# Patient Record
Sex: MALE | Race: WHITE | Employment: FULL TIME | URBAN - NONMETROPOLITAN AREA
[De-identification: names, ages, dates, MRNs, and addresses within clinical notes are randomized per-mention and may not be internally consistent; named-entity substitution may affect disease eponyms.]

---

## 2017-02-21 ENCOUNTER — OFFICE VISIT (OUTPATIENT)
Dept: CARDIOLOGY | Age: 57
End: 2017-02-21
Payer: COMMERCIAL

## 2017-02-21 VITALS
DIASTOLIC BLOOD PRESSURE: 90 MMHG | HEIGHT: 76 IN | BODY MASS INDEX: 30.08 KG/M2 | SYSTOLIC BLOOD PRESSURE: 144 MMHG | HEART RATE: 62 BPM | WEIGHT: 247 LBS

## 2017-02-21 DIAGNOSIS — I10 ESSENTIAL HYPERTENSION: ICD-10-CM

## 2017-02-21 DIAGNOSIS — I25.10 CORONARY ARTERY DISEASE INVOLVING NATIVE CORONARY ARTERY OF NATIVE HEART WITHOUT ANGINA PECTORIS: Primary | ICD-10-CM

## 2017-02-21 PROCEDURE — 99212 OFFICE O/P EST SF 10 MIN: CPT | Performed by: INTERNAL MEDICINE

## 2017-04-04 DIAGNOSIS — I10 ESSENTIAL HYPERTENSION: Primary | ICD-10-CM

## 2017-04-04 RX ORDER — LISINOPRIL 20 MG/1
20 TABLET ORAL 2 TIMES DAILY
Qty: 30 TABLET | Refills: 0 | Status: SHIPPED | OUTPATIENT
Start: 2017-04-04 | End: 2017-04-04 | Stop reason: SDUPTHER

## 2017-04-04 RX ORDER — LISINOPRIL 20 MG/1
20 TABLET ORAL 2 TIMES DAILY
Qty: 180 TABLET | Refills: 3 | Status: SHIPPED | OUTPATIENT
Start: 2017-04-04

## 2018-03-16 ENCOUNTER — TELEPHONE (OUTPATIENT)
Dept: CARDIOLOGY | Age: 58
End: 2018-03-16

## 2018-05-04 ENCOUNTER — TELEPHONE (OUTPATIENT)
Dept: CARDIOLOGY | Age: 58
End: 2018-05-04

## 2019-03-22 ENCOUNTER — OFFICE VISIT (OUTPATIENT)
Dept: CARDIOLOGY | Age: 59
End: 2019-03-22
Payer: COMMERCIAL

## 2019-03-22 VITALS
BODY MASS INDEX: 29.59 KG/M2 | HEART RATE: 66 BPM | DIASTOLIC BLOOD PRESSURE: 72 MMHG | HEIGHT: 76 IN | SYSTOLIC BLOOD PRESSURE: 130 MMHG | WEIGHT: 243 LBS

## 2019-03-22 DIAGNOSIS — I10 ESSENTIAL HYPERTENSION: ICD-10-CM

## 2019-03-22 DIAGNOSIS — I25.10 CORONARY ARTERY DISEASE INVOLVING NATIVE CORONARY ARTERY OF NATIVE HEART WITHOUT ANGINA PECTORIS: Primary | ICD-10-CM

## 2019-03-22 DIAGNOSIS — Z72.0 TOBACCO ABUSE: ICD-10-CM

## 2019-03-22 PROCEDURE — 93000 ELECTROCARDIOGRAM COMPLETE: CPT | Performed by: CLINICAL NURSE SPECIALIST

## 2019-03-22 PROCEDURE — 99213 OFFICE O/P EST LOW 20 MIN: CPT | Performed by: CLINICAL NURSE SPECIALIST

## 2019-03-25 ENCOUNTER — HOSPITAL ENCOUNTER (OUTPATIENT)
Dept: NON INVASIVE DIAGNOSTICS | Age: 59
Discharge: HOME | End: 2019-03-25
Payer: COMMERCIAL

## 2019-03-25 VITALS — SYSTOLIC BLOOD PRESSURE: 150 MMHG | HEART RATE: 86 BPM | DIASTOLIC BLOOD PRESSURE: 95 MMHG

## 2019-03-25 DIAGNOSIS — I10 ESSENTIAL HYPERTENSION: ICD-10-CM

## 2019-03-25 DIAGNOSIS — I25.10 CORONARY ARTERY DISEASE INVOLVING NATIVE CORONARY ARTERY OF NATIVE HEART WITHOUT ANGINA PECTORIS: ICD-10-CM

## 2019-03-25 LAB
LV EF: 35 %
LVEF MODALITY: NORMAL

## 2019-03-25 PROCEDURE — 93350 STRESS TTE ONLY: CPT

## 2019-03-25 PROCEDURE — 2500000003 HC RX 250 WO HCPCS: Performed by: INTERNAL MEDICINE

## 2019-03-25 RX ORDER — METOPROLOL TARTRATE 5 MG/5ML
5 INJECTION INTRAVENOUS PRN
Status: DISCONTINUED | OUTPATIENT
Start: 2019-03-25 | End: 2019-03-26 | Stop reason: HOSPADM

## 2019-03-25 RX ADMIN — METOPROLOL TARTRATE 5 MG: 1 INJECTION, SOLUTION INTRAVENOUS at 16:10

## 2019-03-27 DIAGNOSIS — Z95.5 PRESENCE OF STENT IN RIGHT CORONARY ARTERY: Primary | ICD-10-CM

## 2019-03-27 DIAGNOSIS — Z72.0 TOBACCO ABUSE: ICD-10-CM

## 2019-03-27 DIAGNOSIS — I25.10 CORONARY ARTERY DISEASE INVOLVING NATIVE CORONARY ARTERY OF NATIVE HEART WITHOUT ANGINA PECTORIS: ICD-10-CM

## 2019-04-11 ENCOUNTER — APPOINTMENT (OUTPATIENT)
Dept: GENERAL RADIOLOGY | Age: 59
End: 2019-04-11
Attending: INTERNAL MEDICINE
Payer: COMMERCIAL

## 2019-04-11 ENCOUNTER — TELEPHONE (OUTPATIENT)
Dept: CARDIOLOGY | Age: 59
End: 2019-04-11

## 2019-04-11 ENCOUNTER — HOSPITAL ENCOUNTER (OUTPATIENT)
Dept: CARDIAC CATH/INVASIVE PROCEDURES | Age: 59
Setting detail: OUTPATIENT SURGERY
Discharge: HOME | End: 2019-04-11
Attending: INTERNAL MEDICINE | Admitting: INTERNAL MEDICINE
Payer: COMMERCIAL

## 2019-04-11 VITALS
DIASTOLIC BLOOD PRESSURE: 73 MMHG | BODY MASS INDEX: 29.83 KG/M2 | SYSTOLIC BLOOD PRESSURE: 139 MMHG | HEIGHT: 76 IN | OXYGEN SATURATION: 93 % | WEIGHT: 245 LBS | RESPIRATION RATE: 15 BRPM | TEMPERATURE: 99.1 F | HEART RATE: 64 BPM

## 2019-04-11 DIAGNOSIS — R94.39 ABNORMAL STRESS ECHO: ICD-10-CM

## 2019-04-11 LAB
ANION GAP SERPL CALCULATED.3IONS-SCNC: 12 MMOL/L (ref 7–19)
BUN BLDV-MCNC: 22 MG/DL (ref 8–23)
CALCIUM SERPL-MCNC: 9.5 MG/DL (ref 8.8–10.2)
CHLORIDE BLD-SCNC: 104 MMOL/L (ref 98–111)
CO2: 24 MMOL/L (ref 22–29)
CREAT SERPL-MCNC: 1.1 MG/DL (ref 0.5–1.2)
GFR NON-AFRICAN AMERICAN: >60
GLUCOSE BLD-MCNC: 110 MG/DL (ref 74–109)
HCT VFR BLD CALC: 49.9 % (ref 42–52)
HEMOGLOBIN: 16.9 G/DL (ref 14–18)
MCH RBC QN AUTO: 30.7 PG (ref 27–31)
MCHC RBC AUTO-ENTMCNC: 33.9 G/DL (ref 33–37)
MCV RBC AUTO: 90.6 FL (ref 80–94)
PDW BLD-RTO: 12.8 % (ref 11.5–14.5)
PLATELET # BLD: 223 K/UL (ref 130–400)
PMV BLD AUTO: 10 FL (ref 9.4–12.4)
POTASSIUM SERPL-SCNC: 4.2 MMOL/L (ref 3.5–5)
RBC # BLD: 5.51 M/UL (ref 4.7–6.1)
SODIUM BLD-SCNC: 140 MMOL/L (ref 136–145)
WBC # BLD: 9.7 K/UL (ref 4.8–10.8)

## 2019-04-11 PROCEDURE — 99152 MOD SED SAME PHYS/QHP 5/>YRS: CPT | Performed by: INTERNAL MEDICINE

## 2019-04-11 PROCEDURE — 2580000003 HC RX 258: Performed by: INTERNAL MEDICINE

## 2019-04-11 PROCEDURE — 99153 MOD SED SAME PHYS/QHP EA: CPT | Performed by: INTERNAL MEDICINE

## 2019-04-11 PROCEDURE — 85027 COMPLETE CBC AUTOMATED: CPT

## 2019-04-11 PROCEDURE — 93571 IV DOP VEL&/PRESS C FLO 1ST: CPT | Performed by: INTERNAL MEDICINE

## 2019-04-11 PROCEDURE — 6360000004 HC RX CONTRAST MEDICATION: Performed by: INTERNAL MEDICINE

## 2019-04-11 PROCEDURE — 36415 COLL VENOUS BLD VENIPUNCTURE: CPT

## 2019-04-11 PROCEDURE — C1894 INTRO/SHEATH, NON-LASER: HCPCS

## 2019-04-11 PROCEDURE — 2709999900 HC NON-CHARGEABLE SUPPLY

## 2019-04-11 PROCEDURE — 6360000002 HC RX W HCPCS

## 2019-04-11 PROCEDURE — 71046 X-RAY EXAM CHEST 2 VIEWS: CPT

## 2019-04-11 PROCEDURE — 93459 L HRT ART/GRFT ANGIO: CPT | Performed by: INTERNAL MEDICINE

## 2019-04-11 PROCEDURE — C1769 GUIDE WIRE: HCPCS

## 2019-04-11 PROCEDURE — 80048 BASIC METABOLIC PNL TOTAL CA: CPT

## 2019-04-11 PROCEDURE — C1760 CLOSURE DEV, VASC: HCPCS

## 2019-04-11 RX ORDER — SODIUM CHLORIDE 9 MG/ML
INJECTION, SOLUTION INTRAVENOUS CONTINUOUS
Status: DISCONTINUED | OUTPATIENT
Start: 2019-04-11 | End: 2019-04-11 | Stop reason: HOSPADM

## 2019-04-11 RX ORDER — ACETAMINOPHEN 325 MG/1
650 TABLET ORAL EVERY 4 HOURS PRN
Status: DISCONTINUED | OUTPATIENT
Start: 2019-04-11 | End: 2019-04-11 | Stop reason: HOSPADM

## 2019-04-11 RX ORDER — SODIUM CHLORIDE 0.9 % (FLUSH) 0.9 %
10 SYRINGE (ML) INJECTION PRN
Status: DISCONTINUED | OUTPATIENT
Start: 2019-04-11 | End: 2019-04-11 | Stop reason: HOSPADM

## 2019-04-11 RX ADMIN — IOPAMIDOL 156 ML: 612 INJECTION, SOLUTION INTRAVENOUS at 13:33

## 2019-04-11 RX ADMIN — SODIUM CHLORIDE: 9 INJECTION, SOLUTION INTRAVENOUS at 10:09

## 2019-04-11 NOTE — PROCEDURES
Cardiac Risk Profile -         Risk Factor Yes / No / Unknown       Gender Male   Cigarette Use Yes:    Family History of Cardiovascular Disease Yes: coronary art dis, hypertension, diabetes   Diabetes Mellitus no   Hypercholesteremia yes   Hypertension yes        Prior Cardiac History -    5/3/2009  Acute inferior MI  5/3/2009  Cath  3.0x30 KATYA to RCA, minimual inferior hypokinesis, EF 50%  6/12/2009  SE  negative for myocardial ischemia  4/4/2013  DSE  negative for myocardial ischemia  7/27/2015  SE negative for myocardial ischemia  3/25/2019  SE Positive for echo myocardial ischemia, intermediate risk findings, AUC indication 16, AUC score 4   4/11/19 cath angiographic significant LAD (iFR 0.87, 0.87, 0.86) & diag (0.97, 0.97, 0.97), o/w luminals, normal LVFX      Indications for Cardiac Catheterization -  3/25/2019  SE Positive for echo myocardial ischemia, intermediate risk findings, AUC indication 16, AUC score 4 , Diagnostic Catheterization Appropriate Use Criteria Description, [facility] 2012;59:[id number]      Conscious Sedation Protocol Used During this Procedure -          Anesthesia: Moderate   Sedation: 3 mg Midazolam (Versed)  50 mcg Fentanyl   Start time: 12:40   Stop time: 13:17   ASA Class: III              After obtaining informed written consent, the patient was brought to the catheterization laboratory where the right groin was prepared in the usual sterile fashion. The patient was monitored continuously with ECG, pulse oximetry, blood pressure monitoring and direct observation. Additionally, please review the \"Kelin Hemodynamic Procedure Report\", which is generated electronically through the [employer]. This report includes additional details regarding this procedure including, but not limited to:     1. Patient Data,   2. Admission,   3. Procedure,   4. Hemodynamics,   5. Vital Signs,   6.  Medications, including conscious sedation medications given during the procedure (as noted above),   7. Procedure Log,   8. Device Usage,   9. Signature Audit [place], and,   10. Signatures. It should be noted, that I sign the \"Signatures\" line electronically through the \"HPF Def Portals\" tab on my computer. 2% lidocaine was injected above the common femoral artery. Utilizing ultrasound assistance and the Seldinger technique, the common femoral artery was cannulated and bright red pulsatile blood returned. Using fluoroscopy guidance, the J-tip wire was placed in the common femoral artery. A 6-Fr sheath was inserted. Utilizing 6-Lao Ab catheters, selective coronary arteriography was performed followed by left ventriculography. At this stage, the equipment was removed. A right femoral arteriogram was performed to ensure patency of the vessel so that a vascular access closure device could be deployed. A 6-Lao Mynx vascular access closure device was then inserted. Hemostasis was achieved. A sterile dressing was applied. He was taken to his room in stable and satisfactory condition. The results of the procedure were discussed with the patient's family in there CVI holding room. Complications:  none      Results:    Hemodynamics:      Site Pressure mmHg        Left ventricular pressure 138/0 mmHg   Left ventricular end-diastolic pressure (LVEDP) 12 mmHg   Aortic pressure 136/66 mmHg   Mean aortic pressure 96 mmHg       Angiography:    Coronary Arteries:    Left Main Coronary Artery:  A large vessel which arises from the left sinus of Valsalva. It divides into the left anterior descending coronary artery and the left circumflex. There is mild diffuse disease throughout the entire length of the vessel. Left Anterior Coronary Artery:  The LAD is a moderate sized vessel with several diagonal branches.    There is a complex 90% stenosis at the mid LAD which involves the proximal LAD and large 1st diagonal.  There is other wise mild diffuse disease throughout the entire length of the vessel. The iFR across the diagonal lesion was 0.97, 0.97, 0.97. The iFR across the proximal LAD lesion was 0.87, 0.87, 0.86. Left Circumflex Coronary Artery:  The LCx is a moderate sized vessel with several marginal branches. There is mild diffuse disease throughout the entire length of the vessel. Right Coronary Artery:  The RCA is a moderate sized dominant vessel which arises from the right sinus of Valsalva. There is mild diffuse disease throughout the entire length of the vessel. The stent in the mid RCA is patent. Left Ventriculogram:  The left ventriculogram is obtained in the right oblique projection. All regional wall segments move appropriately. The estimated visual ejection fraction is > 60%. There is no mitral regurgitation nor is there a pull back gradient seen across the aortic valve. Internal Mammary Artery Angiography    Left internal mammary artery angiography:  The left ZION is widely patent and ungrafted. It appears to be of suitable caliber for the use in bypass surgery in the future should the need arise. It was injected for surgical evaluation. Right internal mammary artery angiography:  The right ZION is widely patent and ungrafted. It appears to be of suitable caliber for the use in bypass surgery in the future should the need arise. It was injected for surgical evaluation. Summary:      1. Successful femoral artery ultrasound  2. Successful femoral artery arteriogram  3. Supervision of the administration of moderate conscious sedation  4. Single vessel coronary artery disease involving the LAD and diagonal which were physiologically significant by iFR measurements  5. Left ventricular function is normal  6. Patent un grafted left ZION  7. Patent un grafted right ZION      RECOMMENDATIONS:    1.  Reassurance  2. Risk factor modification  3. Evaluation of etiologies of non cardiac chest discomfort should symptoms persist or progress  4.   Follow Up with Primary care provider as arranged  5.   Consideration for open heart surgery      Electronically signed by Aubrey Rodriguez MD on 4/11/19

## 2019-04-11 NOTE — DISCHARGE SUMMARY
syncope, presyncope, arrhythmia, edema and fatigue.  The patient denies numbness or weakness to suggest cerebrovascular accident or transient ischemic attack. Aj Boogie is still smoking but has cut back\"     Additionally,  It was noted by the same observer at that time: \"We discussed importance of smoking cessation and ways to cut back or quit. 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 If that is negative we'll see him back in 6 months\"    With these symptoms, the patient underwent the following diagnostic endevors with the following results:    3/25/2019  SE Positive for echo myocardial ischemia, intermediate risk findings, AUC indication 16, AUC score 4     He was electively admitted for cardiac catheterization. Hospital Course: The patient underwent cardiac catheterization according to the following criteria:  3/25/2019  SE Positive for echo myocardial ischemia, intermediate risk findings, AUC indication 16, AUC score 4 , Diagnostic Catheterization Appropriate Use Criteria Description, [facility] 2012;59:[id number]    Selective left heart and coronary arteriography was preformed, which revealed:    1. Successful femoral artery ultrasound  2. Successful femoral artery arteriogram  3. Supervision of the administration of moderate conscious sedation  4. Single vessel coronary artery disease involving the LAD and diagonal which were physiologically significant by iFR measurements  5. Left ventricular function is normal  6. Patent un grafted left ZION  7. Patent un grafted right ZION. There were no apparent complications. With the findings of an angiographic complex lesion involving the LAD and diagonal, I felt that surgical revascularization would be preferable to percutaneous methods. I contacted Dr. Geary Kussmaul, at [facility], who has graciously agreed to provide surgical consultation.       The rest of the patient's hospitalization was uneventful. He was discharged home on medical therapy with outpatient follow up. Arrangements are underway for the patient to see Dr. Clark Hancock in his office. Consults:     None      Significant Diagnostic Studies:    1. Selective left heart and coronary arteriography with possible percutaneous coronary interventon, (femoral approach), 04/11/19     Significant Therapeutic Endeavors:      1. none      Activity: activity as directed per discharge instructions. Diet:  Cardiac diet    Labs: For convenience and continuity at follow-up the following most recent labs are provided:    CBC:    Lab Results   Component Value Date    WBC 9.7 04/11/2019    HGB 16.9 04/11/2019    HCT 49.9 04/11/2019    HCT 47.1 01/30/2011     04/11/2019     01/30/2011       Renal:    Lab Results   Component Value Date     04/11/2019     01/30/2011    K 4.2 04/11/2019    K 4.6 01/30/2011     04/11/2019     01/30/2011    CO2 24 04/11/2019    BUN 22 04/11/2019    CREATININE 1.1 04/11/2019    CREATININE 1.1 01/30/2011    CALCIUM 9.5 04/11/2019         Discharge Medications:     Current Discharge Medication List           Details   lisinopril (PRINIVIL;ZESTRIL) 20 MG tablet Take 1 tablet by mouth 2 times daily  Qty: 180 tablet, Refills: 3    Associated Diagnoses: Essential hypertension      cloNIDine (CATAPRES) 0.1 MG tablet Take 1 tablet by mouth nightly  Qty: 90 tablet, Refills: 3    Associated Diagnoses: Essential hypertension      amLODIPine (NORVASC) 2.5 MG tablet Take 2 tablets by mouth 2 times daily  Qty: 180 tablet, Refills: 3    Associated Diagnoses: Essential hypertension      rosuvastatin (CRESTOR) 20 MG tablet Take 20 mg by mouth daily. metoprolol (LOPRESSOR) 25 MG tablet Take 25 mg by mouth 2 times daily. aspirin 325 MG tablet Take 325 mg by mouth daily.         nitroGLYCERIN (NITROSTAT) 0.4 MG SL tablet Place 1 tablet under the tongue every 5 minutes as needed. Qty: 25 tablet, Refills: 11    Associated Diagnoses: Angina pectoris (HCC)      Multiple Vitamin (MULTI-VITAMIN) TABS Take  by mouth daily. Condition At Discharge:  Improved    Disposition:      1. Home  2. Follow up with cardiology as arranged  3. Follow up with primary care provider as arranged      [id number] Lia Lazar with me to discharge after the bedrest is complete, hemostatis is achieved, the patient is hemodynamically stable and comfortable. Please remind the patient that driving is absolutely prohibited for the next day (24 hours) after this procedure. Additionally, caution should be exercised to avoid heights, swimming, tub baths, open flames, or heavy machinery.         Electronically signed by Eladio Manrique MD on 4/11/19

## 2019-04-11 NOTE — TELEPHONE ENCOUNTER
[facility] [place] in [facility] called wanting records on pt. I faxed records to fax number [phone].

## 2019-04-11 NOTE — H&P
ABNER Piña   Nurse Practitioner   Certified Nurse Specialist   Progress Notes      Signed   Encounter Date:  3/22/2019          Related encounter: Office Visit from 3/22/2019 in [facility]         Signed        Expand All Collapse All          Show:Clear all  [x]Manual[x]Template[]Copied    Added by:  [x]ABNER Pham      []Jose for details      [facility] [address] [id number]  Phone: [phone]  Fax: [phone]     OFFICE VISIT:  3/22/2019     [address]: 1960     Reason For Visit:  Jose Burnette is a 59 y.o. male who is here for Follow-up (No cardiac symptoms today.) and Coronary Artery Disease  History of coronary disease and stenting in 2009. Last stress test was negative in 2015  He returns today in follow up with no complaints. 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     Subjective  Jose Burnette denies exertional chest pain, shortness of breath, orthopnea, paroxysmal nocturnal dyspnea, syncope, presyncope, arrhythmia, edema and fatigue. The patient denies numbness or weakness to suggest cerebrovascular accident or transient ischemic attack. He is still smoking but has cut back  Dilip Crisostomo MD is PCP and follows labs including LIPIDS.   Gio Broderick has the following history as recorded in [facility]:          Patient Active Problem List     Diagnosis Date Noted    Nicotine abuse 05/19/2014    Old inferior wall myocardial infarction 05/19/2014    Presence of stent in right coronary artery 04/02/2013    CAD (coronary artery disease) 11/08/2011    Hypertension 11/08/2011    Tobacco abuse 10/26/2011      Past Medical History        Past Medical History:   Diagnosis Date    Acute MI ([address]       inferior wall, interrupted by direct heart cath and angioplasty to the RCA 5/3/09, successful with normal LV function    CAD (coronary artery disease) 11/8/2011    Hyperlipidemia       PCP manages cholesterol    Hypertension       Systemic    Hypertension 11/8/2011    Kidney stone      Nephrolithiasis       history of    Nicotine abuse 5/19/2014    Old inferior wall myocardial infarction 5/19/2014    Presence of stent in right coronary artery 4/2/2013    Tobacco abuse 10/26/2011         Past Surgical History         Past Surgical History:   Procedure Laterality Date    DIAGNOSTIC CARDIAC CATH LAB PROCEDURE   [id number]     EF is > 55%. See scanned document.  LITHOTRIPSY             Family History         Family History   Problem Relation Age of Onset    Coronary Art Dis Mother      Hypertension Mother      Coronary Art Dis Father      Hypertension Father      Hypertension Maternal Aunt      Diabetes Maternal Aunt      Hypertension Maternal Uncle      Diabetes Maternal Uncle           Social History            Tobacco Use    Smoking status: Current Every Day Smoker       Packs/day: 1.50       Types: Cigarettes    Smokeless tobacco: Never Used   Substance Use Topics    Alcohol use: Yes       Alcohol/week: 0.0 oz      Current Facility-Administered Medications          Current Outpatient Medications   Medication Sig Dispense Refill    lisinopril (PRINIVIL;ZESTRIL) 20 MG tablet Take 1 tablet by mouth 2 times daily 180 tablet 3    cloNIDine (CATAPRES) 0.1 MG tablet Take 1 tablet by mouth nightly 90 tablet 3    amLODIPine (NORVASC) 2.5 MG tablet Take 2 tablets by mouth 2 times daily 180 tablet 3    rosuvastatin (CRESTOR) 20 MG tablet Take 20 mg by mouth daily.        nitroGLYCERIN (NITROSTAT) 0.4 MG SL tablet Place 1 tablet under the tongue every 5 minutes as needed.  25 tablet 11    Multiple Vitamin (MULTI-VITAMIN) TABS Take  by mouth daily.          metoprolol (LOPRESSOR) 25 MG tablet Take 25 mg by mouth 2 times daily.          aspirin 325 MG tablet Take 325 mg by mouth daily.            No current facility-administered medications for this visit.          Allergies: Patient has no known allergies.     Review of Systems  Constitutional - no significant activity change, appetite change, or unexpected weight change. No fever, chills or diaphoresis. No fatigue. HEENT - no significant rhinorrhea or epistaxis. No tinnitus or significant hearing loss. Eyes - no sudden vision change or amaurosis. Respiratory - no significant wheezing, stridor, apnea or cough. No dyspnea on exertion or shortness of breath. Cardiovascular - no exertional chest pain, orthopnea or PND. No sensation of arrhythmia or slow heart rate. No claudication or leg edema. Gastrointestinal - no abdominal swelling or pain. No blood in stool. No severe constipation, diarrhea, nausea, or vomiting. Genitourinary - no difficulty urinating, dysuria, frequency, or urgency. No flank pain or hematuria. Musculoskeletal - no back pain, gait disturbance, or myalgia. Skin - no color change or rash. No pallor. No new surgical incision. Neurologic - no speech difficulty, facial asymmetry or lateralizing weakness. No seizures, presyncope, syncope, or significant dizziness. Hematologic - no easy bruising or excessive bleeding. Psychiatric - no severe anxiety or insomnia. No confusion. All other review of systems are negative.        Objective  Vital Signs - /72   Pulse 66   Ht 6' 4\" (1.93 m)   Wt 243 lb (110.2 kg)   BMI 29.58 kg/m²   General - Mikey Draper is alert, cooperative, and pleasant. Well groomed. No acute distress. Body habitus is overweight. HEENT - The head is normocephalic. No circumoral cyanosis. Dentition is normal.   EYES -  No Xanthelasma, no arcus senilis, no conjunctival hemorrhages or discharge. Neck - Supple, without increased jugular venous pressures. No carotid bruits. No mass. Respiratory - Lungs are clear bilaterally. No wheezes or rales. Normal effort without use of accessory muscles. Cardiovascular - Heart has regular rhythm and rate. No murmurs, rubs or gallops. + pedal pulses and no varicosities. bring your medications bottles with you to each visit - this is for your safety!         ABNER Hernadez        This note was in part dictated using Dragon dictation device. Date of the Proposed Procedure:  4/11/2019     Proposed Procedure:  Selective left heart and coronary arteriography with possible percutaneous coronary interventon, (femoral approach), 04/11/19      :  DEBBIE Ernandez MD    Indications:  3/25/2019  SE Positive for echo myocardial ischemia, intermediate risk findings, AUC indication 16, AUC score 4     American Society of Anesthesiologists (ASA) Classification:  III    Plan of Sedation:  Moderate Sedation    Mallampati Classification:  II    I have examined this patient on 04/11/19 in CVI holding in the presence of Donavan Rey RN and find no interval changes since the original History and Physical / Consult as noted written by ABNER Hernadez, on 3/22/2019    With the constellation of symptoms and these findings, I recommend cardiac catheterization and possibility of percutaneous coronary intervention. I discussed with him  in detail  the risks, benefits and alternatives to this procedure. The risks mentioned to him include but are not limited to:  vascular complications in ~ 3%, stroke <1%, renal dysfunction <5%, myocardial infarction <1%, coronary dissection <1%, need for emergency open heart surgery <1, and death <1% . He appeared to understand, had no questions, and agreed to proceed with this plan. Additionally, there are risks associated with moderate sedation which includes transient drop in blood pressure and need for assisted ventilation. This procedure is scheduled for 04/11/19 .     Aurora Goel MD

## 2019-04-12 ENCOUNTER — HOSPITAL ENCOUNTER (OUTPATIENT)
Facility: HOSPITAL | Age: 59
Setting detail: SURGERY ADMIT
End: 2019-04-12
Attending: THORACIC SURGERY (CARDIOTHORACIC VASCULAR SURGERY) | Admitting: THORACIC SURGERY (CARDIOTHORACIC VASCULAR SURGERY)

## 2019-04-12 ENCOUNTER — PREP FOR SURGERY (OUTPATIENT)
Dept: OTHER | Facility: HOSPITAL | Age: 59
End: 2019-04-12

## 2019-04-12 DIAGNOSIS — I25.118 CORONARY ARTERY DISEASE OF NATIVE HEART WITH STABLE ANGINA PECTORIS, UNSPECIFIED VESSEL OR LESION TYPE (HCC): Primary | ICD-10-CM

## 2019-04-12 LAB
LV EF: 60 %
LVEF MODALITY: NORMAL

## 2019-04-15 ENCOUNTER — APPOINTMENT (OUTPATIENT)
Dept: GENERAL RADIOLOGY | Facility: HOSPITAL | Age: 59
End: 2019-04-15

## 2019-04-15 ENCOUNTER — APPOINTMENT (OUTPATIENT)
Dept: RESPIRATORY THERAPY | Facility: HOSPITAL | Age: 59
End: 2019-04-15

## 2019-04-15 ENCOUNTER — APPOINTMENT (OUTPATIENT)
Dept: CARDIOLOGY | Facility: HOSPITAL | Age: 59
End: 2019-04-15

## 2019-04-15 ENCOUNTER — HOSPITAL ENCOUNTER (INPATIENT)
Facility: HOSPITAL | Age: 59
LOS: 5 days | Discharge: HOME HEALTH | End: 2019-04-20
Attending: THORACIC SURGERY (CARDIOTHORACIC VASCULAR SURGERY) | Admitting: THORACIC SURGERY (CARDIOTHORACIC VASCULAR SURGERY)

## 2019-04-15 ENCOUNTER — OFFICE VISIT (OUTPATIENT)
Dept: CARDIAC SURGERY | Facility: CLINIC | Age: 59
End: 2019-04-15

## 2019-04-15 VITALS
HEIGHT: 76 IN | TEMPERATURE: 98.1 F | DIASTOLIC BLOOD PRESSURE: 84 MMHG | HEART RATE: 81 BPM | SYSTOLIC BLOOD PRESSURE: 168 MMHG | OXYGEN SATURATION: 98 % | WEIGHT: 245 LBS | BODY MASS INDEX: 29.83 KG/M2

## 2019-04-15 DIAGNOSIS — Z95.1 S/P CABG X 2: Primary | ICD-10-CM

## 2019-04-15 DIAGNOSIS — I25.119 CORONARY ARTERY DISEASE INVOLVING NATIVE HEART WITH ANGINA PECTORIS, UNSPECIFIED VESSEL OR LESION TYPE (HCC): Primary | ICD-10-CM

## 2019-04-15 PROBLEM — I25.10 CAD (CORONARY ARTERY DISEASE): Status: ACTIVE | Noted: 2019-04-15

## 2019-04-15 LAB
ABO GROUP BLD: NORMAL
ABO GROUP BLD: NORMAL
ALBUMIN SERPL-MCNC: 3.8 G/DL (ref 3.5–5.2)
ALBUMIN/GLOB SERPL: 1 G/DL
ALP SERPL-CCNC: 91 U/L (ref 39–117)
ALT SERPL W P-5'-P-CCNC: 16 U/L (ref 1–41)
ANION GAP SERPL CALCULATED.3IONS-SCNC: 15.7 MMOL/L
APTT PPP: 31.1 SECONDS (ref 22.7–35.4)
ARTERIAL PATENCY WRIST A: ABNORMAL
AST SERPL-CCNC: 18 U/L (ref 1–40)
ATMOSPHERIC PRESS: 753 MMHG
BACTERIA UR QL AUTO: NORMAL /HPF
BASE EXCESS BLDA CALC-SCNC: 1.7 MMOL/L (ref 0–2)
BASOPHILS # BLD AUTO: 0.05 10*3/MM3 (ref 0–0.2)
BASOPHILS NFR BLD AUTO: 0.6 % (ref 0–1.5)
BDY SITE: ABNORMAL
BH CV XLRA MEAS - DIST GSV CALF DIST LEFT: 0.25 CM
BH CV XLRA MEAS - DIST GSV CALF DIST RIGHT: 0.21 CM
BH CV XLRA MEAS - DIST GSV THIGH DIST LEFT: 0.34 CM
BH CV XLRA MEAS - DIST GSV THIGH DIST RIGHT: 0.25 CM
BH CV XLRA MEAS - GSV ANKLE DIST LEFT: 0.24 CM
BH CV XLRA MEAS - GSV ANKLE DIST RIGHT: 0.26 CM
BH CV XLRA MEAS - GSV KNEE DIST LEFT: 0.27 CM
BH CV XLRA MEAS - GSV KNEE DIST RIGHT: 0.23 CM
BH CV XLRA MEAS - GSV ORIGIN DIST LEFT: 0.47 CM
BH CV XLRA MEAS - GSV ORIGIN DIST RIGHT: 0.48 CM
BH CV XLRA MEAS - MID GSV CALF LEFT: 0.21 CM
BH CV XLRA MEAS - MID GSV CALF RIGHT: 0.15 CM
BH CV XLRA MEAS - MID GSV THIGH  LEFT: 0.32 CM
BH CV XLRA MEAS - MID GSV THIGH  RIGHT: 0.27 CM
BH CV XLRA MEAS - PROX GSV CALF DIST LEFT: 0.24 CM
BH CV XLRA MEAS - PROX GSV CALF DIST RIGHT: 0.26 CM
BH CV XLRA MEAS - PROX GSV THIGH  LEFT: 0.39 CM
BH CV XLRA MEAS - PROX GSV THIGH  RIGHT: 0.3 CM
BH CV XLRA MEAS LEFT CCA RATIO VEL: -82.1 CM/SEC
BH CV XLRA MEAS LEFT DIST CCA EDV: -16.3 CM/SEC
BH CV XLRA MEAS LEFT DIST CCA PSV: -82.1 CM/SEC
BH CV XLRA MEAS LEFT DIST ICA EDV: -14.2 CM/SEC
BH CV XLRA MEAS LEFT DIST ICA PSV: -69.3 CM/SEC
BH CV XLRA MEAS LEFT ICA RATIO VEL: -94.8 CM/SEC
BH CV XLRA MEAS LEFT ICA/CCA RATIO: 1.2
BH CV XLRA MEAS LEFT MID ICA EDV: -18.4 CM/SEC
BH CV XLRA MEAS LEFT MID ICA PSV: -94.8 CM/SEC
BH CV XLRA MEAS LEFT PROX CCA EDV: 23.7 CM/SEC
BH CV XLRA MEAS LEFT PROX CCA PSV: 118 CM/SEC
BH CV XLRA MEAS LEFT PROX ECA EDV: -15.4 CM/SEC
BH CV XLRA MEAS LEFT PROX ECA PSV: -96.1 CM/SEC
BH CV XLRA MEAS LEFT PROX ICA EDV: -15.6 CM/SEC
BH CV XLRA MEAS LEFT PROX ICA PSV: -67.2 CM/SEC
BH CV XLRA MEAS LEFT PROX SCLA PSV: 152 CM/SEC
BH CV XLRA MEAS LEFT VERTEBRAL A EDV: 14.2 CM/SEC
BH CV XLRA MEAS LEFT VERTEBRAL A PSV: 59.7 CM/SEC
BH CV XLRA MEAS RIGHT CCA RATIO VEL: -70.1 CM/SEC
BH CV XLRA MEAS RIGHT DIST CCA EDV: -18.4 CM/SEC
BH CV XLRA MEAS RIGHT DIST CCA PSV: -70.1 CM/SEC
BH CV XLRA MEAS RIGHT DIST ICA EDV: -28.3 CM/SEC
BH CV XLRA MEAS RIGHT DIST ICA PSV: -103 CM/SEC
BH CV XLRA MEAS RIGHT ICA RATIO VEL: -103 CM/SEC
BH CV XLRA MEAS RIGHT ICA/CCA RATIO: 1.5
BH CV XLRA MEAS RIGHT MID ICA EDV: -24.8 CM/SEC
BH CV XLRA MEAS RIGHT MID ICA PSV: -85.6 CM/SEC
BH CV XLRA MEAS RIGHT PROX CCA EDV: 18 CM/SEC
BH CV XLRA MEAS RIGHT PROX CCA PSV: 86.3 CM/SEC
BH CV XLRA MEAS RIGHT PROX ECA EDV: -12 CM/SEC
BH CV XLRA MEAS RIGHT PROX ECA PSV: -76.4 CM/SEC
BH CV XLRA MEAS RIGHT PROX ICA EDV: -21.9 CM/SEC
BH CV XLRA MEAS RIGHT PROX ICA PSV: -69.3 CM/SEC
BH CV XLRA MEAS RIGHT PROX SCLA PSV: 89.9 CM/SEC
BH CV XLRA MEAS RIGHT VERTEBRAL A EDV: 17 CM/SEC
BH CV XLRA MEAS RIGHT VERTEBRAL A PSV: 60.9 CM/SEC
BILIRUB SERPL-MCNC: 0.7 MG/DL (ref 0.2–1.2)
BILIRUB UR QL STRIP: NEGATIVE
BLD GP AB SCN SERPL QL: NEGATIVE
BUN BLD-MCNC: 14 MG/DL (ref 8–23)
BUN/CREAT SERPL: 11.8 (ref 7–25)
CALCIUM SPEC-SCNC: 9.6 MG/DL (ref 8.6–10.5)
CHLORIDE SERPL-SCNC: 97 MMOL/L (ref 98–107)
CHOLEST SERPL-MCNC: 132 MG/DL (ref 0–200)
CLARITY UR: CLEAR
CLOSE TME COLL+ADP + EPINEP PNL BLD: 95 %
CO2 SERPL-SCNC: 24.3 MMOL/L (ref 22–29)
COLOR UR: YELLOW
CREAT BLD-MCNC: 1.19 MG/DL (ref 0.76–1.27)
DEPRECATED RDW RBC AUTO: 42.5 FL (ref 37–54)
EOSINOPHIL # BLD AUTO: 0.16 10*3/MM3 (ref 0–0.4)
EOSINOPHIL NFR BLD AUTO: 2 % (ref 0.3–6.2)
ERYTHROCYTE [DISTWIDTH] IN BLOOD BY AUTOMATED COUNT: 12.9 % (ref 12.3–15.4)
GFR SERPL CREATININE-BSD FRML MDRD: 62 ML/MIN/1.73
GLOBULIN UR ELPH-MCNC: 3.7 GM/DL
GLUCOSE BLD-MCNC: 126 MG/DL (ref 65–99)
GLUCOSE UR STRIP-MCNC: NEGATIVE MG/DL
HBA1C MFR BLD: 5.3 % (ref 4.8–5.6)
HCO3 BLDA-SCNC: 26.1 MMOL/L (ref 22–28)
HCT VFR BLD AUTO: 50.1 % (ref 37.5–51)
HDLC SERPL-MCNC: 41 MG/DL (ref 40–60)
HGB BLD-MCNC: 16.5 G/DL (ref 13–17.7)
HGB UR QL STRIP.AUTO: ABNORMAL
HYALINE CASTS UR QL AUTO: NORMAL /LPF
IMM GRANULOCYTES # BLD AUTO: 0.02 10*3/MM3 (ref 0–0.05)
IMM GRANULOCYTES NFR BLD AUTO: 0.3 % (ref 0–0.5)
INR PPP: 0.98 (ref 0.9–1.1)
KETONES UR QL STRIP: NEGATIVE
LDLC SERPL CALC-MCNC: 63 MG/DL (ref 0–100)
LDLC/HDLC SERPL: 1.53 {RATIO}
LEFT ARM BP: NORMAL MMHG
LEUKOCYTE ESTERASE UR QL STRIP.AUTO: NEGATIVE
LYMPHOCYTES # BLD AUTO: 1.65 10*3/MM3 (ref 0.7–3.1)
LYMPHOCYTES NFR BLD AUTO: 20.7 % (ref 19.6–45.3)
MAGNESIUM SERPL-MCNC: 2.2 MG/DL (ref 1.6–2.4)
MCH RBC QN AUTO: 29.8 PG (ref 26.6–33)
MCHC RBC AUTO-ENTMCNC: 32.9 G/DL (ref 31.5–35.7)
MCV RBC AUTO: 90.6 FL (ref 79–97)
MODALITY: ABNORMAL
MONOCYTES # BLD AUTO: 1.01 10*3/MM3 (ref 0.1–0.9)
MONOCYTES NFR BLD AUTO: 12.7 % (ref 5–12)
NEUTROPHILS # BLD AUTO: 5.08 10*3/MM3 (ref 1.4–7)
NEUTROPHILS NFR BLD AUTO: 63.7 % (ref 42.7–76)
NITRITE UR QL STRIP: NEGATIVE
NRBC BLD AUTO-RTO: 0 /100 WBC (ref 0–0)
NT-PROBNP SERPL-MCNC: 43.6 PG/ML (ref 5–900)
PCO2 BLDA: 39.1 MM HG (ref 35–45)
PH BLDA: 7.43 PH UNITS (ref 7.35–7.45)
PH UR STRIP.AUTO: <=5 [PH] (ref 5–8)
PLATELET # BLD AUTO: 200 10*3/MM3 (ref 140–450)
PMV BLD AUTO: 10.4 FL (ref 6–12)
PO2 BLDA: 64.8 MM HG (ref 80–100)
POTASSIUM BLD-SCNC: 3.8 MMOL/L (ref 3.5–5.2)
PROT SERPL-MCNC: 7.5 G/DL (ref 6–8.5)
PROT UR QL STRIP: NEGATIVE
PROTHROMBIN TIME: 12.7 SECONDS (ref 11.7–14.2)
RBC # BLD AUTO: 5.53 10*6/MM3 (ref 4.14–5.8)
RBC # UR: NORMAL /HPF
REF LAB TEST METHOD: NORMAL
RH BLD: POSITIVE
RH BLD: POSITIVE
RIGHT ARM BP: NORMAL MMHG
SAO2 % BLDCOA: 93 % (ref 92–99)
SODIUM BLD-SCNC: 137 MMOL/L (ref 136–145)
SP GR UR STRIP: 1.02 (ref 1–1.03)
SQUAMOUS #/AREA URNS HPF: NORMAL /HPF
T&S EXPIRATION DATE: NORMAL
TOTAL RATE: 18 BREATHS/MINUTE
TRIGL SERPL-MCNC: 142 MG/DL (ref 0–150)
UROBILINOGEN UR QL STRIP: ABNORMAL
VLDLC SERPL-MCNC: 28.4 MG/DL (ref 5–40)
WBC NRBC COR # BLD: 7.97 10*3/MM3 (ref 3.4–10.8)
WBC UR QL AUTO: NORMAL /HPF

## 2019-04-15 PROCEDURE — 71046 X-RAY EXAM CHEST 2 VIEWS: CPT

## 2019-04-15 PROCEDURE — 85730 THROMBOPLASTIN TIME PARTIAL: CPT | Performed by: THORACIC SURGERY (CARDIOTHORACIC VASCULAR SURGERY)

## 2019-04-15 PROCEDURE — 85610 PROTHROMBIN TIME: CPT | Performed by: THORACIC SURGERY (CARDIOTHORACIC VASCULAR SURGERY)

## 2019-04-15 PROCEDURE — 93010 ELECTROCARDIOGRAM REPORT: CPT | Performed by: INTERNAL MEDICINE

## 2019-04-15 PROCEDURE — 99202 OFFICE O/P NEW SF 15 MIN: CPT | Performed by: NURSE PRACTITIONER

## 2019-04-15 PROCEDURE — 83735 ASSAY OF MAGNESIUM: CPT | Performed by: THORACIC SURGERY (CARDIOTHORACIC VASCULAR SURGERY)

## 2019-04-15 PROCEDURE — 82803 BLOOD GASES ANY COMBINATION: CPT

## 2019-04-15 PROCEDURE — 86900 BLOOD TYPING SEROLOGIC ABO: CPT

## 2019-04-15 PROCEDURE — 86923 COMPATIBILITY TEST ELECTRIC: CPT

## 2019-04-15 PROCEDURE — 80061 LIPID PANEL: CPT | Performed by: THORACIC SURGERY (CARDIOTHORACIC VASCULAR SURGERY)

## 2019-04-15 PROCEDURE — 86901 BLOOD TYPING SEROLOGIC RH(D): CPT

## 2019-04-15 PROCEDURE — 93970 EXTREMITY STUDY: CPT

## 2019-04-15 PROCEDURE — 86900 BLOOD TYPING SEROLOGIC ABO: CPT | Performed by: THORACIC SURGERY (CARDIOTHORACIC VASCULAR SURGERY)

## 2019-04-15 PROCEDURE — 85576 BLOOD PLATELET AGGREGATION: CPT | Performed by: THORACIC SURGERY (CARDIOTHORACIC VASCULAR SURGERY)

## 2019-04-15 PROCEDURE — 85025 COMPLETE CBC W/AUTO DIFF WBC: CPT | Performed by: THORACIC SURGERY (CARDIOTHORACIC VASCULAR SURGERY)

## 2019-04-15 PROCEDURE — 86850 RBC ANTIBODY SCREEN: CPT | Performed by: THORACIC SURGERY (CARDIOTHORACIC VASCULAR SURGERY)

## 2019-04-15 PROCEDURE — 80053 COMPREHEN METABOLIC PANEL: CPT | Performed by: THORACIC SURGERY (CARDIOTHORACIC VASCULAR SURGERY)

## 2019-04-15 PROCEDURE — 83880 ASSAY OF NATRIURETIC PEPTIDE: CPT | Performed by: THORACIC SURGERY (CARDIOTHORACIC VASCULAR SURGERY)

## 2019-04-15 PROCEDURE — 86901 BLOOD TYPING SEROLOGIC RH(D): CPT | Performed by: THORACIC SURGERY (CARDIOTHORACIC VASCULAR SURGERY)

## 2019-04-15 PROCEDURE — 93005 ELECTROCARDIOGRAM TRACING: CPT | Performed by: THORACIC SURGERY (CARDIOTHORACIC VASCULAR SURGERY)

## 2019-04-15 PROCEDURE — 94010 BREATHING CAPACITY TEST: CPT

## 2019-04-15 PROCEDURE — 83036 HEMOGLOBIN GLYCOSYLATED A1C: CPT | Performed by: THORACIC SURGERY (CARDIOTHORACIC VASCULAR SURGERY)

## 2019-04-15 PROCEDURE — 81001 URINALYSIS AUTO W/SCOPE: CPT | Performed by: THORACIC SURGERY (CARDIOTHORACIC VASCULAR SURGERY)

## 2019-04-15 PROCEDURE — 93880 EXTRACRANIAL BILAT STUDY: CPT

## 2019-04-15 PROCEDURE — 36600 WITHDRAWAL OF ARTERIAL BLOOD: CPT

## 2019-04-15 RX ORDER — ALPRAZOLAM 0.25 MG/1
0.25 TABLET ORAL EVERY 8 HOURS PRN
Status: DISCONTINUED | OUTPATIENT
Start: 2019-04-15 | End: 2019-04-16

## 2019-04-15 RX ORDER — CHLORHEXIDINE GLUCONATE 500 MG/1
1 CLOTH TOPICAL EVERY 12 HOURS PRN
Status: CANCELLED | OUTPATIENT
Start: 2019-04-15

## 2019-04-15 RX ORDER — ALBUTEROL SULFATE 2.5 MG/3ML
2.5 SOLUTION RESPIRATORY (INHALATION) ONCE AS NEEDED
Status: DISCONTINUED | OUTPATIENT
Start: 2019-04-15 | End: 2019-04-16

## 2019-04-15 RX ORDER — DIPHENHYDRAMINE HCL 25 MG
25 CAPSULE ORAL NIGHTLY PRN
Status: DISCONTINUED | OUTPATIENT
Start: 2019-04-15 | End: 2019-04-16

## 2019-04-15 RX ORDER — CHLORHEXIDINE GLUCONATE 0.12 MG/ML
15 RINSE ORAL EVERY 12 HOURS SCHEDULED
Status: COMPLETED | OUTPATIENT
Start: 2019-04-15 | End: 2019-04-16

## 2019-04-15 RX ORDER — METOPROLOL TARTRATE 50 MG/1
25 TABLET, FILM COATED ORAL 2 TIMES DAILY
COMMUNITY
Start: 2019-02-19 | End: 2019-04-20 | Stop reason: HOSPADM

## 2019-04-15 RX ORDER — CHLORHEXIDINE GLUCONATE 500 MG/1
1 CLOTH TOPICAL EVERY 12 HOURS
Status: DISCONTINUED | OUTPATIENT
Start: 2019-04-15 | End: 2019-04-16

## 2019-04-15 RX ORDER — AMLODIPINE BESYLATE 2.5 MG/1
2.5 TABLET ORAL 2 TIMES DAILY
Status: DISCONTINUED | OUTPATIENT
Start: 2019-04-15 | End: 2019-04-16

## 2019-04-15 RX ORDER — AMLODIPINE BESYLATE 2.5 MG/1
2.5 TABLET ORAL 2 TIMES DAILY
COMMUNITY
End: 2019-04-20 | Stop reason: HOSPADM

## 2019-04-15 RX ORDER — CLONIDINE HYDROCHLORIDE 0.1 MG/1
0.1 TABLET ORAL EVERY 24 HOURS
Status: DISCONTINUED | OUTPATIENT
Start: 2019-04-16 | End: 2019-04-16

## 2019-04-15 RX ORDER — TEMAZEPAM 15 MG/1
15 CAPSULE ORAL NIGHTLY PRN
Status: DISCONTINUED | OUTPATIENT
Start: 2019-04-15 | End: 2019-04-16

## 2019-04-15 RX ORDER — CEFAZOLIN SODIUM 2 G/100ML
2 INJECTION, SOLUTION INTRAVENOUS
Status: COMPLETED | OUTPATIENT
Start: 2019-04-16 | End: 2019-04-16

## 2019-04-15 RX ORDER — ROSUVASTATIN CALCIUM 20 MG/1
20 TABLET, COATED ORAL NIGHTLY
Status: DISCONTINUED | OUTPATIENT
Start: 2019-04-15 | End: 2019-04-16

## 2019-04-15 RX ORDER — CHLORHEXIDINE GLUCONATE 0.12 MG/ML
15 RINSE ORAL ONCE
Status: CANCELLED | OUTPATIENT
Start: 2019-04-15 | End: 2019-04-15

## 2019-04-15 RX ORDER — ACETAMINOPHEN 325 MG/1
650 TABLET ORAL EVERY 4 HOURS PRN
Status: DISCONTINUED | OUTPATIENT
Start: 2019-04-15 | End: 2019-04-16

## 2019-04-15 RX ORDER — NITROGLYCERIN 0.4 MG/1
0.4 TABLET SUBLINGUAL
Status: DISCONTINUED | OUTPATIENT
Start: 2019-04-15 | End: 2019-04-16

## 2019-04-15 RX ORDER — ASPIRIN 81 MG/1
81 TABLET, CHEWABLE ORAL DAILY
Status: DISCONTINUED | OUTPATIENT
Start: 2019-04-15 | End: 2019-04-16

## 2019-04-15 RX ADMIN — ROSUVASTATIN CALCIUM 20 MG: 20 TABLET, FILM COATED ORAL at 20:30

## 2019-04-15 RX ADMIN — CHLORHEXIDINE GLUCONATE 15 ML: 1.2 RINSE ORAL at 20:30

## 2019-04-15 RX ADMIN — AMLODIPINE BESYLATE 2.5 MG: 2.5 TABLET ORAL at 20:30

## 2019-04-15 RX ADMIN — ALPRAZOLAM 0.25 MG: 0.25 TABLET ORAL at 23:08

## 2019-04-15 RX ADMIN — METOPROLOL TARTRATE 25 MG: 25 TABLET ORAL at 20:30

## 2019-04-15 RX ADMIN — MUPIROCIN 10 APPLICATION: 20 OINTMENT TOPICAL at 20:31

## 2019-04-15 NOTE — H&P (VIEW-ONLY)
"Chaitanya Hair is a 58 y.o. male referred by Dr. Garcia    HPI    CC: chest pain    Mr. Hair is a pleasant 58 year old male who was referred to Dr. Perez for cardiac surgery evaluation after he underwent a stress test and subsequent cardiac catheterization while revealed multivessel CAD.  He was on his way to our office when he states he had an episode of chest pain while in the car.  His symptoms usually occur during exertion.  He states he usually feels \"exhausted with numb fingers.\"  This pain occurred at rest. He states it eased without intervention.  He described the pain as pressure. He has a strong family history of early coronary disease and death in his family.  Due to this increase in symptoms we will admit for observation and plan for surgery in the morning with Dr. Perez.    The following portions of the patient's history were reviewed and updated as appropriate: allergies, current medications, past family history, past medical history, past social history, past surgical history and problem list.    Review of Systems   Constitution: Positive for weakness.   HENT: Negative.    Eyes: Negative.    Cardiovascular: Positive for chest pain and leg swelling. Negative for palpitations.   Respiratory: Positive for shortness of breath and snoring. Negative for sleep disturbances due to breathing.    Endocrine: Negative.    Hematologic/Lymphatic: Negative.    Skin: Negative.    Musculoskeletal: Negative.    Gastrointestinal: Negative.    Genitourinary: Negative.    Psychiatric/Behavioral: Negative.    Allergic/Immunologic: Negative.        Physical Exam   Constitutional: He is oriented to person, place, and time. He appears well-developed and well-nourished.   HENT:   Head: Normocephalic and atraumatic.   Nose: Nose normal.   Mouth/Throat: Oropharynx is clear and moist. No oropharyngeal exudate.   Eyes: Conjunctivae and EOM are normal. Pupils are equal, round, and reactive to light. No scleral icterus. "   Neck: Normal range of motion. Neck supple. No JVD present.   Cardiovascular: Normal rate, regular rhythm and normal heart sounds.   Pulmonary/Chest: Effort normal and breath sounds normal. No respiratory distress.   Abdominal: Soft. Bowel sounds are normal. He exhibits no distension and no mass. There is no guarding.   Musculoskeletal: Normal range of motion. He exhibits no edema.   Neurological: He is alert and oriented to person, place, and time. No cranial nerve deficit.   Skin: Skin is warm and dry. He is not diaphoretic. No erythema.   Psychiatric: He has a normal mood and affect. His behavior is normal. Judgment and thought content normal.       Assessment/Plan     -Chest pain  -CAD  -hx of MI with stent placement in 2009  -Current tobacco abuse  -Hypertension    Plan: admit to hospital for monitoring and preop work up.      Thank you for the opportunity to participate in this patient's care.

## 2019-04-16 ENCOUNTER — APPOINTMENT (OUTPATIENT)
Dept: GENERAL RADIOLOGY | Facility: HOSPITAL | Age: 59
End: 2019-04-16

## 2019-04-16 ENCOUNTER — ANCILLARY PROCEDURE (OUTPATIENT)
Dept: PERIOP | Facility: HOSPITAL | Age: 59
End: 2019-04-16

## 2019-04-16 ENCOUNTER — ANESTHESIA (OUTPATIENT)
Dept: PERIOP | Facility: HOSPITAL | Age: 59
End: 2019-04-16

## 2019-04-16 ENCOUNTER — ANESTHESIA EVENT (OUTPATIENT)
Dept: PERIOP | Facility: HOSPITAL | Age: 59
End: 2019-04-16

## 2019-04-16 LAB
ALBUMIN SERPL-MCNC: 3.9 G/DL (ref 3.5–5.2)
ALBUMIN SERPL-MCNC: 4 G/DL (ref 3.5–5.2)
ANION GAP SERPL CALCULATED.3IONS-SCNC: 12.7 MMOL/L
ANION GAP SERPL CALCULATED.3IONS-SCNC: 16 MMOL/L
APTT PPP: 28.8 SECONDS (ref 22.7–35.4)
ARTERIAL PATENCY WRIST A: ABNORMAL
ARTERIAL PATENCY WRIST A: ABNORMAL
ATMOSPHERIC PRESS: 748.4 MMHG
ATMOSPHERIC PRESS: 750.2 MMHG
BASE EXCESS BLDA CALC-SCNC: -2.6 MMOL/L (ref 0–2)
BASE EXCESS BLDA CALC-SCNC: -3.8 MMOL/L (ref 0–2)
BASOPHILS # BLD AUTO: 0.07 10*3/MM3 (ref 0–0.2)
BASOPHILS NFR BLD AUTO: 0.4 % (ref 0–1.5)
BDY SITE: ABNORMAL
BDY SITE: ABNORMAL
BUN BLD-MCNC: 14 MG/DL (ref 8–23)
BUN BLD-MCNC: 17 MG/DL (ref 8–23)
BUN/CREAT SERPL: 10.4 (ref 7–25)
BUN/CREAT SERPL: 13.2 (ref 7–25)
CA-I BLD-MCNC: 5.1 MG/DL (ref 4.6–5.4)
CA-I SERPL ISE-MCNC: 1.28 MMOL/L (ref 1.15–1.35)
CALCIUM SPEC-SCNC: 8.6 MG/DL (ref 8.6–10.5)
CALCIUM SPEC-SCNC: 9.4 MG/DL (ref 8.6–10.5)
CHLORIDE SERPL-SCNC: 101 MMOL/L (ref 98–107)
CHLORIDE SERPL-SCNC: 102 MMOL/L (ref 98–107)
CO2 SERPL-SCNC: 20 MMOL/L (ref 22–29)
CO2 SERPL-SCNC: 22.3 MMOL/L (ref 22–29)
CREAT BLD-MCNC: 1.29 MG/DL (ref 0.76–1.27)
CREAT BLD-MCNC: 1.34 MG/DL (ref 0.76–1.27)
DEPRECATED RDW RBC AUTO: 43.7 FL (ref 37–54)
DEPRECATED RDW RBC AUTO: 44.6 FL (ref 37–54)
EOSINOPHIL # BLD AUTO: 0.35 10*3/MM3 (ref 0–0.4)
EOSINOPHIL NFR BLD AUTO: 2.2 % (ref 0.3–6.2)
ERYTHROCYTE [DISTWIDTH] IN BLOOD BY AUTOMATED COUNT: 13 % (ref 12.3–15.4)
ERYTHROCYTE [DISTWIDTH] IN BLOOD BY AUTOMATED COUNT: 13 % (ref 12.3–15.4)
FIBRINOGEN PPP-MCNC: 427 MG/DL (ref 219–464)
GFR SERPL CREATININE-BSD FRML MDRD: 54 ML/MIN/1.73
GFR SERPL CREATININE-BSD FRML MDRD: 57 ML/MIN/1.73
GLUCOSE BLD-MCNC: 141 MG/DL (ref 65–99)
GLUCOSE BLD-MCNC: 155 MG/DL (ref 65–99)
GLUCOSE BLDC GLUCOMTR-MCNC: 138 MG/DL (ref 70–130)
GLUCOSE BLDC GLUCOMTR-MCNC: 141 MG/DL (ref 70–130)
GLUCOSE BLDC GLUCOMTR-MCNC: 153 MG/DL (ref 70–130)
GLUCOSE BLDC GLUCOMTR-MCNC: 155 MG/DL (ref 70–130)
GLUCOSE BLDC GLUCOMTR-MCNC: 177 MG/DL (ref 70–130)
GLUCOSE BLDC GLUCOMTR-MCNC: 185 MG/DL (ref 70–130)
GLUCOSE BLDC GLUCOMTR-MCNC: 95 MG/DL (ref 70–130)
HCO3 BLDA-SCNC: 22.8 MMOL/L (ref 22–28)
HCO3 BLDA-SCNC: 23.1 MMOL/L (ref 22–28)
HCT VFR BLD AUTO: 42.5 % (ref 37.5–51)
HCT VFR BLD AUTO: 44.9 % (ref 37.5–51)
HGB BLD-MCNC: 14.1 G/DL (ref 13–17.7)
HGB BLD-MCNC: 14.5 G/DL (ref 13–17.7)
HOROWITZ INDEX BLD+IHG-RTO: 100 %
HOROWITZ INDEX BLD+IHG-RTO: 40 %
IMM GRANULOCYTES # BLD AUTO: 0.13 10*3/MM3 (ref 0–0.05)
IMM GRANULOCYTES NFR BLD AUTO: 0.8 % (ref 0–0.5)
INR PPP: 1.23 (ref 0.9–1.1)
LYMPHOCYTES # BLD AUTO: 1.63 10*3/MM3 (ref 0.7–3.1)
LYMPHOCYTES NFR BLD AUTO: 10.3 % (ref 19.6–45.3)
MAGNESIUM SERPL-MCNC: 3 MG/DL (ref 1.6–2.4)
MAGNESIUM SERPL-MCNC: 3.2 MG/DL (ref 1.6–2.4)
MCH RBC QN AUTO: 30.1 PG (ref 26.6–33)
MCH RBC QN AUTO: 30.3 PG (ref 26.6–33)
MCHC RBC AUTO-ENTMCNC: 32.3 G/DL (ref 31.5–35.7)
MCHC RBC AUTO-ENTMCNC: 33.2 G/DL (ref 31.5–35.7)
MCV RBC AUTO: 91.4 FL (ref 79–97)
MCV RBC AUTO: 93.2 FL (ref 79–97)
MODALITY: ABNORMAL
MODALITY: ABNORMAL
MONOCYTES # BLD AUTO: 1.02 10*3/MM3 (ref 0.1–0.9)
MONOCYTES NFR BLD AUTO: 6.5 % (ref 5–12)
NEUTROPHILS # BLD AUTO: 12.58 10*3/MM3 (ref 1.4–7)
NEUTROPHILS NFR BLD AUTO: 79.8 % (ref 42.7–76)
NRBC BLD AUTO-RTO: 0 /100 WBC (ref 0–0)
O2 A-A PPRESDIFF RESPIRATORY: 0.1 MMHG
O2 A-A PPRESDIFF RESPIRATORY: 0.3 MMHG
PCO2 BLDA: 42.5 MM HG (ref 35–45)
PCO2 BLDA: 46 MM HG (ref 35–45)
PEEP RESPIRATORY: 7.5 CM[H2O]
PEEP RESPIRATORY: 7.5 CM[H2O]
PH BLDA: 7.3 PH UNITS (ref 7.35–7.45)
PH BLDA: 7.34 PH UNITS (ref 7.35–7.45)
PHOSPHATE SERPL-MCNC: 3.2 MG/DL (ref 2.5–4.5)
PHOSPHATE SERPL-MCNC: 3.6 MG/DL (ref 2.5–4.5)
PLATELET # BLD AUTO: 155 10*3/MM3 (ref 140–450)
PLATELET # BLD AUTO: 174 10*3/MM3 (ref 140–450)
PMV BLD AUTO: 10 FL (ref 6–12)
PMV BLD AUTO: 10.3 FL (ref 6–12)
PO2 BLDA: 100 MM HG (ref 80–100)
PO2 BLDA: 79.6 MM HG (ref 80–100)
POTASSIUM BLD-SCNC: 4.7 MMOL/L (ref 3.5–5.2)
POTASSIUM BLD-SCNC: 5.4 MMOL/L (ref 3.5–5.2)
PROTHROMBIN TIME: 15.2 SECONDS (ref 11.7–14.2)
PSV: 8 CMH2O
RBC # BLD AUTO: 4.65 10*6/MM3 (ref 4.14–5.8)
RBC # BLD AUTO: 4.82 10*6/MM3 (ref 4.14–5.8)
SAO2 % BLDCOA: 94.3 % (ref 92–99)
SAO2 % BLDCOA: 97.4 % (ref 92–99)
SET MECH RESP RATE: 161
SODIUM BLD-SCNC: 136 MMOL/L (ref 136–145)
SODIUM BLD-SCNC: 138 MMOL/L (ref 136–145)
TOTAL RATE: 16 BREATHS/MINUTE
TOTAL RATE: 19 BREATHS/MINUTE
VENTILATOR MODE: ABNORMAL
VENTILATOR MODE: ABNORMAL
VT ON VENT VENT: 700 ML
WBC NRBC COR # BLD: 15.78 10*3/MM3 (ref 3.4–10.8)
WBC NRBC COR # BLD: 16.41 10*3/MM3 (ref 3.4–10.8)

## 2019-04-16 PROCEDURE — 85018 HEMOGLOBIN: CPT

## 2019-04-16 PROCEDURE — 85730 THROMBOPLASTIN TIME PARTIAL: CPT | Performed by: THORACIC SURGERY (CARDIOTHORACIC VASCULAR SURGERY)

## 2019-04-16 PROCEDURE — P9047 ALBUMIN (HUMAN), 25%, 50ML: HCPCS

## 2019-04-16 PROCEDURE — 85384 FIBRINOGEN ACTIVITY: CPT | Performed by: THORACIC SURGERY (CARDIOTHORACIC VASCULAR SURGERY)

## 2019-04-16 PROCEDURE — C1713 ANCHOR/SCREW BN/BN,TIS/BN: HCPCS | Performed by: THORACIC SURGERY (CARDIOTHORACIC VASCULAR SURGERY)

## 2019-04-16 PROCEDURE — 82803 BLOOD GASES ANY COMBINATION: CPT

## 2019-04-16 PROCEDURE — 85347 COAGULATION TIME ACTIVATED: CPT

## 2019-04-16 PROCEDURE — 94799 UNLISTED PULMONARY SVC/PX: CPT

## 2019-04-16 PROCEDURE — 33534 CABG ARTERIAL TWO: CPT | Performed by: THORACIC SURGERY (CARDIOTHORACIC VASCULAR SURGERY)

## 2019-04-16 PROCEDURE — 25010000002 NEOSTIGMINE 0.5 MG/ML SOLUTION: Performed by: ANESTHESIOLOGY

## 2019-04-16 PROCEDURE — 25010000002 PHENYLEPHRINE PER 1 ML: Performed by: ANESTHESIOLOGY

## 2019-04-16 PROCEDURE — 85025 COMPLETE CBC W/AUTO DIFF WBC: CPT | Performed by: THORACIC SURGERY (CARDIOTHORACIC VASCULAR SURGERY)

## 2019-04-16 PROCEDURE — 25010000003 PROTAMINE SULFATE PER 10 MG: Performed by: THORACIC SURGERY (CARDIOTHORACIC VASCULAR SURGERY)

## 2019-04-16 PROCEDURE — 83735 ASSAY OF MAGNESIUM: CPT | Performed by: THORACIC SURGERY (CARDIOTHORACIC VASCULAR SURGERY)

## 2019-04-16 PROCEDURE — 71045 X-RAY EXAM CHEST 1 VIEW: CPT

## 2019-04-16 PROCEDURE — 25010000002 MORPHINE PER 10 MG: Performed by: THORACIC SURGERY (CARDIOTHORACIC VASCULAR SURGERY)

## 2019-04-16 PROCEDURE — P9041 ALBUMIN (HUMAN),5%, 50ML: HCPCS | Performed by: THORACIC SURGERY (CARDIOTHORACIC VASCULAR SURGERY)

## 2019-04-16 PROCEDURE — 25010000002 PROPOFOL 10 MG/ML EMULSION: Performed by: ANESTHESIOLOGY

## 2019-04-16 PROCEDURE — 85014 HEMATOCRIT: CPT

## 2019-04-16 PROCEDURE — 25010000002 MIDAZOLAM PER 1 MG: Performed by: ANESTHESIOLOGY

## 2019-04-16 PROCEDURE — 25010000003 CEFAZOLIN IN DEXTROSE 2-4 GM/100ML-% SOLUTION: Performed by: THORACIC SURGERY (CARDIOTHORACIC VASCULAR SURGERY)

## 2019-04-16 PROCEDURE — 25010000002 VANCOMYCIN 1 G RECONSTITUTED SOLUTION

## 2019-04-16 PROCEDURE — 85610 PROTHROMBIN TIME: CPT | Performed by: THORACIC SURGERY (CARDIOTHORACIC VASCULAR SURGERY)

## 2019-04-16 PROCEDURE — C1751 CATH, INF, PER/CENT/MIDLINE: HCPCS | Performed by: ANESTHESIOLOGY

## 2019-04-16 PROCEDURE — 82947 ASSAY GLUCOSE BLOOD QUANT: CPT

## 2019-04-16 PROCEDURE — 25010000002 ONDANSETRON PER 1 MG: Performed by: ANESTHESIOLOGY

## 2019-04-16 PROCEDURE — 25010000002 ALBUMIN HUMAN 25% PER 50 ML

## 2019-04-16 PROCEDURE — 85027 COMPLETE CBC AUTOMATED: CPT | Performed by: THORACIC SURGERY (CARDIOTHORACIC VASCULAR SURGERY)

## 2019-04-16 PROCEDURE — 82330 ASSAY OF CALCIUM: CPT | Performed by: THORACIC SURGERY (CARDIOTHORACIC VASCULAR SURGERY)

## 2019-04-16 PROCEDURE — 25010000002 MAGNESIUM SULFATE PER 500 MG OF MAGNESIUM: Performed by: ANESTHESIOLOGY

## 2019-04-16 PROCEDURE — 93010 ELECTROCARDIOGRAM REPORT: CPT | Performed by: INTERNAL MEDICINE

## 2019-04-16 PROCEDURE — 02110Z9 BYPASS CORONARY ARTERY, TWO ARTERIES FROM LEFT INTERNAL MAMMARY, OPEN APPROACH: ICD-10-PCS | Performed by: THORACIC SURGERY (CARDIOTHORACIC VASCULAR SURGERY)

## 2019-04-16 PROCEDURE — 25010000002 METOCLOPRAMIDE PER 10 MG: Performed by: THORACIC SURGERY (CARDIOTHORACIC VASCULAR SURGERY)

## 2019-04-16 PROCEDURE — 93318 ECHO TRANSESOPHAGEAL INTRAOP: CPT

## 2019-04-16 PROCEDURE — 82962 GLUCOSE BLOOD TEST: CPT

## 2019-04-16 PROCEDURE — B24BZZ4 ULTRASONOGRAPHY OF HEART WITH AORTA, TRANSESOPHAGEAL: ICD-10-PCS | Performed by: ANESTHESIOLOGY

## 2019-04-16 PROCEDURE — 25010000002 ALBUMIN HUMAN 5% PER 50 ML: Performed by: THORACIC SURGERY (CARDIOTHORACIC VASCULAR SURGERY)

## 2019-04-16 PROCEDURE — 25010000002 HEPARIN (PORCINE) PER 1000 UNITS

## 2019-04-16 PROCEDURE — 25010000002 HEPARIN (PORCINE) PER 1000 UNITS: Performed by: ANESTHESIOLOGY

## 2019-04-16 PROCEDURE — 33534 CABG ARTERIAL TWO: CPT | Performed by: SPECIALIST/TECHNOLOGIST, OTHER

## 2019-04-16 PROCEDURE — 94002 VENT MGMT INPAT INIT DAY: CPT

## 2019-04-16 PROCEDURE — 25010000002 FENTANYL CITRATE (PF) 100 MCG/2ML SOLUTION: Performed by: ANESTHESIOLOGY

## 2019-04-16 PROCEDURE — 93005 ELECTROCARDIOGRAM TRACING: CPT | Performed by: THORACIC SURGERY (CARDIOTHORACIC VASCULAR SURGERY)

## 2019-04-16 PROCEDURE — 80069 RENAL FUNCTION PANEL: CPT | Performed by: THORACIC SURGERY (CARDIOTHORACIC VASCULAR SURGERY)

## 2019-04-16 PROCEDURE — A4648 IMPLANTABLE TISSUE MARKER: HCPCS | Performed by: THORACIC SURGERY (CARDIOTHORACIC VASCULAR SURGERY)

## 2019-04-16 PROCEDURE — 5A1221Z PERFORMANCE OF CARDIAC OUTPUT, CONTINUOUS: ICD-10-PCS | Performed by: THORACIC SURGERY (CARDIOTHORACIC VASCULAR SURGERY)

## 2019-04-16 DEVICE — SS SUTURE, 3 PER SLEEVE
Type: IMPLANTABLE DEVICE | Site: STERNUM | Status: FUNCTIONAL
Brand: MYO/WIRE II

## 2019-04-16 DEVICE — SS SUTURE, 4 PER SLEEVE
Type: IMPLANTABLE DEVICE | Site: STERNUM | Status: FUNCTIONAL
Brand: MYO/WIRE II

## 2019-04-16 RX ORDER — METOCLOPRAMIDE HYDROCHLORIDE 5 MG/ML
10 INJECTION INTRAMUSCULAR; INTRAVENOUS EVERY 6 HOURS
Status: COMPLETED | OUTPATIENT
Start: 2019-04-16 | End: 2019-04-17

## 2019-04-16 RX ORDER — POTASSIUM CHLORIDE 29.8 MG/ML
20 INJECTION INTRAVENOUS
Status: DISCONTINUED | OUTPATIENT
Start: 2019-04-16 | End: 2019-04-17

## 2019-04-16 RX ORDER — GABAPENTIN 300 MG/1
600 CAPSULE ORAL ONCE
Status: DISCONTINUED | OUTPATIENT
Start: 2019-04-16 | End: 2019-04-16 | Stop reason: HOSPADM

## 2019-04-16 RX ORDER — ALPRAZOLAM 0.25 MG/1
0.25 TABLET ORAL EVERY 8 HOURS PRN
Status: DISCONTINUED | OUTPATIENT
Start: 2019-04-16 | End: 2019-04-20 | Stop reason: HOSPADM

## 2019-04-16 RX ORDER — PROMETHAZINE HYDROCHLORIDE 25 MG/1
12.5 TABLET ORAL EVERY 6 HOURS PRN
Status: DISCONTINUED | OUTPATIENT
Start: 2019-04-16 | End: 2019-04-17

## 2019-04-16 RX ORDER — SODIUM CHLORIDE 0.9 % (FLUSH) 0.9 %
30 SYRINGE (ML) INJECTION ONCE AS NEEDED
Status: DISCONTINUED | OUTPATIENT
Start: 2019-04-16 | End: 2019-04-20 | Stop reason: HOSPADM

## 2019-04-16 RX ORDER — ROCURONIUM BROMIDE 10 MG/ML
INJECTION, SOLUTION INTRAVENOUS AS NEEDED
Status: DISCONTINUED | OUTPATIENT
Start: 2019-04-16 | End: 2019-04-16 | Stop reason: SURG

## 2019-04-16 RX ORDER — SODIUM CHLORIDE 9 MG/ML
INJECTION, SOLUTION INTRAVENOUS CONTINUOUS PRN
Status: DISCONTINUED | OUTPATIENT
Start: 2019-04-16 | End: 2019-04-16 | Stop reason: SURG

## 2019-04-16 RX ORDER — PROPOFOL 10 MG/ML
VIAL (ML) INTRAVENOUS CONTINUOUS PRN
Status: DISCONTINUED | OUTPATIENT
Start: 2019-04-16 | End: 2019-04-16 | Stop reason: SURG

## 2019-04-16 RX ORDER — SENNA AND DOCUSATE SODIUM 50; 8.6 MG/1; MG/1
2 TABLET, FILM COATED ORAL NIGHTLY
Status: DISCONTINUED | OUTPATIENT
Start: 2019-04-17 | End: 2019-04-20 | Stop reason: HOSPADM

## 2019-04-16 RX ORDER — MAGNESIUM SULFATE 1 G/100ML
1 INJECTION INTRAVENOUS EVERY 8 HOURS
Status: DISCONTINUED | OUTPATIENT
Start: 2019-04-16 | End: 2019-04-17

## 2019-04-16 RX ORDER — BISACODYL 10 MG
10 SUPPOSITORY, RECTAL RECTAL DAILY PRN
Status: DISCONTINUED | OUTPATIENT
Start: 2019-04-17 | End: 2019-04-20 | Stop reason: HOSPADM

## 2019-04-16 RX ORDER — MIDAZOLAM HYDROCHLORIDE 1 MG/ML
2 INJECTION INTRAMUSCULAR; INTRAVENOUS
Status: DISCONTINUED | OUTPATIENT
Start: 2019-04-16 | End: 2019-04-17

## 2019-04-16 RX ORDER — ONDANSETRON 2 MG/ML
INJECTION INTRAMUSCULAR; INTRAVENOUS AS NEEDED
Status: DISCONTINUED | OUTPATIENT
Start: 2019-04-16 | End: 2019-04-16 | Stop reason: SURG

## 2019-04-16 RX ORDER — FUROSEMIDE 10 MG/ML
40 INJECTION INTRAMUSCULAR; INTRAVENOUS EVERY 6 HOURS PRN
Status: DISCONTINUED | OUTPATIENT
Start: 2019-04-16 | End: 2019-04-17

## 2019-04-16 RX ORDER — POTASSIUM CHLORIDE 7.45 MG/ML
10 INJECTION INTRAVENOUS
Status: DISCONTINUED | OUTPATIENT
Start: 2019-04-16 | End: 2019-04-20 | Stop reason: HOSPADM

## 2019-04-16 RX ORDER — HEPARIN SODIUM 1000 [USP'U]/ML
INJECTION, SOLUTION INTRAVENOUS; SUBCUTANEOUS AS NEEDED
Status: DISCONTINUED | OUTPATIENT
Start: 2019-04-16 | End: 2019-04-16 | Stop reason: SURG

## 2019-04-16 RX ORDER — ACETAMINOPHEN 325 MG/1
650 TABLET ORAL EVERY 4 HOURS PRN
Status: DISCONTINUED | OUTPATIENT
Start: 2019-04-16 | End: 2019-04-20 | Stop reason: HOSPADM

## 2019-04-16 RX ORDER — ACETAMINOPHEN 500 MG
1000 TABLET ORAL ONCE
Status: DISCONTINUED | OUTPATIENT
Start: 2019-04-16 | End: 2019-04-16 | Stop reason: HOSPADM

## 2019-04-16 RX ORDER — ACETAMINOPHEN 650 MG/1
650 SUPPOSITORY RECTAL EVERY 4 HOURS PRN
Status: DISCONTINUED | OUTPATIENT
Start: 2019-04-16 | End: 2019-04-20 | Stop reason: HOSPADM

## 2019-04-16 RX ORDER — FENTANYL CITRATE 50 UG/ML
50 INJECTION, SOLUTION INTRAMUSCULAR; INTRAVENOUS
Status: DISCONTINUED | OUTPATIENT
Start: 2019-04-16 | End: 2019-04-16 | Stop reason: HOSPADM

## 2019-04-16 RX ORDER — GLYCOPYRROLATE 0.2 MG/ML
INJECTION INTRAMUSCULAR; INTRAVENOUS AS NEEDED
Status: DISCONTINUED | OUTPATIENT
Start: 2019-04-16 | End: 2019-04-16 | Stop reason: SURG

## 2019-04-16 RX ORDER — MIDAZOLAM HYDROCHLORIDE 1 MG/ML
1 INJECTION INTRAMUSCULAR; INTRAVENOUS
Status: DISCONTINUED | OUTPATIENT
Start: 2019-04-16 | End: 2019-04-16 | Stop reason: HOSPADM

## 2019-04-16 RX ORDER — PROTAMINE SULFATE 10 MG/ML
INJECTION, SOLUTION INTRAVENOUS AS NEEDED
Status: DISCONTINUED | OUTPATIENT
Start: 2019-04-16 | End: 2019-04-16 | Stop reason: HOSPADM

## 2019-04-16 RX ORDER — MEPERIDINE HYDROCHLORIDE 25 MG/ML
25 INJECTION INTRAMUSCULAR; INTRAVENOUS; SUBCUTANEOUS EVERY 4 HOURS PRN
Status: DISCONTINUED | OUTPATIENT
Start: 2019-04-16 | End: 2019-04-17

## 2019-04-16 RX ORDER — LIDOCAINE HYDROCHLORIDE 10 MG/ML
0.5 INJECTION, SOLUTION EPIDURAL; INFILTRATION; INTRACAUDAL; PERINEURAL ONCE AS NEEDED
Status: DISCONTINUED | OUTPATIENT
Start: 2019-04-16 | End: 2019-04-16 | Stop reason: HOSPADM

## 2019-04-16 RX ORDER — CHLORHEXIDINE GLUCONATE 0.12 MG/ML
15 RINSE ORAL EVERY 12 HOURS
Status: DISCONTINUED | OUTPATIENT
Start: 2019-04-16 | End: 2019-04-18

## 2019-04-16 RX ORDER — OXYCODONE HYDROCHLORIDE 5 MG/1
10 TABLET ORAL EVERY 4 HOURS PRN
Status: DISCONTINUED | OUTPATIENT
Start: 2019-04-16 | End: 2019-04-20 | Stop reason: HOSPADM

## 2019-04-16 RX ORDER — ONDANSETRON 2 MG/ML
4 INJECTION INTRAMUSCULAR; INTRAVENOUS EVERY 6 HOURS PRN
Status: DISCONTINUED | OUTPATIENT
Start: 2019-04-16 | End: 2019-04-20 | Stop reason: HOSPADM

## 2019-04-16 RX ORDER — MILRINONE LACTATE 0.2 MG/ML
.25-.75 INJECTION, SOLUTION INTRAVENOUS CONTINUOUS PRN
Status: DISCONTINUED | OUTPATIENT
Start: 2019-04-16 | End: 2019-04-17

## 2019-04-16 RX ORDER — DEXMEDETOMIDINE HYDROCHLORIDE 4 UG/ML
.2-1.5 INJECTION, SOLUTION INTRAVENOUS
Status: DISCONTINUED | OUTPATIENT
Start: 2019-04-16 | End: 2019-04-17

## 2019-04-16 RX ORDER — POTASSIUM CHLORIDE 750 MG/1
40 CAPSULE, EXTENDED RELEASE ORAL AS NEEDED
Status: DISCONTINUED | OUTPATIENT
Start: 2019-04-16 | End: 2019-04-20 | Stop reason: HOSPADM

## 2019-04-16 RX ORDER — ATORVASTATIN CALCIUM 20 MG/1
40 TABLET, FILM COATED ORAL NIGHTLY
Status: DISCONTINUED | OUTPATIENT
Start: 2019-04-16 | End: 2019-04-17

## 2019-04-16 RX ORDER — SODIUM CHLORIDE 9 MG/ML
30 INJECTION, SOLUTION INTRAVENOUS CONTINUOUS
Status: DISCONTINUED | OUTPATIENT
Start: 2019-04-16 | End: 2019-04-17

## 2019-04-16 RX ORDER — CYCLOBENZAPRINE HCL 10 MG
10 TABLET ORAL EVERY 8 HOURS PRN
Status: DISCONTINUED | OUTPATIENT
Start: 2019-04-17 | End: 2019-04-20 | Stop reason: HOSPADM

## 2019-04-16 RX ORDER — MIDAZOLAM HYDROCHLORIDE 1 MG/ML
2 INJECTION INTRAMUSCULAR; INTRAVENOUS
Status: DISCONTINUED | OUTPATIENT
Start: 2019-04-16 | End: 2019-04-16 | Stop reason: HOSPADM

## 2019-04-16 RX ORDER — NITROGLYCERIN 20 MG/100ML
5-200 INJECTION INTRAVENOUS
Status: DISCONTINUED | OUTPATIENT
Start: 2019-04-16 | End: 2019-04-17

## 2019-04-16 RX ORDER — BISACODYL 5 MG/1
10 TABLET, DELAYED RELEASE ORAL DAILY PRN
Status: DISCONTINUED | OUTPATIENT
Start: 2019-04-16 | End: 2019-04-20 | Stop reason: HOSPADM

## 2019-04-16 RX ORDER — CEFAZOLIN SODIUM 2 G/100ML
2 INJECTION, SOLUTION INTRAVENOUS EVERY 8 HOURS
Status: COMPLETED | OUTPATIENT
Start: 2019-04-16 | End: 2019-04-18

## 2019-04-16 RX ORDER — KETAMINE HYDROCHLORIDE 10 MG/ML
INJECTION INTRAMUSCULAR; INTRAVENOUS AS NEEDED
Status: DISCONTINUED | OUTPATIENT
Start: 2019-04-16 | End: 2019-04-16 | Stop reason: SURG

## 2019-04-16 RX ORDER — HYDROCODONE BITARTRATE AND ACETAMINOPHEN 5; 325 MG/1; MG/1
2 TABLET ORAL EVERY 4 HOURS PRN
Status: DISCONTINUED | OUTPATIENT
Start: 2019-04-16 | End: 2019-04-20 | Stop reason: HOSPADM

## 2019-04-16 RX ORDER — SODIUM CHLORIDE 9 MG/ML
30 INJECTION, SOLUTION INTRAVENOUS CONTINUOUS PRN
Status: DISCONTINUED | OUTPATIENT
Start: 2019-04-16 | End: 2019-04-17

## 2019-04-16 RX ORDER — AMINOCAPROIC ACID 250 MG/ML
INJECTION, SOLUTION INTRAVENOUS AS NEEDED
Status: DISCONTINUED | OUTPATIENT
Start: 2019-04-16 | End: 2019-04-16 | Stop reason: SURG

## 2019-04-16 RX ORDER — MORPHINE SULFATE 2 MG/ML
4 INJECTION, SOLUTION INTRAMUSCULAR; INTRAVENOUS
Status: DISCONTINUED | OUTPATIENT
Start: 2019-04-16 | End: 2019-04-17

## 2019-04-16 RX ORDER — SODIUM CHLORIDE, SODIUM LACTATE, POTASSIUM CHLORIDE, CALCIUM CHLORIDE 600; 310; 30; 20 MG/100ML; MG/100ML; MG/100ML; MG/100ML
9 INJECTION, SOLUTION INTRAVENOUS CONTINUOUS
Status: DISCONTINUED | OUTPATIENT
Start: 2019-04-16 | End: 2019-04-16

## 2019-04-16 RX ORDER — DOPAMINE HYDROCHLORIDE 160 MG/100ML
2-20 INJECTION, SOLUTION INTRAVENOUS CONTINUOUS PRN
Status: DISCONTINUED | OUTPATIENT
Start: 2019-04-16 | End: 2019-04-17

## 2019-04-16 RX ORDER — MAGNESIUM SULFATE HEPTAHYDRATE 500 MG/ML
INJECTION, SOLUTION INTRAMUSCULAR; INTRAVENOUS AS NEEDED
Status: DISCONTINUED | OUTPATIENT
Start: 2019-04-16 | End: 2019-04-16 | Stop reason: SURG

## 2019-04-16 RX ORDER — FAMOTIDINE 10 MG/ML
20 INJECTION, SOLUTION INTRAVENOUS ONCE
Status: DISCONTINUED | OUTPATIENT
Start: 2019-04-16 | End: 2019-04-16 | Stop reason: HOSPADM

## 2019-04-16 RX ORDER — PANTOPRAZOLE SODIUM 40 MG/1
40 TABLET, DELAYED RELEASE ORAL
Status: DISCONTINUED | OUTPATIENT
Start: 2019-04-17 | End: 2019-04-20 | Stop reason: HOSPADM

## 2019-04-16 RX ORDER — PROPOFOL 10 MG/ML
VIAL (ML) INTRAVENOUS AS NEEDED
Status: DISCONTINUED | OUTPATIENT
Start: 2019-04-16 | End: 2019-04-16 | Stop reason: SURG

## 2019-04-16 RX ORDER — FENTANYL CITRATE 50 UG/ML
INJECTION, SOLUTION INTRAMUSCULAR; INTRAVENOUS AS NEEDED
Status: DISCONTINUED | OUTPATIENT
Start: 2019-04-16 | End: 2019-04-16 | Stop reason: SURG

## 2019-04-16 RX ORDER — FAMOTIDINE 10 MG/ML
20 INJECTION, SOLUTION INTRAVENOUS ONCE
Status: COMPLETED | OUTPATIENT
Start: 2019-04-16 | End: 2019-04-16

## 2019-04-16 RX ORDER — ASPIRIN 81 MG/1
81 TABLET ORAL DAILY
Status: DISCONTINUED | OUTPATIENT
Start: 2019-04-17 | End: 2019-04-20 | Stop reason: HOSPADM

## 2019-04-16 RX ORDER — PROMETHAZINE HYDROCHLORIDE 25 MG/ML
12.5 INJECTION, SOLUTION INTRAMUSCULAR; INTRAVENOUS EVERY 6 HOURS PRN
Status: DISCONTINUED | OUTPATIENT
Start: 2019-04-16 | End: 2019-04-17

## 2019-04-16 RX ORDER — NALOXONE HCL 0.4 MG/ML
0.4 VIAL (ML) INJECTION
Status: DISCONTINUED | OUTPATIENT
Start: 2019-04-16 | End: 2019-04-17

## 2019-04-16 RX ORDER — ALBUMIN, HUMAN INJ 5% 5 %
1500 SOLUTION INTRAVENOUS AS NEEDED
Status: DISCONTINUED | OUTPATIENT
Start: 2019-04-16 | End: 2019-04-17

## 2019-04-16 RX ORDER — SODIUM CHLORIDE 0.9 % (FLUSH) 0.9 %
1-10 SYRINGE (ML) INJECTION AS NEEDED
Status: DISCONTINUED | OUTPATIENT
Start: 2019-04-16 | End: 2019-04-16 | Stop reason: HOSPADM

## 2019-04-16 RX ORDER — MORPHINE SULFATE 2 MG/ML
1 INJECTION, SOLUTION INTRAMUSCULAR; INTRAVENOUS EVERY 4 HOURS PRN
Status: DISCONTINUED | OUTPATIENT
Start: 2019-04-16 | End: 2019-04-17

## 2019-04-16 RX ORDER — POTASSIUM CHLORIDE 1.5 G/1.77G
40 POWDER, FOR SOLUTION ORAL AS NEEDED
Status: DISCONTINUED | OUTPATIENT
Start: 2019-04-16 | End: 2019-04-20 | Stop reason: HOSPADM

## 2019-04-16 RX ADMIN — MIDAZOLAM 1 MG: 1 INJECTION INTRAMUSCULAR; INTRAVENOUS at 09:26

## 2019-04-16 RX ADMIN — FENTANYL CITRATE 100 MCG: 50 INJECTION, SOLUTION INTRAMUSCULAR; INTRAVENOUS at 14:00

## 2019-04-16 RX ADMIN — SODIUM CHLORIDE: 9 INJECTION, SOLUTION INTRAVENOUS at 11:58

## 2019-04-16 RX ADMIN — HYDROCODONE BITARTRATE AND ACETAMINOPHEN 2 TABLET: 5; 325 TABLET ORAL at 23:21

## 2019-04-16 RX ADMIN — PROPOFOL 60 MG: 10 INJECTION, EMULSION INTRAVENOUS at 12:19

## 2019-04-16 RX ADMIN — CEFAZOLIN SODIUM 2 G: 2 INJECTION, SOLUTION INTRAVENOUS at 21:12

## 2019-04-16 RX ADMIN — MORPHINE SULFATE 4 MG: 2 INJECTION, SOLUTION INTRAMUSCULAR; INTRAVENOUS at 15:43

## 2019-04-16 RX ADMIN — SODIUM CHLORIDE: 900 INJECTION, SOLUTION INTRAVENOUS at 12:02

## 2019-04-16 RX ADMIN — FAMOTIDINE 20 MG: 10 INJECTION INTRAVENOUS at 08:47

## 2019-04-16 RX ADMIN — PROPOFOL 50 MG: 10 INJECTION, EMULSION INTRAVENOUS at 13:38

## 2019-04-16 RX ADMIN — NEOSTIGMINE METHYLSULFATE 4 MG: 5 INJECTION, SOLUTION INTRAMUSCULAR; INTRAVENOUS; SUBCUTANEOUS at 14:34

## 2019-04-16 RX ADMIN — CHLORHEXIDINE GLUCONATE 15 ML: 1.2 RINSE ORAL at 08:08

## 2019-04-16 RX ADMIN — MUPIROCIN 1 APPLICATION: 20 OINTMENT TOPICAL at 08:09

## 2019-04-16 RX ADMIN — METOCLOPRAMIDE 10 MG: 5 INJECTION, SOLUTION INTRAMUSCULAR; INTRAVENOUS at 16:39

## 2019-04-16 RX ADMIN — ATORVASTATIN CALCIUM 40 MG: 20 TABLET, FILM COATED ORAL at 21:14

## 2019-04-16 RX ADMIN — PROPOFOL 25 MCG/KG/MIN: 10 INJECTION, EMULSION INTRAVENOUS at 13:33

## 2019-04-16 RX ADMIN — MAGNESIUM SULFATE HEPTAHYDRATE 2 G: 500 INJECTION, SOLUTION INTRAMUSCULAR; INTRAVENOUS at 13:44

## 2019-04-16 RX ADMIN — SODIUM CHLORIDE 6 MG/HR: 9 INJECTION, SOLUTION INTRAVENOUS at 14:09

## 2019-04-16 RX ADMIN — PROPOFOL 20 MG: 10 INJECTION, EMULSION INTRAVENOUS at 13:35

## 2019-04-16 RX ADMIN — ROCURONIUM BROMIDE 50 MG: 10 INJECTION INTRAVENOUS at 12:19

## 2019-04-16 RX ADMIN — CHLORHEXIDINE GLUCONATE 15 ML: 1.2 RINSE ORAL at 16:39

## 2019-04-16 RX ADMIN — FENTANYL CITRATE 50 MCG: 50 INJECTION, SOLUTION INTRAMUSCULAR; INTRAVENOUS at 09:27

## 2019-04-16 RX ADMIN — FENTANYL CITRATE 50 MCG: 50 INJECTION, SOLUTION INTRAMUSCULAR; INTRAVENOUS at 12:13

## 2019-04-16 RX ADMIN — GLYCOPYRROLATE 0.8 MG: 0.2 INJECTION INTRAMUSCULAR; INTRAVENOUS at 14:34

## 2019-04-16 RX ADMIN — PHENYLEPHRINE HYDROCHLORIDE 0.1 MCG/KG/MIN: 10 INJECTION, SOLUTION INTRAMUSCULAR; INTRAVENOUS; SUBCUTANEOUS at 12:18

## 2019-04-16 RX ADMIN — ALBUMIN HUMAN 250 ML: 0.05 INJECTION, SOLUTION INTRAVENOUS at 19:30

## 2019-04-16 RX ADMIN — MIDAZOLAM 2 MG: 1 INJECTION INTRAMUSCULAR; INTRAVENOUS at 09:14

## 2019-04-16 RX ADMIN — KETAMINE HYDROCHLORIDE 50 MG: 10 INJECTION INTRAMUSCULAR; INTRAVENOUS at 12:52

## 2019-04-16 RX ADMIN — CEFAZOLIN SODIUM 2 G: 2 INJECTION, SOLUTION INTRAVENOUS at 12:05

## 2019-04-16 RX ADMIN — AMINOCAPROIC ACID 10 G: 250 INJECTION, SOLUTION INTRAVENOUS at 12:27

## 2019-04-16 RX ADMIN — AMINOCAPROIC ACID 10 G: 250 INJECTION, SOLUTION INTRAVENOUS at 14:08

## 2019-04-16 RX ADMIN — METOPROLOL TARTRATE 12.5 MG: 25 TABLET ORAL at 08:08

## 2019-04-16 RX ADMIN — MAGNESIUM SULFATE HEPTAHYDRATE 1 G: 500 INJECTION, SOLUTION INTRAMUSCULAR; INTRAVENOUS at 12:12

## 2019-04-16 RX ADMIN — PROPOFOL 50 MG: 10 INJECTION, EMULSION INTRAVENOUS at 13:54

## 2019-04-16 RX ADMIN — METOCLOPRAMIDE 10 MG: 5 INJECTION, SOLUTION INTRAMUSCULAR; INTRAVENOUS at 21:14

## 2019-04-16 RX ADMIN — SODIUM CHLORIDE: 9 INJECTION, SOLUTION INTRAVENOUS at 12:12

## 2019-04-16 RX ADMIN — PROPOFOL 20 MG: 10 INJECTION, EMULSION INTRAVENOUS at 12:07

## 2019-04-16 RX ADMIN — HEPARIN SODIUM 30000 UNITS: 1000 INJECTION, SOLUTION INTRAVENOUS; SUBCUTANEOUS at 13:12

## 2019-04-16 RX ADMIN — ONDANSETRON 4 MG: 2 INJECTION INTRAMUSCULAR; INTRAVENOUS at 14:08

## 2019-04-16 RX ADMIN — KETAMINE HYDROCHLORIDE 25 MG: 10 INJECTION INTRAMUSCULAR; INTRAVENOUS at 12:19

## 2019-04-16 RX ADMIN — MAGNESIUM SULFATE HEPTAHYDRATE 1 G: 500 INJECTION, SOLUTION INTRAMUSCULAR; INTRAVENOUS at 12:19

## 2019-04-16 RX ADMIN — FENTANYL CITRATE 50 MCG: 50 INJECTION, SOLUTION INTRAMUSCULAR; INTRAVENOUS at 09:14

## 2019-04-16 RX ADMIN — MIDAZOLAM 1 MG: 1 INJECTION INTRAMUSCULAR; INTRAVENOUS at 09:45

## 2019-04-16 RX ADMIN — MUPIROCIN: 20 OINTMENT TOPICAL at 21:38

## 2019-04-16 RX ADMIN — KETAMINE HYDROCHLORIDE 25 MG: 10 INJECTION INTRAMUSCULAR; INTRAVENOUS at 12:05

## 2019-04-16 RX ADMIN — FENTANYL CITRATE 50 MCG: 50 INJECTION, SOLUTION INTRAMUSCULAR; INTRAVENOUS at 12:51

## 2019-04-16 RX ADMIN — CEFAZOLIN SODIUM 2 G: 2 INJECTION, SOLUTION INTRAVENOUS at 14:08

## 2019-04-16 RX ADMIN — HYDROCODONE BITARTRATE AND ACETAMINOPHEN 2 TABLET: 5; 325 TABLET ORAL at 19:19

## 2019-04-16 RX ADMIN — FENTANYL CITRATE 50 MCG: 50 INJECTION, SOLUTION INTRAMUSCULAR; INTRAVENOUS at 12:06

## 2019-04-16 RX ADMIN — SODIUM CHLORIDE 2.4 UNITS/HR: 9 INJECTION, SOLUTION INTRAVENOUS at 21:30

## 2019-04-16 RX ADMIN — PROPOFOL 20 MG: 10 INJECTION, EMULSION INTRAVENOUS at 13:32

## 2019-04-16 RX ADMIN — SODIUM CHLORIDE 2 MCG/KG/HR: 900 INJECTION, SOLUTION INTRAVENOUS at 12:03

## 2019-04-16 RX ADMIN — PROPOFOL 20 MG: 10 INJECTION, EMULSION INTRAVENOUS at 12:05

## 2019-04-16 RX ADMIN — PROPOFOL 50 MG: 10 INJECTION, EMULSION INTRAVENOUS at 13:57

## 2019-04-16 NOTE — ANESTHESIA PROCEDURE NOTES
Airway  Airway not difficult    General Information and Staff    Anesthesiologist: Chinedu Kendrick MD    Indications and Patient Condition    Preoxygenated: yes  Mask difficulty assessment: 1 - vent by mask    Final Airway Details  Final airway type: endotracheal airway      Successful airway: ETT  Cuffed: yes   Successful intubation technique: direct laryngoscopy  Endotracheal tube insertion site: oral  Blade: Hurtado  Blade size: 2  ETT size (mm): 8.0  Cormack-Lehane Classification: grade I - full view of glottis  Placement verified by: chest auscultation and capnometry   Measured from: teeth  ETT to teeth (cm): 23    Additional Comments  Teeth checked after intubation, no damage noted.

## 2019-04-16 NOTE — ANESTHESIA PROCEDURE NOTES
Arterial Line      Patient reassessed immediately prior to procedure    Patient location during procedure: holding area  Start time: 4/16/2019 9:12 AM  Stop Time:4/16/2019 9:20 AM       Line placed for hemodynamic monitoring.  Performed By   Anesthesiologist: Sienna Vallecillo MD  Preanesthetic Checklist  Completed: patient identified, site marked, pre-op evaluation, IV checked, risks and benefits discussed and monitors and equipment checked  Arterial Line Prep   Sterile Tech: gloves  Prep: ChloraPrep  Arterial Line Procedure   Laterality:left  Location:  radial artery  Catheter size: 20 G   Guidance: ultrasound guided  PROCEDURE NOTE/ULTRASOUND INTERPRETATION.  Using ultrasound guidance the potential vascular sites for insertion of the catheter were visualized to determine the patency of the vessel to be used for vascular access.  After selecting the appropriate site for insertion, the needle was visualized under ultrasound being inserted into the radial artery, followed by ultrasound confirmation of wire and catheter placement. There were no abnormalities seen on ultrasound; an image was taken; and the patient tolerated the procedure with no complications.   Number of attempts: 1  Successful placement: yes          Post Assessment   Dressing Type: occlusive dressing applied.   Complications no  Circ/Move/Sens Assessment: normal.   Patient Tolerance: patient tolerated the procedure well with no apparent complications

## 2019-04-16 NOTE — ANESTHESIA PREPROCEDURE EVALUATION
Anesthesia Evaluation                  Airway   Mallampati: II  TM distance: >3 FB  Neck ROM: full  No difficulty expected  Dental - normal exam     Pulmonary - normal exam   (+) a smoker Current,   (-) sleep apnea    ROS comment: Negative patient screen for KAYLAN    Cardiovascular - normal exam    (+) hypertension, past MI , CAD, cardiac stents more than 12 months ago angina, hyperlipidemia,       Neuro/Psych  GI/Hepatic/Renal/Endo    (+)   renal disease stones, diabetes mellitus type 2,     Musculoskeletal     Abdominal    Substance History      OB/GYN          Other                      Anesthesia Plan    ASA 4     general     Anesthetic plan, all risks, benefits, and alternatives have been provided, discussed and informed consent has been obtained with: patient.

## 2019-04-16 NOTE — OP NOTE
[facility] CARDIAC SURGERY OP NOTE    Preop Diagnosis: Severe coronary artery disease not amenable to stenting involving LAD and diagonal    Postop Diagnosis: Same    Indications: This patient had severe coronary disease involving the LAD and diagonal that stenting could not be done.  The STS Risk and all risks and alternatives were discussed with the patient and family. Operation was advisable to prolong life and relieve symptoms.They understand and wish to proceed.    Procedure: CABG x2.  Skeletonized LIMA to diagonal branch and then LAD sequentially.  Temporary cardiopulmonary bypass.  Antegrade cold blood cardioplegia.  Neurologic monitoring.  Transesophageal echo.    Surgeon: Jesse Perez MD    Assisistant: Komal Arechiga CSA    Anesthesia: GET    Findings : The heart and cardiac chambers were normal.  He tended to be hypertensive in the LVH was grade 5/6.  The diagonal branch was 2 mm in the LAD was 2.5 mm.  The NETTE was 3 mm vessel with excellent blood flow.  There was no other significant coronary artery disease.    Operative Procedure: A primary median sternotomy was made and then the left NETTE was dissected off the chest wall with a harmonic scalpel.  Cardiopulmonary bypass was established for 29 minutes drifting to 34 °C at appropriate flow rates.  The aorta was crossclamped for 17 minutes and we gave a liter of antegrade cold blood cardioplegia and another dose in about 10 minutes.  The mammary was sewn side-to-side to the diagonal branch and then to the LAD with 7-0 Prolene.  With strong suction on the aortic needle vent the cross-clamp was released.  The patient was rewarmed and cardiopulmonary bypass weaned and discontinued.  Decannulation was affected usual devices were placed and hemostasis was obtained.  3 chest tubes were inserted and we applied vancomycin platelets plasma.  The sternum was closed with stainless steel wire and then the fascia soft tissues and  skin closed as usual.  The sponge needle and instrument counts noted to be correct the grafts lay nicely and all the anastomoses were hemostatic.  He went the open heart recovery room in good condition.    Complications: None    Tubes: 3    Epicardial Wires: 3    Blood Loss: Minimal    Aortic X time: 17 min    CPB time: 29 min     Specimen: None    Condition: Good      Patient Care Team:  Og Campos MD as PCP - General  Og Campos MD as PCP - Family Medicine        Jesse Perez MD  4/16/2019  2:47 PM

## 2019-04-16 NOTE — ANESTHESIA PROCEDURE NOTES
Central Line      Patient location during procedure: holding area  Start time: 4/16/2019 9:21 AM  Stop Time:4/16/2019 9:56 AM  Indications: central pressure monitoring, vascular access and MD/Surgeon request  Staff  Anesthesiologist: Sienna Vallecillo MD  Preanesthetic Checklist  Completed: patient identified, site marked, surgical consent, pre-op evaluation, IV checked and risks and benefits discussed  Central Line Prep  Sterile Tech:cap, gloves, gown, mask and sterile barriers  Prep: chloraprep  Patient monitoring: continuous pulse oximetry and EKG  Central Line Procedure  Laterality:right  Location:internal jugular  Catheter Type:[place]-Trisha and Cordis  Catheter Size:9 Fr  Guidance:ultrasound guided  Assessment  Post procedure:biopatch applied, line sutured and occlusive dressing applied  Assessement:chest x-ray ordered, blood return through all ports and free fluid flow  Complications:no  Patient Tolerance:patient tolerated the procedure well with no apparent complications

## 2019-04-16 NOTE — ANESTHESIA PROCEDURE NOTES
Procedure Performed: Emergent/Open-Heart Anesthesia LAURI     Start Time:        End Time:      Preanesthesia Checklist:  Procedure being performed at surgeon's request.    General Procedure Information  Diagnostic Indications for Echo:  hemodynamic monitoring  Physician Requesting Echo: Jesse Perez MD  CPT Code:  Mitral regurgitation, diastolic dysfunction  Location performed:  OR  Bite block not placed  Heart visualized  Probe Insertion:  Easy  Probe Type:  Multiplane  Modalities:  Color flow mapping, continuous wave Doppler and pulse wave Doppler    Echocardiographic and Doppler Measurements    Ventricles    Right Ventricle:  Cavity size normal.  Hypertrophy not present.  Thrombus not present.  Global function normal.    Left Ventricle:  Cavity size normal.  Hypertrophy not present.  Global Function normal.      Ventricular Regional Function:  1- Basal Anteroseptal:  normal  2- Basal Anterior:  normal  3- Basal Anterolateral:  normal  4- Basal Inferolateral:  normal  5- Basal Inferior:  normal  6- Basal Inferoseptal:  normal  7- Mid Anteroseptal:  normal  8- Mid Anterior:  normal  9- Mid Anterolateral:  normal  10- Mid Inferolateral:  normal  11- Mid Inferior:  normal  12- Mid Inferoseptal:  normal  13- Apical Anterior:  normal  14- Apical Lateral:  normal  15- Apical Inferior:  normal  16- Apical Septal:  normal  17- [place]:  normal      Valves    Aortic Valve:  Annulus normal.  Stenosis not present.  Area: 3.35 cm².  Mean Gradient: 2 mmHg.  Regurgitation absent.  Leaflets normal.  Leaflet motions normal.      Mitral Valve:  Annulus normal.  Stenosis not present.  Area: 3.45 cm².  Mean Gradient: 1 mmHg.  Regurgitation trace.  Leaflets normal.  Leaflet motions normal.      Tricuspid Valve:  Annulus normal.  Stenosis not present.  Regurgitation absent.  Leaflets normal.          Aorta    Ascending Aorta:  Size normal.  Diameter 3.6 cm.  Dissection not present.  Plaque thickness less than 3 mm.  Mobile plaque  not present.    Descending Aorta:  Size normal.  Dissection not present.  Plaque thickness less than 3 mm.  Mobile plaque not present.          Atria    Right Atrium:  Size normal.  Spontaneous echo contrast not present.  Thrombus not present.    Left Atrium:  Size normal.  Spontaneous echo contrast not present.  Thrombus not present.  Left atrial appendage normal.      Septa    Atrial Septum:  Intra-atrial septal morphology normal.      Ventricular Septum:  Intra-ventricular septum morphology normal.      Diastolic Function Measurements:  Diastolic Dysfunction Grade= I  E= 51 ms  A= 59 ms  E/A Ratio=  .9  DT=  ms  S/D=  1.2  IVRT=    Other Findings  Pulmonary Arteries:  normal  Pulmonary Venous Flow:  normal    Anesthesia Information  Performed Personally  Anesthesiologist:  Chinedu Kendrick MD

## 2019-04-16 NOTE — PLAN OF CARE
Problem: Patient Care Overview  Goal: Plan of Care Review  Outcome: Ongoing (interventions implemented as appropriate)   04/16/19 0321   Coping/Psychosocial   Plan of Care Reviewed With patient   Plan of Care Review   Progress improving   OTHER   Outcome Summary Preop CABG on 4/16. NPO @midnight. No complains of Pain or SOA. Room Air. Vital Signs Stable. Will Continue to Monitor.

## 2019-04-17 ENCOUNTER — APPOINTMENT (OUTPATIENT)
Dept: GENERAL RADIOLOGY | Facility: HOSPITAL | Age: 59
End: 2019-04-17

## 2019-04-17 LAB
ACT BLD: 114 SECONDS (ref 82–152)
ACT BLD: 345 SECONDS (ref 82–152)
ACT BLD: 417 SECONDS (ref 82–152)
ACT BLD: 92 SECONDS (ref 82–152)
ALBUMIN SERPL-MCNC: 4.1 G/DL (ref 3.5–5.2)
ANION GAP SERPL CALCULATED.3IONS-SCNC: 13.1 MMOL/L
BASE EXCESS BLDA CALC-SCNC: 0 MMOL/L (ref -5–5)
BASOPHILS # BLD AUTO: 0.02 10*3/MM3 (ref 0–0.2)
BASOPHILS NFR BLD AUTO: 0.1 % (ref 0–1.5)
BUN BLD-MCNC: 20 MG/DL (ref 8–23)
BUN/CREAT SERPL: 16.1 (ref 7–25)
CALCIUM SPEC-SCNC: 8.7 MG/DL (ref 8.6–10.5)
CHLORIDE SERPL-SCNC: 100 MMOL/L (ref 98–107)
CO2 BLDA-SCNC: 27 MMOL/L (ref 24–29)
CO2 BLDA-SCNC: 27 MMOL/L (ref 24–29)
CO2 BLDA-SCNC: 28 MMOL/L (ref 24–29)
CO2 BLDA-SCNC: 28 MMOL/L (ref 24–29)
CO2 SERPL-SCNC: 20.9 MMOL/L (ref 22–29)
CREAT BLD-MCNC: 1.24 MG/DL (ref 0.76–1.27)
DEPRECATED RDW RBC AUTO: 43.8 FL (ref 37–54)
EOSINOPHIL # BLD AUTO: 0.01 10*3/MM3 (ref 0–0.4)
EOSINOPHIL NFR BLD AUTO: 0.1 % (ref 0.3–6.2)
ERYTHROCYTE [DISTWIDTH] IN BLOOD BY AUTOMATED COUNT: 13 % (ref 12.3–15.4)
GFR SERPL CREATININE-BSD FRML MDRD: 59 ML/MIN/1.73
GLUCOSE BLD-MCNC: 134 MG/DL (ref 65–99)
GLUCOSE BLDC GLUCOMTR-MCNC: 104 MG/DL (ref 70–130)
GLUCOSE BLDC GLUCOMTR-MCNC: 107 MG/DL (ref 70–130)
GLUCOSE BLDC GLUCOMTR-MCNC: 107 MG/DL (ref 70–130)
GLUCOSE BLDC GLUCOMTR-MCNC: 109 MG/DL (ref 70–130)
GLUCOSE BLDC GLUCOMTR-MCNC: 112 MG/DL (ref 70–130)
GLUCOSE BLDC GLUCOMTR-MCNC: 115 MG/DL (ref 70–130)
GLUCOSE BLDC GLUCOMTR-MCNC: 115 MG/DL (ref 70–130)
GLUCOSE BLDC GLUCOMTR-MCNC: 123 MG/DL (ref 70–130)
GLUCOSE BLDC GLUCOMTR-MCNC: 124 MG/DL (ref 70–130)
GLUCOSE BLDC GLUCOMTR-MCNC: 137 MG/DL (ref 70–130)
GLUCOSE BLDC GLUCOMTR-MCNC: 142 MG/DL (ref 70–130)
GLUCOSE BLDC GLUCOMTR-MCNC: 144 MG/DL (ref 70–130)
GLUCOSE BLDC GLUCOMTR-MCNC: 145 MG/DL (ref 70–130)
GLUCOSE BLDC GLUCOMTR-MCNC: 152 MG/DL (ref 70–130)
HCO3 BLDA-SCNC: 25.3 MMOL/L (ref 22–26)
HCO3 BLDA-SCNC: 25.9 MMOL/L (ref 22–26)
HCO3 BLDA-SCNC: 26.3 MMOL/L (ref 22–26)
HCO3 BLDA-SCNC: 26.8 MMOL/L (ref 22–26)
HCT VFR BLD AUTO: 43.1 % (ref 37.5–51)
HCT VFR BLDA CALC: 33 % (ref 38–51)
HCT VFR BLDA CALC: 34 % (ref 38–51)
HCT VFR BLDA CALC: 35 % (ref 38–51)
HCT VFR BLDA CALC: 43 % (ref 38–51)
HGB BLD-MCNC: 14.1 G/DL (ref 13–17.7)
HGB BLDA-MCNC: 11.2 G/DL (ref 12–17)
HGB BLDA-MCNC: 11.6 G/DL (ref 12–17)
HGB BLDA-MCNC: 11.9 G/DL (ref 12–17)
HGB BLDA-MCNC: 14.6 G/DL (ref 12–17)
IMM GRANULOCYTES # BLD AUTO: 0.11 10*3/MM3 (ref 0–0.05)
IMM GRANULOCYTES NFR BLD AUTO: 0.7 % (ref 0–0.5)
INR PPP: 1.14 (ref 0.9–1.1)
LYMPHOCYTES # BLD AUTO: 0.78 10*3/MM3 (ref 0.7–3.1)
LYMPHOCYTES NFR BLD AUTO: 4.9 % (ref 19.6–45.3)
MAGNESIUM SERPL-MCNC: 2.5 MG/DL (ref 1.6–2.4)
MCH RBC QN AUTO: 30.1 PG (ref 26.6–33)
MCHC RBC AUTO-ENTMCNC: 32.7 G/DL (ref 31.5–35.7)
MCV RBC AUTO: 91.9 FL (ref 79–97)
MONOCYTES # BLD AUTO: 1.4 10*3/MM3 (ref 0.1–0.9)
MONOCYTES NFR BLD AUTO: 8.8 % (ref 5–12)
NEUTROPHILS # BLD AUTO: 13.67 10*3/MM3 (ref 1.4–7)
NEUTROPHILS NFR BLD AUTO: 85.4 % (ref 42.7–76)
NRBC BLD AUTO-RTO: 0 /100 WBC (ref 0–0)
PCO2 BLDA: 45 MM HG (ref 35–45)
PCO2 BLDA: 50.2 MM HG (ref 35–45)
PCO2 BLDA: 50.6 MM HG (ref 35–45)
PCO2 BLDA: 56.6 MM HG (ref 35–45)
PH BLDA: 7.28 PH UNITS (ref 7.35–7.6)
PH BLDA: 7.32 PH UNITS (ref 7.35–7.6)
PH BLDA: 7.33 PH UNITS (ref 7.35–7.6)
PH BLDA: 7.36 PH UNITS (ref 7.35–7.6)
PHOSPHATE SERPL-MCNC: 3.6 MG/DL (ref 2.5–4.5)
PLATELET # BLD AUTO: 164 10*3/MM3 (ref 140–450)
PMV BLD AUTO: 10.6 FL (ref 6–12)
PO2 BLDA: 206 MMHG (ref 80–105)
PO2 BLDA: 339 MMHG (ref 80–105)
PO2 BLDA: 44 MMHG (ref 80–105)
PO2 BLDA: 55 MMHG (ref 80–105)
POTASSIUM BLD-SCNC: 4.2 MMOL/L (ref 3.5–5.2)
POTASSIUM BLD-SCNC: 4.3 MMOL/L (ref 3.5–5.2)
POTASSIUM BLDA-SCNC: 4.4 MMOL/L (ref 3.5–4.9)
POTASSIUM BLDA-SCNC: 5 MMOL/L (ref 3.5–4.9)
POTASSIUM BLDA-SCNC: 5.3 MMOL/L (ref 3.5–4.9)
POTASSIUM BLDA-SCNC: 5.4 MMOL/L (ref 3.5–4.9)
PROTHROMBIN TIME: 14.3 SECONDS (ref 11.7–14.2)
RBC # BLD AUTO: 4.69 10*6/MM3 (ref 4.14–5.8)
SAO2 % BLDA: 100 % (ref 95–98)
SAO2 % BLDA: 100 % (ref 95–98)
SAO2 % BLDA: 75 % (ref 95–98)
SAO2 % BLDA: 84 % (ref 95–98)
SODIUM BLD-SCNC: 134 MMOL/L (ref 136–145)
WBC NRBC COR # BLD: 15.99 10*3/MM3 (ref 3.4–10.8)

## 2019-04-17 PROCEDURE — 25010000002 MAGNESIUM SULFATE IN D5W 1G/100ML (PREMIX) 1-5 GM/100ML-% SOLUTION: Performed by: THORACIC SURGERY (CARDIOTHORACIC VASCULAR SURGERY)

## 2019-04-17 PROCEDURE — 83735 ASSAY OF MAGNESIUM: CPT | Performed by: THORACIC SURGERY (CARDIOTHORACIC VASCULAR SURGERY)

## 2019-04-17 PROCEDURE — 99024 POSTOP FOLLOW-UP VISIT: CPT | Performed by: NURSE PRACTITIONER

## 2019-04-17 PROCEDURE — 25010000002 METOCLOPRAMIDE PER 10 MG: Performed by: THORACIC SURGERY (CARDIOTHORACIC VASCULAR SURGERY)

## 2019-04-17 PROCEDURE — 97110 THERAPEUTIC EXERCISES: CPT

## 2019-04-17 PROCEDURE — 97162 PT EVAL MOD COMPLEX 30 MIN: CPT

## 2019-04-17 PROCEDURE — 82962 GLUCOSE BLOOD TEST: CPT

## 2019-04-17 PROCEDURE — 93010 ELECTROCARDIOGRAM REPORT: CPT | Performed by: INTERNAL MEDICINE

## 2019-04-17 PROCEDURE — 71045 X-RAY EXAM CHEST 1 VIEW: CPT

## 2019-04-17 PROCEDURE — 93005 ELECTROCARDIOGRAM TRACING: CPT | Performed by: THORACIC SURGERY (CARDIOTHORACIC VASCULAR SURGERY)

## 2019-04-17 PROCEDURE — 80069 RENAL FUNCTION PANEL: CPT | Performed by: THORACIC SURGERY (CARDIOTHORACIC VASCULAR SURGERY)

## 2019-04-17 PROCEDURE — 25010000002 FUROSEMIDE PER 20 MG: Performed by: NURSE PRACTITIONER

## 2019-04-17 PROCEDURE — 85610 PROTHROMBIN TIME: CPT | Performed by: THORACIC SURGERY (CARDIOTHORACIC VASCULAR SURGERY)

## 2019-04-17 PROCEDURE — 25010000002 ENOXAPARIN PER 10 MG: Performed by: THORACIC SURGERY (CARDIOTHORACIC VASCULAR SURGERY)

## 2019-04-17 PROCEDURE — 25010000003 CEFAZOLIN IN DEXTROSE 2-4 GM/100ML-% SOLUTION: Performed by: THORACIC SURGERY (CARDIOTHORACIC VASCULAR SURGERY)

## 2019-04-17 PROCEDURE — 84132 ASSAY OF SERUM POTASSIUM: CPT | Performed by: NURSE PRACTITIONER

## 2019-04-17 PROCEDURE — 85025 COMPLETE CBC W/AUTO DIFF WBC: CPT | Performed by: THORACIC SURGERY (CARDIOTHORACIC VASCULAR SURGERY)

## 2019-04-17 RX ORDER — ROSUVASTATIN CALCIUM 20 MG/1
20 TABLET, COATED ORAL NIGHTLY
Status: DISCONTINUED | OUTPATIENT
Start: 2019-04-17 | End: 2019-04-20 | Stop reason: HOSPADM

## 2019-04-17 RX ORDER — DEXTROSE MONOHYDRATE 25 G/50ML
25 INJECTION, SOLUTION INTRAVENOUS
Status: DISCONTINUED | OUTPATIENT
Start: 2019-04-17 | End: 2019-04-17

## 2019-04-17 RX ORDER — DEXTROSE MONOHYDRATE 25 G/50ML
25 INJECTION, SOLUTION INTRAVENOUS
Status: DISCONTINUED | OUTPATIENT
Start: 2019-04-17 | End: 2019-04-20 | Stop reason: HOSPADM

## 2019-04-17 RX ORDER — NICOTINE POLACRILEX 4 MG
15 LOZENGE BUCCAL
Status: DISCONTINUED | OUTPATIENT
Start: 2019-04-17 | End: 2019-04-17

## 2019-04-17 RX ORDER — NICOTINE POLACRILEX 4 MG
15 LOZENGE BUCCAL
Status: DISCONTINUED | OUTPATIENT
Start: 2019-04-17 | End: 2019-04-20 | Stop reason: HOSPADM

## 2019-04-17 RX ORDER — FUROSEMIDE 10 MG/ML
20 INJECTION INTRAMUSCULAR; INTRAVENOUS ONCE
Status: COMPLETED | OUTPATIENT
Start: 2019-04-17 | End: 2019-04-17

## 2019-04-17 RX ADMIN — METOPROLOL TARTRATE 12.5 MG: 25 TABLET ORAL at 13:00

## 2019-04-17 RX ADMIN — CYCLOBENZAPRINE HYDROCHLORIDE 10 MG: 10 TABLET, FILM COATED ORAL at 23:13

## 2019-04-17 RX ADMIN — HYDROCODONE BITARTRATE AND ACETAMINOPHEN 2 TABLET: 5; 325 TABLET ORAL at 18:19

## 2019-04-17 RX ADMIN — ROSUVASTATIN CALCIUM 20 MG: 20 TABLET, FILM COATED ORAL at 20:27

## 2019-04-17 RX ADMIN — ALPRAZOLAM 0.25 MG: 0.25 TABLET ORAL at 08:46

## 2019-04-17 RX ADMIN — HYDROCODONE BITARTRATE AND ACETAMINOPHEN 2 TABLET: 5; 325 TABLET ORAL at 09:37

## 2019-04-17 RX ADMIN — METOPROLOL TARTRATE 25 MG: 25 TABLET ORAL at 20:28

## 2019-04-17 RX ADMIN — HYDROCODONE BITARTRATE AND ACETAMINOPHEN 2 TABLET: 5; 325 TABLET ORAL at 05:44

## 2019-04-17 RX ADMIN — MAGNESIUM SULFATE HEPTAHYDRATE 1 G: 1 INJECTION, SOLUTION INTRAVENOUS at 08:57

## 2019-04-17 RX ADMIN — FUROSEMIDE 20 MG: 10 INJECTION, SOLUTION INTRAMUSCULAR; INTRAVENOUS at 08:53

## 2019-04-17 RX ADMIN — CEFAZOLIN SODIUM 2 G: 2 INJECTION, SOLUTION INTRAVENOUS at 23:09

## 2019-04-17 RX ADMIN — MUPIROCIN 1 APPLICATION: 20 OINTMENT TOPICAL at 08:53

## 2019-04-17 RX ADMIN — MUPIROCIN 1 APPLICATION: 20 OINTMENT TOPICAL at 20:28

## 2019-04-17 RX ADMIN — ENOXAPARIN SODIUM 40 MG: 40 INJECTION SUBCUTANEOUS at 18:20

## 2019-04-17 RX ADMIN — ASPIRIN 81 MG: 81 TABLET, DELAYED RELEASE ORAL at 08:53

## 2019-04-17 RX ADMIN — HYDROCODONE BITARTRATE AND ACETAMINOPHEN 2 TABLET: 5; 325 TABLET ORAL at 14:25

## 2019-04-17 RX ADMIN — METOCLOPRAMIDE 10 MG: 5 INJECTION, SOLUTION INTRAMUSCULAR; INTRAVENOUS at 08:53

## 2019-04-17 RX ADMIN — METOCLOPRAMIDE 10 MG: 5 INJECTION, SOLUTION INTRAMUSCULAR; INTRAVENOUS at 03:40

## 2019-04-17 RX ADMIN — CHLORHEXIDINE GLUCONATE 15 ML: 1.2 RINSE ORAL at 18:20

## 2019-04-17 RX ADMIN — CYCLOBENZAPRINE HYDROCHLORIDE 10 MG: 10 TABLET, FILM COATED ORAL at 13:00

## 2019-04-17 RX ADMIN — SENNOSIDES AND DOCUSATE SODIUM 2 TABLET: 8.6; 5 TABLET ORAL at 20:27

## 2019-04-17 RX ADMIN — CHLORHEXIDINE GLUCONATE 15 ML: 1.2 RINSE ORAL at 03:39

## 2019-04-17 RX ADMIN — CEFAZOLIN SODIUM 2 G: 2 INJECTION, SOLUTION INTRAVENOUS at 05:01

## 2019-04-17 RX ADMIN — CEFAZOLIN SODIUM 2 G: 2 INJECTION, SOLUTION INTRAVENOUS at 13:49

## 2019-04-17 RX ADMIN — PANTOPRAZOLE SODIUM 40 MG: 40 TABLET, DELAYED RELEASE ORAL at 05:01

## 2019-04-17 NOTE — THERAPY EVALUATION
Acute Care - Physical Therapy Initial Evaluation  [facility]     Patient Name: Chaitanya Hair  : 1960  MRN: [id number]  Today's Date: 2019   Onset of Illness/Injury or Date of Surgery: (P) 19     Referring Physician: Perez (P)      Admit Date: 4/15/2019    Visit Dx:     ICD-10-CM ICD-9-CM   1. S/P CABG x 2 Z95.1 V45.81     Patient Active Problem List   Diagnosis   • Coronary artery disease of native heart with stable angina pectoris (CMS/[facility])   • Hypertension   • Nicotine abuse   • Old inferior wall myocardial infarction   • Presence of stent in right coronary artery   • Tobacco abuse   • CAD (coronary artery disease)     Past Medical History:   Diagnosis Date   • Abnormal stress echo 3/25/19-[facility]    Noted For Myocardial Ischemia   • Acute MI (CMS/[facility])    • Atelectasis 2019    Noted on Chest XR   • CAD (coronary artery disease) Since     w/R Stent Placed   • Chest pain due to CAD     Nitro PRN   • DM (diabetes mellitus) (CMS/[facility])     false positive   • Elevated cholesterol    • History of chest x-ray 2019    Suggestive For Atelectasis w/Chronic Inflammatory Scarring Noted   • History of EKG 3/25/19-[facility]    Sinus Rhythm w/Nonspecific ST-T Wave Abnormalities   • History of nephrolithiasis    • History of stress test 2015    WNL   • HLD (hyperlipidemia)     Controlled w/Meds   • Hypertension     Controlled w/Meds   • Kidney stones     S/p Lithotripsy   • LAD stenosis 2019    90% Mid LAD Stenosis; Diffuse Stenosis of LCX--Noted on Cardiac Cath   • Myocardial ischemia 2019    Noted on Echo Stress Test   • RCA occlusion (CMS/HCC) 2019    Mild Diffuse Disease--Noted on Cardiac Cath    • Tobacco use     1.5-1 PPD     Past Surgical History:   Procedure Laterality Date   • BACK SURGERY     • CARDIAC CATHETERIZATION      RCA Stent   • CARDIAC CATHETERIZATION  2019-[facility]    w/L Ventriculogram/Angiography--Dr. Garcia--CAD  Involving LAD   • COLONOSCOPY  06/2008   • CORONARY ANGIOPLASTY WITH STENT PLACEMENT Right 2009   • CYSTOSCOPY W/ LASER LITHOTRIPSY          PT ASSESSMENT (last 12 hours)      Physical Therapy Evaluation     Row Name 04/17/19 0852          PT Evaluation Time/Intention    Subjective Information  complains of;pain  (Pended)   -CG     Document Type  evaluation  (Pended)   -CG     Mode of Treatment  physical therapy  (Pended)   -CG     Patient Effort  good  (Pended)   -CG     Symptoms Noted During/After Treatment  none  (Pended)   -     Row Name 04/17/19 0852          General Information    Patient Profile Reviewed?  yes  (Pended)   -CG     Onset of Illness/Injury or Date of Surgery  04/16/19  (Pended)   -CG     Referring Physician  Chris  (Pended)   -CG     Patient Observations  alert;cooperative;agree to therapy  (Pended)   -CG     Patient/Family Observations  Pt seated in chair, no signs of acute distress at rest, nsg present  (Pended)   -CG     General Observations of Patient  Pt w/CTs, maacrio, NC, pacer  (Pended)   -CG     Prior Level of Function  independent:;all household mobility;community mobility;gait;transfer  (Pended)   -CG     Equipment Currently Used at Home  none  (Pended)   -CG     Pertinent History of Current Functional Problem  Pt is s/p CABGx2 on 4/16. PMHx: Tobacco abuse w/some DTs  (Pended)   -CG     Existing Precautions/Restrictions  fall;cardiac;sternal  (Pended)   -CG     Barriers to Rehab  medically complex  (Pended)   -     Row Name 04/17/19 0852          Home Main Entrance    Number of Stairs, Main Entrance  four  (Pended)   -CG     Stair Railings, Main Entrance  none  (Pended)   -CG     Row Name 04/17/19 0852          Cognitive Assessment/Interventions    Additional Documentation  Cognitive Assessment/Intervention (Group)  (Pended)   -     Row Name 04/17/19 0852          Cognitive Assessment/Intervention- PT/OT    Orientation Status (Cognition)  oriented x 3  (Pended)   -CG     Follows  Commands (Cognition)  WFL  (Pended)   -     Personal Safety Interventions  fall prevention program maintained;muscle strengthening facilitated;nonskid shoes/slippers when out of bed  (Pended)   -[facility] Name 04/17/19 0852          Bed Mobility Assessment/Treatment    Bed Mobility Assessment/Treatment  supine-sit;sit-supine  (Pended)   -     Supine-Sit [place] (Bed Mobility)  not tested  (Pended)  up in chair  -     Sit-Supine [place] (Bed Mobility)  minimum assist (75% patient effort);2 person assist;verbal cues;nonverbal cues (demo/gesture)  (Pended)   -     Bed Mobility, Safety Issues  decreased use of arms for pushing/pulling  (Pended)   -     Row Name 04/17/19 0852          Transfer Assessment/Treatment    Transfer Assessment/Treatment  sit-stand transfer;stand-sit transfer  (Pended)   -     Sit-Stand [place] (Transfers)  contact guard;2 person assist;1 person to manage equipment;verbal cues;nonverbal cues (demo/gesture)  (Pended)   -     Stand-Sit [place] (Transfers)  minimum assist (75% patient effort);1 person assist;verbal cues;nonverbal cues (demo/gesture)  (Pended)   -[facility] Name 04/17/19 0852          Gait/Stairs Assessment/Training    [place] Level (Gait)  contact guard;2 person assist;1 person to manage equipment;verbal cues;nonverbal cues (demo/gesture)  (Pended)   -     Distance in Feet (Gait)  100  (Pended)   -     Pattern (Gait)  step-through  (Pended)   -     Deviations/Abnormal Patterns (Gait)  base of support, wide;[place] decreased;gait speed decreased;stride length decreased  (Pended)   -     Bilateral Gait Deviations  --  (Pended)  B toe-out  -[facility] Name 04/17/19 0852          General ROM    GENERAL ROM COMMENTS  grossly WFL for age, UE limited d/t post-op status, restrictions, pain  (Pended)   -[facility] Name 04/17/19 0852          MMT (Manual Muscle Testing)    General MMT Comments  B LE >=3/5  (Pended)   -[facility] Name 04/17/19  0852          Motor Assessment/Intervention    Additional Documentation  Balance (Group);Balance Interventions (Group)  (Pended)   -CG     Row Name 04/17/19 0852          Balance    Balance  static standing balance;dynamic standing balance  (Pended)   -CG     Row Name 04/17/19 0852          Static Standing Balance    Level of [place] (Supported Standing, Static Balance)  contact guard assist;supervision  (Pended)   -CG     Row Name 04/17/19 0852          Dynamic Standing Balance    Level of [place], Reaches Outside Midline (Standing, Dynamic Balance)  contact guard assist  (Pended)   -CG     Row Name 04/17/19 0852          Pain Assessment    Additional Documentation  Pain Scale: Word Pre/Post-Treatment (Group)  (Pended)   -CG     Row Name 04/17/19 0852          Pain Scale: Numbers Pre/Post-Treatment    Pain Location  chest  (Pended)   -     Pain Intervention(s)  Ambulation/increased activity;Repositioned  (Pended)   -CG     Row Name 04/17/19 0852          Pain Scale: Word Pre/Post-Treatment    Pain: Word Scale, Pretreatment  4 - moderate pain  (Pended)   -CG     Pain: Word Scale, Post-Treatment  4 - moderate pain  (Pended)   -CG     Row Name             Wound 04/16/19 1406 chest incision    Wound - Properties Group Date first assessed: 04/16/19  - Time first assessed: 1406  -SS Location: chest  -SS Type: incision  -SS    Row Name 04/17/19 0852          Plan of Care Review    Plan of Care Reviewed With  patient  (Pended)   -CG     Row Name 04/17/19 0852          Physical Therapy Clinical Impression    Patient/Family Goals Statement (PT Clinical Impression)  to return to PLOF  (Pended)   -CG     Criteria for Skilled Interventions Met (PT Clinical Impression)  yes;treatment indicated  (Pended)   -CG     Impairments Found (describe specific impairments)  aerobic capacity/endurance;gait, locomotion, and balance;muscle performance  (Pended)   -CG     Rehab Potential (PT Clinical Summary)  good, to achieve  stated therapy goals  (Pended)   -     Row Name 04/17/19 0852          Vital Signs    Pre Systolic BP Rehab  114  (Pended)   -CG     Pre Treatment Diastolic BP  69  (Pended)   -CG     Post Systolic BP Rehab  128  (Pended)   -CG     Post Treatment Diastolic BP  67  (Pended)   -CG     Pretreatment Heart Rate (beats/min)  82  (Pended)   -CG     Posttreatment Heart Rate (beats/min)  90  (Pended)   -CG     Pre SpO2 (%)  94  (Pended)   -CG     O2 Delivery Pre Treatment  nasal cannula  (Pended)   -CG     O2 Delivery Intra Treatment  nasal cannula  (Pended)   -CG     Post SpO2 (%)  93  (Pended)   -CG     O2 Delivery Post Treatment  nasal cannula  (Pended)   -     Row Name 04/17/19 0852          Physical Therapy Goals    Additional Documentation  Cardiac Level Goal Selection (PT) (Group)  (Pended)   -[facility] Name 04/17/19 0852          Cardiac Level Goal (PT)    Cardiac Level (Cardiac Goal, PT)  Level 5  (Pended)   -CG     Time Frame (Cardiac Goal, PT)  1 week  (Pended)   -[facility] Name 04/17/19 0852          Positioning and Restraints    Pre-Treatment Position  sitting in chair/recliner  (Pended)   -CG     Post Treatment Position  bed  (Pended)   -CG     In Bed  notified nsg;supine;call light within reach;encouraged to call for assist;patient within staff view  (Pended)   -[facility] Name 04/17/19 0852          Living Environment    Home Accessibility  stairs to enter home  (Pended)   -CG       User Key  (r) = Recorded By, (t) = Taken By, (c) = Cosigned By    Initials Name Provider Type    Adrianne Hopson RN Registered Nurse    Adilson Hernandez, PT Student PT Student        Physical Therapy Education     Title: PT OT SLP Therapies (Done)     Topic: Physical Therapy (Done)     Point: Mobility training (Done)     Learning Progress Summary           Patient Acceptance, E,TB, VU,DU,NR by CG at 4/17/2019  9:22 AM                   Point: Home exercise program (Done)     Learning Progress Summary            Patient Acceptance, E,TB, VU,DU,NR by CG at 4/17/2019  9:22 AM                   Point: Body mechanics (Done)     Learning Progress Summary           Patient Acceptance, E,TB, VU,DU,NR by CG at 4/17/2019  9:22 AM                   Point: Precautions (Done)     Learning Progress Summary           Patient Acceptance, E,TB, VU,DU,NR by CG at 4/17/2019  9:22 AM                               User Key     Initials Effective Dates Name Provider Type Discipline    CG 02/04/19 -  Adilson Quintanilla, PT Student PT Student PT              PT Recommendation and Plan  Anticipated Discharge Disposition (PT): (P) home with assist, home with home health(pending pt progress)  Planned Therapy Interventions (PT Eval): (P) balance training, bed mobility training, gait training, home exercise program, stair training, patient/family education, strengthening  Therapy Frequency (PT Clinical Impression): (P) daily  Outcome Summary/Treatment Plan (PT)  Anticipated Discharge Disposition (PT): (P) home with assist, home with home health(pending pt progress)  Plan of Care Reviewed With: (P) patient  Outcome Summary: (P) Pt presented to physical therapy evaluation with decreased activity tolerance, functional mobility and difficulty walking following CABGx2 on 4/16. Pt was previously independent w/all mobility and required CGAx2 for transfers and minAx2 for bed mobility this date. Pt ambulated 100' w/CGA before fatiguing. Pt can benefit from skilled PT services to address deficits and progress activity to assist in return to PLOF. Currently anticipate DC home w/assist and HHPT pending pt progress.  Outcome Measures     Row Name 04/17/19 0900             How much help from another person do you currently need...    Turning from your back to your side while in flat bed without using bedrails?  3  (Pended)   -CG      Moving from lying on back to sitting on the side of a flat bed without bedrails?  3  (Pended)   -CG      Moving to and from a bed  to a chair (including a wheelchair)?  3  (Pended)   -CG      Standing up from a chair using your arms (e.g., wheelchair, bedside chair)?  3  (Pended)   -CG      Climbing 3-5 steps with a railing?  2  (Pended)   -CG      To walk in hospital room?  4  (Pended)   -CG      AM-PAC 6 Clicks Score  18  (Pended)   -CG         Functional Assessment    Outcome Measure Options  AM-PAC 6 Clicks Basic Mobility (PT)  (Pended)   -CG        User Key  (r) = Recorded By, (t) = Taken By, (c) = Cosigned By    Initials Name Provider Type    CG Adilson Quintanilla, PT Student PT Student         Time Calculation:   PT Charges     Row Name 04/17/19 0927             Time Calculation    Start Time  0840  (Pended)   -CG      Stop Time  0852  (Pended)   -CG      Time Calculation (min)  12 min  (Pended)   -CG      PT Received On  04/17/19  (Pended)   -CG      PT - Next Appointment  04/18/19  (Pended)   -CG      PT Goal Re-Cert Due Date  04/24/19  (Pended)   -CG         Time Calculation- PT    Total Timed Code Minutes- PT  8 minute(s)  (Pended)   -CG        User Key  (r) = Recorded By, (t) = Taken By, (c) = Cosigned By    Initials Name Provider Type    CG Adilson Quintanilla, PT Student PT Student        Therapy Charges for Today     Code Description Service Date Service Provider Modifiers Qty    03863275800 HC PT EVAL MOD COMPLEXITY 2 4/17/2019 Adilson Quintanilla, PT Student GP 1    23424056478 HC PT THER PROC EA 15 MIN 4/17/2019 Adilson Quintanilla, PT Student GP 1    83336039864 HC PT THER SUPP EA 15 MIN 4/17/2019 Adilson Quintanilla, PT Student GP 1          PT G-Codes  Outcome Measure Options: (P) AM-PAC 6 Clicks Basic Mobility (PT)  AM-PAC 6 Clicks Score: (P) 18      Adilson Socorro PT Student  4/17/2019

## 2019-04-17 NOTE — CONSULTS
"Met with patient and several family memebers, discussed benefits of cardiac rehab. Provided phase II information packet, which includes; general information about cardiac rehab, hospitals Cardiac Rehab Programs handout and [facility] letter article entitled “Cardiac Rehab is often the Best Medicine for Recovery\", stresses the importance of cardiac rehab after a heart event.   I provided the contact information for cardiac rehab here at [facility] and encouraged him to call when discharged. Patient states that he completed the program back in 2009 and plans to attend again.   Explained if receiving home health would not be able to attend cardiac rehab until finished with home health. Instructed to bring a copy of After Visit Summary to initial assessment.      "

## 2019-04-17 NOTE — PROGRESS NOTES
" LOS: 2 days   Patient Care Team:  Og Campos MD as PCP - General  Og Campos MD as PCP - Family Medicine    Chief Complaint: post op fu    Subjective:  Symptoms:  No shortness of breath or chest pain.    Diet:  No nausea or vomiting.    Pain:  Pain is well controlled and requiring pain medication.          Vital Signs  Temp:  [97.7 °F (36.5 °C)] 97.7 °F (36.5 °C)  Heart Rate:  [] 70  Resp:  [12-20] 18  BP: ()/(57-90) 116/70  Arterial Line BP: ()/(45-77) 89/77  FiO2 (%):  [0 %-100 %] 0 %  Body mass index is 30.38 kg/m².    Intake/Output Summary (Last 24 hours) at 4/17/2019 0802  Last data filed at 4/17/2019 0625  Gross per 24 hour   Intake 2921 ml   Output 3580 ml   Net -659 ml     No intake/output data recorded.    Chest tube drainage last 8 hours:  100, 50 (no air leak)        04/15/19  1252 04/15/19  2131   Weight: 111 kg (245 lb) 113 kg (249 lb 9.6 oz)         Objective:  General Appearance:  Comfortable.    Vital signs: (most recent): Blood pressure 116/70, pulse 70, temperature 97.7 °F (36.5 °C), temperature source Oral, resp. rate 18, height 193 cm (76\"), weight 113 kg (249 lb 9.6 oz), SpO2 95 %.    Output: Producing urine.    Lungs:  Normal effort and normal respiratory rate.  There are rales (bilateral lower lobes).  No wheezes or rhonchi.    Heart: Normal rate.  Regular rhythm.  Positive for friction rub (chest tubes).    Abdomen: Abdomen is soft and non-distended.    Extremities: There is no dependent edema.    Pulses: Distal pulses are intact.    Neurological: Patient is alert and oriented to person, place and time.    Skin:  Warm and dry.  (Sternal dressing dry and intact)            Results Review:        WBC WBC   Date Value Ref Range Status   04/17/2019 15.99 (H) 3.40 - 10.80 10*3/mm3 Final   04/16/2019 16.41 (H) 3.40 - 10.80 10*3/mm3 Final   04/16/2019 15.78 (H) 3.40 - 10.80 10*3/mm3 Final   04/15/2019 7.97 3.40 - 10.80 10*3/mm3 Final      HGB Hemoglobin   Date Value " Ref Range Status   04/17/2019 14.1 13.0 - 17.7 g/dL Final   04/16/2019 14.5 13.0 - 17.7 g/dL Final   04/16/2019 14.1 13.0 - 17.7 g/dL Final   04/15/2019 16.5 13.0 - 17.7 g/dL Final      HCT Hematocrit   Date Value Ref Range Status   04/17/2019 43.1 37.5 - 51.0 % Final   04/16/2019 44.9 37.5 - 51.0 % Final   04/16/2019 42.5 37.5 - 51.0 % Final   04/15/2019 50.1 37.5 - 51.0 % Final      Platelets Platelets   Date Value Ref Range Status   04/17/2019 164 140 - 450 10*3/mm3 Final   04/16/2019 174 140 - 450 10*3/mm3 Final   04/16/2019 155 140 - 450 10*3/mm3 Final   04/15/2019 200 140 - 450 10*3/mm3 Final        PT/INR:    Protime   Date Value Ref Range Status   04/17/2019 14.3 (H) 11.7 - 14.2 Seconds Final   04/16/2019 15.2 (H) 11.7 - 14.2 Seconds Final   04/15/2019 12.7 11.7 - 14.2 Seconds Final   /  INR   Date Value Ref Range Status   04/17/2019 1.14 (H) 0.90 - 1.10 Final   04/16/2019 1.23 (H) 0.90 - 1.10 Final   04/15/2019 0.98 0.90 - 1.10 Final       Sodium Sodium   Date Value Ref Range Status   04/17/2019 134 (L) 136 - 145 mmol/L Final   04/16/2019 138 136 - 145 mmol/L Final   04/16/2019 136 136 - 145 mmol/L Final   04/15/2019 137 136 - 145 mmol/L Final      Potassium Potassium   Date Value Ref Range Status   04/17/2019 4.3 3.5 - 5.2 mmol/L Final   04/16/2019 4.7 3.5 - 5.2 mmol/L Final   04/16/2019 5.4 (H) 3.5 - 5.2 mmol/L Final   04/15/2019 3.8 3.5 - 5.2 mmol/L Final      Chloride Chloride   Date Value Ref Range Status   04/17/2019 100 98 - 107 mmol/L Final   04/16/2019 102 98 - 107 mmol/L Final   04/16/2019 101 98 - 107 mmol/L Final   04/15/2019 97 (L) 98 - 107 mmol/L Final      Bicarbonate CO2   Date Value Ref Range Status   04/17/2019 20.9 (L) 22.0 - 29.0 mmol/L Final   04/16/2019 20.0 (L) 22.0 - 29.0 mmol/L Final   04/16/2019 22.3 22.0 - 29.0 mmol/L Final   04/15/2019 24.3 22.0 - 29.0 mmol/L Final      BUN BUN   Date Value Ref Range Status   04/17/2019 20 8 - 23 mg/dL Final   04/16/2019 17 8 - 23 mg/dL Final    04/16/2019 14 8 - 23 mg/dL Final   04/15/2019 14 8 - 23 mg/dL Final      Creatinine Creatinine   Date Value Ref Range Status   04/17/2019 1.24 0.76 - 1.27 mg/dL Final   04/16/2019 1.29 (H) 0.76 - 1.27 mg/dL Final   04/16/2019 1.34 (H) 0.76 - 1.27 mg/dL Final   04/15/2019 1.19 0.76 - 1.27 mg/dL Final      Calcium Calcium   Date Value Ref Range Status   04/17/2019 8.7 8.6 - 10.5 mg/dL Final   04/16/2019 9.4 8.6 - 10.5 mg/dL Final   04/16/2019 8.6 8.6 - 10.5 mg/dL Final   04/15/2019 9.6 8.6 - 10.5 mg/dL Final      Magnesium Magnesium   Date Value Ref Range Status   04/17/2019 2.5 (H) 1.6 - 2.4 mg/dL Final   04/16/2019 3.0 (H) 1.6 - 2.4 mg/dL Final   04/16/2019 3.2 (H) 1.6 - 2.4 mg/dL Final   04/15/2019 2.2 1.6 - 2.4 mg/dL Final            aspirin 81 mg Oral Daily   atorvastatin 40 mg Oral Nightly   ceFAZolin 2 g Intravenous Q8H   chlorhexidine 15 mL Mouth/Throat Q12H   enoxaparin 40 mg Subcutaneous Daily   magnesium sulfate 1 g Intravenous Q8H   metoclopramide 10 mg Intravenous Q6H   metoprolol tartrate 12.5 mg Oral Q12H   mupirocin  Each Nare BID   pantoprazole 40 mg Oral Q AM   sennosides-docusate sodium 2 tablet Oral Nightly       clevidipine 2-32 mg/hr    dexmedetomidine 0.2-1.5 mcg/kg/hr Last Rate: Stopped (04/16/19 1646)   DOPamine 2-20 mcg/kg/min    EPINEPHrine 0.02-0.3 mcg/kg/min    insulin 0-50 Units/hr Last Rate: Stopped (04/17/19 0600)   milrinone 0.25-0.75 mcg/kg/min    niCARdipine 5-15 mg/hr Last Rate: Stopped (04/17/19 0615)   nitroglycerin 5-200 mcg/min    norepinephrine 0.02-0.3 mcg/kg/min    phenylephrine 0.2-3 mcg/kg/min    propofol 5-50 mcg/kg/min Last Rate: Stopped (04/16/19 1625)   sodium chloride 30 mL/hr Last Rate: 30 mL/hr (04/16/19 1533)   sodium chloride 30 mL/hr    vasopressin 0.02-0.1 Units/min            Patient Active Problem List   Diagnosis Code   • Coronary artery disease of native heart with stable angina pectoris (CMS/[facility]) I25.118   • Hypertension I10   • Nicotine abuse Z72.0   •  Old inferior wall myocardial infarction I25.2   • Presence of stent in right coronary artery Z95.5   • Tobacco abuse Z72.0   • CAD (coronary artery disease) I25.10       Assessment & Plan    CAD s/p CABG x2 LIMA----POD #1 ([place])  ST elevation----global, asymptomatic, probable mild pericarditis  HTN----controlled  LVH  Tobacco abuse----recent cessation    Give Lasix 20 mg IV this am, start low dose lopressor.  Reeval chest tube output this afternoon for potential removal.  D/c cordis, isolate wires.  Increase pulmonary toilet and activity.         Briana Gibson, APRN  04/17/19  8:02 AM

## 2019-04-17 NOTE — PLAN OF CARE
Problem: Patient Care Overview  Goal: Plan of Care Review  Outcome: Ongoing (interventions implemented as appropriate)   04/17/19 0922   Coping/Psychosocial   Plan of Care Reviewed With patient   OTHER   Outcome Summary Pt presented to physical therapy evaluation with decreased activity tolerance, functional mobility and difficulty walking following CABGx2 on 4/16. Pt was previously independent w/all mobility and required CGAx2 for transfers and minAx2 for bed mobility this date. Pt ambulated 100' w/CGA before fatiguing. Pt can benefit from skilled PT services to address deficits and progress activity to assist in return to PLOF. Currently anticipate DC home w/assist and HHPT pending pt progress.

## 2019-04-17 NOTE — ANESTHESIA POSTPROCEDURE EVALUATION
"Patient: Chaitanya Hair    Procedure Summary     Date:  04/16/19 Room / Location:  [facility] OR [address] [place] [address] MAIN OR    Anesthesia Start:  1158 Anesthesia Stop:  1511    Procedure:  MEDIAN STERNOTOMY, CORONARY ARTERY BYPASS X2 USING LEFT INTERNAL MAMMARY ARTERY GRAFT, PRP, INTRAOP LAURI (N/A Chest) Diagnosis:       Coronary artery disease of native heart with stable angina pectoris, unspecified vessel or lesion type (CMS/HCC)      (Coronary artery disease of native heart with stable angina pectoris, unspecified vessel or lesion type (CMS/HCC) [I25.118])    Surgeon:  Jesse Perez MD Provider:  Chinedu Kendrick MD    Anesthesia Type:  general ASA Status:  4          Anesthesia Type: general  Last vitals  BP   139/78 (04/17/19 1300)   Temp   36.5 °C (97.7 °F) (04/16/19 0821)   Pulse   92 (04/17/19 1300)   Resp   18 (04/16/19 2045)     SpO2   94 % (04/17/19 1220)     Post Anesthesia Care and Evaluation    Patient location during evaluation: bedside  Patient participation: complete - patient participated  Level of consciousness: awake  Pain management: adequate  Airway patency: patent  Anesthetic complications: No anesthetic complications    Cardiovascular status: acceptable  Respiratory status: acceptable  Hydration status: acceptable    Comments: /78   Pulse 92   Temp 36.5 °C (97.7 °F) (Oral)   Resp 18   Ht 193 cm (76\")   Wt 113 kg (249 lb 9.6 oz)   SpO2 94%   BMI 30.38 kg/m²         "

## 2019-04-18 ENCOUNTER — APPOINTMENT (OUTPATIENT)
Dept: GENERAL RADIOLOGY | Facility: HOSPITAL | Age: 59
End: 2019-04-18

## 2019-04-18 LAB
ANION GAP SERPL CALCULATED.3IONS-SCNC: 14.7 MMOL/L
BUN BLD-MCNC: 21 MG/DL (ref 8–23)
BUN/CREAT SERPL: 20 (ref 7–25)
CALCIUM SPEC-SCNC: 9.2 MG/DL (ref 8.6–10.5)
CHLORIDE SERPL-SCNC: 99 MMOL/L (ref 98–107)
CO2 SERPL-SCNC: 24.3 MMOL/L (ref 22–29)
CREAT BLD-MCNC: 1.05 MG/DL (ref 0.76–1.27)
DEPRECATED RDW RBC AUTO: 45.5 FL (ref 37–54)
ERYTHROCYTE [DISTWIDTH] IN BLOOD BY AUTOMATED COUNT: 13.3 % (ref 12.3–15.4)
GFR SERPL CREATININE-BSD FRML MDRD: 72 ML/MIN/1.73
GLUCOSE BLD-MCNC: 114 MG/DL (ref 65–99)
GLUCOSE BLDC GLUCOMTR-MCNC: 106 MG/DL (ref 70–130)
GLUCOSE BLDC GLUCOMTR-MCNC: 115 MG/DL (ref 70–130)
GLUCOSE BLDC GLUCOMTR-MCNC: 119 MG/DL (ref 70–130)
GLUCOSE BLDC GLUCOMTR-MCNC: 125 MG/DL (ref 70–130)
HCT VFR BLD AUTO: 44.5 % (ref 37.5–51)
HGB BLD-MCNC: 14.5 G/DL (ref 13–17.7)
MCH RBC QN AUTO: 30.1 PG (ref 26.6–33)
MCHC RBC AUTO-ENTMCNC: 32.6 G/DL (ref 31.5–35.7)
MCV RBC AUTO: 92.5 FL (ref 79–97)
PLATELET # BLD AUTO: 172 10*3/MM3 (ref 140–450)
PMV BLD AUTO: 10.4 FL (ref 6–12)
POTASSIUM BLD-SCNC: 4.1 MMOL/L (ref 3.5–5.2)
POTASSIUM BLD-SCNC: 4.2 MMOL/L (ref 3.5–5.2)
RBC # BLD AUTO: 4.81 10*6/MM3 (ref 4.14–5.8)
SODIUM BLD-SCNC: 138 MMOL/L (ref 136–145)
WBC NRBC COR # BLD: 16.08 10*3/MM3 (ref 3.4–10.8)

## 2019-04-18 PROCEDURE — 80048 BASIC METABOLIC PNL TOTAL CA: CPT | Performed by: THORACIC SURGERY (CARDIOTHORACIC VASCULAR SURGERY)

## 2019-04-18 PROCEDURE — 84132 ASSAY OF SERUM POTASSIUM: CPT | Performed by: NURSE PRACTITIONER

## 2019-04-18 PROCEDURE — 93005 ELECTROCARDIOGRAM TRACING: CPT | Performed by: THORACIC SURGERY (CARDIOTHORACIC VASCULAR SURGERY)

## 2019-04-18 PROCEDURE — 82962 GLUCOSE BLOOD TEST: CPT

## 2019-04-18 PROCEDURE — 71045 X-RAY EXAM CHEST 1 VIEW: CPT

## 2019-04-18 PROCEDURE — 93010 ELECTROCARDIOGRAM REPORT: CPT | Performed by: INTERNAL MEDICINE

## 2019-04-18 PROCEDURE — 25010000002 ENOXAPARIN PER 10 MG: Performed by: THORACIC SURGERY (CARDIOTHORACIC VASCULAR SURGERY)

## 2019-04-18 PROCEDURE — 25010000002 FUROSEMIDE PER 20 MG: Performed by: NURSE PRACTITIONER

## 2019-04-18 PROCEDURE — 85027 COMPLETE CBC AUTOMATED: CPT | Performed by: THORACIC SURGERY (CARDIOTHORACIC VASCULAR SURGERY)

## 2019-04-18 PROCEDURE — 97110 THERAPEUTIC EXERCISES: CPT

## 2019-04-18 PROCEDURE — 25010000002 ONDANSETRON PER 1 MG: Performed by: THORACIC SURGERY (CARDIOTHORACIC VASCULAR SURGERY)

## 2019-04-18 PROCEDURE — 99024 POSTOP FOLLOW-UP VISIT: CPT | Performed by: NURSE PRACTITIONER

## 2019-04-18 PROCEDURE — 25010000003 CEFAZOLIN IN DEXTROSE 2-4 GM/100ML-% SOLUTION: Performed by: THORACIC SURGERY (CARDIOTHORACIC VASCULAR SURGERY)

## 2019-04-18 RX ORDER — SCOLOPAMINE TRANSDERMAL SYSTEM 1 MG/1
1 PATCH, EXTENDED RELEASE TRANSDERMAL
Status: DISCONTINUED | OUTPATIENT
Start: 2019-04-18 | End: 2019-04-20 | Stop reason: HOSPADM

## 2019-04-18 RX ORDER — METOPROLOL TARTRATE 50 MG/1
50 TABLET, FILM COATED ORAL EVERY 12 HOURS SCHEDULED
Status: DISCONTINUED | OUTPATIENT
Start: 2019-04-18 | End: 2019-04-18

## 2019-04-18 RX ORDER — FUROSEMIDE 10 MG/ML
40 INJECTION INTRAMUSCULAR; INTRAVENOUS ONCE
Status: COMPLETED | OUTPATIENT
Start: 2019-04-18 | End: 2019-04-18

## 2019-04-18 RX ADMIN — CEFAZOLIN SODIUM 2 G: 2 INJECTION, SOLUTION INTRAVENOUS at 06:23

## 2019-04-18 RX ADMIN — METOPROLOL TARTRATE 25 MG: 25 TABLET ORAL at 16:36

## 2019-04-18 RX ADMIN — SCOPALAMINE 1 PATCH: 1 PATCH, EXTENDED RELEASE TRANSDERMAL at 16:22

## 2019-04-18 RX ADMIN — ENOXAPARIN SODIUM 40 MG: 40 INJECTION SUBCUTANEOUS at 09:20

## 2019-04-18 RX ADMIN — ONDANSETRON 4 MG: 2 INJECTION INTRAMUSCULAR; INTRAVENOUS at 11:52

## 2019-04-18 RX ADMIN — FUROSEMIDE 40 MG: 10 INJECTION, SOLUTION INTRAMUSCULAR; INTRAVENOUS at 09:20

## 2019-04-18 RX ADMIN — METOPROLOL TARTRATE 75 MG: 25 TABLET ORAL at 21:01

## 2019-04-18 RX ADMIN — HYDROCODONE BITARTRATE AND ACETAMINOPHEN 2 TABLET: 5; 325 TABLET ORAL at 04:45

## 2019-04-18 RX ADMIN — ASPIRIN 81 MG: 81 TABLET, DELAYED RELEASE ORAL at 09:20

## 2019-04-18 RX ADMIN — CHLORHEXIDINE GLUCONATE 15 ML: 1.2 RINSE ORAL at 05:04

## 2019-04-18 RX ADMIN — SENNOSIDES AND DOCUSATE SODIUM 2 TABLET: 8.6; 5 TABLET ORAL at 21:01

## 2019-04-18 RX ADMIN — METOPROLOL TARTRATE 50 MG: 25 TABLET ORAL at 09:20

## 2019-04-18 RX ADMIN — PANTOPRAZOLE SODIUM 40 MG: 40 TABLET, DELAYED RELEASE ORAL at 06:23

## 2019-04-18 RX ADMIN — ONDANSETRON 4 MG: 2 INJECTION INTRAMUSCULAR; INTRAVENOUS at 21:02

## 2019-04-18 RX ADMIN — ACETAMINOPHEN 650 MG: 325 TABLET, FILM COATED ORAL at 16:40

## 2019-04-18 RX ADMIN — ROSUVASTATIN CALCIUM 20 MG: 20 TABLET, FILM COATED ORAL at 21:01

## 2019-04-18 NOTE — PLAN OF CARE
Problem: Patient Care Overview  Goal: Plan of Care Review  Outcome: Ongoing (interventions implemented as appropriate)   04/18/19 7422   Coping/Psychosocial   Plan of Care Reviewed With patient   OTHER   Outcome Summary Pt demonstrated increased activity tolerance, ambulating further distance w/increased speed this date. Continues to require CGA only for transfers and gait. Cardiac protocol exercises were introduced. PT will continue to follow to progress activity tolerance, gait and balance as appropriate.

## 2019-04-18 NOTE — DISCHARGE PLACEMENT REQUEST
"Lesly Hair (58 y.o. Male)     Date of Birth Social Security Number Address Home Phone MRN    [id number]  [id number] [place] [address] [phone] [id number]    [employer] Marital Status          None        Admission Date Admission Type Admitting Provider Attending Provider Department, Room/Bed    4/15/19 Urgent Jesse Perez MD Pollock, Samuel B., MD [facility] CARDIOVASC UNIT, 2221/1    Discharge Date Discharge Disposition Discharge Destination                       Attending Provider:  Jeses Perez MD    Allergies:  No Known Allergies    Isolation:  None   Infection:  None   Code Status:  CPR    Ht:  193 cm (76\")   Wt:  112 kg (247 lb)    Admission Cmt:  None   Principal Problem:  Coronary artery disease of native heart with stable angina pectoris (CMS/HCC) [I25.118] More...                 Active Insurance as of 4/15/2019     Primary Coverage     Payor Plan Insurance Group Employer/Plan Group    [facility] [employer] [facility] [id number]     Payor Plan Address Payor Plan Phone Number Payor Plan Fax Number Effective Dates    [address] [phone]  1/1/2019 - None Entered    [place] [address]       Subscriber Name Subscriber Birth Date Member ID       LESLY HAIR 1960 [id number]                 Emergency Contacts      (Rel.) Home Phone Work Phone Mobile Phone    HairSyl (Spouse) [phone] -- [phone]              "

## 2019-04-18 NOTE — PROGRESS NOTES
Discharge Planning Assessment  [facility]     Patient Name: Chaitanya Hair  MRN: [id number]  Today's Date: 4/18/2019    Admit Date: 4/15/2019    Discharge Needs Assessment     Row Name 04/18/19 1616       Living Environment    Lives With  spouse    Name(s) of Who Lives With Patient  Syl Hair, spouse    Current Living Arrangements  home/apartment/condo    Primary Care Provided by  self    Provides Primary Care For  pet(s) 10 cats    Family Caregiver if Needed  spouse       Resource/Environmental Concerns    Resource/Environmental Concerns  none       Transition Planning    Patient/Family Anticipates Transition to  home with family    Patient/Family Anticipated Services at Transition  home health care    Transportation Anticipated  family or friend will provide       Discharge Needs Assessment    Concerns to be Addressed  discharge planning    Equipment Currently Used at Home  none    Anticipated Changes Related to Illness  none    Equipment Needed After Discharge  none    Offered/Gave Vendor List  yes    Patient's Choice of Community Agency(s)  Pt chose [facility]        Discharge Plan     Row Name 04/18/19 5656       Plan    Plan  Home w/ [facility] (for [facility])    Patient/Family in Agreement with Plan  yes    Plan Comments  Spoke with Pt and spouse Syl Hair ([phone]) at bedside.  CCP introduced self and role.  Pt confirmed information on face sheet.  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  Pt lives in a house with two entrance steps with his wife.  Pt confirms pharmacy as [facility] in [place], [place].  Pt denies issues with affording his medications.  Pt denies past home health, subacute rehab and DME.  Pt plans to return home at discharge with 24-hour assistance of his spouse.  Pt chose [facility] to follow him at home.  Referral called to luis May at 2:48 PM.  CCP will continue to follow…AGUSTÍN ROMEO/CCP        Destination       No service coordination in this encounter.      Durable Medical Equipment      No service coordination in this encounter.      Dialysis/Infusion      No service coordination in this encounter.      Home Medical Care - Selection Complete      Service Provider Request Status Selected Services Address Phone Number Fax Number    [facility] CARE [facility] [address] [place] [id number] [phone]      Therapy      No service coordination in this encounter.      Community Resources      No service coordination in this encounter.          Demographic Summary     Row Name 04/18/19 1616       General Information    Admission Type  inpatient    Arrived From  operating room    Referral Source  admission list;physician    Reason for Consult  discharge planning    Preferred Language  English        Functional Status     Row Name 04/18/19 1616       Functional Status    Usual Activity Tolerance  good    Current Activity Tolerance  good       Functional Status, IADL    Medications  independent    Meal Preparation  independent    Housekeeping  independent    Laundry  independent    Shopping  independent       Mental Status    General Appearance WDL  WDL       Mental Status Summary    Recent Changes in Mental Status/Cognitive Functioning  no changes       Employment/    Employment Status  employed part time    Current or Previous Occupation  traveling/driving        Psychosocial    No documentation.       Abuse/Neglect    No documentation.       Legal    No documentation.       Substance Abuse    No documentation.       Patient Forms    No documentation.           Corrie Louise RN

## 2019-04-18 NOTE — PROGRESS NOTES
" LOS: 3 days   Patient Care Team:  Og Campos MD as PCP - General  Og Campos MD as PCP - Family Medicine    Chief Complaint: post op fu    Subjective:  Symptoms:  He reports cough.  No shortness of breath or chest pain.    Diet:  He reports  nausea.  No vomiting.    Activity level: Returning to normal.    Pain:  Pain is requiring pain medication and well controlled.      Mild increase in pain \"pulling\" sensation when lying flat    Vital Signs  Temp:  [98.2 °F (36.8 °C)-99.4 °F (37.4 °C)] 99 °F (37.2 °C)  Heart Rate:  [] 77  Resp:  [18] 18  BP: (114-153)/(67-97) 150/97  Body mass index is 30.07 kg/m².    Intake/Output Summary (Last 24 hours) at 4/18/2019 0811  Last data filed at 4/18/2019 0715  Gross per 24 hour   Intake 1318 ml   Output 2010 ml   Net -692 ml     I/O this shift:  In: 240 [P.O.:240]  Out: -           04/15/19  1252 04/15/19  2131 04/18/19  0623   Weight: 111 kg (245 lb) 113 kg (249 lb 9.6 oz) 112 kg (247 lb)         Objective:  General Appearance:  Comfortable.    Vital signs: (most recent): Blood pressure 150/97, pulse 77, temperature 98.9 °F (37.2 °C), temperature source Oral, resp. rate 18, height 193 cm (76\"), weight 112 kg (247 lb), SpO2 93 %.    Output: Producing urine and no stool output (passing flatus).    Lungs:  Normal effort and normal respiratory rate.  There are decreased breath sounds.  (O2 at 2L)  Heart: Tachycardia.  Regular rhythm.  S1 normal and S2 normal.  No friction rub. (Sinus tachycardia, rate 105)  Abdomen: Abdomen is soft and non-distended.    Extremities: There is dependent edema (trace pretibial).    Neurological: Patient is alert and oriented to person, place and time.    Skin:  Warm and dry.  (Sternal dressing dry and intact)            Results Review:        WBC WBC   Date Value Ref Range Status   04/18/2019 16.08 (H) 3.40 - 10.80 10*3/mm3 Final   04/17/2019 15.99 (H) 3.40 - 10.80 10*3/mm3 Final   04/16/2019 16.41 (H) 3.40 - 10.80 10*3/mm3 Final "   04/16/2019 15.78 (H) 3.40 - 10.80 10*3/mm3 Final   04/15/2019 7.97 3.40 - 10.80 10*3/mm3 Final      HGB Hemoglobin   Date Value Ref Range Status   04/18/2019 14.5 13.0 - 17.7 g/dL Final   04/17/2019 14.1 13.0 - 17.7 g/dL Final   04/16/2019 14.5 13.0 - 17.7 g/dL Final   04/16/2019 14.1 13.0 - 17.7 g/dL Final   04/16/2019 11.9 (L) 12.0 - 17.0 g/dL Final   04/16/2019 11.6 (L) 12.0 - 17.0 g/dL Final   04/16/2019 11.2 (L) 12.0 - 17.0 g/dL Final   04/16/2019 14.6 12.0 - 17.0 g/dL Final   04/15/2019 16.5 13.0 - 17.7 g/dL Final      HCT Hematocrit   Date Value Ref Range Status   04/18/2019 44.5 37.5 - 51.0 % Final   04/17/2019 43.1 37.5 - 51.0 % Final   04/16/2019 44.9 37.5 - 51.0 % Final   04/16/2019 42.5 37.5 - 51.0 % Final   04/16/2019 35 (L) 38 - 51 % Final   04/16/2019 34 (L) 38 - 51 % Final   04/16/2019 33 (L) 38 - 51 % Final   04/16/2019 43 38 - 51 % Final   04/15/2019 50.1 37.5 - 51.0 % Final      Platelets Platelets   Date Value Ref Range Status   04/18/2019 172 140 - 450 10*3/mm3 Final   04/17/2019 164 140 - 450 10*3/mm3 Final   04/16/2019 174 140 - 450 10*3/mm3 Final   04/16/2019 155 140 - 450 10*3/mm3 Final   04/15/2019 200 140 - 450 10*3/mm3 Final        PT/INR:    Protime   Date Value Ref Range Status   04/17/2019 14.3 (H) 11.7 - 14.2 Seconds Final   04/16/2019 15.2 (H) 11.7 - 14.2 Seconds Final   04/15/2019 12.7 11.7 - 14.2 Seconds Final   /  INR   Date Value Ref Range Status   04/17/2019 1.14 (H) 0.90 - 1.10 Final   04/16/2019 1.23 (H) 0.90 - 1.10 Final   04/15/2019 0.98 0.90 - 1.10 Final       Sodium Sodium   Date Value Ref Range Status   04/18/2019 138 136 - 145 mmol/L Final   04/17/2019 134 (L) 136 - 145 mmol/L Final   04/16/2019 138 136 - 145 mmol/L Final   04/16/2019 136 136 - 145 mmol/L Final   04/15/2019 137 136 - 145 mmol/L Final      Potassium Potassium   Date Value Ref Range Status   04/18/2019 4.2 3.5 - 5.2 mmol/L Final   04/17/2019 4.2 3.5 - 5.2 mmol/L Final   04/17/2019 4.3 3.5 - 5.2 mmol/L  Final   04/16/2019 4.7 3.5 - 5.2 mmol/L Final   04/16/2019 5.4 (H) 3.5 - 5.2 mmol/L Final   04/15/2019 3.8 3.5 - 5.2 mmol/L Final      Chloride Chloride   Date Value Ref Range Status   04/18/2019 99 98 - 107 mmol/L Final   04/17/2019 100 98 - 107 mmol/L Final   04/16/2019 102 98 - 107 mmol/L Final   04/16/2019 101 98 - 107 mmol/L Final   04/15/2019 97 (L) 98 - 107 mmol/L Final      Bicarbonate CO2   Date Value Ref Range Status   04/18/2019 24.3 22.0 - 29.0 mmol/L Final   04/17/2019 20.9 (L) 22.0 - 29.0 mmol/L Final   04/16/2019 20.0 (L) 22.0 - 29.0 mmol/L Final   04/16/2019 22.3 22.0 - 29.0 mmol/L Final   04/15/2019 24.3 22.0 - 29.0 mmol/L Final      BUN BUN   Date Value Ref Range Status   04/18/2019 21 8 - 23 mg/dL Final   04/17/2019 20 8 - 23 mg/dL Final   04/16/2019 17 8 - 23 mg/dL Final   04/16/2019 14 8 - 23 mg/dL Final   04/15/2019 14 8 - 23 mg/dL Final      Creatinine Creatinine   Date Value Ref Range Status   04/18/2019 1.05 0.76 - 1.27 mg/dL Final   04/17/2019 1.24 0.76 - 1.27 mg/dL Final   04/16/2019 1.29 (H) 0.76 - 1.27 mg/dL Final   04/16/2019 1.34 (H) 0.76 - 1.27 mg/dL Final   04/15/2019 1.19 0.76 - 1.27 mg/dL Final      Calcium Calcium   Date Value Ref Range Status   04/18/2019 9.2 8.6 - 10.5 mg/dL Final   04/17/2019 8.7 8.6 - 10.5 mg/dL Final   04/16/2019 9.4 8.6 - 10.5 mg/dL Final   04/16/2019 8.6 8.6 - 10.5 mg/dL Final   04/15/2019 9.6 8.6 - 10.5 mg/dL Final      Magnesium Magnesium   Date Value Ref Range Status   04/17/2019 2.5 (H) 1.6 - 2.4 mg/dL Final   04/16/2019 3.0 (H) 1.6 - 2.4 mg/dL Final   04/16/2019 3.2 (H) 1.6 - 2.4 mg/dL Final   04/15/2019 2.2 1.6 - 2.4 mg/dL Final            aspirin 81 mg Oral Daily   chlorhexidine 15 mL Mouth/Throat Q12H   enoxaparin 40 mg Subcutaneous Daily   insulin lispro 0-9 Units Subcutaneous 4x Daily With Meals & Nightly   metoprolol tartrate 25 mg Oral Q12H   mupirocin  Each Nare BID   pantoprazole 40 mg Oral Q AM   rosuvastatin 20 mg Oral Nightly    sennosides-docusate sodium 2 tablet Oral Nightly              Patient Active Problem List   Diagnosis Code   • Coronary artery disease of native heart with stable angina pectoris (CMS/[facility]) I25.118   • Hypertension I10   • Nicotine abuse Z72.0   • Old inferior wall myocardial infarction I25.2   • Presence of stent in right coronary artery Z95.5   • Tobacco abuse Z72.0   • CAD (coronary artery disease) I25.10       Assessment & Plan    CAD s/p CABG x2 LIMA----POD #2 ([place])  ST elevation----global, asymptomatic, probable mild pericarditis  HTN----elevated this am  LVH  Tobacco abuse----recent cessation     Lasix 40 mg IV today, increase beta blocker with HTN and tachycardia.  D/c macario, leave wires one more day.  Increase pulmonary toilet frequency and ambulation.  Mild nausea this am, attributing to mupirocin ointment, will discontinue.      Briana Gibson, APRN  04/18/19  8:11 AM

## 2019-04-18 NOTE — THERAPY TREATMENT NOTE
Acute Care - Physical Therapy Treatment Note  [facility]     Patient Name: Chaitanya Hair  : 1960  MRN: [id number]  Today's Date: 2019  Onset of Illness/Injury or Date of Surgery: 19     Referring Physician: Chris    Admit Date: 4/15/2019    Visit Dx:    ICD-10-CM ICD-9-CM   1. S/P CABG x 2 Z95.1 V45.81     Patient Active Problem List   Diagnosis   • Coronary artery disease of native heart with stable angina pectoris (CMS/HCC)   • Hypertension   • Nicotine abuse   • Old inferior wall myocardial infarction   • Presence of stent in right coronary artery   • Tobacco abuse   • CAD (coronary artery disease)       Therapy Treatment    Rehabilitation Treatment Summary     Row Name 19             Treatment Time/Intention    Discipline  physical therapist  (Pended)   -CG      Document Type  therapy note (daily note)  (Pended)   -CG      Subjective Information  complains of;fatigue;nausea/vomiting  (Pended)  from nose swab  -CG      Mode of Treatment  physical therapy  (Pended)   -CG      Patient/Family Observations  Pt seated in chair no signs of acute distress at rest  (Pended)   -CG      Patient Effort  good  (Pended)   -CG      Existing Precautions/Restrictions  fall;cardiac;sternal  (Pended)   -CG      Recorded by [CG] Adilson Quintanilla, PT Student 19      Row Name 19             Vital Signs    Pre Systolic BP Rehab  150  (Pended)   -CG      Pre Treatment Diastolic BP  97  (Pended)   -CG      Pretreatment Heart Rate (beats/min)  104  (Pended)   -CG      Posttreatment Heart Rate (beats/min)  109  (Pended)   -CG      Pre SpO2 (%)  95  (Pended)   -CG      O2 Delivery Pre Treatment  nasal cannula  (Pended)   -CG      O2 Delivery Intra Treatment  nasal cannula  (Pended)   -CG      Post SpO2 (%)  92  (Pended)   -CG      O2 Delivery Post Treatment  nasal cannula  (Pended)   -CG      Recorded by [CG] Adilson Quintanilla, PT Student 19      Row Name 19  0903             Cognitive Assessment/Intervention- PT/OT    Orientation Status (Cognition)  oriented x 3  (Pended)   -CG      Follows Commands (Cognition)  WFL  (Pended)   -CG      Personal Safety Interventions  nonskid shoes/slippers when out of bed;fall prevention program maintained;muscle strengthening facilitated  (Pended)   -CG      Recorded by [CG] Adilson Quintanilla, PT Student 04/18/19 0908      Row Name 04/18/19 0903             Bed Mobility Assessment/Treatment    Supine-Sit B[place] (Bed Mobility)  not tested  (Pended)  up in chair  -CG      Sit-Supine [place] (Bed Mobility)  contact guard;2 person assist;verbal cues;nonverbal cues (demo/gesture)  (Pended)   -CG      Bed Mobility, Safety Issues  decreased use of arms for pushing/pulling  (Pended)   -CG      Recorded by [CG] Adilson Quintanilla, PT Student 04/18/19 0908      Row Name 04/18/19 0903             Sit-Stand Transfer    Sit-Stand B[place] (Transfers)  contact guard;verbal cues;nonverbal cues (demo/gesture)  (Pended)   -CG      Recorded by [CG] Adilson Quintanilla, PT Student 04/18/19 0908      Row Name 04/18/19 0903             Stand-Sit Transfer    Stand-Sit [place] (Transfers)  contact guard;verbal cues;nonverbal cues (demo/gesture)  (Pended)   -CG      Recorded by [CG] Adilson Quintanilla, PT Student 04/18/19 0908      Row Name 04/18/19 0903             Gait/Stairs Assessment/Training    Benton Level (Gait)  contact guard;2 person assist;1 person to manage equipment;verbal cues;nonverbal cues (demo/gesture)  (Pended)   -CG      Distance in Feet (Gait)  120  (Pended)   -CG      Pattern (Gait)  step-through  (Pended)   -CG      Deviations/Abnormal Patterns (Gait)  alanna decreased;gait speed decreased;stride length decreased  (Pended)   -CG      Bilateral Gait Deviations  --  (Pended)  B toe out  -CG      Recorded by [CG] Adilson Quintanilla, PT Student 04/18/19 0908      Row Name 04/18/19 0903             Motor  Skills Assessment/Interventions    Additional Documentation  Therapeutic Exercise (Group);Therapeutic Exercise Interventions (Group)  (Pended)   -CG      Recorded by [CG] Adilson Quintanilla, PT Student 04/18/19 0908      Row Name 04/18/19 0903             Therapeutic Exercise    Comment (Therapeutic Exercise)  cardiac protocol x10, level 3  (Pended)   -CG      Recorded by [CG] Adilson Quintanilla PT Student 04/18/19 0909      Row Name 04/18/19 0903             Positioning and Restraints    Pre-Treatment Position  sitting in chair/recliner  (Pended)   -CG      Post Treatment Position  bed  (Pended)   -CG      In Bed  notified nsg;supine;call light within reach;encouraged to call for assist  (Pended)   -CG      Recorded by [CG] Adilson Quintanilla, PT Student 04/18/19 0909      Row Name 04/18/19 0903             Pain Scale: Word Pre/Post-Treatment    Pain: Word Scale, Pretreatment  0 - no pain  (Pended)   -CG      Pain: Word Scale, Post-Treatment  0 - no pain  (Pended)   -CG      Recorded by [CG] Adilson Quintanilla, PT Student 04/18/19 0909      Row Name                Wound 04/16/19 1406 chest incision    Wound - Properties Group Date first assessed: 04/16/19 [SS] Time first assessed: 1406 [SS] Location: chest [SS] Type: incision [SS] Recorded by:  [SS] Adrianne Rodrigez RN 04/16/19 1406    Row Name 04/18/19 0903             Plan of Care Review    Plan of Care Reviewed With  patient  (Pended)   -CG      Recorded by [CG] Adilson Quintanilla PT Student 04/18/19 0909      Row Name 04/18/19 0903             Outcome Summary/Treatment Plan (PT)    Anticipated Discharge Disposition (PT)  home with assist;home with home health  (Pended)  pending pt progress  -CG      Recorded by [CG] Adilson Quintanilla, PT Student 04/18/19 0909        User Key  (r) = Recorded By, (t) = Taken By, (c) = Cosigned By    Initials Name Effective Dates Discipline    SS Adrianne Rodrigez RN 06/16/16 -  Nurse    CG Socorro  Adilson, PT Student 02/04/19 -  PT          Wound 04/16/19 1406 chest incision (Active)   Dressing Appearance dry;intact 4/18/2019  4:45 AM   Closure YOUNG 4/18/2019  4:45 AM   Base dressing in place, unable to visualize 4/18/2019  4:45 AM   Drainage Amount none 4/18/2019  4:45 AM   Dressing Care, Wound foam;low-adherent 4/17/2019  8:27 PM           Physical Therapy Education     Title: PT OT SLP Therapies (Done)     Topic: Physical Therapy (Done)     Point: Mobility training (Done)     Learning Progress Summary           Patient Acceptance, E,TB, VU,NR by CG at 4/18/2019  9:09 AM    Acceptance, E,TB, VU,DU,NR by CG at 4/17/2019  9:22 AM                   Point: Home exercise program (Done)     Learning Progress Summary           Patient Acceptance, E,TB, VU,NR by CG at 4/18/2019  9:09 AM    Acceptance, E,TB, VU,DU,NR by CG at 4/17/2019  9:22 AM                   Point: Body mechanics (Done)     Learning Progress Summary           Patient Acceptance, E,TB, VU,NR by CG at 4/18/2019  9:09 AM    Acceptance, E,TB, VU,DU,NR by CG at 4/17/2019  9:22 AM                   Point: Precautions (Done)     Learning Progress Summary           Patient Acceptance, E,TB, VU,NR by CG at 4/18/2019  9:09 AM    Acceptance, E,TB, VU,DU,NR by CG at 4/17/2019  9:22 AM                               User Key     Initials Effective Dates Name Provider Type Discipline     02/04/19 -  Adilson Quintanilla, PT Student PT Student PT                PT Recommendation and Plan  Anticipated Discharge Disposition (PT): (P) home with assist, home with home health(pending pt progress)  Planned Therapy Interventions (PT Eval): balance training, bed mobility training, gait training, home exercise program, stair training, patient/family education, strengthening  Therapy Frequency (PT Clinical Impression): daily  Outcome Summary/Treatment Plan (PT)  Anticipated Discharge Disposition (PT): (P) home with assist, home with home health(pending pt progress)  Plan  of Care Reviewed With: (P) patient  Outcome Summary: (P) Pt demonstrated increased activity tolerance, ambulating further distance w/increased speed this date. Continues to require CGA only for transfers and gait. Cardiac protocol exercises were introduced. PT will continue to follow to progress activity tolerance, gait and balance as appropriate.  Outcome Measures     Row Name 04/18/19 0900 04/17/19 0900          How much help from another person do you currently need...    Turning from your back to your side while in flat bed without using bedrails?  3  (Pended)   -CG  3  -CH (r) CG (t) CH (c)     Moving from lying on back to sitting on the side of a flat bed without bedrails?  3  (Pended)   -CG  3  -CH (r) CG (t) CH (c)     Moving to and from a bed to a chair (including a wheelchair)?  3  (Pended)   -CG  3  -CH (r) CG (t) CH (c)     Standing up from a chair using your arms (e.g., wheelchair, bedside chair)?  3  (Pended)   -CG  3  -CH (r) CG (t) CH (c)     Climbing 3-5 steps with a railing?  3  (Pended)   -CG  2  -CH (r) CG (t) CH (c)     To walk in hospital room?  3  (Pended)   -CG  4  -CH (r) CG (t) CH (c)     AM-PAC 6 Clicks Score  18  (Pended)   -CG  18  -CH (r) CG (t)        Functional Assessment    Outcome Measure Options  AM-PAC 6 Clicks Basic Mobility (PT)  (Pended)   -CG  AM-PAC 6 Clicks Basic Mobility (PT)  -CH (r) CG (t) CH (c)       User Key  (r) = Recorded By, (t) = Taken By, (c) = Cosigned By    Initials Name Provider Type    CH Dayan Nunez, PT Physical Therapist    CG Adilson Quintanilla, LORI Student PT Student         Time Calculation:   PT Charges     Row Name 04/18/19 0911             Time Calculation    Start Time  0845  (Pended)   -CG      Stop Time  0903  (Pended)   -CG      Time Calculation (min)  18 min  (Pended)   -CG      PT Received On  04/18/19  (Pended)   -CG      PT - Next Appointment  04/19/19  (Pended)   -CG         Time Calculation- PT    Total Timed Code Minutes- PT  18  minute(s)  (Pended)   -CG        User Key  (r) = Recorded By, (t) = Taken By, (c) = Cosigned By    Initials Name Provider Type     Adilson Quintanilla, PT Student PT Student        Therapy Charges for Today     Code Description Service Date Service Provider Modifiers Qty    28435563068 HC PT EVAL MOD COMPLEXITY 2 4/17/2019 Adilson Quintanilla, PT Student GP 1    27282782608 HC PT THER PROC EA 15 MIN 4/17/2019 Adilson Quintanilla, PT Student GP 1    24638371004 HC PT THER SUPP EA 15 MIN 4/17/2019 Adilson Quintanilla, PT Student GP 1    21359743629 HC PT THER PROC EA 15 MIN 4/18/2019 Adilson Quintanilla, PT Student GP 1    [id number]  PT THER SUPP EA 15 MIN 4/18/2019 Adilson Quintanilla, PT Student GP 1          PT G-Codes  Outcome Measure Options: (P) AM-PAC 6 Clicks Basic Mobility (PT)  AM-PAC 6 Clicks Score: (P) 18    Adilson Quintanilla PT Student  4/18/2019

## 2019-04-18 NOTE — PLAN OF CARE
Problem: Patient Care Overview  Goal: Plan of Care Review  Outcome: Ongoing (interventions implemented as appropriate)   04/18/19 0253   Coping/Psychosocial   Plan of Care Reviewed With patient   Plan of Care Review   Progress improving   OTHER   Outcome Summary VSS. 3L NC. SR/ST. Wires I&T. Figueredo in place. Pain controlled with PO medication. Up with 1. Still no sips with ice chips per pt request. Will continue to monitor.        Problem: Cardiac Surgery (Adult)  Goal: Signs and Symptoms of Listed Potential Problems Will be Absent, Minimized or Managed (Cardiac Surgery)  Outcome: Ongoing (interventions implemented as appropriate)      Problem: Fall Risk (Adult)  Goal: Absence of Fall  Outcome: Ongoing (interventions implemented as appropriate)      Problem: Skin Injury Risk (Adult)  Goal: Skin Health and Integrity  Outcome: Ongoing (interventions implemented as appropriate)

## 2019-04-19 ENCOUNTER — APPOINTMENT (OUTPATIENT)
Dept: GENERAL RADIOLOGY | Facility: HOSPITAL | Age: 59
End: 2019-04-19

## 2019-04-19 LAB
ABO + RH BLD: NORMAL
ABO + RH BLD: NORMAL
ANION GAP SERPL CALCULATED.3IONS-SCNC: 12.2 MMOL/L
BH BB BLOOD EXPIRATION DATE: NORMAL
BH BB BLOOD EXPIRATION DATE: NORMAL
BH BB BLOOD TYPE BARCODE: 7300
BH BB BLOOD TYPE BARCODE: 7300
BH BB DISPENSE STATUS: NORMAL
BH BB DISPENSE STATUS: NORMAL
BH BB PRODUCT CODE: NORMAL
BH BB PRODUCT CODE: NORMAL
BH BB UNIT NUMBER: NORMAL
BH BB UNIT NUMBER: NORMAL
BUN BLD-MCNC: 32 MG/DL (ref 8–23)
BUN/CREAT SERPL: 29.6 (ref 7–25)
CALCIUM SPEC-SCNC: 9.5 MG/DL (ref 8.6–10.5)
CHLORIDE SERPL-SCNC: 92 MMOL/L (ref 98–107)
CO2 SERPL-SCNC: 28.8 MMOL/L (ref 22–29)
CREAT BLD-MCNC: 1.08 MG/DL (ref 0.76–1.27)
DEPRECATED RDW RBC AUTO: 44.2 FL (ref 37–54)
ERYTHROCYTE [DISTWIDTH] IN BLOOD BY AUTOMATED COUNT: 13.1 % (ref 12.3–15.4)
GFR SERPL CREATININE-BSD FRML MDRD: 70 ML/MIN/1.73
GLUCOSE BLD-MCNC: 118 MG/DL (ref 65–99)
GLUCOSE BLDC GLUCOMTR-MCNC: 101 MG/DL (ref 70–130)
GLUCOSE BLDC GLUCOMTR-MCNC: 115 MG/DL (ref 70–130)
GLUCOSE BLDC GLUCOMTR-MCNC: 118 MG/DL (ref 70–130)
GLUCOSE BLDC GLUCOMTR-MCNC: 128 MG/DL (ref 70–130)
HCT VFR BLD AUTO: 48 % (ref 37.5–51)
HGB BLD-MCNC: 15.8 G/DL (ref 13–17.7)
MCH RBC QN AUTO: 30.2 PG (ref 26.6–33)
MCHC RBC AUTO-ENTMCNC: 32.9 G/DL (ref 31.5–35.7)
MCV RBC AUTO: 91.6 FL (ref 79–97)
PLATELET # BLD AUTO: 203 10*3/MM3 (ref 140–450)
PMV BLD AUTO: 10.8 FL (ref 6–12)
POTASSIUM BLD-SCNC: 3.9 MMOL/L (ref 3.5–5.2)
RBC # BLD AUTO: 5.24 10*6/MM3 (ref 4.14–5.8)
SODIUM BLD-SCNC: 133 MMOL/L (ref 136–145)
UNIT  ABO: NORMAL
UNIT  ABO: NORMAL
UNIT  RH: NORMAL
UNIT  RH: NORMAL
WBC NRBC COR # BLD: 18.08 10*3/MM3 (ref 3.4–10.8)

## 2019-04-19 PROCEDURE — 25010000002 ENOXAPARIN PER 10 MG: Performed by: THORACIC SURGERY (CARDIOTHORACIC VASCULAR SURGERY)

## 2019-04-19 PROCEDURE — 71046 X-RAY EXAM CHEST 2 VIEWS: CPT

## 2019-04-19 PROCEDURE — 93005 ELECTROCARDIOGRAM TRACING: CPT | Performed by: NURSE PRACTITIONER

## 2019-04-19 PROCEDURE — 94762 N-INVAS EAR/PLS OXIMTRY CONT: CPT

## 2019-04-19 PROCEDURE — 82962 GLUCOSE BLOOD TEST: CPT

## 2019-04-19 PROCEDURE — 80048 BASIC METABOLIC PNL TOTAL CA: CPT | Performed by: THORACIC SURGERY (CARDIOTHORACIC VASCULAR SURGERY)

## 2019-04-19 PROCEDURE — 99024 POSTOP FOLLOW-UP VISIT: CPT | Performed by: NURSE PRACTITIONER

## 2019-04-19 PROCEDURE — 85027 COMPLETE CBC AUTOMATED: CPT | Performed by: THORACIC SURGERY (CARDIOTHORACIC VASCULAR SURGERY)

## 2019-04-19 PROCEDURE — 97110 THERAPEUTIC EXERCISES: CPT

## 2019-04-19 PROCEDURE — 93010 ELECTROCARDIOGRAM REPORT: CPT | Performed by: INTERNAL MEDICINE

## 2019-04-19 RX ORDER — BISACODYL 10 MG
10 SUPPOSITORY, RECTAL RECTAL ONCE
Status: COMPLETED | OUTPATIENT
Start: 2019-04-19 | End: 2019-04-20

## 2019-04-19 RX ORDER — METOPROLOL TARTRATE 100 MG/1
100 TABLET ORAL EVERY 12 HOURS SCHEDULED
Status: DISCONTINUED | OUTPATIENT
Start: 2019-04-19 | End: 2019-04-20

## 2019-04-19 RX ADMIN — SENNOSIDES AND DOCUSATE SODIUM 2 TABLET: 8.6; 5 TABLET ORAL at 20:23

## 2019-04-19 RX ADMIN — METOPROLOL TARTRATE 75 MG: 25 TABLET ORAL at 10:59

## 2019-04-19 RX ADMIN — ENOXAPARIN SODIUM 40 MG: 40 INJECTION SUBCUTANEOUS at 10:59

## 2019-04-19 RX ADMIN — ROSUVASTATIN CALCIUM 20 MG: 20 TABLET, FILM COATED ORAL at 20:23

## 2019-04-19 RX ADMIN — METOPROLOL TARTRATE 25 MG: 25 TABLET ORAL at 18:50

## 2019-04-19 RX ADMIN — ASPIRIN 81 MG: 81 TABLET, DELAYED RELEASE ORAL at 10:59

## 2019-04-19 RX ADMIN — PANTOPRAZOLE SODIUM 40 MG: 40 TABLET, DELAYED RELEASE ORAL at 06:47

## 2019-04-19 RX ADMIN — METOPROLOL TARTRATE 100 MG: 100 TABLET, FILM COATED ORAL at 20:23

## 2019-04-19 RX ADMIN — HYDROCODONE BITARTRATE AND ACETAMINOPHEN 1 TABLET: 5; 325 TABLET ORAL at 11:09

## 2019-04-19 NOTE — PROGRESS NOTES
" LOS: 4 days   Patient Care Team:  Og Campos MD as PCP - General  Og Campos MD as PCP - Family Medicine    Chief Complaint: post op fu    Subjective:  Symptoms:  He reports anorexia.  No shortness of breath.    Diet:  He reports  nausea.    Activity level: Returning to normal.    Pain:  Pain is requiring pain medication and well controlled.          Vital Signs  Temp:  [98.2 °F (36.8 °C)-100.1 °F (37.8 °C)] 98.2 °F (36.8 °C)  Heart Rate:  [] 75  Resp:  [16-18] 18  BP: (111-153)/(71-98) 130/79  Body mass index is 29.65 kg/m².    Intake/Output Summary (Last 24 hours) at 4/19/2019 0917  Last data filed at 4/19/2019 0818  Gross per 24 hour   Intake 1200 ml   Output 1950 ml   Net -750 ml     I/O this shift:  In: 480 [P.O.:480]  Out: 300 [Urine:300]          04/15/19  2131 04/18/19  0623 04/19/19  0654   Weight: 113 kg (249 lb 9.6 oz) 112 kg (247 lb) 110 kg (243 lb 9.6 oz)         Objective:  General Appearance:  Comfortable.    Vital signs: (most recent): Blood pressure 130/79, pulse 75, temperature 98.2 °F (36.8 °C), temperature source Oral, resp. rate 18, height 193 cm (76\"), weight 110 kg (243 lb 9.6 oz), SpO2 93 %.    Output: Producing urine and no stool output.    Lungs:  Normal effort and normal respiratory rate.  Breath sounds clear to auscultation.  (O2 at 2 L)  Heart: Normal rate.  Regular rhythm.  S1 normal and S2 normal.    Abdomen: Abdomen is soft and non-distended.  Bowel sounds are normal.     Extremities: There is no dependent edema.    Pulses: Distal pulses are intact.    Neurological: Patient is alert and oriented to person, place and time.    Skin:  Warm and dry.  (Sternal dressing dry and intact)            Results Review:        WBC WBC   Date Value Ref Range Status   04/19/2019 18.08 (H) 3.40 - 10.80 10*3/mm3 Final   04/18/2019 16.08 (H) 3.40 - 10.80 10*3/mm3 Final   04/17/2019 15.99 (H) 3.40 - 10.80 10*3/mm3 Final   04/16/2019 16.41 (H) 3.40 - 10.80 10*3/mm3 Final   04/16/2019 " 15.78 (H) 3.40 - 10.80 10*3/mm3 Final      HGB Hemoglobin   Date Value Ref Range Status   04/19/2019 15.8 13.0 - 17.7 g/dL Final   04/18/2019 14.5 13.0 - 17.7 g/dL Final   04/17/2019 14.1 13.0 - 17.7 g/dL Final   04/16/2019 14.5 13.0 - 17.7 g/dL Final   04/16/2019 14.1 13.0 - 17.7 g/dL Final   04/16/2019 11.9 (L) 12.0 - 17.0 g/dL Final   04/16/2019 11.6 (L) 12.0 - 17.0 g/dL Final   04/16/2019 11.2 (L) 12.0 - 17.0 g/dL Final   04/16/2019 14.6 12.0 - 17.0 g/dL Final      HCT Hematocrit   Date Value Ref Range Status   04/19/2019 48.0 37.5 - 51.0 % Final   04/18/2019 44.5 37.5 - 51.0 % Final   04/17/2019 43.1 37.5 - 51.0 % Final   04/16/2019 44.9 37.5 - 51.0 % Final   04/16/2019 42.5 37.5 - 51.0 % Final   04/16/2019 35 (L) 38 - 51 % Final   04/16/2019 34 (L) 38 - 51 % Final   04/16/2019 33 (L) 38 - 51 % Final   04/16/2019 43 38 - 51 % Final      Platelets Platelets   Date Value Ref Range Status   04/19/2019 203 140 - 450 10*3/mm3 Final   04/18/2019 172 140 - 450 10*3/mm3 Final   04/17/2019 164 140 - 450 10*3/mm3 Final   04/16/2019 174 140 - 450 10*3/mm3 Final   04/16/2019 155 140 - 450 10*3/mm3 Final        PT/INR:    Protime   Date Value Ref Range Status   04/17/2019 14.3 (H) 11.7 - 14.2 Seconds Final   04/16/2019 15.2 (H) 11.7 - 14.2 Seconds Final   /  INR   Date Value Ref Range Status   04/17/2019 1.14 (H) 0.90 - 1.10 Final   04/16/2019 1.23 (H) 0.90 - 1.10 Final       Sodium Sodium   Date Value Ref Range Status   04/19/2019 133 (L) 136 - 145 mmol/L Final   04/18/2019 138 136 - 145 mmol/L Final   04/17/2019 134 (L) 136 - 145 mmol/L Final   04/16/2019 138 136 - 145 mmol/L Final   04/16/2019 136 136 - 145 mmol/L Final      Potassium Potassium   Date Value Ref Range Status   04/19/2019 3.9 3.5 - 5.2 mmol/L Final   04/18/2019 4.1 3.5 - 5.2 mmol/L Final   04/18/2019 4.2 3.5 - 5.2 mmol/L Final   04/17/2019 4.2 3.5 - 5.2 mmol/L Final   04/17/2019 4.3 3.5 - 5.2 mmol/L Final   04/16/2019 4.7 3.5 - 5.2 mmol/L Final    04/16/2019 5.4 (H) 3.5 - 5.2 mmol/L Final      Chloride Chloride   Date Value Ref Range Status   04/19/2019 92 (L) 98 - 107 mmol/L Final   04/18/2019 99 98 - 107 mmol/L Final   04/17/2019 100 98 - 107 mmol/L Final   04/16/2019 102 98 - 107 mmol/L Final   04/16/2019 101 98 - 107 mmol/L Final      Bicarbonate CO2   Date Value Ref Range Status   04/19/2019 28.8 22.0 - 29.0 mmol/L Final   04/18/2019 24.3 22.0 - 29.0 mmol/L Final   04/17/2019 20.9 (L) 22.0 - 29.0 mmol/L Final   04/16/2019 20.0 (L) 22.0 - 29.0 mmol/L Final   04/16/2019 22.3 22.0 - 29.0 mmol/L Final      BUN BUN   Date Value Ref Range Status   04/19/2019 32 (H) 8 - 23 mg/dL Final   04/18/2019 21 8 - 23 mg/dL Final   04/17/2019 20 8 - 23 mg/dL Final   04/16/2019 17 8 - 23 mg/dL Final   04/16/2019 14 8 - 23 mg/dL Final      Creatinine Creatinine   Date Value Ref Range Status   04/19/2019 1.08 0.76 - 1.27 mg/dL Final   04/18/2019 1.05 0.76 - 1.27 mg/dL Final   04/17/2019 1.24 0.76 - 1.27 mg/dL Final   04/16/2019 1.29 (H) 0.76 - 1.27 mg/dL Final   04/16/2019 1.34 (H) 0.76 - 1.27 mg/dL Final      Calcium Calcium   Date Value Ref Range Status   04/19/2019 9.5 8.6 - 10.5 mg/dL Final   04/18/2019 9.2 8.6 - 10.5 mg/dL Final   04/17/2019 8.7 8.6 - 10.5 mg/dL Final   04/16/2019 9.4 8.6 - 10.5 mg/dL Final   04/16/2019 8.6 8.6 - 10.5 mg/dL Final      Magnesium Magnesium   Date Value Ref Range Status   04/17/2019 2.5 (H) 1.6 - 2.4 mg/dL Final   04/16/2019 3.0 (H) 1.6 - 2.4 mg/dL Final   04/16/2019 3.2 (H) 1.6 - 2.4 mg/dL Final            aspirin 81 mg Oral Daily   enoxaparin 40 mg Subcutaneous Daily   insulin lispro 0-9 Units Subcutaneous 4x Daily With Meals & Nightly   metoprolol tartrate 75 mg Oral Q12H   pantoprazole 40 mg Oral Q AM   rosuvastatin 20 mg Oral Nightly   Scopolamine 1 patch Transdermal Q72H   sennosides-docusate sodium 2 tablet Oral Nightly              Patient Active Problem List   Diagnosis Code   • Coronary artery disease of native heart with  stable angina pectoris (CMS/HCC) I25.118   • Hypertension I10   • Nicotine abuse Z72.0   • Old inferior wall myocardial infarction I25.2   • Presence of stent in right coronary artery Z95.5   • Tobacco abuse Z72.0   • CAD (coronary artery disease) I25.10       Assessment & Plan    CAD s/p CABG x2 [place]----POD #3 ([facility])  ST elevation----global, asymptomatic, probable mild pericarditis  HTN----elevated this am  LVH  Tobacco abuse----recent cessation  Leukocytosis-----probably reactive, continue to monitor      Still with intermittent nausea, but no overt vomiting, continue scopalamine patch and eval for timing with pain meds.  No BM since surgery, give suppository today.  No lasix today, mild dehydration today, recheck bmp in am.  D/c wires.  Anticipate d/c over weekend.      Briana Gibson, APRN  04/19/19  9:17 AM     Slightly improved today.

## 2019-04-19 NOTE — PLAN OF CARE
Problem: Patient Care Overview  Goal: Plan of Care Review  Outcome: Ongoing (interventions implemented as appropriate)   04/19/19 3917   Coping/Psychosocial   Plan of Care Reviewed With patient   OTHER   Outcome Summary Pt demonstrated increased activity tolerance and balance, ambulating 240' w/sup-CGA only. Stair training was deferred this date d/t nausea. PT will continue to follow to address gait, transfer and stair training before DC.

## 2019-04-19 NOTE — PAYOR COMM NOTE
"Lesly Hair (58 y.o. Male)     Please see attached clinical review.    REF#[id number]    PLEASE CALL KYLIE @ [phone] OR  [phone] WITH CONTINUED STAY AUTHORIZATION AND ADDT DAYS APPROVED.     THANK YOU    CATIE YAN LPN CCP    Date of Birth Social Security Number Address Home Phone MRN    1960  [address]  [facility] [address] [phone] [id number]    Latter day Marital Status          None        Admission Date Admission Type Admitting Provider Attending Provider Department, Room/Bed    4/15/19 Urgent Jesse Perez MD Pollock, Samuel B., MD [facility] CARDIOVASC UNIT, 2221/1    Discharge Date Discharge Disposition Discharge Destination                       Attending Provider:  Jesse Perez MD    Allergies:  No Known Allergies    Isolation:  None   Infection:  None   Code Status:  CPR    Ht:  193 cm (76\")   Wt:  110 kg (243 lb 9.6 oz)    Admission Cmt:  None   Principal Problem:  Coronary artery disease of native heart with stable angina pectoris (CMS/[facility]) [I25.118] More...                 Active Insurance as of 4/15/2019     Primary Coverage     Payor Plan Insurance Group Employer/Plan Group    ANTHEM BLUE CROSS ANTHEM BLUE CROSS BLUE SHIELD PPO [id number]     Payor Plan Address Payor Plan Phone Number Payor Plan Fax Number Effective Dates    [address] [phone]  1/1/2019 - None Entered    [place] [id number]       Subscriber Name Subscriber Birth Date Member ID       LESLY HAIR 1960 [id number]                 Emergency Contacts      (Rel.) Home Phone Work Phone Mobile Phone    Syl Hair (Spouse) [phone] -- [phone]              Intake & Output (last day)       04/18 0701 - 04/19 0700 04/19 0701 - 04/20 0700    P.O. 960 480    I.V. (mL/kg)      Total Intake(mL/kg) 960 (8.7) 480 (4.4)    Urine (mL/kg/hr) 1650 (0.6) 300 (0.8)    Total Output 1650 300    Net -690 +180              Lines, Drains & " "Airways    Active LDAs     Name:   Placement date:   Placement time:   Site:   Days:    Peripheral IV 04/15/19 1316 Anterior;Proximal;Right Forearm   04/15/19    1316    Forearm   3                Operative/Procedure Notes (last 24 hours) (Notes from 4/18/2019 10:28 AM through 4/19/2019 10:28 AM)     No notes of this type exist for this encounter.           Physician Progress Notes (last 24 hours) (Notes from 4/18/2019 10:28 AM through 4/19/2019 10:28 AM)      Briana Gibson APRN at 4/19/2019  9:17 AM           LOS: 4 days   Patient Care Team:  Og Campos MD as PCP - General  Og Campos MD as PCP - Family Medicine    Chief Complaint: post op fu    Subjective:  Symptoms:  He reports anorexia.  No shortness of breath.    Diet:  He reports  nausea.    Activity level: Returning to normal.    Pain:  Pain is requiring pain medication and well controlled.          Vital Signs  Temp:  [98.2 °F (36.8 °C)-100.1 °F (37.8 °C)] 98.2 °F (36.8 °C)  Heart Rate:  [] 75  Resp:  [16-18] 18  BP: (111-153)/(71-98) 130/79  Body mass index is 29.65 kg/m².    Intake/Output Summary (Last 24 hours) at 4/19/2019 0917  Last data filed at 4/19/2019 0818  Gross per 24 hour   Intake 1200 ml   Output 1950 ml   Net -750 ml     I/O this shift:  In: 480 [P.O.:480]  Out: 300 [Urine:300]          04/15/19  2131 04/18/19  0623 04/19/19  0654   Weight: 113 kg (249 lb 9.6 oz) 112 kg (247 lb) 110 kg (243 lb 9.6 oz)         Objective:  General Appearance:  Comfortable.    Vital signs: (most recent): Blood pressure 130/79, pulse 75, temperature 98.2 °F (36.8 °C), temperature source Oral, resp. rate 18, height 193 cm (76\"), weight 110 kg (243 lb 9.6 oz), SpO2 93 %.    Output: Producing urine and no stool output.    Lungs:  Normal effort and normal respiratory rate.  Breath sounds clear to auscultation.  (O2 at 2 L)  Heart: Normal rate.  Regular rhythm.  S1 normal and S2 normal.    Abdomen: Abdomen is soft and non-distended.  Bowel sounds " are normal.     Extremities: There is no dependent edema.    Pulses: Distal pulses are intact.    Neurological: Patient is alert and oriented to person, place and time.    Skin:  Warm and dry.  (Sternal dressing dry and intact)            Results Review:        WBC WBC   Date Value Ref Range Status   04/19/2019 18.08 (H) 3.40 - 10.80 10*3/mm3 Final   04/18/2019 16.08 (H) 3.40 - 10.80 10*3/mm3 Final   04/17/2019 15.99 (H) 3.40 - 10.80 10*3/mm3 Final   04/16/2019 16.41 (H) 3.40 - 10.80 10*3/mm3 Final   04/16/2019 15.78 (H) 3.40 - 10.80 10*3/mm3 Final      HGB Hemoglobin   Date Value Ref Range Status   04/19/2019 15.8 13.0 - 17.7 g/dL Final   04/18/2019 14.5 13.0 - 17.7 g/dL Final   04/17/2019 14.1 13.0 - 17.7 g/dL Final   04/16/2019 14.5 13.0 - 17.7 g/dL Final   04/16/2019 14.1 13.0 - 17.7 g/dL Final   04/16/2019 11.9 (L) 12.0 - 17.0 g/dL Final   04/16/2019 11.6 (L) 12.0 - 17.0 g/dL Final   04/16/2019 11.2 (L) 12.0 - 17.0 g/dL Final   04/16/2019 14.6 12.0 - 17.0 g/dL Final      HCT Hematocrit   Date Value Ref Range Status   04/19/2019 48.0 37.5 - 51.0 % Final   04/18/2019 44.5 37.5 - 51.0 % Final   04/17/2019 43.1 37.5 - 51.0 % Final   04/16/2019 44.9 37.5 - 51.0 % Final   04/16/2019 42.5 37.5 - 51.0 % Final   04/16/2019 35 (L) 38 - 51 % Final   04/16/2019 34 (L) 38 - 51 % Final   04/16/2019 33 (L) 38 - 51 % Final   04/16/2019 43 38 - 51 % Final      Platelets Platelets   Date Value Ref Range Status   04/19/2019 203 140 - 450 10*3/mm3 Final   04/18/2019 172 140 - 450 10*3/mm3 Final   04/17/2019 164 140 - 450 10*3/mm3 Final   04/16/2019 174 140 - 450 10*3/mm3 Final   04/16/2019 155 140 - 450 10*3/mm3 Final        PT/INR:    Protime   Date Value Ref Range Status   04/17/2019 14.3 (H) 11.7 - 14.2 Seconds Final   04/16/2019 15.2 (H) 11.7 - 14.2 Seconds Final   /  INR   Date Value Ref Range Status   04/17/2019 1.14 (H) 0.90 - 1.10 Final   04/16/2019 1.23 (H) 0.90 - 1.10 Final       Sodium Sodium   Date Value Ref Range  Status   04/19/2019 133 (L) 136 - 145 mmol/L Final   04/18/2019 138 136 - 145 mmol/L Final   04/17/2019 134 (L) 136 - 145 mmol/L Final   04/16/2019 138 136 - 145 mmol/L Final   04/16/2019 136 136 - 145 mmol/L Final      Potassium Potassium   Date Value Ref Range Status   04/19/2019 3.9 3.5 - 5.2 mmol/L Final   04/18/2019 4.1 3.5 - 5.2 mmol/L Final   04/18/2019 4.2 3.5 - 5.2 mmol/L Final   04/17/2019 4.2 3.5 - 5.2 mmol/L Final   04/17/2019 4.3 3.5 - 5.2 mmol/L Final   04/16/2019 4.7 3.5 - 5.2 mmol/L Final   04/16/2019 5.4 (H) 3.5 - 5.2 mmol/L Final      Chloride Chloride   Date Value Ref Range Status   04/19/2019 92 (L) 98 - 107 mmol/L Final   04/18/2019 99 98 - 107 mmol/L Final   04/17/2019 100 98 - 107 mmol/L Final   04/16/2019 102 98 - 107 mmol/L Final   04/16/2019 101 98 - 107 mmol/L Final      Bicarbonate CO2   Date Value Ref Range Status   04/19/2019 28.8 22.0 - 29.0 mmol/L Final   04/18/2019 24.3 22.0 - 29.0 mmol/L Final   04/17/2019 20.9 (L) 22.0 - 29.0 mmol/L Final   04/16/2019 20.0 (L) 22.0 - 29.0 mmol/L Final   04/16/2019 22.3 22.0 - 29.0 mmol/L Final      BUN BUN   Date Value Ref Range Status   04/19/2019 32 (H) 8 - 23 mg/dL Final   04/18/2019 21 8 - 23 mg/dL Final   04/17/2019 20 8 - 23 mg/dL Final   04/16/2019 17 8 - 23 mg/dL Final   04/16/2019 14 8 - 23 mg/dL Final      Creatinine Creatinine   Date Value Ref Range Status   04/19/2019 1.08 0.76 - 1.27 mg/dL Final   04/18/2019 1.05 0.76 - 1.27 mg/dL Final   04/17/2019 1.24 0.76 - 1.27 mg/dL Final   04/16/2019 1.29 (H) 0.76 - 1.27 mg/dL Final   04/16/2019 1.34 (H) 0.76 - 1.27 mg/dL Final      Calcium Calcium   Date Value Ref Range Status   04/19/2019 9.5 8.6 - 10.5 mg/dL Final   04/18/2019 9.2 8.6 - 10.5 mg/dL Final   04/17/2019 8.7 8.6 - 10.5 mg/dL Final   04/16/2019 9.4 8.6 - 10.5 mg/dL Final   04/16/2019 8.6 8.6 - 10.5 mg/dL Final      Magnesium Magnesium   Date Value Ref Range Status   04/17/2019 2.5 (H) 1.6 - 2.4 mg/dL Final   04/16/2019 3.0 (H) 1.6  - 2.4 mg/dL Final   04/16/2019 3.2 (H) 1.6 - 2.4 mg/dL Final            aspirin 81 mg Oral Daily   enoxaparin 40 mg Subcutaneous Daily   insulin lispro 0-9 Units Subcutaneous 4x Daily With Meals & Nightly   metoprolol tartrate 75 mg Oral Q12H   pantoprazole 40 mg Oral Q AM   rosuvastatin 20 mg Oral Nightly   Scopolamine 1 patch Transdermal Q72H   sennosides-docusate sodium 2 tablet Oral Nightly              Patient Active Problem List   Diagnosis Code   • Coronary artery disease of native heart with stable angina pectoris (CMS/[facility]) I25.118   • Hypertension I10   • Nicotine abuse Z72.0   • Old inferior wall myocardial infarction I25.2   • Presence of stent in right coronary artery Z95.5   • Tobacco abuse Z72.0   • CAD (coronary artery disease) I25.10       Assessment & Plan    CAD s/p CABG x2 [place]----POD #3 ([place])  ST elevation----global, asymptomatic, probable mild pericarditis  HTN----elevated this am  LVH  Tobacco abuse----recent cessation  Leukocytosis-----probably reactive, continue to monitor      Still with intermittent nausea, but no overt vomiting, continue scopalamine patch and eval for timing with pain meds.  No BM since surgery, give suppository today.  No lasix today, mild dehydration today, recheck bmp in am.  D/c wires.  Anticipate d/c over weekend.      ALY Frances  04/19/19  9:17 AM    Electronically signed by Briana Gibson APRN at 4/19/2019  9:43 AM           Consult Notes (last 24 hours) (Notes from 4/18/2019 10:28 AM through 4/19/2019 10:28 AM)     No notes of this type exist for this encounter.

## 2019-04-19 NOTE — PLAN OF CARE
Problem: Patient Care Overview  Goal: Plan of Care Review  Outcome: Ongoing (interventions implemented as appropriate)   04/19/19 0009   Coping/Psychosocial   Plan of Care Reviewed With patient;spouse   Plan of Care Review   Progress improving   OTHER   Outcome Summary VSS. 1-2L NC. ST. Wires I&T. No complaints of pain. Some intermittant nausea. PRN medications and scoplamine patch given. Up with 1. Will continue to monitor.        Problem: Cardiac Surgery (Adult)  Goal: Signs and Symptoms of Listed Potential Problems Will be Absent, Minimized or Managed (Cardiac Surgery)  Outcome: Ongoing (interventions implemented as appropriate)      Problem: Fall Risk (Adult)  Goal: Absence of Fall  Outcome: Ongoing (interventions implemented as appropriate)      Problem: Skin Injury Risk (Adult)  Goal: Skin Health and Integrity  Outcome: Ongoing (interventions implemented as appropriate)

## 2019-04-19 NOTE — THERAPY TREATMENT NOTE
Acute Care - Physical Therapy Treatment Note  [facility]     Patient Name: Chaitanya Hair  : 1960  MRN: [id number]  Today's Date: 2019  Onset of Illness/Injury or Date of Surgery: 19     Referring Physician: Chris    Admit Date: 4/15/2019    Visit Dx:    ICD-10-CM ICD-9-CM   1. S/P CABG x 2 Z95.1 V45.81     Patient Active Problem List   Diagnosis   • Coronary artery disease of native heart with stable angina pectoris (CMS/HCC)   • Hypertension   • Nicotine abuse   • Old inferior wall myocardial infarction   • Presence of stent in right coronary artery   • Tobacco abuse   • CAD (coronary artery disease)       Therapy Treatment    Rehabilitation Treatment Summary     Row Name 19 0946             Treatment Time/Intention    Discipline  physical therapist  (Pended)   -CG      Document Type  therapy note (daily note)  (Pended)   -CG      Subjective Information  complains of;pain;nausea/vomiting  (Pended)   -CG      Mode of Treatment  physical therapy  (Pended)   -CG      Patient/Family Observations  Pt supine in bed, no signs of acute distress at rest, wife present  (Pended)   -CG      Patient Effort  good  (Pended)   -CG      Comment  Pt states he got up to EOB independently w/no trouble during N/V this AM  (Pended)   -CG      Existing Precautions/Restrictions  fall;cardiac;sternal  (Pended)   -CG      Recorded by [CG] Adilson Quintanilla, PT Student 19 0979      Row Name 1946             Vital Signs    Pre Systolic BP Rehab  130  (Pended)   -CG      Pre Treatment Diastolic BP  79  (Pended)   -CG      Pretreatment Heart Rate (beats/min)  71  (Pended)   -CG      Posttreatment Heart Rate (beats/min)  78  (Pended)   -CG      Pre SpO2 (%)  94  (Pended)   -CG      O2 Delivery Pre Treatment  nasal cannula  (Pended)   -CG      O2 Delivery Intra Treatment  nasal cannula  (Pended)   -CG      Post SpO2 (%)  93  (Pended)   -CG      O2 Delivery Post Treatment  nasal cannula  (Pended)    -CG      Recorded by [CG] Adilson Quintanilla, PT Student 04/19/19 0954      Row Name 04/19/19 0946             Cognitive Assessment/Intervention- PT/OT    Orientation Status (Cognition)  oriented x 3  (Pended)   -CG      Follows Commands (Cognition)  WFL  (Pended)   -CG      Personal Safety Interventions  fall prevention program maintained;nonskid shoes/slippers when out of bed;muscle strengthening facilitated  (Pended)   -CG      Recorded by [CG] Adilson Quintanilla, PT Student 04/19/19 0954      Row Name 04/19/19 0946             Bed Mobility Assessment/Treatment    Supine-Sit [place] (Bed Mobility)  minimum assist (75% patient effort)  (Pended)   -CG      Sit-Supine [place] (Bed Mobility)  contact guard  (Pended)   -CG      Bed Mobility, Safety Issues  decreased use of arms for pushing/pulling  (Pended)   -CG      Assistive Device (Bed Mobility)  head of bed elevated  (Pended)   -CG      Recorded by [CG] Adilson Quintanilla PT Student 04/19/19 0954      Row Name 04/19/19 0946             Sit-Stand Transfer    Sit-Stand [place] (Transfers)  contact guard;verbal cues;nonverbal cues (demo/gesture)  (Pended)  HHAx1  -CG      Recorded by [CG] Adilson Quintanilla, PT Student 04/19/19 0954      Row Name 04/19/19 0946             Stand-Sit Transfer    Stand-Sit [place] (Transfers)  contact guard  (Pended)   -CG      Recorded by [CG] Adilson Quintanilla PT Student 04/19/19 0954      Row Name 04/19/19 0946             Gait/Stairs Assessment/Training    [place] Level (Gait)  supervision;contact guard  (Pended)   -CG      Distance in Feet (Gait)  240  (Pended)   -CG      Pattern (Gait)  step-through  (Pended)   -CG      Deviations/Abnormal Patterns (Gait)  alanna decreased;gait speed decreased;stride length decreased  (Pended)   -CG      Bilateral Gait Deviations  --  (Pended)  B LE toe out  -CG      Comment (Gait/Stairs)  stairs deferred d/t nausea this morning  (Pended)   -CG      Recorded by  "[CG] Adilson Quintanilla, PT Student 04/19/19 0954      Row Name 04/19/19 0946             Therapeutic Exercise    Comment (Therapeutic Exercise)  cardiac protocol x10, level 4  (Pended)   -CG      Recorded by [CG] Adilson Quintanilla, PT Student 04/19/19 0954      Row Name 04/19/19 0946             Positioning and Restraints    Pre-Treatment Position  in bed  (Pended)   -CG      Post Treatment Position  bed  (Pended)   -CG      In Bed  supine;with family/caregiver;with other staff  (Pended)   -CG      Recorded by [CG] Adilson Quintanilal, PT Student 04/19/19 0954      Row Name 04/19/19 0946             Pain Scale: Numbers Pre/Post-Treatment    Pain Location  chest  (Pended)   -CG      Pain Intervention(s)  Ambulation/increased activity;Repositioned  (Pended)   -CG      Recorded by [CG] Adilson Quintanilla, PT Student 04/19/19 0954      Row Name 04/19/19 0946             Pain Scale: Word Pre/Post-Treatment    Pain: Word Scale, Pretreatment  2 - mild pain  (Pended)  \"15/10 when coughing\"  -CG      Pain: Word Scale, Post-Treatment  2 - mild pain  (Pended)   -CG      Recorded by [CG] Adilson Quintanilla, PT Student 04/19/19 0954      Row Name                Wound 04/16/19 1406 chest incision    Wound - Properties Group Date first assessed: 04/16/19 [SS] Time first assessed: 1406 [SS] Location: chest [SS] Type: incision [SS] Recorded by:  [SS] Adrianne Rodrigez RN 04/16/19 1406    Row Name 04/19/19 0946             Plan of Care Review    Plan of Care Reviewed With  patient;spouse  (Pended)   -CG      Recorded by [CG] Adilson Quintanilla, PT Student 04/19/19 0954      Row Name 04/19/19 0946             Outcome Summary/Treatment Plan (PT)    Anticipated Discharge Disposition (PT)  home with assist;home with home health  (Pended)   -CG      Recorded by [CG] Adilson Quintanilla, PT Student 04/19/19 0954        User Key  (r) = Recorded By, (t) = Taken By, (c) = Cosigned By    Initials Name Effective Dates Discipline    " Adrianne Hopson RN 06/16/16 -  Nurse    CG Adilson Quintanilla, PT Student 02/04/19 -  PT          Wound 04/16/19 1406 chest incision (Active)   Dressing Appearance dry;intact 4/19/2019  8:00 AM   Closure YOUNG 4/19/2019  8:00 AM   Base dressing in place, unable to visualize 4/19/2019  8:00 AM   Drainage Amount none 4/19/2019  8:00 AM   Dressing Care, Wound foam;low-adherent 4/18/2019  8:58 PM           Physical Therapy Education     Title: PT OT SLP Therapies (Done)     Topic: Physical Therapy (Done)     Point: Mobility training (Done)     Learning Progress Summary           Patient Acceptance, E,TB, VU,NR by CG at 4/19/2019  9:54 AM    Acceptance, E,TB, VU,NR by CG at 4/18/2019  9:09 AM    Acceptance, E,TB, VU,DU,NR by CG at 4/17/2019  9:22 AM                   Point: Home exercise program (Done)     Learning Progress Summary           Patient Acceptance, E,TB, VU,NR by CG at 4/19/2019  9:54 AM    Acceptance, E,TB, VU,NR by CG at 4/18/2019  9:09 AM    Acceptance, E,TB, VU,DU,NR by CG at 4/17/2019  9:22 AM                   Point: Body mechanics (Done)     Learning Progress Summary           Patient Acceptance, E,TB, VU,NR by CG at 4/19/2019  9:54 AM    Acceptance, E,TB, VU,NR by CG at 4/18/2019  9:09 AM    Acceptance, E,TB, VU,DU,NR by CG at 4/17/2019  9:22 AM                   Point: Precautions (Done)     Learning Progress Summary           Patient Acceptance, E,TB, VU,NR by CG at 4/19/2019  9:54 AM    Acceptance, E,TB, VU,NR by CG at 4/18/2019  9:09 AM    Acceptance, E,TB, VU,DU,NR by CG at 4/17/2019  9:22 AM                               User Key     Initials Effective Dates Name Provider Type Discipline    CG 02/04/19 -  Adilson Quintanilla, PT Student PT Student PT                PT Recommendation and Plan  Anticipated Discharge Disposition (PT): (P) home with assist, home with home health  Planned Therapy Interventions (PT Eval): balance training, bed mobility training, gait training, home exercise  program, stair training, patient/family education, strengthening  Therapy Frequency (PT Clinical Impression): daily  Outcome Summary/Treatment Plan (PT)  Anticipated Discharge Disposition (PT): (P) home with assist, home with home health  Plan of Care Reviewed With: (P) patient  Outcome Summary: (P) Pt demonstrated increased activity tolerance and balance, ambulating 240' w/sup-CGA only. Stair training was deferred this date d/t nausea. PT will continue to follow to address transfer and stair training before DC.  Outcome Measures     Row Name 04/19/19 0900 04/18/19 0900 04/17/19 0900       How much help from another person do you currently need...    Turning from your back to your side while in flat bed without using bedrails?  3  (Pended)   -CG  3  -CH (r) CG (t) CH (c)  3  -CH (r) CG (t) CH (c)    Moving from lying on back to sitting on the side of a flat bed without bedrails?  3  (Pended)   -CG  3  -CH (r) CG (t) CH (c)  3  -CH (r) CG (t) CH (c)    Moving to and from a bed to a chair (including a wheelchair)?  3  (Pended)   -CG  3  -CH (r) CG (t) CH (c)  3  -CH (r) CG (t) CH (c)    Standing up from a chair using your arms (e.g., wheelchair, bedside chair)?  3  (Pended)   -CG  3  -CH (r) CG (t) CH (c)  3  -CH (r) CG (t) CH (c)    Climbing 3-5 steps with a railing?  3  (Pended)   -CG  3  -CH (r) CG (t) CH (c)  2  -CH (r) CG (t) CH (c)    To walk in hospital room?  3  (Pended)   -CG  3  -CH (r) CG (t) CH (c)  4  -CH (r) CG (t) CH (c)    AM-PAC 6 Clicks Score  18  (Pended)   -CG  18  -CH (r) CG (t)  18  -CH (r) CG (t)       Functional Assessment    Outcome Measure Options  AM-PAC 6 Clicks Basic Mobility (PT)  (Pended)   -CG  AM-PAC 6 Clicks Basic Mobility (PT)  - (r)  (t) CH (c)  AM-[facility] 6 Clicks Basic Mobility (PT)  - (r)  (t) CH (c)      User Key  (r) = Recorded By, (t) = Taken By, (c) = Cosigned By    Initials Name Provider Type    CH Dayan Nunez, PT Physical Therapist    Adilson Hernandez PT  Student PT Student         Time Calculation:   PT Charges     Row Name 04/19/19 0958             Time Calculation    Start Time  0932  (Pended)   -CG      Stop Time  0946  (Pended)   -CG      Time Calculation (min)  14 min  (Pended)   -CG      PT Received On  04/19/19  (Pended)   -CG      PT - Next Appointment  04/20/19  (Pended)   -CG         Time Calculation- PT    Total Timed Code Minutes- PT  14 minute(s)  (Pended)   -CG        User Key  (r) = Recorded By, (t) = Taken By, (c) = Cosigned By    Initials Name Provider Type    CG Adilson Quintanilla, PT Student PT Student        Therapy Charges for Today     Code Description Service Date Service Provider Modifiers Qty    61469128825 HC PT THER PROC EA 15 MIN 4/18/2019 Adilson Quintanilla, PT Student GP 1    76806568677 HC PT THER SUPP EA 15 MIN 4/18/2019 Adilson Quintanilla, PT Student GP 1    14659619405 HC PT THER PROC EA 15 MIN 4/19/2019 Adilson Quintanilla, PT Student GP 1          PT G-Codes  Outcome Measure Options: (P) AM-PAC 6 Clicks Basic Mobility (PT)  AM-PAC 6 Clicks Score: (P) 18    Adilson Quintanilla PT Student  4/19/2019

## 2019-04-20 VITALS
OXYGEN SATURATION: 95 % | RESPIRATION RATE: 16 BRPM | HEART RATE: 70 BPM | HEIGHT: 76 IN | DIASTOLIC BLOOD PRESSURE: 74 MMHG | SYSTOLIC BLOOD PRESSURE: 106 MMHG | BODY MASS INDEX: 29.59 KG/M2 | WEIGHT: 243 LBS | TEMPERATURE: 98.5 F

## 2019-04-20 LAB
ANION GAP SERPL CALCULATED.3IONS-SCNC: 12.4 MMOL/L
BACTERIA UR QL AUTO: ABNORMAL /HPF
BASOPHILS # BLD AUTO: 0.03 10*3/MM3 (ref 0–0.2)
BASOPHILS NFR BLD AUTO: 0.2 % (ref 0–1.5)
BILIRUB UR QL STRIP: NEGATIVE
BUN BLD-MCNC: 33 MG/DL (ref 8–23)
BUN/CREAT SERPL: 32 (ref 7–25)
CALCIUM SPEC-SCNC: 9.2 MG/DL (ref 8.6–10.5)
CHLORIDE SERPL-SCNC: 101 MMOL/L (ref 98–107)
CLARITY UR: CLEAR
CO2 SERPL-SCNC: 27.6 MMOL/L (ref 22–29)
COLOR UR: YELLOW
CREAT BLD-MCNC: 1.03 MG/DL (ref 0.76–1.27)
DEPRECATED RDW RBC AUTO: 45.3 FL (ref 37–54)
EOSINOPHIL # BLD AUTO: 0.19 10*3/MM3 (ref 0–0.4)
EOSINOPHIL NFR BLD AUTO: 1.2 % (ref 0.3–6.2)
ERYTHROCYTE [DISTWIDTH] IN BLOOD BY AUTOMATED COUNT: 13.2 % (ref 12.3–15.4)
GFR SERPL CREATININE-BSD FRML MDRD: 74 ML/MIN/1.73
GLUCOSE BLD-MCNC: 99 MG/DL (ref 65–99)
GLUCOSE BLDC GLUCOMTR-MCNC: 105 MG/DL (ref 70–130)
GLUCOSE UR STRIP-MCNC: NEGATIVE MG/DL
HCT VFR BLD AUTO: 44.6 % (ref 37.5–51)
HGB BLD-MCNC: 14.3 G/DL (ref 13–17.7)
HGB UR QL STRIP.AUTO: ABNORMAL
HYALINE CASTS UR QL AUTO: ABNORMAL /LPF
IMM GRANULOCYTES # BLD AUTO: 0.09 10*3/MM3 (ref 0–0.05)
IMM GRANULOCYTES NFR BLD AUTO: 0.6 % (ref 0–0.5)
KETONES UR QL STRIP: NEGATIVE
LEUKOCYTE ESTERASE UR QL STRIP.AUTO: NEGATIVE
LYMPHOCYTES # BLD AUTO: 2.02 10*3/MM3 (ref 0.7–3.1)
LYMPHOCYTES NFR BLD AUTO: 12.9 % (ref 19.6–45.3)
MCH RBC QN AUTO: 30.2 PG (ref 26.6–33)
MCHC RBC AUTO-ENTMCNC: 32.1 G/DL (ref 31.5–35.7)
MCV RBC AUTO: 94.3 FL (ref 79–97)
MONOCYTES # BLD AUTO: 1.3 10*3/MM3 (ref 0.1–0.9)
MONOCYTES NFR BLD AUTO: 8.3 % (ref 5–12)
NEUTROPHILS # BLD AUTO: 12.06 10*3/MM3 (ref 1.7–7)
NEUTROPHILS NFR BLD AUTO: 76.8 % (ref 42.7–76)
NITRITE UR QL STRIP: NEGATIVE
NRBC BLD AUTO-RTO: 0 /100 WBC (ref 0–0.2)
PH UR STRIP.AUTO: <=5 [PH] (ref 5–8)
PLATELET # BLD AUTO: 207 10*3/MM3 (ref 140–450)
PMV BLD AUTO: 9.9 FL (ref 6–12)
POTASSIUM BLD-SCNC: 4.3 MMOL/L (ref 3.5–5.2)
PROT UR QL STRIP: ABNORMAL
RBC # BLD AUTO: 4.73 10*6/MM3 (ref 4.14–5.8)
RBC # UR: ABNORMAL /HPF
REF LAB TEST METHOD: ABNORMAL
SODIUM BLD-SCNC: 141 MMOL/L (ref 136–145)
SP GR UR STRIP: 1.02 (ref 1–1.03)
SQUAMOUS #/AREA URNS HPF: ABNORMAL /HPF
UROBILINOGEN UR QL STRIP: ABNORMAL
WBC NRBC COR # BLD: 15.69 10*3/MM3 (ref 3.4–10.8)
WBC UR QL AUTO: ABNORMAL /HPF

## 2019-04-20 PROCEDURE — 85025 COMPLETE CBC W/AUTO DIFF WBC: CPT | Performed by: NURSE PRACTITIONER

## 2019-04-20 PROCEDURE — 25010000002 ENOXAPARIN PER 10 MG: Performed by: THORACIC SURGERY (CARDIOTHORACIC VASCULAR SURGERY)

## 2019-04-20 PROCEDURE — 97530 THERAPEUTIC ACTIVITIES: CPT

## 2019-04-20 PROCEDURE — 80048 BASIC METABOLIC PNL TOTAL CA: CPT | Performed by: THORACIC SURGERY (CARDIOTHORACIC VASCULAR SURGERY)

## 2019-04-20 PROCEDURE — 81001 URINALYSIS AUTO W/SCOPE: CPT | Performed by: NURSE PRACTITIONER

## 2019-04-20 PROCEDURE — 93005 ELECTROCARDIOGRAM TRACING: CPT | Performed by: NURSE PRACTITIONER

## 2019-04-20 PROCEDURE — 93010 ELECTROCARDIOGRAM REPORT: CPT | Performed by: INTERNAL MEDICINE

## 2019-04-20 PROCEDURE — 82962 GLUCOSE BLOOD TEST: CPT

## 2019-04-20 PROCEDURE — 99024 POSTOP FOLLOW-UP VISIT: CPT | Performed by: NURSE PRACTITIONER

## 2019-04-20 RX ORDER — FUROSEMIDE 20 MG/1
20 TABLET ORAL DAILY
Qty: 30 TABLET | Refills: 0 | Status: SHIPPED | OUTPATIENT
Start: 2019-04-21

## 2019-04-20 RX ORDER — POTASSIUM CHLORIDE 750 MG/1
10 CAPSULE, EXTENDED RELEASE ORAL DAILY
Qty: 30 CAPSULE | Refills: 0 | Status: SHIPPED | OUTPATIENT
Start: 2019-04-21

## 2019-04-20 RX ORDER — FUROSEMIDE 20 MG/1
20 TABLET ORAL DAILY
Status: DISCONTINUED | OUTPATIENT
Start: 2019-04-20 | End: 2019-04-20 | Stop reason: HOSPADM

## 2019-04-20 RX ORDER — METOPROLOL TARTRATE 50 MG/1
50 TABLET, FILM COATED ORAL EVERY 12 HOURS SCHEDULED
Status: DISCONTINUED | OUTPATIENT
Start: 2019-04-20 | End: 2019-04-20 | Stop reason: HOSPADM

## 2019-04-20 RX ORDER — METOPROLOL TARTRATE 50 MG/1
50 TABLET, FILM COATED ORAL 2 TIMES DAILY
Qty: 60 TABLET | Refills: 1 | Status: SHIPPED | OUTPATIENT
Start: 2019-04-20

## 2019-04-20 RX ORDER — POTASSIUM CHLORIDE 750 MG/1
10 CAPSULE, EXTENDED RELEASE ORAL DAILY
Status: DISCONTINUED | OUTPATIENT
Start: 2019-04-20 | End: 2019-04-20 | Stop reason: HOSPADM

## 2019-04-20 RX ORDER — ASPIRIN 81 MG/1
81 TABLET ORAL DAILY
Qty: 100 TABLET | Refills: 99 | Status: SHIPPED | OUTPATIENT
Start: 2019-04-21

## 2019-04-20 RX ORDER — HYDROCODONE BITARTRATE AND ACETAMINOPHEN 5; 325 MG/1; MG/1
1 TABLET ORAL EVERY 4 HOURS PRN
Qty: 60 TABLET | Refills: 0 | Status: SHIPPED | OUTPATIENT
Start: 2019-04-20 | End: 2019-04-26

## 2019-04-20 RX ADMIN — ASPIRIN 81 MG: 81 TABLET, DELAYED RELEASE ORAL at 09:36

## 2019-04-20 RX ADMIN — METOPROLOL TARTRATE: 100 TABLET, FILM COATED ORAL at 09:36

## 2019-04-20 RX ADMIN — ENOXAPARIN SODIUM 40 MG: 40 INJECTION SUBCUTANEOUS at 09:36

## 2019-04-20 RX ADMIN — POTASSIUM CHLORIDE 10 MEQ: 750 CAPSULE, EXTENDED RELEASE ORAL at 12:53

## 2019-04-20 RX ADMIN — FUROSEMIDE 20 MG: 20 TABLET ORAL at 12:53

## 2019-04-20 RX ADMIN — PANTOPRAZOLE SODIUM 40 MG: 40 TABLET, DELAYED RELEASE ORAL at 06:05

## 2019-04-20 RX ADMIN — Medication 10 MG: at 12:53

## 2019-04-20 NOTE — THERAPY TREATMENT NOTE
Acute Care - Physical Therapy Treatment Note/Discharge  [facility]     Patient Name: Chaitanya Hair  : 1960  MRN: [id number]  Today's Date: 2019  Onset of Illness/Injury or Date of Surgery: 19     Referring Physician: Chris    Admit Date: 4/15/2019    Visit Dx:    ICD-10-CM ICD-9-CM   1. S/P CABG x 2 Z95.1 V45.81     Patient Active Problem List   Diagnosis   • Coronary artery disease of native heart with stable angina pectoris (CMS/HCC)   • Hypertension   • Nicotine abuse   • Old inferior wall myocardial infarction   • Presence of stent in right coronary artery   • Tobacco abuse   • CAD (coronary artery disease)       Therapy Treatment    Rehabilitation Treatment Summary     Row Name 19             Treatment Time/Intention    Discipline  physical therapist  -MR      Document Type  discharge treatment  -MR      Subjective Information  no complaints  -MR      Mode of Treatment  physical therapy  -MR      Patient/Family Observations  Pt sitting up in chair upon arrival.  -MR      Patient Effort  good  -MR      Comment  Pt stating he is ready to discharge home at this time.  -MR      Existing Precautions/Restrictions  cardiac;sternal  -MR      Recorded by [MR] Isabel Gamez, PT 19      Row Name 19             Cognitive Assessment/Intervention- PT/OT    Orientation Status (Cognition)  oriented x 3  -MR      Follows Commands (Cognition)  WFL  -MR      Recorded by [MR] Isabel Gamez, PT 19      Row Name 19             Functional Mobility    Functional Mobility- Ind. Level  supervision required  -MR      Functional Mobility-Distance (Feet)  175  -MR      Functional Mobility- Comment  Pt performing ambulation without physical assistance.  Performing stairs as detailed below. Pt having minor amount of bloody discharge on gown and PT assessing upon return to room.  Chest incision not actively bleeding and very small amount of discharge  noted at inferior aspect of incision.  RN notified and to further assess.  -MR      Recorded by [MR] Isabel Gamez, PT 04/20/19 0925      Row Name 04/20/19 0902             Transfer Assessment/Treatment    Transfer Assessment/Treatment  sit-stand transfer;stand-sit transfer  -MR      Recorded by [MR] Isabel Gamez, PT 04/20/19 0925      Row Name 04/20/19 0902             Sit-Stand Transfer    Sit-Stand [place] (Transfers)  supervision  -MR      Recorded by [MR] Isabel Gamez, PT 04/20/19 0925      Row Name 04/20/19 0902             Stand-Sit Transfer    Stand-Sit [place] (Transfers)  supervision  -MR      Recorded by [MR] Isabel Gamez, PT 04/20/19 0925      Row Name 04/20/19 0902             Gait/Stairs Assessment/Training    Negotiation (Stairs)  stairs independence  -MR      [place] Level (Stairs)  contact guard  -MR      Handrail Location (Stairs)  right side (ascending)  -MR      Number of Steps (Stairs)  6  -MR      Ascending Technique (Stairs)  step-over-step  -MR      Descending Technique (Stairs)  step-over-step  -MR      Comment (Gait/Stairs)  Pt performing safe negotiation of stairs without issue.  -MR      Recorded by [MR] Isabel Gamez, PT 04/20/19 0925      Row Name 04/20/19 0902             Positioning and Restraints    Pre-Treatment Position  sitting in chair/recliner  -MR      Post Treatment Position  chair  -MR      In Chair  sitting  -MR      Recorded by [MR] Isabel Gamez, PT 04/20/19 0925      Row Name                Wound 04/16/19 1406 chest incision    Wound - Properties Group Date first assessed: 04/16/19 [SS] Time first assessed: 1406 [SS] Location: chest [SS] Type: incision [SS] Recorded by:  [SS] Adrianne Rodrigez RN 04/16/19 1406    Row Name 04/20/19 0902             Plan of Care Review    Plan of Care Reviewed With  patient  -MR      Recorded by [MR] Isabel Gamez, PT 04/20/19 0925      Row Name 04/20/19 0902             Outcome  Summary/Treatment Plan (PT)    Anticipated Discharge Disposition (PT)  home with assist  -MR      Reason for Discharge (PT Discharge Summary)  patient met all goals and outcomes  -MR      Recorded by [MR] Isabel Gamez, PT 04/20/19 0925        User Key  (r) = Recorded By, (t) = Taken By, (c) = Cosigned By    Initials Name Effective Dates Discipline    Adrianne Hopson RN 06/16/16 -  Nurse    Isabel Dominguez, PT 01/03/19 -  PT          Wound 04/16/19 1406 chest incision (Active)   Dressing Appearance open to air 4/20/2019  4:00 AM   Closure Liquid skin adhesive 4/20/2019  4:00 AM   Base clean;dry;pink 4/19/2019  8:17 PM   Periwound intact;dry;pink 4/19/2019  8:17 PM   Periwound Temperature warm 4/19/2019  8:17 PM   Drainage Amount none 4/20/2019  4:00 AM   Dressing Care, Wound open to air 4/19/2019  8:17 PM       Rehab Goal Summary     Row Name 04/20/19 0925             Cardiac Level Goal (PT)    Cardiac Level (Cardiac Goal, PT)  Level 5  -MR      Time Frame (Cardiac Goal, PT)  1 week  -MR      Progress/Outcomes (Cardiac Goal, PT)  goal met  -MR        User Key  (r) = Recorded By, (t) = Taken By, (c) = Cosigned By    Initials Name Provider Type Discipline    Isabel Dominguez, PT Physical Therapist PT          Physical Therapy Education     Title: PT OT SLP Therapies (Done)     Topic: Physical Therapy (Done)     Point: Mobility training (Done)     Learning Progress Summary           Patient Acceptance, E,TB, VU,NR by CG at 4/19/2019  9:54 AM    Acceptance, E,TB, VU,NR by CG at 4/18/2019  9:09 AM    Acceptance, E,TB, VU,DU,NR by CG at 4/17/2019  9:22 AM                   Point: Home exercise program (Done)     Learning Progress Summary           Patient Acceptance, E,TB, VU,NR by CG at 4/19/2019  9:54 AM    Acceptance, E,TB, VU,NR by CG at 4/18/2019  9:09 AM    Acceptance, E,TB, VU,DU,NR by CG at 4/17/2019  9:22 AM                   Point: Body mechanics (Done)     Learning Progress Summary            Patient Acceptance, E,TB, VU,NR by CG at 4/19/2019  9:54 AM    Acceptance, E,TB, VU,NR by CG at 4/18/2019  9:09 AM    Acceptance, E,TB, VU,DU,NR by CG at 4/17/2019  9:22 AM                   Point: Precautions (Done)     Learning Progress Summary           Patient Acceptance, E,TB, VU,NR by CG at 4/19/2019  9:54 AM    Acceptance, E,TB, VU,NR by CG at 4/18/2019  9:09 AM    Acceptance, E,TB, VU,DU,NR by CG at 4/17/2019  9:22 AM                               User Key     Initials Effective Dates Name Provider Type Discipline     02/04/19 -  Adilson Quintanilla, PT Student PT Student PT                PT Recommendation and Plan  Anticipated Discharge Disposition (PT): home with assist  Outcome Summary/Treatment Plan (PT)  Anticipated Discharge Disposition (PT): home with assist  Reason for Discharge (PT Discharge Summary): patient met all goals and outcomes  Plan of Care Reviewed With: patient  Progress: improving  Outcome Summary: Pt has met all rehab goals this date.  Able to ambulate without assistance and perform stair negotiation.  PT will sign off at this time.  Outcome Measures     Row Name 04/20/19 0926 04/19/19 0900 04/18/19 0900       How much help from another person do you currently need...    Turning from your back to your side while in flat bed without using bedrails?  4  -MR  3  -CH (r) CG (t) CH (c)  3  -CH (r) CG (t) CH (c)    Moving from lying on back to sitting on the side of a flat bed without bedrails?  4  -MR  3  -CH (r) CG (t) CH (c)  3  -CH (r) CG (t) CH (c)    Moving to and from a bed to a chair (including a wheelchair)?  4  -MR  3  -CH (r) CG (t) CH (c)  3  -CH (r) CG (t) CH (c)    Standing up from a chair using your arms (e.g., wheelchair, bedside chair)?  4  -MR  3  -CH (r) CG (t) CH (c)  3  -CH (r) CG (t) CH (c)    Climbing 3-5 steps with a railing?  3  -MR  3  -CH (r) CG (t) CH (c)  3  -CH (r) CG (t) CH (c)    To walk in hospital room?  4  -MR  3  -CH (r) CG (t) CH (c)  3  -CH (r) CG  (t) CH (c)    AM-PAC 6 Clicks Score  23  -MR  18  -CH (r) CG (t)  18  -CH (r) CG (t)       Functional Assessment    Outcome Measure Options  AM-PAC 6 Clicks Basic Mobility (PT)  -MR  AM-PAC 6 Clicks Basic Mobility (PT)  -CH (r) CG (t) CH (c)  AM-PAC 6 Clicks Basic Mobility (PT)  -CH (r) CG (t) CH (c)      User Key  (r) = Recorded By, (t) = Taken By, (c) = Cosigned By    Initials Name Provider Type    CH Dayan Nunez, PT Physical Therapist    Isabel Dominguez, PT Physical Therapist    CG Adilson Quintanilla, PT Student PT Student         Time Calculation:         PT G-Codes  Outcome Measure Options: AM-PAC 6 Clicks Basic Mobility (PT)  AM-PAC 6 Clicks Score: 23    Isabel Gamez, PT  4/20/2019

## 2019-04-20 NOTE — PLAN OF CARE
Problem: Cardiac Surgery (Adult)  Goal: Signs and Symptoms of Listed Potential Problems Will be Absent, Minimized or Managed (Cardiac Surgery)  Outcome: Outcome(s) achieved Date Met: 04/20/19      Problem: Fall Risk (Adult)  Goal: Absence of Fall  Outcome: Outcome(s) achieved Date Met: 04/20/19

## 2019-04-20 NOTE — PROGRESS NOTES
Exercise Oximetry    Patient Name:Chaitanya Hair   MRN: [id number]   Date: 04/20/19             ROOM AIR BASELINE   SpO2% 94   Heart Rate   Blood Pressure      EXERCISE ON ROOM AIR SpO2% EXERCISE ON O2 @  LPM SpO2%   1 MINUTE 94 1 MINUTE    2 MINUTES 93 2 MINUTES    3 MINUTES 92 3 MINUTES    4 MINUTES 93 4 MINUTES    5 MINUTES 94 5 MINUTES    6 MINUTES 92 6 MINUTES               Distance Walked   Distance Walked   Dyspnea (Debra Scale)   Dyspnea (Debra Scale)   Fatigue (Debra Scale)   Fatigue (Debra Scale)   SpO2% Post Exercise  93 SpO2% Post Exercise   HR Post Exercise   HR Post Exercise   Time to Recovery  Time to Recovery     Comments:

## 2019-04-20 NOTE — PROGRESS NOTES
" LOS: 5 days   Patient Care Team:  Og Campos MD as PCP - General  Og Campos MD as PCP - Family Medicine    Chief Complaint: post op fu    Subjective:  Symptoms:  No shortness of breath or chest pain.    Diet:  Adequate intake.  No nausea or vomiting.    Activity level: Returning to normal.    Pain:  He reports no pain.          Vital Signs  Temp:  [97.4 °F (36.3 °C)-98.9 °F (37.2 °C)] 98.5 °F (36.9 °C)  Heart Rate:  [63-79] 71  Resp:  [16-18] 18  BP: (115-146)/(76-90) 118/76  Body mass index is 29.58 kg/m².    Intake/Output Summary (Last 24 hours) at 4/20/2019 0849  Last data filed at 4/20/2019 0837  Gross per 24 hour   Intake 910 ml   Output 700 ml   Net 210 ml     I/O this shift:  In: 210 [P.O.:210]  Out: -           04/18/19  0623 04/19/19  0654 04/20/19  0608   Weight: 112 kg (247 lb) 110 kg (243 lb 9.6 oz) 110 kg (243 lb)         Objective:  General Appearance:  Comfortable.    Vital signs: (most recent): Blood pressure 118/76, pulse 71, temperature 98.5 °F (36.9 °C), temperature source Oral, resp. rate 18, height 193 cm (76\"), weight 110 kg (243 lb), SpO2 94 %.    Output: Producing urine and no stool output.    Lungs:  Normal effort and normal respiratory rate.  There are decreased breath sounds (bilateral lower lobes).    Heart: Normal rate.  Regular rhythm.  S1 normal and S2 normal.    Abdomen: Abdomen is soft and non-distended.  Bowel sounds are normal.     Extremities: There is no dependent edema.    Neurological: Patient is alert and oriented to person, place and time.    Skin:  Warm and dry.  (Sternal incision well approximated without erythema, edema, or drainage)            Results Review:        WBC WBC   Date Value Ref Range Status   04/20/2019 15.69 (H) 3.40 - 10.80 10*3/mm3 Final   04/19/2019 18.08 (H) 3.40 - 10.80 10*3/mm3 Final   04/18/2019 16.08 (H) 3.40 - 10.80 10*3/mm3 Final      HGB Hemoglobin   Date Value Ref Range Status   04/20/2019 14.3 13.0 - 17.7 g/dL Final   04/19/2019 " 15.8 13.0 - 17.7 g/dL Final   04/18/2019 14.5 13.0 - 17.7 g/dL Final      HCT Hematocrit   Date Value Ref Range Status   04/20/2019 44.6 37.5 - 51.0 % Final   04/19/2019 48.0 37.5 - 51.0 % Final   04/18/2019 44.5 37.5 - 51.0 % Final      Platelets Platelets   Date Value Ref Range Status   04/20/2019 207 140 - 450 10*3/mm3 Final   04/19/2019 203 140 - 450 10*3/mm3 Final   04/18/2019 172 140 - 450 10*3/mm3 Final        PT/INR:  No results found for: PROTIME/No results found for: INR    Sodium Sodium   Date Value Ref Range Status   04/20/2019 141 136 - 145 mmol/L Final   04/19/2019 133 (L) 136 - 145 mmol/L Final   04/18/2019 138 136 - 145 mmol/L Final      Potassium Potassium   Date Value Ref Range Status   04/20/2019 4.3 3.5 - 5.2 mmol/L Final   04/19/2019 3.9 3.5 - 5.2 mmol/L Final   04/18/2019 4.1 3.5 - 5.2 mmol/L Final   04/18/2019 4.2 3.5 - 5.2 mmol/L Final   04/17/2019 4.2 3.5 - 5.2 mmol/L Final      Chloride Chloride   Date Value Ref Range Status   04/20/2019 101 98 - 107 mmol/L Final   04/19/2019 92 (L) 98 - 107 mmol/L Final   04/18/2019 99 98 - 107 mmol/L Final      Bicarbonate CO2   Date Value Ref Range Status   04/20/2019 27.6 22.0 - 29.0 mmol/L Final   04/19/2019 28.8 22.0 - 29.0 mmol/L Final   04/18/2019 24.3 22.0 - 29.0 mmol/L Final      BUN BUN   Date Value Ref Range Status   04/20/2019 33 (H) 8 - 23 mg/dL Final   04/19/2019 32 (H) 8 - 23 mg/dL Final   04/18/2019 21 8 - 23 mg/dL Final      Creatinine Creatinine   Date Value Ref Range Status   04/20/2019 1.03 0.76 - 1.27 mg/dL Final   04/19/2019 1.08 0.76 - 1.27 mg/dL Final   04/18/2019 1.05 0.76 - 1.27 mg/dL Final      Calcium Calcium   Date Value Ref Range Status   04/20/2019 9.2 8.6 - 10.5 mg/dL Final   04/19/2019 9.5 8.6 - 10.5 mg/dL Final   04/18/2019 9.2 8.6 - 10.5 mg/dL Final      Magnesium No results found for: MG         aspirin 81 mg Oral Daily   bisacodyl 10 mg Rectal Once   enoxaparin 40 mg Subcutaneous Daily   metoprolol tartrate 100 mg Oral  Q12H   pantoprazole 40 mg Oral Q AM   rosuvastatin 20 mg Oral Nightly   Scopolamine 1 patch Transdermal Q72H   sennosides-docusate sodium 2 tablet Oral Nightly              Patient Active Problem List   Diagnosis Code   • Coronary artery disease of native heart with stable angina pectoris (CMS/HCC) I25.118   • Hypertension I10   • Nicotine abuse Z72.0   • Old inferior wall myocardial infarction I25.2   • Presence of stent in right coronary artery Z95.5   • Tobacco abuse Z72.0   • CAD (coronary artery disease) I25.10       Assessment & Plan    CAD s/p CABG x2 LIMA----POD #4 ([place])  ST elevation----global, asymptomatic, probable mild pericarditis  HTN----elevated this am  LVH  Tobacco abuse----recent cessation  Probable COPD, atelectasis, nocturnal hypoxia  Leukocytosis-----probably reactive, continue to monitor     White count trending down, u/a negative, renal fxn back to normal, nausea resolved.  Check walking oximetry with borderline O2 sats at rest, overnight with > 16 minutes sats less than 89%, will need nocturnal oxygen at home.  If navigate stairs successfully today, plan for discharge home.      Briana Gibson, ALY  04/20/19  8:49 AM

## 2019-04-20 NOTE — PLAN OF CARE
Problem: Patient Care Overview  Goal: Plan of Care Review  Outcome: Outcome(s) achieved Date Met: 04/20/19 04/20/19 0928   Coping/Psychosocial   Plan of Care Reviewed With patient   Plan of Care Review   Progress improving   OTHER   Outcome Summary Pt has met all rehab goals this date. Able to ambulate without assistance and perform stair negotiation. PT will sign off at this time.

## 2019-04-20 NOTE — PLAN OF CARE
Problem: Patient Care Overview  Goal: Plan of Care Review  Outcome: Outcome(s) achieved Date Met: 04/20/19 04/20/19 0538   Coping/Psychosocial   Plan of Care Reviewed With patient;spouse   Plan of Care Review   Progress improving   OTHER   Outcome Summary Vitals stable. No falls. No c/o pain or nausea. Overnight oximetry complete, pt placed on 2L during study due to decreased oxygen saturation. Waiting for urine specimen. Pt resting comfortably. Monitoring closely.        Problem: Cardiac Surgery (Adult)  Goal: Signs and Symptoms of Listed Potential Problems Will be Absent, Minimized or Managed (Cardiac Surgery)  Outcome: Ongoing (interventions implemented as appropriate)   04/20/19 0538   Goal/Outcome Evaluation   Problems Assessed (Cardiac Surgery) all   Problems Present (Cardiac Surgery) pain;respiratory compromise;situational response       Problem: Fall Risk (Adult)  Goal: Identify Related Risk Factors and Signs and Symptoms  Outcome: Outcome(s) achieved Date Met: 04/20/19 04/20/19 0538   Fall Risk (Adult)   Related Risk Factors (Fall Risk) gait/mobility problems;sleep pattern alteration;slippery/uneven surfaces;environment unfamiliar   Signs and Symptoms (Fall Risk) presence of risk factors     Goal: Absence of Fall  Outcome: Ongoing (interventions implemented as appropriate)   04/20/19 0538   Fall Risk (Adult)   Absence of Fall making progress toward outcome       Problem: Skin Injury Risk (Adult)  Goal: Identify Related Risk Factors and Signs and Symptoms  Outcome: Outcome(s) achieved Date Met: 04/20/19 04/20/19 0538   Skin Injury Risk (Adult)   Related Risk Factors (Skin Injury Risk) medication;mobility impaired     Goal: Skin Health and Integrity  Outcome: Outcome(s) achieved Date Met: 04/20/19 04/20/19 0538   Skin Injury Risk (Adult)   Skin Health and Integrity making progress toward outcome

## 2019-04-21 ENCOUNTER — READMISSION MANAGEMENT (OUTPATIENT)
Dept: CALL CENTER | Facility: HOSPITAL | Age: 59
End: 2019-04-21

## 2019-04-21 NOTE — OUTREACH NOTE
Prep Survey      Responses   Facility patient discharged from?  [place]   Is patient eligible?  Yes   Discharge diagnosis  CAD of native heart with stable angina pectoris,s/p CABG x2, skeletonized LIMA to diagonal branch and then LAD sequentially   Does the patient have one of the following disease processes/diagnoses(primary or secondary)?  Cardiothoracic surgery   Does the patient have Home health ordered?  Yes   What is the Home health agency?   [facility]   Is there a DME ordered?  No   Comments regarding appointments  See AVS   Prep survey completed?  Yes          Reena Trammell RN

## 2019-04-22 ENCOUNTER — TELEPHONE (OUTPATIENT)
Dept: CARDIAC SURGERY | Facility: CLINIC | Age: 59
End: 2019-04-22

## 2019-04-22 NOTE — PROGRESS NOTES
Case Management Discharge Note    Final Note: Pt d/c home 4/20/19 with [facility] ([facility]) scheduled to follow.     Destination      No service has been selected for the patient.      Durable Medical Equipment      No service has been selected for the patient.      Dialysis/Infusion      No service has been selected for the patient.      Home Medical Care - Selection Complete      Service Provider Request Status Selected Services Address Phone Number Fax Number    [facility] Selected Home Health Services [address] [address] [phone]      Therapy      No service has been selected for the patient.      Community Resources      No service has been selected for the patient.             Final Discharge Disposition Code: 06 - home with home health care

## 2019-04-23 ENCOUNTER — READMISSION MANAGEMENT (OUTPATIENT)
Dept: CALL CENTER | Facility: HOSPITAL | Age: 59
End: 2019-04-23

## 2019-04-23 NOTE — OUTREACH NOTE
CT Surgery Week 1 Survey      Responses   Facility patient discharged from?  [facility]   Does the patient have one of the following disease processes/diagnoses(primary or secondary)?  Cardiothoracic surgery   Is there a successful TCM telephone encounter documented?  No   Week 1 attempt successful?  Yes   Call start time  1259   Call end time  1303   Discharge diagnosis  CAD of native heart with stable angina pectoris,s/p CABG x2, skeletonized LIMA to diagonal branch and then LAD sequentially   Meds reviewed with patient/caregiver?  Yes   Is the patient having any side effects they believe may be caused by any medication additions or changes?  No   Does the patient have all medications related to this admission filled (includes all antibiotics, pain medications, cardiac medications, etc.)  Yes   Is the patient taking all medications as directed (includes completed medication regime)?  Yes   Does the patient have a primary care provider?   Yes   Does the patient have an appointment scheduled with their C/T surgeon?  Yes   Has the patient kept scheduled appointments due by today?  N/A   What is the Home health agency?   [facility]   Has home health visited the patient within 72 hours of discharge?  Yes   Psychosocial issues?  No   Did the patient receive a copy of their discharge instructions?  Yes   Nursing interventions  Reviewed instructions with patient   What is the patient's perception of their health status since discharge?  Improving   Is the patient/caregiver able to teach back normal signs of recovery?  Nausea and lack of appetite, Constipation, Pain or discomfort at incisional site, Depression or irritability   Is the patient /caregiver able to teach back basic post-op care?  Continue use of incentive spirometry at least 1 week post discharge, Practice 'cough and deep breath', Drive as instructed by MD in discharge instructions, Take showers only when approved by MD-sponge bathe until then, No tub bath,  swimming, or hot tub until instructed by MD, Keep incision areas clean, dry and protected, Do not remove steri-strips, Lifting as instructed by MD in discharge instructions   Is the patient/caregiver able to teach back signs and symptoms of incisional infection?  Increased redness, swelling or pain at the incisonal site, Increased drainage or bleeding, Incisional warmth, Pus or odor from incision, Fever   Is the patient/caregiver able to teach back steps to recovery at home?  Set small, achievable goals for return to baseline health, Rest and rebuild strength, gradually increase activity, Weigh daily, Practice good oral hygiene, Eat a well-balance diet, Make a list of questions for surgeon's appointment   Is the patient/caregiver able to teach back the hierarchy of who to call/visit for symptoms/problems? PCP, Specialist, Home health nurse, Urgent Care, ED, 911  Yes   Week 1 call completed?  Yes            Lorrie Wilkinson, RN

## 2019-04-25 ENCOUNTER — LAB REQUISITION (OUTPATIENT)
Dept: LAB | Facility: HOSPITAL | Age: 59
End: 2019-04-25

## 2019-04-25 DIAGNOSIS — Z00.00 ENCOUNTER FOR GENERAL ADULT MEDICAL EXAMINATION WITHOUT ABNORMAL FINDINGS: ICD-10-CM

## 2019-04-25 LAB
ANION GAP SERPL CALCULATED.3IONS-SCNC: 9 MMOL/L (ref 4–13)
BUN BLD-MCNC: 16 MG/DL (ref 5–21)
BUN/CREAT SERPL: 16.7 (ref 7–25)
CALCIUM SPEC-SCNC: 8.9 MG/DL (ref 8.4–10.4)
CHLORIDE SERPL-SCNC: 101 MMOL/L (ref 98–110)
CO2 SERPL-SCNC: 27 MMOL/L (ref 24–31)
CREAT BLD-MCNC: 0.96 MG/DL (ref 0.5–1.4)
GFR SERPL CREATININE-BSD FRML MDRD: 80 ML/MIN/1.73
GLUCOSE BLD-MCNC: 114 MG/DL (ref 70–100)
POTASSIUM BLD-SCNC: 4.3 MMOL/L (ref 3.5–5.3)
SODIUM BLD-SCNC: 137 MMOL/L (ref 135–145)

## 2019-04-25 PROCEDURE — 80048 BASIC METABOLIC PNL TOTAL CA: CPT | Performed by: THORACIC SURGERY (CARDIOTHORACIC VASCULAR SURGERY)

## 2019-04-29 NOTE — DISCHARGE SUMMARY
Date of Admission: 4/15/2019  Date of Discharge: 4/20/2019    Discharge Diagnosis: CAD status post CABG, hypertension, LVH, tobacco abuse, probable COPD    Presenting Problem/History of Present Illness  CAD (coronary artery disease) [I25.10]     Hospital Course  Patient is a 58 y.o. male admitted for open heart surgery.  The patient underwent successful CABG x2 with LIMA to D1 then LAD sequentially on 4/16/2019 with Dr. Perez (see op report for full details).  The patient's postoperative course was relatively uneventful with the exception of mild nausea that has since resolved.  The patient's postoperative course was 1 of gradual improvement, he is now eating and drinking sufficiently, urinating defecating without issue, and ambulating independently, he is now deemed ready for discharge.  He did have an overnight oximetry with greater than 16 minutes of O2 sats less than 89%, nocturnal oxygen has been ordered for home, and he is to follow-up with his primary physician for any further testing.  He is to follow-up as per appointments below, home health services will follow for routine incision surveillance and sternal precaution education.    Procedures Performed  Procedure(s):  MEDIAN STERNOTOMY, CORONARY ARTERY BYPASS X2 USING LEFT INTERNAL MAMMARY ARTERY GRAFT, PRP, INTRAOP LAURI       Consults:   Consults     No orders found from 3/17/2019 to 4/16/2019.          Pertinent Test Results:    Lab Results   Component Value Date    WBC 15.69 (H) 04/20/2019    HGB 14.3 04/20/2019    HCT 44.6 04/20/2019    MCV 94.3 04/20/2019     04/20/2019      Lab Results   Component Value Date    GLUCOSE 114 (H) 04/25/2019    CALCIUM 8.9 04/25/2019     04/25/2019    K 4.3 04/25/2019    CO2 27.0 04/25/2019     04/25/2019    BUN 16 04/25/2019    CREATININE 0.96 04/25/2019    EGFRIFNONA 80 04/25/2019    BCR 16.7 04/25/2019    ANIONGAP 9.0 04/25/2019     Lab Results   Component Value Date    INR 1.14 (H) 04/17/2019     NUHA 14.3 (H) 04/17/2019         Condition on Discharge: Stable    Vital Signs         Discharge Disposition  Home-Health Care [facility] [place]    Discharge Medications     Discharge Medications      New Medications      Instructions Start Date   aspirin 81 MG EC tablet  Replaces:  aspirin 325 MG tablet   81 mg, Oral, Daily      furosemide 20 MG tablet  Commonly known as:  LASIX   20 mg, Oral, Daily      potassium chloride 10 MEQ CR capsule  Commonly known as:  MICRO-K   10 mEq, Oral, Daily         Changes to Medications      Instructions Start Date   metoprolol tartrate 50 MG tablet  Commonly known as:  LOPRESSOR  What changed:    · how much to take  · how to take this   50 mg, Oral, 2 Times Daily         Continue These Medications      Instructions Start Date   MULTI-VITAMIN tablet   Oral      rosuvastatin 20 MG tablet  Commonly known as:  CRESTOR   20 mg, Oral         Stop These Medications    amLODIPine 2.5 MG tablet  Commonly known as:  NORVASC     aspirin 325 MG tablet  Replaced by:  aspirin 81 MG EC tablet     CloNIDine 0.1 MG tablet  Commonly known as:  CATAPRES     lisinopril 20 MG tablet  Commonly known as:  PRINIVIL,ZESTRIL     nitroglycerin 0.4 MG SL tablet  Commonly known as:  NITROSTAT        ASK your doctor about these medications      Instructions Start Date   HYDROcodone-acetaminophen 5-325 MG per tablet  Commonly known as:  NORCO  Ask about: Should I take this medication?   1 tablet, Oral, Every 4 Hours PRN             Discharge Diet:   Diet Instructions     Diet: Cardiac      Discharge Diet:  Cardiac          Activity at Discharge:   Activity Instructions     Discharge Activity Restrictions      1) No driving for 2 weeks and no longer taking narcotics, ride in the back seat for 2 weeks.   2) May shower, no tub bathing, no immersion in pools or hot tubs   3) Do not lift / push / pull more then 10 lbs.  4) Walk at least 10 minutes at least 3 times daily          Follow-up Appointments  Future Appointments    Date Time Provider Department Center   5/29/2019 11:15 AM Dennys Perez MD [facility] None     Additional Instructions for the Follow-ups that You Need to Schedule     Basic Metabolic Panel    Apr 25, 2019 (Approximate)      Call MD With Problems / Concerns   As directed      Instructions:  Call office at [phone] for any drainage, increased redness, or fever over 100.5    Order Comments:  Instructions:  Call office at [phone] for any drainage, increased redness, or fever over 100.5          Discharge Follow-up with PCP   As directed       Currently Documented PCP:    Og Campos MD    PCP Phone Number:    [phone]     Follow Up Details:  in 1 week, follow per their recommendations for nocturnal oxygen         Discharge Follow-up with Specialty: Cardiologist APRN/PA; 1 Week   As directed      Specialty:  Cardiologist APRN/PA    Follow Up:  1 Week    Follow Up Details:  bring all prescription bottles to appointment, call for appointment         Discharge Follow-up with Specified Provider: Cardiologist; 1 Month   As directed      To:  Cardiologist    Follow Up:  1 Month    Follow Up Details:  call for appointment, bring all medication bottles to appointment         Discharge Follow-up with Specified Provider: Dr. Preez   As directed      To:  Dr. Perez    Follow Up Details:  4-6 weeks, call for appointment, bring all current medications to appointment         Referral to Home Health   As directed      Face to Face Visit Date:  4/20/2019    Follow-up Provider for Plan of Care?:  I will be treating the patient on an ongoing basis.  Please send me the Plan of Care for signature.    Follow-up Provider:  DENNYS PEREZ [[id number]]    Reason/Clinical Findings:  post op open heart    Describe mobility limitations that make leaving home difficult:  weakness    Nursing/Therapeutic Services Requested:  Skilled Nursing    Skilled nursing orders:  Post CABG care (BMP next week, call/fax to   [facility] office) O2 instruction Medication education Cardiopulmonary assessments    Frequency:  1 Week 1               Test Results Pending at Discharge       ALY Frances  04/29/19  2:14 PM

## 2019-04-30 ENCOUNTER — READMISSION MANAGEMENT (OUTPATIENT)
Dept: CALL CENTER | Facility: HOSPITAL | Age: 59
End: 2019-04-30

## 2019-04-30 NOTE — OUTREACH NOTE
CT Surgery Week 2 Survey      Responses   Facility patient discharged from?  [facility]   Does the patient have one of the following disease processes/diagnoses(primary or secondary)?  Cardiothoracic surgery   Week 2 attempt successful?  Yes   Call start time  1307   Call end time  1311   Discharge diagnosis  CAD of native heart with stable angina pectoris,s/p CABG x2, skeletonized LIMA to diagonal branch and then LAD sequentially   Is patient permission given to speak with other caregiver?  No   Meds reviewed with patient/caregiver?  Yes   Is the patient having any side effects they believe may be caused by any medication additions or changes?  No   Does the patient have all medications related to this admission filled (includes all antibiotics, pain medications, cardiac medications, etc.)  Yes   Is the patient taking all medications as directed (includes completed medication regime)?  Yes   Comments regarding appointments  See AVS   Does the patient have a primary care provider?   Yes   Does the patient have an appointment scheduled with their C/T surgeon?  Yes   Has the patient kept scheduled appointments due by today?  Yes   Did the patient receive a copy of their discharge instructions?  Yes   Nursing interventions  Reviewed instructions with patient   What is the patient's perception of their health status since discharge?  Improving   Is the patient/caregiver able to teach back normal signs of recovery?  Nausea and lack of appetite, Constipation, Pain or discomfort at incisional site, Depression or irritability   Is the patient /caregiver able to teach back basic post-op care?  Continue use of incentive spirometry at least 1 week post discharge, Practice 'cough and deep breath', Drive as instructed by MD in discharge instructions, Take showers only when approved by MD-sponge bathe until then, No tub bath, swimming, or hot tub until instructed by MD, Keep incision areas clean, dry and protected, Do not remove  steri-strips, Lifting as instructed by MD in discharge instructions   Is the patient/caregiver able to teach back signs and symptoms of incisional infection?  Increased redness, swelling or pain at the incisonal site, Increased drainage or bleeding, Incisional warmth, Pus or odor from incision, Fever   Is the patient/caregiver able to teach back steps to recovery at home?  Set small, achievable goals for return to baseline health, Rest and rebuild strength, gradually increase activity, Weigh daily, Practice good oral hygiene, Eat a well-balance diet, Make a list of questions for surgeon's appointment   Is the patient /caregiver able to teach back the importance of cardiac rehab?  Yes   Is the patient/caregiver able to teach back the hierarchy of who to call/visit for symptoms/problems? PCP, Specialist, Home health nurse, Urgent Care, ED, 911  Yes   Week 2 call completed?  Yes   Revoked  -- [He is doing great.]          Fern Ansari RN

## 2019-05-01 ENCOUNTER — CLINICAL SUPPORT (OUTPATIENT)
Dept: CARDIAC SURGERY | Facility: CLINIC | Age: 59
End: 2019-05-01

## 2019-05-01 DIAGNOSIS — Z79.82 LONG TERM (CURRENT) USE OF ASPIRIN: ICD-10-CM

## 2019-05-01 DIAGNOSIS — Z72.0 TOBACCO USE: ICD-10-CM

## 2019-05-01 DIAGNOSIS — Z79.891 LONG TERM (CURRENT) USE OF OPIATE ANALGESIC: ICD-10-CM

## 2019-05-01 DIAGNOSIS — Z95.5 PRESENCE OF CORONARY ANGIOPLASTY IMPLANT AND GRAFT: Primary | ICD-10-CM

## 2019-05-01 PROCEDURE — G0180 MD CERTIFICATION HHA PATIENT: HCPCS | Performed by: THORACIC SURGERY (CARDIOTHORACIC VASCULAR SURGERY)

## 2019-05-15 ENCOUNTER — TELEPHONE (OUTPATIENT)
Dept: CARDIAC SURGERY | Facility: CLINIC | Age: 59
End: 2019-05-15

## 2019-05-15 ENCOUNTER — TELEPHONE (OUTPATIENT)
Dept: CARDIOLOGY | Age: 59
End: 2019-05-15

## 2019-05-15 NOTE — TELEPHONE ENCOUNTER
[facility] nurse calls to report that patient is being discharged after having met all his goals. She reports his b/p readings have been slightly elevated 132/90  133/88 She reports he has followed with his PCP but not cardiology. I spoke with patient who reports doing well . He has an appointment with Dr Garcia in about a month. He is agreeable to calling and moving this up if possible. He will call our office with any further questions

## 2019-05-15 NOTE — TELEPHONE ENCOUNTER
patient called to schedule a follow up from double bypass, instructions were not where [facility] could see to determine time frame needed, pt had it about 4 weeks ago, please determine what is needed for follow up and contact pt. Please be advised that the best time to call him to accommodate their needs is Anytime. Thank you.

## 2019-05-29 ENCOUNTER — OFFICE VISIT (OUTPATIENT)
Dept: CARDIAC SURGERY | Facility: CLINIC | Age: 59
End: 2019-05-29

## 2019-05-29 VITALS
DIASTOLIC BLOOD PRESSURE: 80 MMHG | HEART RATE: 60 BPM | HEIGHT: 76 IN | TEMPERATURE: 98.2 F | RESPIRATION RATE: 20 BRPM | OXYGEN SATURATION: 98 % | SYSTOLIC BLOOD PRESSURE: 148 MMHG | WEIGHT: 248 LBS | BODY MASS INDEX: 30.2 KG/M2

## 2019-05-29 DIAGNOSIS — Z95.1 S/P CABG X 2: ICD-10-CM

## 2019-05-29 PROCEDURE — 99024 POSTOP FOLLOW-UP VISIT: CPT | Performed by: NURSE PRACTITIONER

## 2019-05-29 RX ORDER — LISINOPRIL 5 MG/1
5 TABLET ORAL DAILY
Qty: 30 TABLET | Refills: 3 | Status: SHIPPED | OUTPATIENT
Start: 2019-05-29

## 2019-05-29 NOTE — PROGRESS NOTES
"CARDIOVASCULAR SURGERY FOLLOW-UP PROGRESS NOTE  Chief Complaint: post op        HPI:   Dear Dr. Campos, Og PARNELL MD and colleagues:    It was nice to see Chaitanya Hair in follow up 6 weeks  after surgery.  As you know, he is a 58 y.o. male with CAD who underwent CABGx2. He did well postoperatively and continues to do well. He comes in today complaining of nothing.  His activity level has been good.   His blood pressure is a little elevated today.  He says at home it has been elevated as well.  He does not follow up with Dr. Garcia until early next month.  I have ordered lisinopril 5mg daily.    Physical Exam:         /80 (BP Location: Right arm, Patient Position: Sitting, Cuff Size: Adult)   Pulse 60   Temp 98.2 °F (36.8 °C) (Oral)   Resp 20   Ht 193 cm (76\")   Wt 112 kg (248 lb)   SpO2 98%   BMI 30.19 kg/m²   Heart:  regular rate and rhythm  Lungs:  clear to auscultation bilaterally  Extremities:  no edema  Incision(s):  sternum stable    Assessment/Plan:     S/P CABG. Overall, he is doing well.    No significant post-op complications    OK to begin cardiac rehab  Follow-up as scheduled with cardiology  Follow-up as scheduled with PCP      Thank you for allowing me to participate in the care of your   patient.  Regards,  ALY Luna      "

## 2019-06-13 ENCOUNTER — OFFICE VISIT (OUTPATIENT)
Dept: CARDIOLOGY | Age: 59
End: 2019-06-13
Payer: COMMERCIAL

## 2019-06-13 VITALS
WEIGHT: 251 LBS | SYSTOLIC BLOOD PRESSURE: 130 MMHG | DIASTOLIC BLOOD PRESSURE: 84 MMHG | BODY MASS INDEX: 30.56 KG/M2 | HEART RATE: 74 BPM | HEIGHT: 76 IN

## 2019-06-13 DIAGNOSIS — I10 ESSENTIAL HYPERTENSION: ICD-10-CM

## 2019-06-13 DIAGNOSIS — I25.10 CORONARY ARTERY DISEASE INVOLVING NATIVE CORONARY ARTERY OF NATIVE HEART WITHOUT ANGINA PECTORIS: Primary | ICD-10-CM

## 2019-06-13 DIAGNOSIS — Z95.1 S/P CABG X 2: ICD-10-CM

## 2019-06-13 PROCEDURE — 99213 OFFICE O/P EST LOW 20 MIN: CPT | Performed by: CLINICAL NURSE SPECIALIST

## 2019-06-13 RX ORDER — METOPROLOL TARTRATE 50 MG/1
50 TABLET, FILM COATED ORAL 2 TIMES DAILY
Refills: 1 | COMMUNITY
Start: 2019-06-07

## 2019-06-13 RX ORDER — AMLODIPINE BESYLATE 2.5 MG/1
2.5 TABLET ORAL 2 TIMES DAILY
COMMUNITY

## 2019-06-13 NOTE — PATIENT INSTRUCTIONS
POSTOPERATIVE CORONARY ARTERY BYPASS GRAFTING    ONE MONTH AND BEYOND, POST OP INSTRUCTIONS    We are pleased to provide you with this instruction sheet to help speed your recovery from your operation, and to provide ready support for any question that may arise. Activity  Walking is still the best way to regain your strength after your operation. A walking program was provided to you at your discharge from the hospital.  At this point you should be walking 20 minutes or greater twice a day unless otherwise instructed by your physician. You may walk outside, weather permitting, treadmills and stationary bikes are also permitted. Cardiac Rehab is recommended for anyone after having bypass surgery or a heart attack. Cardiac Rehab is a very important step in your recovery process and we highly recommend you attend. Please feel free to request a specific facility if you live outside the [address] [place] [address] and we will be happy to make arrangements for you to attend. · DO NOT lift anything greater than 15 pounds for the next 8 weeks. If you have pain in your sternum with lifting stop, you are probably doing too much. Slowly progress and listen to your body. · Sexual relations will not interfere with healing, again, avoid weight-bearing positions. · If you golf, chipping and putting are fine at this time, you may begin driving the ball in eight more weeks. · You may mow or ride a tractor if it requires less than 15 pounds of pull. · You may not drive a motorcycle for 8 more weeks. · You may drive a car, unless you are still requiring narcotics or otherwise directed by your surgeon or cardiologist  · When you are 12 weeks post op you may resume normal activity unless directed by your physician. Wound Care  At this time your incision should be well healed. You may begin using your choice of soap and lotions as you wish unless otherwise directed by your provider.     Tobacco Use  Continued tobacco use will significantly affect long-term results of your operation and cause advancement of your heart disease. Smoking also causes graft closure and clots in your legs. Diet  If you are diabetic or on a special diet you should continue. A cardiac diet pamphlet was provided for you and should be followed closely. Things to Expect After a Heart Operation  Many of the following may be resolving at this time, others will continue to improve over time. 1.  Surgical pain - it is uncommon for you to need pain meds one month after your operation. Please call our office if you feel your pain is excessive or not improving. 2.  Difficulty sleeping - it sometimes takes up to 3 months to return to a normal sleep - wake cycle. Taking naps during the day will help supplement this. 3.  Loss of appetite - Your appetite should be returning to normal, however, some patients report likes or dislikes may never be what they were prior to surgery. This is a very individual perception. 4.  Mild depression - Some patients report mild depression over the next several months. Please speak to your primary care physician if this continues or does not improve. Medications      Please contact your Primary care provider for non cardiac medications. If your are now taking a cholesterol medication that you were not taking prior to surgery- we will need to check your cholesterol and liver enzymes after taking the medication for 2 mos. Please bring ALL medication bottles to every appointment. If you have more that one medical provider, medications can be changed and all providers need to know the medications you are taking  Please remember to be honest in reporting what you are actually taking so we can better care for you. Valve Patients  Always inform your physician or dentist of your prosthetic valve before having any medical or dental procedure.   If you are taking Coumadin, please do not stop or start any medication without consulting the physician who monitors you Coumadin (INR) level. You should have your levels checked once a week by your primary care doctor or cardiologist.           Please remember to bring all of your medications bottles to your appointment so that we can update your medication list.  Include all medications you take (including prescriptions, over-the-counter, vitamins, and herbals). We ask that you bring your original medication bottles, as it is difficult to identify medications based on color and shape alone. We are committed to providing you with the best care possible. If you receive a survey after visiting one of our offices, please take time to share your experience concerning your physician office visit. These surveys are confidential and no health information about you is shared. We are eager to improve for you and we are counting on your feedback to help make that happen. Allergies:  Patient has no known allergies.

## 2019-06-13 NOTE — PROGRESS NOTES
unexpected weight change. No fever, chills or diaphoresis. No fatigue. HEENT - no significant rhinorrhea or epistaxis. No tinnitus or significant hearing loss. Eyes - no sudden vision change or amaurosis. Respiratory - no significant wheezing, stridor, apnea or cough. No dyspnea on exertion or shortness of breath. Cardiovascular - no exertional chest pain, orthopnea or PND. No sensation of arrhythmia or slow heart rate. No claudication or leg edema. Gastrointestinal - no abdominal swelling or pain. No blood in stool. No severe constipation, diarrhea, nausea, or vomiting. Genitourinary - no difficulty urinating, dysuria, frequency, or urgency. No flank pain or hematuria. Musculoskeletal - no back pain, gait disturbance, or myalgia. Skin - no color change or rash. No pallor. No new surgical incision. Neurologic - no speech difficulty, facial asymmetry or lateralizing weakness. No seizures, presyncope, syncope, or significant dizziness. Hematologic - no easy bruising or excessive bleeding. Psychiatric - no severe anxiety or insomnia. No confusion. All other review of systems are negative. Objective  Vital Signs - /84   Pulse 74   Ht 6' 4\" (1.93 m)   Wt 251 lb (113.9 kg)   BMI 30.55 kg/m²   General - Jason Light is alert, cooperative, and pleasant. Well groomed. No acute distress. Body habitus is overweight. HEENT - The head is normocephalic. No circumoral cyanosis. Dentition is normal.   EYES -  No Xanthelasma, no arcus senilis, no conjunctival hemorrhages or discharge. Neck - Supple, without increased jugular venous pressures. No carotid bruits. No mass. Respiratory - Lungs are clear bilaterally. No wheezes or rales. Normal effort without use of accessory muscles. Cardiovascular - Heart has regular rhythm and rate. No murmurs, rubs or gallops. + pedal pulses and no varicosities. Abdominal -  Soft, nontender, nondistended. Bowel sounds are intact. Extremities - No clubbing, cyanosis, or  edema. Musculoskeletal -  No clubbing . No Osler's nodes. Gait normal .  No kyphosis or scoliosis. Skin -  no statis ulcers or dermatitis. Sternotomy well-healed  Neurological - No focal signs are identified. Oriented to person, place and time. Psychiatric -  Appropriate affect and mood. Assessment:     Diagnosis Orders   1. Coronary artery disease involving native coronary artery of native heart without angina pectoris     2. Essential hypertension     3. S/P CABG x 2       Data:  BP Readings from Last 3 Encounters:   06/13/19 130/84   04/11/19 139/73   03/25/19 (!) 150/95    Pulse Readings from Last 3 Encounters:   06/13/19 74   04/11/19 64   03/25/19 86        Wt Readings from Last 3 Encounters:   06/13/19 251 lb (113.9 kg)   04/11/19 245 lb (111.1 kg)   03/22/19 243 lb (110.2 kg)     Regressing well from bypass surgery. We discussed cardiac rehab. He staying quite active and monitoring his blood pressure at home. We discussed weight restrictions 12-week postoperatively. If his blood pressure continues to stay greater than 140/80 then we can increase his pain     States taking medications as prescribed  Stable cardiovascular status. No evidence of overt heart failure, angina or dysrhythmia. Plan  Monitor BP - if staying > 130/80 consistently then increase the amlodipe to 5mg twice  day  Follow up in 4 months  Call with any questions or concerns  Follow up with Zulma Hoover MD for non cardiac problems  Report any new problems  Cardiovascular Fitness-Exercise as tolerated. Strive for 30 minutes of exercise most days of the week. Cardiac / Healthy Diet  Continue current medications as directed  Continue plan of treatment  It is always recommended that you bring your medications bottles with you to each visit - this is for your safety!        ABNER Paulino    EMR dragon/transcription disclaimer: Much of this encounter note is electronic transcription/translation of spoken language to printed tach. Electronic translation of spoken language may be erroneous, or at times, nonsensical words or phrases may be inadvertently transcribed.  Although, I have reviewed the note for such errors, some may still exist.

## 2019-10-29 ENCOUNTER — OFFICE VISIT (OUTPATIENT)
Dept: CARDIOLOGY | Age: 59
End: 2019-10-29
Payer: COMMERCIAL

## 2019-10-29 VITALS
WEIGHT: 254 LBS | SYSTOLIC BLOOD PRESSURE: 138 MMHG | BODY MASS INDEX: 30.93 KG/M2 | HEIGHT: 76 IN | HEART RATE: 62 BPM | DIASTOLIC BLOOD PRESSURE: 88 MMHG

## 2019-10-29 DIAGNOSIS — I25.10 CORONARY ARTERY DISEASE INVOLVING NATIVE CORONARY ARTERY OF NATIVE HEART WITHOUT ANGINA PECTORIS: Primary | ICD-10-CM

## 2019-10-29 DIAGNOSIS — I10 ESSENTIAL HYPERTENSION: ICD-10-CM

## 2019-10-29 PROCEDURE — 99212 OFFICE O/P EST SF 10 MIN: CPT | Performed by: INTERNAL MEDICINE

## 2020-05-01 ENCOUNTER — VIRTUAL VISIT (OUTPATIENT)
Dept: CARDIOLOGY | Age: 60
End: 2020-05-01
Payer: COMMERCIAL

## 2020-05-01 VITALS — HEIGHT: 76 IN | WEIGHT: 258 LBS | BODY MASS INDEX: 31.42 KG/M2

## 2020-05-01 PROCEDURE — 99406 BEHAV CHNG SMOKING 3-10 MIN: CPT | Performed by: CLINICAL NURSE SPECIALIST

## 2020-05-01 PROCEDURE — 99442 PR PHYS/QHP TELEPHONE EVALUATION 11-20 MIN: CPT | Performed by: CLINICAL NURSE SPECIALIST

## 2020-05-01 NOTE — PROGRESS NOTES
tablet Take 81 mg by mouth daily      metoprolol tartrate (LOPRESSOR) 50 MG tablet Take 50 mg by mouth 2 times daily  1    lisinopril (PRINIVIL;ZESTRIL) 20 MG tablet Take 1 tablet by mouth 2 times daily 180 tablet 3    cloNIDine (CATAPRES) 0.1 MG tablet Take 1 tablet by mouth nightly 90 tablet 3    rosuvastatin (CRESTOR) 20 MG tablet Take 20 mg by mouth daily.  nitroGLYCERIN (NITROSTAT) 0.4 MG SL tablet Place 1 tablet under the tongue every 5 minutes as needed. 25 tablet 11    Multiple Vitamin (MULTI-VITAMIN) TABS Take  by mouth daily. No current facility-administered medications for this visit. Allergies: Patient has no known allergies. 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 See scanned document.    [employer] LITHOTRIPSY       Family History   Problem Relation Age of Onset    Coronary Art Dis Mother     Hypertension Mother     Coronary Art Dis Father     Hypertension Father     Hypertension Maternal Aunt     Diabetes Maternal Aunt     Hypertension Maternal Uncle     Diabetes Maternal Uncle      Social History     Tobacco Use    Smoking status: Current Every Day Smoker     Packs/day: 1.50     Years: 35.00     Pack years: 52.50     Types: Cigarettes    Smokeless tobacco: Never Used   Substance Use Topics    Alcohol use: Yes     Alcohol/week: 0.0 standard drinks     Comment: SOCIALLY       BP Readings from Last 3 Encounters:   10/29/19 138/88   06/13/19 130/84   04/11/19 139/73    Pulse Readings from Last 3 Encounters:   10/29/19 62   06/13/19 74   04/11/19 64             Diagnosis Orders   1. Coronary artery disease involving native coronary artery of native heart without angina pectoris     2. Essential hypertension     3. S/P CABG x 2     4. Tobacco abuse       138/79 today    Heart rate and blood pressure well controlled. On beta-blocker, CCB and ACE inhibitor. On aspirin and statin. PCP manages labs. Due next month for labs. Patient states he is active and doing well. He does continue to smoke about a pack or more a day. We discussed the importance of smoking cessation. We discussed strategies and ways to quit. At least 5 to 10 minutes were spent on strategies and benefits of smoking cessation      15 Minutes were spent speaking with patient and discussing plan of care. Follow-up will be made in 6 months. Someone from the  will call and set up appointment.     Electronically signed, ABNER Orosco

## 2020-08-10 ENCOUNTER — TELEPHONE (OUTPATIENT)
Dept: CARDIOLOGY | Age: 60
End: 2020-08-10

## 2020-08-10 NOTE — TELEPHONE ENCOUNTER
LVM; Left vm for pt to contact the office in reference to his appt in December; Dr. Jef Lucero is no longer with [facility] ([facility]); Please r/s pt with any of our other doctors within Cardiology

## 2020-11-30 ENCOUNTER — TELEPHONE (OUTPATIENT)
Dept: CARDIOLOGY | Age: 60
End: 2020-11-30

## 2023-01-26 ENCOUNTER — HOSPITAL ENCOUNTER (INPATIENT)
Age: 63
LOS: 4 days | Discharge: HOME | DRG: 339 | End: 2023-01-30
Attending: EMERGENCY MEDICINE | Admitting: SURGERY
Payer: COMMERCIAL

## 2023-01-26 ENCOUNTER — APPOINTMENT (OUTPATIENT)
Dept: CT IMAGING | Age: 63
DRG: 339 | End: 2023-01-26
Payer: COMMERCIAL

## 2023-01-26 ENCOUNTER — ANESTHESIA EVENT (OUTPATIENT)
Dept: OPERATING ROOM | Age: 63
End: 2023-01-26
Payer: COMMERCIAL

## 2023-01-26 ENCOUNTER — ANESTHESIA (OUTPATIENT)
Dept: OPERATING ROOM | Age: 63
End: 2023-01-26
Payer: COMMERCIAL

## 2023-01-26 DIAGNOSIS — R91.1 LUNG NODULE SEEN ON IMAGING STUDY: ICD-10-CM

## 2023-01-26 DIAGNOSIS — K35.80 ACUTE APPENDICITIS: ICD-10-CM

## 2023-01-26 DIAGNOSIS — K35.30 ACUTE APPENDICITIS WITH LOCALIZED PERITONITIS, WITHOUT PERFORATION OR ABSCESS, UNSPECIFIED WHETHER GANGRENE PRESENT: Primary | ICD-10-CM

## 2023-01-26 PROBLEM — K35.21 ACUTE APPENDICITIS WITH PERFORATION, GENERALIZED PERITONITIS, ABSCESS, AND GANGRENE: Status: ACTIVE | Noted: 2023-01-26

## 2023-01-26 PROBLEM — K35.20 ACUTE APPENDICITIS WITH PERFORATION, GENERALIZED PERITONITIS, AND GANGRENE, WITHOUT ABSCESS: Status: ACTIVE | Noted: 2023-01-26

## 2023-01-26 LAB
ALBUMIN SERPL-MCNC: 3.6 G/DL (ref 3.5–5.2)
ALP BLD-CCNC: 107 U/L (ref 40–130)
ALT SERPL-CCNC: 28 U/L (ref 5–41)
ANION GAP SERPL CALCULATED.3IONS-SCNC: 12 MMOL/L (ref 7–19)
AST SERPL-CCNC: 21 U/L (ref 5–40)
BASOPHILS ABSOLUTE: 0 K/UL (ref 0–0.2)
BASOPHILS RELATIVE PERCENT: 0.3 % (ref 0–1)
BILIRUB SERPL-MCNC: 2.2 MG/DL (ref 0.2–1.2)
BUN BLDV-MCNC: 19 MG/DL (ref 8–23)
CALCIUM SERPL-MCNC: 9.3 MG/DL (ref 8.8–10.2)
CHLORIDE BLD-SCNC: 98 MMOL/L (ref 98–111)
CO2: 23 MMOL/L (ref 22–29)
CREAT SERPL-MCNC: 1.5 MG/DL (ref 0.5–1.2)
EOSINOPHILS ABSOLUTE: 0 K/UL (ref 0–0.6)
EOSINOPHILS RELATIVE PERCENT: 0 % (ref 0–5)
GFR SERPL CREATININE-BSD FRML MDRD: 51 ML/MIN/{1.73_M2}
GLUCOSE BLD-MCNC: 124 MG/DL (ref 74–109)
HCT VFR BLD CALC: 49.9 % (ref 42–52)
HEMOGLOBIN: 17 G/DL (ref 14–18)
IMMATURE GRANULOCYTES #: 0.1 K/UL
LIPASE: 19 U/L (ref 13–60)
LYMPHOCYTES ABSOLUTE: 1.4 K/UL (ref 1.1–4.5)
LYMPHOCYTES RELATIVE PERCENT: 9.1 % (ref 20–40)
MCH RBC QN AUTO: 31.4 PG (ref 27–31)
MCHC RBC AUTO-ENTMCNC: 34.1 G/DL (ref 33–37)
MCV RBC AUTO: 92.1 FL (ref 80–94)
MONOCYTES ABSOLUTE: 1.1 K/UL (ref 0–0.9)
MONOCYTES RELATIVE PERCENT: 6.9 % (ref 0–10)
NEUTROPHILS ABSOLUTE: 13 K/UL (ref 1.5–7.5)
NEUTROPHILS RELATIVE PERCENT: 83.2 % (ref 50–65)
PDW BLD-RTO: 13.2 % (ref 11.5–14.5)
PLATELET # BLD: 180 K/UL (ref 130–400)
PMV BLD AUTO: 10.5 FL (ref 9.4–12.4)
POTASSIUM SERPL-SCNC: 4.6 MMOL/L (ref 3.5–5)
RBC # BLD: 5.42 M/UL (ref 4.7–6.1)
SARS-COV-2, NAAT: NOT DETECTED
SODIUM BLD-SCNC: 133 MMOL/L (ref 136–145)
TOTAL PROTEIN: 7.4 G/DL (ref 6.6–8.7)
WBC # BLD: 15.6 K/UL (ref 4.8–10.8)

## 2023-01-26 PROCEDURE — 6360000002 HC RX W HCPCS: Performed by: SURGERY

## 2023-01-26 PROCEDURE — 6370000000 HC RX 637 (ALT 250 FOR IP): Performed by: SURGERY

## 2023-01-26 PROCEDURE — 3700000000 HC ANESTHESIA ATTENDED CARE: Performed by: SURGERY

## 2023-01-26 PROCEDURE — 7100000001 HC PACU RECOVERY - ADDTL 15 MIN: Performed by: SURGERY

## 2023-01-26 PROCEDURE — 74177 CT ABD & PELVIS W/CONTRAST: CPT | Performed by: RADIOLOGY

## 2023-01-26 PROCEDURE — 2580000003 HC RX 258: Performed by: ANESTHESIOLOGY

## 2023-01-26 PROCEDURE — 96375 TX/PRO/DX INJ NEW DRUG ADDON: CPT

## 2023-01-26 PROCEDURE — 88304 TISSUE EXAM BY PATHOLOGIST: CPT

## 2023-01-26 PROCEDURE — 96365 THER/PROPH/DIAG IV INF INIT: CPT

## 2023-01-26 PROCEDURE — 2500000003 HC RX 250 WO HCPCS: Performed by: SURGERY

## 2023-01-26 PROCEDURE — 3700000001 HC ADD 15 MINUTES (ANESTHESIA): Performed by: SURGERY

## 2023-01-26 PROCEDURE — 0DTJ4ZZ RESECTION OF APPENDIX, PERCUTANEOUS ENDOSCOPIC APPROACH: ICD-10-PCS | Performed by: SURGERY

## 2023-01-26 PROCEDURE — 36415 COLL VENOUS BLD VENIPUNCTURE: CPT

## 2023-01-26 PROCEDURE — 6360000002 HC RX W HCPCS

## 2023-01-26 PROCEDURE — 2580000003 HC RX 258: Performed by: EMERGENCY MEDICINE

## 2023-01-26 PROCEDURE — 74177 CT ABD & PELVIS W/CONTRAST: CPT

## 2023-01-26 PROCEDURE — 99223 1ST HOSP IP/OBS HIGH 75: CPT | Performed by: SURGERY

## 2023-01-26 PROCEDURE — 6360000004 HC RX CONTRAST MEDICATION: Performed by: EMERGENCY MEDICINE

## 2023-01-26 PROCEDURE — 2500000003 HC RX 250 WO HCPCS: Performed by: ANESTHESIOLOGY

## 2023-01-26 PROCEDURE — 51702 INSERT TEMP BLADDER CATH: CPT

## 2023-01-26 PROCEDURE — 99285 EMERGENCY DEPT VISIT HI MDM: CPT

## 2023-01-26 PROCEDURE — 1210000000 HC MED SURG R&B

## 2023-01-26 PROCEDURE — 87635 SARS-COV-2 COVID-19 AMP PRB: CPT

## 2023-01-26 PROCEDURE — 80053 COMPREHEN METABOLIC PANEL: CPT

## 2023-01-26 PROCEDURE — 2500000003 HC RX 250 WO HCPCS

## 2023-01-26 PROCEDURE — 44970 LAPAROSCOPY APPENDECTOMY: CPT | Performed by: SURGERY

## 2023-01-26 PROCEDURE — 83690 ASSAY OF LIPASE: CPT

## 2023-01-26 PROCEDURE — G0378 HOSPITAL OBSERVATION PER HR: HCPCS

## 2023-01-26 PROCEDURE — 2709999900 HC NON-CHARGEABLE SUPPLY: Performed by: SURGERY

## 2023-01-26 PROCEDURE — C9290 INJ, BUPIVACAINE LIPOSOME: HCPCS | Performed by: SURGERY

## 2023-01-26 PROCEDURE — 3600000004 HC SURGERY LEVEL 4 BASE: Performed by: SURGERY

## 2023-01-26 PROCEDURE — 2580000003 HC RX 258: Performed by: SURGERY

## 2023-01-26 PROCEDURE — 85025 COMPLETE CBC W/AUTO DIFF WBC: CPT

## 2023-01-26 PROCEDURE — 2720000010 HC SURG SUPPLY STERILE: Performed by: SURGERY

## 2023-01-26 PROCEDURE — 6360000002 HC RX W HCPCS: Performed by: EMERGENCY MEDICINE

## 2023-01-26 PROCEDURE — 7100000000 HC PACU RECOVERY - FIRST 15 MIN: Performed by: SURGERY

## 2023-01-26 PROCEDURE — 3600000014 HC SURGERY LEVEL 4 ADDTL 15MIN: Performed by: SURGERY

## 2023-01-26 RX ORDER — LIDOCAINE HYDROCHLORIDE 10 MG/ML
INJECTION, SOLUTION EPIDURAL; INFILTRATION; INTRACAUDAL; PERINEURAL PRN
Status: DISCONTINUED | OUTPATIENT
Start: 2023-01-26 | End: 2023-01-26 | Stop reason: SDUPTHER

## 2023-01-26 RX ORDER — ONDANSETRON 2 MG/ML
4 INJECTION INTRAMUSCULAR; INTRAVENOUS
Status: DISCONTINUED | OUTPATIENT
Start: 2023-01-26 | End: 2023-01-26 | Stop reason: HOSPADM

## 2023-01-26 RX ORDER — MORPHINE SULFATE 4 MG/ML
4 INJECTION, SOLUTION INTRAMUSCULAR; INTRAVENOUS ONCE
Status: COMPLETED | OUTPATIENT
Start: 2023-01-26 | End: 2023-01-26

## 2023-01-26 RX ORDER — ENOXAPARIN SODIUM 100 MG/ML
30 INJECTION SUBCUTANEOUS 2 TIMES DAILY
Status: DISCONTINUED | OUTPATIENT
Start: 2023-01-26 | End: 2023-01-30 | Stop reason: HOSPADM

## 2023-01-26 RX ORDER — MEPERIDINE HYDROCHLORIDE 25 MG/ML
12.5 INJECTION INTRAMUSCULAR; INTRAVENOUS; SUBCUTANEOUS EVERY 5 MIN PRN
Status: DISCONTINUED | OUTPATIENT
Start: 2023-01-26 | End: 2023-01-26 | Stop reason: HOSPADM

## 2023-01-26 RX ORDER — ACETAMINOPHEN 325 MG/1
650 TABLET ORAL EVERY 4 HOURS PRN
Status: DISCONTINUED | OUTPATIENT
Start: 2023-01-26 | End: 2023-01-30 | Stop reason: HOSPADM

## 2023-01-26 RX ORDER — SODIUM CHLORIDE 0.9 % (FLUSH) 0.9 %
5-40 SYRINGE (ML) INJECTION EVERY 12 HOURS SCHEDULED
Status: DISCONTINUED | OUTPATIENT
Start: 2023-01-26 | End: 2023-01-26 | Stop reason: HOSPADM

## 2023-01-26 RX ORDER — ROCURONIUM BROMIDE 10 MG/ML
INJECTION, SOLUTION INTRAVENOUS PRN
Status: DISCONTINUED | OUTPATIENT
Start: 2023-01-26 | End: 2023-01-26 | Stop reason: SDUPTHER

## 2023-01-26 RX ORDER — SODIUM CHLORIDE 0.9 % (FLUSH) 0.9 %
5-40 SYRINGE (ML) INJECTION PRN
Status: DISCONTINUED | OUTPATIENT
Start: 2023-01-26 | End: 2023-01-26 | Stop reason: HOSPADM

## 2023-01-26 RX ORDER — SODIUM CHLORIDE 0.9 % (FLUSH) 0.9 %
5-40 SYRINGE (ML) INJECTION EVERY 12 HOURS SCHEDULED
Status: DISCONTINUED | OUTPATIENT
Start: 2023-01-26 | End: 2023-01-26

## 2023-01-26 RX ORDER — ASPIRIN 81 MG/1
81 TABLET, CHEWABLE ORAL DAILY
Status: DISCONTINUED | OUTPATIENT
Start: 2023-01-26 | End: 2023-01-30 | Stop reason: HOSPADM

## 2023-01-26 RX ORDER — OXYCODONE HYDROCHLORIDE 5 MG/1
10 TABLET ORAL EVERY 4 HOURS PRN
Status: DISCONTINUED | OUTPATIENT
Start: 2023-01-26 | End: 2023-01-30 | Stop reason: HOSPADM

## 2023-01-26 RX ORDER — PROCHLORPERAZINE EDISYLATE 5 MG/ML
5 INJECTION INTRAMUSCULAR; INTRAVENOUS
Status: DISCONTINUED | OUTPATIENT
Start: 2023-01-26 | End: 2023-01-26 | Stop reason: HOSPADM

## 2023-01-26 RX ORDER — METOPROLOL TARTRATE 50 MG/1
50 TABLET, FILM COATED ORAL 2 TIMES DAILY
Status: DISCONTINUED | OUTPATIENT
Start: 2023-01-26 | End: 2023-01-30 | Stop reason: HOSPADM

## 2023-01-26 RX ORDER — SODIUM CHLORIDE 0.9 % (FLUSH) 0.9 %
5-40 SYRINGE (ML) INJECTION EVERY 12 HOURS SCHEDULED
Status: DISCONTINUED | OUTPATIENT
Start: 2023-01-26 | End: 2023-01-30 | Stop reason: HOSPADM

## 2023-01-26 RX ORDER — FENTANYL CITRATE 50 UG/ML
50 INJECTION, SOLUTION INTRAMUSCULAR; INTRAVENOUS EVERY 5 MIN PRN
Status: DISCONTINUED | OUTPATIENT
Start: 2023-01-26 | End: 2023-01-26 | Stop reason: HOSPADM

## 2023-01-26 RX ORDER — AMLODIPINE BESYLATE 2.5 MG/1
2.5 TABLET ORAL 2 TIMES DAILY
Status: DISCONTINUED | OUTPATIENT
Start: 2023-01-26 | End: 2023-01-30 | Stop reason: HOSPADM

## 2023-01-26 RX ORDER — BUPIVACAINE HYDROCHLORIDE 2.5 MG/ML
INJECTION, SOLUTION INFILTRATION; PERINEURAL PRN
Status: DISCONTINUED | OUTPATIENT
Start: 2023-01-26 | End: 2023-01-26 | Stop reason: HOSPADM

## 2023-01-26 RX ORDER — ONDANSETRON 4 MG/1
4 TABLET, ORALLY DISINTEGRATING ORAL EVERY 8 HOURS PRN
Status: DISCONTINUED | OUTPATIENT
Start: 2023-01-26 | End: 2023-01-26 | Stop reason: SDUPTHER

## 2023-01-26 RX ORDER — ONDANSETRON 2 MG/ML
4 INJECTION INTRAMUSCULAR; INTRAVENOUS EVERY 6 HOURS PRN
Status: DISCONTINUED | OUTPATIENT
Start: 2023-01-26 | End: 2023-01-30 | Stop reason: HOSPADM

## 2023-01-26 RX ORDER — HYDROMORPHONE HYDROCHLORIDE 2 MG/1
1 TABLET ORAL EVERY 4 HOURS PRN
Status: DISCONTINUED | OUTPATIENT
Start: 2023-01-26 | End: 2023-01-26

## 2023-01-26 RX ORDER — FENTANYL CITRATE 50 UG/ML
50 INJECTION, SOLUTION INTRAMUSCULAR; INTRAVENOUS
Status: DISCONTINUED | OUTPATIENT
Start: 2023-01-26 | End: 2023-01-26 | Stop reason: HOSPADM

## 2023-01-26 RX ORDER — FENTANYL CITRATE 50 UG/ML
25 INJECTION, SOLUTION INTRAMUSCULAR; INTRAVENOUS
Status: DISCONTINUED | OUTPATIENT
Start: 2023-01-26 | End: 2023-01-26 | Stop reason: HOSPADM

## 2023-01-26 RX ORDER — ONDANSETRON 2 MG/ML
INJECTION INTRAMUSCULAR; INTRAVENOUS PRN
Status: DISCONTINUED | OUTPATIENT
Start: 2023-01-26 | End: 2023-01-26 | Stop reason: SDUPTHER

## 2023-01-26 RX ORDER — HYDROMORPHONE HYDROCHLORIDE 1 MG/ML
0.5 INJECTION, SOLUTION INTRAMUSCULAR; INTRAVENOUS; SUBCUTANEOUS
Status: DISCONTINUED | OUTPATIENT
Start: 2023-01-26 | End: 2023-01-30 | Stop reason: HOSPADM

## 2023-01-26 RX ORDER — FENTANYL CITRATE 50 UG/ML
INJECTION, SOLUTION INTRAMUSCULAR; INTRAVENOUS PRN
Status: DISCONTINUED | OUTPATIENT
Start: 2023-01-26 | End: 2023-01-26 | Stop reason: SDUPTHER

## 2023-01-26 RX ORDER — SODIUM CHLORIDE 9 MG/ML
INJECTION, SOLUTION INTRAVENOUS PRN
Status: DISCONTINUED | OUTPATIENT
Start: 2023-01-26 | End: 2023-01-26 | Stop reason: HOSPADM

## 2023-01-26 RX ORDER — SODIUM CHLORIDE 0.9 % (FLUSH) 0.9 %
5-40 SYRINGE (ML) INJECTION PRN
Status: DISCONTINUED | OUTPATIENT
Start: 2023-01-26 | End: 2023-01-30 | Stop reason: HOSPADM

## 2023-01-26 RX ORDER — SUCCINYLCHOLINE CHLORIDE 20 MG/ML
INJECTION INTRAMUSCULAR; INTRAVENOUS PRN
Status: DISCONTINUED | OUTPATIENT
Start: 2023-01-26 | End: 2023-01-26 | Stop reason: SDUPTHER

## 2023-01-26 RX ORDER — FAMOTIDINE 20 MG/1
20 TABLET, FILM COATED ORAL 2 TIMES DAILY
Status: DISCONTINUED | OUTPATIENT
Start: 2023-01-26 | End: 2023-01-30 | Stop reason: HOSPADM

## 2023-01-26 RX ORDER — ONDANSETRON 2 MG/ML
4 INJECTION INTRAMUSCULAR; INTRAVENOUS ONCE
Status: COMPLETED | OUTPATIENT
Start: 2023-01-26 | End: 2023-01-26

## 2023-01-26 RX ORDER — ONDANSETRON 4 MG/1
4 TABLET, ORALLY DISINTEGRATING ORAL EVERY 8 HOURS PRN
Status: DISCONTINUED | OUTPATIENT
Start: 2023-01-26 | End: 2023-01-30 | Stop reason: HOSPADM

## 2023-01-26 RX ORDER — SODIUM CHLORIDE 9 MG/ML
INJECTION, SOLUTION INTRAVENOUS PRN
Status: DISCONTINUED | OUTPATIENT
Start: 2023-01-26 | End: 2023-01-26 | Stop reason: SDUPTHER

## 2023-01-26 RX ORDER — OXYCODONE HYDROCHLORIDE 5 MG/1
5 TABLET ORAL EVERY 4 HOURS PRN
Status: DISCONTINUED | OUTPATIENT
Start: 2023-01-26 | End: 2023-01-30 | Stop reason: HOSPADM

## 2023-01-26 RX ORDER — HYDROMORPHONE HYDROCHLORIDE 1 MG/ML
1 INJECTION, SOLUTION INTRAMUSCULAR; INTRAVENOUS; SUBCUTANEOUS EVERY 4 HOURS PRN
Status: DISCONTINUED | OUTPATIENT
Start: 2023-01-26 | End: 2023-01-30 | Stop reason: HOSPADM

## 2023-01-26 RX ORDER — METOPROLOL TARTRATE 5 MG/5ML
2.5 INJECTION INTRAVENOUS ONCE
Status: DISCONTINUED | OUTPATIENT
Start: 2023-01-26 | End: 2023-01-30 | Stop reason: HOSPADM

## 2023-01-26 RX ORDER — LISINOPRIL 20 MG/1
20 TABLET ORAL 2 TIMES DAILY
Status: DISCONTINUED | OUTPATIENT
Start: 2023-01-26 | End: 2023-01-30 | Stop reason: HOSPADM

## 2023-01-26 RX ORDER — PROPOFOL 10 MG/ML
INJECTION, EMULSION INTRAVENOUS PRN
Status: DISCONTINUED | OUTPATIENT
Start: 2023-01-26 | End: 2023-01-26 | Stop reason: SDUPTHER

## 2023-01-26 RX ORDER — DIPHENHYDRAMINE HYDROCHLORIDE 50 MG/ML
12.5 INJECTION INTRAMUSCULAR; INTRAVENOUS
Status: DISCONTINUED | OUTPATIENT
Start: 2023-01-26 | End: 2023-01-26 | Stop reason: HOSPADM

## 2023-01-26 RX ORDER — EPHEDRINE SULFATE 50 MG/ML
INJECTION, SOLUTION INTRAVENOUS PRN
Status: DISCONTINUED | OUTPATIENT
Start: 2023-01-26 | End: 2023-01-26 | Stop reason: SDUPTHER

## 2023-01-26 RX ORDER — SODIUM CHLORIDE, SODIUM LACTATE, POTASSIUM CHLORIDE, AND CALCIUM CHLORIDE .6; .31; .03; .02 G/100ML; G/100ML; G/100ML; G/100ML
1000 INJECTION, SOLUTION INTRAVENOUS ONCE
Status: COMPLETED | OUTPATIENT
Start: 2023-01-26 | End: 2023-01-26

## 2023-01-26 RX ORDER — ROSUVASTATIN CALCIUM 20 MG/1
20 TABLET, COATED ORAL DAILY
Status: DISCONTINUED | OUTPATIENT
Start: 2023-01-26 | End: 2023-01-30 | Stop reason: HOSPADM

## 2023-01-26 RX ORDER — SODIUM CHLORIDE, SODIUM LACTATE, POTASSIUM CHLORIDE, CALCIUM CHLORIDE 600; 310; 30; 20 MG/100ML; MG/100ML; MG/100ML; MG/100ML
INJECTION, SOLUTION INTRAVENOUS CONTINUOUS
Status: DISCONTINUED | OUTPATIENT
Start: 2023-01-26 | End: 2023-01-26 | Stop reason: HOSPADM

## 2023-01-26 RX ORDER — CLONIDINE HYDROCHLORIDE 0.1 MG/1
0.1 TABLET ORAL NIGHTLY
Status: DISCONTINUED | OUTPATIENT
Start: 2023-01-26 | End: 2023-01-30 | Stop reason: HOSPADM

## 2023-01-26 RX ORDER — FENTANYL CITRATE 50 UG/ML
25 INJECTION, SOLUTION INTRAMUSCULAR; INTRAVENOUS EVERY 5 MIN PRN
Status: DISCONTINUED | OUTPATIENT
Start: 2023-01-26 | End: 2023-01-26 | Stop reason: HOSPADM

## 2023-01-26 RX ORDER — ONDANSETRON 2 MG/ML
4 INJECTION INTRAMUSCULAR; INTRAVENOUS EVERY 6 HOURS PRN
Status: DISCONTINUED | OUTPATIENT
Start: 2023-01-26 | End: 2023-01-26 | Stop reason: SDUPTHER

## 2023-01-26 RX ORDER — HYDROMORPHONE HYDROCHLORIDE 2 MG/1
2 TABLET ORAL EVERY 4 HOURS PRN
Status: DISCONTINUED | OUTPATIENT
Start: 2023-01-26 | End: 2023-01-26

## 2023-01-26 RX ORDER — MIDAZOLAM HYDROCHLORIDE 2 MG/2ML
2 INJECTION, SOLUTION INTRAMUSCULAR; INTRAVENOUS
Status: DISCONTINUED | OUTPATIENT
Start: 2023-01-26 | End: 2023-01-26 | Stop reason: HOSPADM

## 2023-01-26 RX ORDER — DEXMEDETOMIDINE HYDROCHLORIDE 100 UG/ML
INJECTION, SOLUTION INTRAVENOUS PRN
Status: DISCONTINUED | OUTPATIENT
Start: 2023-01-26 | End: 2023-01-26 | Stop reason: SDUPTHER

## 2023-01-26 RX ORDER — LIDOCAINE HYDROCHLORIDE 10 MG/ML
1 INJECTION, SOLUTION EPIDURAL; INFILTRATION; INTRACAUDAL; PERINEURAL
Status: DISCONTINUED | OUTPATIENT
Start: 2023-01-26 | End: 2023-01-26 | Stop reason: HOSPADM

## 2023-01-26 RX ORDER — SODIUM CHLORIDE, SODIUM LACTATE, POTASSIUM CHLORIDE, CALCIUM CHLORIDE 600; 310; 30; 20 MG/100ML; MG/100ML; MG/100ML; MG/100ML
INJECTION, SOLUTION INTRAVENOUS CONTINUOUS
Status: DISCONTINUED | OUTPATIENT
Start: 2023-01-26 | End: 2023-01-30

## 2023-01-26 RX ORDER — SODIUM CHLORIDE 9 MG/ML
INJECTION, SOLUTION INTRAVENOUS PRN
Status: DISCONTINUED | OUTPATIENT
Start: 2023-01-26 | End: 2023-01-30 | Stop reason: HOSPADM

## 2023-01-26 RX ORDER — SODIUM CHLORIDE, SODIUM LACTATE, POTASSIUM CHLORIDE, CALCIUM CHLORIDE 600; 310; 30; 20 MG/100ML; MG/100ML; MG/100ML; MG/100ML
INJECTION, SOLUTION INTRAVENOUS CONTINUOUS
Status: DISCONTINUED | OUTPATIENT
Start: 2023-01-26 | End: 2023-01-26 | Stop reason: SDUPTHER

## 2023-01-26 RX ORDER — METOPROLOL TARTRATE 5 MG/5ML
2.5 INJECTION INTRAVENOUS ONCE
Status: COMPLETED | OUTPATIENT
Start: 2023-01-26 | End: 2023-01-26

## 2023-01-26 RX ORDER — NITROGLYCERIN 0.4 MG/1
0.4 TABLET SUBLINGUAL EVERY 5 MIN PRN
Status: DISCONTINUED | OUTPATIENT
Start: 2023-01-26 | End: 2023-01-30 | Stop reason: HOSPADM

## 2023-01-26 RX ORDER — ACETAMINOPHEN 500 MG
1000 TABLET ORAL EVERY 8 HOURS SCHEDULED
Status: DISCONTINUED | OUTPATIENT
Start: 2023-01-26 | End: 2023-01-30 | Stop reason: HOSPADM

## 2023-01-26 RX ADMIN — PHENYLEPHRINE HYDROCHLORIDE 100 MCG: 10 INJECTION INTRAVENOUS at 11:45

## 2023-01-26 RX ADMIN — LIDOCAINE HYDROCHLORIDE 50 MG: 10 INJECTION, SOLUTION EPIDURAL; INFILTRATION; INTRACAUDAL; PERINEURAL at 11:37

## 2023-01-26 RX ADMIN — PROPOFOL 200 MG: 10 INJECTION, EMULSION INTRAVENOUS at 11:37

## 2023-01-26 RX ADMIN — MORPHINE SULFATE 4 MG: 4 INJECTION, SOLUTION INTRAMUSCULAR; INTRAVENOUS at 04:10

## 2023-01-26 RX ADMIN — FENTANYL CITRATE 50 MCG: 50 INJECTION, SOLUTION INTRAMUSCULAR; INTRAVENOUS at 11:35

## 2023-01-26 RX ADMIN — LISINOPRIL 20 MG: 20 TABLET ORAL at 21:07

## 2023-01-26 RX ADMIN — FENTANYL CITRATE 50 MCG: 50 INJECTION, SOLUTION INTRAMUSCULAR; INTRAVENOUS at 11:59

## 2023-01-26 RX ADMIN — ROCURONIUM BROMIDE 70 MG: 10 INJECTION, SOLUTION INTRAVENOUS at 11:40

## 2023-01-26 RX ADMIN — ENOXAPARIN SODIUM 30 MG: 100 INJECTION SUBCUTANEOUS at 21:54

## 2023-01-26 RX ADMIN — ROCURONIUM BROMIDE 10 MG: 10 INJECTION, SOLUTION INTRAVENOUS at 12:09

## 2023-01-26 RX ADMIN — FAMOTIDINE 20 MG: 20 TABLET ORAL at 14:24

## 2023-01-26 RX ADMIN — FAMOTIDINE 20 MG: 20 TABLET ORAL at 21:07

## 2023-01-26 RX ADMIN — OXYCODONE HYDROCHLORIDE 10 MG: 5 TABLET ORAL at 21:06

## 2023-01-26 RX ADMIN — SODIUM CHLORIDE, PRESERVATIVE FREE 10 ML: 5 INJECTION INTRAVENOUS at 09:07

## 2023-01-26 RX ADMIN — ONDANSETRON 4 MG: 2 INJECTION INTRAMUSCULAR; INTRAVENOUS at 04:10

## 2023-01-26 RX ADMIN — METOPROLOL TARTRATE 50 MG: 50 TABLET, FILM COATED ORAL at 21:07

## 2023-01-26 RX ADMIN — IOPAMIDOL 90 ML: 755 INJECTION, SOLUTION INTRAVENOUS at 04:25

## 2023-01-26 RX ADMIN — ACETAMINOPHEN 1000 MG: 500 TABLET, FILM COATED ORAL at 21:07

## 2023-01-26 RX ADMIN — CLONIDINE HYDROCHLORIDE 0.1 MG: 0.1 TABLET ORAL at 21:07

## 2023-01-26 RX ADMIN — SODIUM CHLORIDE, PRESERVATIVE FREE 20 MG: 5 INJECTION INTRAVENOUS at 11:22

## 2023-01-26 RX ADMIN — PIPERACILLIN AND TAZOBACTAM 3375 MG: 3; .375 INJECTION, POWDER, LYOPHILIZED, FOR SOLUTION INTRAVENOUS at 21:10

## 2023-01-26 RX ADMIN — ONDANSETRON 4 MG: 2 INJECTION INTRAMUSCULAR; INTRAVENOUS at 12:07

## 2023-01-26 RX ADMIN — DEXMEDETOMIDINE HYDROCHLORIDE 10 MCG: 100 INJECTION, SOLUTION, CONCENTRATE INTRAVENOUS at 12:35

## 2023-01-26 RX ADMIN — SUGAMMADEX 300 MG: 100 INJECTION, SOLUTION INTRAVENOUS at 12:44

## 2023-01-26 RX ADMIN — HYDROMORPHONE HYDROCHLORIDE 0.5 MG: 1 INJECTION, SOLUTION INTRAMUSCULAR; INTRAVENOUS; SUBCUTANEOUS at 12:05

## 2023-01-26 RX ADMIN — PIPERACILLIN AND TAZOBACTAM 3375 MG: 3; .375 INJECTION, POWDER, FOR SOLUTION INTRAVENOUS at 04:45

## 2023-01-26 RX ADMIN — HYDROMORPHONE HYDROCHLORIDE 0.5 MG: 1 INJECTION, SOLUTION INTRAMUSCULAR; INTRAVENOUS; SUBCUTANEOUS at 12:50

## 2023-01-26 RX ADMIN — ACETAMINOPHEN 1000 MG: 500 TABLET, FILM COATED ORAL at 13:49

## 2023-01-26 RX ADMIN — SODIUM CHLORIDE, SODIUM LACTATE, POTASSIUM CHLORIDE, AND CALCIUM CHLORIDE: 600; 310; 30; 20 INJECTION, SOLUTION INTRAVENOUS at 06:02

## 2023-01-26 RX ADMIN — EPHEDRINE SULFATE 20 MG: 50 INJECTION INTRAMUSCULAR; INTRAVENOUS; SUBCUTANEOUS at 11:55

## 2023-01-26 RX ADMIN — PHENYLEPHRINE HYDROCHLORIDE 100 MCG: 10 INJECTION INTRAVENOUS at 11:47

## 2023-01-26 RX ADMIN — AMLODIPINE BESYLATE 2.5 MG: 2.5 TABLET ORAL at 21:07

## 2023-01-26 RX ADMIN — DEXMEDETOMIDINE HYDROCHLORIDE 20 MCG: 100 INJECTION, SOLUTION, CONCENTRATE INTRAVENOUS at 11:28

## 2023-01-26 RX ADMIN — METOPROLOL TARTRATE 2.5 MG: 5 INJECTION, SOLUTION INTRAVENOUS at 11:22

## 2023-01-26 RX ADMIN — SUCCINYLCHOLINE CHLORIDE 200 MG: 20 INJECTION, SOLUTION INTRAMUSCULAR; INTRAVENOUS at 11:37

## 2023-01-26 RX ADMIN — SODIUM CHLORIDE, POTASSIUM CHLORIDE, SODIUM LACTATE AND CALCIUM CHLORIDE 1000 ML: 600; 310; 30; 20 INJECTION, SOLUTION INTRAVENOUS at 04:16

## 2023-01-26 RX ADMIN — HYDROMORPHONE HYDROCHLORIDE 0.5 MG: 1 INJECTION, SOLUTION INTRAMUSCULAR; INTRAVENOUS; SUBCUTANEOUS at 09:05

## 2023-01-26 RX ADMIN — SODIUM CHLORIDE, SODIUM LACTATE, POTASSIUM CHLORIDE, AND CALCIUM CHLORIDE: 600; 310; 30; 20 INJECTION, SOLUTION INTRAVENOUS at 12:45

## 2023-01-26 RX ADMIN — SODIUM CHLORIDE, SODIUM LACTATE, POTASSIUM CHLORIDE, AND CALCIUM CHLORIDE: 600; 310; 30; 20 INJECTION, SOLUTION INTRAVENOUS at 11:00

## 2023-01-26 ASSESSMENT — ENCOUNTER SYMPTOMS
ABDOMINAL DISTENTION: 0
SHORTNESS OF BREATH: 0
COLOR CHANGE: 0
SHORTNESS OF BREATH: 0
ABDOMINAL PAIN: 1
COUGH: 0
DIARRHEA: 1
EYE REDNESS: 0
CHEST TIGHTNESS: 0
BACK PAIN: 0
CONSTIPATION: 0
DIARRHEA: 0
NAUSEA: 1
NAUSEA: 0
VOMITING: 0
RHINORRHEA: 0
SORE THROAT: 0
EYE PAIN: 0

## 2023-01-26 ASSESSMENT — PAIN - FUNCTIONAL ASSESSMENT
PAIN_FUNCTIONAL_ASSESSMENT: ACTIVITIES ARE NOT PREVENTED
PAIN_FUNCTIONAL_ASSESSMENT: 0-10

## 2023-01-26 ASSESSMENT — PAIN SCALES - GENERAL
PAINLEVEL_OUTOF10: 8
PAINLEVEL_OUTOF10: 2
PAINLEVEL_OUTOF10: 6
PAINLEVEL_OUTOF10: 1

## 2023-01-26 ASSESSMENT — PAIN DESCRIPTION - ORIENTATION
ORIENTATION: RIGHT;LOWER
ORIENTATION: RIGHT;LOWER

## 2023-01-26 ASSESSMENT — LIFESTYLE VARIABLES: SMOKING_STATUS: 1

## 2023-01-26 ASSESSMENT — PAIN DESCRIPTION - LOCATION
LOCATION: ABDOMEN

## 2023-01-26 ASSESSMENT — PAIN DESCRIPTION - DESCRIPTORS: DESCRIPTORS: SHOOTING

## 2023-01-26 ASSESSMENT — PAIN SCALES - WONG BAKER: WONGBAKER_NUMERICALRESPONSE: 0

## 2023-01-26 NOTE — ED PROVIDER NOTES
[facility]  eMERGENCY dEPARTMENT eNCOUnter      Pt Name: Kamila Castaneda  MRN: [id number]  [place] 1960  Date of evaluation: 1/26/2023  Provider: Arlene Kerr MD    [address] [place] [address]       Chief Complaint   Patient presents with    Abdominal Pain     Pt has RLQ pain pt stated \"I think my appendix are about to bust\"          HISTORY OF PRESENT ILLNESS   (Location/Symptom, Timing/Onset,Context/Setting, Quality, Duration, Modifying Factors, Severity)  Note limiting factors. Kamila Castaneda is a 57 y.o. male who presents to the emergency department for a 2-day history of worsening right lower quadrant abdominal pain. Pain is constant and states that it is even painful for him to try to stand up straight or when he moves. Admits decreased appetite no vomiting has had 2 episodes of watery diarrhea. Fever up to 103 had Tylenol several hours ago and afebrile here. Tells me he has had kidney stones in the past but this feels different. Denies any prior abdominal surgeries. He is concerned about his appendix. No UTI symptoms. HPI    NursingNotes were reviewed. REVIEW OF SYSTEMS    (2-9 systems for level 4, 10 or more for level 5)     Review of Systems   Constitutional:  Positive for activity change, appetite change and fever. Negative for chills. HENT:  Negative for rhinorrhea and sore throat. Respiratory:  Negative for cough and shortness of breath. Cardiovascular:  Negative for chest pain. Gastrointestinal:  Positive for abdominal pain and diarrhea. Negative for nausea and vomiting. Genitourinary:  Negative for dysuria and frequency. Musculoskeletal:  Negative for back pain and neck pain. Neurological:  Negative for dizziness and headaches. All other systems reviewed and are negative.          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 See scanned document. LITHOTRIPSY           CURRENT MEDICATIONS     Previous Medications    AMLODIPINE (NORVASC) 2.5 MG TABLET    Take 2.5 mg by mouth 2 times daily    ASPIRIN 81 MG TABLET    Take 81 mg by mouth daily    CLONIDINE (CATAPRES) 0.1 MG TABLET    Take 1 tablet by mouth nightly    LISINOPRIL (PRINIVIL;ZESTRIL) 20 MG TABLET    Take 1 tablet by mouth 2 times daily    METOPROLOL TARTRATE (LOPRESSOR) 50 MG TABLET    Take 50 mg by mouth 2 times daily    MULTIPLE VITAMIN (MULTI-VITAMIN) TABS    Take  by mouth daily. NITROGLYCERIN (NITROSTAT) 0.4 MG SL TABLET    Place 1 tablet under the tongue every 5 minutes as needed. ROSUVASTATIN (CRESTOR) 20 MG TABLET    Take 20 mg by mouth daily. ALLERGIES     Patient has no known allergies.     FAMILY HISTORY       Family History   Problem Relation Age of Onset    Coronary Art Dis Mother     Hypertension Mother     Coronary Art Dis Father     Hypertension Father     Hypertension Maternal Aunt     Diabetes Maternal Aunt     Hypertension Maternal Uncle     Diabetes Maternal Uncle           SOCIAL HISTORY       Social History     Socioeconomic History    Marital status:      Spouse name: None    Number of children: None    Years of education: None    Highest education level: None   Tobacco Use    Smoking status: Every Day     Packs/day: 1.50 Years: 35.00     Pack years: 52.50     Types: Cigarettes    Smokeless tobacco: Never   Vaping Use    Vaping Use: Never used   Substance and Sexual Activity    Alcohol use: Yes     Alcohol/week: 0.0 standard drinks     Comment: SOCIALLY    Drug use: No       SCREENINGS    Quang Coma Scale  Eye Opening: Spontaneous  Best Verbal Response: Oriented  Best Motor Response: Obeys commands  Pocahontas Coma Scale Score: 15        PHYSICAL EXAM    (up to 7 for level 4, 8 or more for level 5)     ED Triage Vitals [01/26/23 0353]   BP Temp Temp Source Heart Rate Resp SpO2 Height Weight   (!) 154/86 98.4 °F (36.9 °C) Oral 93 20 90 % 6' 4\" (1.93 m) 255 lb (115.7 kg)       Physical Exam  Vitals and nursing note reviewed. Constitutional:       General: He is not in acute distress. Appearance: He is well-developed. He is not toxic-appearing. HENT:      Head: Normocephalic and atraumatic. Eyes:      Conjunctiva/sclera: Conjunctivae normal.   Neck:      Trachea: No tracheal deviation. Cardiovascular:      Rate and Rhythm: Normal rate and regular rhythm. Heart sounds: Normal heart sounds. No murmur heard. Pulmonary:      Breath sounds: Normal breath sounds. No wheezing or rales. Abdominal:      Palpations: Abdomen is soft. Tenderness: There is abdominal tenderness in the right lower quadrant. There is no right CVA tenderness, left CVA tenderness or guarding. Musculoskeletal:         General: Normal range of motion. Cervical back: Normal range of motion and neck supple. Skin:     General: Skin is warm and dry. Neurological:      Mental Status: He is alert and oriented to person, place, and time.        DIAGNOSTIC RESULTS         RADIOLOGY:  Non-plain film images such as CT, Ultrasound and MRI are read by the radiologist. Plain radiographic images are visualized and preliminarily interpreted bythe emergency physician with the below findings:      CT ABDOMEN PELVIS W IV CONTRAST Additional Contrast? None   Final Result   1. Dilated appendix, measuring 1.4 cm in diameter, with periappendiceal fat stranding/fluid suggesting acute appendicitis. A 1.2 cm appendicolith is seen in the proximal appendix. Peritonitis is noted. No fluid collection. No bowel    obstruction. No free air. Surgical consult recommended. 2. A 4 mm nodule is seen in the left lower lobe (series 2, image 5). Fleischner Society Guidelines for low-risk patients, no follow-up is necessary. For high-risk patients (smoking history or    other known risk factors) an optional chest CT at 12 months could be performed. 3. Colonic diverticulosis. Communications:01/26/23 04:46 Call Doctor Regarding Appendicitis, called  Dr. Carmencita Soni on 01/26 04:46 (-06:00)Electronically signed by Abisai Nicholas MD on 01-26-23 at 9244              LABS:  Labs Reviewed   CBC WITH AUTO DIFFERENTIAL - Abnormal; Notable for the following components:       Result Value    WBC 15.6 (*)     MCH 31.4 (*)     Neutrophils % 83.2 (*)     Lymphocytes % 9.1 (*)     Neutrophils Absolute 13.0 (*)     Monocytes Absolute 1.10 (*)     All other components within normal limits   COMPREHENSIVE METABOLIC PANEL - Abnormal; Notable for the following components:    Sodium 133 (*)     Glucose 124 (*)     Creatinine 1.5 (*)     Est, Glom Filt Rate 51 (*)     Total Bilirubin 2.2 (*)     All other components within normal limits   COVID-19, RAPID   LIPASE   URINALYSIS WITH REFLEX TO CULTURE       All other labs were within normal range or not returned as of this dictation.     EMERGENCY DEPARTMENT COURSE and DIFFERENTIAL DIAGNOSIS/MDM:   Vitals:    Vitals:    01/26/23 0353   BP: (!) 154/86   Pulse: 93   Resp: 20   Temp: 98.4 °F (36.9 °C)   TempSrc: Oral   SpO2: 90%   Weight: 255 lb (115.7 kg)   Height: 6' 4\" (1.93 m)       MDM     Amount and/or Complexity of Data Reviewed  Clinical lab tests: ordered and reviewed  Tests in the radiology section of CPT®: ordered and reviewed  Discuss the patient with other providers: yes  Independent visualization of images, tracings, or specimens: yes      2 day hx of worsening rlq abd pain found to have uncomplicated appendicitis as anticipated as presented quite classically. Pt started on zosyn. Have d/w Dr. Cecilia Tadeo for admission. Did notify pt of incidental pulmonary nodule as well and need of follow up and monitoring. CONSULTS:  None    PROCEDURES:  Unless otherwise noted below, none     Procedures    FINAL IMPRESSION      1. Acute appendicitis with localized peritonitis, without perforation or abscess, unspecified whether gangrene present    2. Lung nodule seen on imaging study          DISPOSITION/PLAN   DISPOSITION Decision To Admit 01/26/2023 04:51:18 AM      PATIENT REFERRED TO:  No follow-up provider specified.     DISCHARGE MEDICATIONS:  New Prescriptions    No medications on file          (Please note that portions of this note were completed with a voice recognition program.  Efforts were made to edit thedictations but occasionally words are mis-transcribed.)    Jeremiah Murphy MD (electronically signed)  Attending Emergency Physician        Carlos Simeon MD  01/26/23 8066

## 2023-01-26 NOTE — ANESTHESIA PRE PROCEDURE
Department of Anesthesiology  Preprocedure Note       Name:  Loreta Greenfield   Age:  57 y.o.  :  1960                                          MRN:  [id number]         Date:  2023      Surgeon: Johny Orellana):  Abebe Scott DO    Procedure: Procedure(s):  LAPAROSCOPIC APPENDECTOMY    Medications prior to admission:   Prior to Admission medications    Medication Sig Start Date End Date Taking? Authorizing Provider   amLODIPine (NORVASC) 2.5 MG tablet Take 2.5 mg by mouth 2 times daily    Historical Provider, MD   aspirin 81 MG tablet Take 81 mg by mouth daily    Historical Provider, MD   metoprolol tartrate (LOPRESSOR) 50 MG tablet Take 50 mg by mouth 2 times daily 19   Historical Provider, MD   lisinopril (PRINIVIL;ZESTRIL) 20 MG tablet Take 1 tablet by mouth 2 times daily 17   ABNER Cosby   cloNIDine (CATAPRES) 0.1 MG tablet Take 1 tablet by mouth nightly 16   ABNER Cosby   rosuvastatin (CRESTOR) 20 MG tablet Take 20 mg by mouth daily. Elian Link MD   nitroGLYCERIN (NITROSTAT) 0.4 MG SL tablet Place 1 tablet under the tongue every 5 minutes as needed. 12   ABNER Hernandez   Multiple Vitamin (MULTI-VITAMIN) TABS Take  by mouth daily.       Historical Provider, MD       Current medications:    Current Facility-Administered Medications   Medication Dose Route Frequency Provider Last Rate Last Admin    [facility] Hold] sodium chloride flush 0.9 % injection 5-40 mL  5-40 mL IntraVENous 2 times per day Candy Cobos DO   10 mL at 23 0907    [MAR Hold] sodium chloride flush 0.9 % injection 5-40 mL  5-40 mL IntraVENous PRN Candy Cobos DO        [MAR Hold] 0.9 % sodium chloride infusion   IntraVENous PRN Candy Cobos DO        [MAR Hold] enoxaparin Sodium (LOVENOX) injection 30 mg  30 mg SubCUTAneous BID Candy Cobos DO        [MAR Hold] acetaminophen (TYLENOL) tablet 650 mg  650 mg Oral B7S PRN Candy Cobos DO        [MAR Hold] ondansetron (ZOFRAN-ODT) disintegrating tablet 4 mg  4 mg Oral H1R PRN Candy Cobos DO        Or    [MAR Hold] ondansetron (ZOFRAN) injection 4 mg  4 mg IntraVENous D6I PRN Candy Cobos DO        [MAR Hold] lactated ringers IV soln infusion   IntraVENous Continuous Candy Cobos DO 75 mL/hr at 01/26/23 0602 New Bag at 01/26/23 0602    [MAR Hold] HYDROmorphone HCl PF (DILAUDID) injection 0.5 mg  0.5 mg IntraVENous G3A PRN Candy Cobos DO   0.5 mg at 01/26/23 0905    [MAR Hold] amLODIPine (NORVASC) tablet 2.5 mg  2.5 mg Oral BID Candy Cobos DO        [MAR Hold] aspirin chewable tablet 81 mg  81 mg Oral Daily Candy Cobos DO        [MAR Hold] cloNIDine (CATAPRES) tablet 0.1 mg  0.1 mg Oral Nightly Candy Cobos DO        [MAR Hold] lisinopril (PRINIVIL;ZESTRIL) tablet 20 mg  20 mg Oral BID Candy Cobos DO        [MAR Hold] metoprolol tartrate (LOPRESSOR) tablet 50 mg  50 mg Oral BID Candy Cobos DO        [MAR Hold] nitroGLYCERIN (NITROSTAT) SL tablet 0.4 mg  0.4 mg SubLINGual Q5 Min PRN Candy Cobos DO        [MAR Hold] rosuvastatin (CRESTOR) tablet 20 mg  20 mg Oral Daily Candy Cobos DO        [MAR Hold] piperacillin-tazobactam (ZOSYN) 3,375 mg in sodium chloride 0.9 % 50 mL IVPB ([id number])  3,375 mg IntraVENous K4B Candy Cobos DO        lactated ringers IV soln infusion   IntraVENous Continuous Edythe Haagensen,  mL/hr at 01/26/23 1100 New Bag at 01/26/23 1100       Allergies:  No Known Allergies    Problem List:    Patient Active Problem List   Diagnosis Code    CAD (coronary artery disease) I25.10    Hypertension I10    Tobacco abuse Z72.0    Presence of stent in right coronary artery Z95.5    Nicotine abuse Z72.0    Old inferior wall myocardial infarction I25.2    Acute appendicitis K35.80       Past Medical History:        Diagnosis Date    Acute MI ([facility] [address]     inferior wall, interrupted by direct heart cath and angioplasty to the RCA 5/3/09, successful with normal LV function  Arthritis     CAD (coronary artery disease) 11/8/2011    Hyperlipidemia     PCP manages cholesterol    Hypertension     Systemic    Hypertension 11/8/2011    Kidney stone     Nephrolithiasis     history of    Nicotine abuse 5/19/2014    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 See scanned document.  LITHOTRIPSY         Social History:    Social History     Tobacco Use    Smoking status: Every Day     Packs/day: 1.50     Years: 35.00     Pack years: 52.50     Types: Cigarettes    Smokeless tobacco: Never   Substance Use Topics    Alcohol use: Yes     Alcohol/week: 0.0 standard drinks     Comment: SOCIALLY                                Ready to quit: Not Answered  Counseling given: Not Answered      Vital Signs (Current):   Vitals:    01/26/23 0406 01/26/23 0600 01/26/23 0816 01/26/23 0905   BP: (!) 156/70 135/75 (!) 140/87    Pulse:  93 89    Resp:  20 18 18   Temp:  99.4 °F (37.4 °C) 98.2 °F (36.8 °C)    TempSrc:  Temporal Temporal    SpO2: 90% 92% 94%    Weight:  256 lb 3.2 oz (116.2 kg)     Height:  6' 4\" (1.93 m)                                                BP Readings from Last 3 Encounters:   01/26/23 (!) 140/87   10/29/19 138/88   06/13/19 130/84       NPO Status: Time of last liquid consumption: 2350                        Time of last solid consumption: 2350                        Date of last liquid consumption: 01/25/23                        Date of last solid food consumption: 01/25/23    BMI:   Wt Readings from Last 3 Encounters:   01/26/23 256 lb 3.2 oz (116.2 kg)   05/01/20 258 lb (117 kg)   10/29/19 254 lb (115.2 kg)     Body mass index is 31.19 kg/m².     CBC:   Lab Results   Component Value Date/Time    WBC 15.6 01/26/2023 03:54 AM    RBC 5.42 01/26/2023 03:54 AM    HGB 17.0 01/26/2023 03:54 AM    HCT 49.9 01/26/2023 03:54 AM    HCT 47.1 01/30/2011 09:55 AM    MCV 92.1 01/26/2023 03:54 AM    RDW 13.2 01/26/2023 03:54 AM     01/26/2023 03:54 AM     01/30/2011 09:55 AM       CMP:   Lab Results   Component Value Date/Time     01/26/2023 03:54 AM     01/30/2011 09:55 AM    K 4.6 01/26/2023 03:54 AM    K 4.6 01/30/2011 09:55 AM    CL 98 01/26/2023 03:54 AM     01/30/2011 09:55 AM    CO2 23 01/26/2023 03:54 AM    BUN 19 01/26/2023 03:54 AM    CREATININE 1.5 01/26/2023 03:54 AM    CREATININE 1.1 01/30/2011 09:55 AM    LABGLOM 51 01/26/2023 03:54 AM    GLUCOSE 124 01/26/2023 03:54 AM    PROT 7.4 01/26/2023 03:54 AM    PROT 6.8 03/01/2011 10:13 AM    CALCIUM 9.3 01/26/2023 03:54 AM    BILITOT 2.2 01/26/2023 03:54 AM    ALKPHOS 107 01/26/2023 03:54 AM    ALKPHOS 89 03/01/2011 10:13 AM    AST 21 01/26/2023 03:54 AM    ALT 28 01/26/2023 03:54 AM       POC Tests: No results for input(s): POCGLU, POCNA, POCK, POCCL, POCBUN, POCHEMO, POCHCT in the last 72 hours.     Coags:   Lab Results   Component Value Date/Time    PROTIME 10.56 01/30/2011 09:55 AM    INR 0.98 01/30/2011 09:55 AM       HCG (If Applicable): No results found for: PREGTESTUR, PREGSERUM, HCG, HCGQUANT     ABGs: No results found for: PHART, PO2ART, UAG4ZKK, ZOS1QJH, BEART, A1WSTVOX     Type & Screen (If Applicable):  No results found for: LABABO, LABRH    Drug/Infectious Status (If Applicable):  No results found for: HIV, HEPCAB    COVID-19 Screening (If Applicable):   Lab Results   Component Value Date/Time    COVID19 Not Detected 01/26/2023 04:16 AM           Anesthesia Evaluation  Patient summary reviewed and Nursing notes reviewed no history of anesthetic complications:   Airway: Mallampati: II  TM distance: >3 FB   Neck ROM: full  Mouth opening: > = 3 FB   Dental: normal exam Pulmonary:   (+) current smoker    (-) shortness of breath          Patient did not smoke on day of surgery. Cardiovascular:  Exercise tolerance: good (>4 METS),   (+) hypertension:, past MI:, CAD:, CABG/stent (cabg x 2 in 2019, stenting RCA):,     (-) pacemaker, valvular problems/murmurs and dysrhythmias       Beta Blocker:  Dose within 24 Hrs         Neuro/Psych:      (-) seizures and CVA           GI/Hepatic/Renal:   (+) renal disease: kidney stones,      (-) GERD and liver disease       Endo/Other:    (+) no malignancy/cancer. (-) diabetes mellitus, blood dyscrasia, no malignancy/cancer               Abdominal:   (+) obese,           Vascular:     - DVT and PE. Other Findings:           Anesthesia Plan      general     ASA 3     (Pepcid in preop.)  Induction: intravenous. MIPS: Postoperative opioids intended and Prophylactic antiemetics administered. Anesthetic plan and risks discussed with patient.                         Josey Clancy DO   1/26/2023

## 2023-01-26 NOTE — OP NOTE
Operative Note      Patient: Heide Michael  YOB: 1960  MRN: [id number]    Date of Procedure: 1/26/2023    Pre-Op Diagnosis: Acute appendicitis [K35.80]    Post-Op Diagnosis:  Acute perforated appendicitis with gangrene, without abscess       Procedure(s):  LAPAROSCOPIC APPENDECTOMY    Surgeon(s):  Yudi Lock DO    Assistant:   * No surgical staff found *    Anesthesia: General    Estimated Blood Loss (mL): less than 50     Complications: None    Specimens:   ID Type Source Tests Collected by Time Destination   A : APPENDIX Tissue Appendix 189 Vishal Lazar DO 4/67/4520 1159        Implants:  * No implants in log *      Drains:   Closed/Suction Drain Left; Anterior Abdomen Bulb (Active)   Site Description Clean, dry & intact 01/26/23 1303   Dressing Status Clean, dry & intact 01/26/23 1303   Drainage Appearance Bloody 01/26/23 1303   Drain Status To bulb suction 01/26/23 1303   Output (ml) 50 ml 01/26/23 1303       Urinary Catheter 01/26/23 (Active)       Findings: inflamed perforated appendix with areas of gangrene; no abscess. Drain placed. Detailed Description of Procedure:   Mr. Jose Anglin was taken to the main operating room and placed in the supine position. After general anesthesia was administered, the abdomen was prepped and draped in the usual, sterile fashion. A thompson catheter was placed. A timeout was performed identifying the correct patient, procedure, preoperative antibiotics, and necessary available equipment. A small incision was made in the superior aspect of the umbilicus after instilling local anesthetic. Via Izabel technique the abdomen was entered and insufflated via 12 mm Trocar. A 5mm laparoscope was inserted and inspection undertaken. No injury from insertion was appreciated. In the right lower quadrant no purulence was seen and the appendix was not initially visible.     Two, 5mm trocars were then inserted under direct visualization after local anesthetic was injected, in the right upper abdomen and in the left lower abdomen. The patient was then rotated to the left and placed in reverse Trendelenburg position and the appendix was not initially visualized. There were adhesions and purulent discharge in the right lower abdomen. Blunt and hydro-dissection were used to free the adhesions and the area of the appendix was found. It was very difficult to free the appendix from lateral attachments. The inflammatory changes in the area made it difficult to visualize. An additional 5 mm trocar was placed in the pelvis under direct visualization and moving the camera to the left lower abdomen allowed for a better view. The appendix tore during dissection, and a small appendicolith was released into the abdomen. This was grasped with a size 10 stone-grabber and removed. The distal portion of the appendix was then freed from the mesoappendix and the mesoappendix was divided with the ligasure device. Further dissection was performed to toward the base of the appendix. It was densely adherent to the cecum. Numerous attempts were made to free it, however the area was very inflamed. An area just distal to the base was circumferentially cleared and divided here with the endo-LEIDY blue load stapler. Hemostasis was obtained. The appendix  portion and the smaller proximal piece were then placed in a endoscopic retrieval bag and removed from the umbilical incision. The umbilical fascia was closed with 0-Vicryl suture. The abdomen was again visualized with 5mm scope, and hemostasis remained at the staple lines. A 15 [employer] was then placed via the left lower abdominal incision into the right lower abdomen. It was sutured in place with a 00 Nylon suture. The abdomen was desuflatted and all skin incisions were closed with 0000-Monocryl. Sponge, needle, and instrument count correct on 2 occasions.      Estimated intraoperative blood loss: Minimal.    Mr. Jose Anglin tolerated his surgery well, and he was taken to  PACU in satisfactory condition. ________________________________  Prerna Cobos DO      Electronically signed by Kodi Rosado DO on 8/75/4851 at 1:18 PM

## 2023-01-26 NOTE — H&P
[facility] Surgery History & Physical    Chief Complaint:  Chief Complaint   Patient presents with    Abdominal Pain     Pt has RLQ pain pt stated \"I think my appendix are about to bust\"        SUBJECTIVE:  Mr. Kunal Shen is a 57 y.o. male who presents today with 2 days of right lower abdominal pain. The pain is sharp and localized to the right lower abdomen. He had fever to 102. He does not have much of an appetite. He has never had this pain in the past.       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 See scanned document.     LITHOTRIPSY       Current Facility-Administered Medications   Medication Dose Route Frequency Provider Last Rate Last Admin    sodium chloride flush 0.9 % injection 5-40 mL  5-40 mL IntraVENous 2 times per day Candy Cobos DO   10 mL at 01/26/23 0907    sodium chloride flush 0.9 % injection 5-40 mL  5-40 mL IntraVENous PRN Candy Cobos DO        0.9 % sodium chloride infusion   IntraVENous PRN Candy Cobos DO        enoxaparin Sodium (LOVENOX) injection 30 mg  30 mg SubCUTAneous BID Candy Cobos DO        acetaminophen (TYLENOL) tablet 650 mg  650 mg Oral N6M PRN Candy Cobos DO        ondansetron (ZOFRAN-ODT) disintegrating tablet 4 mg  4 mg Oral T2W PRN Candy CASSIDY Chandrika, DO        Or    ondansetron (ZOFRAN) injection 4 mg  4 mg IntraVENous E9Z PRN Candy Cobos, DO        lactated ringers IV soln infusion   IntraVENous Continuous Candy Cobos DO 75 mL/hr at 01/26/23 0602 New Bag at 01/26/23 0602    HYDROmorphone HCl PF (DILAUDID) injection 0.5 mg  0.5 mg IntraVENous D6D PRN Candy Cobos, DO   0.5 mg at 01/26/23 0112     Allergies: Patient has no known allergies. Family History   Problem Relation Age of Onset    Coronary Art Dis Mother     Hypertension Mother     Coronary Art Dis Father     Hypertension Father     Hypertension Maternal Aunt     Diabetes Maternal Aunt     Hypertension Maternal Uncle     Diabetes Maternal Uncle        Social History     Tobacco Use    Smoking status: Every Day     Packs/day: 1.50     Years: 35.00     Pack years: 52.50     Types: Cigarettes    Smokeless tobacco: Never   Substance Use Topics    Alcohol use: Yes     Alcohol/week: 0.0 standard drinks     Comment: SOCIALLY       Review of Systems   Constitutional:  Positive for activity change, appetite change and fever. Negative for fatigue and unexpected weight change. HENT:  Negative for hearing loss, nosebleeds and sore throat. Eyes:  Negative for pain, redness and visual disturbance. Respiratory:  Negative for cough, chest tightness and shortness of breath. Cardiovascular:  Negative for chest pain, palpitations and leg swelling. Gastrointestinal:  Positive for abdominal pain and nausea. Negative for abdominal distention, constipation, diarrhea and vomiting. Endocrine: Negative for cold intolerance, heat intolerance and polydipsia. Genitourinary:  Negative for difficulty urinating, frequency and urgency. Musculoskeletal:  Negative for back pain, joint swelling and neck pain. Skin:  Negative for color change, rash and wound. Allergic/Immunologic: Negative for environmental allergies and food allergies.    Neurological:  Negative for seizures, light-headedness and headaches. Hematological:  Negative for adenopathy. Does not bruise/bleed easily. Psychiatric/Behavioral:  Negative for confusion, sleep disturbance and suicidal ideas. OBJECTIVE:  BP (!) 140/87   Pulse 89   Temp 98.2 °F (36.8 °C) (Temporal)   Resp 18   Ht 6' 4\" (1.93 m)   Wt 256 lb 3.2 oz (116.2 kg)   SpO2 94%   BMI 31.19 kg/m²   CONSTITUTIONAL: Alert, appropriate, no acute distress  SKIN: warm, dry with no rashes or lesions  HEENT: NCAT, Non icteric, PERR. Trachea Midline. CHEST/LUNGS: CTA bilaterally. Normal respiratory effort. CARDIOVASCULAR: RRR, No edema. ABDOMEN: soft, ND, TTP in RLQ, +BS  NEUROLOGIC: CN II-XI grossly intact, no motor or sensory deficits   MUSCULOSKELETAL: No clubbing or cyanosis. PSYCHIATRIC:  Normal Mood and Affect. Alert and Oriented. LABS:  CBC:   Recent Labs     01/26/23  0354   WBC 15.6*   HGB 17.0        BMP:    Recent Labs     01/26/23  0354   *   K 4.6   CL 98   CO2 23   BUN 19   CREATININE 1.5*   GLUCOSE 124*     CT abd/pelvis 1/26/2023  Impression:     1. Dilated appendix, measuring 1.4 cm in diameter, with periappendiceal fat stranding/fluid suggesting acute appendicitis. A 1.2 cm appendicolith is seen in the proximal appendix. Peritonitis is noted. No fluid collection. No bowel   obstruction. No free air. Surgical consult recommended. 2. A 4 mm nodule is seen in the left lower lobe (series 2, image 5). Fleischner Society Guidelines for low-risk patients, no follow-up is necessary. For high-risk patients (smoking history or   other known risk factors) an optional chest CT at 12 months could be performed. 3. Colonic diverticulosis. Communications:01/26/23 04:46 Call Doctor Regarding Appendicitis, called  Dr. Justina Mcnair on 01/26 04:46 (-06:00)Electronically signed by Elo Smith MD on 01-26-23 at [id number]       ASSESSMENT:  Principal Problem:    Acute appendicitis  Resolved Problems:    * No resolved hospital problems.  *      PLAN:  The risks, benefits, and alternatives were discussed with the pt. He is willing to accept the risks and proceed with a laparoscopic appendectomy. The surgical risks included but not limited to bleeding, infection, perforation, recurrence, risk of needing further surgery, etc. The anesthetic risks included heart attacks, strokes, pneumonia, phlebitis, etc.  He is willing to accept all risks and proceed with surgery. No guarantees have been given.       Meredith Mckeon DO   Electronically Signed on 1/26/2023 at 9:28 AM

## 2023-01-26 NOTE — BRIEF OP NOTE
Brief Postoperative Note      Patient: Elia Tsang  YOB: 1960  MRN: [id number]    Date of Procedure: 1/26/2023    Pre-Op Diagnosis: Acute appendicitis [K35.80]    Post-Op Diagnosis:  Acute Appendicitis with perforation and gangrene, without abscess       Procedure(s):  LAPAROSCOPIC APPENDECTOMY    Surgeon(s):  Avelina Bello DO    Assistant:  * No surgical staff found *    Anesthesia: General    Estimated Blood Loss (mL): less than 50     Complications: None    Specimens:   ID Type Source Tests Collected by Time Destination   A : APPENDIX Tissue Appendix 189 Vishal DO Nagi 9/15/8163 1159        Implants:  * No implants in log *      Drains:   Closed/Suction Drain Left; Anterior Abdomen Bulb (Active)       Urinary Catheter 01/26/23 (Active)       Findings: perforated inflamed appendix with areas of gangrene. Staple fired with ~ 1.5 cm stump remaining. Drain placed. Copious irrigation.     Electronically signed by Thiago Kee DO on 0/36/2249 at 12:51 PM

## 2023-01-26 NOTE — PROGRESS NOTES
Romulo Pearson arrived to room # [phone]. Presented with: Appendicitis   Mental Status: Patient is oriented, alert, coherent, logical, thought processes intact, and able to concentrate and follow conversation. Vitals:    01/26/23 0600   BP: 135/75   Pulse: 93   Resp: 20   Temp: 99.4 °F (37.4 °C)   SpO2: 92%     Patient safety contract and falls prevention contract reviewed with patient Yes. Oriented Patient and Family to room. Call light within reach. Yes.   Needs, issues or concerns expressed at this time: no.      Electronically signed by Carrie Vences RN on 1/26/2023 at 6:13 AM

## 2023-01-26 NOTE — PROGRESS NOTES
Surgery at bedside to take patient. Consents signed and placed on the chart   Glasses were given to patient wife.

## 2023-01-26 NOTE — PROGRESS NOTES
4 Eyes Skin Assessment    Karly Kong is being assessed upon: Admission    I agree that I, Brittani Godinez RN, along with Rakel Kidd RN (either 2 RN's or 1 LPN and 1 RN) have performed a thorough Head to Toe Skin Assessment on the patient. ALL assessment sites listed below have been assessed. Areas assessed by both nurses:     [x]   Head, Face, and Ears   [x]   Shoulders, Back, and Chest  [x]   Arms, Elbows, and Hands   [x]   Coccyx, Sacrum, and Ischium  [x]   Legs, Feet, and Heels    Does the Patient have Skin Breakdown?  No    [place]ud[place] Prevention initiated: NA  Wound Care Orders initiated: NA    WOC nurse consulted for Pressure Injury (Stage 3,4, Unstageable, DTI, NWPT, and Complex wounds) and New or Established Ostomies: NA        Primary Nurse eSignature: Brittani Godinez RN on 1/26/2023 at 6:13 AM      Co-Signer eSignature: Electronically signed by Rakel Kidd RN on 1/26/23 at 6:13 AM CST

## 2023-01-26 NOTE — PROGRESS NOTES
Patient resting quietly with eyes closed. Opens eyes to name and answers questions. Oxygen titrated to 3L N/C. Respirations even and unlabored. BARAK drain emptied and charted. Denies any pain or discomfort at this time.

## 2023-01-26 NOTE — ANESTHESIA POSTPROCEDURE EVALUATION
Department of Anesthesiology  Postprocedure Note    Patient: Vinh King  MRN: [id number]  YOB: 1960  Date of evaluation: 1/26/2023      Procedure Summary     Date: 01/26/23 Room / Location: [address]    Anesthesia Start: 1131 Anesthesia Stop: 1304    Procedure: LAPAROSCOPIC APPENDECTOMY (Abdomen) Diagnosis:       Acute appendicitis      (Acute appendicitis [K35.80])    Surgeons: Candy Cobos DO Responsible Provider: ABNER Morejon CRNA    Anesthesia Type: general ASA Status: 3          Anesthesia Type: No value filed.    Jewels Phase I: Jewels Score: 5    Jewels Phase II:        Anesthesia Post Evaluation    Patient location during evaluation: PACU  Patient participation: waiting for patient participation  Level of consciousness: responsive to physical stimuli  Pain score: 0  Airway patency: patent (oral airway in place)  Nausea & Vomiting: no vomiting and no nausea  Complications: no  Cardiovascular status: hemodynamically stable  Respiratory status: acceptable, spontaneous ventilation, face mask and oral airway  Hydration status: stable  Comments: /76   Pulse 88   Temp 100.2 °F (37.9 °C) (Temporal)   Resp 28   Ht 6' 4\" (1.93 m)   Wt 256 lb 3.2 oz (116.2 kg)   SpO2 90%   BMI 31.19 kg/m²

## 2023-01-27 LAB
ANION GAP SERPL CALCULATED.3IONS-SCNC: 10 MMOL/L (ref 7–19)
BASOPHILS ABSOLUTE: 0 K/UL (ref 0–0.2)
BASOPHILS RELATIVE PERCENT: 0.2 % (ref 0–1)
BUN BLDV-MCNC: 22 MG/DL (ref 8–23)
CALCIUM SERPL-MCNC: 7.9 MG/DL (ref 8.8–10.2)
CHLORIDE BLD-SCNC: 102 MMOL/L (ref 98–111)
CO2: 27 MMOL/L (ref 22–29)
CREAT SERPL-MCNC: 1.6 MG/DL (ref 0.5–1.2)
EOSINOPHILS ABSOLUTE: 0 K/UL (ref 0–0.6)
EOSINOPHILS RELATIVE PERCENT: 0.1 % (ref 0–5)
GFR SERPL CREATININE-BSD FRML MDRD: 48 ML/MIN/{1.73_M2}
GLUCOSE BLD-MCNC: 97 MG/DL (ref 74–109)
HCT VFR BLD CALC: 42.5 % (ref 42–52)
HEMOGLOBIN: 14 G/DL (ref 14–18)
IMMATURE GRANULOCYTES #: 0.1 K/UL
LYMPHOCYTES ABSOLUTE: 1.4 K/UL (ref 1.1–4.5)
LYMPHOCYTES RELATIVE PERCENT: 10.4 % (ref 20–40)
MCH RBC QN AUTO: 31.2 PG (ref 27–31)
MCHC RBC AUTO-ENTMCNC: 32.9 G/DL (ref 33–37)
MCV RBC AUTO: 94.7 FL (ref 80–94)
MONOCYTES ABSOLUTE: 1 K/UL (ref 0–0.9)
MONOCYTES RELATIVE PERCENT: 7.3 % (ref 0–10)
NEUTROPHILS ABSOLUTE: 10.9 K/UL (ref 1.5–7.5)
NEUTROPHILS RELATIVE PERCENT: 81 % (ref 50–65)
PDW BLD-RTO: 13.2 % (ref 11.5–14.5)
PLATELET # BLD: 139 K/UL (ref 130–400)
PMV BLD AUTO: 10.6 FL (ref 9.4–12.4)
POTASSIUM REFLEX MAGNESIUM: 5.5 MMOL/L (ref 3.5–5)
RBC # BLD: 4.49 M/UL (ref 4.7–6.1)
SODIUM BLD-SCNC: 139 MMOL/L (ref 136–145)
WBC # BLD: 13.5 K/UL (ref 4.8–10.8)

## 2023-01-27 PROCEDURE — 36415 COLL VENOUS BLD VENIPUNCTURE: CPT

## 2023-01-27 PROCEDURE — 6370000000 HC RX 637 (ALT 250 FOR IP): Performed by: SURGERY

## 2023-01-27 PROCEDURE — 2580000003 HC RX 258: Performed by: SURGERY

## 2023-01-27 PROCEDURE — 80048 BASIC METABOLIC PNL TOTAL CA: CPT

## 2023-01-27 PROCEDURE — 1210000000 HC MED SURG R&B

## 2023-01-27 PROCEDURE — 99024 POSTOP FOLLOW-UP VISIT: CPT | Performed by: SURGERY

## 2023-01-27 PROCEDURE — 94760 N-INVAS EAR/PLS OXIMETRY 1: CPT

## 2023-01-27 PROCEDURE — 85025 COMPLETE CBC W/AUTO DIFF WBC: CPT

## 2023-01-27 PROCEDURE — 6360000002 HC RX W HCPCS: Performed by: SURGERY

## 2023-01-27 RX ADMIN — AMLODIPINE BESYLATE 2.5 MG: 2.5 TABLET ORAL at 21:32

## 2023-01-27 RX ADMIN — ACETAMINOPHEN 1000 MG: 500 TABLET, FILM COATED ORAL at 21:32

## 2023-01-27 RX ADMIN — ASPIRIN 81 MG: 81 TABLET, CHEWABLE ORAL at 09:35

## 2023-01-27 RX ADMIN — SODIUM CHLORIDE, PRESERVATIVE FREE 10 ML: 5 INJECTION INTRAVENOUS at 09:36

## 2023-01-27 RX ADMIN — LISINOPRIL 20 MG: 20 TABLET ORAL at 21:32

## 2023-01-27 RX ADMIN — ENOXAPARIN SODIUM 30 MG: 100 INJECTION SUBCUTANEOUS at 09:35

## 2023-01-27 RX ADMIN — FAMOTIDINE 20 MG: 20 TABLET ORAL at 09:35

## 2023-01-27 RX ADMIN — PIPERACILLIN AND TAZOBACTAM 3375 MG: 3; .375 INJECTION, POWDER, LYOPHILIZED, FOR SOLUTION INTRAVENOUS at 06:07

## 2023-01-27 RX ADMIN — FAMOTIDINE 20 MG: 20 TABLET ORAL at 21:32

## 2023-01-27 RX ADMIN — ACETAMINOPHEN 1000 MG: 500 TABLET, FILM COATED ORAL at 06:08

## 2023-01-27 RX ADMIN — METOPROLOL TARTRATE 50 MG: 50 TABLET, FILM COATED ORAL at 21:32

## 2023-01-27 RX ADMIN — PIPERACILLIN AND TAZOBACTAM 3375 MG: 3; .375 INJECTION, POWDER, LYOPHILIZED, FOR SOLUTION INTRAVENOUS at 13:00

## 2023-01-27 RX ADMIN — METOPROLOL TARTRATE 50 MG: 50 TABLET, FILM COATED ORAL at 09:35

## 2023-01-27 RX ADMIN — LISINOPRIL 20 MG: 20 TABLET ORAL at 09:35

## 2023-01-27 RX ADMIN — ENOXAPARIN SODIUM 30 MG: 100 INJECTION SUBCUTANEOUS at 21:32

## 2023-01-27 RX ADMIN — OXYCODONE HYDROCHLORIDE 10 MG: 5 TABLET ORAL at 21:34

## 2023-01-27 RX ADMIN — ROSUVASTATIN CALCIUM 20 MG: 20 TABLET, FILM COATED ORAL at 22:12

## 2023-01-27 RX ADMIN — ACETAMINOPHEN 1000 MG: 500 TABLET, FILM COATED ORAL at 13:00

## 2023-01-27 RX ADMIN — AMLODIPINE BESYLATE 2.5 MG: 2.5 TABLET ORAL at 09:35

## 2023-01-27 RX ADMIN — CLONIDINE HYDROCHLORIDE 0.1 MG: 0.1 TABLET ORAL at 21:32

## 2023-01-27 RX ADMIN — OXYCODONE HYDROCHLORIDE 10 MG: 5 TABLET ORAL at 06:07

## 2023-01-27 RX ADMIN — PIPERACILLIN AND TAZOBACTAM 3375 MG: 3; .375 INJECTION, POWDER, LYOPHILIZED, FOR SOLUTION INTRAVENOUS at 22:12

## 2023-01-27 ASSESSMENT — PAIN DESCRIPTION - LOCATION
LOCATION: ABDOMEN
LOCATION: ABDOMEN
LOCATION: OTHER (COMMENT)

## 2023-01-27 ASSESSMENT — PAIN DESCRIPTION - DESCRIPTORS
DESCRIPTORS: ACHING
DESCRIPTORS: DISCOMFORT

## 2023-01-27 ASSESSMENT — PAIN SCALES - GENERAL
PAINLEVEL_OUTOF10: 7
PAINLEVEL_OUTOF10: 3
PAINLEVEL_OUTOF10: 6

## 2023-01-27 NOTE — PROGRESS NOTES
[facility] General Surgery Progress Note    Chief Complaint:  Chief Complaint   Patient presents with    Abdominal Pain     Pt has RLQ pain pt stated \"I think my appendix are about to bust\"        POD # 1    S: Mr. Chantal Christine is seen and examined at bedside. He feels somewhat better. No flatus at this time. O:   /70   Pulse 58   Temp 98.3 °F (36.8 °C) (Temporal)   Resp 15   Ht 6' 4\" (1.93 m)   Wt 256 lb 3.2 oz (116.2 kg)   SpO2 91%   BMI 31.19 kg/m²   I/O reviewed and appropriate  CONSTITUTIONAL: Alert, appropriate, no acute distress  SKIN: warm, dry with no rashes or lesions  HEENT: NCAT, Non icteric, PERR. Trachea Midline. CHEST/LUNGS: CTA bilaterally. Normal respiratory effort. CARDIOVASCULAR: RRR, No edema. ABDOMEN: soft, ND, appropriately TTP, +BS  Incisions: Clean, dry, and intact with no erythema or induration. BARAK seropurulent. NEUROLOGIC: CN II-XI grossly intact, no motor or sensory deficits   MUSCULOSKELETAL: No clubbing or cyanosis. PSYCHIATRIC:  Normal Mood and Affect. Alert and Oriented. LABS:   CBC:   Recent Labs     01/26/23  0354 01/27/23  0147   WBC 15.6* 13.5*   RBC 5.42 4.49*   HGB 17.0 14.0   HCT 49.9 42.5    139   LYMPHOPCT 9.1* 10.4*   MONOPCT 6.9 7.3   BASOPCT 0.3 0.2   MONOSABS 1.10* 1.00*   LYMPHSABS 1.4 1.4   EOSABS 0.00 0.00   BASOSABS 0.00 0.00      BMP:   Recent Labs     01/26/23  0354 01/27/23  0147   * 139   K 4.6 5.5*   CL 98 102   CO2 23 27   ANIONGAP 12 10   GLUCOSE 124* 97   CREATININE 1.5* 1.6*   LABGLOM 51* 48*   CALCIUM 9.3 7.9*     LFT:   Recent Labs     01/26/23  0354   PROT 7.4   LABALBU 3.6   BILITOT 2.2*   ALKPHOS 107   ALT 28   AST 21       A:     Acute appendicitis with perforation, generalized peritonitis, and gangrene, without abscess      P:  Analgesia prn. Continue antibiotics IV. Continue BARAK drain. Will advance to full liquid diet. Do anticipate a postoperative ileus given level of inflammatory changes within the abdomen. Discussed with the patient and wife who note understanding. Continue LR @125 for HILARY. Will repeat labs tomorrow. Dr. Sandy Smith will be covering for the weekend.        Candace Mcnally DO   Electronically Signed on 1/27/2023 at 10:16 AM

## 2023-01-28 LAB
ANION GAP SERPL CALCULATED.3IONS-SCNC: 11 MMOL/L (ref 7–19)
BASOPHILS ABSOLUTE: 0 K/UL (ref 0–0.2)
BASOPHILS RELATIVE PERCENT: 0.3 % (ref 0–1)
BUN BLDV-MCNC: 18 MG/DL (ref 8–23)
CALCIUM SERPL-MCNC: 8.6 MG/DL (ref 8.8–10.2)
CHLORIDE BLD-SCNC: 99 MMOL/L (ref 98–111)
CO2: 24 MMOL/L (ref 22–29)
CREAT SERPL-MCNC: 1.3 MG/DL (ref 0.5–1.2)
EOSINOPHILS ABSOLUTE: 0.1 K/UL (ref 0–0.6)
EOSINOPHILS RELATIVE PERCENT: 0.5 % (ref 0–5)
GFR SERPL CREATININE-BSD FRML MDRD: >60 ML/MIN/{1.73_M2}
GLUCOSE BLD-MCNC: 110 MG/DL (ref 74–109)
HCT VFR BLD CALC: 46.5 % (ref 42–52)
HEMOGLOBIN: 15.6 G/DL (ref 14–18)
IMMATURE GRANULOCYTES #: 0.1 K/UL
LYMPHOCYTES ABSOLUTE: 1 K/UL (ref 1.1–4.5)
LYMPHOCYTES RELATIVE PERCENT: 7.6 % (ref 20–40)
MCH RBC QN AUTO: 31.1 PG (ref 27–31)
MCHC RBC AUTO-ENTMCNC: 33.5 G/DL (ref 33–37)
MCV RBC AUTO: 92.8 FL (ref 80–94)
MONOCYTES ABSOLUTE: 1 K/UL (ref 0–0.9)
MONOCYTES RELATIVE PERCENT: 7.2 % (ref 0–10)
NEUTROPHILS ABSOLUTE: 11.3 K/UL (ref 1.5–7.5)
NEUTROPHILS RELATIVE PERCENT: 83.5 % (ref 50–65)
PDW BLD-RTO: 12.8 % (ref 11.5–14.5)
PLATELET # BLD: 157 K/UL (ref 130–400)
PMV BLD AUTO: 10.4 FL (ref 9.4–12.4)
POTASSIUM REFLEX MAGNESIUM: 4.1 MMOL/L (ref 3.5–5)
RBC # BLD: 5.01 M/UL (ref 4.7–6.1)
SODIUM BLD-SCNC: 134 MMOL/L (ref 136–145)
WBC # BLD: 13.5 K/UL (ref 4.8–10.8)

## 2023-01-28 PROCEDURE — 2580000003 HC RX 258: Performed by: SURGERY

## 2023-01-28 PROCEDURE — 99024 POSTOP FOLLOW-UP VISIT: CPT | Performed by: SURGERY

## 2023-01-28 PROCEDURE — 85025 COMPLETE CBC W/AUTO DIFF WBC: CPT

## 2023-01-28 PROCEDURE — 2700000000 HC OXYGEN THERAPY PER DAY

## 2023-01-28 PROCEDURE — 1210000000 HC MED SURG R&B

## 2023-01-28 PROCEDURE — 6370000000 HC RX 637 (ALT 250 FOR IP): Performed by: SURGERY

## 2023-01-28 PROCEDURE — 36415 COLL VENOUS BLD VENIPUNCTURE: CPT

## 2023-01-28 PROCEDURE — 80048 BASIC METABOLIC PNL TOTAL CA: CPT

## 2023-01-28 PROCEDURE — 94760 N-INVAS EAR/PLS OXIMETRY 1: CPT

## 2023-01-28 PROCEDURE — 6360000002 HC RX W HCPCS: Performed by: SURGERY

## 2023-01-28 RX ORDER — AMOXICILLIN AND CLAVULANATE POTASSIUM 875; 125 MG/1; MG/1
1 TABLET, FILM COATED ORAL EVERY 12 HOURS SCHEDULED
Status: DISCONTINUED | OUTPATIENT
Start: 2023-01-28 | End: 2023-01-29

## 2023-01-28 RX ADMIN — PIPERACILLIN AND TAZOBACTAM 3375 MG: 3; .375 INJECTION, POWDER, LYOPHILIZED, FOR SOLUTION INTRAVENOUS at 05:11

## 2023-01-28 RX ADMIN — ENOXAPARIN SODIUM 30 MG: 100 INJECTION SUBCUTANEOUS at 10:08

## 2023-01-28 RX ADMIN — FAMOTIDINE 20 MG: 20 TABLET ORAL at 21:00

## 2023-01-28 RX ADMIN — METOPROLOL TARTRATE 50 MG: 50 TABLET, FILM COATED ORAL at 21:00

## 2023-01-28 RX ADMIN — LISINOPRIL 20 MG: 20 TABLET ORAL at 21:00

## 2023-01-28 RX ADMIN — FAMOTIDINE 20 MG: 20 TABLET ORAL at 10:09

## 2023-01-28 RX ADMIN — SODIUM CHLORIDE, POTASSIUM CHLORIDE, SODIUM LACTATE AND CALCIUM CHLORIDE: 600; 310; 30; 20 INJECTION, SOLUTION INTRAVENOUS at 17:41

## 2023-01-28 RX ADMIN — ACETAMINOPHEN 1000 MG: 500 TABLET, FILM COATED ORAL at 13:39

## 2023-01-28 RX ADMIN — ACETAMINOPHEN 1000 MG: 500 TABLET, FILM COATED ORAL at 21:00

## 2023-01-28 RX ADMIN — METOPROLOL TARTRATE 50 MG: 50 TABLET, FILM COATED ORAL at 10:09

## 2023-01-28 RX ADMIN — ASPIRIN 81 MG: 81 TABLET, CHEWABLE ORAL at 10:09

## 2023-01-28 RX ADMIN — CLONIDINE HYDROCHLORIDE 0.1 MG: 0.1 TABLET ORAL at 21:00

## 2023-01-28 RX ADMIN — ROSUVASTATIN CALCIUM 20 MG: 20 TABLET, FILM COATED ORAL at 20:59

## 2023-01-28 RX ADMIN — AMLODIPINE BESYLATE 2.5 MG: 2.5 TABLET ORAL at 10:09

## 2023-01-28 RX ADMIN — SODIUM CHLORIDE, PRESERVATIVE FREE 10 ML: 5 INJECTION INTRAVENOUS at 10:13

## 2023-01-28 RX ADMIN — ACETAMINOPHEN 1000 MG: 500 TABLET, FILM COATED ORAL at 05:08

## 2023-01-28 RX ADMIN — ENOXAPARIN SODIUM 30 MG: 100 INJECTION SUBCUTANEOUS at 21:01

## 2023-01-28 RX ADMIN — OXYCODONE HYDROCHLORIDE 10 MG: 5 TABLET ORAL at 20:59

## 2023-01-28 RX ADMIN — AMOXICILLIN AND CLAVULANATE POTASSIUM 1 TABLET: 875; 125 TABLET, FILM COATED ORAL at 21:00

## 2023-01-28 RX ADMIN — AMLODIPINE BESYLATE 2.5 MG: 2.5 TABLET ORAL at 20:59

## 2023-01-28 RX ADMIN — LISINOPRIL 20 MG: 20 TABLET ORAL at 10:09

## 2023-01-28 ASSESSMENT — PAIN SCALES - GENERAL
PAINLEVEL_OUTOF10: 5
PAINLEVEL_OUTOF10: 3
PAINLEVEL_OUTOF10: 2

## 2023-01-28 ASSESSMENT — PAIN DESCRIPTION - ORIENTATION: ORIENTATION: LEFT

## 2023-01-28 ASSESSMENT — PAIN DESCRIPTION - DESCRIPTORS
DESCRIPTORS: ACHING
DESCRIPTORS: ACHING;SORE

## 2023-01-28 ASSESSMENT — PAIN DESCRIPTION - LOCATION
LOCATION: ABDOMEN
LOCATION: ABDOMEN

## 2023-01-28 NOTE — PROGRESS NOTES
Subjective:  No acute events or changes. Tolerating some PO. Had a little nausea, no vomiting. Pain well-controlled. Has ambulated. Objective:  BP (!) 165/88   Pulse 65   Temp 98.1 °F (36.7 °C) (Temporal)   Resp 18   Ht 6' 4\" (1.93 m)   Wt 256 lb 3.2 oz (116.2 kg)   SpO2 91%   BMI 31.19 kg/m²   Date 01/28/23 0000 - 01/28/23 2359   Shift 9220-8640 4709-9645 1532-0145 24 Hour Total   INTAKE   P.O.(mL/kg/hr)  700  700   I. V.(mL/kg) 500(4.3)   500(4.3)   Shift Total(mL/kg) 500(4.3) 700(6)  1200(10.3)   OUTPUT   Urine(mL/kg/hr) 900(1)   900   Drains(mL/kg) 30(0.3)   30(0.3)   Shift Total(mL/kg) 930(8)   930(8)   Weight (kg) 116.2 116.2 116.2 116.2     General: NAD, AAO  Chest: regular, non-labored  Abdomen: Soft, mild distention, ATTP, incision C/D/I, BARAK with minimal serosang output    CBC:   Lab Results   Component Value Date/Time    WBC 13.5 01/28/2023 01:58 AM    RBC 5.01 01/28/2023 01:58 AM    HGB 15.6 01/28/2023 01:58 AM    HCT 46.5 01/28/2023 01:58 AM    HCT 47.1 01/30/2011 09:55 AM    MCV 92.8 01/28/2023 01:58 AM    MCH 31.1 01/28/2023 01:58 AM    MCHC 33.5 01/28/2023 01:58 AM    RDW 12.8 01/28/2023 01:58 AM     01/28/2023 01:58 AM     01/30/2011 09:55 AM    MPV 10.4 01/28/2023 01:58 AM     Assessment and plan:  57year old male s/p laparoscopic appendectomy   Doing okay.  Will gradually advance diet, switch to oral antibiotics, and continue increasing activity  Continue lovenox and pepcid  Recheck CBC in am

## 2023-01-28 NOTE — PLAN OF CARE
Problem: Discharge Planning  Goal: Discharge to home or other facility with appropriate resources  Outcome: Progressing  Flowsheets (Taken 1/28/2023 0005 by Vic Chen LPN)  Discharge to home or other facility with appropriate resources: Identify barriers to discharge with patient and caregiver     Problem: Pain  Goal: Verbalizes/displays adequate comfort level or baseline comfort level  Outcome: Progressing     Problem: ABCDS Injury Assessment  Goal: Absence of physical injury  Outcome: Progressing

## 2023-01-29 LAB
HCT VFR BLD CALC: 45.4 % (ref 42–52)
HEMOGLOBIN: 15.3 G/DL (ref 14–18)
MCH RBC QN AUTO: 31 PG (ref 27–31)
MCHC RBC AUTO-ENTMCNC: 33.7 G/DL (ref 33–37)
MCV RBC AUTO: 91.9 FL (ref 80–94)
PDW BLD-RTO: 12.9 % (ref 11.5–14.5)
PLATELET # BLD: 182 K/UL (ref 130–400)
PMV BLD AUTO: 10 FL (ref 9.4–12.4)
RBC # BLD: 4.94 M/UL (ref 4.7–6.1)
WBC # BLD: 12.7 K/UL (ref 4.8–10.8)

## 2023-01-29 PROCEDURE — 2580000003 HC RX 258: Performed by: SURGERY

## 2023-01-29 PROCEDURE — 36415 COLL VENOUS BLD VENIPUNCTURE: CPT

## 2023-01-29 PROCEDURE — 99024 POSTOP FOLLOW-UP VISIT: CPT | Performed by: SURGERY

## 2023-01-29 PROCEDURE — 6370000000 HC RX 637 (ALT 250 FOR IP): Performed by: SURGERY

## 2023-01-29 PROCEDURE — 94760 N-INVAS EAR/PLS OXIMETRY 1: CPT

## 2023-01-29 PROCEDURE — 2700000000 HC OXYGEN THERAPY PER DAY

## 2023-01-29 PROCEDURE — 6360000002 HC RX W HCPCS: Performed by: SURGERY

## 2023-01-29 PROCEDURE — 85027 COMPLETE CBC AUTOMATED: CPT

## 2023-01-29 PROCEDURE — 1210000000 HC MED SURG R&B

## 2023-01-29 RX ORDER — DOCUSATE SODIUM 100 MG/1
100 CAPSULE, LIQUID FILLED ORAL DAILY
Status: DISCONTINUED | OUTPATIENT
Start: 2023-01-29 | End: 2023-01-30 | Stop reason: HOSPADM

## 2023-01-29 RX ADMIN — ENOXAPARIN SODIUM 30 MG: 100 INJECTION SUBCUTANEOUS at 21:32

## 2023-01-29 RX ADMIN — AMOXICILLIN AND CLAVULANATE POTASSIUM 1 TABLET: 875; 125 TABLET, FILM COATED ORAL at 07:57

## 2023-01-29 RX ADMIN — DOCUSATE SODIUM 100 MG: 100 CAPSULE, LIQUID FILLED ORAL at 21:32

## 2023-01-29 RX ADMIN — AMLODIPINE BESYLATE 2.5 MG: 2.5 TABLET ORAL at 07:57

## 2023-01-29 RX ADMIN — METOPROLOL TARTRATE 50 MG: 50 TABLET, FILM COATED ORAL at 07:57

## 2023-01-29 RX ADMIN — CLONIDINE HYDROCHLORIDE 0.1 MG: 0.1 TABLET ORAL at 21:22

## 2023-01-29 RX ADMIN — ONDANSETRON 4 MG: 2 INJECTION INTRAMUSCULAR; INTRAVENOUS at 14:09

## 2023-01-29 RX ADMIN — LISINOPRIL 20 MG: 20 TABLET ORAL at 21:22

## 2023-01-29 RX ADMIN — LISINOPRIL 20 MG: 20 TABLET ORAL at 07:57

## 2023-01-29 RX ADMIN — ASPIRIN 81 MG: 81 TABLET, CHEWABLE ORAL at 07:57

## 2023-01-29 RX ADMIN — ENOXAPARIN SODIUM 30 MG: 100 INJECTION SUBCUTANEOUS at 07:58

## 2023-01-29 RX ADMIN — FAMOTIDINE 20 MG: 20 TABLET ORAL at 21:22

## 2023-01-29 RX ADMIN — SODIUM CHLORIDE, PRESERVATIVE FREE 10 ML: 5 INJECTION INTRAVENOUS at 21:23

## 2023-01-29 RX ADMIN — ROSUVASTATIN CALCIUM 20 MG: 20 TABLET, FILM COATED ORAL at 21:22

## 2023-01-29 RX ADMIN — AMLODIPINE BESYLATE 2.5 MG: 2.5 TABLET ORAL at 21:22

## 2023-01-29 RX ADMIN — PIPERACILLIN AND TAZOBACTAM 3375 MG: 3; .375 INJECTION, POWDER, FOR SOLUTION INTRAVENOUS at 21:23

## 2023-01-29 RX ADMIN — FAMOTIDINE 20 MG: 20 TABLET ORAL at 07:58

## 2023-01-29 RX ADMIN — METOPROLOL TARTRATE 50 MG: 50 TABLET, FILM COATED ORAL at 21:22

## 2023-01-29 RX ADMIN — PIPERACILLIN AND TAZOBACTAM 4500 MG: 4; .5 INJECTION, POWDER, FOR SOLUTION INTRAVENOUS at 14:16

## 2023-01-29 RX ADMIN — ACETAMINOPHEN 1000 MG: 500 TABLET, FILM COATED ORAL at 14:08

## 2023-01-29 RX ADMIN — ACETAMINOPHEN 1000 MG: 500 TABLET, FILM COATED ORAL at 21:22

## 2023-01-29 ASSESSMENT — PAIN DESCRIPTION - LOCATION: LOCATION: ABDOMEN

## 2023-01-29 ASSESSMENT — PAIN DESCRIPTION - DESCRIPTORS: DESCRIPTORS: DISCOMFORT;TIGHTNESS

## 2023-01-29 ASSESSMENT — PAIN DESCRIPTION - ONSET: ONSET: ON-GOING

## 2023-01-29 ASSESSMENT — PAIN DESCRIPTION - FREQUENCY: FREQUENCY: INTERMITTENT

## 2023-01-29 ASSESSMENT — PAIN DESCRIPTION - ORIENTATION: ORIENTATION: RIGHT;LEFT;LOWER

## 2023-01-29 ASSESSMENT — PAIN SCALES - GENERAL
PAINLEVEL_OUTOF10: 3
PAINLEVEL_OUTOF10: 5
PAINLEVEL_OUTOF10: 0

## 2023-01-29 ASSESSMENT — PAIN DESCRIPTION - PAIN TYPE: TYPE: SURGICAL PAIN

## 2023-01-29 NOTE — PROGRESS NOTES
Subjective:  No acute events. Patient complaining of a lot of hiccoughs, as well as then N/V. Does state that he is passing flatus. Objective:  BP (!) 147/80   Pulse 68   Temp 99 °F (37.2 °C) (Temporal)   Resp 18   Ht 6' 4\" (1.93 m)   Wt 256 lb 3.2 oz (116.2 kg)   SpO2 92%   BMI 31.19 kg/m²   Date 01/29/23 0000 - 01/29/23 2359   Shift 3941-1860 0204-8985 0098-5405 24 Hour Total   INTAKE   I.V.(mL/kg)  1359. 5(11.7)  1359. 5(11.7)   Shift Total(mL/kg)  1359. 5(11.7)  1359. 5(11.7)   OUTPUT   Emesis/NG output(mL/kg)  150(1.3)  150(1.3)   Shift Total(mL/kg)  150(1.3)  150(1.3)   Weight (kg) 116.2 116.2 116.2 116.2     General: NAD, AAO  Chest: regular, non-labored  Abdomen: Soft, mild distention, ATTP, drain with serosang    CBC:   Lab Results   Component Value Date/Time    WBC 12.7 01/29/2023 04:27 AM    RBC 4.94 01/29/2023 04:27 AM    HGB 15.3 01/29/2023 04:27 AM    HCT 45.4 01/29/2023 04:27 AM    HCT 47.1 01/30/2011 09:55 AM    MCV 91.9 01/29/2023 04:27 AM    MCH 31.0 01/29/2023 04:27 AM    MCHC 33.7 01/29/2023 04:27 AM    RDW 12.9 01/29/2023 04:27 AM     01/29/2023 04:27 AM     01/30/2011 09:55 AM    MPV 10.0 01/29/2023 04:27 AM     Assessment and plan:  57year old male s/p lap awilda for perforated gangrenous appendicitis  Discussed with patient that may need to switch back to IV antibiotics, stay back on clears, increase ambulating.    Continue lovenox and pepcid  Recheck CBC in am

## 2023-01-29 NOTE — PLAN OF CARE
Problem: Discharge Planning  Goal: Discharge to home or other facility with appropriate resources  Outcome: Progressing  Flowsheets (Taken 1/28/2023 1942 by James Ellison LPN)  Discharge to home or other facility with appropriate resources:   Arrange for needed discharge resources and transportation as appropriate   Identify barriers to discharge with patient and caregiver     Problem: Pain  Goal: Verbalizes/displays adequate comfort level or baseline comfort level  Outcome: Progressing     Problem: ABCDS Injury Assessment  Goal: Absence of physical injury  Outcome: Progressing

## 2023-01-29 NOTE — PROGRESS NOTES
Pharmacy Adjustment per [facility] protocol    Marc Ramirez is a 57 y.o. male. Pharmacy has adjusted medications per [facility] protocol. Recent Labs     01/27/23  0147 01/28/23  0158   BUN 22 18       Recent Labs     01/27/23  0147 01/28/23  0158   CREATININE 1.6* 1.3*       Estimated Creatinine Clearance: 80 mL/min (A) (based on SCr of 1.3 mg/dL (H)).     Height:   Ht Readings from Last 1 Encounters:   01/26/23 6' 4\" (1.93 m)     Weight:  Wt Readings from Last 1 Encounters:   01/26/23 256 lb 3.2 oz (116.2 kg)         Plan: Adjust the following medications based on [facility] protocol:           Zosyn to 4500 mg IV once over 30 minutes followed by 3375 mg IV every 8 hours extended infusion over 240 minutes      Electronically signed by Dantua Kohler RPH on 1/29/2023 at 1:46 PM

## 2023-01-30 VITALS
BODY MASS INDEX: 31.2 KG/M2 | SYSTOLIC BLOOD PRESSURE: 133 MMHG | TEMPERATURE: 98.4 F | DIASTOLIC BLOOD PRESSURE: 69 MMHG | OXYGEN SATURATION: 96 % | RESPIRATION RATE: 18 BRPM | WEIGHT: 256.2 LBS | HEART RATE: 58 BPM | HEIGHT: 76 IN

## 2023-01-30 LAB
ALBUMIN SERPL-MCNC: 3 G/DL (ref 3.5–5.2)
ANION GAP SERPL CALCULATED.3IONS-SCNC: 13 MMOL/L (ref 7–19)
BUN BLDV-MCNC: 17 MG/DL (ref 8–23)
CALCIUM SERPL-MCNC: 8.8 MG/DL (ref 8.8–10.2)
CHLORIDE BLD-SCNC: 101 MMOL/L (ref 98–111)
CO2: 24 MMOL/L (ref 22–29)
CREAT SERPL-MCNC: 1 MG/DL (ref 0.5–1.2)
GFR SERPL CREATININE-BSD FRML MDRD: >60 ML/MIN/{1.73_M2}
GLUCOSE BLD-MCNC: 93 MG/DL (ref 74–109)
HCT VFR BLD CALC: 52.5 % (ref 42–52)
HEMOGLOBIN: 17 G/DL (ref 14–18)
MCH RBC QN AUTO: 30.6 PG (ref 27–31)
MCHC RBC AUTO-ENTMCNC: 32.4 G/DL (ref 33–37)
MCV RBC AUTO: 94.6 FL (ref 80–94)
PDW BLD-RTO: 12.9 % (ref 11.5–14.5)
PHOSPHORUS: 4.3 MG/DL (ref 2.5–4.5)
PLATELET # BLD: 155 K/UL (ref 130–400)
PMV BLD AUTO: 9.7 FL (ref 9.4–12.4)
POTASSIUM SERPL-SCNC: 4.1 MMOL/L (ref 3.5–5)
RBC # BLD: 5.55 M/UL (ref 4.7–6.1)
SODIUM BLD-SCNC: 138 MMOL/L (ref 136–145)
WBC # BLD: 8.5 K/UL (ref 4.8–10.8)

## 2023-01-30 PROCEDURE — 6360000002 HC RX W HCPCS: Performed by: SURGERY

## 2023-01-30 PROCEDURE — 2580000003 HC RX 258: Performed by: SURGERY

## 2023-01-30 PROCEDURE — 99024 POSTOP FOLLOW-UP VISIT: CPT | Performed by: SURGERY

## 2023-01-30 PROCEDURE — 94760 N-INVAS EAR/PLS OXIMETRY 1: CPT

## 2023-01-30 PROCEDURE — 80069 RENAL FUNCTION PANEL: CPT

## 2023-01-30 PROCEDURE — 6370000000 HC RX 637 (ALT 250 FOR IP): Performed by: SURGERY

## 2023-01-30 PROCEDURE — 85027 COMPLETE CBC AUTOMATED: CPT

## 2023-01-30 PROCEDURE — 36415 COLL VENOUS BLD VENIPUNCTURE: CPT

## 2023-01-30 RX ORDER — CIPROFLOXACIN 500 MG/1
500 TABLET, FILM COATED ORAL 2 TIMES DAILY
Qty: 12 TABLET | Refills: 0 | Status: SHIPPED | OUTPATIENT
Start: 2023-01-30 | End: 2023-02-05

## 2023-01-30 RX ORDER — METRONIDAZOLE 500 MG/1
500 TABLET ORAL 3 TIMES DAILY
Qty: 18 TABLET | Refills: 0 | Status: SHIPPED | OUTPATIENT
Start: 2023-01-30 | End: 2023-02-05

## 2023-01-30 RX ORDER — BISACODYL 10 MG
10 SUPPOSITORY, RECTAL RECTAL ONCE
Status: COMPLETED | OUTPATIENT
Start: 2023-01-30 | End: 2023-01-30

## 2023-01-30 RX ORDER — OXYCODONE HYDROCHLORIDE AND ACETAMINOPHEN 5; 325 MG/1; MG/1
1 TABLET ORAL EVERY 6 HOURS PRN
Qty: 12 TABLET | Refills: 0 | Status: SHIPPED | OUTPATIENT
Start: 2023-01-30 | End: 2023-02-02

## 2023-01-30 RX ADMIN — SODIUM CHLORIDE, PRESERVATIVE FREE 10 ML: 5 INJECTION INTRAVENOUS at 08:01

## 2023-01-30 RX ADMIN — FAMOTIDINE 20 MG: 20 TABLET ORAL at 08:00

## 2023-01-30 RX ADMIN — AMLODIPINE BESYLATE 2.5 MG: 2.5 TABLET ORAL at 07:59

## 2023-01-30 RX ADMIN — ACETAMINOPHEN 1000 MG: 500 TABLET, FILM COATED ORAL at 05:25

## 2023-01-30 RX ADMIN — METOPROLOL TARTRATE 50 MG: 50 TABLET, FILM COATED ORAL at 08:01

## 2023-01-30 RX ADMIN — BISACODYL 10 MG: 10 SUPPOSITORY RECTAL at 10:05

## 2023-01-30 RX ADMIN — DOCUSATE SODIUM 100 MG: 100 CAPSULE, LIQUID FILLED ORAL at 08:00

## 2023-01-30 RX ADMIN — LISINOPRIL 20 MG: 20 TABLET ORAL at 08:01

## 2023-01-30 RX ADMIN — PIPERACILLIN AND TAZOBACTAM 3375 MG: 3; .375 INJECTION, POWDER, FOR SOLUTION INTRAVENOUS at 05:27

## 2023-01-30 RX ADMIN — ASPIRIN 81 MG: 81 TABLET, CHEWABLE ORAL at 08:00

## 2023-01-30 RX ADMIN — ENOXAPARIN SODIUM 30 MG: 100 INJECTION SUBCUTANEOUS at 08:00

## 2023-01-30 ASSESSMENT — PAIN SCALES - GENERAL: PAINLEVEL_OUTOF10: 2

## 2023-01-30 NOTE — FLOWSHEET NOTE
01/30/23 0550   Mobility   Activity Ambulate in porras   Level of Assistance Standby assist, set-up cues, supervision of patient - no hands on   Assistive Device None   Distance Ambulated (ft) 1000 ft   Ambulation Response Tolerated well   Repositioned Semi fowlers

## 2023-01-30 NOTE — DISCHARGE INSTR - DIET
Good nutrition is important when healing from an illness, injury, or surgery. Follow any nutrition recommendations given to you during your hospital stay. If you were given an oral nutrition supplement while in the hospital, continue to take this supplement at home. You can take it with meals, in-between meals, and/or before bedtime. These supplements can be purchased at most local grocery stores, pharmacies, and chain [employer]-stores. If you have any questions about your diet or nutrition, call the hospital and ask for the dietitian.     Full liquid diet advance as tolerated

## 2023-01-30 NOTE — PROGRESS NOTES
[facility] General Surgery Progress Note    Chief Complaint:  Chief Complaint   Patient presents with    Abdominal Pain     Pt has RLQ pain pt stated \"I think my appendix are about to bust\"        POD # 4    S: Mr. Bee Quinteros is seen and examined at bedside. He feels must better than yesterday. No further hiccoughs or vomiting. Would like to go home. BARAK output decreasing. O:   /69   Pulse 58   Temp 98.4 °F (36.9 °C) (Oral)   Resp 18   Ht 6' 4\" (1.93 m)   Wt 256 lb 3.2 oz (116.2 kg)   SpO2 94%   BMI 31.19 kg/m²   I/O reviewed and appropriate  CONSTITUTIONAL: Alert, appropriate, no acute distress  SKIN: warm, dry with no rashes or lesions  HEENT: NCAT, Non icteric, PERR. Trachea Midline. CHEST/LUNGS: CTA bilaterally. Normal respiratory effort. CARDIOVASCULAR: RRR, No edema. ABDOMEN: soft, ND, appropriately TTP, +BS  Incisions: Clean, dry, and intact with no erythema or induration. BARAK serosanguinous. NEUROLOGIC: CN II-XI grossly intact, no motor or sensory deficits   MUSCULOSKELETAL: No clubbing or cyanosis. PSYCHIATRIC:  Normal Mood and Affect. Alert and Oriented. LABS:   CBC:   Recent Labs     01/28/23  0158 01/29/23  0427 01/30/23  0150   WBC 13.5* 12.7* 8.5   RBC 5.01 4.94 5.55   HGB 15.6 15.3 17.0   HCT 46.5 45.4 52.5*    182 155   LYMPHOPCT 7.6*  --   --    MONOPCT 7.2  --   --    BASOPCT 0.3  --   --    MONOSABS 1.00*  --   --    LYMPHSABS 1.0*  --   --    EOSABS 0.10  --   --    BASOSABS 0.00  --   --       BMP:   Recent Labs     01/28/23  0158 01/30/23  0150   * 138   K 4.1 4.1   CL 99 101   CO2 24 24   ANIONGAP 11 13   GLUCOSE 110* 93   CREATININE 1.3* 1.0   LABGLOM >60 >60   CALCIUM 8.6* 8.8     LFT:   Recent Labs     01/30/23  0150   LABALBU 3.0*       A:     Acute appendicitis with perforation, generalized peritonitis, and gangrene, without abscess      P:  Analgesia prn. Continue antibiotics IV. Continue BARAK drain. Will advance to full liquid diet. Shaheed resolved. Suppository for BM. Once patient has BM and toelrates full liquid diet, will dc home with oral antibiotics. If does not tolerate full liquid, will repeat CT scan to evaluate for abscess or leak. Patient is non-toxic appearing with no pain in his abdomen, so feel this is unlikely but may need to be precautious.       Madeleine Pollock DO   Electronically Signed on 1/30/2023 at 11:01 AM

## 2023-01-30 NOTE — PLAN OF CARE
Problem: Discharge Planning  Goal: Discharge to home or other facility with appropriate resources  1/30/2023 1057 by Ronda Kaufman RN  Outcome: Progressing  1/30/2023 0013 by Irene Encarnacion RN  Outcome: Progressing

## 2023-01-30 NOTE — DISCHARGE SUMMARY
Physician Discharge Summary     Patient ID:  Nkechi Hernadez  [id number]  88 y.o.  1960    Admit date: 1/26/2023    Discharge date and time: No discharge date for patient encounter. Admitting Physician: Eddie Olea DO     Discharge Physician: Brian Walker DO     Admission Diagnoses:  Acute appendicitis with perforation, generalized peritonitis, gangrene, without abscess  Lung nodule seen on imaging study [R91.1], acute kidney injury    Discharge Diagnoses:  Same    Admission Condition: fair    Discharged Condition: good    Indication for Admission: Surgery, observation    Hospital Course: Mr. Kenny Funes presented to the emergency room with complaints of two days of abdominal pain. He was found to have appendicitis and taken to the operating room. There he was found to have a perforated appendix with areas of gangrene, but no abscess. His appendic was able to be removed, though a very small stump remained. A drain was placed and he was washed out. He has since tolerated his diet and has been treated with antibiotics. He did not tolerates oral Augmentin yesterday, but feels improved today. His leukocytosis resolved and his acute kidney injury resolved. He will be discharged home today with cipro and flagyl and his BARAK drain. Consults: None    Significant Diagnostic Studies: See Chart    Treatments: IV hydration, antibiotics: Zosyn and Augmentin, analgesia: acetaminophen and Dilaudid, and surgery: Laparoscopic Appendectomy     Discharge Exam:  /69   Pulse 58   Temp 98.4 °F (36.9 °C) (Oral)   Resp 18   Ht 6' 4\" (1.93 m)   Wt 256 lb 3.2 oz (116.2 kg)   SpO2 96%   BMI 31.19 kg/m²   General appearance: alert, appears stated age, and cooperative  Abdomen: soft, non-tender; bowel sounds normal; no masses,  no organomegaly    Disposition: home    In process/preliminary results:  Outstanding Order Results       No orders found from 12/28/2022 to 1/27/2023.             Patient Instructions:   Current Discharge Medication List        START taking these medications    Details   oxyCODONE-acetaminophen (PERCOCET) 5-325 MG per tablet Take 1 tablet by mouth every 6 hours as needed for Pain for up to 3 days. Intended supply: 3 days. Take lowest dose possible to manage pain Max Daily Amount: 4 tablets  Qty: 12 tablet, Refills: 0    Comments: Reduce doses taken as pain becomes manageable  Associated Diagnoses: Acute appendicitis with localized peritonitis, without perforation or abscess, unspecified whether gangrene present      metroNIDAZOLE (FLAGYL) 500 MG tablet Take 1 tablet by mouth 3 times daily for 6 days  Qty: 18 tablet, Refills: 0      ciprofloxacin (CIPRO) 500 MG tablet Take 1 tablet by mouth 2 times daily for 6 days  Qty: 12 tablet, Refills: 0           CONTINUE these medications which have NOT CHANGED    Details   amLODIPine (NORVASC) 2.5 MG tablet Take 2.5 mg by mouth 2 times daily      aspirin 81 MG tablet Take 81 mg by mouth daily      metoprolol tartrate (LOPRESSOR) 50 MG tablet Take 50 mg by mouth 2 times daily  Refills: 1      lisinopril (PRINIVIL;ZESTRIL) 20 MG tablet Take 1 tablet by mouth 2 times daily  Qty: 180 tablet, Refills: 3    Associated Diagnoses: Essential hypertension      cloNIDine (CATAPRES) 0.1 MG tablet Take 1 tablet by mouth nightly  Qty: 90 tablet, Refills: 3    Associated Diagnoses: Essential hypertension      rosuvastatin (CRESTOR) 20 MG tablet Take 20 mg by mouth daily. nitroGLYCERIN (NITROSTAT) 0.4 MG SL tablet Place 1 tablet under the tongue every 5 minutes as needed. Qty: 25 tablet, Refills: 11    Associated Diagnoses: Angina pectoris (HCC)      Multiple Vitamin (MULTI-VITAMIN) TABS Take  by mouth daily. Activity: activity as tolerated and no driving while on analgesics  Diet: regular diet  Wound Care: as directed    Follow-up with Dr. Mars Harris in 3 days.     Ursula Moore DO   3/69/6283  2:51 PM

## 2023-01-31 ENCOUNTER — CARE COORDINATION (OUTPATIENT)
Dept: CASE MANAGEMENT | Age: 63
End: 2023-01-31

## 2023-01-31 NOTE — CARE COORDINATION
[facility] Care Transitions Initial Follow Up Call    Call within 2 business days of discharge: Yes    Attempted to make contact with patient/caregiver for an initial Care Transitions Coordination follow up call post discharge from the hospital without success. Unable to leave a message regarding intent of call and call back information. Call was immediately forwarded to an unidentifiable voice messaging system. CTN will follow up again at a later time. Any previously scheduled hospital follow up appointments as noted. Patient: Heide Michael Patient : 1960   MRN: [id number]  Reason for Admission: Acute Appendicitis with perforation, Lap Appy  Discharge Date: 23 RARS: Readmission Risk Score: 8.2      Last Discharge [address]       Date Complaint Diagnosis Description Type Department Provider    23 Abdominal Pain Acute appendicitis with localized peritonitis, without perforation or abscess, unspecified whether gangrene present . .. ED to Hosp-Admission (Discharged) (ADMITTED) [facility]IL[facility] Breaux. Lawson Kirk 22, DO; Soraida Bahena MD            Was this an external facility discharge? No     Non-face-to-face services provided:  Reviewed encounter information for continuity of care prior to follow up Care Transitions Coordination phone call - chart notes, consults, progress notes, test results, med list, appointments, AVS, other information. Follow Up  Future Appointments   Date Time Provider Jackie Yang     0:21 AM Candy Cobos DO [facility]     Plan for next call: Second attempt at initial contact for hospital follow up post discharge.     Gregg Cuenca RN

## 2023-01-31 NOTE — ADT AUTH CERT
Appendectomy, without Abscess or Peritonitis - Care Day 4 (1/29/2023) by Dann Schwab RN       Review Status Review Entered   Completed 1/31/2023 1353       Created By   Dann Schwab RN      Criteria Review      Care Day: 4 Care Date: 1/29/2023 Level of Care: Inpatient Floor    Guideline Day 2    Level Of Care    ( ) Floor to discharge    1/31/2023 2:53 PM EST by Leeann Menendez      SURGICAL    Clinical Status    (X) * Procedure completed    (X) * Hemodynamic stability    1/31/2023 2:53 PM EST by Radha Varela      BP (!) 147/80   Pulse 68   Temp 99 °F (37.2 °C) (Temporal)   Resp 18   Ht 6' 4\" (1.93 m)   Wt 256 lb 3.2 oz (116.2 kg)   SpO2 92%   BMI 31.19 kg/m²    (X) * No evidence of ileus or bowel obstruction    (X) * No evidence of postoperative or surgical site infection    ( ) * Diet tolerated    1/31/2023 2:53 PM EST by Avery, [address] [place] [address]. ZOFRAN 4MG IV X 1.    (X) * Pain absent or managed    1/31/2023 2:53 PM EST by Radha Varela      TYLENOL 1G PO TID.    ( ) * Discharge plans and education understood    Activity    (X) * Ambulatory or acceptable for next level of care    Routes    ( ) * Oral hydration    (X) * Oral medications or regimen acceptable for next level of care    1/31/2023 2:53 PM EST by Radha Varela      NORVASC 2.5MG PO BID. ASA 81MG PO QD. CATAPRES 0.1MG PO QHS. PEPCID 20MG PO BID. ZESTRIL 20MG PO BID. COLACE 100MG PO QD.  LOPRESSOR 50MG PO BID. CRESTOR 20MG PO QD.    (X) * Oral diet or acceptable for next level of care    1/31/2023 2:53 PM EST by Radha Varela      CL DIET. Medications    (X) * PCA and parenteral analgesics absent    ( ) Discontinue antibiotics    1/31/2023 2:53 PM EST by Radha Varela      AUGMENTIN 875/125MG PO BID.  ZOSYN 4.5G IV X 1 THEN 3.375G IV Q8H.        Definitions for Care Day 4    Hemodynamic stability    (X) Hemodynamic stability, as indicated by  1 or more  of the following :       (X) Hemodynamic abnormalities at baseline or acceptable for next level of care       * Milestone   Additional Notes   1/29/23   IP SURGICAL      WBC: 12.7 (H)         MED>   LOVENOX 30MG SC BID. PE>   General: NAD, AAO   Chest: regular, non-labored   Abdomen: Soft, mild distention, ATTP, drain with serosang      SURGERY>   Assessment and plan:   57year old male s/p lap awilda for perforated gangrenous appendicitis   Discussed with patient that may need to switch back to IV antibiotics, stay back on clears, increase ambulating. Continue lovenox and pepcid   Recheck CBC in am                       Appendectomy, without Abscess or Peritonitis - Care Day 3 (1/28/2023) by Emerson Cruz RN       Review Status Review Entered   Completed 1/31/2023 1345       Created By   Emerson Cruz RN      Criteria Review      Care Day: 3 Care Date: 1/28/2023 Level of Care: Inpatient Floor    Guideline Day 2    Level Of Care    ( ) Floor to discharge    1/31/2023 2:45 PM EST by Thomas Right      surgical bed    Clinical Status    (X) * Procedure completed    (X) * Hemodynamic stability    1/31/2023 2:45 PM EST by Radha Varela      TEMP 98. 1. P 66. RR 18. /88. SPO2 92% 2LPM NC.    (X) * No evidence of ileus or bowel obstruction    (X) * No evidence of postoperative or surgical site infection    1/31/2023 2:45 PM EST by Radha Varela      incision C/D/I,    ( ) * Diet tolerated    1/31/2023 2:45 PM EST by Radha Varela      Tolerating some PO. Had a little nausea, no vomiting    (X) * Pain absent or managed    1/31/2023 2:45 PM EST by Radha Varela      TYLENOL 1G PO TID. ROXICODONE 10MG PO X 1.    ( ) * Discharge plans and education understood    Activity    (X) * Ambulatory or acceptable for next level of care    1/31/2023 2:45 PM EST by Radha Varela      PATIENT HAS AMBULATED. Routes    ( ) * Oral hydration    (X) * Oral medications or regimen acceptable for next level of care    1/31/2023 2:45 PM EST by Radha Varela      NORVASC 2.5MG PO BID. ASA 81MG PO QD. CATAPRES 0.1MG PO QHS.   PEPCID 20MG PO BID. ZESTRIL 20MG PO BID. LOPRESSOR 50MG PO BID. CRESTOR 20MG PO QD.    (X) * Oral diet or acceptable for next level of care    1/31/2023 2:45 PM EST by Radha Varela      FL TO DYSPHAGIA    Medications    (X) * PCA and parenteral analgesics absent    ( ) Discontinue antibiotics    1/31/2023 2:45 PM EST by Radha Varela      AUGMENTIN 875-125MG PO BID.  ZOSYN 3.375G IV X 1. Definitions for Care Day 3    Hemodynamic stability    (X) Hemodynamic stability, as indicated by  1 or more  of the following :       (X) Hemodynamic abnormalities at baseline or acceptable for next level of care       * Milestone   Additional Notes   1/28/23   IP SURGICAL      Sodium: 134 (L)   Creatinine: 1.3 (H)   Glucose, Random: 110 (H)   CALCIUM, SERUM, 843960: 8.6 (L)   WBC: 13.5 (H)   MCH: 31.1 (H)   Neutrophils %: 83.5 (H)   Lymphocyte %: 7.6 (L)   Neutrophils Absolute: 11.3 (H)   Lymphocytes Absolute: 1.0 (L)   Monocytes Absolute: 1.00 (H)            MEDS>   LOVENOX 30MG SC BID. IVLR 75ML/HOUR. General: NAD, AAO   Chest: regular, non-labored   Abdomen: Soft, mild distention, ATTP, BARAK with minimal serosang output          SURGERY>   Assessment and plan:   57year old male s/p laparoscopic appendectomy    Doing okay.  Will gradually advance diet, switch to oral antibiotics, and continue increasing activity   Continue lovenox and pepcid   Recheck CBC in am

## 2023-01-31 NOTE — PROGRESS NOTES
AVS given, all questions answered, pt verbalized understanding. Drain management/education taught, pt verbalized understanding. Iv's Removed. All belongings sent with patient.  NAD noted, vss.

## 2023-01-31 NOTE — PLAN OF CARE
Problem: Discharge Planning  Goal: Discharge to home or other facility with appropriate resources  1/30/2023 1810 by Juwan Mcdowell RN  Outcome: Adequate for Discharge  1/30/2023 1057 by Juwan Mcdowell RN  Outcome: Progressing     Problem: Pain  Goal: Verbalizes/displays adequate comfort level or baseline comfort level  Outcome: Adequate for Discharge     Problem: ABCDS Injury Assessment  Goal: Absence of physical injury  Outcome: Adequate for Discharge

## 2023-02-01 ENCOUNTER — CARE COORDINATION (OUTPATIENT)
Dept: CASE MANAGEMENT | Age: 63
End: 2023-02-01

## 2023-02-01 NOTE — CARE COORDINATION
[facility] Care Transitions Initial Follow Up Call    Call within 2 business days of discharge: Yes    Attempted to make contact with patient/caregiver for an initial Care Transitions Coordination follow up call post discharge from the hospital without success. Unable to leave a message regarding intent of call and call back information. Call was forwarded to an unidentifiable voice messaging system. As this repeated attempt to make contact was unsuccessful, will disengage at this time. Any previously scheduled hospital follow up appointments as noted. Patient: Mary Ann Dean Patient : 1960   MRN: [id number]    Discharge Date: 23 RARS: Readmission Risk Score: 8.2      Last Discharge  [facility]       Date Complaint Diagnosis Description Type Department Provider    23 Abdominal Pain Acute appendicitis with localized peritonitis, without perforation or abscess, unspecified whether gangrene present . .. ED to Hosp-Admission (Discharged) (ADMITTED) [facility] Saeid. Lawson Kirk 22, DO; El Adam MD            Was this an external facility discharge? No     Non-face-to-face services provided:  Reviewed encounter information for continuity of care prior to follow up Care Transitions Coordination phone call - chart notes, consults, progress notes, test results, med list, appointments, AVS, other information.     Follow Up  Future Appointments   Date Time Provider Jackie Yang   1777  1:81 AM DO DILCIA Davis [facility] NEMESIO Reyes RN

## 2023-02-02 ENCOUNTER — OFFICE VISIT (OUTPATIENT)
Dept: SURGERY | Age: 63
End: 2023-02-02

## 2023-02-02 VITALS
DIASTOLIC BLOOD PRESSURE: 70 MMHG | BODY MASS INDEX: 31.61 KG/M2 | TEMPERATURE: 97.9 F | WEIGHT: 259.6 LBS | SYSTOLIC BLOOD PRESSURE: 124 MMHG | HEIGHT: 76 IN

## 2023-02-02 DIAGNOSIS — R19.7 DIARRHEA, UNSPECIFIED TYPE: Primary | ICD-10-CM

## 2023-02-02 DIAGNOSIS — Z09 POSTOPERATIVE EXAMINATION: ICD-10-CM

## 2023-02-02 PROCEDURE — 99024 POSTOP FOLLOW-UP VISIT: CPT | Performed by: SURGERY

## 2023-02-02 NOTE — PROGRESS NOTES
Postop Progress Note    Subjective    Earney Flattery presents to the office for postop follow up one week s/p perforated appendicitis and laparoscopic appendectomy. He started having diarrhea last night. No abdominal pain. No nausea. Minimal appetite. Objective    Vitals:    02/02/23 1036   BP: 124/70   Temp: 97.9 °F (36.6 °C)     General: alert, cooperative and no distress  Incision: healing well. BARAK with minimal serous output. No purulence. Abd soft, non-tender. Assessment  Doing well postoperatively. Plan  1. Continue any current medications. Will check GI panel on stool sample to r/o c.diff. 2. Wound care discussed  3. Pt is to increase activities as tolerated  4. Usual diet  5.  Follow up: as needed    Electronically signed by Kylah Dominguez DO on 6/4/7928 at 10:52 AM

## 2023-08-04 ENCOUNTER — APPOINTMENT (OUTPATIENT)
Dept: GENERAL RADIOLOGY | Age: 63
End: 2023-08-04
Payer: MEDICARE

## 2023-08-04 ENCOUNTER — APPOINTMENT (OUTPATIENT)
Dept: CT IMAGING | Age: 63
End: 2023-08-04
Payer: MEDICARE

## 2023-08-04 ENCOUNTER — HOSPITAL ENCOUNTER (EMERGENCY)
Age: 63
Discharge: HOME | End: 2023-08-04
Attending: EMERGENCY MEDICINE
Payer: MEDICARE

## 2023-08-04 VITALS
TEMPERATURE: 98.7 F | WEIGHT: 259 LBS | DIASTOLIC BLOOD PRESSURE: 83 MMHG | OXYGEN SATURATION: 93 % | SYSTOLIC BLOOD PRESSURE: 163 MMHG | BODY MASS INDEX: 31.53 KG/M2 | RESPIRATION RATE: 18 BRPM | HEART RATE: 78 BPM

## 2023-08-04 DIAGNOSIS — U07.1 COVID-19 VIRUS INFECTION: Primary | ICD-10-CM

## 2023-08-04 DIAGNOSIS — R41.89 BRAIN FOG: ICD-10-CM

## 2023-08-04 LAB
ALBUMIN SERPL-MCNC: 3.3 G/DL (ref 3.5–5.2)
ALP SERPL-CCNC: 108 U/L (ref 40–130)
ALT SERPL-CCNC: 34 U/L (ref 5–41)
ANION GAP SERPL CALCULATED.3IONS-SCNC: 10 MMOL/L (ref 7–19)
AST SERPL-CCNC: 28 U/L (ref 5–40)
BASOPHILS # BLD: 0 K/UL (ref 0–0.2)
BASOPHILS NFR BLD: 0.5 % (ref 0–1)
BILIRUB SERPL-MCNC: 1 MG/DL (ref 0.2–1.2)
BNP BLD-MCNC: 258 PG/ML (ref 0–124)
BUN SERPL-MCNC: 12 MG/DL (ref 8–23)
CALCIUM SERPL-MCNC: 8.8 MG/DL (ref 8.8–10.2)
CHLORIDE SERPL-SCNC: 107 MMOL/L (ref 98–111)
CO2 SERPL-SCNC: 26 MMOL/L (ref 22–29)
CREAT SERPL-MCNC: 1.3 MG/DL (ref 0.5–1.2)
EOSINOPHIL # BLD: 0.2 K/UL (ref 0–0.6)
EOSINOPHIL NFR BLD: 2.2 % (ref 0–5)
ERYTHROCYTE [DISTWIDTH] IN BLOOD BY AUTOMATED COUNT: 13.8 % (ref 11.5–14.5)
GLUCOSE SERPL-MCNC: 117 MG/DL (ref 74–109)
HCT VFR BLD AUTO: 50 % (ref 42–52)
HGB BLD-MCNC: 16.5 G/DL (ref 14–18)
IMM GRANULOCYTES # BLD: 0 K/UL
LYMPHOCYTES # BLD: 1.7 K/UL (ref 1.1–4.5)
LYMPHOCYTES NFR BLD: 23.4 % (ref 20–40)
MCH RBC QN AUTO: 30.6 PG (ref 27–31)
MCHC RBC AUTO-ENTMCNC: 33 G/DL (ref 33–37)
MCV RBC AUTO: 92.6 FL (ref 80–94)
MONOCYTES # BLD: 1.5 K/UL (ref 0–0.9)
MONOCYTES NFR BLD: 20.5 % (ref 0–10)
NEUTROPHILS # BLD: 3.9 K/UL (ref 1.5–7.5)
NEUTS SEG NFR BLD: 52.9 % (ref 50–65)
PLATELET # BLD AUTO: 107 K/UL (ref 130–400)
PMV BLD AUTO: 10.6 FL (ref 9.4–12.4)
POTASSIUM SERPL-SCNC: 4.1 MMOL/L (ref 3.5–5)
PROT SERPL-MCNC: 7.2 G/DL (ref 6.6–8.7)
RBC # BLD AUTO: 5.4 M/UL (ref 4.7–6.1)
SODIUM SERPL-SCNC: 143 MMOL/L (ref 136–145)
WBC # BLD AUTO: 7.4 K/UL (ref 4.8–10.8)

## 2023-08-04 PROCEDURE — 36415 COLL VENOUS BLD VENIPUNCTURE: CPT

## 2023-08-04 PROCEDURE — 71045 X-RAY EXAM CHEST 1 VIEW: CPT

## 2023-08-04 PROCEDURE — 93005 ELECTROCARDIOGRAM TRACING: CPT | Performed by: EMERGENCY MEDICINE

## 2023-08-04 PROCEDURE — 6360000002 HC RX W HCPCS: Performed by: EMERGENCY MEDICINE

## 2023-08-04 PROCEDURE — 2580000003 HC RX 258: Performed by: EMERGENCY MEDICINE

## 2023-08-04 PROCEDURE — 80053 COMPREHEN METABOLIC PANEL: CPT

## 2023-08-04 PROCEDURE — 99285 EMERGENCY DEPT VISIT HI MDM: CPT

## 2023-08-04 PROCEDURE — 96374 THER/PROPH/DIAG INJ IV PUSH: CPT

## 2023-08-04 PROCEDURE — 83880 ASSAY OF NATRIURETIC PEPTIDE: CPT

## 2023-08-04 PROCEDURE — 85025 COMPLETE CBC W/AUTO DIFF WBC: CPT

## 2023-08-04 PROCEDURE — 70450 CT HEAD/BRAIN W/O DYE: CPT

## 2023-08-04 RX ORDER — SODIUM CHLORIDE, SODIUM LACTATE, POTASSIUM CHLORIDE, AND CALCIUM CHLORIDE .6; .31; .03; .02 G/100ML; G/100ML; G/100ML; G/100ML
1000 INJECTION, SOLUTION INTRAVENOUS ONCE
Status: COMPLETED | OUTPATIENT
Start: 2023-08-04 | End: 2023-08-04

## 2023-08-04 RX ORDER — KETOROLAC TROMETHAMINE 30 MG/ML
15 INJECTION, SOLUTION INTRAMUSCULAR; INTRAVENOUS ONCE
Status: COMPLETED | OUTPATIENT
Start: 2023-08-04 | End: 2023-08-04

## 2023-08-04 RX ADMIN — SODIUM CHLORIDE, POTASSIUM CHLORIDE, SODIUM LACTATE AND CALCIUM CHLORIDE 1000 ML: 600; 310; 30; 20 INJECTION, SOLUTION INTRAVENOUS at 15:14

## 2023-08-04 RX ADMIN — KETOROLAC TROMETHAMINE 15 MG: 30 INJECTION, SOLUTION INTRAMUSCULAR; INTRAVENOUS at 15:13

## 2023-08-04 ASSESSMENT — ENCOUNTER SYMPTOMS
COUGH: 1
EYES NEGATIVE: 1
DIARRHEA: 1

## 2023-08-04 ASSESSMENT — PAIN - FUNCTIONAL ASSESSMENT: PAIN_FUNCTIONAL_ASSESSMENT: NONE - DENIES PAIN

## 2023-08-05 ENCOUNTER — HOSPITAL ENCOUNTER (INPATIENT)
Age: 63
LOS: 12 days | Discharge: ANOTHER HOSPITAL | End: 2023-08-17
Attending: EMERGENCY MEDICINE | Admitting: INTERNAL MEDICINE
Payer: MEDICARE

## 2023-08-05 ENCOUNTER — APPOINTMENT (OUTPATIENT)
Dept: GENERAL RADIOLOGY | Age: 63
End: 2023-08-05
Payer: MEDICARE

## 2023-08-05 ENCOUNTER — APPOINTMENT (OUTPATIENT)
Dept: CT IMAGING | Age: 63
End: 2023-08-05
Payer: MEDICARE

## 2023-08-05 DIAGNOSIS — U07.1 PNEUMONIA DUE TO COVID-19 VIRUS: Primary | ICD-10-CM

## 2023-08-05 DIAGNOSIS — R41.82 ALTERED MENTAL STATUS, UNSPECIFIED ALTERED MENTAL STATUS TYPE: ICD-10-CM

## 2023-08-05 DIAGNOSIS — R56.9 SEIZURE (HCC): ICD-10-CM

## 2023-08-05 DIAGNOSIS — J12.82 PNEUMONIA DUE TO COVID-19 VIRUS: Primary | ICD-10-CM

## 2023-08-05 PROBLEM — J96.01 ACUTE RESPIRATORY FAILURE WITH HYPOXEMIA (HCC): Status: ACTIVE | Noted: 2023-08-05

## 2023-08-05 LAB
ALBUMIN SERPL-MCNC: 3.7 G/DL (ref 3.5–5.2)
ALLENS TEST: ABNORMAL
ALP SERPL-CCNC: 111 U/L (ref 40–130)
ALT SERPL-CCNC: 28 U/L (ref 5–41)
AMPHET UR QL SCN: NEGATIVE
ANION GAP SERPL CALCULATED.3IONS-SCNC: 28 MMOL/L (ref 7–19)
APTT PPP: 27.9 SEC (ref 26–36.2)
AST SERPL-CCNC: 24 U/L (ref 5–40)
BACTERIA URNS QL MICRO: NEGATIVE /HPF
BARBITURATES UR QL SCN: NEGATIVE
BASE EXCESS ARTERIAL: -11.9 MMOL/L (ref -2–2)
BASE EXCESS ARTERIAL: -2.6 MMOL/L (ref -2–2)
BASOPHILS # BLD: 0 K/UL (ref 0–0.2)
BASOPHILS NFR BLD: 0.5 % (ref 0–1)
BENZODIAZ UR QL SCN: NEGATIVE
BILIRUB SERPL-MCNC: 1.2 MG/DL (ref 0.2–1.2)
BILIRUB UR QL STRIP: NEGATIVE
BNP BLD-MCNC: 371 PG/ML (ref 0–124)
BUN SERPL-MCNC: 16 MG/DL (ref 8–23)
BUPRENORPHINE URINE: NEGATIVE
CALCIUM SERPL-MCNC: 9.3 MG/DL (ref 8.8–10.2)
CANNABINOIDS UR QL SCN: NEGATIVE
CARBOXYHEMOGLOBIN ARTERIAL: 2 % (ref 0–5)
CARBOXYHEMOGLOBIN ARTERIAL: 3 % (ref 0–5)
CHLORIDE SERPL-SCNC: 103 MMOL/L (ref 98–111)
CLARITY UR: CLEAR
CO2 SERPL-SCNC: 11 MMOL/L (ref 22–29)
COCAINE UR QL SCN: NEGATIVE
COLOR UR: YELLOW
CREAT SERPL-MCNC: 1.4 MG/DL (ref 0.5–1.2)
CRYSTALS URNS MICRO: ABNORMAL /HPF
D DIMER PPP FEU-MCNC: 1.98 UG/ML FEU (ref 0–0.48)
DRUG SCREEN COMMENT UR-IMP: NORMAL
EOSINOPHIL # BLD: 0.1 K/UL (ref 0–0.6)
EOSINOPHIL NFR BLD: 1 % (ref 0–5)
EPI CELLS #/AREA URNS AUTO: 3 /HPF (ref 0–5)
ERYTHROCYTE [DISTWIDTH] IN BLOOD BY AUTOMATED COUNT: 13.7 % (ref 11.5–14.5)
FIO2: 100 %
FIO2: 100 %
GLUCOSE SERPL-MCNC: 157 MG/DL (ref 74–109)
GLUCOSE UR STRIP.AUTO-MCNC: NEGATIVE MG/DL
HBA1C MFR BLD: 5.7 % (ref 4–6)
HCO3 ARTERIAL: 12.1 MMOL/L (ref 22–26)
HCO3 ARTERIAL: 24.2 MMOL/L (ref 22–26)
HCT VFR BLD AUTO: 51.4 % (ref 42–52)
HEMOGLOBIN, ART, EXTENDED: 15.5 G/DL (ref 14–18)
HEMOGLOBIN, ART, EXTENDED: 16.7 G/DL (ref 14–18)
HGB BLD-MCNC: 17 G/DL (ref 14–18)
HGB UR STRIP.AUTO-MCNC: ABNORMAL MG/L
HYALINE CASTS #/AREA URNS AUTO: 7 /HPF (ref 0–8)
IMM GRANULOCYTES # BLD: 0 K/UL
INR PPP: 1.08 (ref 0.88–1.18)
KETONES UR STRIP.AUTO-MCNC: 15 MG/DL
LACTATE BLDV-SCNC: 0.8 MMOL/L (ref 0.5–1.9)
LACTATE BLDV-SCNC: 13.1 MMOL/L (ref 0.5–1.9)
LEUKOCYTE ESTERASE UR QL STRIP.AUTO: ABNORMAL
LYMPHOCYTES # BLD: 1.8 K/UL (ref 1.1–4.5)
LYMPHOCYTES NFR BLD: 21.7 % (ref 20–40)
MCH RBC QN AUTO: 31.2 PG (ref 27–31)
MCHC RBC AUTO-ENTMCNC: 33.1 G/DL (ref 33–37)
MCV RBC AUTO: 94.3 FL (ref 80–94)
MECHANICAL RATE IN BPM: 16
METHADONE UR QL SCN: NEGATIVE
METHAMPHETAMINE, URINE: NEGATIVE
METHEMOGLOBIN ARTERIAL: 0.8 %
METHEMOGLOBIN ARTERIAL: 1.2 %
MODE: ABNORMAL
MONOCYTES # BLD: 1.9 K/UL (ref 0–0.9)
MONOCYTES NFR BLD: 23.1 % (ref 0–10)
NEUTROPHILS # BLD: 4.4 K/UL (ref 1.5–7.5)
NEUTS SEG NFR BLD: 53.2 % (ref 50–65)
NITRITE UR QL STRIP.AUTO: NEGATIVE
O2 CONTENT ARTERIAL: 20 ML/DL
O2 CONTENT ARTERIAL: 20.9 ML/DL
O2 DELIVERY DEVICE: ABNORMAL
O2 SAT, ARTERIAL: 89.2 %
O2 SAT, ARTERIAL: 91.7 %
O2 THERAPY: ABNORMAL
O2 THERAPY: ABNORMAL
OPIATES UR QL SCN: NEGATIVE
OXYCODONE UR QL SCN: NEGATIVE
PCO2 ARTERIAL: 24 MMHG (ref 35–45)
PCO2 ARTERIAL: 48 MMHG (ref 35–45)
PCP UR QL SCN: NEGATIVE
PH ARTERIAL: 7.31 (ref 7.35–7.45)
PH ARTERIAL: 7.31 (ref 7.35–7.45)
PH UR STRIP.AUTO: 5.5 [PH] (ref 5–8)
PLATELET # BLD AUTO: 126 K/UL (ref 130–400)
PMV BLD AUTO: 11.1 FL (ref 9.4–12.4)
PO2 ARTERIAL: 62 MMHG (ref 80–100)
PO2 ARTERIAL: 70 MMHG (ref 80–100)
POSITIVE END EXP PRESS: 5
POTASSIUM BLD-SCNC: 3.8 MMOL/L
POTASSIUM BLD-SCNC: 4.1 MMOL/L
POTASSIUM SERPL-SCNC: 4 MMOL/L (ref 3.5–5)
PROPOXYPH UR QL SCN: NEGATIVE
PROT SERPL-MCNC: 7.6 G/DL (ref 6.6–8.7)
PROT UR STRIP.AUTO-MCNC: 300 MG/DL
PROTHROMBIN TIME: 13.7 SEC (ref 12–14.6)
RBC # BLD AUTO: 5.45 M/UL (ref 4.7–6.1)
RBC #/AREA URNS AUTO: 12 /HPF (ref 0–4)
SAMPLE SOURCE: ABNORMAL
SAMPLE SOURCE: ABNORMAL
SARS-COV-2 RDRP RESP QL NAA+PROBE: DETECTED
SODIUM SERPL-SCNC: 142 MMOL/L (ref 136–145)
SP GR UR STRIP.AUTO: 1.02 (ref 1–1.03)
TRICYCLIC, URINE: NEGATIVE
TROPONIN T SERPL-MCNC: <0.01 NG/ML (ref 0–0.03)
UROBILINOGEN UR STRIP.AUTO-MCNC: 1 E.U./DL
VT MECHANICAL: 450 %
WBC # BLD AUTO: 8.7 K/UL (ref 4.8–10.8)
WBC #/AREA URNS AUTO: 3 /HPF (ref 0–5)

## 2023-08-05 PROCEDURE — 2580000003 HC RX 258: Performed by: EMERGENCY MEDICINE

## 2023-08-05 PROCEDURE — 2500000003 HC RX 250 WO HCPCS: Performed by: EMERGENCY MEDICINE

## 2023-08-05 PROCEDURE — 85610 PROTHROMBIN TIME: CPT

## 2023-08-05 PROCEDURE — 6360000002 HC RX W HCPCS: Performed by: PSYCHIATRY & NEUROLOGY

## 2023-08-05 PROCEDURE — 87040 BLOOD CULTURE FOR BACTERIA: CPT

## 2023-08-05 PROCEDURE — 82803 BLOOD GASES ANY COMBINATION: CPT

## 2023-08-05 PROCEDURE — 80053 COMPREHEN METABOLIC PANEL: CPT

## 2023-08-05 PROCEDURE — 83880 ASSAY OF NATRIURETIC PEPTIDE: CPT

## 2023-08-05 PROCEDURE — 6360000002 HC RX W HCPCS: Performed by: INTERNAL MEDICINE

## 2023-08-05 PROCEDURE — 6360000002 HC RX W HCPCS: Performed by: EMERGENCY MEDICINE

## 2023-08-05 PROCEDURE — 81001 URINALYSIS AUTO W/SCOPE: CPT

## 2023-08-05 PROCEDURE — 71045 X-RAY EXAM CHEST 1 VIEW: CPT

## 2023-08-05 PROCEDURE — 70450 CT HEAD/BRAIN W/O DYE: CPT

## 2023-08-05 PROCEDURE — 3E0333Z INTRODUCTION OF ANTI-INFLAMMATORY INTO PERIPHERAL VEIN, PERCUTANEOUS APPROACH: ICD-10-PCS | Performed by: INTERNAL MEDICINE

## 2023-08-05 PROCEDURE — 96365 THER/PROPH/DIAG IV INF INIT: CPT

## 2023-08-05 PROCEDURE — 84484 ASSAY OF TROPONIN QUANT: CPT

## 2023-08-05 PROCEDURE — 2500000003 HC RX 250 WO HCPCS: Performed by: INTERNAL MEDICINE

## 2023-08-05 PROCEDURE — 2000000000 HC ICU R&B

## 2023-08-05 PROCEDURE — 72125 CT NECK SPINE W/O DYE: CPT

## 2023-08-05 PROCEDURE — 5A1955Z RESPIRATORY VENTILATION, GREATER THAN 96 CONSECUTIVE HOURS: ICD-10-PCS | Performed by: INTERNAL MEDICINE

## 2023-08-05 PROCEDURE — 99223 1ST HOSP IP/OBS HIGH 75: CPT | Performed by: PSYCHIATRY & NEUROLOGY

## 2023-08-05 PROCEDURE — 85025 COMPLETE CBC W/AUTO DIFF WBC: CPT

## 2023-08-05 PROCEDURE — 96375 TX/PRO/DX INJ NEW DRUG ADDON: CPT

## 2023-08-05 PROCEDURE — 85730 THROMBOPLASTIN TIME PARTIAL: CPT

## 2023-08-05 PROCEDURE — 2700000000 HC OXYGEN THERAPY PER DAY

## 2023-08-05 PROCEDURE — 80306 DRUG TEST PRSMV INSTRMNT: CPT

## 2023-08-05 PROCEDURE — 8E0ZXY6 ISOLATION: ICD-10-PCS | Performed by: INTERNAL MEDICINE

## 2023-08-05 PROCEDURE — 85379 FIBRIN DEGRADATION QUANT: CPT

## 2023-08-05 PROCEDURE — 99285 EMERGENCY DEPT VISIT HI MDM: CPT

## 2023-08-05 PROCEDURE — 96366 THER/PROPH/DIAG IV INF ADDON: CPT

## 2023-08-05 PROCEDURE — 83036 HEMOGLOBIN GLYCOSYLATED A1C: CPT

## 2023-08-05 PROCEDURE — 87635 SARS-COV-2 COVID-19 AMP PRB: CPT

## 2023-08-05 PROCEDURE — 31500 INSERT EMERGENCY AIRWAY: CPT

## 2023-08-05 PROCEDURE — 6370000000 HC RX 637 (ALT 250 FOR IP): Performed by: INTERNAL MEDICINE

## 2023-08-05 PROCEDURE — 36415 COLL VENOUS BLD VENIPUNCTURE: CPT

## 2023-08-05 PROCEDURE — 0BH17EZ INSERTION OF ENDOTRACHEAL AIRWAY INTO TRACHEA, VIA NATURAL OR ARTIFICIAL OPENING: ICD-10-PCS | Performed by: INTERNAL MEDICINE

## 2023-08-05 PROCEDURE — 94002 VENT MGMT INPAT INIT DAY: CPT

## 2023-08-05 PROCEDURE — 2580000003 HC RX 258: Performed by: INTERNAL MEDICINE

## 2023-08-05 PROCEDURE — 36600 WITHDRAWAL OF ARTERIAL BLOOD: CPT

## 2023-08-05 PROCEDURE — 83605 ASSAY OF LACTIC ACID: CPT

## 2023-08-05 RX ORDER — FLUVOXAMINE MALEATE 50 MG/1
50 TABLET, COATED ORAL 2 TIMES DAILY
Status: DISCONTINUED | OUTPATIENT
Start: 2023-08-05 | End: 2023-08-09

## 2023-08-05 RX ORDER — DEXAMETHASONE SODIUM PHOSPHATE 10 MG/ML
6 INJECTION, SOLUTION INTRAMUSCULAR; INTRAVENOUS ONCE
Status: COMPLETED | OUTPATIENT
Start: 2023-08-05 | End: 2023-08-05

## 2023-08-05 RX ORDER — MELATONIN 10 MG
10 CAPSULE ORAL DAILY
Status: DISCONTINUED | OUTPATIENT
Start: 2023-08-05 | End: 2023-08-17 | Stop reason: HOSPADM

## 2023-08-05 RX ORDER — DEXAMETHASONE SODIUM PHOSPHATE 10 MG/ML
6 INJECTION, SOLUTION INTRAMUSCULAR; INTRAVENOUS EVERY 24 HOURS
Status: DISCONTINUED | OUTPATIENT
Start: 2023-08-05 | End: 2023-08-05

## 2023-08-05 RX ORDER — ENOXAPARIN SODIUM 100 MG/ML
0.5 INJECTION SUBCUTANEOUS 2 TIMES DAILY
Status: DISCONTINUED | OUTPATIENT
Start: 2023-08-05 | End: 2023-08-09 | Stop reason: DRUGHIGH

## 2023-08-05 RX ORDER — MIDAZOLAM HYDROCHLORIDE 1 MG/ML
1-10 INJECTION, SOLUTION INTRAVENOUS CONTINUOUS
Status: DISCONTINUED | OUTPATIENT
Start: 2023-08-05 | End: 2023-08-10 | Stop reason: ALTCHOICE

## 2023-08-05 RX ORDER — OMEGA-3-ACID ETHYL ESTERS 1 G/1
2 CAPSULE, LIQUID FILLED ORAL 2 TIMES DAILY
Status: DISCONTINUED | OUTPATIENT
Start: 2023-08-05 | End: 2023-08-17 | Stop reason: HOSPADM

## 2023-08-05 RX ORDER — ACETAMINOPHEN 650 MG/1
650 SUPPOSITORY RECTAL EVERY 6 HOURS PRN
Status: DISCONTINUED | OUTPATIENT
Start: 2023-08-05 | End: 2023-08-17 | Stop reason: HOSPADM

## 2023-08-05 RX ORDER — ASPIRIN 325 MG
325 TABLET ORAL DAILY
Status: DISCONTINUED | OUTPATIENT
Start: 2023-08-05 | End: 2023-08-17 | Stop reason: HOSPADM

## 2023-08-05 RX ORDER — CHLORHEXIDINE GLUCONATE 0.12 MG/ML
15 RINSE ORAL 2 TIMES DAILY
Status: DISCONTINUED | OUTPATIENT
Start: 2023-08-05 | End: 2023-08-17 | Stop reason: HOSPADM

## 2023-08-05 RX ORDER — PROPOFOL 10 MG/ML
5-50 INJECTION, EMULSION INTRAVENOUS CONTINUOUS
Status: DISCONTINUED | OUTPATIENT
Start: 2023-08-05 | End: 2023-08-13

## 2023-08-05 RX ORDER — LEVETIRACETAM 500 MG/5ML
1000 INJECTION, SOLUTION, CONCENTRATE INTRAVENOUS ONCE
Status: COMPLETED | OUTPATIENT
Start: 2023-08-05 | End: 2023-08-05

## 2023-08-05 RX ORDER — ONDANSETRON 4 MG/1
4 TABLET, ORALLY DISINTEGRATING ORAL EVERY 8 HOURS PRN
Status: DISCONTINUED | OUTPATIENT
Start: 2023-08-05 | End: 2023-08-17 | Stop reason: HOSPADM

## 2023-08-05 RX ORDER — SODIUM CHLORIDE 0.9 % (FLUSH) 0.9 %
10 SYRINGE (ML) INJECTION EVERY 12 HOURS SCHEDULED
Status: DISCONTINUED | OUTPATIENT
Start: 2023-08-05 | End: 2023-08-12

## 2023-08-05 RX ORDER — ONDANSETRON 2 MG/ML
4 INJECTION INTRAMUSCULAR; INTRAVENOUS EVERY 6 HOURS PRN
Status: DISCONTINUED | OUTPATIENT
Start: 2023-08-05 | End: 2023-08-17 | Stop reason: HOSPADM

## 2023-08-05 RX ORDER — SODIUM CHLORIDE 0.9 % (FLUSH) 0.9 %
10 SYRINGE (ML) INJECTION PRN
Status: DISCONTINUED | OUTPATIENT
Start: 2023-08-05 | End: 2023-08-17 | Stop reason: HOSPADM

## 2023-08-05 RX ORDER — SODIUM CHLORIDE 9 MG/ML
INJECTION, SOLUTION INTRAVENOUS PRN
Status: DISCONTINUED | OUTPATIENT
Start: 2023-08-05 | End: 2023-08-17 | Stop reason: HOSPADM

## 2023-08-05 RX ORDER — SODIUM CHLORIDE, SODIUM LACTATE, POTASSIUM CHLORIDE, CALCIUM CHLORIDE 600; 310; 30; 20 MG/100ML; MG/100ML; MG/100ML; MG/100ML
INJECTION, SOLUTION INTRAVENOUS CONTINUOUS
Status: DISCONTINUED | OUTPATIENT
Start: 2023-08-05 | End: 2023-08-08

## 2023-08-05 RX ORDER — FENTANYL CITRATE-0.9 % NACL/PF 10 MCG/ML
25-200 PLASTIC BAG, INJECTION (ML) INTRAVENOUS CONTINUOUS
Status: DISCONTINUED | OUTPATIENT
Start: 2023-08-05 | End: 2023-08-05

## 2023-08-05 RX ORDER — ERGOCALCIFEROL 1.25 MG/1
50000 CAPSULE ORAL WEEKLY
Status: DISCONTINUED | OUTPATIENT
Start: 2023-08-05 | End: 2023-08-17 | Stop reason: HOSPADM

## 2023-08-05 RX ORDER — CYPROHEPTADINE HYDROCHLORIDE 4 MG/1
8 TABLET ORAL 3 TIMES DAILY
Status: DISCONTINUED | OUTPATIENT
Start: 2023-08-05 | End: 2023-08-09

## 2023-08-05 RX ORDER — ATORVASTATIN CALCIUM 20 MG/1
40 TABLET, FILM COATED ORAL NIGHTLY
Status: DISCONTINUED | OUTPATIENT
Start: 2023-08-05 | End: 2023-08-17 | Stop reason: HOSPADM

## 2023-08-05 RX ORDER — ZINC SULFATE 50(220)MG
50 CAPSULE ORAL DAILY
Status: DISCONTINUED | OUTPATIENT
Start: 2023-08-05 | End: 2023-08-17 | Stop reason: HOSPADM

## 2023-08-05 RX ORDER — THIAMINE HYDROCHLORIDE 100 MG/ML
100 INJECTION, SOLUTION INTRAMUSCULAR; INTRAVENOUS DAILY
Status: DISCONTINUED | OUTPATIENT
Start: 2023-08-05 | End: 2023-08-05

## 2023-08-05 RX ORDER — DEXAMETHASONE SODIUM PHOSPHATE 10 MG/ML
6 INJECTION, SOLUTION INTRAMUSCULAR; INTRAVENOUS EVERY 24 HOURS
Status: DISCONTINUED | OUTPATIENT
Start: 2023-08-06 | End: 2023-08-13

## 2023-08-05 RX ORDER — FINASTERIDE 5 MG/1
10 TABLET, FILM COATED ORAL DAILY
Status: DISCONTINUED | OUTPATIENT
Start: 2023-08-05 | End: 2023-08-17 | Stop reason: HOSPADM

## 2023-08-05 RX ORDER — THIAMINE HYDROCHLORIDE 100 MG/ML
200 INJECTION, SOLUTION INTRAMUSCULAR; INTRAVENOUS EVERY 12 HOURS
Status: COMPLETED | OUTPATIENT
Start: 2023-08-05 | End: 2023-08-07

## 2023-08-05 RX ORDER — SODIUM CHLORIDE, SODIUM LACTATE, POTASSIUM CHLORIDE, AND CALCIUM CHLORIDE .6; .31; .03; .02 G/100ML; G/100ML; G/100ML; G/100ML
1000 INJECTION, SOLUTION INTRAVENOUS ONCE
Status: COMPLETED | OUTPATIENT
Start: 2023-08-05 | End: 2023-08-05

## 2023-08-05 RX ORDER — MAGNESIUM SULFATE IN WATER 40 MG/ML
4000 INJECTION, SOLUTION INTRAVENOUS ONCE
Status: COMPLETED | OUTPATIENT
Start: 2023-08-05 | End: 2023-08-05

## 2023-08-05 RX ORDER — ACETAMINOPHEN 325 MG/1
650 TABLET ORAL EVERY 6 HOURS PRN
Status: DISCONTINUED | OUTPATIENT
Start: 2023-08-05 | End: 2023-08-17 | Stop reason: HOSPADM

## 2023-08-05 RX ORDER — LEVETIRACETAM 500 MG/5ML
500 INJECTION, SOLUTION, CONCENTRATE INTRAVENOUS EVERY 12 HOURS
Status: DISCONTINUED | OUTPATIENT
Start: 2023-08-05 | End: 2023-08-17 | Stop reason: HOSPADM

## 2023-08-05 RX ADMIN — WATER 1000 MG: 1 INJECTION INTRAMUSCULAR; INTRAVENOUS; SUBCUTANEOUS at 11:18

## 2023-08-05 RX ADMIN — THIAMINE HYDROCHLORIDE 200 MG: 100 INJECTION, SOLUTION INTRAMUSCULAR; INTRAVENOUS at 11:19

## 2023-08-05 RX ADMIN — PROPOFOL 20 MCG/KG/MIN: 10 INJECTION, EMULSION INTRAVENOUS at 14:27

## 2023-08-05 RX ADMIN — OMEGA-3-ACID ETHYL ESTERS 2 G: 1 CAPSULE, LIQUID FILLED ORAL at 16:01

## 2023-08-05 RX ADMIN — ASCORBIC ACID 3000 MG: 500 INJECTION INTRAVENOUS at 23:03

## 2023-08-05 RX ADMIN — MIDAZOLAM HYDROCHLORIDE 6 MG/HR: 1 INJECTION, SOLUTION INTRAVENOUS at 08:17

## 2023-08-05 RX ADMIN — SODIUM CHLORIDE, POTASSIUM CHLORIDE, SODIUM LACTATE AND CALCIUM CHLORIDE 1000 ML: 600; 310; 30; 20 INJECTION, SOLUTION INTRAVENOUS at 07:37

## 2023-08-05 RX ADMIN — Medication 100 MCG/HR: at 08:15

## 2023-08-05 RX ADMIN — ASCORBIC ACID 3000 MG: 500 INJECTION INTRAVENOUS at 11:31

## 2023-08-05 RX ADMIN — PROPOFOL 50 MCG/KG/MIN: 10 INJECTION, EMULSION INTRAVENOUS at 21:08

## 2023-08-05 RX ADMIN — Medication 5000 UNITS: at 16:01

## 2023-08-05 RX ADMIN — THIAMINE HYDROCHLORIDE 200 MG: 100 INJECTION, SOLUTION INTRAMUSCULAR; INTRAVENOUS at 23:03

## 2023-08-05 RX ADMIN — CHLORHEXIDINE GLUCONATE 15 ML: 1.2 RINSE ORAL at 20:42

## 2023-08-05 RX ADMIN — SODIUM CHLORIDE, PRESERVATIVE FREE 20 MG: 5 INJECTION INTRAVENOUS at 20:26

## 2023-08-05 RX ADMIN — Medication 10 MG: at 16:00

## 2023-08-05 RX ADMIN — PROPOFOL 50 MCG/KG/MIN: 10 INJECTION, EMULSION INTRAVENOUS at 16:28

## 2023-08-05 RX ADMIN — ERGOCALCIFEROL 50000 UNITS: 1.25 CAPSULE ORAL at 16:01

## 2023-08-05 RX ADMIN — DEXAMETHASONE SODIUM PHOSPHATE 6 MG: 10 INJECTION, SOLUTION INTRAMUSCULAR; INTRAVENOUS at 07:56

## 2023-08-05 RX ADMIN — CHLORHEXIDINE GLUCONATE 15 ML: 1.2 RINSE ORAL at 11:00

## 2023-08-05 RX ADMIN — SODIUM CHLORIDE, POTASSIUM CHLORIDE, SODIUM LACTATE AND CALCIUM CHLORIDE: 600; 310; 30; 20 INJECTION, SOLUTION INTRAVENOUS at 23:03

## 2023-08-05 RX ADMIN — OMEGA-3-ACID ETHYL ESTERS 2 G: 1 CAPSULE, LIQUID FILLED ORAL at 20:26

## 2023-08-05 RX ADMIN — LEVETIRACETAM 500 MG: 100 INJECTION INTRAVENOUS at 16:29

## 2023-08-05 RX ADMIN — ENOXAPARIN SODIUM 60 MG: 100 INJECTION SUBCUTANEOUS at 20:26

## 2023-08-05 RX ADMIN — MAGNESIUM SULFATE HEPTAHYDRATE 4000 MG: 40 INJECTION, SOLUTION INTRAVENOUS at 11:01

## 2023-08-05 RX ADMIN — CYPROHEPTADINE HYDROCHLORIDE 8 MG: 4 TABLET ORAL at 16:01

## 2023-08-05 RX ADMIN — ZINC SULFATE 220 MG (50 MG) CAPSULE 50 MG: CAPSULE at 18:26

## 2023-08-05 RX ADMIN — FLUVOXAMINE MALEATE 50 MG: 50 TABLET, COATED ORAL at 16:01

## 2023-08-05 RX ADMIN — PROPOFOL 50 MCG/KG/MIN: 10 INJECTION, EMULSION INTRAVENOUS at 19:45

## 2023-08-05 RX ADMIN — ASCORBIC ACID 3000 MG: 500 INJECTION INTRAVENOUS at 18:22

## 2023-08-05 RX ADMIN — FINASTERIDE 10 MG: 5 TABLET, FILM COATED ORAL at 16:00

## 2023-08-05 RX ADMIN — ATORVASTATIN CALCIUM 40 MG: 40 TABLET, FILM COATED ORAL at 20:26

## 2023-08-05 RX ADMIN — CYPROHEPTADINE HYDROCHLORIDE 8 MG: 4 TABLET ORAL at 20:26

## 2023-08-05 RX ADMIN — SODIUM CHLORIDE, PRESERVATIVE FREE 20 MG: 5 INJECTION INTRAVENOUS at 14:55

## 2023-08-05 RX ADMIN — SODIUM CHLORIDE, POTASSIUM CHLORIDE, SODIUM LACTATE AND CALCIUM CHLORIDE: 600; 310; 30; 20 INJECTION, SOLUTION INTRAVENOUS at 10:51

## 2023-08-05 RX ADMIN — ENOXAPARIN SODIUM 60 MG: 100 INJECTION SUBCUTANEOUS at 14:55

## 2023-08-05 RX ADMIN — LEVETIRACETAM 1000 MG: 100 INJECTION INTRAVENOUS at 07:56

## 2023-08-05 RX ADMIN — ASPIRIN 325 MG: 325 TABLET ORAL at 16:01

## 2023-08-05 RX ADMIN — FLUVOXAMINE MALEATE 50 MG: 50 TABLET, COATED ORAL at 20:27

## 2023-08-05 RX ADMIN — MIDAZOLAM HYDROCHLORIDE 8 MG/HR: 1 INJECTION, SOLUTION INTRAVENOUS at 19:20

## 2023-08-05 ASSESSMENT — PULMONARY FUNCTION TESTS
PIF_VALUE: 19
PIF_VALUE: 19
PIF_VALUE: 16
PIF_VALUE: 20
PIF_VALUE: 19
PIF_VALUE: 18
PIF_VALUE: 18
PIF_VALUE: 15
PIF_VALUE: 18
PIF_VALUE: 19
PIF_VALUE: 16
PIF_VALUE: 15
PIF_VALUE: 17
PIF_VALUE: 19
PIF_VALUE: 19
PIF_VALUE: 16
PIF_VALUE: 15
PIF_VALUE: 16
PIF_VALUE: 17
PIF_VALUE: 18
PIF_VALUE: 14
PIF_VALUE: 24
PIF_VALUE: 20
PIF_VALUE: 19
PIF_VALUE: 18
PIF_VALUE: 16
PIF_VALUE: 19

## 2023-08-05 ASSESSMENT — PAIN SCALES - GENERAL
PAINLEVEL_OUTOF10: 0

## 2023-08-05 NOTE — CONSULTS
Comprehensive Nutrition Assessment    Type and Reason for Visit:  Initial, Consult    Nutrition Recommendations/Plan: To start Vital HIgh Protein at 25ml and advance by 5 ml every 6 hours until goal rate of 68ml is reached. 15ml free water flush hourly     Malnutrition Assessment:  Malnutrition Status: At risk for malnutrition (Comment) (08/05/23 1420)    Context:  Acute Illness     Findings of the 6 clinical characteristics of malnutrition:  Energy Intake:  Mild decrease in energy intake (Comment)  Weight Loss:  No significant weight loss     Body Fat Loss:  No significant body fat loss     Muscle Mass Loss:  No significant muscle mass loss    Fluid Accumulation:  No significant fluid accumulation Extremities   Strength:  Not Performed    Nutrition Assessment:    Received consult for vent and tf orders and management. No Propofol noted. +COVID. To start Vital high protein goal rate 68ml/hr with 15ml free water flush hourly. Nutrition Related Findings:    on vent. Glucose 157-on Decdron Wound Type: None       Current Nutrition Intake & Therapies:    Average Meal Intake: NPO  Average Supplements Intake: NPO  Current Tube Feeding (TF) Orders:  Feeding Route: Orogastric  Formula: Peptide Based High Protein  Schedule: Continuous  Feeding Regimen: Vital HighProtein goal rate 68ml/hr  Additives/Modulars: None  Water Flushes: 15ml hourly  Current TF & Flush Orders Provides: 0  Goal TF & Flush Orders Provides: Vital HighProtein @ 68ml/hr with 15ml free water flush = 1632 kcals with 142g protein, 182g CHO and 1364ml free water from formula and 360ml free water from flush    Anthropometric Measures:  Height: 6' 4\" (193 cm)  Ideal Body Weight (IBW): 202 lbs (92 kg)    Admission Body Weight: 259 lb (117.5 kg)  Current Body Weight: 259 lb (117.5 kg), 128.2 % IBW.     Current BMI (kg/m2): 31.5  Usual Body Weight: 259 lb 9.6 oz (117.8 kg) (2/2023)  % Weight Change (Calculated): -0.2  BMI Categories: Obese Class 1 (BMI 30.0-34. 9)    Estimated Daily Nutrient Needs:  Energy Requirements Based On: Kcal/kg  Weight Used for Energy Requirements: Current  Energy (kcal/day): 8418-2237 ml  Weight Used for Protein Requirements: Ideal  Protein (g/day): 138-184g  (1.5-2g/kg)  Method Used for Fluid Requirements: 1 ml/kcal  Fluid (ml/day): 292-1645 ml    Nutrition Diagnosis:   Inadequate oral intake related to acute injury/trauma, impaired respiratory function as evidenced by NPO or clear liquid status due to medical condition, intubation, nutrition support - enteral nutrition    Nutrition Interventions:   Food and/or Nutrient Delivery: Start Tube Feeding, Continue NPO  Nutrition Education/Counseling: No recommendation at this time  Coordination of Nutrition Care: Continue to monitor while inpatient       Goals:     Goals: Meet at least 75% of estimated needs, Initiate nutrition support, Tolerate nutrition support at goal rate       Nutrition Monitoring and Evaluation:   Behavioral-Environmental Outcomes: None Identified  Food/Nutrient Intake Outcomes: Enteral Nutrition Intake/Tolerance  Physical Signs/Symptoms Outcomes: Biochemical Data, Fluid Status or Edema, Weight, Skin    Discharge Planning:     Too soon to determine     Joyce Garcia MS, RD, LD  Contact: [phone]

## 2023-08-05 NOTE — H&P
Hospital Medicine H&P    Patient:  Latisha Medley  MRN: [id number]    Consulting Physician: Bert Monteiro DO  Reason for Consult: Medical Management  Primacy Care Physician: Gabino Smith MD    HISTORY OF PRESENT ILLNESS:   The patient is a 70 y.o. male returns back to the emergency department with worsening shortness of breath. On Sunday, he was diagnosed with COVID. He was seen in the emergency department yesterday treated and was discharged. He came back again today with worsening symptoms and developed significant hypoxemia and required intubation. He has had fairly rapid development of infiltrative disease on his chest x-ray. This is somewhat reminiscent of the earlier phases of COVID at the beginning of the pandemic. However he does not have the classic groundglass appearance. He will be admitted to the ICU and undergo intensive medical therapy. He is currently on assist-control rate of 14, tidal volume of 520, 8 of PEEP, FiO2 1. 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 See scanned document.     LAPAROSCOPIC APPENDECTOMY N/A 1/26/2023    LAPAROSCOPIC APPENDECTOMY performed by Champ Vigil DO at [address]    LITHOTRIPSY         Medications: Scheduled Meds:   sodium 08/05/2023 Time:    09:47     XR CHEST PORTABLE    Result Date: 8/5/2023  EXAM:  CHEST, SINGLE VIEW  HISTORY:  Altered mental status. COMPARISON:  Chest radiograph 08/04/2023. FINDINGS:  Mild enlargement cardiac silhouette is unchanged. Mediasternotomy wires. Mixed interstitial and alveolar opacities throughout the lungs bilaterally. Increased in extent with more focal airspace opacities as seen in the lower right lung through the infrahilar region. This could be related to infection/pneumonia or asymmetric pulmonary edema. No pleural effusion or pneumothorax. Increasing mixed interstitial and alveolar opacities, more pronounced on the right side as detailed above which could represent worsening infection/pneumonia or asymmetric pulmonary edema. Stable cardiomegaly and postoperative change of the chest.   ______________________________________ Electronically signed by: Nichole Monroe M.D. Date:     08/05/2023 Time:    08:24     XR CHEST PORTABLE    Result Date: 8/4/2023  EXAM:  CHEST FRONTAL VIEW  HISTORY:  COVID-19, confusion COMPARISON:  04/11/2019      FINDINGS / IMPRESSION:  Cardiomegaly, atherosclerotic disease and present. There is mild vascular congestion and interstitial edema. No definite consolidated pneumonia is identified. ______________________________________ Electronically signed by: Jesus Barba M.D. Date:     08/04/2023 Time:    15:48         Assessment and Plan     Acute respiratory failure with hypoxemia secondary to COVID. Ventilatory support. Aggressive medical management. Further recommendations to follow. Please document 40 minutes of critical care time for patient assessment, chart review, discussion with staff, .       Pearl Wolf DO

## 2023-08-05 NOTE — ED PROVIDER NOTES
[facility] EMERGENCY DEPT  eMERGENCY dEPARTMENT eNCOUnter      Pt Name: Maria A Balderas  MRN: [id number]  [address] [facility] [place] 1960  Date of evaluation: 8/5/2023  Provider: Ana Vallejo MD    [address]       Chief Complaint   Patient presents with    Positive For Covid-19    Altered Mental Status         HISTORY OF PRESENT ILLNESS   (Location/Symptom, Timing/Onset,Context/Setting, Quality, Duration, Modifying Factors, Severity)  Note limiting factors. Maria A Balderas is a 70 y.o. male who presents to the emergency department for altered mental status. Patient diagnosed with COVID 6 days ago was seen here yesterday for dizziness and some mild confusion but had a negative head CT and otherwise grossly negative work-up with a nonfocal neuro exam and was discharged home. This morning he was headed to the bathroom when he supposedly had some brief seizure-like activity. He is incontinent of urine here. Blood sugar is 124 with EMS. Altered responsive to pain unable to provide any history. HPI    NursingNotes were reviewed.     REVIEW OF SYSTEMS    (2-9 systems for level 4, 10 or more for level 5)     Review of Systems   Unable to perform ROS: Mental status change          PAST MEDICALHISTORY     Past Medical History:   Diagnosis Date    Acute MI ([address])     inferior wall, interrupted by direct heart cath and angioplasty to the RCA 5/3/09, successful with normal LV function    Arthritis     CAD (coronary artery disease) 11/8/2011    Hyperlipidemia     PCP manages cholesterol    Hypertension     Systemic    Hypertension 11/8/2011    Kidney stone     Nephrolithiasis     history of    Nicotine abuse 5/19/2014    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 All CT scans are performed using dose optimization techniques as appropriate to the performed exam and include    at least one of the following: Automated exposure control, adjustment of the mA and/or kV according to size, and the use of iterative reconstruction technique. ______________________________________    Electronically signed by: Kati Mathis M.D. Date:     08/05/2023   Time:    08:43       CT CERVICAL SPINE WO CONTRAST   Final Result       No acute osseous abnormality involving the cervical spine. Multilevel degenerative disc/joint disease as described with mild central canal narrowing at C5/C6 and C6/C7. Multilevel foraminal narrowing most severe bilaterally at C6/C7. Mild dextrocurvature of the spine. Partially imaged endotracheal tube. Emphysematous changes of the lungs with question of pneumonia versus pulmonary edema within the lungs. Mild gaseous distension of the esophagus with scattered filling defects. Correlation with MRI of the cervical spine can be considered if clinically warranted. All CT scans are performed using dose optimization techniques as appropriate to the performed exam and include    at least one of the following: Automated exposure control, adjustment of the mA and/or kV according to size, and the use of iterative reconstruction technique. ______________________________________    Electronically signed by: Tameka Sofia M.D. Date:     08/05/2023   Time:    08:49       XR CHEST PORTABLE   Final Result       Increasing mixed interstitial and alveolar opacities, more pronounced on the right side as detailed above which could represent worsening infection/pneumonia or asymmetric pulmonary edema.        Stable cardiomegaly and postoperative change of the chest.           ______________________________________    Electronically

## 2023-08-05 NOTE — PLAN OF CARE
Nutrition Problem #1: Inadequate oral intake  Intervention: Food and/or Nutrient Delivery: Start Tube Feeding, Continue NPO  Nutritional

## 2023-08-05 NOTE — PROGRESS NOTES
Pt intubated with 8.0 et tube at the 24cm asia.  Pt placed on vent settings ACVC 16, ,5 peep and 100% fio2

## 2023-08-05 NOTE — PROGRESS NOTES
Received pt to  at 09:32 from ER admitted with pneumonia due to Covid-19, AMS, and seizure. He is intubated and ventilated per RT--AC/VC, RR 20, , PEEP 5, FiO2 100%. He is sedated with Versed and Fentanyl, does withdraw to pain and exhibit anxiousness. Dr. Evelyn Yang notified of patient's arrival and in to see patient. VSS at this time. Will continue to monitor.

## 2023-08-05 NOTE — CONSULTS
[facility] Neurology Consult      Patient:   Justyn David  MR#:    [id number]  Account Number:                   [id number]      Room:    [facility]   YOB: 1960  Date of Progress Note: 8/5/2023  Time of Note                           3:21 PM  Attending Physician:  Onesimo Smith DO  Consulting Physician:  Lisa Carpenter DO       CHIEF COMPLAINT:  AMS, covid, possible seizure      HISTORY OF PRESENT ILLNESS:   This is a 70 y.o. male who was admitted with altered mental status, respiratory failure, COVID. He initially presented to the ER with altered mental status. He was diagnosed with COVID 6 days ago and was seen in the ER yesterday for dizziness and mild confusion. Head CT at that time was negative. He was discharged home. He presented back to the emergency department today with worsening confusion and possible seizure-like activity versus syncope. The patient subsequently went into respiratory distress and required intubation and mechanical ventilation. No prior seizure history noted. He was treated with Keppra in the emergency department is now in our ICU. He is currently sedated with propofol. REVIEW OF SYSTEMS:  Unable to obtain given AMS.      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 Minimal central canal narrowing with central canal measuring 9.5 mm anterior to posterior dimension. Facet hypertrophy with moderate bilateral foraminal narrowing more pronounced on the left side  C6/C7:  The posterior disc/osteophyte complex with mild deformation ventral portion of the spinal cord. Central canal measures 9.5 mm in anterior to posterior dimension. Facet and uncovertebral hypertrophy with marked bilateral foraminal narrowing. C7/T1:  Small posterior disc/osteophyte complex without significant central canal narrowing. Facet and uncovertebral hypertrophy with mild foraminal narrowing        No acute osseous abnormality involving the cervical spine. Multilevel degenerative disc/joint disease as described with mild central canal narrowing at C5/C6 and C6/C7. Multilevel foraminal narrowing most severe bilaterally at C6/C7. Mild dextrocurvature of the spine. Partially imaged endotracheal tube. Emphysematous changes of the lungs with question of pneumonia versus pulmonary edema within the lungs. Mild gaseous distension of the esophagus with scattered filling defects. Correlation with MRI of the cervical spine can be considered if clinically warranted. All CT scans are performed using dose optimization techniques as appropriate to the performed exam and include at least one of the following: Automated exposure control, adjustment of the mA and/or kV according to size, and the use of iterative reconstruction technique. ______________________________________ Electronically signed by: Wes Gilmore M.D. Date:     08/05/2023 Time:    08:49     XR CHEST PORTABLE    Result Date: 8/5/2023  EXAM: CHEST RADIOGRAPH (1 VIEW)  TECHNIQUE: Frontal Chest Radiograph. HISTORY: Nasogastric tube placement. COMPARISON: Chest radiograph 08/05/2023  FINDINGS:  Lines, Tubes, Devices:  Enteric tube with tip likely within the body of the stomach.   [place] is

## 2023-08-06 ENCOUNTER — APPOINTMENT (OUTPATIENT)
Dept: GENERAL RADIOLOGY | Age: 63
End: 2023-08-06
Payer: MEDICARE

## 2023-08-06 LAB
ALBUMIN SERPL-MCNC: 3 G/DL (ref 3.5–5.2)
ALBUMIN SERPL-MCNC: 3.1 G/DL (ref 3.5–5.2)
ALLENS TEST: ABNORMAL
ALP SERPL-CCNC: 82 U/L (ref 40–130)
ALP SERPL-CCNC: 83 U/L (ref 40–130)
ALT SERPL-CCNC: 16 U/L (ref 5–41)
ALT SERPL-CCNC: 18 U/L (ref 5–41)
ANION GAP SERPL CALCULATED.3IONS-SCNC: 12 MMOL/L (ref 7–19)
ANION GAP SERPL CALCULATED.3IONS-SCNC: 13 MMOL/L (ref 7–19)
APTT PPP: 32.1 SEC (ref 26–36.2)
AST SERPL-CCNC: 15 U/L (ref 5–40)
AST SERPL-CCNC: 17 U/L (ref 5–40)
BASE EXCESS ARTERIAL: -1.7 MMOL/L (ref -2–2)
BASOPHILS # BLD: 0 K/UL (ref 0–0.2)
BASOPHILS NFR BLD: 0.2 % (ref 0–1)
BILIRUB SERPL-MCNC: 0.6 MG/DL (ref 0.2–1.2)
BILIRUB SERPL-MCNC: 0.6 MG/DL (ref 0.2–1.2)
BUN SERPL-MCNC: 24 MG/DL (ref 8–23)
BUN SERPL-MCNC: 29 MG/DL (ref 8–23)
CALCIUM SERPL-MCNC: 7.9 MG/DL (ref 8.8–10.2)
CALCIUM SERPL-MCNC: 8.4 MG/DL (ref 8.8–10.2)
CARBOXYHEMOGLOBIN ARTERIAL: 1.4 % (ref 0–5)
CHLORIDE SERPL-SCNC: 104 MMOL/L (ref 98–111)
CHLORIDE SERPL-SCNC: 107 MMOL/L (ref 98–111)
CO2 SERPL-SCNC: 23 MMOL/L (ref 22–29)
CO2 SERPL-SCNC: 23 MMOL/L (ref 22–29)
CREAT SERPL-MCNC: 1.5 MG/DL (ref 0.5–1.2)
CREAT SERPL-MCNC: 2.1 MG/DL (ref 0.5–1.2)
EOSINOPHIL # BLD: 0 K/UL (ref 0–0.6)
EOSINOPHIL NFR BLD: 0 % (ref 0–5)
ERYTHROCYTE [DISTWIDTH] IN BLOOD BY AUTOMATED COUNT: 14 % (ref 11.5–14.5)
FIO2: 100 %
GLUCOSE BLD-MCNC: 288 MG/DL (ref 70–99)
GLUCOSE SERPL-MCNC: 140 MG/DL (ref 74–109)
GLUCOSE SERPL-MCNC: 157 MG/DL (ref 74–109)
HCO3 ARTERIAL: 25.6 MMOL/L (ref 22–26)
HCT VFR BLD AUTO: 46.1 % (ref 42–52)
HEMOGLOBIN, ART, EXTENDED: 15.6 G/DL (ref 14–18)
HGB BLD-MCNC: 15 G/DL (ref 14–18)
IMM GRANULOCYTES # BLD: 0 K/UL
INR PPP: 1.13 (ref 0.88–1.18)
LYMPHOCYTES # BLD: 1 K/UL (ref 1.1–4.5)
LYMPHOCYTES NFR BLD: 12 % (ref 20–40)
MCH RBC QN AUTO: 31.1 PG (ref 27–31)
MCHC RBC AUTO-ENTMCNC: 32.5 G/DL (ref 33–37)
MCV RBC AUTO: 95.4 FL (ref 80–94)
MECHANICAL RATE IN BPM: 20
METHEMOGLOBIN ARTERIAL: 1.4 %
MODE: ABNORMAL
MONOCYTES # BLD: 0.9 K/UL (ref 0–0.9)
MONOCYTES NFR BLD: 10.9 % (ref 0–10)
NEUTROPHILS # BLD: 6.5 K/UL (ref 1.5–7.5)
NEUTS SEG NFR BLD: 76.5 % (ref 50–65)
O2 CONTENT ARTERIAL: 19.8 ML/DL
O2 SAT, ARTERIAL: 90.5 %
O2 THERAPY: ABNORMAL
PCO2 ARTERIAL: 52 MMHG (ref 35–45)
PERFORMED ON: ABNORMAL
PH ARTERIAL: 7.3 (ref 7.35–7.45)
PLATELET # BLD AUTO: 139 K/UL (ref 130–400)
PMV BLD AUTO: 10.1 FL (ref 9.4–12.4)
PO2 ARTERIAL: 63 MMHG (ref 80–100)
POSITIVE END EXP PRESS: 8
POTASSIUM BLD-SCNC: 4.6 MMOL/L
POTASSIUM SERPL-SCNC: 4.5 MMOL/L (ref 3.5–5)
POTASSIUM SERPL-SCNC: 5.2 MMOL/L (ref 3.5–5)
PROT SERPL-MCNC: 5.7 G/DL (ref 6.6–8.7)
PROT SERPL-MCNC: 6.6 G/DL (ref 6.6–8.7)
PROTHROMBIN TIME: 14.2 SEC (ref 12–14.6)
RBC # BLD AUTO: 4.83 M/UL (ref 4.7–6.1)
SAMPLE SOURCE: ABNORMAL
SODIUM SERPL-SCNC: 140 MMOL/L (ref 136–145)
SODIUM SERPL-SCNC: 142 MMOL/L (ref 136–145)
TROPONIN T SERPL-MCNC: 0.09 NG/ML (ref 0–0.03)
VT MECHANICAL: 520 %
WBC # BLD AUTO: 8.5 K/UL (ref 4.8–10.8)

## 2023-08-06 PROCEDURE — 82962 GLUCOSE BLOOD TEST: CPT

## 2023-08-06 PROCEDURE — 6360000002 HC RX W HCPCS: Performed by: INTERNAL MEDICINE

## 2023-08-06 PROCEDURE — 94760 N-INVAS EAR/PLS OXIMETRY 1: CPT

## 2023-08-06 PROCEDURE — 2000000000 HC ICU R&B

## 2023-08-06 PROCEDURE — 6360000002 HC RX W HCPCS: Performed by: EMERGENCY MEDICINE

## 2023-08-06 PROCEDURE — 2500000003 HC RX 250 WO HCPCS: Performed by: INTERNAL MEDICINE

## 2023-08-06 PROCEDURE — 95819 EEG AWAKE AND ASLEEP: CPT

## 2023-08-06 PROCEDURE — 99233 SBSQ HOSP IP/OBS HIGH 50: CPT | Performed by: PSYCHIATRY & NEUROLOGY

## 2023-08-06 PROCEDURE — 2580000003 HC RX 258: Performed by: INTERNAL MEDICINE

## 2023-08-06 PROCEDURE — 85025 COMPLETE CBC W/AUTO DIFF WBC: CPT

## 2023-08-06 PROCEDURE — 85610 PROTHROMBIN TIME: CPT

## 2023-08-06 PROCEDURE — 95819 EEG AWAKE AND ASLEEP: CPT | Performed by: PSYCHIATRY & NEUROLOGY

## 2023-08-06 PROCEDURE — 71045 X-RAY EXAM CHEST 1 VIEW: CPT

## 2023-08-06 PROCEDURE — 2700000000 HC OXYGEN THERAPY PER DAY

## 2023-08-06 PROCEDURE — 80053 COMPREHEN METABOLIC PANEL: CPT

## 2023-08-06 PROCEDURE — 36415 COLL VENOUS BLD VENIPUNCTURE: CPT

## 2023-08-06 PROCEDURE — 84484 ASSAY OF TROPONIN QUANT: CPT

## 2023-08-06 PROCEDURE — 85730 THROMBOPLASTIN TIME PARTIAL: CPT

## 2023-08-06 PROCEDURE — 02HV33Z INSERTION OF INFUSION DEVICE INTO SUPERIOR VENA CAVA, PERCUTANEOUS APPROACH: ICD-10-PCS | Performed by: INTERNAL MEDICINE

## 2023-08-06 PROCEDURE — 6360000002 HC RX W HCPCS: Performed by: PSYCHIATRY & NEUROLOGY

## 2023-08-06 PROCEDURE — 36600 WITHDRAWAL OF ARTERIAL BLOOD: CPT

## 2023-08-06 PROCEDURE — 6370000000 HC RX 637 (ALT 250 FOR IP): Performed by: INTERNAL MEDICINE

## 2023-08-06 PROCEDURE — 94003 VENT MGMT INPAT SUBQ DAY: CPT

## 2023-08-06 PROCEDURE — 82803 BLOOD GASES ANY COMBINATION: CPT

## 2023-08-06 RX ORDER — PHENYLEPHRINE HYDROCHLORIDE 10 MG/ML
INJECTION INTRAVENOUS
Status: COMPLETED
Start: 2023-08-06 | End: 2023-08-06

## 2023-08-06 RX ORDER — NOREPINEPHRINE BITARTRATE 0.06 MG/ML
1-100 INJECTION, SOLUTION INTRAVENOUS CONTINUOUS
Status: DISCONTINUED | OUTPATIENT
Start: 2023-08-06 | End: 2023-08-06

## 2023-08-06 RX ADMIN — Medication 5000 UNITS: at 09:30

## 2023-08-06 RX ADMIN — MIDAZOLAM HYDROCHLORIDE 8 MG/HR: 1 INJECTION, SOLUTION INTRAVENOUS at 07:29

## 2023-08-06 RX ADMIN — OMEGA-3-ACID ETHYL ESTERS 2 G: 1 CAPSULE, LIQUID FILLED ORAL at 20:38

## 2023-08-06 RX ADMIN — CHLORHEXIDINE GLUCONATE 15 ML: 1.2 RINSE ORAL at 09:41

## 2023-08-06 RX ADMIN — THIAMINE HYDROCHLORIDE 200 MG: 100 INJECTION, SOLUTION INTRAMUSCULAR; INTRAVENOUS at 09:39

## 2023-08-06 RX ADMIN — PHENYLEPHRINE HYDROCHLORIDE 50 MCG/MIN: 10 INJECTION INTRAVENOUS at 18:58

## 2023-08-06 RX ADMIN — SODIUM CHLORIDE, PRESERVATIVE FREE 10 ML: 5 INJECTION INTRAVENOUS at 09:26

## 2023-08-06 RX ADMIN — CHLORHEXIDINE GLUCONATE 15 ML: 1.2 RINSE ORAL at 20:49

## 2023-08-06 RX ADMIN — PHENYLEPHRINE HYDROCHLORIDE 30 MCG/MIN: 10 INJECTION INTRAVENOUS at 11:25

## 2023-08-06 RX ADMIN — FLUVOXAMINE MALEATE 50 MG: 50 TABLET, COATED ORAL at 09:12

## 2023-08-06 RX ADMIN — PROPOFOL 20 MCG/KG/MIN: 10 INJECTION, EMULSION INTRAVENOUS at 17:37

## 2023-08-06 RX ADMIN — WATER 1000 MG: 1 INJECTION INTRAMUSCULAR; INTRAVENOUS; SUBCUTANEOUS at 14:39

## 2023-08-06 RX ADMIN — CYPROHEPTADINE HYDROCHLORIDE 8 MG: 4 TABLET ORAL at 20:38

## 2023-08-06 RX ADMIN — ASPIRIN 325 MG: 325 TABLET ORAL at 09:20

## 2023-08-06 RX ADMIN — SODIUM CHLORIDE, PRESERVATIVE FREE 20 MG: 5 INJECTION INTRAVENOUS at 09:21

## 2023-08-06 RX ADMIN — PROPOFOL 50 MCG/KG/MIN: 10 INJECTION, EMULSION INTRAVENOUS at 02:03

## 2023-08-06 RX ADMIN — ASCORBIC ACID 3000 MG: 500 INJECTION INTRAVENOUS at 20:39

## 2023-08-06 RX ADMIN — THIAMINE HYDROCHLORIDE 200 MG: 100 INJECTION, SOLUTION INTRAMUSCULAR; INTRAVENOUS at 23:29

## 2023-08-06 RX ADMIN — Medication 5 MCG/MIN: at 12:03

## 2023-08-06 RX ADMIN — ENOXAPARIN SODIUM 60 MG: 100 INJECTION SUBCUTANEOUS at 20:39

## 2023-08-06 RX ADMIN — PROPOFOL 20 MCG/KG/MIN: 10 INJECTION, EMULSION INTRAVENOUS at 23:30

## 2023-08-06 RX ADMIN — PROPOFOL 50 MCG/KG/MIN: 10 INJECTION, EMULSION INTRAVENOUS at 08:12

## 2023-08-06 RX ADMIN — ASCORBIC ACID 3000 MG: 500 INJECTION INTRAVENOUS at 15:24

## 2023-08-06 RX ADMIN — EPINEPHRINE 14 MCG/MIN: 1 INJECTION INTRAMUSCULAR; INTRAVENOUS; SUBCUTANEOUS at 20:40

## 2023-08-06 RX ADMIN — Medication 10 MG: at 09:18

## 2023-08-06 RX ADMIN — DEXAMETHASONE SODIUM PHOSPHATE 6 MG: 10 INJECTION INTRAMUSCULAR; INTRAVENOUS at 09:22

## 2023-08-06 RX ADMIN — CYPROHEPTADINE HYDROCHLORIDE 8 MG: 4 TABLET ORAL at 09:13

## 2023-08-06 RX ADMIN — ATORVASTATIN CALCIUM 40 MG: 40 TABLET, FILM COATED ORAL at 20:39

## 2023-08-06 RX ADMIN — ENOXAPARIN SODIUM 60 MG: 100 INJECTION SUBCUTANEOUS at 09:20

## 2023-08-06 RX ADMIN — PROPOFOL 30 MCG/KG/MIN: 10 INJECTION, EMULSION INTRAVENOUS at 11:00

## 2023-08-06 RX ADMIN — PROPOFOL 50 MCG/KG/MIN: 10 INJECTION, EMULSION INTRAVENOUS at 04:00

## 2023-08-06 RX ADMIN — SODIUM CHLORIDE, POTASSIUM CHLORIDE, SODIUM LACTATE AND CALCIUM CHLORIDE: 600; 310; 30; 20 INJECTION, SOLUTION INTRAVENOUS at 17:20

## 2023-08-06 RX ADMIN — ZINC SULFATE 220 MG (50 MG) CAPSULE 50 MG: CAPSULE at 09:30

## 2023-08-06 RX ADMIN — LEVETIRACETAM 500 MG: 100 INJECTION INTRAVENOUS at 03:59

## 2023-08-06 RX ADMIN — FLUVOXAMINE MALEATE 50 MG: 50 TABLET, COATED ORAL at 20:39

## 2023-08-06 RX ADMIN — SODIUM CHLORIDE, POTASSIUM CHLORIDE, SODIUM LACTATE AND CALCIUM CHLORIDE: 600; 310; 30; 20 INJECTION, SOLUTION INTRAVENOUS at 07:30

## 2023-08-06 RX ADMIN — CYPROHEPTADINE HYDROCHLORIDE 8 MG: 4 TABLET ORAL at 14:39

## 2023-08-06 RX ADMIN — SODIUM CHLORIDE, PRESERVATIVE FREE 20 MG: 5 INJECTION INTRAVENOUS at 20:39

## 2023-08-06 RX ADMIN — LEVETIRACETAM 500 MG: 100 INJECTION INTRAVENOUS at 16:32

## 2023-08-06 RX ADMIN — EPINEPHRINE 1 MCG/MIN: 1 INJECTION INTRAMUSCULAR; INTRAVENOUS; SUBCUTANEOUS at 13:09

## 2023-08-06 RX ADMIN — FINASTERIDE 10 MG: 5 TABLET, FILM COATED ORAL at 09:13

## 2023-08-06 RX ADMIN — ASCORBIC ACID 3000 MG: 500 INJECTION INTRAVENOUS at 03:59

## 2023-08-06 RX ADMIN — OMEGA-3-ACID ETHYL ESTERS 2 G: 1 CAPSULE, LIQUID FILLED ORAL at 09:18

## 2023-08-06 ASSESSMENT — PULMONARY FUNCTION TESTS
PIF_VALUE: 17
PIF_VALUE: 16
PIF_VALUE: 18
PIF_VALUE: 22
PIF_VALUE: 19
PIF_VALUE: 20
PIF_VALUE: 16
PIF_VALUE: 16
PIF_VALUE: 19
PIF_VALUE: 22
PIF_VALUE: 20
PIF_VALUE: 22
PIF_VALUE: 23
PIF_VALUE: 19
PIF_VALUE: 20
PIF_VALUE: 20
PIF_VALUE: 18
PIF_VALUE: 20
PIF_VALUE: 19
PIF_VALUE: 21
PIF_VALUE: 20
PIF_VALUE: 20
PIF_VALUE: 19
PIF_VALUE: 20
PIF_VALUE: 18
PIF_VALUE: 20
PIF_VALUE: 18
PIF_VALUE: 21
PIF_VALUE: 18
PIF_VALUE: 18
PIF_VALUE: 20
PIF_VALUE: 23
PIF_VALUE: 20
PIF_VALUE: 19
PIF_VALUE: 16
PIF_VALUE: 19
PIF_VALUE: 21
PIF_VALUE: 19
PIF_VALUE: 19
PIF_VALUE: 17
PIF_VALUE: 23
PIF_VALUE: 26
PIF_VALUE: 17
PIF_VALUE: 24
PIF_VALUE: 23
PIF_VALUE: 19
PIF_VALUE: 19
PIF_VALUE: 27
PIF_VALUE: 20
PIF_VALUE: 19
PIF_VALUE: 25
PIF_VALUE: 23
PIF_VALUE: 19
PIF_VALUE: 23
PIF_VALUE: 20
PIF_VALUE: 19
PIF_VALUE: 19
PIF_VALUE: 18
PIF_VALUE: 21
PIF_VALUE: 19
PIF_VALUE: 22
PIF_VALUE: 20
PIF_VALUE: 24
PIF_VALUE: 17
PIF_VALUE: 20
PIF_VALUE: 19
PIF_VALUE: 19
PIF_VALUE: 24
PIF_VALUE: 21
PIF_VALUE: 19
PIF_VALUE: 23

## 2023-08-06 ASSESSMENT — PAIN SCALES - GENERAL
PAINLEVEL_OUTOF10: 0
PAINLEVEL_OUTOF10: 0

## 2023-08-06 NOTE — PROGRESS NOTES
Dr. Oj Sullivan notified that bradycardia has worsened. He then came to bedside. At 1208 1mg of epinephrine was administered per verbal order.

## 2023-08-06 NOTE — PROCEDURES
ADULT INPATIENT ELECTROENCEPHALOGRAM REPORT    Patient:   Latesha Lemons  MR#:    [id number]  Room #:    INPATIENT  YOB: 1960  Date of Evaluation:  8/6/2023  Primary Physician:     Aby Castrejon MD   Referring Physician:   Dariana Jimenez DO      CLINICAL INFORMATION:     This patient is a 70 y.o. male with a history of AMS. MEDICATIONS:     See MAR. RECORDING CONDITIONS:     This EEG was performed utilizing standard International 10-20 System of electrode placement, with additional channels monitored for eye movement. One channel electrocardiogram was monitored. Data was obtained, stored, and interpreted according to ACNS guidelines (J Clin Neurophysiol 2006;23(2):) utilizing referential montage recording, with reformatting to longitudinal, transverse bipolar, and referential montages as necessary for interpretation, along with the digital/automated EEG analysis. Patient tolerated entire procedure well. Photic stimulation and hyperventilation were utilized as activation procedures unless otherwise specified below. E.E.G. DESCRIPTION:     The background consists of diffuse low voltage slowing. No overt epileptiform abnormalities or electrographic seizures were seen. Hyperventilation was not performed. Photic stimulation was performed and had little change on the recording. Muscle, motion, and eye movement artifacts were noted. EEG INTERPRETATION:    Abnormal EEG due to diffuse low voltage slowing of the background rhythm. No overt epileptiform abnormalities or electrographic seizure activity was noted.         Dariana Jimenez DO  Board Certified Neurologist      Date reported: 8/6/2023  Date signed: 8/6/2023

## 2023-08-06 NOTE — PLAN OF CARE
Problem: Discharge Planning  Goal: Discharge to home or other facility with appropriate resources  Outcome: Not Progressing     Problem: Safety - Adult  Goal: Free from fall injury  Outcome: Not Progressing     Problem: ABCDS Injury Assessment  Goal: Absence of physical injury  Outcome: Not Progressing     Problem: Skin/Tissue Integrity  Goal: Absence of new skin breakdown  Description: 1. Monitor for areas of redness and/or skin breakdown  2. Assess vascular access sites hourly  3. Every 4-6 hours minimum:  Change oxygen saturation probe site  4. Every 4-6 hours:  If on nasal continuous positive airway pressure, respiratory therapy assess nares and determine need for appliance change or resting period.   Outcome: Not Progressing     Problem: Nutrition Deficit:  Goal: Optimize nutritional status  Outcome: Not Progressing     Problem: Pain  Goal: Verbalizes/displays adequate comfort level or baseline comfort level  Outcome: Not Progressing

## 2023-08-06 NOTE — PROGRESS NOTES
Dr. Frandy Ghosh notified that patient is hypotensive and bradycardic. 70's/40's and heart rate 45-50. Order received. See orders.

## 2023-08-06 NOTE — PROCEDURES
After informed consent was obtained, the right femoral region was prepped and draped in sterile fashion. The Site Right ultrasound was used real-time to identify and cannulate the femoral vein on 1 attempt. The guidewire was inserted without difficulty. A skin nick was made. The tract was then dilated. The catheter was then inserted via Seldinger technique. All 3 ports were flushed and had good flow. The catheter was sutured in place. There were no immediate complications. Estimated blood loss was 3 cc.

## 2023-08-07 ENCOUNTER — APPOINTMENT (OUTPATIENT)
Dept: MRI IMAGING | Age: 63
End: 2023-08-07
Payer: MEDICARE

## 2023-08-07 PROBLEM — J12.82 PNEUMONIA DUE TO COVID-19 VIRUS: Status: ACTIVE | Noted: 2023-08-07

## 2023-08-07 PROBLEM — Z51.5 PALLIATIVE CARE PATIENT: Status: ACTIVE | Noted: 2023-08-07

## 2023-08-07 PROBLEM — R41.82 ALTERED MENTAL STATUS: Status: ACTIVE | Noted: 2023-08-07

## 2023-08-07 PROBLEM — N17.9 AKI (ACUTE KIDNEY INJURY) (HCC): Status: ACTIVE | Noted: 2023-08-07

## 2023-08-07 PROBLEM — R56.9 SEIZURE (HCC): Status: ACTIVE | Noted: 2023-08-07

## 2023-08-07 PROBLEM — U07.1 PNEUMONIA DUE TO COVID-19 VIRUS: Status: ACTIVE | Noted: 2023-08-07

## 2023-08-07 PROBLEM — J96.01 ACUTE RESPIRATORY FAILURE WITH HYPOXIA (HCC): Status: ACTIVE | Noted: 2023-08-07

## 2023-08-07 LAB
ALBUMIN SERPL-MCNC: 2.9 G/DL (ref 3.5–5.2)
ALP SERPL-CCNC: 81 U/L (ref 40–130)
ALT SERPL-CCNC: 17 U/L (ref 5–41)
ANION GAP SERPL CALCULATED.3IONS-SCNC: 16 MMOL/L (ref 7–19)
AST SERPL-CCNC: 23 U/L (ref 5–40)
BASOPHILS # BLD: 0 K/UL (ref 0–0.2)
BASOPHILS NFR BLD: 0.1 % (ref 0–1)
BILIRUB SERPL-MCNC: 0.4 MG/DL (ref 0.2–1.2)
BUN SERPL-MCNC: 38 MG/DL (ref 8–23)
CALCIUM SERPL-MCNC: 8.4 MG/DL (ref 8.8–10.2)
CHLORIDE SERPL-SCNC: 103 MMOL/L (ref 98–111)
CO2 SERPL-SCNC: 20 MMOL/L (ref 22–29)
CREAT SERPL-MCNC: 2.4 MG/DL (ref 0.5–1.2)
EKG P AXIS: 8 DEGREES
EKG P-R INTERVAL: 168 MS
EKG Q-T INTERVAL: 436 MS
EKG QRS DURATION: 168 MS
EKG QTC CALCULATION (BAZETT): 453 MS
EKG T AXIS: 0 DEGREES
EOSINOPHIL # BLD: 0 K/UL (ref 0–0.6)
EOSINOPHIL NFR BLD: 0 % (ref 0–5)
ERYTHROCYTE [DISTWIDTH] IN BLOOD BY AUTOMATED COUNT: 14.3 % (ref 11.5–14.5)
GLUCOSE SERPL-MCNC: 239 MG/DL (ref 74–109)
HCT VFR BLD AUTO: 45 % (ref 42–52)
HGB BLD-MCNC: 14.5 G/DL (ref 14–18)
IMM GRANULOCYTES # BLD: 0.1 K/UL
LYMPHOCYTES # BLD: 1 K/UL (ref 1.1–4.5)
LYMPHOCYTES NFR BLD: 7 % (ref 20–40)
MAGNESIUM SERPL-MCNC: 2.7 MG/DL (ref 1.6–2.4)
MCH RBC QN AUTO: 31 PG (ref 27–31)
MCHC RBC AUTO-ENTMCNC: 32.2 G/DL (ref 33–37)
MCV RBC AUTO: 96.2 FL (ref 80–94)
MONOCYTES # BLD: 2 K/UL (ref 0–0.9)
MONOCYTES NFR BLD: 13.8 % (ref 0–10)
NEUTROPHILS # BLD: 11.3 K/UL (ref 1.5–7.5)
NEUTS SEG NFR BLD: 78.5 % (ref 50–65)
PHOSPHATE SERPL-MCNC: 5 MG/DL (ref 2.5–4.5)
PLATELET # BLD AUTO: 218 K/UL (ref 130–400)
PMV BLD AUTO: 10.1 FL (ref 9.4–12.4)
POTASSIUM SERPL-SCNC: 3.8 MMOL/L (ref 3.5–5)
PROT SERPL-MCNC: 6.5 G/DL (ref 6.6–8.7)
RBC # BLD AUTO: 4.68 M/UL (ref 4.7–6.1)
SODIUM SERPL-SCNC: 139 MMOL/L (ref 136–145)
WBC # BLD AUTO: 14.4 K/UL (ref 4.8–10.8)

## 2023-08-07 PROCEDURE — 83735 ASSAY OF MAGNESIUM: CPT

## 2023-08-07 PROCEDURE — 84100 ASSAY OF PHOSPHORUS: CPT

## 2023-08-07 PROCEDURE — 94003 VENT MGMT INPAT SUBQ DAY: CPT

## 2023-08-07 PROCEDURE — 6370000000 HC RX 637 (ALT 250 FOR IP): Performed by: INTERNAL MEDICINE

## 2023-08-07 PROCEDURE — 2580000003 HC RX 258: Performed by: INTERNAL MEDICINE

## 2023-08-07 PROCEDURE — 2700000000 HC OXYGEN THERAPY PER DAY

## 2023-08-07 PROCEDURE — 93010 ELECTROCARDIOGRAM REPORT: CPT | Performed by: INTERNAL MEDICINE

## 2023-08-07 PROCEDURE — 99233 SBSQ HOSP IP/OBS HIGH 50: CPT | Performed by: PSYCHIATRY & NEUROLOGY

## 2023-08-07 PROCEDURE — 2000000000 HC ICU R&B

## 2023-08-07 PROCEDURE — 2500000003 HC RX 250 WO HCPCS: Performed by: INTERNAL MEDICINE

## 2023-08-07 PROCEDURE — 80053 COMPREHEN METABOLIC PANEL: CPT

## 2023-08-07 PROCEDURE — 6360000002 HC RX W HCPCS: Performed by: PSYCHIATRY & NEUROLOGY

## 2023-08-07 PROCEDURE — 99223 1ST HOSP IP/OBS HIGH 75: CPT | Performed by: PHYSICIAN ASSISTANT

## 2023-08-07 PROCEDURE — 85025 COMPLETE CBC W/AUTO DIFF WBC: CPT

## 2023-08-07 PROCEDURE — 6360000002 HC RX W HCPCS: Performed by: INTERNAL MEDICINE

## 2023-08-07 RX ADMIN — CHLORHEXIDINE GLUCONATE 15 ML: 1.2 RINSE ORAL at 07:56

## 2023-08-07 RX ADMIN — LEVETIRACETAM 500 MG: 100 INJECTION INTRAVENOUS at 02:59

## 2023-08-07 RX ADMIN — CYPROHEPTADINE HYDROCHLORIDE 8 MG: 4 TABLET ORAL at 07:38

## 2023-08-07 RX ADMIN — ASPIRIN 325 MG: 325 TABLET ORAL at 07:37

## 2023-08-07 RX ADMIN — SODIUM CHLORIDE, POTASSIUM CHLORIDE, SODIUM LACTATE AND CALCIUM CHLORIDE: 600; 310; 30; 20 INJECTION, SOLUTION INTRAVENOUS at 18:00

## 2023-08-07 RX ADMIN — ASCORBIC ACID 3000 MG: 500 INJECTION INTRAVENOUS at 02:59

## 2023-08-07 RX ADMIN — PROPOFOL 20 MCG/KG/MIN: 10 INJECTION, EMULSION INTRAVENOUS at 04:45

## 2023-08-07 RX ADMIN — ATORVASTATIN CALCIUM 40 MG: 40 TABLET, FILM COATED ORAL at 20:36

## 2023-08-07 RX ADMIN — Medication 5000 UNITS: at 07:45

## 2023-08-07 RX ADMIN — LEVETIRACETAM 500 MG: 100 INJECTION INTRAVENOUS at 14:43

## 2023-08-07 RX ADMIN — CYPROHEPTADINE HYDROCHLORIDE 8 MG: 4 TABLET ORAL at 20:35

## 2023-08-07 RX ADMIN — FLUVOXAMINE MALEATE 50 MG: 50 TABLET, COATED ORAL at 20:35

## 2023-08-07 RX ADMIN — FINASTERIDE 10 MG: 5 TABLET, FILM COATED ORAL at 07:38

## 2023-08-07 RX ADMIN — THIAMINE HYDROCHLORIDE 200 MG: 100 INJECTION, SOLUTION INTRAMUSCULAR; INTRAVENOUS at 22:07

## 2023-08-07 RX ADMIN — CHLORHEXIDINE GLUCONATE 15 ML: 1.2 RINSE ORAL at 20:36

## 2023-08-07 RX ADMIN — CYPROHEPTADINE HYDROCHLORIDE 8 MG: 4 TABLET ORAL at 14:43

## 2023-08-07 RX ADMIN — ENOXAPARIN SODIUM 60 MG: 100 INJECTION SUBCUTANEOUS at 20:36

## 2023-08-07 RX ADMIN — THIAMINE HYDROCHLORIDE 200 MG: 100 INJECTION, SOLUTION INTRAMUSCULAR; INTRAVENOUS at 09:10

## 2023-08-07 RX ADMIN — SODIUM CHLORIDE, POTASSIUM CHLORIDE, SODIUM LACTATE AND CALCIUM CHLORIDE: 600; 310; 30; 20 INJECTION, SOLUTION INTRAVENOUS at 00:58

## 2023-08-07 RX ADMIN — ASCORBIC ACID 3000 MG: 500 INJECTION INTRAVENOUS at 07:59

## 2023-08-07 RX ADMIN — SODIUM CHLORIDE, PRESERVATIVE FREE 20 MG: 5 INJECTION INTRAVENOUS at 07:36

## 2023-08-07 RX ADMIN — SODIUM CHLORIDE, PRESERVATIVE FREE 10 ML: 5 INJECTION INTRAVENOUS at 07:56

## 2023-08-07 RX ADMIN — SODIUM CHLORIDE, POTASSIUM CHLORIDE, SODIUM LACTATE AND CALCIUM CHLORIDE: 600; 310; 30; 20 INJECTION, SOLUTION INTRAVENOUS at 09:16

## 2023-08-07 RX ADMIN — WATER 1000 MG: 1 INJECTION INTRAMUSCULAR; INTRAVENOUS; SUBCUTANEOUS at 14:43

## 2023-08-07 RX ADMIN — FLUVOXAMINE MALEATE 50 MG: 50 TABLET, COATED ORAL at 07:45

## 2023-08-07 RX ADMIN — DEXAMETHASONE SODIUM PHOSPHATE 6 MG: 10 INJECTION INTRAMUSCULAR; INTRAVENOUS at 07:37

## 2023-08-07 RX ADMIN — ZINC SULFATE 220 MG (50 MG) CAPSULE 50 MG: CAPSULE at 07:45

## 2023-08-07 RX ADMIN — OMEGA-3-ACID ETHYL ESTERS 2 G: 1 CAPSULE, LIQUID FILLED ORAL at 20:35

## 2023-08-07 RX ADMIN — OMEGA-3-ACID ETHYL ESTERS 2 G: 1 CAPSULE, LIQUID FILLED ORAL at 07:45

## 2023-08-07 RX ADMIN — EPINEPHRINE 14 MCG/MIN: 1 INJECTION INTRAMUSCULAR; INTRAVENOUS; SUBCUTANEOUS at 03:00

## 2023-08-07 RX ADMIN — ENOXAPARIN SODIUM 60 MG: 100 INJECTION SUBCUTANEOUS at 07:38

## 2023-08-07 RX ADMIN — Medication 10 MG: at 07:45

## 2023-08-07 ASSESSMENT — PULMONARY FUNCTION TESTS
PIF_VALUE: 18
PIF_VALUE: 14
PIF_VALUE: 15
PIF_VALUE: 19
PIF_VALUE: 18
PIF_VALUE: 18
PIF_VALUE: 17
PIF_VALUE: 19
PIF_VALUE: 18
PIF_VALUE: 19
PIF_VALUE: 18
PIF_VALUE: 21
PIF_VALUE: 15
PIF_VALUE: 26
PIF_VALUE: 15
PIF_VALUE: 18
PIF_VALUE: 18
PIF_VALUE: 21
PIF_VALUE: 18
PIF_VALUE: 22
PIF_VALUE: 18
PIF_VALUE: 18
PIF_VALUE: 17
PIF_VALUE: 15
PIF_VALUE: 19
PIF_VALUE: 21
PIF_VALUE: 18
PIF_VALUE: 16
PIF_VALUE: 18
PIF_VALUE: 18
PIF_VALUE: 14
PIF_VALUE: 18
PIF_VALUE: 15
PIF_VALUE: 17
PIF_VALUE: 19
PIF_VALUE: 18
PIF_VALUE: 21
PIF_VALUE: 16
PIF_VALUE: 15
PIF_VALUE: 21
PIF_VALUE: 18
PIF_VALUE: 13
PIF_VALUE: 18
PIF_VALUE: 18
PIF_VALUE: 21
PIF_VALUE: 20
PIF_VALUE: 18
PIF_VALUE: 17
PIF_VALUE: 14
PIF_VALUE: 18
PIF_VALUE: 17
PIF_VALUE: 21
PIF_VALUE: 18
PIF_VALUE: 18
PIF_VALUE: 22
PIF_VALUE: 21
PIF_VALUE: 21
PIF_VALUE: 20
PIF_VALUE: 18
PIF_VALUE: 18
PIF_VALUE: 17
PIF_VALUE: 15
PIF_VALUE: 23
PIF_VALUE: 16
PIF_VALUE: 18
PIF_VALUE: 18
PIF_VALUE: 19
PIF_VALUE: 18
PIF_VALUE: 16
PIF_VALUE: 19
PIF_VALUE: 21
PIF_VALUE: 18
PIF_VALUE: 17
PIF_VALUE: 27
PIF_VALUE: 22
PIF_VALUE: 16
PIF_VALUE: 18
PIF_VALUE: 18
PIF_VALUE: 19
PIF_VALUE: 19
PIF_VALUE: 16
PIF_VALUE: 14
PIF_VALUE: 17
PIF_VALUE: 15
PIF_VALUE: 18
PIF_VALUE: 18

## 2023-08-07 ASSESSMENT — PAIN SCALES - GENERAL
PAINLEVEL_OUTOF10: 0
PAINLEVEL_OUTOF10: 0

## 2023-08-07 NOTE — PROGRESS NOTES
COVID    Please feel free to call with any questions. [phone] (cell phone).       Micheal Fitzpatrick DO  Board Certified Neurology

## 2023-08-07 NOTE — PROGRESS NOTES
Nutrition Assessment     Type and Reason for Visit: Reassess    Nutrition Recommendations/Plan:   Continue to monitor clinical course to restart EN as appropriate. Malnutrition Assessment:  Malnutrition Status: At risk for malnutrition (Comment)    Nutrition Assessment:  EN has been stopped d/t elevated residual (300 ml @0400 on 8/6). NGT now set to LIS. LR @ 125 ml/hr. Pt remains on vent, no propofol noted at this time. Continue to monitor course to restart EN as appropriate. Estimated Daily Nutrient Needs:  Energy (kcal):  6487-5529 ml Weight Used for Energy Requirements: Current     Protein (g):  138-184g  (1.5-2g/kg) Weight Used for Protein Requirements: Ideal        Fluid (ml/day):  292-1645 ml Method Used for Fluid Requirements: 1 ml/kcal    Nutrition Related Findings:   on vent, glucose 157-288 last 24hrs.  NGT to LIS Wound Type: None    Current Nutrition Therapies:    Diet NPO  ADULT TUBE FEEDING; Orogastric; Peptide Based High Protein; Continuous; 25; Yes; 5; Q 6 hours; 68; 15; Q 1 hour    Anthropometric Measures:  Height: 6' 4\" (193 cm)  Current Body Wt: 274 lb (124.3 kg)   BMI: 33.4    Nutrition Diagnosis:   Inadequate oral intake related to acute injury/trauma, impaired respiratory function as evidenced by NPO or clear liquid status due to medical condition, intubation, nutrition support - enteral nutrition    Nutrition Interventions:   Food and/or Nutrient Delivery: Continue NPO  Nutrition Education/Counseling: No recommendation at this time  Coordination of Nutrition Care: Continue to monitor while inpatient       Goals:     Goals: Meet at least 75% of estimated needs, Initiate nutrition support, Tolerate nutrition support at goal rate       Nutrition Monitoring and Evaluation:   Behavioral-Environmental Outcomes: None Identified  Food/Nutrient Intake Outcomes: Enteral Nutrition Intake/Tolerance  Physical Signs/Symptoms Outcomes: Biochemical Data, Fluid Status or Edema, Weight, Skin    Discharge

## 2023-08-07 NOTE — PLAN OF CARE
Problem: Discharge Planning  Goal: Discharge to home or other facility with appropriate resources  Outcome: Not Progressing     Problem: Safety - Adult  Goal: Free from fall injury  Outcome: Not Progressing     Problem: ABCDS Injury Assessment  Goal: Absence of physical injury  Outcome: Not Progressing     Problem: Skin/Tissue Integrity  Goal: Absence of new skin breakdown  Description: 1. Monitor for areas of redness and/or skin breakdown  2. Assess vascular access sites hourly  3. Every 4-6 hours minimum:  Change oxygen saturation probe site  4. Every 4-6 hours:  If on nasal continuous positive airway pressure, respiratory therapy assess nares and determine need for appliance change or resting period.   Outcome: Not Progressing     Problem: Nutrition Deficit:  Goal: Optimize nutritional status  Outcome: Not Progressing     Problem: Pain  Goal: Verbalizes/displays adequate comfort level or baseline comfort level  Outcome: Not Progressing     Problem: Neurosensory - Adult  Goal: Achieves stable or improved neurological status  Outcome: Not Progressing  Goal: Absence of seizures  Outcome: Not Progressing     Problem: Respiratory - Adult  Goal: Achieves optimal ventilation and oxygenation  Outcome: Not Progressing     Problem: Cardiovascular - Adult  Goal: Maintains optimal cardiac output and hemodynamic stability  Outcome: Not Progressing  Goal: Absence of cardiac dysrhythmias or at baseline  Outcome: Not Progressing     Problem: Skin/Tissue Integrity - Adult  Goal: Skin integrity remains intact  Outcome: Not Progressing  Goal: Oral mucous membranes remain intact  Outcome: Not Progressing     Problem: Musculoskeletal - Adult  Goal: Return mobility to safest level of function  Outcome: Not Progressing  Goal: Return ADL status to a safe level of function  Outcome: Not Progressing     Problem: Gastrointestinal - Adult  Goal: Maintains or returns to baseline bowel function  Outcome: Not Progressing     Problem:

## 2023-08-07 NOTE — PROGRESS NOTES
Hospitalist Progress Note    Patient:  Lindsay Romo  YOB: 1960  Date of Service: 8/7/2023  MRN: [id number]   Acct: [de-identified]   Primary Care Physician: Bony Smith MD  Advance Directive: Full Code  Admit Date: 8/5/2023       Hospital Day: 2  Referring Provider: Roslyn Aguilar DO    Patient Seen, Chart, Consults, Notes, Labs, Radiology studies reviewed. Subjective: Lindsay Romo is a 70 y.o. male  whom we are following for  COVID-pneumonia, hypoxemic respiratory failure, acute kidney injury. He is now off of all of his pressors. Unfortunately he has had a jump in serum creatinine. He did have some volatility yesterday with his blood pressure. He may have developed some acute tubular injury. His O2 demand has also decreased. He is on assist-control rate of 20, tidal volume of 520, 5 of PEEP, FiO2 of 0.5. Allergies:  Patient has no known allergies.     Medicines:  Current Facility-Administered Medications   Medication Dose Route Frequency Provider Last Rate Last Admin    [START ON 8/8/2023] famotidine (PEPCID) 20 mg in sodium chloride (PF) 0.9 % 10 mL injection  20 mg IntraVENous Daily Lamar Jeff, DO        midazolam (VERSED) 100mg/100mL in NS infusion  1-10 mg/hr IntraVENous Continuous Dara Black MD   Stopped at 08/06/23 1145    sodium chloride flush 0.9 % injection 10 mL  10 mL IntraVENous 2 times per day Lamar Jeff, DO   10 mL at 08/07/23 0756    sodium chloride flush 0.9 % injection 10 mL  10 mL IntraVENous PRN Lamar Jeff, DO        0.9 % sodium chloride infusion   IntraVENous PRN Lamar Jeff, DO        enoxaparin (LOVENOX) injection 60 mg  0.5 mg/kg SubCUTAneous BID Lamar Jeff, DO   60 mg at 08/07/23 8164    ondansetron (ZOFRAN-ODT) disintegrating tablet 4 mg  4 mg Oral Q8H PRN Lamar Jeff, DO        Or    [facility]ansetron [facility]) injection 4 mg  4 mg IntraVENous Q6H PRN Lamar Jeff, DO        magnesium hydroxide (MILK Mediasternotomy wires. Mixed interstitial and alveolar opacities throughout the lungs bilaterally. Increased in extent with more focal airspace opacities as seen in the lower right lung through the infrahilar region. This could be related to infection/pneumonia or asymmetric pulmonary edema. No pleural effusion or pneumothorax. Increasing mixed interstitial and alveolar opacities, more pronounced on the right side as detailed above which could represent worsening infection/pneumonia or asymmetric pulmonary edema. Stable cardiomegaly and postoperative change of the chest.   ______________________________________ Electronically signed by: Lenny Chew M.D. Date:     08/05/2023 Time:    08:24        Assessment     Acute respiratory failure with hypoxemia secondary to COVID. Ventilatory support. Aggressive medical management. Acute kidney injury. Continue fluid resuscitation. Acute kidney injury. Probable ATN. With hold any potential nephrotoxic agents. Bradycardia. Possibly associated with COVID infection. Catecholamine support. Please document 40 minutes of critical care time for patient assessment, chart review, discussion with staff, .       Elva Lancaster DO

## 2023-08-07 NOTE — CONSULTS
Palliative Care Consult Note    8/7/2023 9:33 AM  Subjective:  Admit Date: 8/5/2023  PCP: Clarke Wolf MD    Date of Service: 8/7/2023    Reason for Consultation:  Goals of Care, Code Status, Family Support     History Obtained From: EMR/Patient and their Family    History Of Present Illness: The patient is a 70 y.o. male with PMH CAD, HLD, HTN, tobacco use who presented to [facility] ED on 08/04/2023 w/ concern for worsening weakness, confusion, fatigue and feeling \"shaky\". He was diagnosed w/ COVID-19 5 days prior after developing cough, congestion, diarrhea, fever and fatigue. He was utilizing Mucinex at home. CXR reported cardiomegaly w/ mild vascular congestion and interstitial edema. CT head reported no acute intracranial abnormality. He was felt stable for discharge home at that time. The patient returned on 08/05/2023 after he experienced reported seizure-like activity at home. He was incontinent of urine in ED. Repeat CT head reported no acute intracranial abnormality. CXR reported increasing mixed interstitial and alveolar opacities. He was given Keppra, decadron and IVF's. The patient's mental status did not improve with treatments administered and patient was intubated. He has been seen by Neurology w/ recommendation for MRI brain when able. Keppra is continued. He has required pressor support for hypotension and bradycardia. EEG without epileptiform abnormalities or electrographic seizure activity. He is currently receiving Keppra, Decadron, Rocephin, Zinc, Vitamin D, Thiamine, Luvox, Proscar, Pepcid, ascorbic acid, aspirin, melatonin. Palliative care has been consulted for goals of care, code status discussion.      Past Medical History:        Diagnosis Date    Acute MI ([address])     inferior wall, interrupted by direct heart cath and angioplasty to the RCA 5/3/09, successful with normal LV function    Arthritis     CAD (coronary artery disease) 11/08/2011    Hyperlipidemia     PCP manages cholesterol

## 2023-08-07 NOTE — ACP (ADVANCE CARE PLANNING)
Advance Care Planning     Advance Care Planning (ACP) Physician/NP/PA (Provider) Conversation      Date of ACP Conversation: 08/07/2023  Conversation Conducted with:    Healthcare Decision Maker: Next of Kin by law (only applies in absence of above) (name) [address]:    Current Designated Health Care Decision Maker:    Primary Decision Maker: Marin Jimi - Spouse - [phone]    Secondary Decision Maker: Catalina Padminis - Child - [phone]    Secondary Decision Maker: Bonnie De La Cruz - Child - [phone]    Care Preferences:    Ventilation:  Yes    Resuscitation:  Yes    Length of Voluntary ACP Conversation in minutes:  10 minutes included w/ total consult time     Danyell Amaya PA-C

## 2023-08-08 LAB
ALBUMIN SERPL-MCNC: 2.6 G/DL (ref 3.5–5.2)
ALBUMIN SERPL-MCNC: 2.9 G/DL (ref 3.5–5.2)
ALP SERPL-CCNC: 82 U/L (ref 40–130)
ALP SERPL-CCNC: 89 U/L (ref 40–130)
ALT SERPL-CCNC: 12 U/L (ref 5–41)
ALT SERPL-CCNC: 16 U/L (ref 5–41)
ANION GAP SERPL CALCULATED.3IONS-SCNC: 6 MMOL/L (ref 7–19)
ANION GAP SERPL CALCULATED.3IONS-SCNC: 9 MMOL/L (ref 7–19)
AST SERPL-CCNC: 14 U/L (ref 5–40)
AST SERPL-CCNC: 21 U/L (ref 5–40)
BASOPHILS # BLD: 0 K/UL (ref 0–0.2)
BASOPHILS # BLD: 0 K/UL (ref 0–0.2)
BASOPHILS NFR BLD: 0.1 % (ref 0–1)
BASOPHILS NFR BLD: 0.2 % (ref 0–1)
BILIRUB SERPL-MCNC: 0.3 MG/DL (ref 0.2–1.2)
BILIRUB SERPL-MCNC: 0.3 MG/DL (ref 0.2–1.2)
BUN SERPL-MCNC: 41 MG/DL (ref 8–23)
BUN SERPL-MCNC: 47 MG/DL (ref 8–23)
CALCIUM SERPL-MCNC: 8.8 MG/DL (ref 8.8–10.2)
CALCIUM SERPL-MCNC: 9 MG/DL (ref 8.8–10.2)
CHLORIDE SERPL-SCNC: 110 MMOL/L (ref 98–111)
CHLORIDE SERPL-SCNC: 111 MMOL/L (ref 98–111)
CO2 SERPL-SCNC: 26 MMOL/L (ref 22–29)
CO2 SERPL-SCNC: 29 MMOL/L (ref 22–29)
CREAT SERPL-MCNC: 1.3 MG/DL (ref 0.5–1.2)
CREAT SERPL-MCNC: 1.5 MG/DL (ref 0.5–1.2)
EOSINOPHIL # BLD: 0 K/UL (ref 0–0.6)
EOSINOPHIL # BLD: 0 K/UL (ref 0–0.6)
EOSINOPHIL NFR BLD: 0 % (ref 0–5)
EOSINOPHIL NFR BLD: 0 % (ref 0–5)
ERYTHROCYTE [DISTWIDTH] IN BLOOD BY AUTOMATED COUNT: 13.9 % (ref 11.5–14.5)
ERYTHROCYTE [DISTWIDTH] IN BLOOD BY AUTOMATED COUNT: 14 % (ref 11.5–14.5)
GLUCOSE SERPL-MCNC: 105 MG/DL (ref 74–109)
GLUCOSE SERPL-MCNC: 110 MG/DL (ref 74–109)
HCT VFR BLD AUTO: 43.9 % (ref 42–52)
HCT VFR BLD AUTO: 44.7 % (ref 42–52)
HGB BLD-MCNC: 14.1 G/DL (ref 14–18)
HGB BLD-MCNC: 14.5 G/DL (ref 14–18)
IMM GRANULOCYTES # BLD: 0.1 K/UL
IMM GRANULOCYTES # BLD: 0.2 K/UL
LYMPHOCYTES # BLD: 1.2 K/UL (ref 1.1–4.5)
LYMPHOCYTES # BLD: 1.4 K/UL (ref 1.1–4.5)
LYMPHOCYTES NFR BLD: 10.9 % (ref 20–40)
LYMPHOCYTES NFR BLD: 9.1 % (ref 20–40)
MAGNESIUM SERPL-MCNC: 2.2 MG/DL (ref 1.6–2.4)
MAGNESIUM SERPL-MCNC: 2.3 MG/DL (ref 1.6–2.4)
MCH RBC QN AUTO: 30.7 PG (ref 27–31)
MCH RBC QN AUTO: 30.7 PG (ref 27–31)
MCHC RBC AUTO-ENTMCNC: 32.1 G/DL (ref 33–37)
MCHC RBC AUTO-ENTMCNC: 32.4 G/DL (ref 33–37)
MCV RBC AUTO: 94.5 FL (ref 80–94)
MCV RBC AUTO: 95.4 FL (ref 80–94)
MONOCYTES # BLD: 1.3 K/UL (ref 0–0.9)
MONOCYTES # BLD: 1.8 K/UL (ref 0–0.9)
MONOCYTES NFR BLD: 11.5 % (ref 0–10)
MONOCYTES NFR BLD: 12.2 % (ref 0–10)
NEUTROPHILS # BLD: 11.9 K/UL (ref 1.5–7.5)
NEUTROPHILS # BLD: 7.9 K/UL (ref 1.5–7.5)
NEUTS SEG NFR BLD: 75.4 % (ref 50–65)
NEUTS SEG NFR BLD: 77.8 % (ref 50–65)
PHOSPHATE SERPL-MCNC: 3.2 MG/DL (ref 2.5–4.5)
PHOSPHATE SERPL-MCNC: 3.6 MG/DL (ref 2.5–4.5)
PLATELET # BLD AUTO: 189 K/UL (ref 130–400)
PLATELET # BLD AUTO: 205 K/UL (ref 130–400)
PMV BLD AUTO: 10 FL (ref 9.4–12.4)
PMV BLD AUTO: 10.9 FL (ref 9.4–12.4)
POTASSIUM SERPL-SCNC: 4.9 MMOL/L (ref 3.5–5)
POTASSIUM SERPL-SCNC: 5.4 MMOL/L (ref 3.5–5)
PROT SERPL-MCNC: 5.9 G/DL (ref 6.6–8.7)
PROT SERPL-MCNC: 6.3 G/DL (ref 6.6–8.7)
RBC # BLD AUTO: 4.6 M/UL (ref 4.7–6.1)
RBC # BLD AUTO: 4.73 M/UL (ref 4.7–6.1)
SODIUM SERPL-SCNC: 145 MMOL/L (ref 136–145)
SODIUM SERPL-SCNC: 146 MMOL/L (ref 136–145)
WBC # BLD AUTO: 10.5 K/UL (ref 4.8–10.8)
WBC # BLD AUTO: 15.3 K/UL (ref 4.8–10.8)

## 2023-08-08 PROCEDURE — 2500000003 HC RX 250 WO HCPCS: Performed by: INTERNAL MEDICINE

## 2023-08-08 PROCEDURE — 83735 ASSAY OF MAGNESIUM: CPT

## 2023-08-08 PROCEDURE — 84100 ASSAY OF PHOSPHORUS: CPT

## 2023-08-08 PROCEDURE — 6370000000 HC RX 637 (ALT 250 FOR IP): Performed by: INTERNAL MEDICINE

## 2023-08-08 PROCEDURE — 80053 COMPREHEN METABOLIC PANEL: CPT

## 2023-08-08 PROCEDURE — 94003 VENT MGMT INPAT SUBQ DAY: CPT

## 2023-08-08 PROCEDURE — 99232 SBSQ HOSP IP/OBS MODERATE 35: CPT | Performed by: PSYCHIATRY & NEUROLOGY

## 2023-08-08 PROCEDURE — 6360000002 HC RX W HCPCS: Performed by: PSYCHIATRY & NEUROLOGY

## 2023-08-08 PROCEDURE — 2700000000 HC OXYGEN THERAPY PER DAY

## 2023-08-08 PROCEDURE — 6360000002 HC RX W HCPCS: Performed by: INTERNAL MEDICINE

## 2023-08-08 PROCEDURE — 2000000000 HC ICU R&B

## 2023-08-08 PROCEDURE — 85025 COMPLETE CBC W/AUTO DIFF WBC: CPT

## 2023-08-08 PROCEDURE — 6360000002 HC RX W HCPCS: Performed by: EMERGENCY MEDICINE

## 2023-08-08 PROCEDURE — 2580000003 HC RX 258: Performed by: INTERNAL MEDICINE

## 2023-08-08 PROCEDURE — 99232 SBSQ HOSP IP/OBS MODERATE 35: CPT | Performed by: PHYSICIAN ASSISTANT

## 2023-08-08 RX ORDER — NOREPINEPHRINE BITARTRATE 0.06 MG/ML
1-100 INJECTION, SOLUTION INTRAVENOUS CONTINUOUS
Status: DISCONTINUED | OUTPATIENT
Start: 2023-08-08 | End: 2023-08-12

## 2023-08-08 RX ADMIN — CHLORHEXIDINE GLUCONATE 15 ML: 1.2 RINSE ORAL at 07:58

## 2023-08-08 RX ADMIN — FINASTERIDE 10 MG: 5 TABLET, FILM COATED ORAL at 07:56

## 2023-08-08 RX ADMIN — LEVETIRACETAM 500 MG: 100 INJECTION INTRAVENOUS at 15:33

## 2023-08-08 RX ADMIN — ASPIRIN 325 MG: 325 TABLET ORAL at 07:54

## 2023-08-08 RX ADMIN — CYPROHEPTADINE HYDROCHLORIDE 8 MG: 4 TABLET ORAL at 13:30

## 2023-08-08 RX ADMIN — PROPOFOL 20 MCG/KG/MIN: 10 INJECTION, EMULSION INTRAVENOUS at 05:14

## 2023-08-08 RX ADMIN — LEVETIRACETAM 500 MG: 100 INJECTION INTRAVENOUS at 04:42

## 2023-08-08 RX ADMIN — ENOXAPARIN SODIUM 60 MG: 100 INJECTION SUBCUTANEOUS at 07:53

## 2023-08-08 RX ADMIN — SODIUM CHLORIDE, PRESERVATIVE FREE 10 ML: 5 INJECTION INTRAVENOUS at 07:57

## 2023-08-08 RX ADMIN — OMEGA-3-ACID ETHYL ESTERS 2 G: 1 CAPSULE, LIQUID FILLED ORAL at 20:21

## 2023-08-08 RX ADMIN — WATER 1000 MG: 1 INJECTION INTRAMUSCULAR; INTRAVENOUS; SUBCUTANEOUS at 13:30

## 2023-08-08 RX ADMIN — PROPOFOL 40 MCG/KG/MIN: 10 INJECTION, EMULSION INTRAVENOUS at 18:08

## 2023-08-08 RX ADMIN — ENOXAPARIN SODIUM 60 MG: 100 INJECTION SUBCUTANEOUS at 20:21

## 2023-08-08 RX ADMIN — MIDAZOLAM HYDROCHLORIDE 2 MG/HR: 1 INJECTION, SOLUTION INTRAVENOUS at 08:37

## 2023-08-08 RX ADMIN — ZINC SULFATE 220 MG (50 MG) CAPSULE 50 MG: CAPSULE at 07:55

## 2023-08-08 RX ADMIN — OMEGA-3-ACID ETHYL ESTERS 2 G: 1 CAPSULE, LIQUID FILLED ORAL at 07:54

## 2023-08-08 RX ADMIN — CYPROHEPTADINE HYDROCHLORIDE 8 MG: 4 TABLET ORAL at 07:54

## 2023-08-08 RX ADMIN — FLUVOXAMINE MALEATE 50 MG: 50 TABLET, COATED ORAL at 07:54

## 2023-08-08 RX ADMIN — PROPOFOL 30 MCG/KG/MIN: 10 INJECTION, EMULSION INTRAVENOUS at 01:54

## 2023-08-08 RX ADMIN — Medication 5000 UNITS: at 07:55

## 2023-08-08 RX ADMIN — PROPOFOL 50 MCG/KG/MIN: 10 INJECTION, EMULSION INTRAVENOUS at 12:51

## 2023-08-08 RX ADMIN — PROPOFOL 50 MCG/KG/MIN: 10 INJECTION, EMULSION INTRAVENOUS at 08:33

## 2023-08-08 RX ADMIN — PROPOFOL 40 MCG/KG/MIN: 10 INJECTION, EMULSION INTRAVENOUS at 20:55

## 2023-08-08 RX ADMIN — ATORVASTATIN CALCIUM 40 MG: 40 TABLET, FILM COATED ORAL at 20:21

## 2023-08-08 RX ADMIN — DEXAMETHASONE SODIUM PHOSPHATE 6 MG: 10 INJECTION INTRAMUSCULAR; INTRAVENOUS at 07:52

## 2023-08-08 RX ADMIN — CYPROHEPTADINE HYDROCHLORIDE 8 MG: 4 TABLET ORAL at 20:21

## 2023-08-08 RX ADMIN — CHLORHEXIDINE GLUCONATE 15 ML: 1.2 RINSE ORAL at 20:21

## 2023-08-08 RX ADMIN — FLUVOXAMINE MALEATE 50 MG: 50 TABLET, COATED ORAL at 20:21

## 2023-08-08 RX ADMIN — SODIUM CHLORIDE, PRESERVATIVE FREE 20 MG: 5 INJECTION INTRAVENOUS at 07:51

## 2023-08-08 RX ADMIN — SODIUM CHLORIDE, POTASSIUM CHLORIDE, SODIUM LACTATE AND CALCIUM CHLORIDE: 600; 310; 30; 20 INJECTION, SOLUTION INTRAVENOUS at 01:43

## 2023-08-08 RX ADMIN — PROPOFOL 50 MCG/KG/MIN: 10 INJECTION, EMULSION INTRAVENOUS at 15:49

## 2023-08-08 RX ADMIN — Medication 10 MG: at 07:53

## 2023-08-08 ASSESSMENT — PULMONARY FUNCTION TESTS
PIF_VALUE: 18
PIF_VALUE: 14
PIF_VALUE: 18
PIF_VALUE: 18
PIF_VALUE: 13
PIF_VALUE: 18
PIF_VALUE: 13
PIF_VALUE: 18
PIF_VALUE: 18
PIF_VALUE: 13
PIF_VALUE: 13
PIF_VALUE: 14
PIF_VALUE: 18
PIF_VALUE: 17
PIF_VALUE: 18
PIF_VALUE: 17
PIF_VALUE: 14
PIF_VALUE: 15
PIF_VALUE: 13
PIF_VALUE: 18
PIF_VALUE: 14
PIF_VALUE: 18
PIF_VALUE: 16
PIF_VALUE: 18
PIF_VALUE: 29
PIF_VALUE: 16
PIF_VALUE: 14
PIF_VALUE: 18
PIF_VALUE: 12
PIF_VALUE: 18
PIF_VALUE: 14
PIF_VALUE: 18
PIF_VALUE: 18

## 2023-08-08 ASSESSMENT — PAIN SCALES - GENERAL
PAINLEVEL_OUTOF10: 0

## 2023-08-08 NOTE — PROGRESS NOTES
strength x 4 extremities  [x] Normal bulk and tone  [x] No tremor present  [x] No rigidity or bradykinesia noted  COMMENTS:Withdrawal x 4    Sensory  [] Sensation intact to light touch, pin prick, vibration, and proprioception BLE  [] Sensation intact to light touch, pin prick, vibration, and proprioception BUE  COMMENTS:Limited    Coordination [] FTN normal bilaterally   [] HTS normal bilaterally  [] BISI normal.   COMMENTS:Limited   Reflexes  [x] Symmetric and non-pathological  [x] Toes downgoing bilaterally  [x] No clonus present  COMMENTS:   Gait                  [] Normal steady gait    [] Ataxic    [] Spastic     [] Magnetic     [] Shuffling  [x] Not assessed  COMMENTS:        LABS/IMAGING:    CT HEAD WO CONTRAST    Result Date: 8/5/2023  EXAM:  CT HEAD WITHOUT CONTRAST 08/05/2023. SAGITTAL AND CORONAL REFORMATTED IMAGES OBTAINED  HISTORY:  Altered mental status  COMPARISON:  08/04/2023  FINDINGS: There is no evidence of intracranial hemorrhage. The midline is maintained. There is no hydrocephalus. Generalized atrophy. Chronic small vessel ischemic change. No cerebellar tonsillar ectopia. Evaluation of the calvarium shows no fracture. The mastoid air cells are normally pneumatized. No acute intracranial abnormality. All CT scans are performed using dose optimization techniques as appropriate to the performed exam and include at least one of the following: Automated exposure control, adjustment of the mA and/or kV according to size, and the use of iterative reconstruction technique. ______________________________________ Electronically signed by: Nini Mcdonnell M.D. Date:     08/05/2023 Time:    08:43     CT CERVICAL SPINE WO CONTRAST    Result Date: 8/5/2023  EXAM:  CT CERVICAL SPINE WITHOUT CONTRAST. HISTORY:  Altered mental status. Fall. COVID-19 positive. COMPARISON:  CT head 08/04/2023. TECHNIQUE:  Noncontrast CT images of the cervical spine were obtained.   Axial reconstructions with bilaterally. Increased in extent with more focal airspace opacities as seen in the lower right lung through the infrahilar region. This could be related to infection/pneumonia or asymmetric pulmonary edema. No pleural effusion or pneumothorax. Increasing mixed interstitial and alveolar opacities, more pronounced on the right side as detailed above which could represent worsening infection/pneumonia or asymmetric pulmonary edema. Stable cardiomegaly and postoperative change of the chest.   ______________________________________ Electronically signed by: Sunny Quinteros M.D. Date:     08/05/2023 Time:    08:24       Lab Results   Component Value Date    WBC 15.3 (H) 08/08/2023    HGB 14.5 08/08/2023    HCT 44.7 08/08/2023    MCV 94.5 (H) 08/08/2023     08/08/2023     Lab Results   Component Value Date     08/08/2023    K 4.9 08/08/2023     08/08/2023    CO2 26 08/08/2023    BUN 41 (H) 08/08/2023    CREATININE 1.5 (H) 08/08/2023    GLUCOSE 110 (H) 08/08/2023    CALCIUM 9.0 08/08/2023    PROT 6.3 (L) 08/08/2023    LABALBU 2.9 (L) 08/08/2023    BILITOT 0.3 08/08/2023    ALKPHOS 89 08/08/2023    AST 21 08/08/2023    ALT 16 08/08/2023    LABGLOM 51 (A) 08/08/2023     Lab Results   Component Value Date    INR 1.13 08/06/2023    INR 1.08 08/05/2023    INR 0.98 01/30/2011    PROTIME 14.2 08/06/2023    PROTIME 13.7 08/05/2023    PROTIME 10.56 01/30/2011       RECORD REVIEW:   Previous medical records, medications were reviewed at today's visit. Nursing/physician notes, imaging, labs and vitals reviewed. PT,OT and/or speech notes reviewed       ASSESSMENT:  70 y.o. admitted with confusion, possible seizure, COVID pneumonia, respiratory failure s/p mechanical ventilation, HILARY, acidosis. CT head nothing acute. EEG with slowing, nothing epileptiform. Exam unchanged overnight.       PLAN:   MRI Brain when able    Continue Keppra 500 mg q12h    Continue addressing medical issues, HILARY, acidosis,

## 2023-08-08 NOTE — PROGRESS NOTES
Since 2145 patient having tachypnea, tachycardia, and hypertensive. Patient able to open eyes to voice. Patient not following commands at this time. patient coughing and gaging on ET tube. Patient started on low dose propofol to help patient rest and be more hemodynamic stable.

## 2023-08-08 NOTE — PLAN OF CARE
Problem: Discharge Planning  Goal: Discharge to home or other facility with appropriate resources  8/8/2023 0252 by Justyna Teresa RN  Outcome: Not Progressing     Problem: ABCDS Injury Assessment  Goal: Absence of physical injury  8/8/2023 0252 by Justyna Teresa RN  Outcome: Not Progressing     Problem: Cardiovascular - Adult  Goal: Maintains optimal cardiac output and hemodynamic stability  8/8/2023 0252 by Justyna Teresa RN  Outcome: Not Progressing     Problem: Cardiovascular - Adult  Goal: Absence of cardiac dysrhythmias or at baseline  8/8/2023 0252 by Justyna Teresa RN  Outcome: Not Progressing     Problem: Skin/Tissue Integrity - Adult  Goal: Oral mucous membranes remain intact  8/8/2023 0252 by Justyna Tersea RN  Outcome: Not Progressing     Problem: Musculoskeletal - Adult  Goal: Return mobility to safest level of function  8/8/2023 0252 by Justyna Teresa RN  Outcome: Not Progressing     Problem: Musculoskeletal - Adult  Goal: Return ADL status to a safe level of function  8/8/2023 0252 by Justyna Teresa RN  Outcome: Not Progressing     Problem: Discharge Planning  Goal: Discharge to home or other facility with appropriate resources  8/8/2023 0252 by Justyna Teresa RN  Outcome: Not Progressing     Problem: Safety - Adult  Goal: Free from fall injury  8/8/2023 1029 by Gladis Schwartz RN  Outcome: Progressing  8/8/2023 0252 by Justyna Teresa RN  Outcome: Not Progressing     Problem: ABCDS Injury Assessment  Goal: Absence of physical injury  8/8/2023 0252 by Justyna Teresa RN  Outcome: Not Progressing     Problem: Skin/Tissue Integrity  Goal: Absence of new skin breakdown  Description: 1. Monitor for areas of redness and/or skin breakdown  2. Assess vascular access sites hourly  3. Every 4-6 hours minimum:  Change oxygen saturation probe site  4.   Every 4-6 hours:  If on nasal continuous positive airway pressure, respiratory therapy assess nares and determine need for appliance change or resting period.   8/8/2023 1029 by Mayi Fleming RN  Outcome: Progressing  8/8/2023 0252 by Amarilys Byrd RN  Outcome: Not Progressing     Problem: Nutrition Deficit:  Goal: Optimize nutritional status  8/8/2023 1029 by Mayi Fleming RN  Outcome: Progressing  8/8/2023 0252 by Amarilys Byrd RN  Outcome: Not Progressing     Problem: Pain  Goal: Verbalizes/displays adequate comfort level or baseline comfort level  8/8/2023 1029 by Mayi Fleming RN  Outcome: Progressing  8/8/2023 0252 by Amarilys Byrd RN  Outcome: Not Progressing     Problem: Neurosensory - Adult  Goal: Achieves stable or improved neurological status  8/8/2023 1029 by Mayi Fleming RN  Outcome: Progressing  8/8/2023 0252 by Amarilys Byrd RN  Outcome: Not Progressing  Goal: Absence of seizures  8/8/2023 1029 by Mayi Fleming RN  Outcome: Progressing  8/8/2023 0252 by Amarilys Byrd RN  Outcome: Not Progressing     Problem: Respiratory - Adult  Goal: Achieves optimal ventilation and oxygenation  8/8/2023 1029 by Mayi Fleming RN  Outcome: Progressing  8/8/2023 0252 by Amarilys Byrd RN  Outcome: Not Progressing     Problem: Cardiovascular - Adult  Goal: Maintains optimal cardiac output and hemodynamic stability  8/8/2023 0252 by Amarilys Byrd RN  Outcome: Not Progressing  Goal: Absence of cardiac dysrhythmias or at baseline  8/8/2023 0252 by Amarilys Byrd RN  Outcome: Not Progressing     Problem: Skin/Tissue Integrity - Adult  Goal: Skin integrity remains intact  8/8/2023 1029 by Mayi Fleming RN  Outcome: Progressing  8/8/2023 0252 by Amarilys Byrd RN  Outcome: Not Progressing  Goal: Oral mucous membranes remain intact  8/8/2023 0252 by Amarilys Byrd RN  Outcome: Not Progressing     Problem: Musculoskeletal - Adult  Goal: Return mobility to safest level of function  8/8/2023 0252 by Amarilys Byrd RN  Outcome: Not Progressing  Goal: Return ADL status to a safe level of function  8/8/2023 0252 by Amarilys Byrd

## 2023-08-08 NOTE — PROGRESS NOTES
Patient overbreathing ventilator with respirations 30-40 and diaphoretic. Spontaneous movement in bilateral upper extremities. Oxygen saturation dropped to 80s. Patient tachycardic and hypertensive in 200s. Sedation increased. FiO2 increased with no improvement to oxygen saturation. PEEP increased to 6, verbal order to adjust as needed per MD. Patient resting and hemodynamically stable.     Electronically signed by Mae Alvarez RN on 8/8/2023 at 7:53 AM

## 2023-08-08 NOTE — PROGRESS NOTES
1.  Use robitussin at night for cough as needed    2.  Keep up on fluids    3.  Continue exercises.    4.  Wear stockings as before.   Palliative Care Progress Note  8/8/2023 12:21 PM    Patient:  Tawny Darden  YOB: 1960  Primary Care Physician: Saulo Barber MD  Advance Directive: Full Code  Admit Date: 8/5/2023       Hospital Day: 3  Portions of this note have been copied forward, however, changed to reflect the most current clinical status of this patient. CHIEF COMPLAINT/REASON FOR CONSULTATION Goals of care, family support, Code status, symptom management     SUBJECTIVE:  Mr. Vicki Griffith remains intubated, sedated. RN reports overnight he became more responsive w/ decreased sedation and started breathing over the vent and became agitated. HPI:  The patient is a 70 y.o. male with PMH CAD, HLD, HTN, tobacco use who presented to [facility] ED on 08/04/2023 w/ concern for worsening weakness, confusion, fatigue and feeling \"shaky\". He was diagnosed w/ COVID-19 5 days prior after developing cough, congestion, diarrhea, fever and fatigue. He was utilizing Mucinex at home. CXR reported cardiomegaly w/ mild vascular congestion and interstitial edema. CT head reported no acute intracranial abnormality. He was felt stable for discharge home at that time. The patient returned on 08/05/2023 after he experienced reported seizure-like activity at home. He was incontinent of urine in ED. Repeat CT head reported no acute intracranial abnormality. CXR reported increasing mixed interstitial and alveolar opacities. He was given Keppra, decadron and IVF's. The patient's mental status did not improve with treatments administered and patient was intubated. He has been seen by Neurology w/ recommendation for MRI brain when able. Keppra is continued. He has required pressor support for hypotension and bradycardia. EEG without epileptiform abnormalities or electrographic seizure activity. He is currently receiving Keppra, Decadron, Rocephin, Zinc, Vitamin D, Thiamine, Luvox, Proscar, Pepcid, ascorbic acid, aspirin, melatonin.  Palliative care has been ondansetron **OR** ondansetron, magnesium hydroxide, acetaminophen **OR** acetaminophen  Diet NPO  ADULT TUBE FEEDING; Orogastric; Peptide Based High Protein; Continuous; 25; Yes; 5; Q 6 hours; 68; 15; Q 1 hour     Lab and other Data:     Recent Labs     08/06/23  0331 08/07/23  0140 08/08/23  0145   WBC 8.5 14.4* 15.3*   HGB 15.0 14.5 14.5    218 205     Recent Labs     08/06/23  1238 08/07/23  0140 08/08/23  0145    139 145   K 4.5 3.8 4.9    103 110   CO2 23 20* 26   BUN 29* 38* 41*   CREATININE 2.1* 2.4* 1.5*   GLUCOSE 157* 239* 110*     Recent Labs     08/06/23  1238 08/07/23  0140 08/08/23  0145   AST 15 23 21   ALT 16 17 16   BILITOT 0.6 0.4 0.3   ALKPHOS 82 81 89     Palliative Performance Scale:  [] 80% Full ambulation  Some disease: Normal activity w/ some effort  Full self-care  Normal/reduced intake  Full LOC  [] 70% Reduced ambulation  Some disease: Can't do normal job or work  Full self-care  Normal/reduced intake  Full LOC  [] 60% Reduced ambulation  Significant disease: Can't do hobbies/housework  Occasional assistance  Normal/reduced intake  LOC full/confusion  [] 50% Mainly sit/lie  Extensive disease: Can't do any work  Considerable assistance  Normal/reduced intake  LOC full/confusion  [] 40% Mainly in bed  Extensive disease  Mainly assistance  Normal/reduced intake  LOC full/confusion  [] 30% Bed Bound  Extensive disease  Total care  Reduced Intake  LOC full/confusion  [] 20% Bed Bound  Extensive disease  Total care  Minimal intake  Drowsy/coma  [x] 10% Bed Bound  Extensive disease  Total care  Mouth care only  Drowsy/coma  [] 0% Death    ECOG:(4) Completely disabled, unable to carry out self-care and confined to bed or chair    CLINICAL PAIN ASSESSMENT:     No non-verbal indicators of pain present     Assessment/Plan   Principal Problem:    Acute respiratory failure with hypoxemia [facility]  Active Problems:    Palliative care patient    Pneumonia

## 2023-08-08 NOTE — PROGRESS NOTES
Wasted 98cc of versed with Pharmacist Karin Talbot in Pharmacy medication destroyer. Also wasted with [facility] RN.

## 2023-08-09 ENCOUNTER — APPOINTMENT (OUTPATIENT)
Dept: GENERAL RADIOLOGY | Age: 63
End: 2023-08-09
Payer: MEDICARE

## 2023-08-09 LAB
ALBUMIN SERPL-MCNC: 2.8 G/DL (ref 3.5–5.2)
ALP SERPL-CCNC: 87 U/L (ref 40–130)
ALT SERPL-CCNC: 13 U/L (ref 5–41)
ANION GAP SERPL CALCULATED.3IONS-SCNC: 8 MMOL/L (ref 7–19)
AST SERPL-CCNC: 14 U/L (ref 5–40)
BILIRUB SERPL-MCNC: 0.3 MG/DL (ref 0.2–1.2)
BUN SERPL-MCNC: 49 MG/DL (ref 8–23)
CALCIUM SERPL-MCNC: 9 MG/DL (ref 8.8–10.2)
CHLORIDE SERPL-SCNC: 105 MMOL/L (ref 98–111)
CO2 SERPL-SCNC: 29 MMOL/L (ref 22–29)
CREAT SERPL-MCNC: 1.3 MG/DL (ref 0.5–1.2)
ERYTHROCYTE [DISTWIDTH] IN BLOOD BY AUTOMATED COUNT: 13.7 % (ref 11.5–14.5)
GLUCOSE SERPL-MCNC: 105 MG/DL (ref 74–109)
HCT VFR BLD AUTO: 44 % (ref 42–52)
HGB BLD-MCNC: 14.3 G/DL (ref 14–18)
MCH RBC QN AUTO: 30.9 PG (ref 27–31)
MCHC RBC AUTO-ENTMCNC: 32.5 G/DL (ref 33–37)
MCV RBC AUTO: 95 FL (ref 80–94)
PLATELET # BLD AUTO: 190 K/UL (ref 130–400)
PMV BLD AUTO: 10.7 FL (ref 9.4–12.4)
POTASSIUM SERPL-SCNC: 4.6 MMOL/L (ref 3.5–5)
PROT SERPL-MCNC: 6.1 G/DL (ref 6.6–8.7)
RBC # BLD AUTO: 4.63 M/UL (ref 4.7–6.1)
SODIUM SERPL-SCNC: 142 MMOL/L (ref 136–145)
WBC # BLD AUTO: 10.9 K/UL (ref 4.8–10.8)

## 2023-08-09 PROCEDURE — 6360000002 HC RX W HCPCS: Performed by: EMERGENCY MEDICINE

## 2023-08-09 PROCEDURE — 94003 VENT MGMT INPAT SUBQ DAY: CPT

## 2023-08-09 PROCEDURE — 6360000002 HC RX W HCPCS: Performed by: INTERNAL MEDICINE

## 2023-08-09 PROCEDURE — 84100 ASSAY OF PHOSPHORUS: CPT

## 2023-08-09 PROCEDURE — 2500000003 HC RX 250 WO HCPCS: Performed by: INTERNAL MEDICINE

## 2023-08-09 PROCEDURE — 85027 COMPLETE CBC AUTOMATED: CPT

## 2023-08-09 PROCEDURE — 99232 SBSQ HOSP IP/OBS MODERATE 35: CPT | Performed by: PSYCHIATRY & NEUROLOGY

## 2023-08-09 PROCEDURE — 2580000003 HC RX 258: Performed by: HOSPITALIST

## 2023-08-09 PROCEDURE — 2000000000 HC ICU R&B

## 2023-08-09 PROCEDURE — 80053 COMPREHEN METABOLIC PANEL: CPT

## 2023-08-09 PROCEDURE — 6360000002 HC RX W HCPCS: Performed by: PSYCHIATRY & NEUROLOGY

## 2023-08-09 PROCEDURE — 71045 X-RAY EXAM CHEST 1 VIEW: CPT

## 2023-08-09 PROCEDURE — 2580000003 HC RX 258: Performed by: INTERNAL MEDICINE

## 2023-08-09 PROCEDURE — 6370000000 HC RX 637 (ALT 250 FOR IP): Performed by: INTERNAL MEDICINE

## 2023-08-09 PROCEDURE — 85025 COMPLETE CBC W/AUTO DIFF WBC: CPT

## 2023-08-09 PROCEDURE — 2500000003 HC RX 250 WO HCPCS: Performed by: HOSPITALIST

## 2023-08-09 PROCEDURE — 99232 SBSQ HOSP IP/OBS MODERATE 35: CPT | Performed by: PHYSICIAN ASSISTANT

## 2023-08-09 PROCEDURE — 2700000000 HC OXYGEN THERAPY PER DAY

## 2023-08-09 PROCEDURE — 94760 N-INVAS EAR/PLS OXIMETRY 1: CPT

## 2023-08-09 PROCEDURE — 83735 ASSAY OF MAGNESIUM: CPT

## 2023-08-09 RX ORDER — ENOXAPARIN SODIUM 100 MG/ML
40 INJECTION SUBCUTANEOUS 2 TIMES DAILY
Status: DISCONTINUED | OUTPATIENT
Start: 2023-08-09 | End: 2023-08-17 | Stop reason: HOSPADM

## 2023-08-09 RX ORDER — BUMETANIDE 0.25 MG/ML
1 INJECTION INTRAMUSCULAR; INTRAVENOUS ONCE
Status: COMPLETED | OUTPATIENT
Start: 2023-08-09 | End: 2023-08-09

## 2023-08-09 RX ADMIN — AZITHROMYCIN MONOHYDRATE 500 MG: 500 INJECTION, POWDER, LYOPHILIZED, FOR SOLUTION INTRAVENOUS at 21:54

## 2023-08-09 RX ADMIN — CYPROHEPTADINE HYDROCHLORIDE 8 MG: 4 TABLET ORAL at 13:46

## 2023-08-09 RX ADMIN — FINASTERIDE 10 MG: 5 TABLET, FILM COATED ORAL at 07:38

## 2023-08-09 RX ADMIN — PROPOFOL 35 MCG/KG/MIN: 10 INJECTION, EMULSION INTRAVENOUS at 00:02

## 2023-08-09 RX ADMIN — SODIUM CHLORIDE, PRESERVATIVE FREE 10 ML: 5 INJECTION INTRAVENOUS at 21:00

## 2023-08-09 RX ADMIN — Medication 5000 UNITS: at 07:38

## 2023-08-09 RX ADMIN — ZINC SULFATE 220 MG (50 MG) CAPSULE 50 MG: CAPSULE at 07:38

## 2023-08-09 RX ADMIN — ASPIRIN 325 MG: 325 TABLET ORAL at 07:38

## 2023-08-09 RX ADMIN — ENOXAPARIN SODIUM 40 MG: 100 INJECTION SUBCUTANEOUS at 21:47

## 2023-08-09 RX ADMIN — MIDAZOLAM HYDROCHLORIDE 3 MG/HR: 1 INJECTION, SOLUTION INTRAVENOUS at 08:42

## 2023-08-09 RX ADMIN — Medication 10 MG: at 07:38

## 2023-08-09 RX ADMIN — CHLORHEXIDINE GLUCONATE 15 ML: 1.2 RINSE ORAL at 07:39

## 2023-08-09 RX ADMIN — ENOXAPARIN SODIUM 60 MG: 100 INJECTION SUBCUTANEOUS at 07:36

## 2023-08-09 RX ADMIN — LEVETIRACETAM 500 MG: 100 INJECTION INTRAVENOUS at 04:22

## 2023-08-09 RX ADMIN — PROPOFOL 30 MCG/KG/MIN: 10 INJECTION, EMULSION INTRAVENOUS at 22:57

## 2023-08-09 RX ADMIN — DEXAMETHASONE SODIUM PHOSPHATE 6 MG: 10 INJECTION INTRAMUSCULAR; INTRAVENOUS at 07:35

## 2023-08-09 RX ADMIN — CYPROHEPTADINE HYDROCHLORIDE 8 MG: 4 TABLET ORAL at 07:38

## 2023-08-09 RX ADMIN — PROPOFOL 30 MCG/KG/MIN: 10 INJECTION, EMULSION INTRAVENOUS at 13:46

## 2023-08-09 RX ADMIN — LEVETIRACETAM 500 MG: 100 INJECTION INTRAVENOUS at 16:19

## 2023-08-09 RX ADMIN — PROPOFOL 30 MCG/KG/MIN: 10 INJECTION, EMULSION INTRAVENOUS at 04:38

## 2023-08-09 RX ADMIN — OMEGA-3-ACID ETHYL ESTERS 2 G: 1 CAPSULE, LIQUID FILLED ORAL at 07:38

## 2023-08-09 RX ADMIN — SODIUM CHLORIDE, PRESERVATIVE FREE 20 MG: 5 INJECTION INTRAVENOUS at 07:36

## 2023-08-09 RX ADMIN — BUMETANIDE 1 MG: 0.25 INJECTION INTRAMUSCULAR; INTRAVENOUS at 02:28

## 2023-08-09 RX ADMIN — PROPOFOL 30 MCG/KG/MIN: 10 INJECTION, EMULSION INTRAVENOUS at 08:44

## 2023-08-09 RX ADMIN — WATER 1000 MG: 1 INJECTION INTRAMUSCULAR; INTRAVENOUS; SUBCUTANEOUS at 13:47

## 2023-08-09 RX ADMIN — FLUVOXAMINE MALEATE 50 MG: 50 TABLET, COATED ORAL at 07:38

## 2023-08-09 RX ADMIN — PROPOFOL 30 MCG/KG/MIN: 10 INJECTION, EMULSION INTRAVENOUS at 18:18

## 2023-08-09 RX ADMIN — ATORVASTATIN CALCIUM 40 MG: 40 TABLET, FILM COATED ORAL at 21:47

## 2023-08-09 RX ADMIN — SODIUM CHLORIDE, PRESERVATIVE FREE 10 ML: 5 INJECTION INTRAVENOUS at 07:39

## 2023-08-09 RX ADMIN — BUMETANIDE 0.5 MG/HR: 0.25 INJECTION INTRAMUSCULAR; INTRAVENOUS at 02:31

## 2023-08-09 ASSESSMENT — PULMONARY FUNCTION TESTS
PIF_VALUE: 18
PIF_VALUE: 18
PIF_VALUE: 16
PIF_VALUE: 16
PIF_VALUE: 21
PIF_VALUE: 23
PIF_VALUE: 19
PIF_VALUE: 23
PIF_VALUE: 18
PIF_VALUE: 18
PIF_VALUE: 20
PIF_VALUE: 18
PIF_VALUE: 16
PIF_VALUE: 19
PIF_VALUE: 18
PIF_VALUE: 17
PIF_VALUE: 18
PIF_VALUE: 17
PIF_VALUE: 19
PIF_VALUE: 16
PIF_VALUE: 17
PIF_VALUE: 19
PIF_VALUE: 19
PIF_VALUE: 23
PIF_VALUE: 16
PIF_VALUE: 18
PIF_VALUE: 17
PIF_VALUE: 19
PIF_VALUE: 16
PIF_VALUE: 20
PIF_VALUE: 18
PIF_VALUE: 16
PIF_VALUE: 20
PIF_VALUE: 16
PIF_VALUE: 26
PIF_VALUE: 17

## 2023-08-09 ASSESSMENT — PAIN SCALES - GENERAL
PAINLEVEL_OUTOF10: 0
PAINLEVEL_OUTOF10: 0

## 2023-08-09 NOTE — PROGRESS NOTES
Comprehensive Nutrition Assessment    Type and Reason for Visit:  Reassess    Nutrition Recommendations/Plan:   Modify Vital High Protein goal rate to 70ml/hr     Malnutrition Assessment:  Malnutrition Status: At risk for malnutrition (Comment) (08/09/23 1118)    Context:  Acute Illness     Findings of the 6 clinical characteristics of malnutrition:  Energy Intake:  Mild decrease in energy intake (Comment)  Weight Loss:  Greater than 2% over 1 week     Body Fat Loss:  No significant body fat loss     Muscle Mass Loss:  No significant muscle mass loss    Fluid Accumulation:  No significant fluid accumulation Extremities   Strength:  Not Performed    Nutrition Assessment:    EN is running at time of visit. Rate has been increased to 40ml/hr with 15ml free water flush. Propofol remains at 21.2ml/hr. Weight has decreased with diuresis. Nutrition Related Findings:    Glucose 10.  on vent Wound Type: None       Current Nutrition Intake & Therapies:    Average Meal Intake: NPO  Average Supplements Intake: NPO  Current Tube Feeding (TF) Orders:  Feeding Route: Orogastric  Formula: Peptide Based High Protein  Schedule: Continuous  Feeding Regimen: Vital High Protein goal rate 70ml/hr  Additives/Modulars: None  Water Flushes: 15ml hourly  Current TF & Flush Orders Provides: Vital High Protein @ 40ml/hr = 960 kcals with 83g protein, 106h CHO and 802 ml free water from formula  Goal TF & Flush Orders Provides: Vital High Protein @ 70ml/hr = 1680 kcals with 146g protein, 186g CHO and 1404 ml free water from formula and 360ml free water from fllush. Propofol adds another 559 NP kcals    Anthropometric Measures:  Height: 6' 4\" (193 cm)  Ideal Body Weight (IBW): 202 lbs (92 kg)    Admission Body Weight: 259 lb (117.5 kg)  Current Body Weight: 245 lb 9.5 oz (111.4 kg), 121.6 % IBW.  Weight Source: Bed Scale  Current BMI (kg/m2): 29.9  Usual Body Weight: 259 lb 9.6 oz (117.8 kg) (2/2023)  % Weight Change (Calculated):

## 2023-08-09 NOTE — PROGRESS NOTES
Palliative Care Progress Note  8/9/2023 2:38 PM    Patient:  Karen Baker  YOB: 1960  Primary Care Physician: Aleshia Erickson MD  Advance Directive: Full Code  Admit Date: 8/5/2023       Hospital Day: 4  Portions of this note have been copied forward, however, changed to reflect the most current clinical status of this patient. CHIEF COMPLAINT/REASON FOR CONSULTATION Goals of care, family support, Code status, symptom management     SUBJECTIVE:  Mr. Jewel Moreau remains intubated, sedated. Placed on bumex drip overnight w/ concern for volume overload     HPI:  The patient is a 70 y.o. male with PMH CAD, HLD, HTN, tobacco use who presented to [address] [facility] ED on 08/04/2023 w/ concern for worsening weakness, confusion, fatigue and feeling \"shaky\". He was diagnosed w/ COVID-19 5 days prior after developing cough, congestion, diarrhea, fever and fatigue. He was utilizing Mucinex at home. CXR reported cardiomegaly w/ mild vascular congestion and interstitial edema. CT head reported no acute intracranial abnormality. He was felt stable for discharge home at that time. The patient returned on 08/05/2023 after he experienced reported seizure-like activity at home. He was incontinent of urine in ED. Repeat CT head reported no acute intracranial abnormality. CXR reported increasing mixed interstitial and alveolar opacities. He was given Keppra, decadron and IVF's. The patient's mental status did not improve with treatments administered and patient was intubated. He has been seen by Neurology w/ recommendation for MRI brain when able. Keppra is continued. He has required pressor support for hypotension and bradycardia. EEG without epileptiform abnormalities or electrographic seizure activity. He is currently receiving Keppra, Decadron, Rocephin, Zinc, Vitamin D, Thiamine, Luvox, Proscar, Pepcid, ascorbic acid, aspirin, melatonin. Palliative care has been consulted for goals of care, code status discussion.      Review of

## 2023-08-09 NOTE — PROGRESS NOTES
Hospitalist Progress Note    Patient:  Long Shin  YOB: 1960  Date of Service: 8/8/2023  MRN: [id number]   Acct: [de-identified]   Primary Care Physician: Vikki Watson MD  Advance Directive: Full Code  Admit Date: 8/5/2023       Hospital Day: 3  Referring Provider: Dena Quintero DO    Patient Seen, Chart, Consults, Notes, Labs, Radiology studies reviewed. Subjective: Long Shin is a 70 y.o. male  whom we are following for COVID-pneumonia, hypoxemic respiratory failure, acute kidney injury. He still has fairly high O2 demand. His GFR is improved. Hemodynamics are stable. He remains off of pressors. He is on assist-control rate of 20, tidal volume of 520, 6 of PEEP, FiO2 of 0.6. Allergies:  Patient has no known allergies.     Medicines:  Current Facility-Administered Medications   Medication Dose Route Frequency Provider Last Rate Last Admin    famotidine (PEPCID) 20 mg in sodium chloride (PF) 0.9 % 10 mL injection  20 mg IntraVENous Daily Dena Quintero, DO   20 mg at 08/08/23 0751    midazolam (VERSED) 100mg/100mL in NS infusion  1-10 mg/hr IntraVENous Continuous Kendall Renteria MD 2 mL/hr at 08/08/23 0837 2 mg/hr at 08/08/23 0837    sodium chloride flush 0.9 % injection 10 mL  10 mL IntraVENous 2 times per day Dena Quintero, DO   10 mL at 08/08/23 0757    sodium chloride flush 0.9 % injection 10 mL  10 mL IntraVENous PRN Dena Quintero, DO        0.9 % sodium chloride infusion   IntraVENous PRN Dena Quintero, DO        enoxaparin (LOVENOX) injection 60 mg  0.5 mg/kg SubCUTAneous BID Dena Romaner, DO   60 mg at 08/08/23 0753    ondansetron (ZOFRAN-ODT) disintegrating tablet 4 mg  4 mg Oral Q8H PRN Dena Romaner, DO        Or    ondansetron [facility]) injection 4 mg  4 mg IntraVENous Q6H PRN Dena Lacer, DO        magnesium hydroxide (MILK OF MAGNESIA) 400 MG/5ML suspension 30 mL  30 mL Oral Daily PRN Dena Lacer, DO degenerative changes of additional levels. Bilateral facet hypertrophy throughout the spine. Soft Tissues: No prevertebral soft tissue swelling. Scattered vascular calcifications of the carotid vessels. Limited Chest: Endotracheal tube within the trachea with tip excluded from the field of view. Filling defects within the adjacent mildly distended esophagus. Patchy ground-glass opacities throughout the upper lungs bilaterally with regions of interlobular septal thickening which could represent a component of pulmonary edema. Changes may be related to pneumonia given the clinical history. Emphysematous changes of the lungs. Segmental analysis is as follows:  C2/C3:  No significant central canal narrowing. Facet hypertrophy, more pronounced on the left side with mild left foraminal narrowing. C3/C4:  Small posterior disc/osteophyte complex without significant central canal narrowing. Facet hypertrophy with minimal foraminal narrowing. C4/C5:  Small posterior disc/osteophyte complex effacing the ventral portion of the thecal sac with central canal measuring 10.5 mm in anterior-posterior dimension. Mild facet hypertrophy with mild left foraminal narrowing. C5/C6:  Posterior disc osteophyte complex results in mild deformation ventral portion of the spinal cord. Minimal central canal narrowing with central canal measuring 9.5 mm anterior to posterior dimension. Facet hypertrophy with moderate bilateral foraminal narrowing more pronounced on the left side  C6/C7:  The posterior disc/osteophyte complex with mild deformation ventral portion of the spinal cord. Central canal measures 9.5 mm in anterior to posterior dimension. Facet and uncovertebral hypertrophy with marked bilateral foraminal narrowing. C7/T1:  Small posterior disc/osteophyte complex without significant central canal narrowing.  Facet and uncovertebral hypertrophy with mild foraminal narrowing        No acute osseous abnormality involving the

## 2023-08-09 NOTE — PLAN OF CARE
Problem: Discharge Planning  Goal: Discharge to home or other facility with appropriate resources  8/9/2023 0009 by Vincenzo Campa RN  Outcome: Not Progressing     Problem: Safety - Adult  Goal: Free from fall injury  8/9/2023 0009 by Vincenzo Campa RN  Outcome: Not Progressing     Problem: ABCDS Injury Assessment  Goal: Absence of physical injury  8/9/2023 0009 by Vincenzo Campa RN  Outcome: Not Progressing     Problem: Skin/Tissue Integrity  Goal: Absence of new skin breakdown  Description: 1. Monitor for areas of redness and/or skin breakdown  2. Assess vascular access sites hourly  3. Every 4-6 hours minimum:  Change oxygen saturation probe site  4. Every 4-6 hours:  If on nasal continuous positive airway pressure, respiratory therapy assess nares and determine need for appliance change or resting period.   8/9/2023 0009 by Vincenzo Campa RN  Outcome: Not Progressing     Problem: Nutrition Deficit:  Goal: Optimize nutritional status  8/9/2023 1127 by Richie Zimmer, MS, RD, LD  Outcome: Progressing  Flowsheets (Taken 8/9/2023 1110)  Nutrient intake appropriate for improving, restoring, or maintaining nutritional needs:   Assess nutritional status and recommend course of action   Recommend, monitor, and adjust tube feedings and TPN/PPN based on assessed needs  8/9/2023 0009 by Vincenzo Campa RN  Outcome: Not Progressing     Problem: Pain  Goal: Verbalizes/displays adequate comfort level or baseline comfort level  8/9/2023 0009 by Vincenzo Campa RN  Outcome: Not Progressing     Problem: Neurosensory - Adult  Goal: Achieves stable or improved neurological status  8/9/2023 0009 by Vincenzo Campa RN  Outcome: Not Progressing  Goal: Absence of seizures  8/9/2023 0009 by Vincenzo Campa RN  Outcome: Not Progressing     Problem: Respiratory - Adult  Goal: Achieves optimal ventilation and oxygenation  8/9/2023 0009 by Vincenzo Campa RN  Outcome: Not Progressing     Problem: Cardiovascular - Adult  Goal:

## 2023-08-09 NOTE — CARE COORDINATION
Care Management following for needs; Pt still intubated/sedated.  Covid +  Electronically signed by Harlee Goldberg, LSW on 8/9/2023 at 1:52 PM

## 2023-08-09 NOTE — PLAN OF CARE
Problem: Discharge Planning  Goal: Discharge to home or other facility with appropriate resources  Outcome: Not Progressing     Problem: Safety - Adult  Goal: Free from fall injury  8/9/2023 0009 by Lin Chan RN  Outcome: Not Progressing  8/8/2023 1029 by Miya Brand RN  Outcome: Progressing     Problem: ABCDS Injury Assessment  Goal: Absence of physical injury  Outcome: Not Progressing     Problem: Skin/Tissue Integrity  Goal: Absence of new skin breakdown  Description: 1. Monitor for areas of redness and/or skin breakdown  2. Assess vascular access sites hourly  3. Every 4-6 hours minimum:  Change oxygen saturation probe site  4. Every 4-6 hours:  If on nasal continuous positive airway pressure, respiratory therapy assess nares and determine need for appliance change or resting period.   8/9/2023 0009 by Lin Chan RN  Outcome: Not Progressing  8/8/2023 1029 by Miya Brand RN  Outcome: Progressing     Problem: Nutrition Deficit:  Goal: Optimize nutritional status  8/9/2023 0009 by Lin Chan RN  Outcome: Not Progressing  8/8/2023 1029 by Miya Brand RN  Outcome: Progressing     Problem: Pain  Goal: Verbalizes/displays adequate comfort level or baseline comfort level  8/9/2023 0009 by Lin Chan RN  Outcome: Not Progressing  8/8/2023 1029 by Miya Brand RN  Outcome: Progressing     Problem: Neurosensory - Adult  Goal: Achieves stable or improved neurological status  8/9/2023 0009 by Lin Chan RN  Outcome: Not Progressing  8/8/2023 1029 by Miya Brand RN  Outcome: Progressing  Goal: Absence of seizures  8/9/2023 0009 by Lin Chan RN  Outcome: Not Progressing  8/8/2023 1029 by Miya Brand RN  Outcome: Progressing     Problem: Respiratory - Adult  Goal: Achieves optimal ventilation and oxygenation  8/9/2023 0009 by Lin Chan RN  Outcome: Not Progressing  8/8/2023 1029 by Miya Brand RN  Outcome: Progressing     Problem: Cardiovascular - Adult  Goal: Maintains optimal cardiac output and hemodynamic stability  Outcome: Not Progressing  Goal: Absence of cardiac dysrhythmias or at baseline  Outcome: Not Progressing     Problem: Skin/Tissue Integrity - Adult  Goal: Skin integrity remains intact  8/9/2023 0009 by Mallory Abdi RN  Outcome: Not Progressing  8/8/2023 1029 by Ria Kolb RN  Outcome: Progressing  Goal: Oral mucous membranes remain intact  Outcome: Not Progressing     Problem: Musculoskeletal - Adult  Goal: Return mobility to safest level of function  Outcome: Not Progressing  Goal: Return ADL status to a safe level of function  Outcome: Not Progressing     Problem: Gastrointestinal - Adult  Goal: Maintains or returns to baseline bowel function  Outcome: Not Progressing     Problem: Genitourinary - Adult  Goal: Absence of urinary retention  Outcome: Not Progressing  Goal: Urinary catheter remains patent  Outcome: Not Progressing     Problem: Infection - Adult  Goal: Absence of infection at discharge  Outcome: Not Progressing     Problem: Metabolic/Fluid and Electrolytes - Adult  Goal: Electrolytes maintained within normal limits  Outcome: Not Progressing     Problem: Hematologic - Adult  Goal: Maintains hematologic stability  Outcome: Not Progressing     Problem: Coping  Goal: Pt/Family able to verbalize concerns and demonstrate effective coping strategies  Description: INTERVENTIONS:  1. Assist patient/family to identify coping skills, available support systems and cultural and spiritual values  2. Provide emotional support, including active listening and acknowledgement of concerns of patient and caregivers  3. Reduce environmental stimuli, as able  4. Instruct patient/family in relaxation techniques, as appropriate  5.  Assess for spiritual pain/suffering and initiate Spiritual Care, Psychosocial Clinical Specialist consults as needed  Outcome: Not Progressing

## 2023-08-09 NOTE — PROGRESS NOTES
is normal.  Small left pleural effusion is new since the prior. There are persistent left perihilar alveolar opacities. There are increased opacities in the left lung base. Right lung base opacities are unchanged. No pneumothoraces. 1. New small left pleural effusion with subjacent atelectasis and/or infiltrate. 2. Stable left perihilar infiltrate. 3. Stable infiltrate in the right lung base. 4. Endotracheal tube and nasogastric tube in place. .   ______________________________________ Electronically signed by: Moreno Najera D.O. Date:     08/09/2023 Time:    07:39      Assessment and Plan:   I assumed care of this patient on 8/9/2023 at 0700 with ongoing treatment plan on board. 69-year-old male with tobacco dependence and remote CABG admitted to ICU for COVID-19 bilateral pneumonia, seizure-like activity, HILARY, and ventilator dependent acute hypoxemic respiratory failure. Decadron  Conservative fluid management  Bumex gtt  Lung protective ventilation  Titrate minute ventilation for pH 7.35  SBT once able  Neurology following  AEDs per neurology  Seizure precautions  Follow renal function/urine output/electrolytes  Creatinine improving  Lovenox for DVT prophylaxis  Discussed treatment plan with wife/RN    Total critical care time: 48 minutes    The above note was generated using voice recognition software. Inadvertent typographical errors in transcription may have occurred.     Joe Terry MD   8/9/2023 8:48 PM

## 2023-08-09 NOTE — PLAN OF CARE
Problem: Discharge Planning  Goal: Discharge to home or other facility with appropriate resources  Outcome: Not Progressing     Problem: Safety - Adult  Goal: Free from fall injury  Outcome: Progressing     Problem: ABCDS Injury Assessment  Goal: Absence of physical injury  Outcome: Progressing     Problem: Skin/Tissue Integrity  Goal: Absence of new skin breakdown  Description: 1. Monitor for areas of redness and/or skin breakdown  2. Assess vascular access sites hourly  3. Every 4-6 hours minimum:  Change oxygen saturation probe site  4. Every 4-6 hours:  If on nasal continuous positive airway pressure, respiratory therapy assess nares and determine need for appliance change or resting period.   Outcome: Progressing     Problem: Nutrition Deficit:  Goal: Optimize nutritional status  8/9/2023 1753 by Nirmala Tejada RN  Outcome: Progressing  8/9/2023 1127 by Juve Hammond MS, RD, LD  Outcome: Progressing  Flowsheets (Taken 8/9/2023 1110)  Nutrient intake appropriate for improving, restoring, or maintaining nutritional needs:   Assess nutritional status and recommend course of action   Recommend, monitor, and adjust tube feedings and TPN/PPN based on assessed needs     Problem: Pain  Goal: Verbalizes/displays adequate comfort level or baseline comfort level  Outcome: Progressing     Problem: Neurosensory - Adult  Goal: Achieves stable or improved neurological status  Outcome: Progressing  Goal: Absence of seizures  Outcome: Progressing     Problem: Respiratory - Adult  Goal: Achieves optimal ventilation and oxygenation  Outcome: Progressing     Problem: Cardiovascular - Adult  Goal: Maintains optimal cardiac output and hemodynamic stability  Outcome: Progressing  Goal: Absence of cardiac dysrhythmias or at baseline  Outcome: Progressing     Problem: Skin/Tissue Integrity - Adult  Goal: Skin integrity remains intact  Outcome: Progressing  Goal: Oral mucous membranes remain intact  Outcome: Progressing

## 2023-08-10 ENCOUNTER — APPOINTMENT (OUTPATIENT)
Dept: GENERAL RADIOLOGY | Age: 63
End: 2023-08-10
Payer: MEDICARE

## 2023-08-10 LAB
ALLENS TEST: ABNORMAL
ANION GAP SERPL CALCULATED.3IONS-SCNC: 7 MMOL/L (ref 7–19)
BACTERIA BLD CULT ORG #2: NORMAL
BACTERIA BLD CULT: NORMAL
BASE EXCESS ARTERIAL: 17.1 MMOL/L (ref -2–2)
BASOPHILS # BLD: 0.1 K/UL (ref 0–0.2)
BASOPHILS NFR BLD: 0.4 % (ref 0–1)
BUN SERPL-MCNC: 62 MG/DL (ref 8–23)
CALCIUM SERPL-MCNC: 9.4 MG/DL (ref 8.8–10.2)
CARBOXYHEMOGLOBIN ARTERIAL: 1.6 % (ref 0–5)
CHLORIDE SERPL-SCNC: 103 MMOL/L (ref 98–111)
CO2 SERPL-SCNC: 40 MMOL/L (ref 22–29)
CREAT SERPL-MCNC: 1.5 MG/DL (ref 0.5–1.2)
EOSINOPHIL # BLD: 0 K/UL (ref 0–0.6)
EOSINOPHIL NFR BLD: 0 % (ref 0–5)
ERYTHROCYTE [DISTWIDTH] IN BLOOD BY AUTOMATED COUNT: 13.5 % (ref 11.5–14.5)
FIO2: 50 %
GLUCOSE SERPL-MCNC: 114 MG/DL (ref 74–109)
HCO3 ARTERIAL: 43.3 MMOL/L (ref 22–26)
HCT VFR BLD AUTO: 48.2 % (ref 42–52)
HEMOGLOBIN, ART, EXTENDED: 16.7 G/DL (ref 14–18)
HGB BLD-MCNC: 15.9 G/DL (ref 14–18)
IMM GRANULOCYTES # BLD: 0.2 K/UL
LYMPHOCYTES # BLD: 1.6 K/UL (ref 1.1–4.5)
LYMPHOCYTES NFR BLD: 12.1 % (ref 20–40)
MAGNESIUM SERPL-MCNC: 1.9 MG/DL (ref 1.6–2.4)
MCH RBC QN AUTO: 30.8 PG (ref 27–31)
MCHC RBC AUTO-ENTMCNC: 33 G/DL (ref 33–37)
MCV RBC AUTO: 93.2 FL (ref 80–94)
MECHANICAL RATE IN BPM: 20
METHEMOGLOBIN ARTERIAL: 1.3 %
MODE: ABNORMAL
MONOCYTES # BLD: 1.8 K/UL (ref 0–0.9)
MONOCYTES NFR BLD: 13.8 % (ref 0–10)
NEUTROPHILS # BLD: 9.6 K/UL (ref 1.5–7.5)
NEUTS SEG NFR BLD: 72.6 % (ref 50–65)
O2 CONTENT ARTERIAL: 21.6 ML/DL
O2 SAT, ARTERIAL: 92.3 %
O2 THERAPY: ABNORMAL
PCO2 ARTERIAL: 53 MMHG (ref 35–45)
PH ARTERIAL: 7.52 (ref 7.35–7.45)
PLATELET # BLD AUTO: 203 K/UL (ref 130–400)
PMV BLD AUTO: 10.6 FL (ref 9.4–12.4)
PO2 ARTERIAL: 71 MMHG (ref 80–100)
POSITIVE END EXP PRESS: 6
POTASSIUM BLD-SCNC: 3.8 MMOL/L
POTASSIUM SERPL-SCNC: 4.2 MMOL/L (ref 3.5–5)
RBC # BLD AUTO: 5.17 M/UL (ref 4.7–6.1)
SAMPLE SOURCE: ABNORMAL
SODIUM SERPL-SCNC: 150 MMOL/L (ref 136–145)
VT MECHANICAL: 520 %
WBC # BLD AUTO: 13.2 K/UL (ref 4.8–10.8)

## 2023-08-10 PROCEDURE — 2000000000 HC ICU R&B

## 2023-08-10 PROCEDURE — 6360000002 HC RX W HCPCS: Performed by: INTERNAL MEDICINE

## 2023-08-10 PROCEDURE — 2580000003 HC RX 258: Performed by: HOSPITALIST

## 2023-08-10 PROCEDURE — 2500000003 HC RX 250 WO HCPCS: Performed by: HOSPITALIST

## 2023-08-10 PROCEDURE — 2700000000 HC OXYGEN THERAPY PER DAY

## 2023-08-10 PROCEDURE — 99232 SBSQ HOSP IP/OBS MODERATE 35: CPT | Performed by: PHYSICIAN ASSISTANT

## 2023-08-10 PROCEDURE — 6370000000 HC RX 637 (ALT 250 FOR IP): Performed by: INTERNAL MEDICINE

## 2023-08-10 PROCEDURE — 85025 COMPLETE CBC W/AUTO DIFF WBC: CPT

## 2023-08-10 PROCEDURE — 36600 WITHDRAWAL OF ARTERIAL BLOOD: CPT

## 2023-08-10 PROCEDURE — 80048 BASIC METABOLIC PNL TOTAL CA: CPT

## 2023-08-10 PROCEDURE — 2580000003 HC RX 258: Performed by: INTERNAL MEDICINE

## 2023-08-10 PROCEDURE — 82803 BLOOD GASES ANY COMBINATION: CPT

## 2023-08-10 PROCEDURE — 6360000002 HC RX W HCPCS: Performed by: PSYCHIATRY & NEUROLOGY

## 2023-08-10 PROCEDURE — 99232 SBSQ HOSP IP/OBS MODERATE 35: CPT | Performed by: PSYCHIATRY & NEUROLOGY

## 2023-08-10 PROCEDURE — 2500000003 HC RX 250 WO HCPCS: Performed by: INTERNAL MEDICINE

## 2023-08-10 PROCEDURE — 94003 VENT MGMT INPAT SUBQ DAY: CPT

## 2023-08-10 PROCEDURE — 71045 X-RAY EXAM CHEST 1 VIEW: CPT

## 2023-08-10 PROCEDURE — 83735 ASSAY OF MAGNESIUM: CPT

## 2023-08-10 RX ORDER — FENTANYL CITRATE-0.9 % NACL/PF 10 MCG/ML
25-200 PLASTIC BAG, INJECTION (ML) INTRAVENOUS CONTINUOUS
Status: DISCONTINUED | OUTPATIENT
Start: 2023-08-10 | End: 2023-08-13

## 2023-08-10 RX ORDER — FAMOTIDINE 20 MG/1
20 TABLET, FILM COATED ORAL 2 TIMES DAILY
Status: DISCONTINUED | OUTPATIENT
Start: 2023-08-10 | End: 2023-08-17 | Stop reason: HOSPADM

## 2023-08-10 RX ADMIN — ATORVASTATIN CALCIUM 40 MG: 40 TABLET, FILM COATED ORAL at 21:07

## 2023-08-10 RX ADMIN — ZINC SULFATE 220 MG (50 MG) CAPSULE 50 MG: CAPSULE at 08:38

## 2023-08-10 RX ADMIN — SODIUM CHLORIDE, PRESERVATIVE FREE 10 ML: 5 INJECTION INTRAVENOUS at 21:08

## 2023-08-10 RX ADMIN — PROPOFOL 20 MCG/KG/MIN: 10 INJECTION, EMULSION INTRAVENOUS at 19:42

## 2023-08-10 RX ADMIN — SODIUM CHLORIDE, PRESERVATIVE FREE 20 MG: 5 INJECTION INTRAVENOUS at 08:37

## 2023-08-10 RX ADMIN — CHLORHEXIDINE GLUCONATE 15 ML: 1.2 RINSE ORAL at 05:00

## 2023-08-10 RX ADMIN — LEVETIRACETAM 500 MG: 100 INJECTION INTRAVENOUS at 15:52

## 2023-08-10 RX ADMIN — CHLORHEXIDINE GLUCONATE 15 ML: 1.2 RINSE ORAL at 08:37

## 2023-08-10 RX ADMIN — PROPOFOL 30 MCG/KG/MIN: 10 INJECTION, EMULSION INTRAVENOUS at 13:45

## 2023-08-10 RX ADMIN — Medication 50 MCG/HR: at 13:36

## 2023-08-10 RX ADMIN — BUMETANIDE 0.5 MG/HR: 0.25 INJECTION INTRAMUSCULAR; INTRAVENOUS at 00:07

## 2023-08-10 RX ADMIN — FINASTERIDE 10 MG: 5 TABLET, FILM COATED ORAL at 08:38

## 2023-08-10 RX ADMIN — ENOXAPARIN SODIUM 40 MG: 100 INJECTION SUBCUTANEOUS at 21:08

## 2023-08-10 RX ADMIN — ASPIRIN 325 MG: 325 TABLET ORAL at 08:38

## 2023-08-10 RX ADMIN — LEVETIRACETAM 500 MG: 100 INJECTION INTRAVENOUS at 03:49

## 2023-08-10 RX ADMIN — AZITHROMYCIN MONOHYDRATE 500 MG: 500 INJECTION, POWDER, LYOPHILIZED, FOR SOLUTION INTRAVENOUS at 21:08

## 2023-08-10 RX ADMIN — FAMOTIDINE 20 MG: 20 TABLET ORAL at 21:08

## 2023-08-10 RX ADMIN — OMEGA-3-ACID ETHYL ESTERS 2 G: 1 CAPSULE, LIQUID FILLED ORAL at 21:07

## 2023-08-10 RX ADMIN — PROPOFOL 30 MCG/KG/MIN: 10 INJECTION, EMULSION INTRAVENOUS at 09:09

## 2023-08-10 RX ADMIN — DEXAMETHASONE SODIUM PHOSPHATE 6 MG: 10 INJECTION INTRAMUSCULAR; INTRAVENOUS at 08:39

## 2023-08-10 RX ADMIN — OMEGA-3-ACID ETHYL ESTERS 2 G: 1 CAPSULE, LIQUID FILLED ORAL at 08:38

## 2023-08-10 RX ADMIN — SODIUM CHLORIDE, PRESERVATIVE FREE 10 ML: 5 INJECTION INTRAVENOUS at 08:39

## 2023-08-10 RX ADMIN — Medication 10 MG: at 08:38

## 2023-08-10 RX ADMIN — ENOXAPARIN SODIUM 40 MG: 100 INJECTION SUBCUTANEOUS at 08:37

## 2023-08-10 RX ADMIN — CHLORHEXIDINE GLUCONATE 15 ML: 1.2 RINSE ORAL at 21:29

## 2023-08-10 RX ADMIN — Medication 5000 UNITS: at 08:38

## 2023-08-10 RX ADMIN — WATER 1000 MG: 1 INJECTION INTRAMUSCULAR; INTRAVENOUS; SUBCUTANEOUS at 14:30

## 2023-08-10 RX ADMIN — PROPOFOL 30 MCG/KG/MIN: 10 INJECTION, EMULSION INTRAVENOUS at 03:50

## 2023-08-10 ASSESSMENT — PULMONARY FUNCTION TESTS
PIF_VALUE: 19
PIF_VALUE: 19
PIF_VALUE: 20
PIF_VALUE: 19
PIF_VALUE: 21
PIF_VALUE: 19

## 2023-08-10 NOTE — PROGRESS NOTES
Palliative Care Progress Note  8/10/2023 1:49 PM    Patient:  Cecy Avendaño  YOB: 1960  Primary Care Physician: Maegan Mahmood MD  Advance Directive: Full Code  Admit Date: 8/5/2023       Hospital Day: 5  Portions of this note have been copied forward, however, changed to reflect the most current clinical status of this patient. CHIEF COMPLAINT/REASON FOR CONSULTATION Goals of care, family support, Code status, symptom management     SUBJECTIVE:  Mr. Sondra Seymour remains intubated, sedated. No acute overnight events reported. HPI:  The patient is a 70 y.o. male with PMH CAD, HLD, HTN, tobacco use who presented to [facility] ED on 08/04/2023 w/ concern for worsening weakness, confusion, fatigue and feeling \"shaky\". He was diagnosed w/ COVID-19 5 days prior after developing cough, congestion, diarrhea, fever and fatigue. He was utilizing Mucinex at home. CXR reported cardiomegaly w/ mild vascular congestion and interstitial edema. CT head reported no acute intracranial abnormality. He was felt stable for discharge home at that time. The patient returned on 08/05/2023 after he experienced reported seizure-like activity at home. He was incontinent of urine in ED. Repeat CT head reported no acute intracranial abnormality. CXR reported increasing mixed interstitial and alveolar opacities. He was given Keppra, decadron and IVF's. The patient's mental status did not improve with treatments administered and patient was intubated. He has been seen by Neurology w/ recommendation for MRI brain when able. Keppra is continued. He has required pressor support for hypotension and bradycardia. EEG without epileptiform abnormalities or electrographic seizure activity. He is currently receiving Keppra, Decadron, Rocephin, Zinc, Vitamin D, Thiamine, Luvox, Proscar, Pepcid, ascorbic acid, aspirin, melatonin. Palliative care has been consulted for goals of care, code status discussion.      Review of Systems:   14 point (acute kidney injury) ([address])    Acute respiratory failure with hypoxia ([address])  Resolved Problems:    * No resolved hospital problems. *    Visit Summary:  Chart reviewed. No acute overnight events noted. Discussed w/ ICU RN and Hospitalist. Versed discontinued w/ plans to move forward with sedation weaning when indicated/if patient tolerates. Call placed to patient's spouse. We reviewed current clinical concerns and treatments provided thus far. She is concerned about his CXR. We reviewed repeat imaging. We reviewed sedation weaning, neurological exam. She plans to return in AM. Opportunity for questions and support provided. Recommendations:      Palliative Care-GOC unchanged, continue current level of support, to further be determined based on clinical course Code status: Full code   Acute respiratory failure w/ hypoxia, suspected 2/2 COVID-19-ventilator dependent, mgmt per Hospitalist Decadron, Rocephin, Azithromycin, Zinc, Vitamin D, Proscar, Pepcid, aspirin, melatonin  Seizure-like activity, AMS-Neurology following, keppra continued, MRI pending  HILARY-monitor closely strict I/O's   Bradycardia/hypotension-resolved, pressor support weaned  Concern for volume overload-resolved, hold bumex and monitor    Thank you for consulting Palliative Care and allowing us to participate in the care of this patient.      Total Time Spent with patient assessment, interview of independent historian/HCS, workup/treatment review, discussion with medical team, review of current and home medications, and placement of orders/preparation of this note: 36 minutes                               Electronically signed by David Donis PA-C on 8/10/2023 at 1:49 PM    (Please note that portions of this note were completed with a voice recognition program.  Judy Jones made to edit the dictations but occasionally words are mis-transcribed.)

## 2023-08-10 NOTE — PROGRESS NOTES
Pharmacy Intravenous to Oral Protocol    Medication changed per [facility] IV to PO protocol: Pepcid to via oragastric tube and dose increased to 20mg every 12 hours with improved renal function. Patient meets the following inclusion criteria and none of the exclusion criteria:    Inclusion criteria:  - IV therapy > 24 hours (antibiotics only)  - Tolerating diet more advanced than clear liquids  - Tolerating PO medications  - No vasopressor blood pressure support (ie no signs of shock)  - Patient hasn't had a seizure for 72 hrs (antiepileptic medications only)    Exclusion criteria:  - Infections requiring IV therapy (ie meningitis, endocarditis, osteomyelitis, pancreatitis)   - Nausea and/or vomiting or severe diarrhea within past 24 hours   - Has gastrectomy, ileus, gastric outlet or bowel obstruction, or malabsorption syndromes   - Has significant painful oral ulceration   - TPN with an NPO order   - Active GI bleed   - Unable to swallow   - NPO   - Febrile in the last 24 hours (antibiotics only)   - Clinical deteriorating or unstable (antibiotics only)   - Pediatric patients and patients who are not euthyroid (not on oral levothyroxine/not stabilized on oral levothyroxine)- Levothyroxine only     Recent Labs     08/09/23  0443 08/10/23  0310   BUN 49* 62*       Recent Labs     08/09/23  0443 08/10/23  0310   CREATININE 1.3* 1.5*       Estimated Creatinine Clearance: 68 mL/min (A) (based on SCr of 1.5 mg/dL (H)).       Electronically signed by Angelic Headley, [address] [place] [address] on 8/10/2023 at 9:12 AM

## 2023-08-10 NOTE — PROGRESS NOTES
Hospitalist Progress Note  [facility]     Patient: Steve Hernandez  : 1960  MRN: [id number]  Code Status: Full Code    Hospital Day: 5   Date of Service: 8/10/2023    Subjective:   Patient seen in [place] [address] critical care unit. Intubated and sedated. 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 See scanned document.     LAPAROSCOPIC APPENDECTOMY N/A 2023    LAPAROSCOPIC APPENDECTOMY performed by Ana Perez DO at [address]    LITHOTRIPSY         Family History   Problem Relation Age of Onset    Coronary Art Dis Mother     Hypertension Mother     Coronary Art Dis Father     Hypertension Father     Hypertension Maternal Aunt     Diabetes Maternal Aunt     Hypertension Maternal Uncle     Diabetes Maternal Uncle        Social History     Socioeconomic History    Marital status:      Spouse name: Not on file    Number of children: Not on file    Years of education: Not on file    Highest education level: Not on file   Occupational History    Not on file   Tobacco Use    Smoking status: Every Day     Packs/day: 1.50     Years: 35.00     Pack years: 52.50     Types: Cigarettes    Smokeless tobacco: Never   Vaping Use    Vaping Use: in unchanged position. Trachea is midline. Cardiomediastinal silhouette is unchanged. Sternotomy wires. Atherosclerosis in the aorta. Elevation of the left hemidiaphragm. Unchanged bilateral infiltrates. Left pleural effusion, unchanged. No pneumothorax. Osseous structures are intact.   ______________________________________ Electronically signed by: Marion Rodriguez M.D. Date:     08/10/2023 Time:    07:17     XR CHEST PORTABLE    Result Date: 8/9/2023  EXAM:  CHEST ONE-VIEW  HISTORY:  COVID-19  COMPARISON:  AP chest from 08/06/2023  FINDINGS:  The cardiomediastinal silhouette is stable. Wire sternal sutures are unchanged. The endotracheal tube and nasogastric tube are unchanged. The pulmonary vasculature is normal.  Small left pleural effusion is new since the prior. There are persistent left perihilar alveolar opacities. There are increased opacities in the left lung base. Right lung base opacities are unchanged. No pneumothoraces. 1. New small left pleural effusion with subjacent atelectasis and/or infiltrate. 2. Stable left perihilar infiltrate. 3. Stable infiltrate in the right lung base. 4. Endotracheal tube and nasogastric tube in place. .   ______________________________________ Electronically signed by: Aurelia Triplett D.O. Date:     08/09/2023 Time:    07:39        Assessment and Plan:   I assumed care of this patient on 8/9/2023 at 0700 with ongoing treatment plan on board. 38-year-old male with tobacco dependence and remote CABG admitted to ICU for COVID-19 bilateral pneumonia, seizure-like activity, HILARY, and ventilator dependent acute hypoxemic respiratory failure.      Decadron  Conservative fluid management  Lung protective ventilation  Titrate minute ventilation for pH 7.35  SBT once able  Neurology following  AEDs per neurology  Seizure precautions  Follow renal function/urine output/electrolytes  Creatinine improved overall  Lovenox for DVT prophylaxis  Discussed

## 2023-08-10 NOTE — PROGRESS NOTES
Comprehensive Nutrition Assessment    Type and Reason for Visit:  Reassess    Nutrition Recommendations/Plan:   Continue to work toward goal rate for tf. Monitor Propofol and adjust tf as needed     Malnutrition Assessment:  Malnutrition Status: At risk for malnutrition (Comment) (08/10/23 1016)    Context:  Acute Illness     Findings of the 6 clinical characteristics of malnutrition:  Energy Intake:  Mild decrease in energy intake (Comment)  Weight Loss:  No significant weight loss     Body Fat Loss:  No significant body fat loss     Muscle Mass Loss:  No significant muscle mass loss    Fluid Accumulation:  No significant fluid accumulation Extremities   Strength:  Not Performed    Nutrition Assessment:    Currnt rate of tf = 45ml/hr. Propofol 21.2ml  Residuals 25-5ml. Weight has increased since yesterday. Nutrition Related Findings:    on vent. Na+ 150, BUN 62, Creat 1.5 Wound Type: None (multiple abrasions)       Current Nutrition Intake & Therapies:    Average Meal Intake: NPO  Average Supplements Intake: NPO  Current Tube Feeding (TF) Orders:  Feeding Route: Orogastric  Formula: Peptide Based High Protein  Schedule: Continuous  Feeding Regimen: Vital HIgh Protein goal rate 70ml/hr  Additives/Modulars: None  Water Flushes: 15ml hourly  Current TF & Flush Orders Provides: Vital HIgh Protein @ 45ml/hr = 1080 kcals with 94g protein, 119g CHO and 902ml free water from formula and 360ml free water from flush. Goal TF & Flush Orders Provides: Vital High Protein @ 70ml/hr = 1680 kcals with 146g protein, 186g CHO and 1404 ml free water from formula and 360ml free water from fllush. Propofol adds another 559 NP kcals    Anthropometric Measures:  Height: 6' 4\" (193 cm)  Ideal Body Weight (IBW): 202 lbs (92 kg)    Admission Body Weight: 259 lb (117.5 kg)  Current Body Weight: 250 lb (113.4 kg), 123.8 % IBW.  Weight Source: Bed Scale  Current BMI (kg/m2): 30.4  Usual Body Weight: 259 lb 9.6 oz (117.8 kg) (2/2023)  % Weight Change (Calculated): -0.2  BMI Categories: Obese Class 1 (BMI 30.0-34. 9)    Estimated Daily Nutrient Needs:  Energy Requirements Based On: Kcal/kg  Weight Used for Energy Requirements: Current  Energy (kcal/day): 3365-5701 kcals (20-25 kals/kg)  Weight Used for Protein Requirements: Ideal  Protein (g/day): 110-184g  Method Used for Fluid Requirements: 1 ml/kcal  Fluid (ml/day): 6377-7435 ml    Nutrition Diagnosis:   Inadequate oral intake related to acute injury/trauma, impaired respiratory function as evidenced by NPO or clear liquid status due to medical condition, intubation, nutrition support - enteral nutrition    Nutrition Interventions:   Food and/or Nutrient Delivery: Continue NPO, Continue Current Tube Feeding  Nutrition Education/Counseling: No recommendation at this time  Coordination of Nutrition Care: Continue to monitor while inpatient       Goals:  Previous Goal Met: Progressing toward Goal(s)  Goals: Meet at least 75% of estimated needs, Initiate nutrition support, Tolerate nutrition support at goal rate       Nutrition Monitoring and Evaluation:   Behavioral-Environmental Outcomes: None Identified  Food/Nutrient Intake Outcomes: Enteral Nutrition Intake/Tolerance  Physical Signs/Symptoms Outcomes: Biochemical Data, Fluid Status or Edema, Weight, Skin    Discharge Planning:     Too soon to determine     Ismael Drake MS, RD, LD  Contact: [phone]

## 2023-08-10 NOTE — PROGRESS NOTES
lungs bilaterally. Increased in extent with more focal airspace opacities as seen in the lower right lung through the infrahilar region. This could be related to infection/pneumonia or asymmetric pulmonary edema. No pleural effusion or pneumothorax. Increasing mixed interstitial and alveolar opacities, more pronounced on the right side as detailed above which could represent worsening infection/pneumonia or asymmetric pulmonary edema. Stable cardiomegaly and postoperative change of the chest.   ______________________________________ Electronically signed by: Wes Gilmore M.D. Date:     08/05/2023 Time:    08:24       Lab Results   Component Value Date    WBC 13.2 (H) 08/10/2023    HGB 15.9 08/10/2023    HCT 48.2 08/10/2023    MCV 93.2 08/10/2023     08/10/2023     Lab Results   Component Value Date     (H) 08/10/2023    K 3.8 08/10/2023     08/10/2023    CO2 40 (H) 08/10/2023    BUN 62 (H) 08/10/2023    CREATININE 1.5 (H) 08/10/2023    GLUCOSE 114 (H) 08/10/2023    CALCIUM 9.4 08/10/2023    PROT 6.1 (L) 08/09/2023    LABALBU 2.8 (L) 08/09/2023    BILITOT 0.3 08/09/2023    ALKPHOS 87 08/09/2023    AST 14 08/09/2023    ALT 13 08/09/2023    LABGLOM 51 (A) 08/10/2023     Lab Results   Component Value Date    INR 1.13 08/06/2023    INR 1.08 08/05/2023    INR 0.98 01/30/2011    PROTIME 14.2 08/06/2023    PROTIME 13.7 08/05/2023    PROTIME 10.56 01/30/2011       RECORD REVIEW:   Previous medical records, medications were reviewed at today's visit. Nursing/physician notes, imaging, labs and vitals reviewed. PT,OT and/or speech notes reviewed       ASSESSMENT:  70 y.o. admitted with confusion, possible seizure, COVID pneumonia, respiratory failure s/p mechanical ventilation, HILARY, acidosis. CT head nothing acute. EEG with slowing, nothing epileptiform. Exam remains largely unchanged overnight.       PLAN:   MRI Brain when able    Continue Keppra 500 mg q12h    Continue addressing medical

## 2023-08-10 NOTE — PLAN OF CARE
Problem: Nutrition Deficit:  Goal: Optimize nutritional status  Outcome: Progressing  Flowsheets (Taken 8/10/2023 1011)  Nutrient intake appropriate for improving, restoring, or maintaining nutritional needs:   Assess nutritional status and recommend course of action   Recommend, monitor, and adjust tube feedings and TPN/PPN based on assessed needs

## 2023-08-11 LAB
ANION GAP SERPL CALCULATED.3IONS-SCNC: 5 MMOL/L (ref 7–19)
BASOPHILS # BLD: 0 K/UL (ref 0–0.2)
BASOPHILS NFR BLD: 0.3 % (ref 0–1)
BUN SERPL-MCNC: 73 MG/DL (ref 8–23)
CALCIUM SERPL-MCNC: 9.2 MG/DL (ref 8.8–10.2)
CHLORIDE SERPL-SCNC: 106 MMOL/L (ref 98–111)
CO2 SERPL-SCNC: 40 MMOL/L (ref 22–29)
CREAT SERPL-MCNC: 1.5 MG/DL (ref 0.5–1.2)
EOSINOPHIL # BLD: 0 K/UL (ref 0–0.6)
EOSINOPHIL NFR BLD: 0 % (ref 0–5)
ERYTHROCYTE [DISTWIDTH] IN BLOOD BY AUTOMATED COUNT: 13.4 % (ref 11.5–14.5)
GLUCOSE SERPL-MCNC: 121 MG/DL (ref 74–109)
HCT VFR BLD AUTO: 45.9 % (ref 42–52)
HGB BLD-MCNC: 14.5 G/DL (ref 14–18)
IMM GRANULOCYTES # BLD: 0.2 K/UL
LYMPHOCYTES # BLD: 1.5 K/UL (ref 1.1–4.5)
LYMPHOCYTES NFR BLD: 14.1 % (ref 20–40)
MAGNESIUM SERPL-MCNC: 2.3 MG/DL (ref 1.6–2.4)
MCH RBC QN AUTO: 30.8 PG (ref 27–31)
MCHC RBC AUTO-ENTMCNC: 31.6 G/DL (ref 33–37)
MCV RBC AUTO: 97.5 FL (ref 80–94)
MONOCYTES # BLD: 1.3 K/UL (ref 0–0.9)
MONOCYTES NFR BLD: 12.4 % (ref 0–10)
NEUTROPHILS # BLD: 7.7 K/UL (ref 1.5–7.5)
NEUTS SEG NFR BLD: 71.5 % (ref 50–65)
PLATELET # BLD AUTO: 191 K/UL (ref 130–400)
PMV BLD AUTO: 11.1 FL (ref 9.4–12.4)
POTASSIUM SERPL-SCNC: 4.1 MMOL/L (ref 3.5–5)
RBC # BLD AUTO: 4.71 M/UL (ref 4.7–6.1)
SODIUM SERPL-SCNC: 151 MMOL/L (ref 136–145)
WBC # BLD AUTO: 10.7 K/UL (ref 4.8–10.8)

## 2023-08-11 PROCEDURE — 2000000000 HC ICU R&B

## 2023-08-11 PROCEDURE — 85025 COMPLETE CBC W/AUTO DIFF WBC: CPT

## 2023-08-11 PROCEDURE — 2500000003 HC RX 250 WO HCPCS: Performed by: INTERNAL MEDICINE

## 2023-08-11 PROCEDURE — 80048 BASIC METABOLIC PNL TOTAL CA: CPT

## 2023-08-11 PROCEDURE — 2580000003 HC RX 258: Performed by: INTERNAL MEDICINE

## 2023-08-11 PROCEDURE — 6360000002 HC RX W HCPCS: Performed by: INTERNAL MEDICINE

## 2023-08-11 PROCEDURE — 83735 ASSAY OF MAGNESIUM: CPT

## 2023-08-11 PROCEDURE — 99232 SBSQ HOSP IP/OBS MODERATE 35: CPT | Performed by: PSYCHIATRY & NEUROLOGY

## 2023-08-11 PROCEDURE — 2700000000 HC OXYGEN THERAPY PER DAY

## 2023-08-11 PROCEDURE — 6370000000 HC RX 637 (ALT 250 FOR IP): Performed by: INTERNAL MEDICINE

## 2023-08-11 PROCEDURE — 94003 VENT MGMT INPAT SUBQ DAY: CPT

## 2023-08-11 PROCEDURE — 94760 N-INVAS EAR/PLS OXIMETRY 1: CPT

## 2023-08-11 PROCEDURE — 6360000002 HC RX W HCPCS: Performed by: PSYCHIATRY & NEUROLOGY

## 2023-08-11 RX ADMIN — CHLORHEXIDINE GLUCONATE 15 ML: 1.2 RINSE ORAL at 19:46

## 2023-08-11 RX ADMIN — ASPIRIN 325 MG: 325 TABLET ORAL at 07:34

## 2023-08-11 RX ADMIN — CHLORHEXIDINE GLUCONATE 15 ML: 1.2 RINSE ORAL at 07:36

## 2023-08-11 RX ADMIN — ATORVASTATIN CALCIUM 40 MG: 40 TABLET, FILM COATED ORAL at 19:40

## 2023-08-11 RX ADMIN — SODIUM CHLORIDE, PRESERVATIVE FREE 10 ML: 5 INJECTION INTRAVENOUS at 07:36

## 2023-08-11 RX ADMIN — PROPOFOL 20 MCG/KG/MIN: 10 INJECTION, EMULSION INTRAVENOUS at 04:12

## 2023-08-11 RX ADMIN — FAMOTIDINE 20 MG: 20 TABLET ORAL at 07:39

## 2023-08-11 RX ADMIN — DEXAMETHASONE SODIUM PHOSPHATE 6 MG: 10 INJECTION INTRAMUSCULAR; INTRAVENOUS at 07:35

## 2023-08-11 RX ADMIN — ZINC SULFATE 220 MG (50 MG) CAPSULE 50 MG: CAPSULE at 07:35

## 2023-08-11 RX ADMIN — WATER 1000 MG: 1 INJECTION INTRAMUSCULAR; INTRAVENOUS; SUBCUTANEOUS at 15:29

## 2023-08-11 RX ADMIN — FINASTERIDE 10 MG: 5 TABLET, FILM COATED ORAL at 07:34

## 2023-08-11 RX ADMIN — FAMOTIDINE 20 MG: 20 TABLET ORAL at 19:40

## 2023-08-11 RX ADMIN — Medication 10 MG: at 07:35

## 2023-08-11 RX ADMIN — LEVETIRACETAM 500 MG: 100 INJECTION INTRAVENOUS at 15:29

## 2023-08-11 RX ADMIN — SODIUM CHLORIDE, PRESERVATIVE FREE 10 ML: 5 INJECTION INTRAVENOUS at 19:47

## 2023-08-11 RX ADMIN — OMEGA-3-ACID ETHYL ESTERS 2 G: 1 CAPSULE, LIQUID FILLED ORAL at 19:40

## 2023-08-11 RX ADMIN — Medication 5000 UNITS: at 07:35

## 2023-08-11 RX ADMIN — OMEGA-3-ACID ETHYL ESTERS 2 G: 1 CAPSULE, LIQUID FILLED ORAL at 07:34

## 2023-08-11 RX ADMIN — Medication 25 MCG/HR: at 07:39

## 2023-08-11 RX ADMIN — LEVETIRACETAM 500 MG: 100 INJECTION INTRAVENOUS at 04:11

## 2023-08-11 RX ADMIN — ENOXAPARIN SODIUM 40 MG: 100 INJECTION SUBCUTANEOUS at 19:47

## 2023-08-11 RX ADMIN — ENOXAPARIN SODIUM 40 MG: 100 INJECTION SUBCUTANEOUS at 07:35

## 2023-08-11 RX ADMIN — AZITHROMYCIN MONOHYDRATE 500 MG: 500 INJECTION, POWDER, LYOPHILIZED, FOR SOLUTION INTRAVENOUS at 19:44

## 2023-08-11 ASSESSMENT — PULMONARY FUNCTION TESTS
PIF_VALUE: 20
PIF_VALUE: 20
PIF_VALUE: 19
PIF_VALUE: 20
PIF_VALUE: 21
PIF_VALUE: 33
PIF_VALUE: 19
PIF_VALUE: 25
PIF_VALUE: 23
PIF_VALUE: 27
PIF_VALUE: 25
PIF_VALUE: 20
PIF_VALUE: 20
PIF_VALUE: 19
PIF_VALUE: 23
PIF_VALUE: 20
PIF_VALUE: 19

## 2023-08-11 ASSESSMENT — PAIN SCALES - GENERAL
PAINLEVEL_OUTOF10: 0
PAINLEVEL_OUTOF10: 0

## 2023-08-11 NOTE — PROGRESS NOTES
Comprehensive Nutrition Assessment    Type and Reason for Visit:  Reassess    Nutrition Recommendations/Plan:   Continue orders     Malnutrition Assessment:  Malnutrition Status: At risk for malnutrition (Comment) (08/10/23 1016)    Context:  Acute Illness     Findings of the 6 clinical characteristics of malnutrition:  Energy Intake:  Mild decrease in energy intake (Comment)  Weight Loss:  No significant weight loss     Body Fat Loss:  No significant body fat loss     Muscle Mass Loss:  No significant muscle mass loss    Fluid Accumulation:  No significant fluid accumulation Extremities   Strength:  Not Performed    Nutrition Assessment:    Pt tolerating TF @ 55 mL/hr. Propofol decreased. Will continue to follow for advancing TF rate, tolerance. Nutrition Related Findings:    vent Wound Type: None (multiple abrasions)       Current Nutrition Intake & Therapies:    Average Meal Intake: NPO  Average Supplements Intake: NPO  Current Tube Feeding (TF) Orders:  Feeding Route: Orogastric  Formula: Peptide Based High Protein  Schedule: Continuous  Feeding Regimen: Vital HIgh Protein goal rate 70ml/hr  Additives/Modulars: None  Water Flushes: 15ml hourly  Goal TF & Flush Orders Provides: Vital High Protein @ 70ml/hr = 1680 kcals with 146g protein, 186g CHO and 1404 ml free water from formula and 360ml free water from fllush. Propofol adds another 559 NP kcals    Anthropometric Measures:  Height: 6' 4\" (193 cm)  Ideal Body Weight (IBW): 202 lbs (92 kg)    Admission Body Weight: 259 lb (117.5 kg)  Current Body Weight: 250 lb (113.4 kg), 123.8 % IBW. Weight Source: Bed Scale  Current BMI (kg/m2): 30.4  Usual Body Weight: 259 lb 9.6 oz (117.8 kg) (2/2023)  % Weight Change (Calculated): -0.2                    BMI Categories: Obese Class 1 (BMI 30.0-34. 9)    Estimated Daily Nutrient Needs:  Energy Requirements Based On: Kcal/kg  Weight Used for Energy Requirements: Current  Energy (kcal/day): 2459-7920 kcals (20-25

## 2023-08-12 ENCOUNTER — APPOINTMENT (OUTPATIENT)
Dept: GENERAL RADIOLOGY | Age: 63
End: 2023-08-12
Payer: MEDICARE

## 2023-08-12 LAB
ALLENS TEST: ABNORMAL
ANION GAP SERPL CALCULATED.3IONS-SCNC: 5 MMOL/L (ref 7–19)
BASE EXCESS ARTERIAL: 16.4 MMOL/L (ref -2–2)
BASOPHILS # BLD: 0 K/UL (ref 0–0.2)
BASOPHILS NFR BLD: 0 % (ref 0–1)
BUN SERPL-MCNC: 61 MG/DL (ref 8–23)
CALCIUM SERPL-MCNC: 10.1 MG/DL (ref 8.8–10.2)
CARBOXYHEMOGLOBIN ARTERIAL: 1.4 % (ref 0–5)
CHLORIDE SERPL-SCNC: 113 MMOL/L (ref 98–111)
CO2 SERPL-SCNC: 39 MMOL/L (ref 22–29)
CREAT SERPL-MCNC: 1.3 MG/DL (ref 0.5–1.2)
EOSINOPHIL # BLD: 0 K/UL (ref 0–0.6)
EOSINOPHIL NFR BLD: 0 % (ref 0–5)
ERYTHROCYTE [DISTWIDTH] IN BLOOD BY AUTOMATED COUNT: 13.6 % (ref 11.5–14.5)
FIO2: 60 %
GLUCOSE SERPL-MCNC: 117 MG/DL (ref 74–109)
HCO3 ARTERIAL: 42.9 MMOL/L (ref 22–26)
HCT VFR BLD AUTO: 49.5 % (ref 42–52)
HEMOGLOBIN, ART, EXTENDED: 16.2 G/DL (ref 14–18)
HGB BLD-MCNC: 15.6 G/DL (ref 14–18)
HYPOCHROMIA BLD QL SMEAR: ABNORMAL
IMM GRANULOCYTES # BLD: 0.2 K/UL
LYMPHOCYTES # BLD: 2 K/UL (ref 1.1–4.5)
LYMPHOCYTES NFR BLD: 12 % (ref 20–40)
MACROCYTES BLD QL SMEAR: ABNORMAL
MAGNESIUM SERPL-MCNC: 2.3 MG/DL (ref 1.6–2.4)
MCH RBC QN AUTO: 30.9 PG (ref 27–31)
MCHC RBC AUTO-ENTMCNC: 31.5 G/DL (ref 33–37)
MCV RBC AUTO: 98 FL (ref 80–94)
MECHANICAL RATE IN BPM: 22
METHEMOGLOBIN ARTERIAL: 1.4 %
MODE: ABNORMAL
MONOCYTES # BLD: 1.5 K/UL (ref 0–0.9)
MONOCYTES NFR BLD: 12 % (ref 0–10)
NEUTROPHILS # BLD: 9.1 K/UL (ref 1.5–7.5)
NEUTS BAND NFR BLD MANUAL: 2 % (ref 0–5)
NEUTS SEG NFR BLD: 70 % (ref 50–65)
O2 CONTENT ARTERIAL: 21.9 ML/DL
O2 SAT, ARTERIAL: 96 %
O2 THERAPY: ABNORMAL
PCO2 ARTERIAL: 55 MMHG (ref 35–45)
PH ARTERIAL: 7.5 (ref 7.35–7.45)
PLATELET # BLD AUTO: 222 K/UL (ref 130–400)
PLATELET SLIDE REVIEW: ADEQUATE
PMV BLD AUTO: 11.1 FL (ref 9.4–12.4)
PO2 ARTERIAL: 104 MMHG (ref 80–100)
POSITIVE END EXP PRESS: 6
POTASSIUM BLD-SCNC: 4.3 MMOL/L
POTASSIUM SERPL-SCNC: 4.4 MMOL/L (ref 3.5–5)
RBC # BLD AUTO: 5.05 M/UL (ref 4.7–6.1)
SAMPLE SOURCE: ABNORMAL
SODIUM SERPL-SCNC: 156 MMOL/L (ref 136–145)
SODIUM SERPL-SCNC: 157 MMOL/L (ref 136–145)
SODIUM SERPL-SCNC: 157 MMOL/L (ref 136–145)
VARIANT LYMPHS NFR BLD: 4 % (ref 0–8)
VT MECHANICAL: 450 %
WBC # BLD AUTO: 12.7 K/UL (ref 4.8–10.8)

## 2023-08-12 PROCEDURE — 99233 SBSQ HOSP IP/OBS HIGH 50: CPT | Performed by: PSYCHIATRY & NEUROLOGY

## 2023-08-12 PROCEDURE — 6360000002 HC RX W HCPCS: Performed by: INTERNAL MEDICINE

## 2023-08-12 PROCEDURE — 2580000003 HC RX 258: Performed by: INTERNAL MEDICINE

## 2023-08-12 PROCEDURE — 6370000000 HC RX 637 (ALT 250 FOR IP): Performed by: INTERNAL MEDICINE

## 2023-08-12 PROCEDURE — 83735 ASSAY OF MAGNESIUM: CPT

## 2023-08-12 PROCEDURE — 36600 WITHDRAWAL OF ARTERIAL BLOOD: CPT

## 2023-08-12 PROCEDURE — 80048 BASIC METABOLIC PNL TOTAL CA: CPT

## 2023-08-12 PROCEDURE — 84295 ASSAY OF SERUM SODIUM: CPT

## 2023-08-12 PROCEDURE — 36592 COLLECT BLOOD FROM PICC: CPT

## 2023-08-12 PROCEDURE — 2000000000 HC ICU R&B

## 2023-08-12 PROCEDURE — 2500000003 HC RX 250 WO HCPCS

## 2023-08-12 PROCEDURE — 85025 COMPLETE CBC W/AUTO DIFF WBC: CPT

## 2023-08-12 PROCEDURE — 82803 BLOOD GASES ANY COMBINATION: CPT

## 2023-08-12 PROCEDURE — 2700000000 HC OXYGEN THERAPY PER DAY

## 2023-08-12 PROCEDURE — 6360000002 HC RX W HCPCS: Performed by: PSYCHIATRY & NEUROLOGY

## 2023-08-12 PROCEDURE — 94003 VENT MGMT INPAT SUBQ DAY: CPT

## 2023-08-12 PROCEDURE — 71045 X-RAY EXAM CHEST 1 VIEW: CPT

## 2023-08-12 RX ORDER — DEXMEDETOMIDINE HYDROCHLORIDE 4 UG/ML
INJECTION, SOLUTION INTRAVENOUS
Status: COMPLETED
Start: 2023-08-12 | End: 2023-08-12

## 2023-08-12 RX ORDER — DEXMEDETOMIDINE HYDROCHLORIDE 4 UG/ML
.1-1.5 INJECTION, SOLUTION INTRAVENOUS CONTINUOUS
Status: DISCONTINUED | OUTPATIENT
Start: 2023-08-12 | End: 2023-08-13

## 2023-08-12 RX ORDER — DEXTROSE MONOHYDRATE 50 MG/ML
INJECTION, SOLUTION INTRAVENOUS CONTINUOUS
Status: DISCONTINUED | OUTPATIENT
Start: 2023-08-12 | End: 2023-08-17 | Stop reason: HOSPADM

## 2023-08-12 RX ADMIN — ZINC SULFATE 220 MG (50 MG) CAPSULE 50 MG: CAPSULE at 07:23

## 2023-08-12 RX ADMIN — FINASTERIDE 10 MG: 5 TABLET, FILM COATED ORAL at 07:23

## 2023-08-12 RX ADMIN — SODIUM CHLORIDE, PRESERVATIVE FREE 10 ML: 5 INJECTION INTRAVENOUS at 07:23

## 2023-08-12 RX ADMIN — OMEGA-3-ACID ETHYL ESTERS 2 G: 1 CAPSULE, LIQUID FILLED ORAL at 19:55

## 2023-08-12 RX ADMIN — Medication 10 MG: at 07:23

## 2023-08-12 RX ADMIN — ASPIRIN 325 MG: 325 TABLET ORAL at 07:23

## 2023-08-12 RX ADMIN — DEXAMETHASONE SODIUM PHOSPHATE 6 MG: 10 INJECTION INTRAMUSCULAR; INTRAVENOUS at 07:23

## 2023-08-12 RX ADMIN — LEVETIRACETAM 500 MG: 100 INJECTION INTRAVENOUS at 15:44

## 2023-08-12 RX ADMIN — ENOXAPARIN SODIUM 40 MG: 100 INJECTION SUBCUTANEOUS at 21:25

## 2023-08-12 RX ADMIN — ERGOCALCIFEROL 50000 UNITS: 1.25 CAPSULE ORAL at 07:23

## 2023-08-12 RX ADMIN — CHLORHEXIDINE GLUCONATE 15 ML: 1.2 RINSE ORAL at 07:24

## 2023-08-12 RX ADMIN — CHLORHEXIDINE GLUCONATE 15 ML: 1.2 RINSE ORAL at 19:56

## 2023-08-12 RX ADMIN — DEXTROSE MONOHYDRATE: 50 INJECTION, SOLUTION INTRAVENOUS at 15:42

## 2023-08-12 RX ADMIN — Medication 5000 UNITS: at 07:23

## 2023-08-12 RX ADMIN — OMEGA-3-ACID ETHYL ESTERS 2 G: 1 CAPSULE, LIQUID FILLED ORAL at 07:23

## 2023-08-12 RX ADMIN — WATER 1000 MG: 1 INJECTION INTRAMUSCULAR; INTRAVENOUS; SUBCUTANEOUS at 14:13

## 2023-08-12 RX ADMIN — AZITHROMYCIN MONOHYDRATE 500 MG: 500 INJECTION, POWDER, LYOPHILIZED, FOR SOLUTION INTRAVENOUS at 21:30

## 2023-08-12 RX ADMIN — FAMOTIDINE 20 MG: 20 TABLET ORAL at 19:55

## 2023-08-12 RX ADMIN — LEVETIRACETAM 500 MG: 100 INJECTION INTRAVENOUS at 04:49

## 2023-08-12 RX ADMIN — FAMOTIDINE 20 MG: 20 TABLET ORAL at 07:23

## 2023-08-12 RX ADMIN — ENOXAPARIN SODIUM 40 MG: 100 INJECTION SUBCUTANEOUS at 09:00

## 2023-08-12 RX ADMIN — DEXMEDETOMIDINE HYDROCHLORIDE 0.2 MCG/KG/HR: 400 INJECTION, SOLUTION INTRAVENOUS at 17:37

## 2023-08-12 RX ADMIN — ATORVASTATIN CALCIUM 40 MG: 40 TABLET, FILM COATED ORAL at 19:55

## 2023-08-12 ASSESSMENT — PULMONARY FUNCTION TESTS
PIF_VALUE: 17
PIF_VALUE: 20
PIF_VALUE: 27
PIF_VALUE: 19
PIF_VALUE: 15
PIF_VALUE: 21
PIF_VALUE: 19
PIF_VALUE: 17
PIF_VALUE: 19
PIF_VALUE: 17
PIF_VALUE: 18
PIF_VALUE: 18
PIF_VALUE: 21
PIF_VALUE: 17
PIF_VALUE: 16
PIF_VALUE: 17
PIF_VALUE: 17
PIF_VALUE: 20
PIF_VALUE: 17
PIF_VALUE: 17
PIF_VALUE: 23
PIF_VALUE: 24

## 2023-08-12 ASSESSMENT — PAIN SCALES - GENERAL
PAINLEVEL_OUTOF10: 0

## 2023-08-12 NOTE — PROGRESS NOTES
Nasogastric tube placement. COMPARISON: Chest radiograph 08/05/2023  FINDINGS:  Lines, Tubes, Devices:  Enteric tube with tip likely within the body of the stomach. [place] is not visualized. Postsurgical changes of median sternotomy. Lungs and Pleura:  Unchanged right lower lobe consolidation and bilateral interstitial opacities. No pneumothorax. Cardiomediastinum: Enlarged cardiomediastinal silhouette. Mild aortic calcifications. Bones/Soft Tissues: No acute osseous abnormality. No soft tissue abnormality. Upper Abdomen: Within normal limits. Enteric tube with tip likely within the body of the stomach.    ______________________________________ Electronically signed by: Ricardo Simon D.O. Date:     08/05/2023 Time:    13:47     XR CHEST PORTABLE    Result Date: 8/5/2023  EXAM: CHEST RADIOGRAPH (1 VIEW)  TECHNIQUE: Frontal Chest Radiograph. HISTORY: Intubation. COMPARISON: Chest radiograph 08/05/2023  FINDINGS:  Lines, Tubes, Devices:  Endotracheal tube with tip approximately 5 cm above the joao. Postsurgical changes of median sternotomy. Lungs and Pleura:  Low lung volumes. Unchanged bilateral interstitial opacities, right greater than left. No pleural effusion. No pneumothorax. Cardiomediastinum: Enlarged cardiomediastinal silhouette. Mild aortic calcifications. Bones/Soft Tissues: No acute osseous abnormality. No soft tissue abnormality. Upper Abdomen: Within normal limits. Endotracheal tube in appropriate position. ______________________________________ Electronically signed by: Ricardo Simon D.O. Date:     08/05/2023 Time:    09:47     XR CHEST PORTABLE    Result Date: 8/5/2023  EXAM:  CHEST, SINGLE VIEW  HISTORY:  Altered mental status. COMPARISON:  Chest radiograph 08/04/2023. FINDINGS:  Mild enlargement cardiac silhouette is unchanged. Mediasternotomy wires. Mixed interstitial and alveolar opacities throughout the lungs bilaterally.   Increased in extent with more focal airspace opacities as seen in the lower right lung through the infrahilar region. This could be related to infection/pneumonia or asymmetric pulmonary edema. No pleural effusion or pneumothorax. Increasing mixed interstitial and alveolar opacities, more pronounced on the right side as detailed above which could represent worsening infection/pneumonia or asymmetric pulmonary edema. Stable cardiomegaly and postoperative change of the chest.   ______________________________________ Electronically signed by: Karyle Qua M.D. Date:     08/05/2023 Time:    08:24       Lab Results   Component Value Date    WBC 12.7 (H) 08/12/2023    HGB 15.6 08/12/2023    HCT 49.5 08/12/2023    MCV 98.0 (H) 08/12/2023     08/12/2023     Lab Results   Component Value Date     (H) 08/12/2023    K 4.4 08/12/2023     (H) 08/12/2023    CO2 39 (H) 08/12/2023    BUN 61 (H) 08/12/2023    CREATININE 1.3 (H) 08/12/2023    GLUCOSE 117 (H) 08/12/2023    CALCIUM 10.1 08/12/2023    PROT 6.1 (L) 08/09/2023    LABALBU 2.8 (L) 08/09/2023    BILITOT 0.3 08/09/2023    ALKPHOS 87 08/09/2023    AST 14 08/09/2023    ALT 13 08/09/2023    LABGLOM >60 08/12/2023     Lab Results   Component Value Date    INR 1.13 08/06/2023    INR 1.08 08/05/2023    INR 0.98 01/30/2011    PROTIME 14.2 08/06/2023    PROTIME 13.7 08/05/2023    PROTIME 10.56 01/30/2011       RECORD REVIEW:   Previous medical records, medications were reviewed at today's visit. Nursing/physician notes, imaging, labs and vitals reviewed. PT,OT and/or speech notes reviewed       ASSESSMENT:  70 y.o. admitted with confusion, possible seizure, COVID pneumonia, respiratory failure s/p mechanical ventilation, HILARY, acidosis. CT head nothing acute. EEG with slowing, nothing epileptiform. Exam remains largely unchanged, currently trying to wean sedation.       PLAN:   MRI Brain when able-able to be done now due to lack of negative pressure room MRI   Continue Keppra 500 mg q12h

## 2023-08-12 NOTE — PROGRESS NOTES
Hospitalist Progress Note  [facility]     Patient: Oscar Mcgee  : 1960  MRN: [id number]  Code Status: Full Code    Hospital Day: 7   Date of Service: 2023    Subjective:   Patient seen in [place] [address] critical care unit. Intubated. Past Medical History:   Diagnosis Date    Acute MI ([address])     inferior wall, interrupted by direct heart cath and angioplasty to the RCA 5/3/09, successful with normal LV function    Arthritis     CAD (coronary artery disease) 2011    Hyperlipidemia     PCP manages cholesterol    Hypertension     Systemic    Hypertension 2011    Kidney stone     Nephrolithiasis     history of    Nicotine abuse 2014    Old inferior wall myocardial infarction 2014    Palliative care patient 2023    Presence of stent in right coronary artery 2013    Tobacco abuse 10/26/2011       Past Surgical History:   Procedure Laterality Date    CORONARY ARTERY BYPASS GRAFT      Dr Gretel Alvarez LIMA to D1 then sequentially to LAD    DIAGNOSTIC CARDIAC CATH LAB PROCEDURE  [id number]    EF is > 55%. See scanned document.     LAPAROSCOPIC APPENDECTOMY N/A 2023    LAPAROSCOPIC APPENDECTOMY performed by Rafy Wyatt DO at [address]    LITHOTRIPSY         Family History   Problem Relation Age of Onset    Coronary Art Dis Mother     Hypertension Mother     Coronary Art Dis Father     Hypertension Father     Hypertension Maternal Aunt     Diabetes Maternal Aunt     Hypertension Maternal Uncle     Diabetes Maternal Uncle        Social History     Socioeconomic History    Marital status:      Spouse name: Not on file    Number of children: Not on file    Years of education: Not on file    Highest education level: Not on file   Occupational History    Not on file   Tobacco Use    Smoking status: Every Day     Packs/day: 1.50     Years: 35.00     Pack years: 52.50     Types: Cigarettes    Smokeless tobacco: Never   Vaping Use    Vaping Use: Never used

## 2023-08-13 LAB
ANION GAP SERPL CALCULATED.3IONS-SCNC: 5 MMOL/L (ref 7–19)
BASOPHILS # BLD: 0 K/UL (ref 0–0.2)
BASOPHILS NFR BLD: 0.2 % (ref 0–1)
BUN SERPL-MCNC: 66 MG/DL (ref 8–23)
CALCIUM SERPL-MCNC: 9.8 MG/DL (ref 8.8–10.2)
CHLORIDE SERPL-SCNC: 115 MMOL/L (ref 98–111)
CO2 SERPL-SCNC: 37 MMOL/L (ref 22–29)
CREAT SERPL-MCNC: 1.2 MG/DL (ref 0.5–1.2)
EOSINOPHIL # BLD: 0 K/UL (ref 0–0.6)
EOSINOPHIL NFR BLD: 0.1 % (ref 0–5)
ERYTHROCYTE [DISTWIDTH] IN BLOOD BY AUTOMATED COUNT: 13.7 % (ref 11.5–14.5)
GLUCOSE BLD-MCNC: 100 MG/DL (ref 70–99)
GLUCOSE BLD-MCNC: 135 MG/DL (ref 70–99)
GLUCOSE BLD-MCNC: 148 MG/DL (ref 70–99)
GLUCOSE SERPL-MCNC: 152 MG/DL (ref 74–109)
HCT VFR BLD AUTO: 49.3 % (ref 42–52)
HGB BLD-MCNC: 14.8 G/DL (ref 14–18)
IMM GRANULOCYTES # BLD: 0.1 K/UL
LYMPHOCYTES # BLD: 1.3 K/UL (ref 1.1–4.5)
LYMPHOCYTES NFR BLD: 14 % (ref 20–40)
MAGNESIUM SERPL-MCNC: 2.5 MG/DL (ref 1.6–2.4)
MCH RBC QN AUTO: 30.5 PG (ref 27–31)
MCHC RBC AUTO-ENTMCNC: 30 G/DL (ref 33–37)
MCV RBC AUTO: 101.4 FL (ref 80–94)
MONOCYTES # BLD: 1.4 K/UL (ref 0–0.9)
MONOCYTES NFR BLD: 15.3 % (ref 0–10)
NEUTROPHILS # BLD: 6.2 K/UL (ref 1.5–7.5)
NEUTS SEG NFR BLD: 69.4 % (ref 50–65)
PERFORMED ON: ABNORMAL
PLATELET # BLD AUTO: 199 K/UL (ref 130–400)
PMV BLD AUTO: 11.2 FL (ref 9.4–12.4)
POTASSIUM SERPL-SCNC: 4.7 MMOL/L (ref 3.5–5)
RBC # BLD AUTO: 4.86 M/UL (ref 4.7–6.1)
SODIUM SERPL-SCNC: 157 MMOL/L (ref 136–145)
WBC # BLD AUTO: 9 K/UL (ref 4.8–10.8)

## 2023-08-13 PROCEDURE — 2580000003 HC RX 258: Performed by: INTERNAL MEDICINE

## 2023-08-13 PROCEDURE — 2700000000 HC OXYGEN THERAPY PER DAY

## 2023-08-13 PROCEDURE — 6360000002 HC RX W HCPCS: Performed by: INTERNAL MEDICINE

## 2023-08-13 PROCEDURE — 80048 BASIC METABOLIC PNL TOTAL CA: CPT

## 2023-08-13 PROCEDURE — 94003 VENT MGMT INPAT SUBQ DAY: CPT

## 2023-08-13 PROCEDURE — 94760 N-INVAS EAR/PLS OXIMETRY 1: CPT

## 2023-08-13 PROCEDURE — 82962 GLUCOSE BLOOD TEST: CPT

## 2023-08-13 PROCEDURE — 83735 ASSAY OF MAGNESIUM: CPT

## 2023-08-13 PROCEDURE — 6360000002 HC RX W HCPCS: Performed by: PSYCHIATRY & NEUROLOGY

## 2023-08-13 PROCEDURE — 85025 COMPLETE CBC W/AUTO DIFF WBC: CPT

## 2023-08-13 PROCEDURE — 2000000000 HC ICU R&B

## 2023-08-13 PROCEDURE — 84295 ASSAY OF SERUM SODIUM: CPT

## 2023-08-13 PROCEDURE — 2500000003 HC RX 250 WO HCPCS: Performed by: INTERNAL MEDICINE

## 2023-08-13 PROCEDURE — 6370000000 HC RX 637 (ALT 250 FOR IP): Performed by: INTERNAL MEDICINE

## 2023-08-13 PROCEDURE — 36592 COLLECT BLOOD FROM PICC: CPT

## 2023-08-13 PROCEDURE — 99233 SBSQ HOSP IP/OBS HIGH 50: CPT | Performed by: PSYCHIATRY & NEUROLOGY

## 2023-08-13 RX ORDER — HYDRALAZINE HYDROCHLORIDE 20 MG/ML
10 INJECTION INTRAMUSCULAR; INTRAVENOUS EVERY 6 HOURS PRN
Status: DISCONTINUED | OUTPATIENT
Start: 2023-08-13 | End: 2023-08-17 | Stop reason: HOSPADM

## 2023-08-13 RX ORDER — DEXAMETHASONE SODIUM PHOSPHATE 10 MG/ML
4 INJECTION, SOLUTION INTRAMUSCULAR; INTRAVENOUS EVERY 24 HOURS
Status: DISCONTINUED | OUTPATIENT
Start: 2023-08-14 | End: 2023-08-14

## 2023-08-13 RX ADMIN — HYDRALAZINE HYDROCHLORIDE 10 MG: 20 INJECTION INTRAMUSCULAR; INTRAVENOUS at 17:36

## 2023-08-13 RX ADMIN — WATER 1000 MG: 1 INJECTION INTRAMUSCULAR; INTRAVENOUS; SUBCUTANEOUS at 14:11

## 2023-08-13 RX ADMIN — LEVETIRACETAM 500 MG: 100 INJECTION INTRAVENOUS at 16:29

## 2023-08-13 RX ADMIN — AZITHROMYCIN MONOHYDRATE 500 MG: 500 INJECTION, POWDER, LYOPHILIZED, FOR SOLUTION INTRAVENOUS at 20:31

## 2023-08-13 RX ADMIN — Medication 5000 UNITS: at 07:47

## 2023-08-13 RX ADMIN — LEVETIRACETAM 500 MG: 100 INJECTION INTRAVENOUS at 03:54

## 2023-08-13 RX ADMIN — FINASTERIDE 10 MG: 5 TABLET, FILM COATED ORAL at 07:46

## 2023-08-13 RX ADMIN — ENOXAPARIN SODIUM 40 MG: 100 INJECTION SUBCUTANEOUS at 20:24

## 2023-08-13 RX ADMIN — SODIUM CHLORIDE, PRESERVATIVE FREE 10 ML: 5 INJECTION INTRAVENOUS at 07:47

## 2023-08-13 RX ADMIN — DEXMEDETOMIDINE HYDROCHLORIDE 0.2 MCG/KG/HR: 400 INJECTION, SOLUTION INTRAVENOUS at 03:52

## 2023-08-13 RX ADMIN — Medication 10 MG: at 07:47

## 2023-08-13 RX ADMIN — ZINC SULFATE 220 MG (50 MG) CAPSULE 50 MG: CAPSULE at 07:47

## 2023-08-13 RX ADMIN — ASPIRIN 325 MG: 325 TABLET ORAL at 07:47

## 2023-08-13 RX ADMIN — ENOXAPARIN SODIUM 40 MG: 100 INJECTION SUBCUTANEOUS at 09:10

## 2023-08-13 RX ADMIN — OMEGA-3-ACID ETHYL ESTERS 2 G: 1 CAPSULE, LIQUID FILLED ORAL at 07:46

## 2023-08-13 RX ADMIN — CHLORHEXIDINE GLUCONATE 15 ML: 1.2 RINSE ORAL at 07:47

## 2023-08-13 RX ADMIN — DEXAMETHASONE SODIUM PHOSPHATE 6 MG: 10 INJECTION INTRAMUSCULAR; INTRAVENOUS at 07:46

## 2023-08-13 RX ADMIN — FAMOTIDINE 20 MG: 20 TABLET ORAL at 07:46

## 2023-08-13 RX ADMIN — DEXTROSE MONOHYDRATE: 50 INJECTION, SOLUTION INTRAVENOUS at 11:36

## 2023-08-13 ASSESSMENT — PULMONARY FUNCTION TESTS
PIF_VALUE: 14
PIF_VALUE: 29
PIF_VALUE: 11
PIF_VALUE: 23
PIF_VALUE: 30
PIF_VALUE: 11
PIF_VALUE: 11
PIF_VALUE: 17
PIF_VALUE: 17
PIF_VALUE: 18
PIF_VALUE: 55
PIF_VALUE: 18
PIF_VALUE: 26
PIF_VALUE: 24
PIF_VALUE: 18
PIF_VALUE: 24
PIF_VALUE: 19
PIF_VALUE: 32
PIF_VALUE: 19

## 2023-08-13 ASSESSMENT — PAIN SCALES - GENERAL
PAINLEVEL_OUTOF10: 0

## 2023-08-13 NOTE — PROGRESS NOTES
Pt placed on SBT for 30 minutes, during trial pt would not initiate breaths and vent had to kick in. HR 55 SBI 13, + when pt would initiate breaths. Dr. Brad Grant notified.

## 2023-08-13 NOTE — PROGRESS NOTES
Hospitalist Progress Note  [facility]     Patient: Joshua Caballero  : 1960  MRN: [id number]  Code Status: Full Code    Hospital Day: 8   Date of Service: 2023    Subjective:   Patient seen in [place] [address] critical care unit. Extubated today. 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 See scanned document.     LAPAROSCOPIC APPENDECTOMY N/A 2023    LAPAROSCOPIC APPENDECTOMY performed by Low Horne DO at [address]    LITHOTRIPSY         Family History   Problem Relation Age of Onset    Coronary Art Dis Mother     Hypertension Mother     Coronary Art Dis Father     Hypertension Father     Hypertension Maternal Aunt     Diabetes Maternal Aunt     Hypertension Maternal Uncle     Diabetes Maternal Uncle        Social History     Socioeconomic History    Marital status:      Spouse name: Not on file    Number of children: Not on file    Years of education: Not on file    Highest education level: Not on file   Occupational History    Not on file   Tobacco Use    Smoking status: Every Day     Packs/day: 1.50     Years: 35.00     Pack years: 52.50     Types: Cigarettes    Smokeless tobacco: Never   Vaping Use    Vaping Use: Never

## 2023-08-13 NOTE — PLAN OF CARE
Problem: Discharge Planning  Goal: Discharge to home or other facility with appropriate resources  8/13/2023 1828 by Govind Arrington RN  Outcome: Progressing  Flowsheets (Taken 8/13/2023 1200)  Discharge to home or other facility with appropriate resources:   Arrange for needed discharge resources and transportation as appropriate   Identify barriers to discharge with patient and caregiver  8/13/2023 0549 by Db Starkey RN  Outcome: [address] [facility] 114 Progressing  8/13/2023 0546 by Db Starkey RN  Outcome: [address] [place] [address] Not Progressing     Problem: Safety - Adult  Goal: Free from fall injury  8/13/2023 1828 by Govind Arrington RN  Outcome: Progressing  Flowsheets (Taken 8/13/2023 0800)  Free From Fall Injury: Instruct family/caregiver on patient safety  8/13/2023 0549 by Db Starkey RN  Outcome: [address]  8/13/2023 0546 by Db Starkey RN  Outcome: [address] [place] [address] Progressing     Problem: ABCDS Injury Assessment  Goal: Absence of physical injury  8/13/2023 1828 by Govind Arrington RN  Outcome: Progressing  Flowsheets (Taken 8/13/2023 0800)  Absence of Physical Injury: Implement safety measures based on patient assessment  8/13/2023 0549 by Db Starkey RN  Outcome: [address]  8/13/2023 0546 by Db Starkey RN  Outcome: [address] [facility] 114 Progressing     Problem: Skin/Tissue Integrity  Goal: Absence of new skin breakdown  Description: 1. Monitor for areas of redness and/or skin breakdown  2. Assess vascular access sites hourly  3. Every 4-6 hours minimum:  Change oxygen saturation probe site  4. Every 4-6 hours:  If on nasal continuous positive airway pressure, respiratory therapy assess nares and determine need for appliance change or resting period.   8/13/2023 1828 by Govind Arrington RN  Outcome: Progressing  8/13/2023 0549 by Db Starkey RN  Outcome: [address] [place] [address] Progressing  8/13/2023 0546 by Db Starkey RN  Outcome: [address] [place] [address] Progressing     Problem: Nutrition Deficit:  Goal: Optimize nutritional status  8/13/2023 1828 by Govind Arrington ability to void and empty bladder   Monitor intake/output and perform bladder scan as needed  8/13/2023 0549 by Guero Wooten RN  Outcome: [address] [facility] 114 Progressing  8/13/2023 0546 by Gueor Wooten RN  Outcome: [address] [facility] 114 Progressing  Goal: Urinary catheter remains patent  Recent Flowsheet Documentation  Taken 8/13/2023 1200 by [place] Becerra RN  Urinary catheter remains patent:   Assess patency of urinary catheter   Irrigate catheter per Licensed Independent Practitioner order if indicated and notify Licensed Independent Practitioner if unable to irrigate  Taken 8/13/2023 0800 by [place]ana Becerra RN  Urinary catheter remains patent:   Assess patency of urinary catheter   Irrigate catheter per Licensed Independent Practitioner order if indicated and notify Licensed Independent Practitioner if unable to irrigate  8/13/2023 0549 by Guero Wooten RN  Outcome: [address] [facility] 114 Progressing  8/13/2023 0546 by Guero Wooten RN  Outcome: [address] [facility] 114 Progressing

## 2023-08-14 LAB
ANION GAP SERPL CALCULATED.3IONS-SCNC: 4 MMOL/L (ref 7–19)
BASOPHILS # BLD: 0 K/UL (ref 0–0.2)
BASOPHILS NFR BLD: 0.3 % (ref 0–1)
BUN SERPL-MCNC: 52 MG/DL (ref 8–23)
CALCIUM SERPL-MCNC: 9.6 MG/DL (ref 8.8–10.2)
CHLORIDE SERPL-SCNC: 119 MMOL/L (ref 98–111)
CO2 SERPL-SCNC: 33 MMOL/L (ref 22–29)
CREAT SERPL-MCNC: 1.2 MG/DL (ref 0.5–1.2)
EOSINOPHIL # BLD: 0 K/UL (ref 0–0.6)
EOSINOPHIL NFR BLD: 0.3 % (ref 0–5)
ERYTHROCYTE [DISTWIDTH] IN BLOOD BY AUTOMATED COUNT: 13.5 % (ref 11.5–14.5)
GLUCOSE SERPL-MCNC: 113 MG/DL (ref 74–109)
HCT VFR BLD AUTO: 49.8 % (ref 42–52)
HGB BLD-MCNC: 15.1 G/DL (ref 14–18)
IMM GRANULOCYTES # BLD: 0.1 K/UL
LYMPHOCYTES # BLD: 1.9 K/UL (ref 1.1–4.5)
LYMPHOCYTES NFR BLD: 19 % (ref 20–40)
MAGNESIUM SERPL-MCNC: 2.2 MG/DL (ref 1.6–2.4)
MCH RBC QN AUTO: 30.2 PG (ref 27–31)
MCHC RBC AUTO-ENTMCNC: 30.3 G/DL (ref 33–37)
MCV RBC AUTO: 99.6 FL (ref 80–94)
MONOCYTES # BLD: 1.8 K/UL (ref 0–0.9)
MONOCYTES NFR BLD: 17.8 % (ref 0–10)
NEUTROPHILS # BLD: 6.2 K/UL (ref 1.5–7.5)
NEUTS SEG NFR BLD: 61.7 % (ref 50–65)
PLATELET # BLD AUTO: 215 K/UL (ref 130–400)
PMV BLD AUTO: 11 FL (ref 9.4–12.4)
POTASSIUM SERPL-SCNC: 4.2 MMOL/L (ref 3.5–5)
RBC # BLD AUTO: 5 M/UL (ref 4.7–6.1)
SODIUM SERPL-SCNC: 153 MMOL/L (ref 136–145)
SODIUM SERPL-SCNC: 155 MMOL/L (ref 136–145)
SODIUM SERPL-SCNC: 156 MMOL/L (ref 136–145)
WBC # BLD AUTO: 10.1 K/UL (ref 4.8–10.8)

## 2023-08-14 PROCEDURE — 6360000002 HC RX W HCPCS: Performed by: INTERNAL MEDICINE

## 2023-08-14 PROCEDURE — 92610 EVALUATE SWALLOWING FUNCTION: CPT

## 2023-08-14 PROCEDURE — 2580000003 HC RX 258: Performed by: INTERNAL MEDICINE

## 2023-08-14 PROCEDURE — 97165 OT EVAL LOW COMPLEX 30 MIN: CPT

## 2023-08-14 PROCEDURE — 36415 COLL VENOUS BLD VENIPUNCTURE: CPT

## 2023-08-14 PROCEDURE — 92522 EVALUATE SPEECH PRODUCTION: CPT

## 2023-08-14 PROCEDURE — 6360000002 HC RX W HCPCS: Performed by: PSYCHIATRY & NEUROLOGY

## 2023-08-14 PROCEDURE — 97163 PT EVAL HIGH COMPLEX 45 MIN: CPT

## 2023-08-14 PROCEDURE — 99233 SBSQ HOSP IP/OBS HIGH 50: CPT | Performed by: PSYCHIATRY & NEUROLOGY

## 2023-08-14 PROCEDURE — 2000000000 HC ICU R&B

## 2023-08-14 PROCEDURE — 94760 N-INVAS EAR/PLS OXIMETRY 1: CPT

## 2023-08-14 PROCEDURE — 84295 ASSAY OF SERUM SODIUM: CPT

## 2023-08-14 PROCEDURE — 83735 ASSAY OF MAGNESIUM: CPT

## 2023-08-14 PROCEDURE — 97530 THERAPEUTIC ACTIVITIES: CPT

## 2023-08-14 PROCEDURE — 2700000000 HC OXYGEN THERAPY PER DAY

## 2023-08-14 PROCEDURE — 80048 BASIC METABOLIC PNL TOTAL CA: CPT

## 2023-08-14 PROCEDURE — 85025 COMPLETE CBC W/AUTO DIFF WBC: CPT

## 2023-08-14 RX ORDER — DEXAMETHASONE SODIUM PHOSPHATE 10 MG/ML
2 INJECTION, SOLUTION INTRAMUSCULAR; INTRAVENOUS EVERY 24 HOURS
Status: DISCONTINUED | OUTPATIENT
Start: 2023-08-15 | End: 2023-08-15

## 2023-08-14 RX ADMIN — DEXTROSE MONOHYDRATE: 50 INJECTION, SOLUTION INTRAVENOUS at 17:56

## 2023-08-14 RX ADMIN — ENOXAPARIN SODIUM 40 MG: 100 INJECTION SUBCUTANEOUS at 20:49

## 2023-08-14 RX ADMIN — WATER 1000 MG: 1 INJECTION INTRAMUSCULAR; INTRAVENOUS; SUBCUTANEOUS at 14:38

## 2023-08-14 RX ADMIN — CHLORHEXIDINE GLUCONATE 15 ML: 1.2 RINSE ORAL at 09:55

## 2023-08-14 RX ADMIN — ENOXAPARIN SODIUM 40 MG: 100 INJECTION SUBCUTANEOUS at 09:00

## 2023-08-14 RX ADMIN — LEVETIRACETAM 500 MG: 100 INJECTION INTRAVENOUS at 05:13

## 2023-08-14 RX ADMIN — AZITHROMYCIN MONOHYDRATE 500 MG: 500 INJECTION, POWDER, LYOPHILIZED, FOR SOLUTION INTRAVENOUS at 21:05

## 2023-08-14 RX ADMIN — CHLORHEXIDINE GLUCONATE 15 ML: 1.2 RINSE ORAL at 20:49

## 2023-08-14 RX ADMIN — LEVETIRACETAM 500 MG: 100 INJECTION INTRAVENOUS at 16:15

## 2023-08-14 RX ADMIN — DEXAMETHASONE SODIUM PHOSPHATE 4 MG: 10 INJECTION INTRAMUSCULAR; INTRAVENOUS at 10:48

## 2023-08-14 ASSESSMENT — PAIN SCALES - GENERAL
PAINLEVEL_OUTOF10: 0
PAINLEVEL_OUTOF10: 0

## 2023-08-14 NOTE — PROGRESS NOTES
Nutrition Assessment     Type and Reason for Visit: Reassess    Nutrition Recommendations/Plan:   Monitor for diet advance. Malnutrition Assessment:  Malnutrition Status: At risk for malnutrition (Comment)    Nutrition Assessment:  Pt extubated @1330 on 8/13 per RN/RT notes. D/c EN orders at this time as no route available for nutrition support at this time. Pt remains NPO, monitor for diet advance. Estimated Daily Nutrient Needs:  Energy (kcal):  0610-9323 kcals (20-25 kals/kg) Weight Used for Energy Requirements: Current     Protein (g):  110-184g Weight Used for Protein Requirements: Ideal        Fluid (ml/day):  1128-4136 ml Method Used for Fluid Requirements: 1 ml/kcal    Nutrition Related Findings:   glucose 100-148, Na 156, Cl 119 Wound Type: None (multiple abrasions)    Current Nutrition Therapies:    Diet NPO    Anthropometric Measures:  Height: 6' 4\" (193 cm)  Current Body Wt: 238 lb (108 kg)   BMI: 29    Nutrition Diagnosis:   Inadequate oral intake related to impaired respiratory function as evidenced by intubation    Nutrition Interventions:   Food and/or Nutrient Delivery: Continue NPO  Nutrition Education/Counseling: No recommendation at this time  Coordination of Nutrition Care: Continue to monitor while inpatient       Goals:  Previous Goal Met: No Progress toward Goal(s)  Goals: Initiate PO diet       Nutrition Monitoring and Evaluation:   Behavioral-Environmental Outcomes: None Identified  Food/Nutrient Intake Outcomes: Diet Advancement/Tolerance  Physical Signs/Symptoms Outcomes: Biochemical Data, Fluid Status or Edema, Weight, Skin    Discharge Planning:     Too soon to determine     Breanna Beach MS, RD, LD  Contact: [phone]

## 2023-08-14 NOTE — PLAN OF CARE
Problem: Discharge Planning  Goal: Discharge to home or other facility with appropriate resources  Outcome: Progressing  Flowsheets (Taken 8/14/2023 0800)  Discharge to home or other facility with appropriate resources: Identify barriers to discharge with patient and caregiver     Problem: Safety - Adult  Goal: Free from fall injury  Outcome: Progressing     Problem: ABCDS Injury Assessment  Goal: Absence of physical injury  Outcome: Progressing     Problem: Skin/Tissue Integrity  Goal: Absence of new skin breakdown  Description: 1. Monitor for areas of redness and/or skin breakdown  2. Assess vascular access sites hourly  3. Every 4-6 hours minimum:  Change oxygen saturation probe site  4. Every 4-6 hours:  If on nasal continuous positive airway pressure, respiratory therapy assess nares and determine need for appliance change or resting period.   Outcome: Progressing     Problem: Nutrition Deficit:  Goal: Optimize nutritional status  Outcome: Progressing  Flowsheets (Taken 8/14/2023 0837 by China Jones, MS, RD, LD)  Nutrient intake appropriate for improving, restoring, or maintaining nutritional needs:   Assess nutritional status and recommend course of action   Monitor oral intake, labs, and treatment plans   Recommend appropriate diets, oral nutritional supplements, and vitamin/mineral supplements     Problem: Pain  Goal: Verbalizes/displays adequate comfort level or baseline comfort level  Outcome: Progressing  Flowsheets  Taken 8/14/2023 0400 by Jazmin Perry RN  Verbalizes/displays adequate comfort level or baseline comfort level:   Assess pain using appropriate pain scale   Encourage patient to monitor pain and request assistance  Taken 8/14/2023 0000 by Jazmin Perry RN  Verbalizes/displays adequate comfort level or baseline comfort level:   Encourage patient to monitor pain and request assistance   Assess pain using appropriate pain scale     Problem: Neurosensory - Adult  Goal: Achieves stable

## 2023-08-14 NOTE — PROGRESS NOTES
75cc of Fentanyl wasted with Tiffanie BARNES RN as witness.      Electronically signed by Ginny Sexton RN on 8/13/2023 at 10:13 PM  Electronically signed by Skyla Jimenez RN on 8/13/2023 at 10:14 PM

## 2023-08-14 NOTE — PROGRESS NOTES
Physical Therapy  Facility/Department: [facility] ICU  Physical Therapy Initial Assessment    Name: Demi Valle  : 1960  MRN: [id number]  Date of Service: 2023    Discharge Recommendations:  Continue to assess pending progress, 24 hour supervision or assist, Patient would benefit from continued therapy after discharge          Patient Diagnosis(es): The primary encounter diagnosis was Pneumonia due to COVID-19 virus. Diagnoses of Altered mental status, unspecified altered mental status type and Seizure [facility]) were also pertinent to this visit. 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 Past Surgical History:  has a past surgical history that includes Diagnostic Cardiac Cath Lab Procedure (499287); Lithotripsy; Coronary artery bypass graft (2019); and laparoscopic appendectomy (N/A, 2023). Assessment   Body Structures, Functions, Activity Limitations Requiring Skilled Therapeutic Intervention: Decreased functional mobility ; Decreased body mechanics; Decreased endurance;Decreased safe awareness;Decreased strength;Decreased posture;Decreased balance  Assessment: Pt. will benefit from cont. PT to decrease impairments. Pt. a fall risk and should not attempt mobility on his own at this time. Pt. needs Max/D A x 2 for bed mobility and sitting EOB. Will work on progressive mobility and out of bed to chair. Pt. having difficulty keeping eyes open and holding head up. RN notified pt having coughing and thick secretions, likely needing suctioned. Pt. may need cont. PT once d/c from [facility] due to weakness.   Treatment Diagnosis: impaired gait and mobility  Therapy Prognosis: Good  Decision Making: High Complexity  Barriers to Balance  Posture: Poor  Sitting - Static: Poor;-  Sitting - Dynamic: Poor;-  Exercise Treatment: HS, AP, AAROM x 5 reps        OutComes Score                                                  AM-PAC Score             Tinneti Score       Goals  Short Term Goals  Time Frame for Short Term Goals: 2 wks  Short Term Goal 1: supine to sit indep  Short Term Goal 2: sit to stand indep  Short Term Goal 3: amb. 200' with RW SBA  Short Term Goal 4: bed to chair SBA  Patient Goals   Patient Goals : get better       Education  Patient Education  Education Given To: Patient  Education Provided: Role of Therapy;Plan of Care  Education Provided Comments: use of call light, staff A  Education Method: Verbal;Demonstration  Barriers to Learning: Cognition  Education Outcome: Demonstrated understanding;Verbalized understanding;Continued education needed      Therapy Time   Individual Concurrent Group Co-treatment   Time In           Time Out           Minutes                   Nicola Huynh PT   Electronically signed by Nicola Huynh PT on 8/14/2023 at 1:48 PM

## 2023-08-14 NOTE — PROGRESS NOTES
Facility/Department: [facility] ICU   CLINICAL BEDSIDE SWALLOW EVALUATION  SPEECH PRODUCTION EVALUATION     NAME: Amy Savage  : 1960  MRN: [id number]    ADMISSION DATE: 2023  ADMITTING DIAGNOSIS: has CAD (coronary artery disease); Hypertension; Tobacco abuse; Presence of stent in right coronary artery; Nicotine abuse; Old inferior wall myocardial infarction; Acute appendicitis with perforation, generalized peritonitis, and gangrene, without abscess; Acute appendicitis with localized peritonitis; Acute respiratory failure with hypoxemia ([address]); Palliative care patient; Pneumonia due to COVID-19 virus; Seizure ([address]); Altered mental status; HILARY (acute kidney injury) ([address]); and Acute respiratory failure with hypoxia ([address]) on their problem list.    Date of Eval: 2023  Evaluating Therapist: LILIAN Givens    Current Diet level:  NPO    Pain:  Pain Assessment  Pain Assessment: None - Denies Pain  Pain Level: 0    Reason for Referral  Amy Savage was referred for a bedside swallow evaluation to assess the efficiency of his swallow function, identify signs and symptoms of aspiration and make recommendations regarding safe dietary consistencies, effective compensatory strategies, and safe eating environment. Impression  Assessed patient's swallowing function. Patient exhibited inconsistent absent swallows. At times, patient exhibited sluggish, moderately decreased laryngeal elevation for swallow airway protection. Other times, just laryngeal rocking was noted. Patient exhibited degree of airway detection with delayed coughs observed following probable penetration/aspiration of PO trials (ice chips presented by SLP). At this time, would continue NPO status. Alternative means of nutrition. If patient receives mouth care prior to intake, okay for swabs regular water and ice chips for comfort. Will continue to follow.      Treatment Plan  Requires SLP Intervention: Yes     Recommended Diet and

## 2023-08-14 NOTE — PROGRESS NOTES
COMPARISON:  08/04/2023  FINDINGS: There is no evidence of intracranial hemorrhage. The midline is maintained. There is no hydrocephalus. Generalized atrophy. Chronic small vessel ischemic change. No cerebellar tonsillar ectopia. Evaluation of the calvarium shows no fracture. The mastoid air cells are normally pneumatized. No acute intracranial abnormality. All CT scans are performed using dose optimization techniques as appropriate to the performed exam and include at least one of the following: Automated exposure control, adjustment of the mA and/or kV according to size, and the use of iterative reconstruction technique. ______________________________________ Electronically signed by: Clarice Wise M.D. Date:     08/05/2023 Time:    08:43     CT CERVICAL SPINE WO CONTRAST    Result Date: 8/5/2023  EXAM:  CT CERVICAL SPINE WITHOUT CONTRAST. HISTORY:  Altered mental status. Fall. COVID-19 positive. COMPARISON:  CT head 08/04/2023. TECHNIQUE:  Noncontrast CT images of the cervical spine were obtained. Axial reconstructions with sagittal and coronal reformats were provided. Technically limited study with decreased anatomic detail from the level of C7 extending inferiorly due to superimposition of the shoulders and decreased beam penetration at that site. FINDINGS:  Limited Head: Grossly unremarkable. Craniocervical Junction: Normal alignment. Atlantoaxial Joint: Atlantodental interval is within normal limits aside from hypertrophic degenerative change at that level. Odontoid process is intact. Lateral masses of C1 are normal in alignment. Anterior and posterior arches of C1 are intact. Alignment: Mild dextrocurvature of the cervical spine. Cervical alignment is otherwise unremarkable. Post-Surgical Changes/Hardware: None. Bones: No acute fracture. No chronic compression deformity.   Disk Spaces: Marked intervertebral the space narrowing with endplate degenerative change in anterior TECHNIQUE: Frontal Chest Radiograph. HISTORY: Intubation. COMPARISON: Chest radiograph 08/05/2023  FINDINGS:  Lines, Tubes, Devices:  Endotracheal tube with tip approximately 5 cm above the joao. Postsurgical changes of median sternotomy. Lungs and Pleura:  Low lung volumes. Unchanged bilateral interstitial opacities, right greater than left. No pleural effusion. No pneumothorax. Cardiomediastinum: Enlarged cardiomediastinal silhouette. Mild aortic calcifications. Bones/Soft Tissues: No acute osseous abnormality. No soft tissue abnormality. Upper Abdomen: Within normal limits. Endotracheal tube in appropriate position. ______________________________________ Electronically signed by: Jorje Hewitt D.O. Date:     08/05/2023 Time:    09:47     XR CHEST PORTABLE    Result Date: 8/5/2023  EXAM:  CHEST, SINGLE VIEW  HISTORY:  Altered mental status. COMPARISON:  Chest radiograph 08/04/2023. FINDINGS:  Mild enlargement cardiac silhouette is unchanged. Mediasternotomy wires. Mixed interstitial and alveolar opacities throughout the lungs bilaterally. Increased in extent with more focal airspace opacities as seen in the lower right lung through the infrahilar region. This could be related to infection/pneumonia or asymmetric pulmonary edema. No pleural effusion or pneumothorax. Increasing mixed interstitial and alveolar opacities, more pronounced on the right side as detailed above which could represent worsening infection/pneumonia or asymmetric pulmonary edema. Stable cardiomegaly and postoperative change of the chest.   ______________________________________ Electronically signed by: Rosalia Knight M.D.  Date:     08/05/2023 Time:    08:24       Lab Results   Component Value Date    WBC 10.1 08/14/2023    HGB 15.1 08/14/2023    HCT 49.8 08/14/2023    MCV 99.6 (H) 08/14/2023     08/14/2023     Lab Results   Component Value Date     (H) 08/14/2023    K 4.2 08/14/2023

## 2023-08-14 NOTE — PROGRESS NOTES
Occupational Therapy  Facility/Department: [facility] ICU  Occupational Therapy Initial Assessment    Name: Judd Campoverde  : 1960  MRN: [id number]  Date of Service: 2023    Discharge Recommendations:             Patient Diagnosis(es): The primary encounter diagnosis was Pneumonia due to COVID-19 virus. Diagnoses of Altered mental status, unspecified altered mental status type and Seizure [facility]) were also pertinent to this visit. 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 Past Surgical History:  has a past surgical history that includes Diagnostic Cardiac Cath Lab Procedure ([id number]); Lithotripsy; Coronary artery bypass graft (2019); and laparoscopic appendectomy (N/A, 2023). Treatment Diagnosis: Acute respiratory failure      Assessment   Performance deficits / Impairments: Decreased functional mobility ; Decreased strength;Decreased endurance;Decreased vision/visual deficit; Decreased ADL status; Decreased safe awareness;Decreased ROM; Decreased cognition;Decreased balance  Assessment: Will progress as tolerated  Treatment Diagnosis: Acute respiratory failure  Prognosis: Fair  Decision Making: Low Complexity  REQUIRES OT FOLLOW-UP: Yes  Activity Tolerance  Activity Tolerance: Patient Tolerated treatment well        Plan   Occupational Therapy Plan  Times Per Week: 3-5x/week  Times Per Day:  Once a day     Restrictions  Restrictions/Precautions  Restrictions/Precautions: Fall Risk, Isolation, Seizure  Required Braces or Orthoses?: No    Subjective   General  Chart Reviewed: Yes  Patient assessed for rehabilitation services?: Yes  Additional Pertinent Hx: Covid positive  Family / Caregiver Present: No  Diagnosis: Acute respiratory failure with hypoxia  none  Social/Functional History  Social/Functional History  Lives With: Spouse  Type of Home: House  ADL Assistance: Independent  Ambulation Assistance: Independent  Transfer Assistance: Independent  Occupation: Retired  Additional Comments: pt unable to report prior functional history       Objective   Pulse: 78  Heart Rate Source: Monitor  BP: 138/64  BP Location: Left upper arm  BP Method: Automatic  Patient Position: Semi fowlers  MAP (Calculated): 89  Respirations: 22  SpO2: 94 %  O2 Device: Nasal cannula          Observation/Palpation  Posture: Poor  Observation: difficulty holding head up, telemetry, head hyperextended while on pillow; RN alerted that pt sounded like he had thick secretions that needed suctioning. Safety Devices  Type of Devices: Call light within reach; Patient at risk for falls; Bed alarm in place; Heels elevated for pressure relief;Nurse notified; Left in bed (RN notified pt needing suction and pt rolled slightly to R side.)        AROM: Generally decreased, functional  Strength: Generally decreased, functional (Grossly 3- B shld, 3+B elbows, poor )  ADL  Feeding: Dependent/Total  Grooming: Dependent/Total  UE Bathing: Dependent/Total  LE Bathing: Dependent/Total  UE Dressing: Dependent/Total  LE Dressing: Dependent/Total  Toileting: Dependent/Total  Additional Comments: Decreased alertness with eyes closed during most of eval     Activity Tolerance  Activity Tolerance: Patient limited by fatigue;Patient limited by endurance;Treatment limited secondary to decreased cognition  Bed mobility  Rolling to Left: Dependent/Total;2 Person assistance  Rolling to Right: Dependent/Total;2 Person assistance  Supine to Sit: Dependent/Total;2 Person assistance;Maximum assistance  Sit to Supine: Dependent/Total;Maximum assistance;2 Person assistance  Bed Mobility Comments: Max A of 2 with sitting balance on EOB     Vision  Vision: Impaired  Hearing  Hearing: Within

## 2023-08-15 ENCOUNTER — APPOINTMENT (OUTPATIENT)
Dept: GENERAL RADIOLOGY | Age: 63
End: 2023-08-15
Payer: MEDICARE

## 2023-08-15 LAB
ANION GAP SERPL CALCULATED.3IONS-SCNC: 7 MMOL/L (ref 7–19)
BASOPHILS # BLD: 0 K/UL (ref 0–0.2)
BASOPHILS NFR BLD: 0.1 % (ref 0–1)
BUN SERPL-MCNC: 49 MG/DL (ref 8–23)
CALCIUM SERPL-MCNC: 9.4 MG/DL (ref 8.8–10.2)
CHLORIDE SERPL-SCNC: 117 MMOL/L (ref 98–111)
CO2 SERPL-SCNC: 30 MMOL/L (ref 22–29)
CREAT SERPL-MCNC: 1.1 MG/DL (ref 0.5–1.2)
EOSINOPHIL # BLD: 0.1 K/UL (ref 0–0.6)
EOSINOPHIL NFR BLD: 0.5 % (ref 0–5)
ERYTHROCYTE [DISTWIDTH] IN BLOOD BY AUTOMATED COUNT: 13.4 % (ref 11.5–14.5)
GLUCOSE BLD-MCNC: 94 MG/DL (ref 70–99)
GLUCOSE SERPL-MCNC: 104 MG/DL (ref 74–109)
HCT VFR BLD AUTO: 49 % (ref 42–52)
HGB BLD-MCNC: 15 G/DL (ref 14–18)
IMM GRANULOCYTES # BLD: 0.1 K/UL
LYMPHOCYTES # BLD: 2.1 K/UL (ref 1.1–4.5)
LYMPHOCYTES NFR BLD: 19.7 % (ref 20–40)
MAGNESIUM SERPL-MCNC: 2.3 MG/DL (ref 1.6–2.4)
MCH RBC QN AUTO: 30.5 PG (ref 27–31)
MCHC RBC AUTO-ENTMCNC: 30.6 G/DL (ref 33–37)
MCV RBC AUTO: 99.6 FL (ref 80–94)
MONOCYTES # BLD: 1.5 K/UL (ref 0–0.9)
MONOCYTES NFR BLD: 13.9 % (ref 0–10)
NEUTROPHILS # BLD: 6.9 K/UL (ref 1.5–7.5)
NEUTS SEG NFR BLD: 65 % (ref 50–65)
PERFORMED ON: NORMAL
PLATELET # BLD AUTO: 209 K/UL (ref 130–400)
PMV BLD AUTO: 11.7 FL (ref 9.4–12.4)
POTASSIUM SERPL-SCNC: 4.7 MMOL/L (ref 3.5–5)
RBC # BLD AUTO: 4.92 M/UL (ref 4.7–6.1)
SODIUM SERPL-SCNC: 152 MMOL/L (ref 136–145)
SODIUM SERPL-SCNC: 153 MMOL/L (ref 136–145)
SODIUM SERPL-SCNC: 153 MMOL/L (ref 136–145)
SODIUM SERPL-SCNC: 154 MMOL/L (ref 136–145)
SODIUM SERPL-SCNC: 156 MMOL/L (ref 136–145)
WBC # BLD AUTO: 10.6 K/UL (ref 4.8–10.8)

## 2023-08-15 PROCEDURE — 84295 ASSAY OF SERUM SODIUM: CPT

## 2023-08-15 PROCEDURE — 6360000002 HC RX W HCPCS: Performed by: INTERNAL MEDICINE

## 2023-08-15 PROCEDURE — 2580000003 HC RX 258: Performed by: INTERNAL MEDICINE

## 2023-08-15 PROCEDURE — 1210000000 HC MED SURG R&B

## 2023-08-15 PROCEDURE — 83735 ASSAY OF MAGNESIUM: CPT

## 2023-08-15 PROCEDURE — 94760 N-INVAS EAR/PLS OXIMETRY 1: CPT

## 2023-08-15 PROCEDURE — 80048 BASIC METABOLIC PNL TOTAL CA: CPT

## 2023-08-15 PROCEDURE — 71045 X-RAY EXAM CHEST 1 VIEW: CPT

## 2023-08-15 PROCEDURE — 99232 SBSQ HOSP IP/OBS MODERATE 35: CPT | Performed by: PHYSICIAN ASSISTANT

## 2023-08-15 PROCEDURE — 99232 SBSQ HOSP IP/OBS MODERATE 35: CPT | Performed by: PSYCHIATRY & NEUROLOGY

## 2023-08-15 PROCEDURE — 6360000002 HC RX W HCPCS: Performed by: PSYCHIATRY & NEUROLOGY

## 2023-08-15 PROCEDURE — 85025 COMPLETE CBC W/AUTO DIFF WBC: CPT

## 2023-08-15 PROCEDURE — 92507 TX SP LANG VOICE COMM INDIV: CPT

## 2023-08-15 PROCEDURE — 94761 N-INVAS EAR/PLS OXIMETRY MLT: CPT

## 2023-08-15 PROCEDURE — 36415 COLL VENOUS BLD VENIPUNCTURE: CPT

## 2023-08-15 PROCEDURE — 82962 GLUCOSE BLOOD TEST: CPT

## 2023-08-15 PROCEDURE — 92526 ORAL FUNCTION THERAPY: CPT

## 2023-08-15 PROCEDURE — 2700000000 HC OXYGEN THERAPY PER DAY

## 2023-08-15 RX ORDER — DEXAMETHASONE SODIUM PHOSPHATE 10 MG/ML
1 INJECTION, SOLUTION INTRAMUSCULAR; INTRAVENOUS EVERY 24 HOURS
Status: DISCONTINUED | OUTPATIENT
Start: 2023-08-16 | End: 2023-08-16

## 2023-08-15 RX ADMIN — LEVETIRACETAM 500 MG: 100 INJECTION INTRAVENOUS at 03:58

## 2023-08-15 RX ADMIN — ENOXAPARIN SODIUM 40 MG: 100 INJECTION SUBCUTANEOUS at 08:41

## 2023-08-15 RX ADMIN — DEXAMETHASONE SODIUM PHOSPHATE 2 MG: 10 INJECTION INTRAMUSCULAR; INTRAVENOUS at 08:41

## 2023-08-15 RX ADMIN — DEXTROSE MONOHYDRATE: 50 INJECTION, SOLUTION INTRAVENOUS at 22:53

## 2023-08-15 RX ADMIN — WATER 1000 MG: 1 INJECTION INTRAMUSCULAR; INTRAVENOUS; SUBCUTANEOUS at 14:51

## 2023-08-15 RX ADMIN — LEVETIRACETAM 500 MG: 100 INJECTION INTRAVENOUS at 16:47

## 2023-08-15 RX ADMIN — CHLORHEXIDINE GLUCONATE 15 ML: 1.2 RINSE ORAL at 08:42

## 2023-08-15 RX ADMIN — AZITHROMYCIN MONOHYDRATE 500 MG: 500 INJECTION, POWDER, LYOPHILIZED, FOR SOLUTION INTRAVENOUS at 20:27

## 2023-08-15 RX ADMIN — DEXTROSE MONOHYDRATE: 50 INJECTION, SOLUTION INTRAVENOUS at 08:15

## 2023-08-15 RX ADMIN — ENOXAPARIN SODIUM 40 MG: 100 INJECTION SUBCUTANEOUS at 20:28

## 2023-08-15 ASSESSMENT — PAIN SCALES - GENERAL: PAINLEVEL_OUTOF10: 0

## 2023-08-15 NOTE — PROGRESS NOTES
Ximena Jose transferred to [facility] from [facility] via bed. Reason for transfer: STABLE   Explained reason for transfer to Patient. Belongings: Glasses with patient at bedside . Soft chart transferred with patient: Yes. Telemetry box number N/A transferred with patient: yes. Report given to: Aniyah Lang, via telephone.       Electronically signed by Anne Brewer RN on 8/15/2023 at 6:01

## 2023-08-15 NOTE — CARE COORDINATION
Case Management Assessment  Initial Evaluation    Date/Time of Evaluation: 8/15/2023 4:21 PM  Assessment Completed by: JAY Jimenez    If patient is discharged prior to next notation, then this note serves as note for discharge by case management. Patient Name: Liyah Larson                   YOB: 1960  Diagnosis: Seizure ([address]) [R56.9]  Altered mental status, unspecified altered mental status type [R41.82]  Pneumonia due to COVID-19 virus [U07.1, J12.82]  Acute respiratory failure with hypoxemia ([address]) [J96.01]                   Date / Time: 8/5/2023  7:26 AM    Patient Admission Status: Inpatient   Readmission Risk (Low < 19, Mod (19-27), High > 27): Readmission Risk Score: 14.8    Current PCP: Live Vergara MD  PCP verified by ? Yes    Chart Reviewed: Yes      History Provided by: Spouse, Medical Record  Patient Orientation: Unable to Assess (Pt slept throughout conversation with his wife)    Patient Cognition: Other (see comment) (asleep)    Hospitalization in the last 30 days (Readmission):  No    If yes, Readmission Assessment in CM Navigator will be completed. Advance Directives:      Code Status: Full Code   Patient's Primary Decision Maker is: Legal Next of Kin    Primary Decision MakerPmarcos Coyne - Spouse - [phone]    Secondary Decision Maker: Kayla Gray - Child - [phone]    Secondary Decision Maker: Rajat Kawasaki - Child - [phone]    Discharge Planning:    Patient lives with: Spouse/Significant Other Type of Home: House  Primary Care Giver: Self  Patient Support Systems include: Spouse/Significant Other, Children, Family Members   Current Financial resources: Medicare  Current community resources: None  Current services prior to admission: None            Current DME:              Type of Home Care services:  None    ADLS  Prior functional level: Independent in ADLs/IADLs  Current functional level:  Independent in ADLs/IADLs    PT AM-PAC:   /24  OT AM-PAC:

## 2023-08-15 NOTE — PLAN OF CARE
Problem: Discharge Planning  Goal: Discharge to home or other facility with appropriate resources  Outcome: Progressing     Problem: Safety - Adult  Goal: Free from fall injury  Outcome: Progressing     Problem: ABCDS Injury Assessment  Goal: Absence of physical injury  Outcome: Progressing     Problem: Skin/Tissue Integrity  Goal: Absence of new skin breakdown  Description: 1. Monitor for areas of redness and/or skin breakdown  2. Assess vascular access sites hourly  3. Every 4-6 hours minimum:  Change oxygen saturation probe site  4. Every 4-6 hours:  If on nasal continuous positive airway pressure, respiratory therapy assess nares and determine need for appliance change or resting period.   Outcome: Progressing     Problem: Nutrition Deficit:  Goal: Optimize nutritional status  Outcome: Progressing     Problem: Pain  Goal: Verbalizes/displays adequate comfort level or baseline comfort level  Outcome: Progressing     Problem: Neurosensory - Adult  Goal: Achieves stable or improved neurological status  Outcome: Progressing  Goal: Absence of seizures  Outcome: Progressing     Problem: Respiratory - Adult  Goal: Achieves optimal ventilation and oxygenation  Outcome: Progressing     Problem: Cardiovascular - Adult  Goal: Maintains optimal cardiac output and hemodynamic stability  Outcome: Progressing  Goal: Absence of cardiac dysrhythmias or at baseline  Outcome: Progressing     Problem: Skin/Tissue Integrity - Adult  Goal: Skin integrity remains intact  Outcome: Progressing  Goal: Oral mucous membranes remain intact  Outcome: Progressing     Problem: Musculoskeletal - Adult  Goal: Return mobility to safest level of function  Outcome: Progressing  Goal: Return ADL status to a safe level of function  Outcome: Progressing     Problem: Gastrointestinal - Adult  Goal: Maintains or returns to baseline bowel function  Outcome: Progressing     Problem: Genitourinary - Adult  Goal: Absence of urinary retention  Outcome: Progressing  Goal: Urinary catheter remains patent  Outcome: Progressing     Problem: Infection - Adult  Goal: Absence of infection at discharge  Outcome: Progressing     Problem: Metabolic/Fluid and Electrolytes - Adult  Goal: Electrolytes maintained within normal limits  Outcome: Progressing     Problem: Hematologic - Adult  Goal: Maintains hematologic stability  Outcome: Progressing     Problem: Coping  Goal: Pt/Family able to verbalize concerns and demonstrate effective coping strategies  Description: INTERVENTIONS:  1. Assist patient/family to identify coping skills, available support systems and cultural and spiritual values  2. Provide emotional support, including active listening and acknowledgement of concerns of patient and caregivers  3. Reduce environmental stimuli, as able  4. Instruct patient/family in relaxation techniques, as appropriate  5.  Assess for spiritual pain/suffering and initiate Spiritual Care, Psychosocial Clinical Specialist consults as needed  Outcome: Progressing

## 2023-08-15 NOTE — CONSULTS
Comprehensive Nutrition Assessment    Type and Reason for Visit:  Reassess, Consult    Nutrition Recommendations/Plan:   Start Jevity at 25ml/hr and advance by 5ml every 4 hours until 72ml of Jevity 1.5 is reached. Give 45ml free watr flush d/t elevated Na+ level     Malnutrition Assessment:  Malnutrition Status: At risk for malnutrition (Comment) (08/15/23 5455)    Context:  Acute Illness     Findings of the 6 clinical characteristics of malnutrition:  Energy Intake:  Mild decrease in energy intake (Comment)  Weight Loss:  No significant weight loss     Body Fat Loss:  No significant body fat loss     Muscle Mass Loss:  No significant muscle mass loss    Fluid Accumulation:  No significant fluid accumulation Extremities   Strength:  Not Performed    Nutrition Assessment:    Pt hs been extubated. SLP continues to recommend NPO. Aware DHT has been placed. Nutrition Related Findings:    glucose 100-148, Na 156, Cl 119 Wound Type: None       Current Nutrition Intake & Therapies:    Average Meal Intake: NPO  Average Supplements Intake: NPO  Current Tube Feeding (TF) Orders:  Feeding Route: Orogastric  Formula: Standard with Fiber  Schedule: Continuous  Feeding Regimen: Jevity 1.5 @ 72ml/hr goal rate  Additives/Modulars: None  Water Flushes: 45ml/hr  Current TF & Flush Orders Provides: 0  Goal TF & Flush Orders Provides: jevity 1.5 @ 72ml/hr with 45ml free water flush = 2592 kcals with 110g protein, 372 g CHO and 1313 ml bryan water from formula and 1080ml free water from flush    Anthropometric Measures:  Height: 6' 4\" (193 cm)  Ideal Body Weight (IBW): 202 lbs (92 kg)    Admission Body Weight: 259 lb (117.5 kg)  Current Body Weight: 237 lb 1.6 oz (107.5 kg), 117.4 % IBW. Weight Source: Bed Scale  Current BMI (kg/m2): 28.9  Usual Body Weight: 259 lb 9.6 oz (117.8 kg) (2/2023)  % Weight Change (Calculated): -0.2  BMI Categories: Overweight (BMI 25.0-29. 9)    Estimated Daily Nutrient Needs:  Energy Requirements

## 2023-08-15 NOTE — PROGRESS NOTES
Spouse at bedside. Bed alarm on, call light within reach. Will review orders and complete transfer assessment after report received.    Electronically signed by Jam Juarez RN on 8/15/2023 at 6:57 PM

## 2023-08-15 NOTE — PLAN OF CARE
Problem: Discharge Planning  Goal: Discharge to home or other facility with appropriate resources  8/15/2023 1615 by Megan Marquez RN  Outcome: Progressing  8/15/2023 0418 by Gabriella Barron RN  Outcome: Progressing     Problem: Safety - Adult  Goal: Free from fall injury  8/15/2023 1615 by Megan Marquez RN  Outcome: Progressing  8/15/2023 0418 by Gabriella Barron RN  Outcome: Progressing     Problem: ABCDS Injury Assessment  Goal: Absence of physical injury  8/15/2023 1615 by Megan Marquez RN  Outcome: Progressing  8/15/2023 0418 by Gabriella Barron RN  Outcome: Progressing     Problem: Skin/Tissue Integrity  Goal: Absence of new skin breakdown  Description: 1. Monitor for areas of redness and/or skin breakdown  2. Assess vascular access sites hourly  3. Every 4-6 hours minimum:  Change oxygen saturation probe site  4. Every 4-6 hours:  If on nasal continuous positive airway pressure, respiratory therapy assess nares and determine need for appliance change or resting period.   8/15/2023 1615 by Megan Marquez RN  Outcome: Progressing  8/15/2023 0418 by Gabriella Barron RN  Outcome: Progressing     Problem: Nutrition Deficit:  Goal: Optimize nutritional status  8/15/2023 1547 by Héctor Paige MS, RD, LD  Outcome: Progressing  Flowsheets (Taken 8/15/2023 1526)  Nutrient intake appropriate for improving, restoring, or maintaining nutritional needs:   Assess nutritional status and recommend course of action   Recommend, monitor, and adjust tube feedings and TPN/PPN based on assessed needs  8/15/2023 0418 by Gabriella Barron RN  Outcome: Progressing     Problem: Pain  Goal: Verbalizes/displays adequate comfort level or baseline comfort level  8/15/2023 1615 by Megan Marquez RN  Outcome: Progressing  8/15/2023 0418 by Gabriella Barron RN  Outcome: Progressing     Problem: Neurosensory - Adult  Goal: Achieves stable or improved neurological status  8/15/2023 0418 by Gabriella Barron RN  Outcome: Progressing  Goal: Absence of seizures  8/15/2023 1615 by Francisco Javier Saini RN  Outcome: Progressing  8/15/2023 0418 by Zara Rodas RN  Outcome: Progressing     Problem: Respiratory - Adult  Goal: Achieves optimal ventilation and oxygenation  8/15/2023 1615 by Francisco Javier Saini RN  Outcome: Progressing  8/15/2023 0418 by Zara Rodas RN  Outcome: Progressing     Problem: Cardiovascular - Adult  Goal: Maintains optimal cardiac output and hemodynamic stability  8/15/2023 1615 by Francisco Javier Saini RN  Outcome: Progressing  8/15/2023 0418 by Zara Rodas RN  Outcome: Progressing  Goal: Absence of cardiac dysrhythmias or at baseline  8/15/2023 1615 by Francisco Javier Saini RN  Outcome: Progressing  8/15/2023 0418 by Zara Rodas RN  Outcome: Progressing     Problem: Skin/Tissue Integrity - Adult  Goal: Skin integrity remains intact  8/15/2023 1615 by Francisco Javier Saini RN  Outcome: Progressing  8/15/2023 0418 by Zara Rodas RN  Outcome: Progressing  Goal: Oral mucous membranes remain intact  8/15/2023 0418 by Zara Rodas RN  Outcome: Progressing     Problem: Musculoskeletal - Adult  Goal: Return mobility to safest level of function  8/15/2023 0418 by Zara Rodas RN  Outcome: Progressing  Goal: Return ADL status to a safe level of function  8/15/2023 0418 by Zara Rodas RN  Outcome: Progressing     Problem: Gastrointestinal - Adult  Goal: Maintains or returns to baseline bowel function  8/15/2023 0418 by Zara Rodas RN  Outcome: Progressing     Problem: Genitourinary - Adult  Goal: Absence of urinary retention  8/15/2023 0418 by Zara Rodas RN  Outcome: Progressing  Goal: Urinary catheter remains patent  8/15/2023 1615 by Francisco Javier Saini RN  Outcome: Progressing  8/15/2023 0418 by Zara Rodas RN  Outcome: Progressing     Problem: Infection - Adult  Goal: Absence of infection at discharge  8/15/2023 0418 by Zara Rodas RN  Outcome: Progressing     Problem: Metabolic/Fluid and Electrolytes - Adult  Goal: Electrolytes

## 2023-08-15 NOTE — PROGRESS NOTES
Palliative Care Progress Note  8/15/2023 10:59 AM    Patient:  Debbie Garcia  YOB: 1960  Primary Care Physician: Carole De La O MD  Advance Directive: Full Code  Admit Date: 8/5/2023       Hospital Day: 10  Portions of this note have been copied forward, however, changed to reflect the most current clinical status of this patient. CHIEF COMPLAINT/REASON FOR CONSULTATION Goals of care, family support, Code status, symptom management     SUBJECTIVE:  Mr. Deirdre Mccormick has been extubated. He is tolerating NC O2 and asking for ice chips. HPI:  The patient is a 70 y.o. male with PMH CAD, HLD, HTN, tobacco use who presented to [facility] ED on 08/04/2023 w/ concern for worsening weakness, confusion, fatigue and feeling \"shaky\". He was diagnosed w/ COVID-19 5 days prior after developing cough, congestion, diarrhea, fever and fatigue. He was utilizing Mucinex at home. CXR reported cardiomegaly w/ mild vascular congestion and interstitial edema. CT head reported no acute intracranial abnormality. He was felt stable for discharge home at that time. The patient returned on 08/05/2023 after he experienced reported seizure-like activity at home. He was incontinent of urine in ED. Repeat CT head reported no acute intracranial abnormality. CXR reported increasing mixed interstitial and alveolar opacities. He was given Keppra, decadron and IVF's. The patient's mental status did not improve with treatments administered and patient was intubated. He has been seen by Neurology w/ recommendation for MRI brain when able. Keppra is continued. He has required pressor support for hypotension and bradycardia. EEG without epileptiform abnormalities or electrographic seizure activity. He is currently receiving Keppra, Decadron, Rocephin, Zinc, Vitamin D, Thiamine, Luvox, Proscar, Pepcid, ascorbic acid, aspirin, melatonin. Palliative care has been consulted for goals of care, code status discussion.      Review of Systems:   14 Summary:  Chart reviewed. No acute overnight events. Patient was extubated on 8/13/2023. He is tolerating nasal cannula O2 at 2 L. Mr. Nannette Berger is awake and interactive though speaks in a whisper. He can follow some simple commands but has generalized weakness. He has been evaluated by speech therapy with recommendations to remain n.p.o. at this time. I discussed temporary means of nutrition via Dobbhoff or NG with him and his spouse today we also reviewed PEG tube placement should his ability to swallow not improve throughout the remainder of his hospital stay. At this time they indicate they are interested in temporary means of nutrition and would be interested in a PEG tube if indicated. Their plans are to pursue therapy and aggressive rehab. Neurology is following with recommendations for MRI when able. Patient has been hospitalized at this point for 10 days with COVID recommend pursuing removal of isolation precautions when okay with infection prevention to help facilitate MRI. Recommendations:      Palliative Care-GOC unchanged, prolong life, pursue alternate means of nutrition. Code status: Full code recommend involvement of infection prevention to lift isolation precautions as soon as able to facilitate MRI  Acute respiratory failure w/ hypoxia, suspected 2/2 COVID-19-extubated 08/13/2023, encourage deep breathing and cough and increase in activity if tolerated  Dysphagia-patient and spouse agreeable to pursue alternate means of nutrition at this time. Seizure-like activity, AMS-Neurology following, keppra continued, MRI pending  HILARY-resolved  Bradycardia/hypotension-resolved, pressor support weaned  Concern for volume overload-resolved    Thank you for consulting Palliative Care and allowing us to participate in the care of this patient.      Total Time Spent with patient assessment, interview of independent historian/HCS, workup/treatment review, discussion with medical team, review of current

## 2023-08-15 NOTE — PROGRESS NOTES
Hospitalist Progress Note  [facility]     Patient: Latesha Lemons  : 1960  MRN: [id number]  Code Status: Full Code    Hospital Day: 10   Date of Service: 8/15/2023    Subjective:   Patient seen and examined in [place] [address] critical care unit. No acute distress. Past Medical History:   Diagnosis Date    Acute MI ([address])     inferior wall, interrupted by direct heart cath and angioplasty to the RCA 5/3/09, successful with normal LV function    Arthritis     CAD (coronary artery disease) 2011    Hyperlipidemia     PCP manages cholesterol    Hypertension     Systemic    Hypertension 2011    Kidney stone     Nephrolithiasis     history of    Nicotine abuse 2014    Old inferior wall myocardial infarction 2014    Palliative care patient 2023    Presence of stent in right coronary artery 2013    Tobacco abuse 10/26/2011       Past Surgical History:   Procedure Laterality Date    CORONARY ARTERY BYPASS GRAFT      Dr August Mon QUINTERO to D1 then sequentially to LAD    DIAGNOSTIC CARDIAC CATH LAB PROCEDURE  [id number]    EF is > 55%. See scanned document.     LAPAROSCOPIC APPENDECTOMY N/A 2023    LAPAROSCOPIC APPENDECTOMY performed by Bren Calderon DO at [address]    LITHOTRIPSY         Family History   Problem Relation Age of Onset    Coronary Art Dis Mother     Hypertension Mother     Coronary Art Dis Father     Hypertension Father     Hypertension Maternal Aunt     Diabetes Maternal Aunt     Hypertension Maternal Uncle     Diabetes Maternal Uncle        Social History     Socioeconomic History    Marital status:      Spouse name: Not on file    Number of children: Not on file    Years of education: Not on file    Highest education level: Not on file   Occupational History    Not on file   Tobacco Use    Smoking status: Every Day     Packs/day: 1.50     Years: 35.00     Pack years: 52.50     Types: Cigarettes    Smokeless tobacco: Never   Vaping Use

## 2023-08-15 NOTE — PROGRESS NOTES
Facility/Department: [facility] ICU  SWALLOW THERAPY  SPEECH THERAPY     NAME: Jihan Lyon  : 1960  MRN: [id number]    ADMISSION DATE: 2023  ADMITTING DIAGNOSIS: has CAD (coronary artery disease); Hypertension; Tobacco abuse; Presence of stent in right coronary artery; Nicotine abuse; Old inferior wall myocardial infarction; Acute appendicitis with perforation, generalized peritonitis, and gangrene, without abscess; Acute appendicitis with localized peritonitis; Acute respiratory failure with hypoxemia ([address]); Palliative care patient; Pneumonia due to COVID-19 virus; Seizure ([address]); Altered mental status; HILARY (acute kidney injury) ([address]); and Acute respiratory failure with hypoxia ([address]) on their problem list.    Date of Treat: 8/15/2023  Evaluating Therapist: LILIAN Toribio    Current Diet level:  NPO    Pain:  Pain Assessment  Pain Assessment: None - Denies Pain  Pain Level: 0    Reason for Referral  Jihan Lyon was referred for a bedside swallow evaluation to assess the efficiency of his swallow function, identify signs and symptoms of aspiration and make recommendations regarding safe dietary consistencies, effective compensatory strategies, and safe eating environment. Impression  Re-assessed patient's swallowing function. Patient exhibited absent swallows with no laryngeal elevation noted and just laryngeal rocking observed. Patient exhibited degree of airway detection with delayed coughs noted following probable penetration/aspiration of PO trials (ice chips presented by SLP). At this time, would continue NPO status. Consider alternative means of nutrition. If patient receives mouth care prior to intake, okay for swabs regular water and ice chips for comfort. Will continue to follow.      Treatment Plan  Requires SLP Intervention: Yes     Recommended Diet and Intervention  Diet Solids Recommendation: NPO  Liquid Consistency Recommendation: NPO   Therapeutic Interventions: Patient/Family

## 2023-08-15 NOTE — PLAN OF CARE
Problem: Nutrition Deficit:  Goal: Optimize nutritional status  8/15/2023 1547 by Tae Ryan, MS, RD, LD  Outcome: Progressing  Flowsheets (Taken 8/15/2023 1526)  Nutrient intake appropriate for improving, restoring, or maintaining nutritional needs:   Assess nutritional status and recommend course of action   Recommend, monitor, and adjust tube feedings and TPN/PPN based on assessed needs  8/15/2023 0415 by Mary Orellana RN  Outcome: Progressing

## 2023-08-15 NOTE — PROGRESS NOTES
Infection Preventionist Note:    Contacted to review case for possible discontinuation of Droplet Plus precautions for COVID-19. Patient  noted onset of COVID symptoms on 07/31/2023 and diagnosed. Confirmatory COVID positive test result on 08/05/2023 on admission. Patient is now 10 days past onset of confirmatory COVID test with with symptoms resolving. He has been off the ventilator for greater than 24 hours on High flow nasal cannula maintaining SpO2 above 90 and afebrile without the use of fever reducing medications in the past 24 hours. Symptom Based Strategy for discontinuation of Droplet Plus Precautions for the severe to critically ill COVID-19 patient have been met. Recommendation:  Discontinuation of Droplet Plus precautions.

## 2023-08-16 ENCOUNTER — APPOINTMENT (OUTPATIENT)
Dept: MRI IMAGING | Age: 63
End: 2023-08-16
Payer: MEDICARE

## 2023-08-16 LAB
ANION GAP SERPL CALCULATED.3IONS-SCNC: 8 MMOL/L (ref 7–19)
BASOPHILS # BLD: 0 K/UL (ref 0–0.2)
BASOPHILS NFR BLD: 0.1 % (ref 0–1)
BUN SERPL-MCNC: 46 MG/DL (ref 8–23)
CALCIUM SERPL-MCNC: 9.3 MG/DL (ref 8.8–10.2)
CHLORIDE SERPL-SCNC: 118 MMOL/L (ref 98–111)
CO2 SERPL-SCNC: 28 MMOL/L (ref 22–29)
CREAT SERPL-MCNC: 1.1 MG/DL (ref 0.5–1.2)
EOSINOPHIL # BLD: 0.2 K/UL (ref 0–0.6)
EOSINOPHIL NFR BLD: 1.5 % (ref 0–5)
ERYTHROCYTE [DISTWIDTH] IN BLOOD BY AUTOMATED COUNT: 13.2 % (ref 11.5–14.5)
GLUCOSE BLD-MCNC: 116 MG/DL (ref 70–99)
GLUCOSE BLD-MCNC: 127 MG/DL (ref 70–99)
GLUCOSE BLD-MCNC: 128 MG/DL (ref 70–99)
GLUCOSE BLD-MCNC: 146 MG/DL (ref 70–99)
GLUCOSE SERPL-MCNC: 125 MG/DL (ref 74–109)
HCT VFR BLD AUTO: 47.8 % (ref 42–52)
HGB BLD-MCNC: 15.1 G/DL (ref 14–18)
IMM GRANULOCYTES # BLD: 0.1 K/UL
LYMPHOCYTES # BLD: 2 K/UL (ref 1.1–4.5)
LYMPHOCYTES NFR BLD: 14.8 % (ref 20–40)
MAGNESIUM SERPL-MCNC: 2.4 MG/DL (ref 1.6–2.4)
MCH RBC QN AUTO: 30.8 PG (ref 27–31)
MCHC RBC AUTO-ENTMCNC: 31.6 G/DL (ref 33–37)
MCV RBC AUTO: 97.4 FL (ref 80–94)
MONOCYTES # BLD: 1.3 K/UL (ref 0–0.9)
MONOCYTES NFR BLD: 9.7 % (ref 0–10)
NEUTROPHILS # BLD: 10 K/UL (ref 1.5–7.5)
NEUTS SEG NFR BLD: 73.4 % (ref 50–65)
PERFORMED ON: ABNORMAL
PLATELET # BLD AUTO: 195 K/UL (ref 130–400)
PMV BLD AUTO: 11.8 FL (ref 9.4–12.4)
POTASSIUM SERPL-SCNC: 4.5 MMOL/L (ref 3.5–5)
RBC # BLD AUTO: 4.91 M/UL (ref 4.7–6.1)
SODIUM SERPL-SCNC: 151 MMOL/L (ref 136–145)
SODIUM SERPL-SCNC: 154 MMOL/L (ref 136–145)
WBC # BLD AUTO: 13.6 K/UL (ref 4.8–10.8)

## 2023-08-16 PROCEDURE — 6370000000 HC RX 637 (ALT 250 FOR IP): Performed by: PHYSICIAN ASSISTANT

## 2023-08-16 PROCEDURE — 97530 THERAPEUTIC ACTIVITIES: CPT

## 2023-08-16 PROCEDURE — 82962 GLUCOSE BLOOD TEST: CPT

## 2023-08-16 PROCEDURE — 94640 AIRWAY INHALATION TREATMENT: CPT

## 2023-08-16 PROCEDURE — 99232 SBSQ HOSP IP/OBS MODERATE 35: CPT | Performed by: PSYCHIATRY & NEUROLOGY

## 2023-08-16 PROCEDURE — 84295 ASSAY OF SERUM SODIUM: CPT

## 2023-08-16 PROCEDURE — 94150 VITAL CAPACITY TEST: CPT

## 2023-08-16 PROCEDURE — 80048 BASIC METABOLIC PNL TOTAL CA: CPT

## 2023-08-16 PROCEDURE — 36415 COLL VENOUS BLD VENIPUNCTURE: CPT

## 2023-08-16 PROCEDURE — 97110 THERAPEUTIC EXERCISES: CPT

## 2023-08-16 PROCEDURE — 83735 ASSAY OF MAGNESIUM: CPT

## 2023-08-16 PROCEDURE — 92526 ORAL FUNCTION THERAPY: CPT

## 2023-08-16 PROCEDURE — 2500000003 HC RX 250 WO HCPCS: Performed by: PHYSICIAN ASSISTANT

## 2023-08-16 PROCEDURE — 6370000000 HC RX 637 (ALT 250 FOR IP): Performed by: INTERNAL MEDICINE

## 2023-08-16 PROCEDURE — 6360000002 HC RX W HCPCS: Performed by: INTERNAL MEDICINE

## 2023-08-16 PROCEDURE — 51798 US URINE CAPACITY MEASURE: CPT

## 2023-08-16 PROCEDURE — 6360000002 HC RX W HCPCS: Performed by: PSYCHIATRY & NEUROLOGY

## 2023-08-16 PROCEDURE — 99232 SBSQ HOSP IP/OBS MODERATE 35: CPT | Performed by: PHYSICIAN ASSISTANT

## 2023-08-16 PROCEDURE — 2700000000 HC OXYGEN THERAPY PER DAY

## 2023-08-16 PROCEDURE — 1210000000 HC MED SURG R&B

## 2023-08-16 PROCEDURE — 92507 TX SP LANG VOICE COMM INDIV: CPT

## 2023-08-16 PROCEDURE — 2500000003 HC RX 250 WO HCPCS: Performed by: HOSPITALIST

## 2023-08-16 PROCEDURE — 94761 N-INVAS EAR/PLS OXIMETRY MLT: CPT

## 2023-08-16 PROCEDURE — 85025 COMPLETE CBC W/AUTO DIFF WBC: CPT

## 2023-08-16 PROCEDURE — 2580000003 HC RX 258: Performed by: INTERNAL MEDICINE

## 2023-08-16 RX ORDER — IPRATROPIUM BROMIDE AND ALBUTEROL SULFATE 2.5; .5 MG/3ML; MG/3ML
1 SOLUTION RESPIRATORY (INHALATION)
Status: DISCONTINUED | OUTPATIENT
Start: 2023-08-16 | End: 2023-08-17 | Stop reason: HOSPADM

## 2023-08-16 RX ORDER — GLYCOPYRROLATE 0.2 MG/ML
0.2 INJECTION INTRAMUSCULAR; INTRAVENOUS EVERY 6 HOURS PRN
Status: DISCONTINUED | OUTPATIENT
Start: 2023-08-16 | End: 2023-08-17 | Stop reason: HOSPADM

## 2023-08-16 RX ORDER — ALBUTEROL SULFATE 90 UG/1
2 AEROSOL, METERED RESPIRATORY (INHALATION) EVERY 4 HOURS PRN
Status: DISCONTINUED | OUTPATIENT
Start: 2023-08-16 | End: 2023-08-17 | Stop reason: HOSPADM

## 2023-08-16 RX ORDER — GUAIFENESIN 200 MG/10ML
200 LIQUID ORAL EVERY 4 HOURS PRN
Status: DISCONTINUED | OUTPATIENT
Start: 2023-08-16 | End: 2023-08-17 | Stop reason: HOSPADM

## 2023-08-16 RX ADMIN — FAMOTIDINE 20 MG: 20 TABLET ORAL at 09:33

## 2023-08-16 RX ADMIN — ENOXAPARIN SODIUM 40 MG: 100 INJECTION SUBCUTANEOUS at 20:30

## 2023-08-16 RX ADMIN — ENOXAPARIN SODIUM 40 MG: 100 INJECTION SUBCUTANEOUS at 09:32

## 2023-08-16 RX ADMIN — FINASTERIDE 10 MG: 5 TABLET, FILM COATED ORAL at 09:32

## 2023-08-16 RX ADMIN — LEVETIRACETAM 500 MG: 100 INJECTION INTRAVENOUS at 03:35

## 2023-08-16 RX ADMIN — GLYCOPYRROLATE 0.2 MG: 0.2 INJECTION INTRAMUSCULAR; INTRAVENOUS at 23:55

## 2023-08-16 RX ADMIN — Medication 10 MG: at 19:57

## 2023-08-16 RX ADMIN — ASPIRIN 325 MG: 325 TABLET ORAL at 09:32

## 2023-08-16 RX ADMIN — LEVETIRACETAM 500 MG: 100 INJECTION INTRAVENOUS at 15:56

## 2023-08-16 RX ADMIN — GUAIFENESIN 200 MG: 100 SOLUTION ORAL at 16:00

## 2023-08-16 RX ADMIN — IPRATROPIUM BROMIDE AND ALBUTEROL SULFATE 1 DOSE: 2.5; .5 SOLUTION RESPIRATORY (INHALATION) at 18:17

## 2023-08-16 RX ADMIN — WATER 1000 MG: 1 INJECTION INTRAMUSCULAR; INTRAVENOUS; SUBCUTANEOUS at 14:34

## 2023-08-16 RX ADMIN — FAMOTIDINE 20 MG: 20 TABLET ORAL at 19:57

## 2023-08-16 RX ADMIN — DEXAMETHASONE SODIUM PHOSPHATE 1 MG: 10 INJECTION, SOLUTION INTRAMUSCULAR; INTRAVENOUS at 09:31

## 2023-08-16 RX ADMIN — ATORVASTATIN CALCIUM 40 MG: 40 TABLET, FILM COATED ORAL at 19:57

## 2023-08-16 ASSESSMENT — ENCOUNTER SYMPTOMS
DIARRHEA: 0
VOICE CHANGE: 0
TROUBLE SWALLOWING: 1
COLOR CHANGE: 0
BACK PAIN: 0
NAUSEA: 0
VOMITING: 0
SHORTNESS OF BREATH: 0
CONSTIPATION: 0

## 2023-08-16 NOTE — PROGRESS NOTES
Palliative Care Progress Note  8/16/2023 2:56 PM    Patient:  Oscar Mcgee  YOB: 1960  Primary Care Physician: Natty Thompson MD  Advance Directive: Full Code  Admit Date: 8/5/2023       Hospital Day: 6  Portions of this note have been copied forward, however, changed to reflect the most current clinical status of this patient. CHIEF COMPLAINT/REASON FOR CONSULTATION Goals of care, family support, Code status, symptom management     SUBJECTIVE:  Mr. Floretta Ganser was moved to the medical floor yesterday. No new complaints today and his spouse feels he's a little improved     HPI:  The patient is a 70 y.o. male with PMH CAD, HLD, HTN, tobacco use who presented to [facility] ED on 08/04/2023 w/ concern for worsening weakness, confusion, fatigue and feeling \"shaky\". He was diagnosed w/ COVID-19 5 days prior after developing cough, congestion, diarrhea, fever and fatigue. He was utilizing Mucinex at home. CXR reported cardiomegaly w/ mild vascular congestion and interstitial edema. CT head reported no acute intracranial abnormality. He was felt stable for discharge home at that time. The patient returned on 08/05/2023 after he experienced reported seizure-like activity at home. He was incontinent of urine in ED. Repeat CT head reported no acute intracranial abnormality. CXR reported increasing mixed interstitial and alveolar opacities. He was given Keppra, decadron and IVF's. The patient's mental status did not improve with treatments administered and patient was intubated. He has been seen by Neurology w/ recommendation for MRI brain when able. Keppra is continued. He has required pressor support for hypotension and bradycardia. EEG without epileptiform abnormalities or electrographic seizure activity. He is currently receiving Keppra, Decadron, Rocephin, Zinc, Vitamin D, Thiamine, Luvox, Proscar, Pepcid, ascorbic acid, aspirin, melatonin.  Palliative care has been consulted for goals of care, code status

## 2023-08-16 NOTE — PROGRESS NOTES
RT contacted for continuous pulse ox. Patient is on 4 L NC SpO2 98% heart rate 72. Oral suction placed at bedside to use PRN. See flow sheets for further documentation.    Electronically signed by Toi Gant RN on 8/15/2023 at 8:36 PM

## 2023-08-16 NOTE — PROGRESS NOTES
4 Eyes Skin Assessment    Rafael Ambriz is being assessed upon: Transfer to New Unit    I agree that Bonita Su, RN, along with Tanvi RN (either 2 RN's or 1 LPN and 1 RN) have performed a thorough Head to Toe Skin Assessment on the patient. ALL assessment sites listed below have been assessed. Areas assessed by both nurses:     [x]   Head, Face, and Ears   [x]   Shoulders, Back, and Chest  [x]   Arms, Elbows, and Hands   [x]   Coccyx, Sacrum, and Ischium  [x]   Legs, Feet, and Heels    Does the Patient have Skin Breakdown?  No    [place] Prevention initiated: No  Wound Care Orders initiated: No    WOC nurse consulted for Pressure Injury (Stage 3,4, Unstageable, DTI, NWPT, and Complex wounds) and New or Established Ostomies: No        Primary Nurse eSignature: Cesilia Duarte RN on 8/15/2023 at 11:13 PM      Co-Signer eSignature: Electronically signed by Ross Martinez RN on 8/16/23 at 1:11 AM CDT

## 2023-08-16 NOTE — PROGRESS NOTES
Hospitalist Progress Note    Patient:  Kayla Gaspar  YOB: 1960  Date of Service: 8/16/2023  MRN: [id number]   Acct: [de-identified]   Primary Care Physician: Pao Talamantes MD  Advance Directive: Full Code  Admit Date: 8/5/2023       Hospital Day: 11  Referring Provider: Karin Laureano DO    Patient Seen, Chart, Consults, Notes, Labs, Radiology studies reviewed. Subjective: Kayla Gaspar is a 70 y.o. male  whom we are following for COVID-19 pneumonia, hypoxemic respiratory failure, critical care myopathy. Since I last saw him, he has now been successfully extubated. He is extremely weak and has developed critical care myopathy. He will require a great deal of rehabilitation. He is receiving tube feeds for nutrition. Allergies:  Patient has no known allergies.     Medicines:  Current Facility-Administered Medications   Medication Dose Route Frequency Provider Last Rate Last Admin    guaiFENesin (ROBITUSSIN) 100 MG/5ML liquid 200 mg  200 mg Per NG tube Q4H PRN Smooth Sarmiento PA-C   200 mg at 08/16/23 1600    ipratropium 0.5 mg-albuterol 2.5 mg (DUONEB) nebulizer solution 1 Dose  1 Dose Inhalation Q4H WA RT Smooth Sarmiento PA-C   1 Dose at 08/16/23 1817    hydrALAZINE (APRESOLINE) injection 10 mg  10 mg IntraVENous Q6H PRN Zhane Rose MD   10 mg at 08/13/23 1736    dextrose 5 % solution   IntraVENous Continuous Lacrjorge Laureano,  mL/hr at 08/16/23 1303 Rate Change at 08/16/23 1303    famotidine (PEPCID) tablet 20 mg  20 mg Orogastric BID Lacretia Sridevi, DO   20 mg at 08/16/23 0933    enoxaparin (LOVENOX) injection 40 mg  40 mg SubCUTAneous BID Zhane Rose MD   40 mg at 08/16/23 0932    sodium chloride flush 0.9 % injection 10 mL  10 mL IntraVENous PRN Lacretia Sridevi, DO   10 mL at 08/13/23 0747    0.9 % sodium chloride infusion   IntraVENous PRN Lacretibrandon Laureano, DO        ondansetron (ZOFRAN-ODT) disintegrating tablet 4 mg  4 mg Oral Q8H PRN Rosas Mcghee No rigidity. Skin:     General: Skin is warm and dry. Neurological:      Mental Status: He is oriented to person, place, and time. Psychiatric:         Mood and Affect: Mood normal.         Thought Content: Thought content normal.       Labs:  BMP:   Recent Labs     08/14/23  0620 08/14/23  1608 08/15/23  0635 08/15/23  1407 08/15/23  2224 08/16/23  0229 08/16/23  1011   *   < > 154*   < > 153* 154* 151*   K 4.2  --  4.7  --   --  4.5  --    *  --  117*  --   --  118*  --    CO2 33*  --  30*  --   --  28  --    BUN 52*  --  49*  --   --  46*  --    CREATININE 1.2  --  1.1  --   --  1.1  --    CALCIUM 9.6  --  9.4  --   --  9.3  --     < > = values in this interval not displayed. CBC:   Recent Labs     08/14/23  0620 08/15/23  0121 08/16/23  0229   WBC 10.1 10.6 13.6*   HGB 15.1 15.0 15.1   HCT 49.8 49.0 47.8   MCV 99.6* 99.6* 97.4*    209 195     LIVER PROFILE: No results for input(s): AST, ALT, LIPASE, BILIDIR, BILITOT, ALKPHOS in the last 72 hours. Invalid input(s): AMYLASE,  ALB  PT/INR: No results for input(s): PROTIME, INR in the last 72 hours. APTT: No results for input(s): APTT in the last 72 hours. BNP:  No results for input(s): BNP in the last 72 hours. Ionized Calcium:No results for input(s): IONCA in the last 72 hours. Magnesium:  Recent Labs     08/14/23  0620 08/15/23  0635 08/16/23  0229   MG 2.2 2.3 2.4     Phosphorus:No results for input(s): PHOS in the last 72 hours. HgbA1C: No results for input(s): LABA1C in the last 72 hours. Hepatic: No results for input(s): ALKPHOS, ALT, AST, PROT, BILITOT, BILIDIR, LABALBU in the last 72 hours. Lactic Acid: No results for input(s): LACTA in the last 72 hours. Troponin: No results for input(s): CKTOTAL, CKMB, TROPONINT in the last 72 hours. ABGs: No results for input(s): PH, PCO2, PO2, HCO3, O2SAT in the last 72 hours. CRP:  No results for input(s): CRP in the last 72 hours.   Sed Rate:  No results for input(s): SEDRATE

## 2023-08-16 NOTE — PROGRESS NOTES
Dopphoff placement verified by X-Ray (see imaging). Residual volume checked prior to initiating feed. 10 cc of brown gastric contents obtained. Tube feeding started as ordered at 2030. Jevity 1.5 infusing at 25 cc/hr into right nostril. External measurement verified at 63 cm.    Electronically signed by Aryan Fuentes RN on 8/15/2023 at 8:33 PM

## 2023-08-16 NOTE — PLAN OF CARE
Problem: Nutrition Deficit:  Goal: Optimize nutritional status  Outcome: Progressing  Flowsheets (Taken 8/16/2023 1238)  Nutrient intake appropriate for improving, restoring, or maintaining nutritional needs:   Assess nutritional status and recommend course of action   Recommend, monitor, and adjust tube feedings and TPN/PPN based on assessed needs

## 2023-08-16 NOTE — PROGRESS NOTES
Comprehensive Nutrition Assessment    Type and Reason for Visit:  Reassess    Nutrition Recommendations/Plan:   Continue to work toward goal rate for tf. Monitor labs     Malnutrition Assessment:  Malnutrition Status: At risk for malnutrition (Comment) (08/16/23 1251)    Context:  Acute Illness     Findings of the 6 clinical characteristics of malnutrition:  Energy Intake:  Mild decrease in energy intake (Comment)  Weight Loss:  No significant weight loss     Body Fat Loss:  No significant body fat loss     Muscle Mass Loss:  No significant muscle mass loss    Fluid Accumulation:  No significant fluid accumulation Extremities   Strength:  Not Performed    Nutrition Assessment:    TF has been started. Currently is being restarted after therapy. Current rate is 40ml Jevity 1.5 with 50ml free water flush hourly. Residuals 10-40ml. No new wt    Nutrition Related Findings:    Gllucose 125. Accuchek's 116--on Decadron. Na+ 154 Wound Type: None       Current Nutrition Intake & Therapies:    Average Meal Intake: NPO  Average Supplements Intake: NPO  Current Tube Feeding (TF) Orders:  Feeding Route: Nasogastric  Formula: Standard with Fiber  Schedule: Continuous  Feeding Regimen: Jevity 1.5 @ 72ml/hr goal rate  Additives/Modulars: None  Water Flushes: 50ml hourly  Current TF & Flush Orders Provides: Jevity 1.5 @ 40ml/hr with 50ml free water [id number] kcals with 61g protein, 207g CHO and 729ml free water from formula and 1200ml free water from flush  Goal TF & Flush Orders Provides: Jevity 1.5 @ 72ml/hr with 50ml free water flush = 2592 kcals with 110g protein, 372g CHO and 1313ml free water from formula and 1200ml free water from flush    Anthropometric Measures:  Height: 6' 4\" (193 cm)  Ideal Body Weight (IBW): 202 lbs (92 kg)    Admission Body Weight: 259 lb (117.5 kg)  Current Body Weight: 237 lb 1.6 oz (107.5 kg), 117.4 % IBW.  Weight Source: Bed Scale  Current BMI (kg/m2): 28.9  Usual Body Weight: 259 lb 9.6

## 2023-08-16 NOTE — PROGRESS NOTES
Facility/Department: [facility] ONCOLOGY UNIT  SWALLOW THERAPY  SPEECH THERAPY     NAME: Liyah Larson  : 1960  MRN: [id number]    ADMISSION DATE: 2023  ADMITTING DIAGNOSIS: has CAD (coronary artery disease); Hypertension; Tobacco abuse; Presence of stent in right coronary artery; Nicotine abuse; Old inferior wall myocardial infarction; Acute appendicitis with perforation, generalized peritonitis, and gangrene, without abscess; Acute appendicitis with localized peritonitis; Acute respiratory failure with hypoxemia ([address]); Palliative care patient; Pneumonia due to COVID-19 virus; Seizure ([address]); Altered mental status; HILARY (acute kidney injury) ([address]); and Acute respiratory failure with hypoxia ([address]) on their problem list.    Date of Treat: 2023  Evaluating Therapist: LILIAN Hand    Current Diet level:  NPO    Pain:  Pain Assessment  Pain Assessment: None - Denies Pain  Pain Level: 0    Reason for Referral  Liyah Larson was referred for a bedside swallow evaluation to assess the efficiency of his swallow function, identify signs and symptoms of aspiration and make recommendations regarding safe dietary consistencies, effective compensatory strategies, and safe eating environment. Impression  Re-assessed patient's swallowing function. Patient continued to exhibit absent swallows with no laryngeal elevation noted and just laryngeal rocking observed. Patient still exhibited degree of airway detection with delayed coughs noted following probable penetration/aspiration of PO trials (ice chips presented by SLP). At this time, would continue NPO status with alternative means of nutrition. Recommend frequent oral care as patient with decreased management of secretions (scopolamine?). If patient receives mouth care prior to intake, okay for swabs regular water and ice chips for comfort. Will continue to follow.      Treatment Plan  Requires SLP Intervention: Yes     Recommended Diet and Intervention  Diet trials.)  Oral Phase - Comment: Observed oral cavity with copious amount of sticky secretions noted throughout the mouth. Completed oral care prior to presentation of PO trials. Pharyngeal Phase  Laryngeal Elevation: Ice chips (Patient exhibited absent swallows with no laryngeal elevation noted and just laryngeal rocking observed.)  Delayed Cough: Ice chips  Pharyngeal Phase - Comment: Patient continued to exhibit absent swallows with no laryngeal elevation noted and just laryngeal rocking observed. Patient still exhibited degree of airway detection with delayed coughs noted following probable penetration/aspiration of PO trials (ice chips presented by SLP). At this time, would continue NPO status with alternative means of nutrition. Recommend frequent oral care as patient with decreased management of secretions (scopolamine?). If patient receives mouth care prior to intake, okay for swabs regular water and ice chips for comfort. Will continue to follow.     Electronically signed by LILIAN Churchill on 8/16/2023 at 11:51 AM

## 2023-08-16 NOTE — PROGRESS NOTES
Facility/Department: [facility] ONCOLOGY UNIT  Daily Treatment Note  NAME: Liyah Larson  : 1960  MRN: [id number]    Date of Service: 2023    Discharge Recommendations:  Patient would benefit from continued therapy after discharge       Assessment   Assessment: The patient continues to have significant impairment in ADL and mobility, but has made significant gains in just the last few days. Strongly recommend continued skilled therapy intervention  Treatment Diagnosis: Acute respiratory failure  Activity Tolerance  Activity Tolerance: Patient Tolerated treatment well         Patient Diagnosis(es): The primary encounter diagnosis was Pneumonia due to COVID-19 virus. Diagnoses of Altered mental status, unspecified altered mental status type and Seizure [facility]) were also pertinent to this visit. has a past medical history of Acute MI ([address]), Arthritis, CAD (coronary artery disease), Hyperlipidemia, Hypertension, Hypertension, Kidney stone, Nephrolithiasis, Nicotine abuse, Old inferior wall myocardial infarction, Palliative care patient, Presence of stent in right coronary artery, and Tobacco abuse.   has a past surgical history that includes Diagnostic Cardiac Cath Lab Procedure ([id number]); Lithotripsy; Coronary artery bypass graft (2019); and laparoscopic appendectomy (N/A, 2023). Restrictions  Restrictions/Precautions  Restrictions/Precautions: Fall Risk, Seizure, Aspiration Risk  Required Braces or Orthoses?: No  Position Activity Restriction  Other position/activity restrictions: Note in chart from infection control that Droplet precautions can be discontinued.   HOB 30 degrees due to NG tube    Subjective   General  Chart Reviewed: Yes  Patient assessed for rehabilitation services?: Yes  Additional Pertinent Hx: Covid positive  Family / Caregiver Present: No  Diagnosis: Acute respiratory failure with hypoxia  Pre Treatment Pain Screening  Pain at present: 0  Scale Used: Numeric

## 2023-08-16 NOTE — PLAN OF CARE
2153 by Ludwig Jones RN  Outcome: Progressing  8/15/2023 1615 by Allyson Gilliland RN  Outcome: Progressing     Problem: Respiratory - Adult  Goal: Achieves optimal ventilation and oxygenation  8/15/2023 2153 by Ludwig Jones RN  Outcome: Progressing  8/15/2023 1615 by Allyson Gilliland RN  Outcome: Progressing     Problem: Cardiovascular - Adult  Goal: Maintains optimal cardiac output and hemodynamic stability  8/15/2023 2153 by Ludwig Jones RN  Outcome: Progressing  8/15/2023 1615 by Allyson Gilliland RN  Outcome: Progressing     Problem: Cardiovascular - Adult  Goal: Absence of cardiac dysrhythmias or at baseline  8/15/2023 2153 by Ludwig Jones RN  Outcome: Progressing  8/15/2023 1615 by Allyson Gilliland RN  Outcome: Progressing     Problem: Skin/Tissue Integrity - Adult  Goal: Skin integrity remains intact  8/15/2023 2153 by Ludwig Jones RN  Outcome: Progressing  8/15/2023 1615 by Allyson Gilliland RN  Outcome: Progressing     Problem: Skin/Tissue Integrity - Adult  Goal: Oral mucous membranes remain intact  Outcome: Progressing     Problem: Musculoskeletal - Adult  Goal: Return mobility to safest level of function  Outcome: Progressing     Problem: Musculoskeletal - Adult  Goal: Return ADL status to a safe level of function  Outcome: Progressing     Problem: Gastrointestinal - Adult  Goal: Maintains or returns to baseline bowel function  Outcome: Progressing     Problem: Genitourinary - Adult  Goal: Absence of urinary retention  Outcome: Progressing     Problem: Genitourinary - Adult  Goal: Urinary catheter remains patent  8/15/2023 2153 by Ludwig Jones RN  Outcome: Progressing  8/15/2023 1615 by Allyson Gilliland RN  Outcome: Progressing     Problem: Infection - Adult  Goal: Absence of infection at discharge  Outcome: Progressing     Problem: Metabolic/Fluid and Electrolytes - Adult  Goal: Electrolytes maintained within normal limits  Outcome: Progressing     Problem: Hematologic - Adult  Goal: Maintains hematologic stability  Outcome: Progressing     Problem: Coping  Goal: Pt/Family able to verbalize concerns and demonstrate effective coping strategies  Description: INTERVENTIONS:  1. Assist patient/family to identify coping skills, available support systems and cultural and spiritual values  2. Provide emotional support, including active listening and acknowledgement of concerns of patient and caregivers  3. Reduce environmental stimuli, as able  4. Instruct patient/family in relaxation techniques, as appropriate  5.  Assess for spiritual pain/suffering and initiate Spiritual Care, Psychosocial Clinical Specialist consults as needed  Outcome: Progressing

## 2023-08-16 NOTE — PROGRESS NOTES
08/16/23 1000   Subjective   Subjective Pt. in bed agrees to work with therapy. NSG. present. Pt. has NG Tube for feedings. On 4L NC with continuous pulse OX . SATS 94% at rest maintained in 90's thru [place]. SATS 92% post TX. (Pt. speaks softly has no C/O pain. External catherter connected to suction. Suction on to clear secretions from throat. YAN Francis suctioned secretions prior to [place].)   Pain Assessment   Pain Assessment None - Denies Pain   Bed Mobility Training   Bed Mobility Training Yes   Supine to Sit Maximum assistance   Sit to Supine Moderate assistance;Assist X2   Balance   Sitting Impaired  (EOB x 15-25 minutes MOD/MIN assist.  Balance improved as TX progressed.)   Standing With support   Transfer Training   Transfer Training Yes   Sit to Stand   (First attempt bed at lowest elevation, pt.managed to clear hips off bed. Second attempt bed elevated 2-4 inches MAX x 2 to stand c pt holding BR's pt. stood about 40seconds then returned to supine.)   Stand to Sit Moderate assistance;Assist X2   PT Exercises   A/AROM Exercises BL LE EX. in sitting x 10 reps. Patient Education   Education Given To Patient   Education Provided Role of Therapy;Plan of Care; Fall Prevention Strategies;Transfer Training   Education Provided Comments use of call light, staff A   Education Method Demonstration;Verbal   Education Outcome Verbalized understanding;Demonstrated understanding;Continued education needed   Other Specialty Interventions   Other Treatments/Modalities BTB alarm set. Dolly Renteria OT working with pt. when this therapist left.    PT Plan of Care   Wednesday X       Electronically signed by Olesya Cardozo PTA on 8/16/2023 at 10:23 AM

## 2023-08-16 NOTE — CARE COORDINATION
[facility]  Notification of Admission Decision      [] Patient has been accepted for admit to [facility] on :       Please write discharge orders and summary prior to discharge. [] Patient acceptance to Rehab pending the following :    [] Eval in progress       [x] Patient determined to be ineligible for services at [facility] because : too low level with therapy. Feel he will need a longer stay than Rehab can provide to be able to return home. We recommend you consider : SNF       Thank you for your referral, we appreciate you. If you have any questions, please feel   free to contact me at [phone].    Electronically Signed by Kirill Hartman Admissions Coordinator 8/16/2023 11:11 AM

## 2023-08-17 ENCOUNTER — HOSPITAL ENCOUNTER (OUTPATIENT)
Facility: HOSPITAL | Age: 63
Discharge: HOME | End: 2023-09-04
Attending: INTERNAL MEDICINE | Admitting: INTERNAL MEDICINE
Payer: MEDICARE

## 2023-08-17 VITALS
TEMPERATURE: 99 F | SYSTOLIC BLOOD PRESSURE: 144 MMHG | BODY MASS INDEX: 28.87 KG/M2 | HEIGHT: 76 IN | OXYGEN SATURATION: 96 % | HEART RATE: 75 BPM | WEIGHT: 237.1 LBS | DIASTOLIC BLOOD PRESSURE: 77 MMHG | RESPIRATION RATE: 18 BRPM

## 2023-08-17 DIAGNOSIS — R47.1 DYSARTHRIA: ICD-10-CM

## 2023-08-17 DIAGNOSIS — R13.10 DYSPHAGIA, UNSPECIFIED TYPE: Primary | ICD-10-CM

## 2023-08-17 DIAGNOSIS — R49.0 DYSPHONIA: ICD-10-CM

## 2023-08-17 DIAGNOSIS — J38.00 VOCAL CORD WEAKNESS: ICD-10-CM

## 2023-08-17 DIAGNOSIS — R49.0 HOARSENESS: ICD-10-CM

## 2023-08-17 DIAGNOSIS — R13.12 DYSPHAGIA, OROPHARYNGEAL: ICD-10-CM

## 2023-08-17 PROBLEM — U07.1 PNEUMONIA DUE TO COVID-19 VIRUS: Status: RESOLVED | Noted: 2023-08-07 | Resolved: 2023-08-17

## 2023-08-17 PROBLEM — J96.01 ACUTE RESPIRATORY FAILURE WITH HYPOXIA (HCC): Status: RESOLVED | Noted: 2023-08-07 | Resolved: 2023-08-17

## 2023-08-17 PROBLEM — J12.82 PNEUMONIA DUE TO COVID-19 VIRUS: Status: RESOLVED | Noted: 2023-08-07 | Resolved: 2023-08-17

## 2023-08-17 PROBLEM — R41.82 ALTERED MENTAL STATUS: Status: RESOLVED | Noted: 2023-08-07 | Resolved: 2023-08-17

## 2023-08-17 PROBLEM — J96.01 ACUTE RESPIRATORY FAILURE WITH HYPOXEMIA (HCC): Status: RESOLVED | Noted: 2023-08-05 | Resolved: 2023-08-17

## 2023-08-17 PROBLEM — R56.9 SEIZURE (HCC): Status: RESOLVED | Noted: 2023-08-07 | Resolved: 2023-08-17

## 2023-08-17 PROBLEM — Z51.5 PALLIATIVE CARE PATIENT: Status: RESOLVED | Noted: 2023-08-07 | Resolved: 2023-08-17

## 2023-08-17 PROBLEM — N17.9 AKI (ACUTE KIDNEY INJURY) (HCC): Status: RESOLVED | Noted: 2023-08-07 | Resolved: 2023-08-17

## 2023-08-17 LAB
ALBUMIN SERPL-MCNC: 2.6 G/DL (ref 3.5–5.2)
ALP SERPL-CCNC: 151 U/L (ref 40–130)
ALT SERPL-CCNC: 77 U/L (ref 5–41)
ANION GAP SERPL CALCULATED.3IONS-SCNC: 7 MMOL/L (ref 7–19)
AST SERPL-CCNC: 49 U/L (ref 5–40)
BASOPHILS # BLD: 0 K/UL (ref 0–0.2)
BASOPHILS NFR BLD: 0.2 % (ref 0–1)
BILIRUB SERPL-MCNC: 0.7 MG/DL (ref 0.2–1.2)
BUN SERPL-MCNC: 40 MG/DL (ref 8–23)
CALCIUM SERPL-MCNC: 9.1 MG/DL (ref 8.8–10.2)
CHLORIDE SERPL-SCNC: 111 MMOL/L (ref 98–111)
CO2 SERPL-SCNC: 29 MMOL/L (ref 22–29)
CREAT SERPL-MCNC: 1.1 MG/DL (ref 0.5–1.2)
EOSINOPHIL # BLD: 0.3 K/UL (ref 0–0.6)
EOSINOPHIL NFR BLD: 2.4 % (ref 0–5)
ERYTHROCYTE [DISTWIDTH] IN BLOOD BY AUTOMATED COUNT: 13.1 % (ref 11.5–14.5)
GLUCOSE BLD-MCNC: 110 MG/DL (ref 70–99)
GLUCOSE BLD-MCNC: 122 MG/DL (ref 70–99)
GLUCOSE BLD-MCNC: 123 MG/DL (ref 70–99)
GLUCOSE BLD-MCNC: 141 MG/DL (ref 70–99)
GLUCOSE SERPL-MCNC: 134 MG/DL (ref 74–109)
HCT VFR BLD AUTO: 48.1 % (ref 42–52)
HGB BLD-MCNC: 14.7 G/DL (ref 14–18)
IMM GRANULOCYTES # BLD: 0.1 K/UL
LYMPHOCYTES # BLD: 2 K/UL (ref 1.1–4.5)
LYMPHOCYTES NFR BLD: 17.8 % (ref 20–40)
MAGNESIUM SERPL-MCNC: 2.2 MG/DL (ref 1.6–2.4)
MCH RBC QN AUTO: 30.7 PG (ref 27–31)
MCHC RBC AUTO-ENTMCNC: 30.6 G/DL (ref 33–37)
MCV RBC AUTO: 100.4 FL (ref 80–94)
MONOCYTES # BLD: 1 K/UL (ref 0–0.9)
MONOCYTES NFR BLD: 9.1 % (ref 0–10)
NEUTROPHILS # BLD: 7.7 K/UL (ref 1.5–7.5)
NEUTS SEG NFR BLD: 70 % (ref 50–65)
PERFORMED ON: ABNORMAL
PHOSPHATE SERPL-MCNC: 3 MG/DL (ref 2.5–4.5)
PLATELET # BLD AUTO: 181 K/UL (ref 130–400)
PMV BLD AUTO: 11.9 FL (ref 9.4–12.4)
POTASSIUM SERPL-SCNC: 4.2 MMOL/L (ref 3.5–5)
PROT SERPL-MCNC: 6.2 G/DL (ref 6.6–8.7)
RBC # BLD AUTO: 4.79 M/UL (ref 4.7–6.1)
SODIUM SERPL-SCNC: 147 MMOL/L (ref 136–145)
WBC # BLD AUTO: 11 K/UL (ref 4.8–10.8)

## 2023-08-17 PROCEDURE — 92526 ORAL FUNCTION THERAPY: CPT

## 2023-08-17 PROCEDURE — 94761 N-INVAS EAR/PLS OXIMETRY MLT: CPT

## 2023-08-17 PROCEDURE — 2700000000 HC OXYGEN THERAPY PER DAY

## 2023-08-17 PROCEDURE — 80053 COMPREHEN METABOLIC PANEL: CPT

## 2023-08-17 PROCEDURE — 6370000000 HC RX 637 (ALT 250 FOR IP): Performed by: INTERNAL MEDICINE

## 2023-08-17 PROCEDURE — 92507 TX SP LANG VOICE COMM INDIV: CPT

## 2023-08-17 PROCEDURE — 97530 THERAPEUTIC ACTIVITIES: CPT

## 2023-08-17 PROCEDURE — 85025 COMPLETE CBC W/AUTO DIFF WBC: CPT

## 2023-08-17 PROCEDURE — 2500000003 HC RX 250 WO HCPCS: Performed by: PHYSICIAN ASSISTANT

## 2023-08-17 PROCEDURE — 94640 AIRWAY INHALATION TREATMENT: CPT

## 2023-08-17 PROCEDURE — 6360000002 HC RX W HCPCS: Performed by: PSYCHIATRY & NEUROLOGY

## 2023-08-17 PROCEDURE — 83735 ASSAY OF MAGNESIUM: CPT

## 2023-08-17 PROCEDURE — 6360000002 HC RX W HCPCS: Performed by: INTERNAL MEDICINE

## 2023-08-17 PROCEDURE — 2500000003 HC RX 250 WO HCPCS: Performed by: HOSPITALIST

## 2023-08-17 PROCEDURE — 25010000002 ENOXAPARIN PER 10 MG: Performed by: INTERNAL MEDICINE

## 2023-08-17 PROCEDURE — 36415 COLL VENOUS BLD VENIPUNCTURE: CPT

## 2023-08-17 PROCEDURE — 99232 SBSQ HOSP IP/OBS MODERATE 35: CPT | Performed by: PSYCHIATRY & NEUROLOGY

## 2023-08-17 PROCEDURE — 6370000000 HC RX 637 (ALT 250 FOR IP): Performed by: PHYSICIAN ASSISTANT

## 2023-08-17 PROCEDURE — 25010000002 LEVETIRACETAM IN NACL 0.82% 500 MG/100ML SOLUTION: Performed by: INTERNAL MEDICINE

## 2023-08-17 PROCEDURE — 82962 GLUCOSE BLOOD TEST: CPT

## 2023-08-17 PROCEDURE — 97535 SELF CARE MNGMENT TRAINING: CPT

## 2023-08-17 PROCEDURE — 84100 ASSAY OF PHOSPHORUS: CPT

## 2023-08-17 PROCEDURE — 6370000000 HC RX 637 (ALT 250 FOR IP): Performed by: HOSPITALIST

## 2023-08-17 RX ORDER — ATORVASTATIN CALCIUM 40 MG/1
40 TABLET, FILM COATED ORAL NIGHTLY
Status: DISCONTINUED | OUTPATIENT
Start: 2023-08-17 | End: 2023-08-22

## 2023-08-17 RX ORDER — ZINC SULFATE 50(220)MG
220 CAPSULE ORAL DAILY
Status: DISCONTINUED | OUTPATIENT
Start: 2023-08-18 | End: 2023-08-22

## 2023-08-17 RX ORDER — CHLORHEXIDINE GLUCONATE 0.12 MG/ML
15 RINSE ORAL EVERY 12 HOURS SCHEDULED
Status: DISCONTINUED | OUTPATIENT
Start: 2023-08-17 | End: 2023-08-22

## 2023-08-17 RX ORDER — IPRATROPIUM BROMIDE AND ALBUTEROL SULFATE 2.5; .5 MG/3ML; MG/3ML
3 SOLUTION RESPIRATORY (INHALATION)
Status: DISCONTINUED | OUTPATIENT
Start: 2023-08-17 | End: 2023-08-26

## 2023-08-17 RX ORDER — DEXTROSE MONOHYDRATE 50 MG/ML
100 INJECTION, SOLUTION INTRAVENOUS CONTINUOUS
Status: DISCONTINUED | OUTPATIENT
Start: 2023-08-17 | End: 2023-08-21

## 2023-08-17 RX ORDER — SODIUM CHLORIDE 0.9 % (FLUSH) 0.9 %
10 SYRINGE (ML) INJECTION AS NEEDED
Status: DISCONTINUED | OUTPATIENT
Start: 2023-08-17 | End: 2023-09-04 | Stop reason: HOSPADM

## 2023-08-17 RX ORDER — ONDANSETRON 4 MG/1
4 TABLET, FILM COATED ORAL EVERY 6 HOURS PRN
Status: DISCONTINUED | OUTPATIENT
Start: 2023-08-17 | End: 2023-08-22

## 2023-08-17 RX ORDER — ASPIRIN 325 MG
325 TABLET ORAL DAILY
Status: DISCONTINUED | OUTPATIENT
Start: 2023-08-18 | End: 2023-08-22

## 2023-08-17 RX ORDER — ENOXAPARIN SODIUM 100 MG/ML
40 INJECTION SUBCUTANEOUS EVERY 12 HOURS SCHEDULED
Status: DISCONTINUED | OUTPATIENT
Start: 2023-08-17 | End: 2023-08-18

## 2023-08-17 RX ORDER — GUAIFENESIN 200 MG/10ML
200 LIQUID ORAL EVERY 4 HOURS PRN
Status: DISCONTINUED | OUTPATIENT
Start: 2023-08-17 | End: 2023-08-22

## 2023-08-17 RX ORDER — FAMOTIDINE 20 MG/1
20 TABLET, FILM COATED ORAL DAILY
Status: DISCONTINUED | OUTPATIENT
Start: 2023-08-18 | End: 2023-08-22

## 2023-08-17 RX ORDER — LEVETIRACETAM 5 MG/ML
500 INJECTION INTRAVASCULAR EVERY 12 HOURS SCHEDULED
Status: DISCONTINUED | OUTPATIENT
Start: 2023-08-17 | End: 2023-08-21

## 2023-08-17 RX ORDER — GLYCOPYRROLATE 0.2 MG/ML
0.2 INJECTION INTRAMUSCULAR; INTRAVENOUS EVERY 6 HOURS PRN
Status: DISCONTINUED | OUTPATIENT
Start: 2023-08-17 | End: 2023-09-04 | Stop reason: HOSPADM

## 2023-08-17 RX ORDER — ALBUTEROL SULFATE 2.5 MG/3ML
2.5 SOLUTION RESPIRATORY (INHALATION) EVERY 4 HOURS PRN
Status: DISCONTINUED | OUTPATIENT
Start: 2023-08-17 | End: 2023-09-04 | Stop reason: HOSPADM

## 2023-08-17 RX ORDER — ACETAMINOPHEN 160 MG/5ML
650 SOLUTION ORAL EVERY 4 HOURS PRN
Status: DISCONTINUED | OUTPATIENT
Start: 2023-08-17 | End: 2023-08-22

## 2023-08-17 RX ORDER — LANOLIN ALCOHOL/MO/W.PET/CERES
9 CREAM (GRAM) TOPICAL NIGHTLY
Status: DISCONTINUED | OUTPATIENT
Start: 2023-08-17 | End: 2023-08-22

## 2023-08-17 RX ORDER — ACETAMINOPHEN 650 MG/1
650 SUPPOSITORY RECTAL EVERY 4 HOURS PRN
Status: DISCONTINUED | OUTPATIENT
Start: 2023-08-17 | End: 2023-08-22

## 2023-08-17 RX ORDER — SODIUM CHLORIDE 0.9 % (FLUSH) 0.9 %
10 SYRINGE (ML) INJECTION EVERY 12 HOURS SCHEDULED
Status: DISCONTINUED | OUTPATIENT
Start: 2023-08-17 | End: 2023-09-04 | Stop reason: HOSPADM

## 2023-08-17 RX ORDER — HYDRALAZINE HYDROCHLORIDE 20 MG/ML
10 INJECTION INTRAMUSCULAR; INTRAVENOUS EVERY 6 HOURS PRN
Status: DISCONTINUED | OUTPATIENT
Start: 2023-08-17 | End: 2023-09-04 | Stop reason: HOSPADM

## 2023-08-17 RX ORDER — ONDANSETRON 2 MG/ML
4 INJECTION INTRAMUSCULAR; INTRAVENOUS EVERY 6 HOURS PRN
Status: DISCONTINUED | OUTPATIENT
Start: 2023-08-17 | End: 2023-08-22

## 2023-08-17 RX ADMIN — FAMOTIDINE 20 MG: 20 TABLET ORAL at 10:14

## 2023-08-17 RX ADMIN — IPRATROPIUM BROMIDE AND ALBUTEROL SULFATE 1 DOSE: 2.5; .5 SOLUTION RESPIRATORY (INHALATION) at 14:30

## 2023-08-17 RX ADMIN — GLYCOPYRROLATE 0.2 MG: 0.2 INJECTION INTRAMUSCULAR; INTRAVENOUS at 05:00

## 2023-08-17 RX ADMIN — LEVETIRACETAM 500 MG: 100 INJECTION INTRAVENOUS at 16:12

## 2023-08-17 RX ADMIN — ASPIRIN 325 MG: 325 TABLET ORAL at 10:14

## 2023-08-17 RX ADMIN — IPRATROPIUM BROMIDE AND ALBUTEROL SULFATE 1 DOSE: 2.5; .5 SOLUTION RESPIRATORY (INHALATION) at 05:07

## 2023-08-17 RX ADMIN — ENOXAPARIN SODIUM 40 MG: 100 INJECTION SUBCUTANEOUS at 10:14

## 2023-08-17 RX ADMIN — GUAIFENESIN 200 MG: 100 SOLUTION ORAL at 10:16

## 2023-08-17 RX ADMIN — FINASTERIDE 10 MG: 5 TABLET, FILM COATED ORAL at 10:13

## 2023-08-17 RX ADMIN — ZINC SULFATE 220 MG (50 MG) CAPSULE 50 MG: CAPSULE at 10:14

## 2023-08-17 RX ADMIN — IPRATROPIUM BROMIDE AND ALBUTEROL SULFATE 1 DOSE: 2.5; .5 SOLUTION RESPIRATORY (INHALATION) at 07:05

## 2023-08-17 RX ADMIN — GLYCOPYRROLATE 0.2 MG: 0.2 INJECTION INTRAMUSCULAR; INTRAVENOUS at 16:12

## 2023-08-17 RX ADMIN — ALBUTEROL SULFATE 2 PUFF: 90 AEROSOL, METERED RESPIRATORY (INHALATION) at 00:14

## 2023-08-17 RX ADMIN — LEVETIRACETAM 500 MG: 100 INJECTION INTRAVENOUS at 03:50

## 2023-08-17 RX ADMIN — IPRATROPIUM BROMIDE AND ALBUTEROL SULFATE 1 DOSE: 2.5; .5 SOLUTION RESPIRATORY (INHALATION) at 18:09

## 2023-08-17 RX ADMIN — IPRATROPIUM BROMIDE AND ALBUTEROL SULFATE 1 DOSE: 2.5; .5 SOLUTION RESPIRATORY (INHALATION) at 10:36

## 2023-08-17 NOTE — CARE COORDINATION
Sw spoke to Pt spouse who was agreeable to SNF referral to [facility]: [address] pending referral as well. Electronically signed by RONALD Mcmillan on 8/17/2023 at 9:16 AM    [facility]: [phone]  Is unable to meet Pt needs at this time. Electronically signed by RONALD Mcmillan on 8/17/2023 at 10:44 AM    Pt has been accepted at [facility] today. Pt can d/c once medical stable. Pre cert has been approved for [facility].  Please fax to [phone] nurses phone is [phone]  Electronically signed by RONALD Mcmillan on 8/17/2023 at 12:56 PM

## 2023-08-17 NOTE — DISCHARGE SUMMARY
Discharge Summary    Rubin Storey  :  1960  MRN:  [id number]    Admit date:  2023  Discharge date: 2023     Admitting Physician:  Pearl Wolf DO    Advance Directive: Full Code    Consults: neurology and palliative care    Primary Care Physician:  Delores Moura MD    Discharge Diagnoses:  Principal Problem (Resolved):    Acute respiratory failure with hypoxemia [facility])  Active Problems:    * No active hospital problems. *  Resolved Problems:    Palliative care patient    Pneumonia due to COVID-19 virus    Seizure ([address])    Altered mental status    HILARY (acute kidney injury) ([address])    Acute respiratory failure with hypoxia ([address])      Significant Diagnostic Studies:   CT HEAD WO CONTRAST    Result Date: 2023  EXAM:  CT HEAD WITHOUT CONTRAST 2023. SAGITTAL AND CORONAL REFORMATTED IMAGES OBTAINED  HISTORY:  Altered mental status  COMPARISON:  2023  FINDINGS: There is no evidence of intracranial hemorrhage. The midline is maintained. There is no hydrocephalus. Generalized atrophy. Chronic small vessel ischemic change. No cerebellar tonsillar ectopia. Evaluation of the calvarium shows no fracture. The mastoid air cells are normally pneumatized. No acute intracranial abnormality. All CT scans are performed using dose optimization techniques as appropriate to the performed exam and include at least one of the following: Automated exposure control, adjustment of the mA and/or kV according to size, and the use of iterative reconstruction technique. ______________________________________ Electronically signed by: Ryan Tao M.D. Date:     2023 Time:    08:43     CT CERVICAL SPINE WO CONTRAST    Result Date: 2023  EXAM:  CT CERVICAL SPINE WITHOUT CONTRAST. HISTORY:  Altered mental status. Fall. COVID-19 positive. COMPARISON:  CT head 2023. TECHNIQUE:  Noncontrast CT images of the cervical spine were obtained.   Axial reconstructions with sagittal and Cardiomediastinum: Enlarged cardiomediastinal silhouette. Mild aortic calcifications. Bones/Soft Tissues: No acute osseous abnormality. No soft tissue abnormality. Upper Abdomen: Within normal limits. Endotracheal tube in appropriate position. ______________________________________ Electronically signed by: Yaa Pardo D.O. Date:     08/05/2023 Time:    09:47     XR CHEST PORTABLE    Result Date: 8/5/2023  EXAM:  CHEST, SINGLE VIEW  HISTORY:  Altered mental status. COMPARISON:  Chest radiograph 08/04/2023. FINDINGS:  Mild enlargement cardiac silhouette is unchanged. Mediasternotomy wires. Mixed interstitial and alveolar opacities throughout the lungs bilaterally. Increased in extent with more focal airspace opacities as seen in the lower right lung through the infrahilar region. This could be related to infection/pneumonia or asymmetric pulmonary edema. No pleural effusion or pneumothorax. Increasing mixed interstitial and alveolar opacities, more pronounced on the right side as detailed above which could represent worsening infection/pneumonia or asymmetric pulmonary edema. Stable cardiomegaly and postoperative change of the chest.   ______________________________________ Electronically signed by: Dipesh Hawkins M.D. Date:     08/05/2023 Time:    08:24       CBC:   Recent Labs     08/15/23  0121 08/16/23  0229 08/17/23  0256   WBC 10.6 13.6* 11.0*   HGB 15.0 15.1 14.7    195 181     BMP:    Recent Labs     08/15/23  0635 08/15/23  1407 08/16/23  0229 08/16/23  1011 08/17/23  0256   *   < > 154* 151* 147*   K 4.7  --  4.5  --  4.2   *  --  118*  --  111   CO2 30*  --  28  --  29   BUN 49*  --  46*  --  40*   CREATININE 1.1  --  1.1  --  1.1   GLUCOSE 104  --  125*  --  134*    < > = values in this interval not displayed. INR: No results for input(s): INR in the last 72 hours. Lipids: No results for input(s): CHOL, HDL in the last 72 hours.     Invalid input(s):

## 2023-08-17 NOTE — PLAN OF CARE
Chapo Severino LPN  Outcome: Progressing  8/16/2023 2205 by Moses Polanco RN  Outcome: Progressing     Problem: Cardiovascular - Adult  Goal: Maintains optimal cardiac output and hemodynamic stability  8/17/2023 1127 by Angela Marinelli LPN  Outcome: Progressing  8/16/2023 2205 by Moses Polanco RN  Outcome: Progressing  Goal: Absence of cardiac dysrhythmias or at baseline  8/17/2023 1127 by Angela Marinelli LPN  Outcome: Progressing  8/16/2023 2205 by Moses Polanco RN  Outcome: Progressing     Problem: Skin/Tissue Integrity - Adult  Goal: Skin integrity remains intact  8/17/2023 1127 by Angela Marinelli LPN  Outcome: Progressing  8/16/2023 2205 by Moses Polanco RN  Outcome: Progressing  Goal: Oral mucous membranes remain intact  8/17/2023 1127 by Angela Marinelli LPN  Outcome: Progressing  8/16/2023 2205 by Moses Polanco RN  Outcome: Progressing     Problem: Musculoskeletal - Adult  Goal: Return mobility to safest level of function  8/17/2023 1127 by Angela Marinelli LPN  Outcome: Progressing  8/16/2023 2205 by Moses Polanco RN  Outcome: Progressing  Goal: Return ADL status to a safe level of function  8/17/2023 1127 by Angela Marinelli LPN  Outcome: Progressing  8/16/2023 2205 by Moses Polanco RN  Outcome: Progressing     Problem: Gastrointestinal - Adult  Goal: Maintains or returns to baseline bowel function  8/17/2023 1127 by Angela Marinelli LPN  Outcome: Progressing  8/16/2023 2205 by Moses Polanco RN  Outcome: Progressing     Problem: Genitourinary - Adult  Goal: Absence of urinary retention  8/17/2023 1127 by Angela Marinelli LPN  Outcome: Progressing  8/16/2023 2205 by Moses Polanco RN  Outcome: Progressing  Goal: Urinary catheter remains patent  8/17/2023 1127 by Angela Marinelli LPN  Outcome: Progressing  8/16/2023 2205 by Moses Polanco RN  Outcome: Progressing     Problem: Infection - Adult  Goal: Absence of infection at discharge  8/17/2023 1127 by Angela Marinelli LPN  Outcome: Progressing  8/16/2023 2205 by Moses Polanco, YAN  Outcome: Progressing     Problem: Metabolic/Fluid and Electrolytes - Adult  Goal: Electrolytes maintained within normal limits  8/17/2023 1127 by Kayla Casanova LPN  Outcome: Progressing  8/16/2023 2205 by Isa Castrejon RN  Outcome: Progressing     Problem: Hematologic - Adult  Goal: Maintains hematologic stability  8/17/2023 1127 by Kayla Casanova LPN  Outcome: Progressing  8/16/2023 2205 by Isa Castrejon RN  Outcome: Progressing     Problem: Coping  Goal: Pt/Family able to verbalize concerns and demonstrate effective coping strategies  Description: INTERVENTIONS:  1. Assist patient/family to identify coping skills, available support systems and cultural and spiritual values  2. Provide emotional support, including active listening and acknowledgement of concerns of patient and caregivers  3. Reduce environmental stimuli, as able  4. Instruct patient/family in relaxation techniques, as appropriate  5.  Assess for spiritual pain/suffering and initiate Spiritual Care, Psychosocial Clinical Specialist consults as needed  8/17/2023 1127 by Kayla Casanova LPN  Outcome: Progressing  8/16/2023 2205 by Isa Castrejon RN  Outcome: Progressing

## 2023-08-17 NOTE — PROGRESS NOTES
Facility/Department: [facility] ONCOLOGY UNIT  Daily Treatment Note  NAME: Dixie Khoury  : 1960  MRN: [id number]    Date of Service: 2023    Discharge Recommendations:  Patient would benefit from continued therapy after discharge       Assessment   Assessment: OOB to chair using [place] which provided the patient with multiple options for beginning level movement and increased participation in ADL from the recliner. Treatment Diagnosis: Acute respiratory failure  Activity Tolerance  Activity Tolerance: Patient Tolerated treatment well         Patient Diagnosis(es): The primary encounter diagnosis was Pneumonia due to COVID-19 virus. Diagnoses of Altered mental status, unspecified altered mental status type and Seizure [facility]) were also pertinent to this visit. has a past medical history of Acute MI ([address]), Arthritis, CAD (coronary artery disease), Hyperlipidemia, Hypertension, Hypertension, Kidney stone, Nephrolithiasis, Nicotine abuse, Old inferior wall myocardial infarction, Palliative care patient, Presence of stent in right coronary artery, and Tobacco abuse.   has a past surgical history that includes Diagnostic Cardiac Cath Lab Procedure (467284); Lithotripsy; Coronary artery bypass graft (2019); and laparoscopic appendectomy (N/A, 2023). Restrictions  Restrictions/Precautions  Restrictions/Precautions: Fall Risk, Seizure, Aspiration Risk  Required Braces or Orthoses?: No  Position Activity Restriction  Other position/activity restrictions: Note in chart from infection control that Droplet plus precautions can be discontinued, but continues to be on droplet precautions due to productive cough.   HOB 30 degrees due to NG tube    Subjective   General  Chart Reviewed: Yes  Patient assessed for rehabilitation services?: Yes  Additional Pertinent Hx: Covid positive  Family / Caregiver Present: Yes  Diagnosis: Acute respiratory failure with hypoxia  Pre Treatment Pain Screening  Pain at present: 0  Scale Used: Numeric Score  Intervention List: Patient able to continue with treatment  Comments / Details: No pain reported with activity     Objective    ADL  Grooming:  (Able to wash face today after set up. Can comb the front portion of his hair, limited to comb the back due to proximal weakness and decrease bilateral hand coordination)        Toilet Transfers  Toilet Transfers Comments: Recommend Maxi Move. Transfers  Transfer Comments: Used Maxi Move to transfer patient. He performed multiple AAROM/AROM movements with arms and legs, performed partial rolls with min A using bed rails, performed long sit forward using bilateral rails with CGA during the process of donning sling. The patient incorporated active UE grasp and head/neck control during the transfer. Once sitting in recliner, able to lean forward and sit with fair upright posture. Plan   Occupational Therapy Plan  Times Per Week: 3-5x/week  Times Per Day: Once a day    Goals (On-Going)   Short Term Goals  Time Frame for Short Term Goals: 1 week  Short Term Goal 1: Demonstrate 60 deg AROM B shld flex against gravity  Short Term Goal 2: Sit on EOB 3 minutes with CGA for sitting balance  Short Term Goal 3: ModA of 2 transfer to [address] [place] [address] or recliner  Short Term Goal 4: Perform UB ADL with min A after set up  Short Term Goal 5:  Independent with beginning level therapeutic exercise program  Long Term Goals  Long Term Goal 1: Return to PLOF       Tx Time  Minutes  [facility], OT/L  Electronically signed by Marnie Cancer OT/L on 8/17/2023 at 1:10 PM.

## 2023-08-17 NOTE — PROGRESS NOTES
Facility/Department: [facility] ONCOLOGY UNIT  SWALLOW THERAPY  SPEECH THERAPY     NAME: Mic Velazquez  : 1960  MRN: [id number]    ADMISSION DATE: 2023  ADMITTING DIAGNOSIS: has CAD (coronary artery disease); Hypertension; Tobacco abuse; Presence of stent in right coronary artery; Nicotine abuse; Old inferior wall myocardial infarction; Acute appendicitis with perforation, generalized peritonitis, and gangrene, without abscess; Acute appendicitis with localized peritonitis; Acute respiratory failure with hypoxemia ([address]); Palliative care patient; Pneumonia due to COVID-19 virus; Seizure ([address]); Altered mental status; HILARY (acute kidney injury) ([address]); and Acute respiratory failure with hypoxia ([address]) on their problem list.    Date of Treat: 2023  Evaluating Therapist: LILIAN Sheldon    Current Diet level:  NPO    Pain:  Pain Assessment  Pain Assessment: None - Denies Pain  Pain Level: 0    Reason for Referral  Mic Velazquez was referred for a bedside swallow evaluation to assess the efficiency of his swallow function, identify signs and symptoms of aspiration and make recommendations regarding safe dietary consistencies, effective compensatory strategies, and safe eating environment. Impression  Re-assessed patient's swallowing function. Patient continued to exhibit absent swallows with no laryngeal elevation noted and just laryngeal rocking observed. Patient still exhibited degree of airway detection with delayed coughs noted following probable penetration/aspiration of PO trials (ice chips presented by SLP). At this time, would continue NPO status with alternative means of nutrition. Recommend frequent oral care as patient with decreased management of secretions. If patient receives mouth care prior to intake, okay for swabs regular water and ice chips for comfort. Will continue to follow.      Treatment Plan  Requires SLP Intervention: Yes     Recommended Diet and Intervention  Diet Solids

## 2023-08-17 NOTE — PLAN OF CARE
Problem: Discharge Planning  Goal: Discharge to home or other facility with appropriate resources  Outcome: Progressing     Problem: Safety - Adult  Goal: Free from fall injury  Outcome: Progressing     Problem: ABCDS Injury Assessment  Goal: Absence of physical injury  Outcome: Progressing     Problem: Skin/Tissue Integrity  Goal: Absence of new skin breakdown  Description: 1. Monitor for areas of redness and/or skin breakdown  2. Assess vascular access sites hourly  3. Every 4-6 hours minimum:  Change oxygen saturation probe site  4. Every 4-6 hours:  If on nasal continuous positive airway pressure, respiratory therapy assess nares and determine need for appliance change or resting period.   Outcome: Progressing     Problem: Nutrition Deficit:  Goal: Optimize nutritional status  8/16/2023 2205 by Merlyn Luna RN  Outcome: Progressing  8/16/2023 1258 by Linda Keene MS, RD, LD  Outcome: Progressing  Flowsheets (Taken 8/16/2023 1238)  Nutrient intake appropriate for improving, restoring, or maintaining nutritional needs:   Assess nutritional status and recommend course of action   Recommend, monitor, and adjust tube feedings and TPN/PPN based on assessed needs     Problem: Pain  Goal: Verbalizes/displays adequate comfort level or baseline comfort level  Outcome: Progressing     Problem: Neurosensory - Adult  Goal: Achieves stable or improved neurological status  Outcome: Progressing     Problem: Neurosensory - Adult  Goal: Absence of seizures  Outcome: Progressing     Problem: Respiratory - Adult  Goal: Achieves optimal ventilation and oxygenation  Outcome: Progressing     Problem: Cardiovascular - Adult  Goal: Maintains optimal cardiac output and hemodynamic stability  Outcome: Progressing     Problem: Cardiovascular - Adult  Goal: Absence of cardiac dysrhythmias or at baseline  Outcome: Progressing     Problem: Skin/Tissue Integrity - Adult  Goal: Skin integrity remains intact  Outcome: Progressing

## 2023-08-17 NOTE — PROGRESS NOTES
Facility/Department: [facility] ONCOLOGY UNIT  Daily Treatment Note  NAME: Leesa Caro  : 1960  MRN: [id number]    Date of Service: 2023    Discharge Recommendations:  Patient would benefit from continued therapy after discharge       Assessment   Assessment: Assisted patient back to bed  Treatment Diagnosis: Acute respiratory failure  Activity Tolerance  Activity Tolerance: Patient Tolerated treatment well         Patient Diagnosis(es): The primary encounter diagnosis was Pneumonia due to COVID-19 virus. Diagnoses of Altered mental status, unspecified altered mental status type and Seizure [facility]) were also pertinent to this visit. has a past medical history of Acute MI ([address] Arthritis, CAD (coronary artery disease), Hyperlipidemia, Hypertension, Hypertension, Kidney stone, Nephrolithiasis, Nicotine abuse, Old inferior wall myocardial infarction, Palliative care patient, Presence of stent in right coronary artery, and Tobacco abuse.   has a past surgical history that includes Diagnostic Cardiac Cath Lab Procedure (673833); Lithotripsy; Coronary artery bypass graft (2019); and laparoscopic appendectomy (N/A, 2023). Restrictions  Restrictions/Precautions  Restrictions/Precautions: Fall Risk, Seizure, Aspiration Risk  Required Braces or Orthoses?: No  Position Activity Restriction  Other position/activity restrictions: Note in chart from infection control that Droplet plus precautions can be discontinued, but continues to be on droplet precautions due to productive cough.   HOB 30 degrees due to NG tube    Subjective   General  Chart Reviewed: Yes  Patient assessed for rehabilitation services?: Yes  Additional Pertinent Hx: Covid positive  Family / Caregiver Present: Yes  Diagnosis: Acute respiratory failure with hypoxia  Pre Treatment Pain Screening  Pain at present: 0  Scale Used: Numeric Score  Intervention List: Patient able to continue with treatment  Comments / Details: No pain reported

## 2023-08-17 NOTE — PROGRESS NOTES
Physical Therapy  Name: Mic Velazquez  MRN:  [id number]  Date of service:  8/17/2023    Patient up in chair and ready to return to bed. Patient able to shift weight to reposition himself in chair once feet on floor. Patient was returned to bed with mechanical lift and assist of 2. Patient was able to use lower bed rails to pull self forward to remove lift pad. Patient repositioned for comfort, all need in reach, wife in room and bed alarmed set. Physical Therapy will continue to follow and progress as able.     Electronically signed by Shabbir Perez PTA on 8/17/2023 at 11:47 AM

## 2023-08-17 NOTE — CARE COORDINATION
Second IMM given to patient and explained with patient verbalizing understanding. All questions and concerns addressed     Signed letter placed in pt soft chart  Patient declined waiting 4 hr period prior to discharge. Electronically signed by RONALD Mack on 8/17/2023 at 1:04 PM       08/17/23 1303   IMM Letter   IMM Letter given to Patient/Family/Significant other/Guardian/POA/by: michael presented Pt with second IMM letter to Pt spouse Evelyn Elder.    IMM Letter date given: 08/17/23   IMM Letter time given: 1109

## 2023-08-18 ENCOUNTER — APPOINTMENT (OUTPATIENT)
Dept: GENERAL RADIOLOGY | Facility: HOSPITAL | Age: 63
End: 2023-08-18
Payer: COMMERCIAL

## 2023-08-18 LAB
ALBUMIN SERPL-MCNC: 2.5 G/DL (ref 3.5–5.2)
ALBUMIN/GLOB SERPL: 0.8 G/DL
ALP SERPL-CCNC: 132 U/L (ref 39–117)
ALT SERPL W P-5'-P-CCNC: 62 U/L (ref 1–41)
ANION GAP SERPL CALCULATED.3IONS-SCNC: 6 MMOL/L (ref 5–15)
AST SERPL-CCNC: 37 U/L (ref 1–40)
BASOPHILS # BLD AUTO: 0.03 10*3/MM3 (ref 0–0.2)
BASOPHILS NFR BLD AUTO: 0.2 % (ref 0–1.5)
BILIRUB SERPL-MCNC: 0.9 MG/DL (ref 0–1.2)
BUN SERPL-MCNC: 28 MG/DL (ref 8–23)
BUN/CREAT SERPL: 31.1 (ref 7–25)
CALCIUM SPEC-SCNC: 8.6 MG/DL (ref 8.6–10.5)
CHLORIDE SERPL-SCNC: 108 MMOL/L (ref 98–107)
CO2 SERPL-SCNC: 26 MMOL/L (ref 22–29)
CREAT SERPL-MCNC: 0.9 MG/DL (ref 0.76–1.27)
DEPRECATED RDW RBC AUTO: 46 FL (ref 37–54)
EGFRCR SERPLBLD CKD-EPI 2021: 94.8 ML/MIN/1.73
EOSINOPHIL # BLD AUTO: 0.3 10*3/MM3 (ref 0–0.4)
EOSINOPHIL NFR BLD AUTO: 2.4 % (ref 0.3–6.2)
ERYTHROCYTE [DISTWIDTH] IN BLOOD BY AUTOMATED COUNT: 13 % (ref 12.3–15.4)
GLOBULIN UR ELPH-MCNC: 3.2 GM/DL
GLUCOSE SERPL-MCNC: 97 MG/DL (ref 65–99)
HCT VFR BLD AUTO: 44.9 % (ref 37.5–51)
HGB BLD-MCNC: 13.9 G/DL (ref 13–17.7)
IMM GRANULOCYTES # BLD AUTO: 0.1 10*3/MM3 (ref 0–0.05)
IMM GRANULOCYTES NFR BLD AUTO: 0.8 % (ref 0–0.5)
LYMPHOCYTES # BLD AUTO: 2.39 10*3/MM3 (ref 0.7–3.1)
LYMPHOCYTES NFR BLD AUTO: 18.8 % (ref 19.6–45.3)
MCH RBC QN AUTO: 29.6 PG (ref 26.6–33)
MCHC RBC AUTO-ENTMCNC: 31 G/DL (ref 31.5–35.7)
MCV RBC AUTO: 95.5 FL (ref 79–97)
MONOCYTES # BLD AUTO: 1.14 10*3/MM3 (ref 0.1–0.9)
MONOCYTES NFR BLD AUTO: 9 % (ref 5–12)
NEUTROPHILS NFR BLD AUTO: 68.8 % (ref 42.7–76)
NEUTROPHILS NFR BLD AUTO: 8.76 10*3/MM3 (ref 1.7–7)
NRBC BLD AUTO-RTO: 0 /100 WBC (ref 0–0.2)
PLATELET # BLD AUTO: 177 10*3/MM3 (ref 140–450)
PMV BLD AUTO: 11.7 FL (ref 6–12)
POTASSIUM SERPL-SCNC: 4.1 MMOL/L (ref 3.5–5.2)
PREALB SERPL-MCNC: 19.2 MG/DL (ref 20–40)
PROT SERPL-MCNC: 5.7 G/DL (ref 6–8.5)
RBC # BLD AUTO: 4.7 10*6/MM3 (ref 4.14–5.8)
SODIUM SERPL-SCNC: 140 MMOL/L (ref 136–145)
WBC NRBC COR # BLD: 12.72 10*3/MM3 (ref 3.4–10.8)

## 2023-08-18 PROCEDURE — 92610 EVALUATE SWALLOWING FUNCTION: CPT

## 2023-08-18 PROCEDURE — 97162 PT EVAL MOD COMPLEX 30 MIN: CPT | Performed by: PHYSICAL THERAPIST

## 2023-08-18 PROCEDURE — 80053 COMPREHEN METABOLIC PANEL: CPT | Performed by: INTERNAL MEDICINE

## 2023-08-18 PROCEDURE — 25010000002 ENOXAPARIN PER 10 MG: Performed by: INTERNAL MEDICINE

## 2023-08-18 PROCEDURE — 25010000002 LEVETIRACETAM IN NACL 0.82% 500 MG/100ML SOLUTION: Performed by: INTERNAL MEDICINE

## 2023-08-18 PROCEDURE — 84134 ASSAY OF PREALBUMIN: CPT | Performed by: INTERNAL MEDICINE

## 2023-08-18 PROCEDURE — 74018 RADEX ABDOMEN 1 VIEW: CPT

## 2023-08-18 PROCEDURE — 97166 OT EVAL MOD COMPLEX 45 MIN: CPT | Performed by: OCCUPATIONAL THERAPIST

## 2023-08-18 PROCEDURE — 85025 COMPLETE CBC W/AUTO DIFF WBC: CPT | Performed by: INTERNAL MEDICINE

## 2023-08-18 RX ORDER — ENOXAPARIN SODIUM 100 MG/ML
40 INJECTION SUBCUTANEOUS DAILY
Status: DISCONTINUED | OUTPATIENT
Start: 2023-08-19 | End: 2023-09-04 | Stop reason: HOSPADM

## 2023-08-18 NOTE — CONSULTS
Adult Nutrition  Assessment/PES    Patient Name:  Chaitanya Hair  YOB: 1960  MRN: [id number]  Admit Date:  8/17/2023    Assessment Date:  8/18/2023    Comments:  Modifying free water flush to 55 mL/hr auto flush via pump.      Reason for Assessment       Row Name 08/18/23 1045          Reason for Assessment    Reason For Assessment TF/PN;physician consult;per organizational policy;other (see comments)  LTACH admission     Diagnosis renal disease;pulmonary disease;infection/sepsis     Identified At Risk by Screening Criteria tube feeding or parenteral nutrition;difficulty chewing/swallowing                    Nutrition/Diet History       Row Name 08/18/23 1046          Nutrition/Diet History    Typical Intake (Food/Fluid/EN/PN) Pt without admit wt; 237.1lb on admission face sheet from [facility]. No enteral access. NC. SLP recommending NPO. Resuming enteral nutrition support when DHT placed and verified. BM yesterday. Pt spouse denied food allergies and UBW 245lb.     Food Intolerance(s) None per pt spouse     Enteral Nutrition Regimen Previous Jevity 1.5 with final goal rate 72 mL/hr with free water 150 mL/hr.     Factors Affecting Nutritional Intake chewing difficulties;difficulty/impaired swallowing;respiratory difficulty/therapies;enteral/parenteral device(s);restricted diet                    Labs/Tests/Procedures/Meds       Row Name 08/18/23 1047          Labs/Procedures/Meds    Lab Results Reviewed reviewed     Lab Results Comments BUN, LFTs        Diagnostic Tests/Procedures    Diagnostic Test/Procedure Reviewed reviewed     Diagnostic Test/Procedures Comments Replacing DHT 8/18        Medications    Pertinent Medications Reviewed reviewed     Pertinent Medications Comments See MAR                    Physical Findings       Row Name 08/18/23 1047          Physical Findings    Overall Physical Appearance NC, BM 8/17, no edema, no enteral access.                    Estimated/Assessed Needs -  Anthropometrics       Row Name 08/18/23 1047          Anthropometrics    Weight for Calculation 108 kg (237 lb)     Additional Documentation Usual Body Weight (UBW) (Group)        Usual Body Weight (UBW)    Usual Body Weight 111 kg (245 lb)        Estimated/Assessed Needs    Additional Documentation Fluid Requirements (Group);KCAL/KG (Group);Protein Requirements (Group)        KCAL/KG    KCAL/KG 25 Kcal/Kg (kcal);20 Kcal/Kg (kcal)     20 Kcal/Kg (kcal) 2150.04     25 Kcal/Kg (kcal) 2687.55        Protein Requirements    Weight Used For Protein Calculations 108 kg (238 lb 1.6 oz)     Est Protein Requirement Amount (gms/kg) 0.9 gm protein     Estimated Protein Requirements (gms/day) 97.2        Fluid Requirements    Fluid Requirements (mL/day) 2419     Estimated Fluid Requirement Method other (see comments)  average of kcal range; 1mL/kcal     RDA Method (mL) 2419                    Nutrition Prescription Ordered       Row Name 08/18/23 1048          Nutrition Prescription PO    Current PO Diet NPO        Nutrition Prescription EN    Product Isosource 1.5 (Jevity 1.5)     TF Delivery Method Continuous     Continuous TF Goal Rate (mL/hr) 72 mL/hr     Continuous TF Current Rate (mL/hr) 0 mL/hr     Continuous TF Goal Volume (mL) 1584 mL     Water flush (mL)  150 mL     Water Flush Frequency Per hour                    Evaluation of Received Nutrient/Fluid Intake       Row Name 08/18/23 1048          Nutrient/Fluid Evaluation    Number of Days Evaluated Other (comment)  admission < 24 hours, no enteral access        Calories Evaluation    Total Calorie Target (kcal) 2419  average of kcal range        Protein Evaluation    Total Protein Target (g) 97        Fluid Intake Evaluation    Total Fluid Target (mL) 2419                       Problem/Interventions:   Problem 1       Row Name 08/18/23 1049          Nutrition Diagnoses Problem 1    Problem 1 Needs Alternate     Etiology (related to) Factors Affecting Nutrition      Oral Chewing Difficulty;Swallowing Difficulty     Signs/Symptoms (evidenced by) NPO;SLP/Swallow eval     Swallow eval status Done     Type of SLP Evaluation Bedside                          Intervention Goal       Row Name 08/18/23 1050          Intervention Goal    General Nutrition support treatment;Reduce/improve symptoms;Meet nutritional needs for age/condition;Disease management/therapy     TF/PN Establish TF tolerance;Maintain TF/PN;Tolerate TF at goal;Deliver (%) goal;Deliver estimated need (%)     Deliver % of Goal 75 %     Deliver % of Estimated Need 75 %     Weight No significant weight loss                    Nutrition Intervention       Row Name 08/18/23 1050          Nutrition Intervention    RD/Tech Action Care plan reviewd;Follow Tx progress;Recommend/ordered     Recommended/Ordered EN                    Nutrition Prescription       Row Name 08/18/23 1050          Nutrition Prescription PO    PO Prescription Other (comment)  per SLP recommendations        Nutrition Prescription EN    Enteral Prescription Enteral begin/change;Enteral to supply     Enteral Route --  DHT if able     Product Isosource 1.5 conor     TF Delivery Method Continuous     Water flush (mL)  55 mL     Water Flush Frequency Per hour     New EN Prescription Ordered? Yes        EN to Supply    TF Free H2O (mL) 1210 mL     Total Free H2O (mL/day) 2420 mL/day                    Education/Evaluation       Row Name 08/18/23 1051          Education    Education No discharge needs identified at this time        Monitor/Evaluation    Monitor Per protocol                     Electronically signed by:  Lucita Diallo RDN, [place]  08/18/23 10:51 CDT

## 2023-08-18 NOTE — H&P
"James is a 44 year old who is being evaluated via a billable telephone visit.      What phone number would you like to be contacted at? [phone]  [facility]    Assessment & Plan       ICD-10-CM    1. ADHD (attention deficit hyperactivity disorder), inattentive type  F90.0 amphetamine-dextroamphetamine (ADDERALL XR) 20 MG 24 hr capsule          Adderall XR renewed, no changes. His ADHD is under good control. Follow up OV in 6 months.       Prescription drug management         BMI:   Estimated body mass index is 30.39 kg/m  as calculated from the following:    Height as of 4/25/22: 1.92 m (6' 3.59\").    Weight as of 4/25/22: 112 kg (247 lb).       Return in about 6 months (around 5/15/2023) for your annual physical, ADHD follow up, with Susan, in person.    Susan Whittington PA-C  [facility] SHANELL Ramirez is a 44 year old, presenting for the following health issues:  Recheck Medication      HPI     Medication Followup of amphetamine-dextroamphetamine (ADDERALL XR) 20 MG 24 hr capsule    Taking Medication as prescribed: yes    Side Effects:  None    Medication Helping Symptoms:  yes    Please answer the questions below, rating yourself on each of the criteria shown using the scale on the right side of the page. As you answer each question, place an X in the box that best describes how you have felt and conducted yourself over the past 6 months.     Never Rarely Sometimes Often Very Often   1 How often do you have trouble wrapping up the final details of a project once the challenging parts have been done?  x      2 How often do you have difficulty getting things in order when you have to do a task that requires organization?   x     3 How often do you have problems remembering appointments or obligations?  x      4 When you have a task that requires a lot of thought, how often do you avoid or delay getting started?  x      5 How often do you fidget " [facility]  REED Pimentel APRN          Internal Medicine History and Physical      Name: Chaitanya Hair  MRN: [id number]     Acct: [id number]  Room: 9/    Admit Date: 8/17/2023  PCP: Og Campos MD    Chief Complaint:     Shortness of breath, weakness    History Obtained From:     chart review and the patient    History of Present Illness:      Chaitanya Hair is a  63 y.o.  male who presents with need for continued oxygen weaning and rehabilitation efforts following recent acute care stay. The patient had been in his usual state of health when he developed increased shortness of breath. He presented to ER  on 8/4/23 with his complaints and was evaluated and discharged to home. Of note, he was found positive for COVID 19 5 days prior on July 31. He returned to ER the following day with progression of symptoms now including increased confusion, diarrhea, fever, and fatigue with questionable seizure activity. He was found to be significantly hypoxic on intake and requiring emergent intubation with mechanical ventilation. CXR showed significant worsening from prior imaging. He was treated with IV antibiotics and IV steroids. Patient was evaluated by neurology and placed on prophylactic treatment with keppra pending workup for possible seizure activity. He was sedated with propofol and required pressor support. He developed fluid volume overload and was aggressive diuresed resulting in declining renal function. Patient stabilized and tolerated sedation weaning and spontaneous breathing trials and was extubated to nasal cannula on 8/13. Patient was evaluated by SLP and found to be at high risk for aspiration and patient was recommended to continued NPO with DHT for nutrition support with AMONs. Due to continued weakness, need for nutrition support and aggressive rehabilitation efforts, the patient transferred to our facility.     Past Medical History:     Past Medical  History:   Diagnosis Date    Abnormal stress echo 3/25/19-[facility]    Noted For Myocardial Ischemia    Acute MI     Atelectasis 04/11/2019    Noted on Chest XR    CAD (coronary artery disease) Since 2009    w/R Stent Placed    Chest pain due to CAD     Nitro PRN    DM (diabetes mellitus)     false positive    Elevated cholesterol     History of chest x-ray 04/11/2019    Suggestive For Atelectasis w/Chronic Inflammatory Scarring Noted    History of EKG 3/25/19-[facility]    Sinus Rhythm w/Nonspecific ST-T Wave Abnormalities    History of nephrolithiasis     History of stress test 2015    WNL    HLD (hyperlipidemia)     Controlled w/Meds    Hypertension     Controlled w/Meds    Kidney stones     S/p Lithotripsy    LAD stenosis 04/11/2019    90% Mid LAD Stenosis; Diffuse Stenosis of LCX--Noted on Cardiac Cath    Myocardial ischemia 03/25/2019    Noted on Echo Stress Test    RCA occlusion 04/11/2019    Mild Diffuse Disease--Noted on Cardiac Cath     Tobacco use     1.5-1 PPD        Past Surgical History:     Past Surgical History:   Procedure Laterality Date    BACK SURGERY  2013    CARDIAC CATHETERIZATION  2009    RCA Stent    CARDIAC CATHETERIZATION  04/11/2019-[facility]    w/L Ventriculogram/Angiography--Dr. Garcia--CAD Involving LAD    COLONOSCOPY  06/2008    CORONARY ANGIOPLASTY WITH STENT PLACEMENT Right 2009    CORONARY ARTERY BYPASS GRAFT N/A 4/16/2019    Procedure: MEDIAN STERNOTOMY, CORONARY ARTERY BYPASS X2 USING LEFT INTERNAL MAMMARY ARTERY GRAFT, PRP, INTRAOP LAURI;  Surgeon: Jesse Perez MD;  Location: [facility];  Service: Cardiothoracic    CYSTOSCOPY W/ LASER LITHOTRIPSY          Medications Prior to Admission:       Prior to Admission medications    Medication Sig Start Date End Date Taking? Authorizing Provider   aspirin 81 MG EC tablet Take 1 tablet by mouth Daily. 4/21/19   Briana Gibson APRN   furosemide (LASIX) 20 MG tablet Take 1 tablet by mouth Daily. 4/21/19    or squirm with your hands or feet when you have to sit down for a long time?  x      6 How often do you feel overly active and compelled to do things, like you were driven by a motor?  x      7 How often do you make careless mistakes when you have to work on a boring or difficult project?  x      8 How often do you have difficulty keeping your attention when you are doing boring or repetitive work?  x      9 How often do you have difficulty concentrating on what people say to you, even when they are speaking to you directly?  x      10 How often do you misplace or have difficulty finding things at home or at work?  x      11 How often are you distracted by activity or noise around you?  x      12 How often do you leave your seat in meetings or other situations in which you are expected to remain seated?  x      13 How often do you feel restless or fidgety?  x      14 How often do you have difficulty unwinding and relaxing when you have time to yourself? x       15 How often do you find yourself talking too much when you are in social situations?  x      16 When you're in a conversation, how often do you find yourself finishing the sentences of the people you are talking to, before they can finish them themselves? x       17 How often do you have difficulty waiting your turn in situations when turn-taking is required? x       18 How often do you interrupt others when they are busy? x           Review of Systems   Constitutional, and psych systems are negative, except as otherwise noted.      Objective           Vitals:  No vitals were obtained today due to virtual visit.    Physical Exam   healthy, alert and no distress  PSYCH: Alert and oriented times 3; coherent speech, normal   rate and volume, able to articulate logical thoughts, able   to abstract reason, no tangential thoughts, no hallucinations   or delusions  His affect is normal and pleasant  RESP: No cough, no audible wheezing, able to talk in full  Briana Gibson APRN   lisinopril (PRINIVIL,ZESTRIL) 5 MG tablet Take 1 tablet by mouth Daily. 5/29/19   Cara Noel APRN   metoprolol tartrate (LOPRESSOR) 50 MG tablet Take 1 tablet by mouth 2 (Two) Times a Day. 4/20/19   Briana Gibson APRN   Multiple Vitamin (MULTI-VITAMIN) tablet Take  by mouth.    Provider, MD Stephany   potassium chloride (MICRO-K) 10 MEQ CR capsule Take 1 capsule by mouth Daily. 4/21/19   Briana Gibson APRN   rosuvastatin (CRESTOR) 20 MG tablet Take 20 mg by mouth.    Provider, Stephany, MD        Allergies:       Patient has no known allergies.    Social History:     Tobacco:    reports that he has been smoking cigarettes. He has a 20.00 pack-year smoking history. He has never used smokeless tobacco.  Alcohol:      reports current alcohol use.  Drug Use:  reports no history of drug use.    Family History:     Family History   Problem Relation Age of Onset    Heart disease Father        Review of Systems:     Review of Systems   Constitutional: Positive for malaise/fatigue. Negative for chills, decreased appetite, weight gain and weight loss.   HENT:  Negative for congestion, ear discharge, hoarse voice and tinnitus.    Eyes:  Negative for blurred vision, discharge, visual disturbance and visual halos.   Cardiovascular:  Positive for dyspnea on exertion. Negative for chest pain, claudication, irregular heartbeat, leg swelling, orthopnea and paroxysmal nocturnal dyspnea.   Respiratory:  Positive for cough, shortness of breath and sputum production. Negative for wheezing.    Endocrine: Negative for cold intolerance, heat intolerance and polyuria.   Hematologic/Lymphatic: Negative for adenopathy. Does not bruise/bleed easily.   Skin:  Negative for dry skin, itching and suspicious lesions.   Musculoskeletal:  Negative for arthritis, back pain, falls, joint pain, muscle weakness and myalgias.   Gastrointestinal:  Positive for dysphagia. Negative for abdominal pain, constipation,  sentences  Remainder of exam unable to be completed due to telephone visits          Phone call duration: 4 minutes     "diarrhea and hematemesis.   Genitourinary:  Negative for bladder incontinence, dysuria and frequency.   Neurological:  Positive for weakness. Negative for aphonia, disturbances in coordination and dizziness.   Psychiatric/Behavioral:  Negative for altered mental status, depression, memory loss and substance abuse. The patient does not have insomnia and is not nervous/anxious.      Code Status:    There are no questions and answers to display.       Physical Exam:     Vitals:  Ht 193 cm (76\")   Wt 108 kg (237 lb)   BMI 28.85 kg/m²   T 98.2 P 89 R 22 /68 Sp02 97% (3 lpm)  Physical Exam  Vitals and nursing note reviewed.   Constitutional:       Appearance: He is well-developed.   HENT:      Head: Normocephalic and atraumatic.      Right Ear: External ear normal.      Left Ear: External ear normal.      Nose: Nose normal.      Mouth/Throat:      Mouth: Mucous membranes are dry.      Pharynx: Oropharynx is clear.   Eyes:      Pupils: Pupils are equal, round, and reactive to light.   Cardiovascular:      Rate and Rhythm: Normal rate and regular rhythm.      Heart sounds: Normal heart sounds.   Pulmonary:      Effort: Pulmonary effort is normal.      Breath sounds: Normal breath sounds.   Abdominal:      General: Bowel sounds are normal.      Palpations: Abdomen is soft.   Musculoskeletal:         General: Normal range of motion.      Cervical back: Normal range of motion and neck supple.      Comments: Generalized weakness   Skin:     General: Skin is warm and dry.   Neurological:      Mental Status: He is alert and oriented to person, place, and time.      Deep Tendon Reflexes: Reflexes are normal and symmetric.   Psychiatric:         Behavior: Behavior normal.             Data:     Lab Results (last 7 days)       Procedure Component Value Units Date/Time    Comprehensive Metabolic Panel [[id number]]  (Abnormal) Collected: 08/18/23 0520    Specimen: Blood Updated: 08/18/23 0609     Glucose 97 mg/dL      BUN 28 " mg/dL      Creatinine 0.90 mg/dL      Sodium 140 mmol/L      Potassium 4.1 mmol/L      Chloride 108 mmol/L      CO2 26.0 mmol/L      Calcium 8.6 mg/dL      Total Protein 5.7 g/dL      Albumin 2.5 g/dL      ALT (SGPT) 62 U/L      AST (SGOT) 37 U/L      Alkaline Phosphatase 132 U/L      Total Bilirubin 0.9 mg/dL      Globulin 3.2 gm/dL      A/G Ratio 0.8 g/dL      BUN/Creatinine Ratio 31.1     Anion Gap 6.0 mmol/L      eGFR 94.8 mL/min/1.73     Narrative:      GFR Normal >60  Chronic Kidney Disease <60  Kidney Failure <15      CBC & Differential [[id number]]  (Abnormal) Collected: 08/18/23 0520    Specimen: Blood Updated: 08/18/23 0544    Narrative:      The following orders were created for panel order CBC & Differential.  Procedure                               Abnormality         Status                     ---------                               -----------         ------                     CBC Auto Differential[[id number]]        Abnormal            Final result                 Please view results for these tests on the individual orders.    CBC Auto Differential [[id number]]  (Abnormal) Collected: 08/18/23 0520    Specimen: Blood Updated: 08/18/23 0544     WBC 12.72 10*3/mm3      RBC 4.70 10*6/mm3      Hemoglobin 13.9 g/dL      Hematocrit 44.9 %      MCV 95.5 fL      MCH 29.6 pg      MCHC 31.0 g/dL      RDW 13.0 %      RDW-SD 46.0 fl      MPV 11.7 fL      Platelets 177 10*3/mm3      Neutrophil % 68.8 %      Lymphocyte % 18.8 %      Monocyte % 9.0 %      Eosinophil % 2.4 %      Basophil % 0.2 %      Immature Grans % 0.8 %      Neutrophils, Absolute 8.76 10*3/mm3      Lymphocytes, Absolute 2.39 10*3/mm3      Monocytes, Absolute 1.14 10*3/mm3      Eosinophils, Absolute 0.30 10*3/mm3      Basophils, Absolute 0.03 10*3/mm3      Immature Grans, Absolute 0.10 10*3/mm3      nRBC 0.0 /100 WBC     Prealbumin [[id number]] Collected: 08/18/23 0520    Specimen: Blood Updated: 08/18/23 0534          XR CHEST PORTABLE    Result  Date: 8/15/2023  Narrative: EXAMINATION: AP CHEST RADIOGRAPH. HISTORY: NGT placement COMPARISON: 08/12/2023 FINDINGS: The ETT has been removed.  A Dobbhoff tube is seen with tip overlying the gastric body.  The right costophrenic angle was clipped from the image.  Left mid lung calcified granuloma noted.No focal consolidation, pleural effusion or pneumothorax is identified. The cardiomediastinal silhouette is mildly prominent. Sternotomy wires are seen.    Impression: No acute cardiopulmonary findings. Dobbhoff tube tip overlies the gastric body. ______________________________________ Electronically signed by: REYNALDO FAGAN M.D. Date:     08/15/2023 Time:    11:39     XR CHEST PORTABLE    Result Date: 8/12/2023  Narrative: EXAM:  SINGLE, PORTABLE AP VIEW(S) CHEST. HISTORY:  Ventilator dependent respiratory failure. COMPARISON:  08/10/2023 TECHNIQUE: Single, portable AP view(s) of the chest. FINDINGS: ET tube is in midline, approximately 6 cm above the nishi.  NG tube is unchanged. Lungs: The lung voulmes are normal.Opacity obscures the left hemidiaphragm.  Evaluation is limited due to the cardiomegaly. There are no suspicious nodules. There is no pneumothorax. Cardiovascular: The heart is enlarged.  The pulmonary vasculature is within normal limits..  The aorta is unremarkable. Fany/Mediastinum: Normal. Osseous structures.  Normal for age.    Impression: 1.  Tubes and lines in good position. 2.  Opacity in left base may be due to atelectasis or pneumonia.  Recommend follow-up. ______________________________________ Electronically signed by: BRUCE FRANKLIN M.D. Date:     08/12/2023 Time:    07:32     XR CHEST PORTABLE    Result Date: 8/10/2023  Narrative: EXAM:  CHEST RADIOGRAPH; SINGLE VIEW HISTORY:  VDRF. COMPARISON:  Chest radiograph 08/09/2023.    Impression: FINDINGS/IMPRESSION: Enteric tube with distal tip below the GE junction below the margins of this radiograph.  Endotracheal tube with distal tip terminating  in unchanged position. Trachea is midline.  Cardiomediastinal silhouette is unchanged.  Sternotomy wires.  Atherosclerosis in the aorta.  Elevation of the left hemidiaphragm.  Unchanged bilateral infiltrates.  Left pleural effusion, unchanged.  No pneumothorax.  Osseous structures are intact. ______________________________________ Electronically signed by: NANDINI BECERRIL M.D. Date:     08/10/2023 Time:    07:17     XR CHEST PORTABLE    Result Date: 8/9/2023  Narrative: EXAM:  CHEST ONE-VIEW HISTORY:  COVID-19 COMPARISON:  AP chest from 08/06/2023 FINDINGS:  The cardiomediastinal silhouette is stable.  Wire sternal sutures are unchanged.  The endotracheal tube and nasogastric tube are unchanged.  The pulmonary vasculature is normal.  Small left pleural effusion is new since the prior.  There are persistent left perihilar alveolar opacities.  There are increased opacities in the left lung base.  Right lung base opacities are unchanged.  No pneumothoraces.    Impression: 1. New small left pleural effusion with subjacent atelectasis and/or infiltrate. 2. Stable left perihilar infiltrate. 3. Stable infiltrate in the right lung base. 4. Endotracheal tube and nasogastric tube in place. . ______________________________________ Electronically signed by: ALEJANDRINA NIEVES D.O. Date:     08/09/2023 Time:    07:39     XR CHEST PORTABLE    Result Date: 8/7/2023  Narrative: EXAM:  FRONTAL VIEW OF THE CHEST. HISTORY:  Altered mental status.  History of COVID-19. COMPARISON:  The 08/05/2023 FINDINGS: Endotracheal tube approximately 5 cm above the nishi.  Orogastric tube traverses the esophagus.  Tip not identified. Cardiac silhouette is normal. Patchy mixed airspace opacification of the lungs right greater than left.  Mild improved aeration left lung base No visible effusion or pneumothorax. No acute osseous abnormality.    Impression: 1. Mild improved aeration right lung base. ______________________________________  Electronically signed by: FAVIOLA WILSON M.D. Date:     08/07/2023 Time:    08:16     XR CHEST PORTABLE    Result Date: 8/5/2023  Narrative: EXAM: CHEST RADIOGRAPH (1 VIEW) TECHNIQUE: Frontal Chest Radiograph. HISTORY: Nasogastric tube placement. COMPARISON: Chest radiograph 08/05/2023 FINDINGS: Lines, Tubes, Devices:  Enteric tube with tip likely within the body of the stomach.  Sideport is not visualized. Postsurgical changes of median sternotomy. Lungs and Pleura:  Unchanged right lower lobe consolidation and bilateral interstitial opacities.  No pneumothorax. Cardiomediastinum: Enlarged cardiomediastinal silhouette.  Mild aortic calcifications. Bones/Soft Tissues: No acute osseous abnormality.  No soft tissue abnormality. Upper Abdomen: Within normal limits.    Impression: Enteric tube with tip likely within the body of the stomach. ______________________________________ Electronically signed by: ZAID CHU D.O. Date:     08/05/2023 Time:    13:47     XR CHEST PORTABLE    Result Date: 8/5/2023  Narrative: EXAM: CHEST RADIOGRAPH (1 VIEW) TECHNIQUE: Frontal Chest Radiograph. HISTORY: Intubation. COMPARISON: Chest radiograph 08/05/2023 FINDINGS: Lines, Tubes, Devices:  Endotracheal tube with tip approximately 5 cm above the nishi.  Postsurgical changes of median sternotomy. Lungs and Pleura:  Low lung volumes.  Unchanged bilateral interstitial opacities, right greater than left.  No pleural effusion.  No pneumothorax. Cardiomediastinum: Enlarged cardiomediastinal silhouette.  Mild aortic calcifications. Bones/Soft Tissues: No acute osseous abnormality.  No soft tissue abnormality. Upper Abdomen: Within normal limits.    Impression: Endotracheal tube in appropriate position. ______________________________________ Electronically signed by: ZAID CHU D.O. Date:     08/05/2023 Time:    09:47     CT CERVICAL SPINE WO CONTRAST    Result Date: 8/5/2023  Narrative: EXAM:  CT CERVICAL SPINE WITHOUT CONTRAST. HISTORY:   Altered mental status.  Fall.  COVID-19 positive. COMPARISON:  CT head 08/04/2023. TECHNIQUE:  Noncontrast CT images of the cervical spine were obtained.  Axial reconstructions with sagittal and coronal reformats were provided.  Technically limited study with decreased anatomic detail from the level of C7 extending inferiorly due to superimposition of the shoulders and decreased beam penetration at that site. FINDINGS: Limited Head: Grossly unremarkable. Craniocervical Junction: Normal alignment. Atlantoaxial Joint: Atlantodental interval is within normal limits aside from hypertrophic degenerative change at that level. Odontoid process is intact. Lateral masses of C1 are normal in alignment.  Anterior and posterior arches of C1 are intact. Alignment: Mild dextrocurvature of the cervical spine.  Cervical alignment is otherwise unremarkable. Post-Surgical Changes/Hardware: None. Bones: No acute fracture.  No chronic compression deformity. Disk Spaces: Marked intervertebral the space narrowing with endplate degenerative change in anterior posterior osteophytes at C5/C6 and C6/C7 with less pronounced degenerative changes of additional levels.  Bilateral facet hypertrophy throughout the spine. Soft Tissues: No prevertebral soft tissue swelling.  Scattered vascular calcifications of the carotid vessels. Limited Chest: Endotracheal tube within the trachea with tip excluded from the field of view.  Filling defects within the adjacent mildly distended esophagus.  Patchy ground-glass opacities throughout the upper lungs bilaterally with regions of interlobular septal thickening which could represent a component of pulmonary edema.  Changes may be related to pneumonia given the clinical history.  Emphysematous changes of the lungs. Segmental analysis is as follows: C2/C3:  No significant central canal narrowing. Facet hypertrophy, more pronounced on the left side with mild left foraminal narrowing. C3/C4:  Small posterior  disc/osteophyte complex without significant central canal narrowing. Facet hypertrophy with minimal foraminal narrowing. C4/C5:  Small posterior disc/osteophyte complex effacing the ventral portion of the thecal sac with central canal measuring 10.5 mm in anterior-posterior dimension. Mild facet hypertrophy with mild left foraminal narrowing. C5/C6:  Posterior disc osteophyte complex results in mild deformation ventral portion of the spinal cord.  Minimal central canal narrowing with central canal measuring 9.5 mm anterior to posterior dimension. Facet hypertrophy with moderate bilateral foraminal narrowing more pronounced on the left side C6/C7:  The posterior disc/osteophyte complex with mild deformation ventral portion of the spinal cord.  Central canal measures 9.5 mm in anterior to posterior dimension. Facet and uncovertebral hypertrophy with marked bilateral foraminal narrowing. C7/T1:  Small posterior disc/osteophyte complex without significant central canal narrowing. Facet and uncovertebral hypertrophy with mild foraminal narrowing    Impression: No acute osseous abnormality involving the cervical spine. Multilevel degenerative disc/joint disease as described with mild central canal narrowing at C5/C6 and C6/C7.  Multilevel foraminal narrowing most severe bilaterally at C6/C7. Mild dextrocurvature of the spine. Partially imaged endotracheal tube. Emphysematous changes of the lungs with question of pneumonia versus pulmonary edema within the lungs. Mild gaseous distension of the esophagus with scattered filling defects. Correlation with MRI of the cervical spine can be considered if clinically warranted. All CT scans are performed using dose optimization techniques as appropriate to the performed exam and include at least one of the following: Automated exposure control, adjustment of the mA and/or kV according to size, and the use of iterative reconstruction technique. ______________________________________  Electronically signed by: REYNALDO BHATT M.D. Date:     08/05/2023 Time:    08:49     CT HEAD WO CONTRAST    Result Date: 8/5/2023  Narrative: EXAM:  CT HEAD WITHOUT CONTRAST 08/05/2023.  SAGITTAL AND CORONAL REFORMATTED IMAGES OBTAINED HISTORY:  Altered mental status COMPARISON:  08/04/2023 FINDINGS: There is no evidence of intracranial hemorrhage.  The midline is maintained.  There is no hydrocephalus.  Generalized atrophy.  Chronic small vessel ischemic change.  No cerebellar tonsillar ectopia.  Evaluation of the calvarium shows no fracture.  The mastoid air cells are normally pneumatized.    Impression: No acute intracranial abnormality. All CT scans are performed using dose optimization techniques as appropriate to the performed exam and include at least one of the following: Automated exposure control, adjustment of the mA and/or kV according to size, and the use of iterative reconstruction technique. ______________________________________ Electronically signed by: ANNA TELLEZ M.D. Date:     08/05/2023 Time:    08:43     XR CHEST PORTABLE    Result Date: 8/5/2023  Narrative: EXAM:  CHEST, SINGLE VIEW HISTORY:  Altered mental status. COMPARISON:  Chest radiograph 08/04/2023. FINDINGS: Mild enlargement cardiac silhouette is unchanged.  Mediasternotomy wires.  Mixed interstitial and alveolar opacities throughout the lungs bilaterally.  Increased in extent with more focal airspace opacities as seen in the lower right lung through the infrahilar region.  This could be related to infection/pneumonia or asymmetric pulmonary edema.  No pleural effusion or pneumothorax.    Impression: Increasing mixed interstitial and alveolar opacities, more pronounced on the right side as detailed above which could represent worsening infection/pneumonia or asymmetric pulmonary edema. Stable cardiomegaly and postoperative change of the chest. ______________________________________ Electronically signed by: REYNALDO BHATT M.D.  Date:     08/05/2023 Time:    08:24     XR CHEST PORTABLE    Result Date: 8/4/2023  Narrative: EXAM:  CHEST FRONTAL VIEW HISTORY:  COVID-19, confusion COMPARISON:  04/11/2019    Impression: FINDINGS / IMPRESSION:  Cardiomegaly, atherosclerotic disease and present.  There is mild vascular congestion and interstitial edema.  No definite consolidated pneumonia is identified. ______________________________________ Electronically signed by: VIVIENNE BRADY M.D. Date:     08/04/2023 Time:    15:48     CT HEAD WO CONTRAST    Result Date: 8/4/2023  Narrative: EXAM:  CT HEAD WITHOUT CONTRAST TECHNIQUE:  Noncontrast CT of the head with multiple reformats. HISTORY:  Weakness and word finding difficulty. COMPARISON:  None. FINDINGS: No acute intracranial hemorrhage.  No CT evidence of acute territorial ischemic infarction. Mild brain volume loss.  Mild scattered nonspecific white matter hypodensities, likely chronic small vessel changes. Empty sella. Atherosclerotic calcification of the carotid siphons. No brain herniation. Patent basilar cisterns. Ventricles are proportional to brain volume. No acute osseous abnormality. Orbits are unremarkable. Mild to moderate mucosal changes in the paranasal sinuses.    Impression: No acute intracranial abnormality. All CT scans are performed using dose optimization techniques as appropriate to the performed exam and include at least one of the following: Automated exposure control, adjustment of the mA and/or kV according to size, and the use of iterative reconstruction technique. ______________________________________ Electronically signed by: SANAZ IZQUIERDO M.D. Date:     08/04/2023 Time:    15:34        Assessment:           * No active hospital problems. *    Past Medical History:   Diagnosis Date    Abnormal stress echo 3/25/19-[facility]    Noted For Myocardial Ischemia    Acute MI     Atelectasis 04/11/2019    Noted on Chest XR    CAD (coronary artery disease) Since 2009    w/R  Stent Placed    Chest pain due to CAD     Nitro PRN    DM (diabetes mellitus)     false positive    Elevated cholesterol     History of chest x-ray 04/11/2019    Suggestive For Atelectasis w/Chronic Inflammatory Scarring Noted    History of EKG 3/25/19-[facility]    Sinus Rhythm w/Nonspecific ST-T Wave Abnormalities    History of nephrolithiasis     History of stress test 2015    WNL    HLD (hyperlipidemia)     Controlled w/Meds    Hypertension     Controlled w/Meds    Kidney stones     S/p Lithotripsy    LAD stenosis 04/11/2019    90% Mid LAD Stenosis; Diffuse Stenosis of LCX--Noted on Cardiac Cath    Myocardial ischemia 03/25/2019    Noted on Echo Stress Test    RCA occlusion 04/11/2019    Mild Diffuse Disease--Noted on Cardiac Cath     Tobacco use     1.5-1 PPD       Plan:     Acute hypoxic respiratory failure  S/p COVID  Dysphagia  History of CAD  Tobacco abuse  HTN  HLD  Continue current treatment. Monitor counts. Increase activity. Labs Monday. Oxygen weaning as tolerated. Continue nutrition support via AMONs. Aggressive therapies as tolerated.       Electronically signed by ALY James on 8/18/2023 at 10:40 CDT     Copy sent to Og Sidhu MD  I have discussed the care of Chaitanya Hair, including pertinent history and exam findings, with the nurse practitioner.    I have seen and examined the patient and the key elements of all parts of the encounter have been performed by me.  I agree with the assessment, plan and orders as documented by ALY Ribeiro, after I modified the exam findings and the plan of treatments and the final version is my approved version of the assessment.        Electronically signed by Nikunj Jane MD on 8/18/2023 at 18:36 CDT

## 2023-08-18 NOTE — PROGRESS NOTES
[facility] Fall Risk Assessment Note    Name: Chaitanya Hair  MRN: [id number]  CSN: [id number]  Admit Date/Time: 8/17/2023  9:25 PM.    Currently ordered medications associated with an increased risk for fall include:    Scheduled Meds:  aspirin, 325 mg, Nasogastric, Daily  levETIRAcetam, 500 mg, Intravenous, Q12H  melatonin, 9 mg, Nasogastric, Nightly    PRN Meds:    glycopyrrolate    guaifenesin    hydrALAZINE    Carley Weeks PharmD  08/18/23 09:51 CDT

## 2023-08-19 PROCEDURE — 25010000002 LEVETIRACETAM IN NACL 0.82% 500 MG/100ML SOLUTION: Performed by: INTERNAL MEDICINE

## 2023-08-19 PROCEDURE — 92526 ORAL FUNCTION THERAPY: CPT

## 2023-08-19 PROCEDURE — 25010000002 ENOXAPARIN PER 10 MG: Performed by: NURSE PRACTITIONER

## 2023-08-19 RX ORDER — ACETYLCYSTEINE 200 MG/ML
2 SOLUTION ORAL; RESPIRATORY (INHALATION)
Status: DISPENSED | OUTPATIENT
Start: 2023-08-19 | End: 2023-08-22

## 2023-08-19 NOTE — PROGRESS NOTES
REED Mcleod APRN        Internal Medicine Progress Note    8/19/2023   06:48 CDT    Name:  Chaitanya Hair  MRN:    [id number]     Acct:     [id number]   Room:  [address] [place] [address] Day: 0     Admit Date: 8/17/2023  9:25 PM  PCP: Og Campos MD    Subjective:     C/C: Shortness of breath, weakness     Interval History: Status:  stayed the same. Pt resting in bed. No family at bedside. O2 sats stable on 5L. Pt NG tube currently out (was kinked per nursing staff).  Pt denies shortness of breath or pain.      Review of Systems   Constitutional: Positive for malaise/fatigue. Negative for chills, decreased appetite and fever.   HENT:  Negative for ear pain, hearing loss and hoarse voice.    Eyes:  Negative for blurred vision, discharge and pain.   Cardiovascular:  Positive for dyspnea on exertion. Negative for chest pain and claudication.   Respiratory:  Positive for cough and shortness of breath. Negative for hemoptysis.    Endocrine: Negative for cold intolerance, heat intolerance, polydipsia, polyphagia and polyuria.   Hematologic/Lymphatic: Negative for adenopathy and bleeding problem.   Skin:  Negative for color change, itching, nail changes and rash.   Musculoskeletal:  Positive for muscle weakness. Negative for arthritis and back pain.   Gastrointestinal:  Negative for abdominal pain, diarrhea, nausea and vomiting.   Genitourinary:  Negative for flank pain, hematuria and urgency.   Neurological:  Positive for weakness. Negative for dizziness and headaches.   Psychiatric/Behavioral:  Negative for altered mental status, depression, substance abuse, suicidal ideas and thoughts of violence. The patient is not nervous/anxious.    Allergic/Immunologic: Negative for environmental allergies, HIV exposure, hives and persistent infections.       Medications:     Allergies: No Known Allergies    Current Meds:   Current Facility-Administered Medications:     acetaminophen (TYLENOL)  suppository 650 mg, 650 mg, Rectal, Q4H PRN **OR** acetaminophen (TYLENOL) 160 MG/5ML solution 650 mg, 650 mg, Nasogastric, Q4H PRN, Nikunj Jane MD    albuterol (PROVENTIL) nebulizer solution 0.083% 2.5 mg/3mL, 2.5 mg, Nebulization, Q4H PRN, Nikunj Jane MD    aspirin tablet 325 mg, 325 mg, Nasogastric, Daily, Nikunj Jane MD    atorvastatin (LIPITOR) tablet 40 mg, 40 mg, Nasogastric, Nightly, Nikunj Jane MD    chlorhexidine (PERIDEX) 0.12 % solution 15 mL, 15 mL, Mouth/Throat, Q12H, Nikunj Jane MD    dextrose (D5W) 5 % infusion, 100 mL/hr, Intravenous, Continuous, Nikunj Jane MD    Enoxaparin Sodium (LOVENOX) syringe 40 mg, 40 mg, Subcutaneous, Daily, Kaia Edwards APRN    famotidine (PEPCID) tablet 20 mg, 20 mg, Nasogastric, Daily, Nikunj Jane MD    glycopyrrolate (ROBINUL) injection 0.2 mg, 0.2 mg, Intravenous, Q6H PRN, Nikunj Jane MD    guaifenesin (ROBITUSSIN) 100 MG/5ML liquid 200 mg, 200 mg, Nasogastric, Q4H PRN, Nikunj Jane MD    hydrALAZINE (APRESOLINE) injection 10 mg, 10 mg, Intravenous, Q6H PRN, Nikunj Jane MD    ipratropium-albuterol (DUO-NEB) nebulizer solution 3 mL, 3 mL, Nebulization, Q4H While Awake - RT, Nikunj Jane MD    levETIRAcetam in NaCl 0.82% (KEPPRA) IVPB 500 mg, 500 mg, Intravenous, Q12H, Nikunj Jane MD    magnesium hydroxide (MILK OF MAGNESIA) 400 MG/5ML suspension 30 mL, 30 mL, Nasogastric, Daily PRN, Nikunj Jane MD    melatonin tablet 9 mg, 9 mg, Nasogastric, Nightly, Nikunj Jane MD    nystatin (MYCOSTATIN) 100,000 unit/mL suspension 500,000 Units, 5 mL, Swish & Spit, TID, Nikunj Jane MD    ondansetron (ZOFRAN) tablet 4 mg, 4 mg, Nasogastric, Q6H PRN **OR** ondansetron (ZOFRAN) injection 4 mg, 4 mg, Intravenous, Q6H PRN, Nikunj Jane MD    sodium chloride 0.9 % flush 10 mL, 10 mL, Intravenous, Q12H, Bukc  "Nikunj Hollis MD    sodium chloride 0.9 % flush 10 mL, 10 mL, Intravenous, PRN, Nikunj Jane MD    vitamin D3 tablet 5,000 Units, 5,000 Units, Nasogastric, Daily, Nikunj Jane MD    zinc sulfate (ZINCATE) capsule 220 mg, 220 mg, Nasogastric, Daily, Nikunj Jane MD    Data:     Code Status:    There are no questions and answers to display.       Family History   Problem Relation Age of Onset    Heart disease Father        Social History     Socioeconomic History    Marital status:    Tobacco Use    Smoking status: Every Day     Packs/day: 0.50     Years: 40.00     Pack years: 20.00     Types: Cigarettes    Smokeless tobacco: Never   Substance and Sexual Activity    Alcohol use: Yes     Comment: social drinker    Drug use: No    Sexual activity: Defer       Vitals:  Ht 193 cm (76\")   Wt 108 kg (237 lb)   BMI 28.85 kg/m²   T 97.9  RR 23 /72 SPO2 92%           I/O (24Hr):  No intake or output data in the 24 hours ending 08/19/23 0648    Labs and imaging:      No results found for this or any previous visit (from the past 12 hour(s)).                            Physical Examination:        Physical Exam  Vitals and nursing note reviewed.   Constitutional:       Appearance: Normal appearance.   HENT:      Head: Normocephalic and atraumatic.      Right Ear: External ear normal.      Left Ear: External ear normal.      Nose: Nose normal.      Mouth/Throat:      Mouth: Mucous membranes are dry.      Pharynx: Oropharynx is clear.   Eyes:      Extraocular Movements: Extraocular movements intact.      Pupils: Pupils are equal, round, and reactive to light.   Cardiovascular:      Rate and Rhythm: Normal rate and regular rhythm.      Pulses: Normal pulses.      Heart sounds: Normal heart sounds.   Pulmonary:      Effort: Pulmonary effort is normal.      Breath sounds: Normal breath sounds.   Abdominal:      General: Abdomen is flat.      Palpations: Abdomen is soft. "   Musculoskeletal:         General: Normal range of motion.      Cervical back: Normal range of motion and neck supple.   Skin:     General: Skin is warm and dry.      Capillary Refill: Capillary refill takes less than 2 seconds.   Neurological:      General: No focal deficit present.      Mental Status: He is alert and oriented to person, place, and time.   Psychiatric:         Mood and Affect: Mood normal.         Behavior: Behavior normal.         Thought Content: Thought content normal.         Judgment: Judgment normal.         Assessment:        Primary Problem  <principal problem not specified>       * No active hospital problems. *    Past Medical History:   Diagnosis Date    Abnormal stress echo 3/25/19-[facility]    Noted For Myocardial Ischemia    Acute MI     Atelectasis 04/11/2019    Noted on Chest XR    CAD (coronary artery disease) Since 2009    w/R Stent Placed    Chest pain due to CAD     Nitro PRN    DM (diabetes mellitus)     false positive    Elevated cholesterol     History of chest x-ray 04/11/2019    Suggestive For Atelectasis w/Chronic Inflammatory Scarring Noted    History of EKG 3/25/19-[facility]    Sinus Rhythm w/Nonspecific ST-T Wave Abnormalities    History of nephrolithiasis     History of stress test 2015    WNL    HLD (hyperlipidemia)     Controlled w/Meds    Hypertension     Controlled w/Meds    Kidney stones     S/p Lithotripsy    LAD stenosis 04/11/2019    90% Mid LAD Stenosis; Diffuse Stenosis of LCX--Noted on Cardiac Cath    Myocardial ischemia 03/25/2019    Noted on Echo Stress Test    RCA occlusion 04/11/2019    Mild Diffuse Disease--Noted on Cardiac Cath     Tobacco use     1.5-1 PPD        Plan:        Acute hypoxic respiratory failure  S/p COVID  Dysphagia  History of CAD  Tobacco abuse  HTN  HLD  Continue current treatment. Monitor counts. Increase activity. Labs Monday. Oxygen weaning as tolerated. Continue nutrition support via AMONs. Aggressive therapies as  tolerated.       Electronically signed by ALY Huynh on 8/19/2023 at 06:48 CDT

## 2023-08-20 ENCOUNTER — APPOINTMENT (OUTPATIENT)
Dept: GENERAL RADIOLOGY | Facility: HOSPITAL | Age: 63
End: 2023-08-20
Payer: COMMERCIAL

## 2023-08-20 PROCEDURE — 25010000002 ENOXAPARIN PER 10 MG: Performed by: NURSE PRACTITIONER

## 2023-08-20 PROCEDURE — 97530 THERAPEUTIC ACTIVITIES: CPT

## 2023-08-20 PROCEDURE — 71045 X-RAY EXAM CHEST 1 VIEW: CPT

## 2023-08-20 PROCEDURE — 97110 THERAPEUTIC EXERCISES: CPT

## 2023-08-20 PROCEDURE — 25010000002 LEVETIRACETAM IN NACL 0.82% 500 MG/100ML SOLUTION: Performed by: INTERNAL MEDICINE

## 2023-08-20 NOTE — PROGRESS NOTES
REED Mlceod APRN        Internal Medicine Progress Note    8/20/2023   08:53 CDT    Name:  Chaitanya Hair  MRN:    [id number]     Acct:     [id number]   Room:  [address] [place] [address] Day: 0     Admit Date: 8/17/2023  9:25 PM  PCP: Og Campos MD    Subjective:     C/C: Shortness of breath, weakness     Interval History: Status:  stayed the same. Pt resting in bed. Family at bedside. O2 sats stable on 5L. Pt refused NG tube placement yesterday. Pt denies shortness of breath or pain.  Family states they have been working on swallowing exercises.      Review of Systems   Constitutional: Positive for malaise/fatigue. Negative for chills, decreased appetite and fever.   HENT:  Negative for ear pain, hearing loss and hoarse voice.    Eyes:  Negative for blurred vision, discharge and pain.   Cardiovascular:  Positive for dyspnea on exertion. Negative for chest pain and claudication.   Respiratory:  Positive for cough and shortness of breath. Negative for hemoptysis.    Endocrine: Negative for cold intolerance, heat intolerance, polydipsia, polyphagia and polyuria.   Hematologic/Lymphatic: Negative for adenopathy and bleeding problem.   Skin:  Negative for color change, itching, nail changes and rash.   Musculoskeletal:  Positive for muscle weakness. Negative for arthritis and back pain.   Gastrointestinal:  Negative for abdominal pain, diarrhea, nausea and vomiting.   Genitourinary:  Negative for flank pain, hematuria and urgency.   Neurological:  Positive for weakness. Negative for dizziness and headaches.   Psychiatric/Behavioral:  Negative for altered mental status, depression, substance abuse, suicidal ideas and thoughts of violence. The patient is not nervous/anxious.    Allergic/Immunologic: Negative for environmental allergies, HIV exposure, hives and persistent infections.       Medications:     Allergies: No Known Allergies    Current Meds:   Current Facility-Administered  Medications:     acetaminophen (TYLENOL) suppository 650 mg, 650 mg, Rectal, Q4H PRN **OR** acetaminophen (TYLENOL) 160 MG/5ML solution 650 mg, 650 mg, Nasogastric, Q4H PRN, Nikunj Jane MD    acetylcysteine (MUCOMYST) 20 % nebulizer solution 2 mL, 2 mL, Nebulization, BID - RT, Nikunj Jane MD    albuterol (PROVENTIL) nebulizer solution 0.083% 2.5 mg/3mL, 2.5 mg, Nebulization, Q4H PRN, Nikunj Jane MD    aspirin tablet 325 mg, 325 mg, Nasogastric, Daily, Nikunj Jane MD    atorvastatin (LIPITOR) tablet 40 mg, 40 mg, Nasogastric, Nightly, Nikunj Jane MD    chlorhexidine (PERIDEX) 0.12 % solution 15 mL, 15 mL, Mouth/Throat, Q12H, Nikunj Jane MD    dextrose (D5W) 5 % infusion, 100 mL/hr, Intravenous, Continuous, Nikunj Jane MD    Enoxaparin Sodium (LOVENOX) syringe 40 mg, 40 mg, Subcutaneous, Daily, Kaia Edwards APRN    famotidine (PEPCID) tablet 20 mg, 20 mg, Nasogastric, Daily, Nikunj Jane MD    glycopyrrolate (ROBINUL) injection 0.2 mg, 0.2 mg, Intravenous, Q6H PRN, Nikunj Jane MD    guaifenesin (ROBITUSSIN) 100 MG/5ML liquid 200 mg, 200 mg, Nasogastric, Q4H PRN, Nikunj Jane MD    hydrALAZINE (APRESOLINE) injection 10 mg, 10 mg, Intravenous, Q6H PRN, Nikunj Jane MD    ipratropium-albuterol (DUO-NEB) nebulizer solution 3 mL, 3 mL, Nebulization, Q4H While Awake - RT, Nikunj Jane MD    levETIRAcetam in NaCl 0.82% (KEPPRA) IVPB 500 mg, 500 mg, Intravenous, Q12H, Nikunj Jane MD    magnesium hydroxide (MILK OF MAGNESIA) 400 MG/5ML suspension 30 mL, 30 mL, Nasogastric, Daily PRN, Nikunj Jane MD    melatonin tablet 9 mg, 9 mg, Nasogastric, Nightly, Nikunj Jane MD    nystatin (MYCOSTATIN) 100,000 unit/mL suspension 500,000 Units, 5 mL, Swish & Spit, TID, Nikunj Jane MD    ondansetron (ZOFRAN) tablet 4 mg, 4 mg, Nasogastric, Q6H PRN **OR**  "ondansetron (ZOFRAN) injection 4 mg, 4 mg, Intravenous, Q6H PRN, Nikunj Jane MD    sodium chloride 0.9 % flush 10 mL, 10 mL, Intravenous, Q12H, Nikunj Jane MD    sodium chloride 0.9 % flush 10 mL, 10 mL, Intravenous, PRN, Nikunj Jane MD    vitamin D3 tablet 5,000 Units, 5,000 Units, Nasogastric, Daily, Nikunj Jane MD    zinc sulfate (ZINCATE) capsule 220 mg, 220 mg, Nasogastric, Daily, Nikunj Jane MD    Data:     Code Status:    There are no questions and answers to display.       Family History   Problem Relation Age of Onset    Heart disease Father        Social History     Socioeconomic History    Marital status:    Tobacco Use    Smoking status: Every Day     Packs/day: 0.50     Years: 40.00     Pack years: 20.00     Types: Cigarettes    Smokeless tobacco: Never   Substance and Sexual Activity    Alcohol use: Yes     Comment: social drinker    Drug use: No    Sexual activity: Defer       Vitals:  Ht 193 cm (76\")   Wt 108 kg (237 lb)   BMI 28.85 kg/m²   T 98.1  RR 25 /70 SPO2 95%           I/O (24Hr):  No intake or output data in the 24 hours ending 08/20/23 0853    Labs and imaging:      No results found for this or any previous visit (from the past 12 hour(s)).                            Physical Examination:        Physical Exam  Vitals and nursing note reviewed.   Constitutional:       Appearance: Normal appearance.   HENT:      Head: Normocephalic and atraumatic.      Right Ear: External ear normal.      Left Ear: External ear normal.      Nose: Nose normal.      Mouth/Throat:      Mouth: Mucous membranes are dry.      Pharynx: Oropharynx is clear.   Eyes:      Extraocular Movements: Extraocular movements intact.      Pupils: Pupils are equal, round, and reactive to light.   Cardiovascular:      Rate and Rhythm: Normal rate and regular rhythm.      Pulses: Normal pulses.      Heart sounds: Normal heart sounds.   Pulmonary:      " Effort: Pulmonary effort is normal.      Breath sounds: Normal breath sounds.   Abdominal:      General: Abdomen is flat.      Palpations: Abdomen is soft.   Musculoskeletal:         General: Normal range of motion.      Cervical back: Normal range of motion and neck supple.   Skin:     General: Skin is warm and dry.      Capillary Refill: Capillary refill takes less than 2 seconds.   Neurological:      General: No focal deficit present.      Mental Status: He is alert and oriented to person, place, and time.   Psychiatric:         Mood and Affect: Mood normal.         Behavior: Behavior normal.         Thought Content: Thought content normal.         Judgment: Judgment normal.         Assessment:        Primary Problem  <principal problem not specified>       * No active hospital problems. *    Past Medical History:   Diagnosis Date    Abnormal stress echo 3/25/19-[facility]    Noted For Myocardial Ischemia    Acute MI     Atelectasis 04/11/2019    Noted on Chest XR    CAD (coronary artery disease) Since 2009    w/R Stent Placed    Chest pain due to CAD     Nitro PRN    DM (diabetes mellitus)     false positive    Elevated cholesterol     History of chest x-ray 04/11/2019    Suggestive For Atelectasis w/Chronic Inflammatory Scarring Noted    History of EKG 3/25/19-[facility]    Sinus Rhythm w/Nonspecific ST-T Wave Abnormalities    History of nephrolithiasis     History of stress test 2015    WNL    HLD (hyperlipidemia)     Controlled w/Meds    Hypertension     Controlled w/Meds    Kidney stones     S/p Lithotripsy    LAD stenosis 04/11/2019    90% Mid LAD Stenosis; Diffuse Stenosis of LCX--Noted on Cardiac Cath    Myocardial ischemia 03/25/2019    Noted on Echo Stress Test    RCA occlusion 04/11/2019    Mild Diffuse Disease--Noted on Cardiac Cath     Tobacco use     1.5-1 PPD        Plan:        Acute hypoxic respiratory failure  S/p COVID  Dysphagia  History of CAD  Tobacco abuse  HTN  HLD  Continue  current treatment. Monitor counts. Increase activity. Labs Monday. Oxygen weaning as tolerated. Continue nutrition support via AMONs. Aggressive therapies as tolerated.       Electronically signed by ALY Huynh on 8/20/2023 at 08:53 CDT     I have discussed the care of Chaiatnya Hair, including pertinent history and exam findings, with the nurse practitioner.    I have seen and examined the patient and the key elements of all parts of the encounter have been performed by me.  I agree with the assessment, plan and orders as documented by ALY Cheng, after I modified the exam findings and the plan of treatments and the final version is my approved version of the assessment.        Electronically signed by Nikunj Jane MD on 8/20/2023 at 18:58 CDT

## 2023-08-21 ENCOUNTER — APPOINTMENT (OUTPATIENT)
Dept: GENERAL RADIOLOGY | Facility: HOSPITAL | Age: 63
End: 2023-08-21
Payer: COMMERCIAL

## 2023-08-21 LAB
ANION GAP SERPL CALCULATED.3IONS-SCNC: 10 MMOL/L (ref 5–15)
BASOPHILS # BLD AUTO: 0.02 10*3/MM3 (ref 0–0.2)
BASOPHILS NFR BLD AUTO: 0.2 % (ref 0–1.5)
BUN SERPL-MCNC: 24 MG/DL (ref 8–23)
BUN/CREAT SERPL: 19.8 (ref 7–25)
CALCIUM SPEC-SCNC: 9 MG/DL (ref 8.6–10.5)
CHLORIDE SERPL-SCNC: 104 MMOL/L (ref 98–107)
CO2 SERPL-SCNC: 24 MMOL/L (ref 22–29)
CREAT SERPL-MCNC: 1.21 MG/DL (ref 0.76–1.27)
DEPRECATED RDW RBC AUTO: 45.3 FL (ref 37–54)
EGFRCR SERPLBLD CKD-EPI 2021: 66.4 ML/MIN/1.73
EOSINOPHIL # BLD AUTO: 0.19 10*3/MM3 (ref 0–0.4)
EOSINOPHIL NFR BLD AUTO: 1.8 % (ref 0.3–6.2)
ERYTHROCYTE [DISTWIDTH] IN BLOOD BY AUTOMATED COUNT: 13.5 % (ref 12.3–15.4)
GLUCOSE SERPL-MCNC: 117 MG/DL (ref 65–99)
HCT VFR BLD AUTO: 37.4 % (ref 37.5–51)
HGB BLD-MCNC: 11.8 G/DL (ref 13–17.7)
IMM GRANULOCYTES # BLD AUTO: 0.06 10*3/MM3 (ref 0–0.05)
IMM GRANULOCYTES NFR BLD AUTO: 0.6 % (ref 0–0.5)
LYMPHOCYTES # BLD AUTO: 1.37 10*3/MM3 (ref 0.7–3.1)
LYMPHOCYTES NFR BLD AUTO: 12.6 % (ref 19.6–45.3)
MCH RBC QN AUTO: 29.3 PG (ref 26.6–33)
MCHC RBC AUTO-ENTMCNC: 31.6 G/DL (ref 31.5–35.7)
MCV RBC AUTO: 92.8 FL (ref 79–97)
MONOCYTES # BLD AUTO: 0.72 10*3/MM3 (ref 0.1–0.9)
MONOCYTES NFR BLD AUTO: 6.6 % (ref 5–12)
NEUTROPHILS NFR BLD AUTO: 78.2 % (ref 42.7–76)
NEUTROPHILS NFR BLD AUTO: 8.49 10*3/MM3 (ref 1.7–7)
NRBC BLD AUTO-RTO: 0 /100 WBC (ref 0–0.2)
NT-PROBNP SERPL-MCNC: 514 PG/ML (ref 0–900)
PLATELET # BLD AUTO: 212 10*3/MM3 (ref 140–450)
PMV BLD AUTO: 11.4 FL (ref 6–12)
POTASSIUM SERPL-SCNC: 3.8 MMOL/L (ref 3.5–5.2)
RBC # BLD AUTO: 4.03 10*6/MM3 (ref 4.14–5.8)
SODIUM SERPL-SCNC: 138 MMOL/L (ref 136–145)
WBC NRBC COR # BLD: 10.85 10*3/MM3 (ref 3.4–10.8)

## 2023-08-21 PROCEDURE — 80048 BASIC METABOLIC PNL TOTAL CA: CPT | Performed by: NURSE PRACTITIONER

## 2023-08-21 PROCEDURE — 97530 THERAPEUTIC ACTIVITIES: CPT

## 2023-08-21 PROCEDURE — 97535 SELF CARE MNGMENT TRAINING: CPT

## 2023-08-21 PROCEDURE — 74230 X-RAY XM SWLNG FUNCJ C+: CPT

## 2023-08-21 PROCEDURE — 25010000002 LEVETIRACETAM IN NACL 0.82% 500 MG/100ML SOLUTION: Performed by: INTERNAL MEDICINE

## 2023-08-21 PROCEDURE — 92611 MOTION FLUOROSCOPY/SWALLOW: CPT

## 2023-08-21 PROCEDURE — 25010000002 ENOXAPARIN PER 10 MG: Performed by: NURSE PRACTITIONER

## 2023-08-21 PROCEDURE — 83880 ASSAY OF NATRIURETIC PEPTIDE: CPT | Performed by: NURSE PRACTITIONER

## 2023-08-21 PROCEDURE — 85025 COMPLETE CBC W/AUTO DIFF WBC: CPT | Performed by: NURSE PRACTITIONER

## 2023-08-21 PROCEDURE — 25010000002 LEVETIRACETAM IN NACL 0.82% 500 MG/100ML SOLUTION: Performed by: NURSE PRACTITIONER

## 2023-08-21 PROCEDURE — 97110 THERAPEUTIC EXERCISES: CPT

## 2023-08-21 RX ORDER — LEVETIRACETAM 100 MG/ML
500 SOLUTION ORAL EVERY 12 HOURS SCHEDULED
Status: DISCONTINUED | OUTPATIENT
Start: 2023-08-21 | End: 2023-08-21

## 2023-08-21 RX ORDER — LISINOPRIL 20 MG/1
20 TABLET ORAL 2 TIMES DAILY
Status: DISCONTINUED | OUTPATIENT
Start: 2023-08-21 | End: 2023-09-04 | Stop reason: HOSPADM

## 2023-08-21 RX ORDER — AMLODIPINE BESYLATE 5 MG/1
5 TABLET ORAL 2 TIMES DAILY
Status: DISCONTINUED | OUTPATIENT
Start: 2023-08-21 | End: 2023-09-04 | Stop reason: HOSPADM

## 2023-08-21 RX ORDER — LEVETIRACETAM 5 MG/ML
500 INJECTION INTRAVASCULAR EVERY 12 HOURS SCHEDULED
Status: DISCONTINUED | OUTPATIENT
Start: 2023-08-21 | End: 2023-08-22

## 2023-08-21 NOTE — PROGRESS NOTES
REED Mcleod APRN        Internal Medicine Progress Note    8/21/2023   10:44 CDT    Name:  Chaitanya Hair  MRN:    [id number]     Acct:     [id number]   Room:  [address] [place] [address] Day: 0     Admit Date: 8/17/2023  9:25 PM  PCP: Og Campos MD    Subjective:     C/C: Shortness of breath, weakness     Interval History: Status:  stayed the same. Up to side of bed. Wife at bedside. Afebrile. 02 sats stable with 02 at 2 lpm. Denies pain. Getting stronger slowly. Plans for dysphagia study today per SLP.   Review of Systems   Constitutional: Positive for malaise/fatigue. Negative for chills, decreased appetite and fever.   HENT:  Negative for ear pain, hearing loss and hoarse voice.    Eyes:  Negative for blurred vision, discharge and pain.   Cardiovascular:  Positive for dyspnea on exertion. Negative for chest pain and claudication.   Respiratory:  Positive for cough and shortness of breath. Negative for hemoptysis.    Endocrine: Negative for cold intolerance, heat intolerance, polydipsia, polyphagia and polyuria.   Hematologic/Lymphatic: Negative for adenopathy and bleeding problem.   Skin:  Negative for color change, itching, nail changes and rash.   Musculoskeletal:  Positive for muscle weakness. Negative for arthritis and back pain.   Gastrointestinal:  Negative for abdominal pain, diarrhea, nausea and vomiting.   Genitourinary:  Negative for flank pain, hematuria and urgency.   Neurological:  Positive for weakness. Negative for dizziness and headaches.   Psychiatric/Behavioral:  Negative for altered mental status, depression, substance abuse, suicidal ideas and thoughts of violence. The patient is not nervous/anxious.    Allergic/Immunologic: Negative for environmental allergies, HIV exposure, hives and persistent infections.       Medications:     Allergies: No Known Allergies    Current Meds:   Current Facility-Administered Medications:     acetaminophen (TYLENOL)  suppository 650 mg, 650 mg, Rectal, Q4H PRN **OR** acetaminophen (TYLENOL) 160 MG/5ML solution 650 mg, 650 mg, Nasogastric, Q4H PRN, Nikunj Jane MD    acetylcysteine (MUCOMYST) 20 % nebulizer solution 2 mL, 2 mL, Nebulization, BID - RT, Nikunj Jane MD    albuterol (PROVENTIL) nebulizer solution 0.083% 2.5 mg/3mL, 2.5 mg, Nebulization, Q4H PRN, Nikunj Jane MD    aspirin tablet 325 mg, 325 mg, Nasogastric, Daily, Nikunj Jane MD    atorvastatin (LIPITOR) tablet 40 mg, 40 mg, Nasogastric, Nightly, Nikunj Jane MD    chlorhexidine (PERIDEX) 0.12 % solution 15 mL, 15 mL, Mouth/Throat, Q12H, Nikunj Jane MD    dextrose (D5W) 5 % infusion, 100 mL/hr, Intravenous, Continuous, Nikunj Jane MD    Enoxaparin Sodium (LOVENOX) syringe 40 mg, 40 mg, Subcutaneous, Daily, Kaia Edwards APRN    famotidine (PEPCID) tablet 20 mg, 20 mg, Nasogastric, Daily, Nikunj Jane MD    glycopyrrolate (ROBINUL) injection 0.2 mg, 0.2 mg, Intravenous, Q6H PRN, Nikunj Jane MD    guaifenesin (ROBITUSSIN) 100 MG/5ML liquid 200 mg, 200 mg, Nasogastric, Q4H PRN, Nikunj Jane MD    hydrALAZINE (APRESOLINE) injection 10 mg, 10 mg, Intravenous, Q6H PRN, Nikunj Jane MD    ipratropium-albuterol (DUO-NEB) nebulizer solution 3 mL, 3 mL, Nebulization, Q4H While Awake - RT, Nkiunj Jane MD    levETIRAcetam in NaCl 0.82% (KEPPRA) IVPB 500 mg, 500 mg, Intravenous, Q12H, Nikunj Jane MD    magnesium hydroxide (MILK OF MAGNESIA) 400 MG/5ML suspension 30 mL, 30 mL, Nasogastric, Daily PRN, Nikunj Jane MD    melatonin tablet 9 mg, 9 mg, Nasogastric, Nightly, Nikunj Jane MD    nystatin (MYCOSTATIN) 100,000 unit/mL suspension 500,000 Units, 5 mL, Swish & Spit, TID, Nikunj Jane MD    ondansetron (ZOFRAN) tablet 4 mg, 4 mg, Nasogastric, Q6H PRN **OR** ondansetron (ZOFRAN) injection 4 mg, 4 mg,  "Intravenous, Q6H PRN, Nikunj Jane MD    sodium chloride 0.9 % flush 10 mL, 10 mL, Intravenous, Q12H, Nikunj Jane MD    sodium chloride 0.9 % flush 10 mL, 10 mL, Intravenous, PRN, Nikunj Jane MD    vitamin D3 tablet 5,000 Units, 5,000 Units, Nasogastric, Daily, Nikunj Jane MD    zinc sulfate (ZINCATE) capsule 220 mg, 220 mg, Nasogastric, Daily, Nikunj Jane MD    Data:     Code Status:    There are no questions and answers to display.       Family History   Problem Relation Age of Onset    Heart disease Father        Social History     Socioeconomic History    Marital status:    Tobacco Use    Smoking status: Every Day     Packs/day: 0.50     Years: 40.00     Pack years: 20.00     Types: Cigarettes    Smokeless tobacco: Never   Substance and Sexual Activity    Alcohol use: Yes     Comment: social drinker    Drug use: No    Sexual activity: Defer       Vitals:  Ht 193 cm (76\")   Wt 108 kg (237 lb)   BMI 28.85 kg/m²   T 97.6 HR 80 RR 13 /63 SPO2 92%  (2 lpm)          I/O (24Hr):  No intake or output data in the 24 hours ending 08/21/23 1044    Labs and imaging:      No results found for this or any previous visit (from the past 12 hour(s)).                            Physical Examination:        Physical Exam  Vitals and nursing note reviewed.   Constitutional:       Appearance: Normal appearance.   HENT:      Head: Normocephalic and atraumatic.      Right Ear: External ear normal.      Left Ear: External ear normal.      Nose: Nose normal.      Mouth/Throat:      Mouth: Mucous membranes are dry.      Pharynx: Oropharynx is clear.   Eyes:      Extraocular Movements: Extraocular movements intact.      Pupils: Pupils are equal, round, and reactive to light.   Cardiovascular:      Rate and Rhythm: Normal rate and regular rhythm.      Pulses: Normal pulses.      Heart sounds: Normal heart sounds.   Pulmonary:      Effort: Pulmonary effort is normal.      " Breath sounds: Normal breath sounds.   Abdominal:      General: Abdomen is flat.      Palpations: Abdomen is soft.   Musculoskeletal:         General: Normal range of motion.      Cervical back: Normal range of motion and neck supple.      Comments: Profound generalized weakness   Skin:     General: Skin is warm and dry.      Capillary Refill: Capillary refill takes less than 2 seconds.   Neurological:      General: No focal deficit present.      Mental Status: He is alert and oriented to person, place, and time.   Psychiatric:         Mood and Affect: Mood normal.         Behavior: Behavior normal.         Thought Content: Thought content normal.         Judgment: Judgment normal.         Assessment:             * No active hospital problems. *    Past Medical History:   Diagnosis Date    Abnormal stress echo 3/25/19-[facility]    Noted For Myocardial Ischemia    Acute MI     Atelectasis 04/11/2019    Noted on Chest XR    CAD (coronary artery disease) Since 2009    w/R Stent Placed    Chest pain due to CAD     Nitro PRN    DM (diabetes mellitus)     false positive    Elevated cholesterol     History of chest x-ray 04/11/2019    Suggestive For Atelectasis w/Chronic Inflammatory Scarring Noted    History of EKG 3/25/19-[facility]    Sinus Rhythm w/Nonspecific ST-T Wave Abnormalities    History of nephrolithiasis     History of stress test 2015    WNL    HLD (hyperlipidemia)     Controlled w/Meds    Hypertension     Controlled w/Meds    Kidney stones     S/p Lithotripsy    LAD stenosis 04/11/2019    90% Mid LAD Stenosis; Diffuse Stenosis of LCX--Noted on Cardiac Cath    Myocardial ischemia 03/25/2019    Noted on Echo Stress Test    RCA occlusion 04/11/2019    Mild Diffuse Disease--Noted on Cardiac Cath     Tobacco use     1.5-1 PPD        Plan:        Acute hypoxic respiratory failure  S/p COVID  Dysphagia  History of CAD  Tobacco abuse  HTN  HLD  Continue current treatment. Monitor counts. Increase  activity. Labs today and Thursday. Oxygen weaning as tolerated. Await findings from dysphagia study and further recommendations per SLP. Aggressive therapies as tolerated.       Electronically signed by ALY James on 8/21/2023 at 10:44 CDT     I have discussed the care of Chaitanya Hair, including pertinent history and exam findings, with the nurse practitioner.    I have seen and examined the patient and the key elements of all parts of the encounter have been performed by me.  I agree with the assessment, plan and orders as documented by ALY Ribeiro, after I modified the exam findings and the plan of treatments and the final version is my approved version of the assessment.        Electronically signed by Nikunj Jane MD on 8/22/2023 at 06:55 CDT

## 2023-08-21 NOTE — MBS/VFSS/FEES
Speech Language Pathology   [facility]  [facility]       Patient Name: Chaitanya Hair  : 1960  MRN: [id number]    Today's Date: 2023      Visit Date: 2023   SPEECH-LANGUAGE PATHOLOGY EVALUTION - VFSS  Subjective: The patient was seen on this date for a VFSS(Videofluoroscopic Swallowing Study).  Patient was alert and cooperative.    Significant history: COVID, respiratory failure, mechanical ventilation   Objective: Risks/benefits were reviewed with the patient and family, and consent was obtained. The study was completed with SLP and Radiologist present. The patient was seen in lateral view(s). Textures given included  pudding thick, thin liquid, nectar thick liquid, honey thick liquid, and mechanical soft consistency.  Assessment: Consistencies were presented in the following order with the following results:  Honey thick via spoon: Premature loss to the vallecula secondary to decreased back of tongue control. Poor hyolaryngeal elevation/excursion and decreased epiglottic inversion, base of tongue retraction, and pharyngeal squeeze. No definite laryngeal penetration or aspiration observed. Severe residue remained in the pharynx. SLP cued pt to use a chin tuck strategy with a multiple swallow which reduced residue to a mild amount in the vallecula and lateral channels.     Honey thick via spoon with chin tuck: Poor hyolaryngeal elevation/excursion and decreased epiglottic inversion, base of tongue retraction, and pharyngeal squeeze. No definite laryngeal penetration or aspiration observed. Pt had better clearance of the pharynx with the initial swallow. Mild to moderate residue remained in the vallecula.     Nectar thick via spoon with chin tuck: Pt was unable to obtain barium via straw due to poor labial seal. Poor hyolaryngeal elevation/excursion and decreased epiglottic inversion, base of tongue retraction, and pharyngeal squeeze. No definite laryngeal penetration or  aspiration observed. Mild residue remained in the vallecula.     Thin via spoon with chin tuck: Pt was unable to obtain barium via straw due to poor labial seal. Poor hyolaryngeal elevation/excursion and decreased epiglottic inversion, base of tongue retraction, and pharyngeal squeeze. No definite laryngeal penetration or aspiration observed. Mild residue remained in the vallecula.     Thin via cup with chin tuck: Pt was unable to obtain barium via straw due to poor labial seal. Poor hyolaryngeal elevation/excursion and decreased epiglottic inversion, base of tongue retraction, and pharyngeal squeeze. No definite laryngeal penetration or aspiration observed. Mild residue remained in the vallecula.     Pudding thick via spoon: Pt was unable to obtain barium via straw due to poor labial seal. Poor hyolaryngeal elevation/excursion and decreased epiglottic inversion, base of tongue retraction, and pharyngeal squeeze. No definite laryngeal penetration or aspiration observed. Profound residue remained in the vallecula initially. SLP then cued pt to use a chin tuck strategy during a multiple swallow which did reduce residue to a mild to moderate amount in the vallecula and lateral channels.    Soft solid coated in pudding thick barium with chin tuck: Prolonged mastication and poor rotary chew. Pt was unable to obtain barium via straw due to poor labial seal. Poor hyolaryngeal elevation/excursion and decreased epiglottic inversion, base of tongue retraction, and pharyngeal squeeze. No definite laryngeal penetration or aspiration observed. Moderate to severe residue remained in the vallecula. Pt continued chewing soft residue in the oral cavity and eventually had to expectorate the rest of the bolus.     Thin via cup: Pt was unable to obtain barium via straw due to poor labial seal. Poor hyolaryngeal elevation/excursion and decreased epiglottic inversion, base of tongue retraction, and pharyngeal squeeze with deep silent  laryngeal penetration during the swallow and silent aspiration after the swallow. SLP cued pt to cough, but his cough was weak and ineffective in clearing residue from the airway. Moderate to severe residue remained in the vallecula as well as a mild amount in the lateral channels and laryngeal vestibule.     [place] thick via spoon: Premature loss to the vallecula secondary to decreased back of tongue control. Pt was unable to obtain barium via straw due to poor labial seal. Poor hyolaryngeal elevation/excursion and decreased epiglottic inversion, base of tongue retraction, and pharyngeal squeeze. No definite laryngeal penetration or aspiration observed. Mild to moderate residue remained in the vallecula and lateral channels. Thin barium residue still remained in the laryngeal vestibule.      SLP Findings: Patient presents with moderate oropharyngeal dysphagia.   Recommendations: Diet Textures: nectar thick liquid, puree consistency food. Medications should be taken crushed with puree. May have water and Ice between meals after oral care, under staff or family supervision and with the recommended strategies for safe swallowing.  Recommended Strategies:  Chin tuck with all swallows, Upright for PO, small bites and sips, no straw, alternate liquids and solids, and supervision with all PO. Oral care before breakfast, after all meals and PRN.  Other Recommended Evaluations: Referral to otolaryngology due to weak voice/cough.     Dysphagia therapy is recommended.   Ana Maria Hicks, CCC-SLP 8/21/2023 13:20 CDT     Visit Dx:     ICD-10-CM ICD-9-CM   1. Dysphagia, unspecified type  R13.10 787.20       Patient Active Problem List   Diagnosis    Coronary artery disease of native heart with stable angina pectoris    Hypertension    Nicotine abuse    Old inferior wall myocardial infarction    Presence of stent in right coronary artery    Tobacco abuse    CAD (coronary artery disease)        Past Medical History:   Diagnosis  Date    Abnormal stress echo 3/25/19-[facility]    Noted For Myocardial Ischemia    Acute MI     Atelectasis 04/11/2019    Noted on Chest XR    CAD (coronary artery disease) Since 2009    w/R Stent Placed    Chest pain due to CAD     Nitro PRN    DM (diabetes mellitus)     false positive    Elevated cholesterol     History of chest x-ray 04/11/2019    Suggestive For Atelectasis w/Chronic Inflammatory Scarring Noted    History of EKG 3/25/19-[facility]    Sinus Rhythm w/Nonspecific ST-T Wave Abnormalities    History of nephrolithiasis     History of stress test 2015    WNL    HLD (hyperlipidemia)     Controlled w/Meds    Hypertension     Controlled w/Meds    Kidney stones     S/p Lithotripsy    LAD stenosis 04/11/2019    90% Mid LAD Stenosis; Diffuse Stenosis of LCX--Noted on Cardiac Cath    Myocardial ischemia 03/25/2019    Noted on Echo Stress Test    RCA occlusion 04/11/2019    Mild Diffuse Disease--Noted on Cardiac Cath     Tobacco use     1.5-1 PPD        Past Surgical History:   Procedure Laterality Date    BACK SURGERY  2013    CARDIAC CATHETERIZATION  2009    RCA Stent    CARDIAC CATHETERIZATION  04/11/2019-[facility]    w/L Ventriculogram/Angiography--Dr. Garcia--CAD Involving LAD    COLONOSCOPY  06/2008    CORONARY ANGIOPLASTY WITH STENT PLACEMENT Right 2009    CORONARY ARTERY BYPASS GRAFT N/A 4/16/2019    Procedure: MEDIAN STERNOTOMY, CORONARY ARTERY BYPASS X2 USING LEFT INTERNAL MAMMARY ARTERY GRAFT, PRP, INTRAOP LAURI;  Surgeon: Jesse Perez MD;  Location: [facility];  Service: Cardiothoracic    CYSTOSCOPY W/ LASER LITHOTRIPSY                        SLP Adult Swallow Evaluation       Row Name 08/21/23 1029       Rehab Evaluation    Document Type evaluation  -MB    Subjective Information no complaints  -MB    Patient Observations alert;cooperative  -MB    Patient/Family/Caregiver Comments/Observations Wife present  -MB       General Information    Patient Profile Reviewed yes   -MB    Pertinent History Of Current Problem COVID, respiratory failure, mechanical ventilation  -MB    Current Method of Nutrition NPO  -MB    Precautions/Limitations, Vision WFL with corrective lenses  -MB    Precautions/Limitations, Hearing WFL  -MB    Prior Level of Function-Communication WFL  -MB    Prior Level of Function-Swallowing no diet consistency restrictions  -MB    Plans/Goals Discussed with patient;spouse/S.O.  -MB    Barriers to Rehab medically complex  -MB    Patient's Goals for Discharge return to PO diet  -MB    Family Goals for Discharge patient able to return to PO diet  -MB       Pain    Additional Documentation Pain Scale: FACES Pre/Post-Treatment (Group)  -MB       Pain Scale: FACES Pre/Post-Treatment    Pain: FACES Scale, Pretreatment 4-->hurts little more  -MB    Pre/Posttreatment Pain Comment Due to sitting up during testing  -MB       Oral Motor Structure and Function    Dentition Assessment natural, present and adequate  -MB       MBS/VFSS    Utensils Used spoon;cup;straw  -MB    Consistencies Trialed soft to chew textures;thin liquids;nectar/syrup-thick liquids;honey-thick liquids;pudding thick  -MB       MBS/VFSS Interpretation    Oral Prep Phase impaired oral phase of swallowing  -MB    Oral Transit Phase impaired  -MB    Oral Residue impaired  -MB    VFSS Summary See note  -MB       Oral Preparatory Phase    Oral Preparatory Phase reduced lip closure around utensil;prolonged manipulation;poor rotary chew;spits out food/liquid prior to swallow  -MB    Reduced Lip Closure Around Utensil thin liquids;nectar-thick liquids  -MB    Prolonged Manipulation mechanical soft  -MB    Poor Rotary Chew mechanical soft  -MB    Spits Out Food/Liquid Prior to Swallow mechanical soft  -MB       Oral Transit Phase    Impaired Oral Transit Phase increased A-P transit time;premature spillage of liquids into pharynx  -MB    Increased A-P Transit Time mechanical soft  -MB    Premature Spillage of Liquids  into Pharynx nectar-thick liquids;honey-thick liquids  -MB       Oral Residue    Impaired Oral Residue diffuse residue throughout oral cavity  -MB       Initiation of Pharyngeal Swallow    Initiation of Pharyngeal Swallow bolus in valleculae  -MB    Pharyngeal Phase impaired pharyngeal phase of swallowing  -MB    Penetration During the Swallow thin liquids  -MB    Aspiration After the Swallow thin liquids  -MB    Depth of Penetration deep  -MB    Response to Penetration No  -MB    No spontaneous response to penetration and non-effective laryngeal clearance with cue (see comments)  -MB    Response to Aspiration No  -MB    No spontaneous response to aspiration and non-effective subglottic clearance with cue (see comments)  -MB    Pharyngeal Residue all consistencies tested  -MB    Response to Residue cleared residue with compensatory maneuver (see comments)  -MB    Attempted Compensatory Maneuvers chin tuck  -MB    Response to Attempted Compensatory Maneuvers prevented penetration;prevented aspiration;reduced residue  -MB    Successful Compensatory Maneuver Competency patient able to;demonstrate compensations  -MB       SLP Evaluation Clinical Impression    SLP Swallowing Diagnosis mod-severe;oral dysphagia;pharyngeal dysphagia  -MB    Functional Impact risk of aspiration/pneumonia;risk of malnutrition;risk of dehydration  -MB    Rehab Potential/Prognosis, Swallowing good, to achieve stated therapy goals  -MB    Swallow Criteria for Skilled Therapeutic Interventions Met demonstrates skilled criteria  -MB       Recommendations    Therapy Frequency (Swallow) 2 days per week  -MB    SLP Diet Recommendation puree;nectar thick liquids;water between meals after oral care, with supervision;ice chips between meals after oral care, with supervision  -MB    Recommended Precautions and Strategies upright posture during/after eating;small bites of food and sips of liquid;alternate between small bites of food and sips of  liquid;chin tuck;general aspiration precautions  -MB    Oral Care Recommendations Oral Care before breakfast, after meals and PRN  -MB    SLP Rec. for Method of Medication Administration meds whole;meds crushed;with puree  -MB    Monitor for Signs of Aspiration yes;cough;gurgly voice;throat clearing;notify SLP if any concerns  -MB    Anticipated Discharge Disposition (SLP) unknown  -MB              User Key  (r) = Recorded By, (t) = Taken By, (c) = Cosigned By      Initials Name Provider Type    Ana Maria Loja CCC-SLP Speech and Language Pathologist                                    OP SLP Education       Row Name 08/21/23 1253       Education    Barriers to Learning No barriers identified  -MB    Education Provided Described results of evaluation;Patient expressed understanding of evaluation;Family/caregivers expressed understanding of evaluation  -MB    Assessed Learning readiness;Learning preferences;Learning motivation;Learning needs  -MB    Learning Motivation Moderate  -MB    Learning Method Explanation  -MB    Teaching Response Verbalized understanding  -MB              User Key  (r) = Recorded By, (t) = Taken By, (c) = Cosigned By      Initials Name Effective Dates    Ana Maria Loja CCC-SLP 02/03/23 -                                        Time Calculation:   Untimed Charges  03103-FS Eval Oral Pharyng Swallow Minutes: 48  84183-OL Motion Fluoro Eval Swallow Minutes: 86  92435-QF Treatment Swallow Minutes: 55  Total Minutes  Untimed Charges Total Minutes: 86   Total Minutes: 86    Therapy Charges for Today       Code Description Service Date Service Provider Modifiers Qty    [id number]  ST MOTION FLUORO EVAL SWALLOW 6 8/21/2023 Ana Maria Hicks CCC-SLP GN 1                    LUIS Son  8/21/2023

## 2023-08-22 PROCEDURE — 97530 THERAPEUTIC ACTIVITIES: CPT

## 2023-08-22 PROCEDURE — 92526 ORAL FUNCTION THERAPY: CPT

## 2023-08-22 PROCEDURE — 25010000002 LEVETIRACETAM IN NACL 0.82% 500 MG/100ML SOLUTION: Performed by: NURSE PRACTITIONER

## 2023-08-22 PROCEDURE — 97116 GAIT TRAINING THERAPY: CPT

## 2023-08-22 PROCEDURE — 25010000002 ENOXAPARIN PER 10 MG: Performed by: NURSE PRACTITIONER

## 2023-08-22 PROCEDURE — 97110 THERAPEUTIC EXERCISES: CPT

## 2023-08-22 PROCEDURE — 97535 SELF CARE MNGMENT TRAINING: CPT

## 2023-08-22 RX ORDER — ONDANSETRON 4 MG/1
4 TABLET, FILM COATED ORAL EVERY 6 HOURS PRN
Status: DISCONTINUED | OUTPATIENT
Start: 2023-08-22 | End: 2023-09-04 | Stop reason: HOSPADM

## 2023-08-22 RX ORDER — ACETAMINOPHEN 325 MG/1
650 TABLET ORAL EVERY 4 HOURS PRN
Status: DISCONTINUED | OUTPATIENT
Start: 2023-08-22 | End: 2023-09-04 | Stop reason: HOSPADM

## 2023-08-22 RX ORDER — FAMOTIDINE 20 MG/1
20 TABLET, FILM COATED ORAL DAILY
Status: DISCONTINUED | OUTPATIENT
Start: 2023-08-23 | End: 2023-09-04 | Stop reason: HOSPADM

## 2023-08-22 RX ORDER — GUAIFENESIN 200 MG/10ML
200 LIQUID ORAL EVERY 4 HOURS PRN
Status: DISCONTINUED | OUTPATIENT
Start: 2023-08-22 | End: 2023-09-04 | Stop reason: HOSPADM

## 2023-08-22 RX ORDER — ASPIRIN 325 MG
325 TABLET ORAL DAILY
Status: DISCONTINUED | OUTPATIENT
Start: 2023-08-23 | End: 2023-09-04 | Stop reason: HOSPADM

## 2023-08-22 RX ORDER — LANOLIN ALCOHOL/MO/W.PET/CERES
9 CREAM (GRAM) TOPICAL NIGHTLY
Status: DISCONTINUED | OUTPATIENT
Start: 2023-08-22 | End: 2023-09-04 | Stop reason: HOSPADM

## 2023-08-22 RX ORDER — ATORVASTATIN CALCIUM 40 MG/1
40 TABLET, FILM COATED ORAL NIGHTLY
Status: DISCONTINUED | OUTPATIENT
Start: 2023-08-22 | End: 2023-09-04 | Stop reason: HOSPADM

## 2023-08-22 RX ORDER — LEVETIRACETAM 100 MG/ML
500 SOLUTION ORAL EVERY 12 HOURS SCHEDULED
Status: DISCONTINUED | OUTPATIENT
Start: 2023-08-22 | End: 2023-09-04 | Stop reason: HOSPADM

## 2023-08-22 RX ORDER — ZINC SULFATE 50(220)MG
220 CAPSULE ORAL DAILY
Status: DISCONTINUED | OUTPATIENT
Start: 2023-08-23 | End: 2023-08-23

## 2023-08-22 RX ORDER — ONDANSETRON 2 MG/ML
4 INJECTION INTRAMUSCULAR; INTRAVENOUS EVERY 6 HOURS PRN
Status: DISCONTINUED | OUTPATIENT
Start: 2023-08-22 | End: 2023-09-04 | Stop reason: HOSPADM

## 2023-08-22 RX ORDER — ACETAMINOPHEN 650 MG/1
650 SUPPOSITORY RECTAL EVERY 4 HOURS PRN
Status: DISCONTINUED | OUTPATIENT
Start: 2023-08-22 | End: 2023-09-04 | Stop reason: HOSPADM

## 2023-08-22 NOTE — PROGRESS NOTES
Physician Progress Note      PATIENT:               Cyril Goodpasture  CSN #:                  [id number]  :                       1960  ADMIT DATE:       2023 7:26 AM  DISCH DATE:        2023 8:20 PM  RESPONDING  PROVIDER #:        Blake Payne DO        QUERY TEXT:    Type of Encephalopathy: Please provide further specificity, if known. Clinical indicators include: fever, alcohol use, alcohol, coma,   encephalopathy, infection  Options provided:  -- Anoxic/hypoxic encephalopathy  -- Metabolic encephalopathy  -- Toxic encephalopathy  -- Hepatic encephalopathy  -- Hypertensive encephalopathy  -- Other - I will add my own diagnosis  -- Disagree - Not applicable / Not valid  -- Disagree - Clinically Unable to determine / Unknown        PROVIDER RESPONSE TEXT:    The patient has metabolic encephalopathy.       Electronically signed by:  Blake Payne DO 2023 2:02 PM

## 2023-08-22 NOTE — PROGRESS NOTES
Adult Nutrition  Assessment/PES    Patient Name:  Chaitanya Hair  YOB: 1960  MRN: [id number]  Admit Date:  8/17/2023    Assessment Date:  8/22/2023       Reason for Assessment       Row Name 08/22/23 1444          Reason for Assessment    Reason For Assessment follow-up protocol     Diagnosis pulmonary disease;renal disease;infection/sepsis     Identified At Risk by Screening Criteria difficulty chewing/swallowing                    Nutrition/Diet History       Row Name 08/22/23 1444          Nutrition/Diet History    Typical Intake (Food/Fluid/EN/PN) Pt and pt family in room. Pt dislikes modified diet. RD and pt discussed modified diet to help with strengthening with goals of diet progression. Pt encouraged to do SLP exercises; pt family encouraging to work on their recommendations. No new wt.     Factors Affecting Nutritional Intake chewing difficulties;difficulty/impaired swallowing;restricted diet                    Labs/Tests/Procedures/Meds       Row Name 08/22/23 1446          Labs/Procedures/Meds    Lab Results Reviewed reviewed     Lab Results Comments BUN        Diagnostic Tests/Procedures    Diagnostic Test/Procedure Reviewed reviewed        Medications    Pertinent Medications Reviewed reviewed     Pertinent Medications Comments See MAR                    Physical Findings       Row Name 08/22/23 1446          Physical Findings    Overall Physical Appearance NC, BM 8/21, no skin breakdown.                    Estimated/Assessed Needs - Anthropometrics       Row Name 08/22/23 1446          Anthropometrics    Weight for Calculation 108 kg (237 lb)        Estimated/Assessed Needs    Additional Documentation Fluid Requirements (Group);KCAL/KG (Group);Protein Requirements (Group)        KCAL/KG    KCAL/KG 20 Kcal/Kg (kcal);25 Kcal/Kg (kcal)     20 Kcal/Kg (kcal) 2150.04     25 Kcal/Kg (kcal) 2687.55        Protein Requirements    Weight Used For Protein Calculations 108 kg (238 lb 1.6 oz)      Est Protein Requirement Amount (gms/kg) 1.0 gm protein     Estimated Protein Requirements (gms/day) 108        Fluid Requirements    Fluid Requirements (mL/day) 2419     RDA Method (mL) 2419                    Nutrition Prescription Ordered       Row Name 08/22/23 1447          Nutrition Prescription PO    Current PO Diet Pureed     Fluid Consistency Nectar/syrup thick                    Evaluation of Received Nutrient/Fluid Intake       Row Name 08/22/23 1447          Nutrient/Fluid Evaluation    Number of Days Evaluated 1 day  no TF 8/19-8/20, started PO diet 8/21        Fluid Intake Evaluation    Oral Fluid (mL) 360  total     Other Fluid (mL) 2370        PO Evaluation    Number of Days PO Intake Evaluated 1 day     Number of Meals 2     % PO Intake 25                       Problem/Interventions:   Problem 1       Row Name 08/22/23 1447          Nutrition Diagnoses Problem 1    Problem 1 Needs Alternate     Etiology (related to) Factors Affecting Nutrition     Oral Chewing Difficulty;Swallowing Difficulty     Signs/Symptoms (evidenced by) NPO;SLP/Swallow eval     Swallow eval status Done     Type of SLP Evaluation Bedside     Resolved? Yes                    Problem 2       Row Name 08/22/23 1447          Nutrition Diagnoses Problem 2    Problem 2 Inadequate Nutrient Intake     Etiology (related to) Factors Affecting Nutrition     Reported/Observed By Patient;Family     Food Habit/Preferences Dislike Modified Diet     Oral Chewing Difficulty;Swallowing Difficulty     Signs/Symptoms (evidenced by) PO Intake     Percent (%) intake recorded 25 %     Over number of meals 2                        Intervention Goal       Row Name 08/22/23 1448          Intervention Goal    General Meet nutritional needs for age/condition;Reduce/improve symptoms;Disease management/therapy     PO Tolerate PO;Increase intake;Meet estimated needs     Weight Maintain weight                    Nutrition Intervention       Row Name 08/22/23  1448          Nutrition Intervention    RD/Tech Action Care plan reviewd;Follow Tx progress;Recommend/ordered     Recommended/Ordered Supplement                    Nutrition Prescription       Row Name 08/22/23 1448          Nutrition Prescription PO    PO Prescription Begin/change supplement     Supplement Magic Cup     Supplement Frequency 3 times a day     New PO Prescription Ordered? Yes                    Education/Evaluation       Row Name 08/22/23 1448          Education    Education No discharge needs identified at this time        Monitor/Evaluation    Monitor Per protocol                     Electronically signed by:  Lucita Diallo RDN, [place]  08/22/23 14:49 CDT

## 2023-08-22 NOTE — PROGRESS NOTES
[facility]  REED Pimentel APRN        Internal Medicine Progress Note    8/22/2023   11:19 CDT    Name:  Chaitanya Hair  MRN:    [id number]     Acct:     [id number]   Room:  [address] [place] [address] Day: 0     Admit Date: 8/17/2023  9:25 PM  PCP: Og Campos MD    Subjective:     C/C: Shortness of breath, weakness     Interval History: Status:  stayed the same. Resting in bed.  No family at bedside. Afebrile. 02 sats stable on room air. Denies pain. Getting stronger slowly. Tolerating modified po diet. Progressing with therapy.   Review of Systems   Constitutional: Positive for malaise/fatigue. Negative for chills, decreased appetite and fever.   HENT:  Negative for ear pain, hearing loss and hoarse voice.    Eyes:  Negative for blurred vision, discharge and pain.   Cardiovascular:  Positive for dyspnea on exertion. Negative for chest pain and claudication.   Respiratory:  Positive for cough and shortness of breath. Negative for hemoptysis.    Endocrine: Negative for cold intolerance, heat intolerance, polydipsia, polyphagia and polyuria.   Hematologic/Lymphatic: Negative for adenopathy and bleeding problem.   Skin:  Negative for color change, itching, nail changes and rash.   Musculoskeletal:  Positive for muscle weakness. Negative for arthritis and back pain.   Gastrointestinal:  Negative for abdominal pain, diarrhea, nausea and vomiting.   Genitourinary:  Negative for flank pain, hematuria and urgency.   Neurological:  Positive for weakness. Negative for dizziness and headaches.   Psychiatric/Behavioral:  Negative for altered mental status, depression, substance abuse, suicidal ideas and thoughts of violence. The patient is not nervous/anxious.    Allergic/Immunologic: Negative for environmental allergies, HIV exposure, hives and persistent infections.       Medications:     Allergies: No Known Allergies    Current Meds:   Current Facility-Administered Medications:     acetaminophen  (TYLENOL) suppository 650 mg, 650 mg, Rectal, Q4H PRN **OR** acetaminophen (TYLENOL) 160 MG/5ML solution 650 mg, 650 mg, Nasogastric, Q4H PRN, Nikunj Jane MD    acetylcysteine (MUCOMYST) 20 % nebulizer solution 2 mL, 2 mL, Nebulization, BID - RT, Nikunj Jane MD    albuterol (PROVENTIL) nebulizer solution 0.083% 2.5 mg/3mL, 2.5 mg, Nebulization, Q4H PRN, Nikunj Jane MD    amLODIPine (NORVASC) tablet 5 mg, 5 mg, Oral, BID, Kaia Edwards APRN    aspirin tablet 325 mg, 325 mg, Nasogastric, Daily, Nikunj Jane MD    atorvastatin (LIPITOR) tablet 40 mg, 40 mg, Nasogastric, Nightly, Nikunj Jane MD    chlorhexidine (PERIDEX) 0.12 % solution 15 mL, 15 mL, Mouth/Throat, Q12H, Nikunj Jane MD    Enoxaparin Sodium (LOVENOX) syringe 40 mg, 40 mg, Subcutaneous, Daily, Kaia Edwards APRN    famotidine (PEPCID) tablet 20 mg, 20 mg, Nasogastric, Daily, Nikunj Jane MD    glycopyrrolate (ROBINUL) injection 0.2 mg, 0.2 mg, Intravenous, Q6H PRN, Nikunj Jane MD    guaifenesin (ROBITUSSIN) 100 MG/5ML liquid 200 mg, 200 mg, Nasogastric, Q4H PRN, Nikunj Jane MD    hydrALAZINE (APRESOLINE) injection 10 mg, 10 mg, Intravenous, Q6H PRN, Nikunj Jane MD    ipratropium-albuterol (DUO-NEB) nebulizer solution 3 mL, 3 mL, Nebulization, Q4H While Awake - RT, Nikunj Jane MD    levETIRAcetam in NaCl 0.82% (KEPPRA) IVPB 500 mg, 500 mg, Intravenous, Q12H, Kaia Edwards, ALY    lisinopril (PRINIVIL,ZESTRIL) tablet 20 mg, 20 mg, Oral, BID, Kaia Edwards APRN    magnesium hydroxide (MILK OF MAGNESIA) 400 MG/5ML suspension 30 mL, 30 mL, Nasogastric, Daily PRN, Nikunj Jane MD    melatonin tablet 9 mg, 9 mg, Nasogastric, Nightly, Nikunj Jane MD    nystatin (MYCOSTATIN) 100,000 unit/mL suspension 500,000 Units, 5 mL, Swish & Spit, TID, Nikunj Jane MD    ondansetron (ZOFRAN) tablet  "4 mg, 4 mg, Nasogastric, Q6H PRN **OR** ondansetron (ZOFRAN) injection 4 mg, 4 mg, Intravenous, Q6H PRN, Nikunj Jane MD    sodium chloride 0.9 % flush 10 mL, 10 mL, Intravenous, Q12H, Nikunj Jane MD    sodium chloride 0.9 % flush 10 mL, 10 mL, Intravenous, PRN, Nikunj Jane MD    vitamin D3 tablet 5,000 Units, 5,000 Units, Nasogastric, Daily, Nikunj Jane MD    zinc sulfate (ZINCATE) capsule 220 mg, 220 mg, Nasogastric, Daily, Nikunj Jane MD    Data:     Code Status:    There are no questions and answers to display.       Family History   Problem Relation Age of Onset    Heart disease Father        Social History     Socioeconomic History    Marital status:    Tobacco Use    Smoking status: Every Day     Packs/day: 0.50     Years: 40.00     Pack years: 20.00     Types: Cigarettes    Smokeless tobacco: Never   Substance and Sexual Activity    Alcohol use: Yes     Comment: social drinker    Drug use: No    Sexual activity: Defer       Vitals:  Ht 193 cm (76\")   Wt 108 kg (237 lb)   BMI 28.85 kg/m²   T 97.9 HR 82 RR 25 /75 SPO2 91%  (room air)          I/O (24Hr):  No intake or output data in the 24 hours ending 08/22/23 1119    Labs and imaging:      No results found for this or any previous visit (from the past 12 hour(s)).                  Physical Examination:        Physical Exam  Vitals and nursing note reviewed.   Constitutional:       Appearance: Normal appearance.   HENT:      Head: Normocephalic and atraumatic.      Right Ear: External ear normal.      Left Ear: External ear normal.      Nose: Nose normal.      Mouth/Throat:      Mouth: Mucous membranes are dry.      Pharynx: Oropharynx is clear.   Eyes:      Extraocular Movements: Extraocular movements intact.      Pupils: Pupils are equal, round, and reactive to light.   Cardiovascular:      Rate and Rhythm: Normal rate and regular rhythm.      Pulses: Normal pulses.      Heart sounds: " Normal heart sounds.   Pulmonary:      Effort: Pulmonary effort is normal.      Breath sounds: Normal breath sounds.   Abdominal:      General: Abdomen is flat.      Palpations: Abdomen is soft.   Musculoskeletal:         General: Normal range of motion.      Cervical back: Normal range of motion and neck supple.      Comments: Profound generalized weakness   Skin:     General: Skin is warm and dry.      Capillary Refill: Capillary refill takes less than 2 seconds.   Neurological:      General: No focal deficit present.      Mental Status: He is alert and oriented to person, place, and time.   Psychiatric:         Mood and Affect: Mood normal.         Behavior: Behavior normal.         Thought Content: Thought content normal.         Judgment: Judgment normal.         Assessment:             * No active hospital problems. *    Past Medical History:   Diagnosis Date    Abnormal stress echo 3/25/19-[facility]    Noted For Myocardial Ischemia    Acute MI     Atelectasis 04/11/2019    Noted on Chest XR    CAD (coronary artery disease) Since 2009    w/R Stent Placed    Chest pain due to CAD     Nitro PRN    DM (diabetes mellitus)     false positive    Elevated cholesterol     History of chest x-ray 04/11/2019    Suggestive For Atelectasis w/Chronic Inflammatory Scarring Noted    History of EKG 3/25/19-[facility]    Sinus Rhythm w/Nonspecific ST-T Wave Abnormalities    History of nephrolithiasis     History of stress test 2015    WNL    HLD (hyperlipidemia)     Controlled w/Meds    Hypertension     Controlled w/Meds    Kidney stones     S/p Lithotripsy    LAD stenosis 04/11/2019    90% Mid LAD Stenosis; Diffuse Stenosis of LCX--Noted on Cardiac Cath    Myocardial ischemia 03/25/2019    Noted on Echo Stress Test    RCA occlusion 04/11/2019    Mild Diffuse Disease--Noted on Cardiac Cath     Tobacco use     1.5-1 PPD        Plan:        Acute hypoxic respiratory failure  S/p COVID  Dysphagia  History of  CAD  Tobacco abuse  HTN  HLD  Continue current treatment. Monitor counts. Increase activity. Labs Thursday. Oxygen weaning as tolerated. Aggressive therapies as tolerated. Maintain patient safety.       Electronically signed by ALY James on 8/22/2023 at 11:19 CDT

## 2023-08-23 PROCEDURE — 97530 THERAPEUTIC ACTIVITIES: CPT

## 2023-08-23 PROCEDURE — 97116 GAIT TRAINING THERAPY: CPT

## 2023-08-23 PROCEDURE — 25010000002 ENOXAPARIN PER 10 MG: Performed by: NURSE PRACTITIONER

## 2023-08-23 PROCEDURE — 92526 ORAL FUNCTION THERAPY: CPT

## 2023-08-23 NOTE — PROGRESS NOTES
REED Mcleod APRN        Internal Medicine Progress Note    8/23/2023   09:24 CDT    Name:  Chaitanya Hair  MRN:    [id number]     Acct:     [id number]   Room:  [address] [place] [address] Day: 0     Admit Date: 8/17/2023  9:25 PM  PCP: Og Campos MD    Subjective:     C/C: Shortness of breath, weakness     Interval History: Status:  stayed the same. Resting in bed. No family at bedside. Afebrile. 02 sats stable on room air. Denies pain. Getting stronger slowly. Tolerating modified po diet. Progressing with therapy. Awaiting ENT evaluation and recommendations.   Review of Systems   Constitutional: Positive for malaise/fatigue. Negative for chills, decreased appetite and fever.   HENT:  Negative for ear pain, hearing loss and hoarse voice.    Eyes:  Negative for blurred vision, discharge and pain.   Cardiovascular:  Positive for dyspnea on exertion. Negative for chest pain and claudication.   Respiratory:  Positive for cough and shortness of breath. Negative for hemoptysis.    Endocrine: Negative for cold intolerance, heat intolerance, polydipsia, polyphagia and polyuria.   Hematologic/Lymphatic: Negative for adenopathy and bleeding problem.   Skin:  Negative for color change, itching, nail changes and rash.   Musculoskeletal:  Positive for muscle weakness. Negative for arthritis and back pain.   Gastrointestinal:  Negative for abdominal pain, diarrhea, nausea and vomiting.   Genitourinary:  Negative for flank pain, hematuria and urgency.   Neurological:  Positive for weakness. Negative for dizziness and headaches.   Psychiatric/Behavioral:  Negative for altered mental status, depression, substance abuse, suicidal ideas and thoughts of violence. The patient is not nervous/anxious.    Allergic/Immunologic: Negative for environmental allergies, HIV exposure, hives and persistent infections.       Medications:     Allergies: No Known Allergies    Current Meds:   Current  Facility-Administered Medications:     acetaminophen (TYLENOL) tablet 650 mg, 650 mg, Oral, Q4H PRN **OR** acetaminophen (TYLENOL) suppository 650 mg, 650 mg, Rectal, Q4H PRN, Nikunj Jane MD    albuterol (PROVENTIL) nebulizer solution 0.083% 2.5 mg/3mL, 2.5 mg, Nebulization, Q4H PRN, Nikunj Jane MD    amLODIPine (NORVASC) tablet 5 mg, 5 mg, Oral, BID, Kaia Edwards APRN    aspirin tablet 325 mg, 325 mg, Oral, Daily, Nikunj Jane MD    atorvastatin (LIPITOR) tablet 40 mg, 40 mg, Oral, Nightly, Nikunj Jane MD    Enoxaparin Sodium (LOVENOX) syringe 40 mg, 40 mg, Subcutaneous, Daily, Kaia Edwards, ALY    famotidine (PEPCID) tablet 20 mg, 20 mg, Oral, Daily, Nikunj Jane MD    glycopyrrolate (ROBINUL) injection 0.2 mg, 0.2 mg, Intravenous, Q6H PRN, Nikunj Jane MD    guaifenesin (ROBITUSSIN) 100 MG/5ML liquid 200 mg, 200 mg, Oral, Q4H PRN, Nikunj Jane MD    hydrALAZINE (APRESOLINE) injection 10 mg, 10 mg, Intravenous, Q6H PRN, Nikunj Jane MD    ipratropium-albuterol (DUO-NEB) nebulizer solution 3 mL, 3 mL, Nebulization, Q4H While Awake - RT, Nikunj Jane MD    levETIRAcetam (KEPPRA) 100 MG/ML solution 500 mg, 500 mg, Oral, Q12H, Kaia Edwards APRN    lisinopril (PRINIVIL,ZESTRIL) tablet 20 mg, 20 mg, Oral, BID, Kaia Edwards APRN    magnesium hydroxide (MILK OF MAGNESIA) 400 MG/5ML suspension 30 mL, 30 mL, Oral, Daily PRN, Nikunj Jane MD    melatonin tablet 9 mg, 9 mg, Oral, Nightly, Nikunj Jane MD    nystatin (MYCOSTATIN) 100,000 unit/mL suspension 500,000 Units, 5 mL, Swish & Spit, TID, Nikunj Jane MD    ondansetron (ZOFRAN) tablet 4 mg, 4 mg, Oral, Q6H PRN **OR** ondansetron (ZOFRAN) injection 4 mg, 4 mg, Intravenous, Q6H PRN, Nikunj Jane MD    sodium chloride 0.9 % flush 10 mL, 10 mL, Intravenous, Q12H, Nikunj Jane MD    sodium chloride  "0.9 % flush 10 mL, 10 mL, Intravenous, PRN, Nikunj Jane MD    vitamin D3 tablet 5,000 Units, 5,000 Units, Oral, Daily, Nikunj Jane MD    zinc sulfate (ZINCATE) capsule 220 mg, 220 mg, Oral, Daily, Nikunj Jane MD    Data:     Code Status:    There are no questions and answers to display.       Family History   Problem Relation Age of Onset    Heart disease Father        Social History     Socioeconomic History    Marital status:    Tobacco Use    Smoking status: Every Day     Packs/day: 0.50     Years: 40.00     Pack years: 20.00     Types: Cigarettes    Smokeless tobacco: Never   Substance and Sexual Activity    Alcohol use: Yes     Comment: social drinker    Drug use: No    Sexual activity: Defer       Vitals:  Ht 193 cm (76\")   Wt 108 kg (237 lb)   BMI 28.85 kg/m²   T 97.9 HR 68 RR 19 /70 SPO2 94%  (room air)          I/O (24Hr):  No intake or output data in the 24 hours ending 08/23/23 0924    Labs and imaging:      No results found for this or any previous visit (from the past 12 hour(s)).                  Physical Examination:        Physical Exam  Vitals and nursing note reviewed.   Constitutional:       Appearance: Normal appearance.   HENT:      Head: Normocephalic and atraumatic.      Right Ear: External ear normal.      Left Ear: External ear normal.      Nose: Nose normal.      Mouth/Throat:      Mouth: Mucous membranes are dry.      Pharynx: Oropharynx is clear.   Eyes:      Extraocular Movements: Extraocular movements intact.      Pupils: Pupils are equal, round, and reactive to light.   Cardiovascular:      Rate and Rhythm: Normal rate and regular rhythm.      Pulses: Normal pulses.      Heart sounds: Normal heart sounds.   Pulmonary:      Effort: Pulmonary effort is normal.      Breath sounds: Normal breath sounds.   Abdominal:      General: Abdomen is flat.      Palpations: Abdomen is soft.   Musculoskeletal:         General: Normal range of motion.     "  Cervical back: Normal range of motion and neck supple.      Comments: Profound generalized weakness   Skin:     General: Skin is warm and dry.      Capillary Refill: Capillary refill takes less than 2 seconds.   Neurological:      General: No focal deficit present.      Mental Status: He is alert and oriented to person, place, and time.   Psychiatric:         Mood and Affect: Mood normal.         Behavior: Behavior normal.         Thought Content: Thought content normal.         Judgment: Judgment normal.         Assessment:             * No active hospital problems. *    Past Medical History:   Diagnosis Date    Abnormal stress echo 3/25/19-[facility]    Noted For Myocardial Ischemia    Acute MI     Atelectasis 04/11/2019    Noted on Chest XR    CAD (coronary artery disease) Since 2009    w/R Stent Placed    Chest pain due to CAD     Nitro PRN    DM (diabetes mellitus)     false positive    Elevated cholesterol     History of chest x-ray 04/11/2019    Suggestive For Atelectasis w/Chronic Inflammatory Scarring Noted    History of EKG 3/25/19-[facility]    Sinus Rhythm w/Nonspecific ST-T Wave Abnormalities    History of nephrolithiasis     History of stress test 2015    WNL    HLD (hyperlipidemia)     Controlled w/Meds    Hypertension     Controlled w/Meds    Kidney stones     S/p Lithotripsy    LAD stenosis 04/11/2019    90% Mid LAD Stenosis; Diffuse Stenosis of LCX--Noted on Cardiac Cath    Myocardial ischemia 03/25/2019    Noted on Echo Stress Test    RCA occlusion 04/11/2019    Mild Diffuse Disease--Noted on Cardiac Cath     Tobacco use     1.5-1 PPD        Plan:        Acute hypoxic respiratory failure  S/p COVID  Dysphagia  History of CAD  Tobacco abuse  HTN  HLD  Continue current treatment. Monitor counts. Increase activity. Labs in am. Oxygen weaning as tolerated. Aggressive therapies as tolerated. Maintain patient safety. Await ENT evaluation and recommendations.       Electronically signed by  ALY James on 8/23/2023 at 09:24 CDT     I have discussed the care of Chaitanya Hair, including pertinent history and exam findings, with the nurse practitioner.    I have seen and examined the patient and the key elements of all parts of the encounter have been performed by me.  I agree with the assessment, plan and orders as documented by ALY Ribeiro, after I modified the exam findings and the plan of treatments and the final version is my approved version of the assessment.        Electronically signed by Nikunj Jane MD on 8/23/2023 at 10:55 CDT

## 2023-08-23 NOTE — PROGRESS NOTES
[facility] Fall Risk Assessment Note    Name: Chaitanya Hair  MRN: [id number]  CSN: [id number]  Admit Date/Time: 8/17/2023  9:25 PM.    Currently ordered medications associated with an increased risk for fall include:    Scheduled Meds:  amLODIPine, 5 mg, Oral, BID  famotidine, 20 mg, Oral, Daily  levETIRAcetam, 500 mg, Oral, Q12H  lisinopril, 20 mg, Oral, BID  melatonin, 9 mg, Oral, Nightly    PRN Meds:    hydrALAZINE  Glycopyrrolate IV PRN    Rickie Berkowitz, Pharmacy Intern  08/23/23 15:23 CDT

## 2023-08-24 LAB
ANION GAP SERPL CALCULATED.3IONS-SCNC: 11 MMOL/L (ref 5–15)
ANISOCYTOSIS BLD QL: ABNORMAL
BASOPHILS # BLD MANUAL: 0.22 10*3/MM3 (ref 0–0.2)
BASOPHILS NFR BLD MANUAL: 3.2 % (ref 0–1.5)
BUN SERPL-MCNC: 19 MG/DL (ref 8–23)
BUN/CREAT SERPL: 20.2 (ref 7–25)
BURR CELLS BLD QL SMEAR: ABNORMAL
CALCIUM SPEC-SCNC: 8.6 MG/DL (ref 8.6–10.5)
CHLORIDE SERPL-SCNC: 109 MMOL/L (ref 98–107)
CO2 SERPL-SCNC: 20 MMOL/L (ref 22–29)
CREAT SERPL-MCNC: 0.94 MG/DL (ref 0.76–1.27)
DEPRECATED RDW RBC AUTO: 48.3 FL (ref 37–54)
EGFRCR SERPLBLD CKD-EPI 2021: 90 ML/MIN/1.73
ERYTHROCYTE [DISTWIDTH] IN BLOOD BY AUTOMATED COUNT: 13.2 % (ref 12.3–15.4)
GLUCOSE SERPL-MCNC: 92 MG/DL (ref 65–99)
HCT VFR BLD AUTO: 39.7 % (ref 37.5–51)
HGB BLD-MCNC: 12.2 G/DL (ref 13–17.7)
LYMPHOCYTES # BLD MANUAL: 1.7 10*3/MM3 (ref 0.7–3.1)
LYMPHOCYTES NFR BLD MANUAL: 4.2 % (ref 5–12)
MACROCYTES BLD QL SMEAR: ABNORMAL
MCH RBC QN AUTO: 30.6 PG (ref 26.6–33)
MCHC RBC AUTO-ENTMCNC: 30.7 G/DL (ref 31.5–35.7)
MCV RBC AUTO: 99.5 FL (ref 79–97)
MONOCYTES # BLD: 0.28 10*3/MM3 (ref 0.1–0.9)
NEUTROPHILS # BLD AUTO: 4.53 10*3/MM3 (ref 1.7–7)
NEUTROPHILS NFR BLD MANUAL: 67.4 % (ref 42.7–76)
PLAT MORPH BLD: NORMAL
PLATELET # BLD AUTO: 188 10*3/MM3 (ref 140–450)
PMV BLD AUTO: 11.1 FL (ref 6–12)
POIKILOCYTOSIS BLD QL SMEAR: ABNORMAL
POTASSIUM SERPL-SCNC: 3.4 MMOL/L (ref 3.5–5.2)
RBC # BLD AUTO: 3.99 10*6/MM3 (ref 4.14–5.8)
SODIUM SERPL-SCNC: 140 MMOL/L (ref 136–145)
VARIANT LYMPHS NFR BLD MANUAL: 25.3 % (ref 19.6–45.3)
WBC MORPH BLD: NORMAL
WBC NRBC COR # BLD: 6.72 10*3/MM3 (ref 3.4–10.8)

## 2023-08-24 PROCEDURE — 25010000002 ENOXAPARIN PER 10 MG: Performed by: NURSE PRACTITIONER

## 2023-08-24 PROCEDURE — 85025 COMPLETE CBC W/AUTO DIFF WBC: CPT | Performed by: NURSE PRACTITIONER

## 2023-08-24 PROCEDURE — 85007 BL SMEAR W/DIFF WBC COUNT: CPT | Performed by: NURSE PRACTITIONER

## 2023-08-24 PROCEDURE — 97116 GAIT TRAINING THERAPY: CPT

## 2023-08-24 PROCEDURE — 80048 BASIC METABOLIC PNL TOTAL CA: CPT | Performed by: NURSE PRACTITIONER

## 2023-08-24 PROCEDURE — 92526 ORAL FUNCTION THERAPY: CPT

## 2023-08-24 PROCEDURE — 97535 SELF CARE MNGMENT TRAINING: CPT

## 2023-08-24 RX ORDER — POTASSIUM CHLORIDE 750 MG/1
40 CAPSULE, EXTENDED RELEASE ORAL
Status: DISPENSED | OUTPATIENT
Start: 2023-08-24 | End: 2023-08-25

## 2023-08-24 NOTE — PROGRESS NOTES
REED Mcleod APRN        Internal Medicine Progress Note    8/24/2023   11:48 CDT    Name:  Chaitanya Hair  MRN:    [id number]     Acct:     [id number]   Room:  [facility] Day: 0     Admit Date: 8/17/2023  9:25 PM  PCP: Og Campos MD    Subjective:     C/C: Shortness of breath, weakness     Interval History: Status:  stayed the same. Ambulating in hallway with therapy. No family at bedside. Afebrile. 02 sats stable on room air. Denies pain. Getting stronger slowly. Tolerating modified po diet. Progressing with therapy. Awaiting ENT evaluation and recommendations. Potassium 3.4. Counts otherwise stable.   Review of Systems   Constitutional: Positive for malaise/fatigue. Negative for chills, decreased appetite and fever.   HENT:  Negative for ear pain, hearing loss and hoarse voice.    Eyes:  Negative for blurred vision, discharge and pain.   Cardiovascular:  Positive for dyspnea on exertion. Negative for chest pain and claudication.   Respiratory:  Positive for cough and shortness of breath. Negative for hemoptysis.    Endocrine: Negative for cold intolerance, heat intolerance, polydipsia, polyphagia and polyuria.   Hematologic/Lymphatic: Negative for adenopathy and bleeding problem.   Skin:  Negative for color change, itching, nail changes and rash.   Musculoskeletal:  Positive for muscle weakness. Negative for arthritis and back pain.   Gastrointestinal:  Negative for abdominal pain, diarrhea, nausea and vomiting.   Genitourinary:  Negative for flank pain, hematuria and urgency.   Neurological:  Positive for weakness. Negative for dizziness and headaches.   Psychiatric/Behavioral:  Negative for altered mental status, depression, substance abuse, suicidal ideas and thoughts of violence. The patient is not nervous/anxious.    Allergic/Immunologic: Negative for environmental allergies, HIV exposure, hives and persistent infections.       Medications:     Allergies:  No Known Allergies    Current Meds:   Current Facility-Administered Medications:     acetaminophen (TYLENOL) tablet 650 mg, 650 mg, Oral, Q4H PRN **OR** acetaminophen (TYLENOL) suppository 650 mg, 650 mg, Rectal, Q4H PRN, Nikunj Jane MD    albuterol (PROVENTIL) nebulizer solution 0.083% 2.5 mg/3mL, 2.5 mg, Nebulization, Q4H PRN, Nikunj Jane MD    amLODIPine (NORVASC) tablet 5 mg, 5 mg, Oral, BID, Kaia Edwards APRN    aspirin tablet 325 mg, 325 mg, Oral, Daily, Nikunj Jane MD    atorvastatin (LIPITOR) tablet 40 mg, 40 mg, Oral, Nightly, Nikunj Jane MD    Enoxaparin Sodium (LOVENOX) syringe 40 mg, 40 mg, Subcutaneous, Daily, Kaia Edwards, ALY    famotidine (PEPCID) tablet 20 mg, 20 mg, Oral, Daily, Nikunj Jane MD    glycopyrrolate (ROBINUL) injection 0.2 mg, 0.2 mg, Intravenous, Q6H PRN, Nikunj Jane MD    guaifenesin (ROBITUSSIN) 100 MG/5ML liquid 200 mg, 200 mg, Oral, Q4H PRN, Nikunj Jane MD    hydrALAZINE (APRESOLINE) injection 10 mg, 10 mg, Intravenous, Q6H PRN, Nikunj Jane MD    ipratropium-albuterol (DUO-NEB) nebulizer solution 3 mL, 3 mL, Nebulization, Q4H While Awake - RT, Nikunj Jane MD    levETIRAcetam (KEPPRA) 100 MG/ML solution 500 mg, 500 mg, Oral, Q12H, Kaia Edwards APRN    lisinopril (PRINIVIL,ZESTRIL) tablet 20 mg, 20 mg, Oral, BID, Kaia Edwards, ALY    magnesium hydroxide (MILK OF MAGNESIA) 400 MG/5ML suspension 30 mL, 30 mL, Oral, Daily PRN, Nikunj Jane MD    melatonin tablet 9 mg, 9 mg, Oral, Nightly, Nikunj Jane MD    nystatin (MYCOSTATIN) 100,000 unit/mL suspension 500,000 Units, 5 mL, Swish & Spit, TID, Nikunj Jane MD    ondansetron (ZOFRAN) tablet 4 mg, 4 mg, Oral, Q6H PRN **OR** ondansetron (ZOFRAN) injection 4 mg, 4 mg, Intravenous, Q6H PRN, Nikunj Jane MD    sodium chloride 0.9 % flush 10 mL, 10 mL, Intravenous, Q12H,  "Nikunj Jane MD    sodium chloride 0.9 % flush 10 mL, 10 mL, Intravenous, PRN, Nikunj Jane MD    Data:     Code Status:    There are no questions and answers to display.       Family History   Problem Relation Age of Onset    Heart disease Father        Social History     Socioeconomic History    Marital status:    Tobacco Use    Smoking status: Every Day     Packs/day: 0.50     Years: 40.00     Pack years: 20.00     Types: Cigarettes    Smokeless tobacco: Never   Substance and Sexual Activity    Alcohol use: Yes     Comment: social drinker    Drug use: No    Sexual activity: Defer       Vitals:  Ht 193 cm (76\")   Wt 108 kg (237 lb)   BMI 28.85 kg/m²   T 98.2 HR 75 RR 22 /80 SPO2 90%  (room air)          I/O (24Hr):  No intake or output data in the 24 hours ending 08/24/23 1148    Labs and imaging:      Recent Results (from the past 12 hour(s))   Basic Metabolic Panel    Collection Time: 08/24/23  4:11 AM    Specimen: Blood   Result Value Ref Range    Glucose 92 65 - 99 mg/dL    BUN 19 8 - 23 mg/dL    Creatinine 0.94 0.76 - 1.27 mg/dL    Sodium 140 136 - 145 mmol/L    Potassium 3.4 (L) 3.5 - 5.2 mmol/L    Chloride 109 (H) 98 - 107 mmol/L    CO2 20.0 (L) 22.0 - 29.0 mmol/L    Calcium 8.6 8.6 - 10.5 mg/dL    BUN/Creatinine Ratio 20.2 7.0 - 25.0    Anion Gap 11.0 5.0 - 15.0 mmol/L    eGFR 90.0 >60.0 mL/min/1.73   CBC Auto Differential    Collection Time: 08/24/23  4:11 AM    Specimen: Blood   Result Value Ref Range    WBC 6.72 3.40 - 10.80 10*3/mm3    RBC 3.99 (L) 4.14 - 5.80 10*6/mm3    Hemoglobin 12.2 (L) 13.0 - 17.7 g/dL    Hematocrit 39.7 37.5 - 51.0 %    MCV 99.5 (H) 79.0 - 97.0 fL    MCH 30.6 26.6 - 33.0 pg    MCHC 30.7 (L) 31.5 - 35.7 g/dL    RDW 13.2 12.3 - 15.4 %    RDW-SD 48.3 37.0 - 54.0 fl    MPV 11.1 6.0 - 12.0 fL    Platelets 188 140 - 450 10*3/mm3   Manual Differential    Collection Time: 08/24/23  4:11 AM    Specimen: Blood   Result Value Ref Range    Neutrophil % " 67.4 42.7 - 76.0 %    Lymphocyte % 25.3 19.6 - 45.3 %    Monocyte % 4.2 (L) 5.0 - 12.0 %    Basophil % 3.2 (H) 0.0 - 1.5 %    Neutrophils Absolute 4.53 1.70 - 7.00 10*3/mm3    Lymphocytes Absolute 1.70 0.70 - 3.10 10*3/mm3    Monocytes Absolute 0.28 0.10 - 0.90 10*3/mm3    Basophils Absolute 0.22 (H) 0.00 - 0.20 10*3/mm3    Anisocytosis Slight/1+ None Seen    Crenated RBC's Mod/2+ None Seen    Macrocytes Slight/1+ None Seen    Poikilocytes Slight/1+ None Seen    WBC Morphology Normal Normal    Platelet Morphology Normal Normal                     Physical Examination:        Physical Exam  Vitals and nursing note reviewed.   Constitutional:       Appearance: Normal appearance.   HENT:      Head: Normocephalic and atraumatic.      Right Ear: External ear normal.      Left Ear: External ear normal.      Nose: Nose normal.      Mouth/Throat:      Mouth: Mucous membranes are dry.      Pharynx: Oropharynx is clear.   Eyes:      Extraocular Movements: Extraocular movements intact.      Pupils: Pupils are equal, round, and reactive to light.   Cardiovascular:      Rate and Rhythm: Normal rate and regular rhythm.      Pulses: Normal pulses.      Heart sounds: Normal heart sounds.   Pulmonary:      Effort: Pulmonary effort is normal.      Breath sounds: Normal breath sounds.   Abdominal:      General: Abdomen is flat.      Palpations: Abdomen is soft.   Musculoskeletal:         General: Normal range of motion.      Cervical back: Normal range of motion and neck supple.      Comments: Profound generalized weakness   Skin:     General: Skin is warm and dry.      Capillary Refill: Capillary refill takes less than 2 seconds.   Neurological:      General: No focal deficit present.      Mental Status: He is alert and oriented to person, place, and time.   Psychiatric:         Mood and Affect: Mood normal.         Behavior: Behavior normal.         Thought Content: Thought content normal.         Judgment: Judgment normal.          Assessment:             * No active hospital problems. *    Past Medical History:   Diagnosis Date    Abnormal stress echo 3/25/19-[facility]    Noted For Myocardial Ischemia    Acute MI     Atelectasis 04/11/2019    Noted on Chest XR    CAD (coronary artery disease) Since 2009    w/R Stent Placed    Chest pain due to CAD     Nitro PRN    DM (diabetes mellitus)     false positive    Elevated cholesterol     History of chest x-ray 04/11/2019    Suggestive For Atelectasis w/Chronic Inflammatory Scarring Noted    History of EKG 3/25/19-[facility]    Sinus Rhythm w/Nonspecific ST-T Wave Abnormalities    History of nephrolithiasis     History of stress test 2015    WNL    HLD (hyperlipidemia)     Controlled w/Meds    Hypertension     Controlled w/Meds    Kidney stones     S/p Lithotripsy    LAD stenosis 04/11/2019    90% Mid LAD Stenosis; Diffuse Stenosis of LCX--Noted on Cardiac Cath    Myocardial ischemia 03/25/2019    Noted on Echo Stress Test    RCA occlusion 04/11/2019    Mild Diffuse Disease--Noted on Cardiac Cath     Tobacco use     1.5-1 PPD        Plan:        Acute hypoxic respiratory failure  S/p COVID  Dysphagia  History of CAD  Tobacco abuse  HTN  HLD  Hypokalemia  Continue current treatment. Monitor counts. Increase activity. Labs Monday. Oxygen weaning as tolerated. Aggressive therapies as tolerated. Maintain patient safety. Await ENT evaluation and recommendations. Replace and recheck potassium.       Electronically signed by ALY James on 8/24/2023 at 11:48 CDT

## 2023-08-25 PROBLEM — R49.0 DYSPHONIA: Status: ACTIVE | Noted: 2023-08-25

## 2023-08-25 PROBLEM — J38.00 VOCAL CORD WEAKNESS: Status: ACTIVE | Noted: 2023-08-25

## 2023-08-25 PROBLEM — R13.12 DYSPHAGIA, OROPHARYNGEAL: Status: ACTIVE | Noted: 2023-08-25

## 2023-08-25 PROBLEM — R49.0 HOARSENESS: Status: ACTIVE | Noted: 2023-08-25

## 2023-08-25 PROBLEM — R47.1 DYSARTHRIA: Status: ACTIVE | Noted: 2023-08-25

## 2023-08-25 LAB
ANION GAP SERPL CALCULATED.3IONS-SCNC: 9 MMOL/L (ref 5–15)
BUN SERPL-MCNC: 15 MG/DL (ref 8–23)
BUN/CREAT SERPL: 16 (ref 7–25)
CALCIUM SPEC-SCNC: 9.2 MG/DL (ref 8.6–10.5)
CHLORIDE SERPL-SCNC: 109 MMOL/L (ref 98–107)
CO2 SERPL-SCNC: 24 MMOL/L (ref 22–29)
CREAT SERPL-MCNC: 0.94 MG/DL (ref 0.76–1.27)
EGFRCR SERPLBLD CKD-EPI 2021: 90 ML/MIN/1.73
GLUCOSE SERPL-MCNC: 107 MG/DL (ref 65–99)
MAGNESIUM SERPL-MCNC: 1.9 MG/DL (ref 1.6–2.4)
POTASSIUM SERPL-SCNC: 3.8 MMOL/L (ref 3.5–5.2)
SODIUM SERPL-SCNC: 142 MMOL/L (ref 136–145)

## 2023-08-25 PROCEDURE — 83735 ASSAY OF MAGNESIUM: CPT | Performed by: INTERNAL MEDICINE

## 2023-08-25 PROCEDURE — 97110 THERAPEUTIC EXERCISES: CPT

## 2023-08-25 PROCEDURE — 97116 GAIT TRAINING THERAPY: CPT

## 2023-08-25 PROCEDURE — 97535 SELF CARE MNGMENT TRAINING: CPT

## 2023-08-25 PROCEDURE — 31575 DIAGNOSTIC LARYNGOSCOPY: CPT | Performed by: OTOLARYNGOLOGY

## 2023-08-25 PROCEDURE — 25010000002 ENOXAPARIN PER 10 MG: Performed by: NURSE PRACTITIONER

## 2023-08-25 PROCEDURE — 80048 BASIC METABOLIC PNL TOTAL CA: CPT | Performed by: INTERNAL MEDICINE

## 2023-08-25 PROCEDURE — 99222 1ST HOSP IP/OBS MODERATE 55: CPT | Performed by: OTOLARYNGOLOGY

## 2023-08-25 PROCEDURE — 97530 THERAPEUTIC ACTIVITIES: CPT

## 2023-08-25 PROCEDURE — 92526 ORAL FUNCTION THERAPY: CPT

## 2023-08-25 NOTE — CONSULTS
[facility] Otolaryngology Head and Neck Surgery  CONSULT  Patient Name: Chaitanya Hair  : 1960  MRN: [id number]  Primary Care Physician:  Og Campos MD  Referring Physician: Nikunj Jane MD  Date of admission: 2023  Length of Stay: 0  Room: [unfilled]      Subjective    Subjective   History of Present Illness  Chief Complaint/Reason for Consultation: Dysphagia, dysphonia  Accompanied by: Wife, RT, speech therapy  Chaitanya Hair is a 63 y.o. male has been transferred to [facility] for oxygen weaning.  The patient underwent intubation with mechanical ventilation at [facility] for worsening COVID pneumonia.  The patient was extubated on 2023.  Since that time, he is continued with dysphagia, trouble speaking, severe dysphonia.  The patient has been evaluated by speech at [facility].  He was noted with similar symptoms as above.  The patient had none of these present prior to intubation for COVID.  He is transferred to [facility] for the same.  ENT has been consulted to evaluate for dysphagia and dysphonia.  The patient has been advanced to puréed food at this point in time.  He is still considered significant risk for aspiration.  Patient seen, chart is reviewed    Review of Systems   Otherwise complete ROS is negative except as mentioned above.    Past Medical History:   Past Medical History:   Diagnosis Date    Abnormal stress echo 3/25/19-[facility]    Noted For Myocardial Ischemia    Acute MI     Atelectasis 2019    Noted on Chest XR    CAD (coronary artery disease) Since     w/R Stent Placed    Chest pain due to CAD     Nitro PRN    DM (diabetes mellitus)     false positive    Elevated cholesterol     History of chest x-ray 2019    Suggestive For Atelectasis w/Chronic Inflammatory Scarring Noted    History of EKG 3/25/19-[facility]    Sinus Rhythm w/Nonspecific ST-T Wave Abnormalities    History of nephrolithiasis     History of stress test     WNL     HLD (hyperlipidemia)     Controlled w/Meds    Hypertension     Controlled w/Meds    Kidney stones     S/p Lithotripsy    LAD stenosis 04/11/2019    90% Mid LAD Stenosis; Diffuse Stenosis of LCX--Noted on Cardiac Cath    Myocardial ischemia 03/25/2019    Noted on Echo Stress Test    RCA occlusion 04/11/2019    Mild Diffuse Disease--Noted on Cardiac Cath     Tobacco use     1.5-1 PPD     Past Surgical History:  Past Surgical History:   Procedure Laterality Date    BACK SURGERY  2013    CARDIAC CATHETERIZATION  2009    RCA Stent    CARDIAC CATHETERIZATION  04/11/2019-[facility]    w/L Ventriculogram/Angiography--Dr. Garcia--CAD Involving LAD    COLONOSCOPY  06/2008    CORONARY ANGIOPLASTY WITH STENT PLACEMENT Right 2009    CORONARY ARTERY BYPASS GRAFT N/A 4/16/2019    Procedure: MEDIAN STERNOTOMY, CORONARY ARTERY BYPASS X2 USING LEFT INTERNAL MAMMARY ARTERY GRAFT, PRP, INTRAOP LAURI;  Surgeon: Jesse Perez MD;  Location: [facility];  Service: Cardiothoracic    CYSTOSCOPY W/ LASER LITHOTRIPSY         Family History: His family history includes Heart disease in his father.   Social History: He  reports that he has been smoking cigarettes. He has a 20.00 pack-year smoking history. He has never used smokeless tobacco. He reports current alcohol use. He reports that he does not use drugs.    Home Medications:  aspirin, furosemide, lisinopril, metoprolol tartrate, multivitamin, potassium chloride, and rosuvastatin    Allergies:  He has No Known Allergies.    Objective    Objective   Vitals:         Physical Exam  Vitals reviewed.   Constitutional:       General: He is not in acute distress.     Appearance: Normal appearance. He is well-developed and normal weight. He is not ill-appearing.      Comments: Sitting up in chair, no difficulties breathing  Obvious oropharyngeal and oral weakness with dysarthria  Obvious breathy voice   HENT:      Head: Normocephalic and atraumatic.      Right Ear: Hearing and  external ear normal.      Left Ear: Hearing and external ear normal.      Nose: Nose normal.      Mouth/Throat:      Lips: Pink.      Mouth: Mucous membranes are dry. No oral lesions.      Dentition: Normal dentition. No gum lesions.      Tongue: No lesions. Tongue does not deviate from midline.      Palate: No mass and lesions.      Pharynx: Uvula midline.      Comments: Tongue-weak protrusion, weak lateral motion and weak repeated side to side motion  Normal palate elevation  Eyes:      General: Lids are normal. Gaze aligned appropriately.      Extraocular Movements: Extraocular movements intact.      Conjunctiva/sclera: Conjunctivae normal.   Neck:      Thyroid: No thyroid mass or thyromegaly.      Trachea: Trachea normal.      Comments: Voice-very breathy voice, decrease sentence formation    Obvious dysarthria that appears to be originating from the tongue  Pulmonary:      Effort: Pulmonary effort is normal. No tachypnea, respiratory distress or retractions.      Breath sounds: No stridor.   Musculoskeletal:         General: Normal range of motion.      Cervical back: Normal range of motion. No rigidity or crepitus. Normal range of motion.   Lymphadenopathy:      Cervical: No cervical adenopathy.   Skin:     Findings: No rash.   Neurological:      General: No focal deficit present.      Mental Status: He is alert and oriented to person, place, and time.      Cranial Nerves: Cranial nerves 2-12 are intact. No cranial nerve deficit.   Psychiatric:         Attention and Perception: Attention and perception normal.         Mood and Affect: Mood and affect normal.         Behavior: Behavior normal. Behavior is cooperative.         Thought Content: Thought content normal.         Cognition and Memory: Cognition normal.         Judgment: Judgment normal.       Result Review    Result Review:  I have personally reviewed the results from the time of this admission to 8/25/2023 16:15 CDT and agree with these  findings:  []  Laboratory  []  Microbiology  []  Radiology  []  EKG/Telemetry   []  Cardiology/Vascular   []  Pathology  []  Old records  []  Other:  Most notable findings include: Labs pertinent to ENT    Assessment & Plan    Assessment / Plan   Brief Patient Summary:  Chaitanya Hair is a 63 y.o. male who has dysphagia and dysphonia.  I have scoped the patient today.  He has extremely weak oral and hypopharynx.  He also has weak vocal folds.  The vocal folds do move and AB as well as AB duct.  He does not have a forced glottic closure and has persistent glottic chink even at full vocalization.  He also has a weak tongue.  I feel his prognosis is good following history of intubation and COVID infection.  I recommend continuing voice and swallowing therapy at this point in time.  He should also engage in oral motor strengthening.    Active Hospital Problems:  Active Hospital Problems    Diagnosis     Dysphagia, oropharyngeal     Dysphonia     Hoarseness     Vocal cord weakness     Dysarthria            Plan:   Dysphonia-related to vocal cord weakness following intubation.  He should participate in voice therapy.  Pharyngeal dysphagia-the patient has oropharyngeal dysphagia from lateral and posterior wall weakness.  He should have swallowing therapy.  Dysarthria-the patient has weak tongue muscles as well.  This is consistent with his overall oropharyngeal weakness.  This will be addressed with his other therapies as above.    I discussed the patient's findings and my recommendations with patient, wife, RT, SLP.  Following patient as in-patient. Further recommendations will be made based on serial examinations.    Medications/Orders for this encounter: No new medications ordered.  No New orders written.         DVT prophylaxis:  Medical and mechanical DVT prophylaxis orders are present.    Discharge Planning: Per primary team    Electronically signed by Wesley Wayne Jr, MD, 08/25/23, 3:25 PM CDT.

## 2023-08-25 NOTE — PROGRESS NOTES
Nutrition Services    Patient Name:  Chaitanya Hair  YOB: 1960  MRN: [id number]  Admit Date:  8/17/2023    NTN visit to encouraged continued therapy with SLP for diet advancement. Pt hopeful for food/liquid texture/consistency upgrade. Encouraging Magic Cup and PO meals as prescribed at this time. NTN following per protocol.    Electronically signed by:  Lucita Diallo RDN, [place]  08/25/23 11:42 CDT

## 2023-08-25 NOTE — PROCEDURES
Flexible laryngoscopy    Date/Time: 8/25/2023 3:27 PM  Performed by: Wesley Wayne Jr., MD  Authorized by: Wesley Wayne Jr., MD     Consent:     Consent obtained:  Verbal    Consent given by:  Patient    Alternatives discussed:  No treatment  Procedure details:     Indications: hoarseness, dysphagia, or aspiration      Instrument: flexible fiberoptic laryngoscope      Scope location: left nare    Nasal cavity:     right nasal cavity not occluded and left nasal cavity not occluded    Sinus/ Nasopharynx:     Right nasopharynx: normal and patent      Left nasopharynx: normal and patent      Right eustachian tube: normal and patent      Left eustachian tube: normal and patent    Oropharynx/ Supraglottis:     Posterior pharyngeal wall: normal       comment:  Lateral walls pale, weak with swallowing     comment:  Pooling of secretions, no lesions    Base of tongue: lingual tonsillar hypertrophy (Mild to moderate)       comment:  Epiglottis appears normal but stiff, without retroversion on swallowing  Larynx/ Hypopharynx:     Arytenoids: interarytenoid edema (Mild)       comment:  Mild mucosal hypertrophy, pooling around the arytenoids and postcricoid area     comment:  No mucosal lesions, weak with swallowing    Pyriform sinus: pooling of secretions (Mild)      False vocal cords: lesion (Mild thickening in the midportion of the false cords, no lesions or ulcerations)      True vocal cords: reduced mobility and Geno's edema      True vocal cords: no immobility    Post-procedure details:     Patient tolerance of procedure:  Tolerated well  Comments:      Patient appears to have weak swallowing.  His epiglottis does not retrovert with swallowing.  He has pooling in the back vallecula and the piriform area as well as in the postcricoid area.  The vocal folds do abductor but weakly.  The vocal folds adduction but forced closure does not result in a competent glottic chink.  He has 1 to 2 mm of opening,  causing breathy speech.  There is no penetration of secretions.  Findings discussed with patient and wife    Electronically signed by Wesley Wayne Jr, MD, 08/25/23, 3:32 PM CDT.

## 2023-08-25 NOTE — PROGRESS NOTES
REED Mcleod APRN        Internal Medicine Progress Note    8/25/2023   09:57 CDT    Name:  Chaitanya Hair  MRN:    [id number]     Acct:     [id number]   Room:  [address] [place] [address] Day: 0     Admit Date: 8/17/2023  9:25 PM  PCP: Og Campos MD    Subjective:     C/C: Shortness of breath, weakness     Interval History: Status:  stayed the same. Resting in bed. Wife at bedside. Woke from sleep. Afebrile. 02 sats stable on room air. Denies pain. Getting stronger slowly. Tolerating modified po diet. Progressing with therapy. Awaiting ENT evaluation and recommendations. Counts stable.   Review of Systems   Constitutional: Positive for malaise/fatigue. Negative for chills, decreased appetite and fever.   HENT:  Negative for ear pain, hearing loss and hoarse voice.    Eyes:  Negative for blurred vision, discharge and pain.   Cardiovascular:  Positive for dyspnea on exertion. Negative for chest pain and claudication.   Respiratory:  Positive for cough and shortness of breath. Negative for hemoptysis.    Endocrine: Negative for cold intolerance, heat intolerance, polydipsia, polyphagia and polyuria.   Hematologic/Lymphatic: Negative for adenopathy and bleeding problem.   Skin:  Negative for color change, itching, nail changes and rash.   Musculoskeletal:  Positive for muscle weakness. Negative for arthritis and back pain.   Gastrointestinal:  Negative for abdominal pain, diarrhea, nausea and vomiting.   Genitourinary:  Negative for flank pain, hematuria and urgency.   Neurological:  Positive for weakness. Negative for dizziness and headaches.   Psychiatric/Behavioral:  Negative for altered mental status, depression, substance abuse, suicidal ideas and thoughts of violence. The patient is not nervous/anxious.    Allergic/Immunologic: Negative for environmental allergies, HIV exposure, hives and persistent infections.       Medications:     Allergies: No Known Allergies    Current  Meds:   Current Facility-Administered Medications:     acetaminophen (TYLENOL) tablet 650 mg, 650 mg, Oral, Q4H PRN **OR** acetaminophen (TYLENOL) suppository 650 mg, 650 mg, Rectal, Q4H PRN, Nikunj Jane MD    albuterol (PROVENTIL) nebulizer solution 0.083% 2.5 mg/3mL, 2.5 mg, Nebulization, Q4H PRN, Nikunj Jane MD    amLODIPine (NORVASC) tablet 5 mg, 5 mg, Oral, BID, Kaia Edwards, APRN    aspirin tablet 325 mg, 325 mg, Oral, Daily, Nikunj Jane MD    atorvastatin (LIPITOR) tablet 40 mg, 40 mg, Oral, Nightly, Nikunj Jane MD    Enoxaparin Sodium (LOVENOX) syringe 40 mg, 40 mg, Subcutaneous, Daily, Kaia Edwards, APRN    famotidine (PEPCID) tablet 20 mg, 20 mg, Oral, Daily, Nikunj Jane MD    glycopyrrolate (ROBINUL) injection 0.2 mg, 0.2 mg, Intravenous, Q6H PRN, Nikunj Jane MD    guaifenesin (ROBITUSSIN) 100 MG/5ML liquid 200 mg, 200 mg, Oral, Q4H PRN, Nikunj Jane MD    hydrALAZINE (APRESOLINE) injection 10 mg, 10 mg, Intravenous, Q6H PRN, Nikunj Jane MD    ipratropium-albuterol (DUO-NEB) nebulizer solution 3 mL, 3 mL, Nebulization, Q4H While Awake - RT, Nikunj Jane MD    levETIRAcetam (KEPPRA) 100 MG/ML solution 500 mg, 500 mg, Oral, Q12H, Kaia Edwards, APRANNIKA    lisinopril (PRINIVIL,ZESTRIL) tablet 20 mg, 20 mg, Oral, BID, Kaia Edwards, APRANNIKA    magnesium hydroxide (MILK OF MAGNESIA) 400 MG/5ML suspension 30 mL, 30 mL, Oral, Daily PRN, Nikunj Jane MD    melatonin tablet 9 mg, 9 mg, Oral, Nightly, Nikunj Jane MD    nystatin (MYCOSTATIN) 100,000 unit/mL suspension 500,000 Units, 5 mL, Swish & Spit, TID, Nikunj Jane MD    ondansetron (ZOFRAN) tablet 4 mg, 4 mg, Oral, Q6H PRN **OR** ondansetron (ZOFRAN) injection 4 mg, 4 mg, Intravenous, Q6H PRN, Nikunj Jane MD    sodium chloride 0.9 % flush 10 mL, 10 mL, Intravenous, Q12H, Nikunj Jane MD     "sodium chloride 0.9 % flush 10 mL, 10 mL, Intravenous, PRN, Nikunj Jane MD    Data:     Code Status:    There are no questions and answers to display.       Family History   Problem Relation Age of Onset    Heart disease Father        Social History     Socioeconomic History    Marital status:    Tobacco Use    Smoking status: Every Day     Packs/day: 0.50     Years: 40.00     Pack years: 20.00     Types: Cigarettes    Smokeless tobacco: Never   Substance and Sexual Activity    Alcohol use: Yes     Comment: social drinker    Drug use: No    Sexual activity: Defer       Vitals:  Ht 193 cm (76\")   Wt 108 kg (237 lb)   BMI 28.85 kg/m²   T 98.1 HR 70 RR 13 /68 SPO2 95%  (room air)          I/O (24Hr):  No intake or output data in the 24 hours ending 08/25/23 0957    Labs and imaging:      Recent Results (from the past 12 hour(s))   Basic Metabolic Panel    Collection Time: 08/25/23  4:54 AM    Specimen: Blood   Result Value Ref Range    Glucose 107 (H) 65 - 99 mg/dL    BUN 15 8 - 23 mg/dL    Creatinine 0.94 0.76 - 1.27 mg/dL    Sodium 142 136 - 145 mmol/L    Potassium 3.8 3.5 - 5.2 mmol/L    Chloride 109 (H) 98 - 107 mmol/L    CO2 24.0 22.0 - 29.0 mmol/L    Calcium 9.2 8.6 - 10.5 mg/dL    BUN/Creatinine Ratio 16.0 7.0 - 25.0    Anion Gap 9.0 5.0 - 15.0 mmol/L    eGFR 90.0 >60.0 mL/min/1.73   Magnesium    Collection Time: 08/25/23  4:54 AM    Specimen: Blood   Result Value Ref Range    Magnesium 1.9 1.6 - 2.4 mg/dL                     Physical Examination:        Physical Exam  Vitals and nursing note reviewed.   Constitutional:       Appearance: Normal appearance.   HENT:      Head: Normocephalic and atraumatic.      Right Ear: External ear normal.      Left Ear: External ear normal.      Nose: Nose normal.      Mouth/Throat:      Mouth: Mucous membranes are dry.      Pharynx: Oropharynx is clear.   Eyes:      Extraocular Movements: Extraocular movements intact.      Pupils: Pupils are equal, " round, and reactive to light.   Cardiovascular:      Rate and Rhythm: Normal rate and regular rhythm.      Pulses: Normal pulses.      Heart sounds: Normal heart sounds.   Pulmonary:      Effort: Pulmonary effort is normal.      Breath sounds: Normal breath sounds.   Abdominal:      General: Abdomen is flat.      Palpations: Abdomen is soft.   Musculoskeletal:         General: Normal range of motion.      Cervical back: Normal range of motion and neck supple.      Comments: Profound generalized weakness   Skin:     General: Skin is warm and dry.      Capillary Refill: Capillary refill takes less than 2 seconds.   Neurological:      General: No focal deficit present.      Mental Status: He is alert and oriented to person, place, and time.   Psychiatric:         Mood and Affect: Mood normal.         Behavior: Behavior normal.         Thought Content: Thought content normal.         Judgment: Judgment normal.         Assessment:             * No active hospital problems. *    Past Medical History:   Diagnosis Date    Abnormal stress echo 3/25/19-[facility]    Noted For Myocardial Ischemia    Acute MI     Atelectasis 04/11/2019    Noted on Chest XR    CAD (coronary artery disease) Since 2009    w/R Stent Placed    Chest pain due to CAD     Nitro PRN    DM (diabetes mellitus)     false positive    Elevated cholesterol     History of chest x-ray 04/11/2019    Suggestive For Atelectasis w/Chronic Inflammatory Scarring Noted    History of EKG 3/25/19-[facility]    Sinus Rhythm w/Nonspecific ST-T Wave Abnormalities    History of nephrolithiasis     History of stress test 2015    WNL    HLD (hyperlipidemia)     Controlled w/Meds    Hypertension     Controlled w/Meds    Kidney stones     S/p Lithotripsy    LAD stenosis 04/11/2019    90% Mid LAD Stenosis; Diffuse Stenosis of LCX--Noted on Cardiac Cath    Myocardial ischemia 03/25/2019    Noted on Echo Stress Test    RCA occlusion 04/11/2019    Mild Diffuse  Disease--Noted on Cardiac Cath     Tobacco use     1.5-1 PPD        Plan:        Acute hypoxic respiratory failure  S/p COVID  Dysphagia  History of CAD  Tobacco abuse  HTN  HLD  Hypokalemia  Continue current treatment. Monitor counts. Increase activity. Labs Monday. Oxygen weaning as tolerated. Aggressive therapies as tolerated. Maintain patient safety. Await ENT evaluation and recommendations.       Electronically signed by ALY James on 8/25/2023 at 09:57 CDT     I have discussed the care of Chaitanya Hair, including pertinent history and exam findings, with the nurse practitioner.    I have seen and examined the patient and the key elements of all parts of the encounter have been performed by me.  I agree with the assessment, plan and orders as documented by ALY Ribeiro, after I modified the exam findings and the plan of treatments and the final version is my approved version of the assessment.        Electronically signed by Nikunj Jane MD on 8/25/2023 at 20:34 CDT

## 2023-08-26 PROCEDURE — 97116 GAIT TRAINING THERAPY: CPT

## 2023-08-26 PROCEDURE — 25010000002 ENOXAPARIN PER 10 MG: Performed by: NURSE PRACTITIONER

## 2023-08-26 PROCEDURE — 97110 THERAPEUTIC EXERCISES: CPT

## 2023-08-26 RX ORDER — IPRATROPIUM BROMIDE AND ALBUTEROL SULFATE 2.5; .5 MG/3ML; MG/3ML
3 SOLUTION RESPIRATORY (INHALATION)
Status: DISCONTINUED | OUTPATIENT
Start: 2023-08-26 | End: 2023-08-30

## 2023-08-26 NOTE — PROGRESS NOTES
REED Mcleod APRN        Internal Medicine Progress Note    8/26/2023   07:27 CDT    Name:  Chaitanya Hair  MRN:    [id number]     Acct:     [id number]   Room:  [address] [place] [address] Day: 0     Admit Date: 8/17/2023  9:25 PM  PCP: Og Campos MD    Subjective:     C/C: Shortness of breath, weakness     Interval History: Status:  stayed the same. Resting in bed. No family at bedside. No complaints this morning. Continues on thickened diet. Pt a little disappointed is wanting thin coffee and sweet tea.  No overnight issues per staff.      Review of Systems   Constitutional: Positive for malaise/fatigue. Negative for chills, decreased appetite and fever.   HENT:  Negative for ear pain, hearing loss and hoarse voice.    Eyes:  Negative for blurred vision, discharge and pain.   Cardiovascular:  Positive for dyspnea on exertion. Negative for chest pain and claudication.   Respiratory:  Positive for cough and shortness of breath. Negative for hemoptysis.    Endocrine: Negative for cold intolerance, heat intolerance, polydipsia, polyphagia and polyuria.   Hematologic/Lymphatic: Negative for adenopathy and bleeding problem.   Skin:  Negative for color change, itching, nail changes and rash.   Musculoskeletal:  Positive for muscle weakness. Negative for arthritis and back pain.   Gastrointestinal:  Negative for abdominal pain, diarrhea, nausea and vomiting.   Genitourinary:  Negative for flank pain, hematuria and urgency.   Neurological:  Positive for weakness. Negative for dizziness and headaches.   Psychiatric/Behavioral:  Negative for altered mental status, depression, substance abuse, suicidal ideas and thoughts of violence. The patient is not nervous/anxious.    Allergic/Immunologic: Negative for environmental allergies, HIV exposure, hives and persistent infections.       Medications:     Allergies: No Known Allergies    Current Meds:   Current Facility-Administered Medications:      acetaminophen (TYLENOL) tablet 650 mg, 650 mg, Oral, Q4H PRN **OR** acetaminophen (TYLENOL) suppository 650 mg, 650 mg, Rectal, Q4H PRN, Nikunj Jane MD    albuterol (PROVENTIL) nebulizer solution 0.083% 2.5 mg/3mL, 2.5 mg, Nebulization, Q4H PRN, Nikunj Jane MD    amLODIPine (NORVASC) tablet 5 mg, 5 mg, Oral, BID, Kaia Edwards APRN    aspirin tablet 325 mg, 325 mg, Oral, Daily, Nikunj Jane MD    atorvastatin (LIPITOR) tablet 40 mg, 40 mg, Oral, Nightly, Nikunj Jane MD    Enoxaparin Sodium (LOVENOX) syringe 40 mg, 40 mg, Subcutaneous, Daily, Kaia Edwards APRN    famotidine (PEPCID) tablet 20 mg, 20 mg, Oral, Daily, Nikunj Jane MD    glycopyrrolate (ROBINUL) injection 0.2 mg, 0.2 mg, Intravenous, Q6H PRN, Nikunj Jane MD    guaifenesin (ROBITUSSIN) 100 MG/5ML liquid 200 mg, 200 mg, Oral, Q4H PRN, Nikunj Jane MD    hydrALAZINE (APRESOLINE) injection 10 mg, 10 mg, Intravenous, Q6H PRN, Nikunj Jane MD    ipratropium-albuterol (DUO-NEB) nebulizer solution 3 mL, 3 mL, Nebulization, BID - RT, Nikunj Jane MD    levETIRAcetam (KEPPRA) 100 MG/ML solution 500 mg, 500 mg, Oral, Q12H, Kaia Edwards APRN    lisinopril (PRINIVIL,ZESTRIL) tablet 20 mg, 20 mg, Oral, BID, Kaia Edwards APRN    magnesium hydroxide (MILK OF MAGNESIA) 400 MG/5ML suspension 30 mL, 30 mL, Oral, Daily PRN, Nikunj Jane MD    melatonin tablet 9 mg, 9 mg, Oral, Nightly, Nikunj Jane MD    ondansetron (ZOFRAN) tablet 4 mg, 4 mg, Oral, Q6H PRN **OR** ondansetron (ZOFRAN) injection 4 mg, 4 mg, Intravenous, Q6H PRN, Nikunj Jane MD    sodium chloride 0.9 % flush 10 mL, 10 mL, Intravenous, Q12H, Nikunj Jane MD    sodium chloride 0.9 % flush 10 mL, 10 mL, Intravenous, PRN, Nikunj Jane MD    Data:     Code Status:    There are no questions and answers to display.       Family  "History   Problem Relation Age of Onset    Heart disease Father        Social History     Socioeconomic History    Marital status:    Tobacco Use    Smoking status: Every Day     Packs/day: 0.50     Years: 40.00     Pack years: 20.00     Types: Cigarettes    Smokeless tobacco: Never   Substance and Sexual Activity    Alcohol use: Yes     Comment: social drinker    Drug use: No    Sexual activity: Defer       Vitals:  Ht 193 cm (76\")   Wt 108 kg (237 lb)   BMI 28.85 kg/m²   T 98.1 HR 70 RR 13 /68 SPO2 95%  (room air)          I/O (24Hr):  No intake or output data in the 24 hours ending 08/26/23 0764    Labs and imaging:      No results found for this or any previous visit (from the past 12 hour(s)).                    Physical Examination:        Physical Exam  Vitals and nursing note reviewed.   Constitutional:       Appearance: Normal appearance.   HENT:      Head: Normocephalic and atraumatic.      Right Ear: External ear normal.      Left Ear: External ear normal.      Nose: Nose normal.      Mouth/Throat:      Mouth: Mucous membranes are dry.      Pharynx: Oropharynx is clear.   Eyes:      Extraocular Movements: Extraocular movements intact.      Pupils: Pupils are equal, round, and reactive to light.   Cardiovascular:      Rate and Rhythm: Normal rate and regular rhythm.      Pulses: Normal pulses.      Heart sounds: Normal heart sounds.   Pulmonary:      Effort: Pulmonary effort is normal.      Breath sounds: Normal breath sounds.   Abdominal:      General: Abdomen is flat.      Palpations: Abdomen is soft.   Musculoskeletal:         General: Normal range of motion.      Cervical back: Normal range of motion and neck supple.      Comments: Profound generalized weakness   Skin:     General: Skin is warm and dry.      Capillary Refill: Capillary refill takes less than 2 seconds.   Neurological:      General: No focal deficit present.      Mental Status: He is alert and oriented to person, place, " and time.   Psychiatric:         Mood and Affect: Mood normal.         Behavior: Behavior normal.         Thought Content: Thought content normal.         Judgment: Judgment normal.         Assessment:             Dysphagia, oropharyngeal    Dysphonia    Hoarseness    Vocal cord weakness    Dysarthria    Past Medical History:   Diagnosis Date    Abnormal stress echo 3/25/19-[facility]    Noted For Myocardial Ischemia    Acute MI     Atelectasis 04/11/2019    Noted on Chest XR    CAD (coronary artery disease) Since 2009    w/R Stent Placed    Chest pain due to CAD     Nitro PRN    DM (diabetes mellitus)     false positive    Elevated cholesterol     History of chest x-ray 04/11/2019    Suggestive For Atelectasis w/Chronic Inflammatory Scarring Noted    History of EKG 3/25/19-[facility]    Sinus Rhythm w/Nonspecific ST-T Wave Abnormalities    History of nephrolithiasis     History of stress test 2015    WNL    HLD (hyperlipidemia)     Controlled w/Meds    Hypertension     Controlled w/Meds    Kidney stones     S/p Lithotripsy    LAD stenosis 04/11/2019    90% Mid LAD Stenosis; Diffuse Stenosis of LCX--Noted on Cardiac Cath    Myocardial ischemia 03/25/2019    Noted on Echo Stress Test    RCA occlusion 04/11/2019    Mild Diffuse Disease--Noted on Cardiac Cath     Tobacco use     1.5-1 PPD        Plan:        Acute hypoxic respiratory failure  S/p COVID  Dysphagia  History of CAD  Tobacco abuse  HTN  HLD  Hypokalemia  Continue current treatment. Monitor counts. Increase activity. Labs Monday. Oxygen weaning as tolerated. Aggressive therapies as tolerated. Maintain patient safety. Await ENT evaluation and recommendations.       Electronically signed by ALY Huynh on 8/26/2023 at 07:27 CDT   I have discussed the care of Chaitanya Hair, including pertinent history and exam findings, with the nurse practitioner.    I have seen and examined the patient and the key elements of all parts of the  encounter have been performed by me.  I agree with the assessment, plan and orders as documented by ALY Cheng, after I modified the exam findings and the plan of treatments and the final version is my approved version of the assessment.        Electronically signed by Nikunj Jane MD on 8/26/2023 at 21:34 CDT

## 2023-08-27 PROCEDURE — 97116 GAIT TRAINING THERAPY: CPT

## 2023-08-27 PROCEDURE — 25010000002 ENOXAPARIN PER 10 MG: Performed by: NURSE PRACTITIONER

## 2023-08-27 NOTE — PROGRESS NOTES
REED Mcleod APRN        Internal Medicine Progress Note    8/27/2023   08:37 CDT    Name:  Chaitanya Hair  MRN:    [id number]     Acct:     [id number]   Room:  [address] [place] [address] Day: 0     Admit Date: 8/17/2023  9:25 PM  PCP: Og Campos MD    Subjective:     C/C: Shortness of breath, weakness     Interval History: Status:  stayed the same. Resting in bed. No family at bedside. Pt states had a pretty good night. Up in chair for several hours yesterday per staff. Making progress with therapy.  No new concerns this am.     Review of Systems   Constitutional: Positive for malaise/fatigue. Negative for chills, decreased appetite and fever.   HENT:  Negative for ear pain, hearing loss and hoarse voice.    Eyes:  Negative for blurred vision, discharge and pain.   Cardiovascular:  Positive for dyspnea on exertion. Negative for chest pain and claudication.   Respiratory:  Positive for cough and shortness of breath. Negative for hemoptysis.    Endocrine: Negative for cold intolerance, heat intolerance, polydipsia, polyphagia and polyuria.   Hematologic/Lymphatic: Negative for adenopathy and bleeding problem.   Skin:  Negative for color change, itching, nail changes and rash.   Musculoskeletal:  Positive for muscle weakness. Negative for arthritis and back pain.   Gastrointestinal:  Negative for abdominal pain, diarrhea, nausea and vomiting.   Genitourinary:  Negative for flank pain, hematuria and urgency.   Neurological:  Positive for weakness. Negative for dizziness and headaches.   Psychiatric/Behavioral:  Negative for altered mental status, depression, substance abuse, suicidal ideas and thoughts of violence. The patient is not nervous/anxious.    Allergic/Immunologic: Negative for environmental allergies, HIV exposure, hives and persistent infections.       Medications:     Allergies: No Known Allergies    Current Meds:   Current Facility-Administered Medications:      acetaminophen (TYLENOL) tablet 650 mg, 650 mg, Oral, Q4H PRN **OR** acetaminophen (TYLENOL) suppository 650 mg, 650 mg, Rectal, Q4H PRN, Nikunj Jane MD    albuterol (PROVENTIL) nebulizer solution 0.083% 2.5 mg/3mL, 2.5 mg, Nebulization, Q4H PRN, Nikunj Jane MD    amLODIPine (NORVASC) tablet 5 mg, 5 mg, Oral, BID, Kaia Edwards APRN    aspirin tablet 325 mg, 325 mg, Oral, Daily, Nikunj Jane MD    atorvastatin (LIPITOR) tablet 40 mg, 40 mg, Oral, Nightly, Nikunj Jane MD    Enoxaparin Sodium (LOVENOX) syringe 40 mg, 40 mg, Subcutaneous, Daily, Kaia Edwards APRN    famotidine (PEPCID) tablet 20 mg, 20 mg, Oral, Daily, Nikunj Jane MD    glycopyrrolate (ROBINUL) injection 0.2 mg, 0.2 mg, Intravenous, Q6H PRN, Nikunj Jane MD    guaifenesin (ROBITUSSIN) 100 MG/5ML liquid 200 mg, 200 mg, Oral, Q4H PRN, Nikunj Jane MD    hydrALAZINE (APRESOLINE) injection 10 mg, 10 mg, Intravenous, Q6H PRN, Nikunj Jane MD    ipratropium-albuterol (DUO-NEB) nebulizer solution 3 mL, 3 mL, Nebulization, BID - RT, Nikunj Jane MD    levETIRAcetam (KEPPRA) 100 MG/ML solution 500 mg, 500 mg, Oral, Q12H, Kaia Edwards APRN    lisinopril (PRINIVIL,ZESTRIL) tablet 20 mg, 20 mg, Oral, BID, Kaia Edwards APRN    magnesium hydroxide (MILK OF MAGNESIA) 400 MG/5ML suspension 30 mL, 30 mL, Oral, Daily PRN, Nikunj Jane MD    melatonin tablet 9 mg, 9 mg, Oral, Nightly, Nikunj Jane MD    ondansetron (ZOFRAN) tablet 4 mg, 4 mg, Oral, Q6H PRN **OR** ondansetron (ZOFRAN) injection 4 mg, 4 mg, Intravenous, Q6H PRN, Nikunj Jane MD    sodium chloride 0.9 % flush 10 mL, 10 mL, Intravenous, Q12H, Nikunj Jane MD    sodium chloride 0.9 % flush 10 mL, 10 mL, Intravenous, PRN, Nikunj Jane MD    Data:     Code Status:    There are no questions and answers to display.       Family History  "  Problem Relation Age of Onset    Heart disease Father        Social History     Socioeconomic History    Marital status:    Tobacco Use    Smoking status: Every Day     Packs/day: 0.50     Years: 40.00     Pack years: 20.00     Types: Cigarettes    Smokeless tobacco: Never   Substance and Sexual Activity    Alcohol use: Yes     Comment: social drinker    Drug use: No    Sexual activity: Defer       Vitals:  Ht 193 cm (76\")   Wt 108 kg (237 lb)   BMI 28.85 kg/m²   T 98.6 HR 85 RR 14 /81 SPO2 94%  (room air)          I/O (24Hr):  No intake or output data in the 24 hours ending 08/27/23 0837    Labs and imaging:      No results found for this or any previous visit (from the past 12 hour(s)).                    Physical Examination:        Physical Exam  Vitals and nursing note reviewed.   Constitutional:       Appearance: Normal appearance.   HENT:      Head: Normocephalic and atraumatic.      Right Ear: External ear normal.      Left Ear: External ear normal.      Nose: Nose normal.      Mouth/Throat:      Mouth: Mucous membranes are dry.      Pharynx: Oropharynx is clear.   Eyes:      Extraocular Movements: Extraocular movements intact.      Pupils: Pupils are equal, round, and reactive to light.   Cardiovascular:      Rate and Rhythm: Normal rate and regular rhythm.      Pulses: Normal pulses.      Heart sounds: Normal heart sounds.   Pulmonary:      Effort: Pulmonary effort is normal.      Breath sounds: Normal breath sounds.   Abdominal:      General: Abdomen is flat.      Palpations: Abdomen is soft.   Musculoskeletal:         General: Normal range of motion.      Cervical back: Normal range of motion and neck supple.      Comments: Profound generalized weakness   Skin:     General: Skin is warm and dry.      Capillary Refill: Capillary refill takes less than 2 seconds.   Neurological:      General: No focal deficit present.      Mental Status: He is alert and oriented to person, place, and time. "   Psychiatric:         Mood and Affect: Mood normal.         Behavior: Behavior normal.         Thought Content: Thought content normal.         Judgment: Judgment normal.         Assessment:             Dysphagia, oropharyngeal    Dysphonia    Hoarseness    Vocal cord weakness    Dysarthria    Past Medical History:   Diagnosis Date    Abnormal stress echo 3/25/19-[facility]    Noted For Myocardial Ischemia    Acute MI     Atelectasis 04/11/2019    Noted on Chest XR    CAD (coronary artery disease) Since 2009    w/R Stent Placed    Chest pain due to CAD     Nitro PRN    DM (diabetes mellitus)     false positive    Elevated cholesterol     History of chest x-ray 04/11/2019    Suggestive For Atelectasis w/Chronic Inflammatory Scarring Noted    History of EKG 3/25/19-[facility]    Sinus Rhythm w/Nonspecific ST-T Wave Abnormalities    History of nephrolithiasis     History of stress test 2015    WNL    HLD (hyperlipidemia)     Controlled w/Meds    Hypertension     Controlled w/Meds    Kidney stones     S/p Lithotripsy    LAD stenosis 04/11/2019    90% Mid LAD Stenosis; Diffuse Stenosis of LCX--Noted on Cardiac Cath    Myocardial ischemia 03/25/2019    Noted on Echo Stress Test    RCA occlusion 04/11/2019    Mild Diffuse Disease--Noted on Cardiac Cath     Tobacco use     1.5-1 PPD        Plan:        Acute hypoxic respiratory failure  S/p COVID  Dysphagia  History of CAD  Tobacco abuse  HTN  HLD  Hypokalemia  Continue current treatment. Monitor counts. Increase activity. Labs Monday. Oxygen weaning as tolerated. Aggressive therapies as tolerated. Maintain patient safety. Await ENT evaluation and recommendations.       Electronically signed by ALY Huynh on 8/27/2023 at 08:37 CDT

## 2023-08-28 LAB
ANION GAP SERPL CALCULATED.3IONS-SCNC: 8 MMOL/L (ref 5–15)
BASOPHILS # BLD AUTO: 0.05 10*3/MM3 (ref 0–0.2)
BASOPHILS NFR BLD AUTO: 0.8 % (ref 0–1.5)
BUN SERPL-MCNC: 13 MG/DL (ref 8–23)
BUN/CREAT SERPL: 13.8 (ref 7–25)
CALCIUM SPEC-SCNC: 9 MG/DL (ref 8.6–10.5)
CHLORIDE SERPL-SCNC: 108 MMOL/L (ref 98–107)
CO2 SERPL-SCNC: 25 MMOL/L (ref 22–29)
CREAT SERPL-MCNC: 0.94 MG/DL (ref 0.76–1.27)
DEPRECATED RDW RBC AUTO: 45.5 FL (ref 37–54)
EGFRCR SERPLBLD CKD-EPI 2021: 90 ML/MIN/1.73
EOSINOPHIL # BLD AUTO: 0.4 10*3/MM3 (ref 0–0.4)
EOSINOPHIL NFR BLD AUTO: 6.5 % (ref 0.3–6.2)
ERYTHROCYTE [DISTWIDTH] IN BLOOD BY AUTOMATED COUNT: 13.5 % (ref 12.3–15.4)
GLUCOSE SERPL-MCNC: 92 MG/DL (ref 65–99)
HCT VFR BLD AUTO: 37.5 % (ref 37.5–51)
HGB BLD-MCNC: 12.1 G/DL (ref 13–17.7)
IMM GRANULOCYTES # BLD AUTO: 0.02 10*3/MM3 (ref 0–0.05)
IMM GRANULOCYTES NFR BLD AUTO: 0.3 % (ref 0–0.5)
LYMPHOCYTES # BLD AUTO: 1.79 10*3/MM3 (ref 0.7–3.1)
LYMPHOCYTES NFR BLD AUTO: 29.1 % (ref 19.6–45.3)
MCH RBC QN AUTO: 30 PG (ref 26.6–33)
MCHC RBC AUTO-ENTMCNC: 32.3 G/DL (ref 31.5–35.7)
MCV RBC AUTO: 92.8 FL (ref 79–97)
MONOCYTES # BLD AUTO: 0.5 10*3/MM3 (ref 0.1–0.9)
MONOCYTES NFR BLD AUTO: 8.1 % (ref 5–12)
NEUTROPHILS NFR BLD AUTO: 3.4 10*3/MM3 (ref 1.7–7)
NEUTROPHILS NFR BLD AUTO: 55.2 % (ref 42.7–76)
NRBC BLD AUTO-RTO: 0 /100 WBC (ref 0–0.2)
PLATELET # BLD AUTO: 191 10*3/MM3 (ref 140–450)
PMV BLD AUTO: 10.4 FL (ref 6–12)
POTASSIUM SERPL-SCNC: 4 MMOL/L (ref 3.5–5.2)
RBC # BLD AUTO: 4.04 10*6/MM3 (ref 4.14–5.8)
SODIUM SERPL-SCNC: 141 MMOL/L (ref 136–145)
WBC NRBC COR # BLD: 6.16 10*3/MM3 (ref 3.4–10.8)

## 2023-08-28 PROCEDURE — 85025 COMPLETE CBC W/AUTO DIFF WBC: CPT | Performed by: INTERNAL MEDICINE

## 2023-08-28 PROCEDURE — 97535 SELF CARE MNGMENT TRAINING: CPT

## 2023-08-28 PROCEDURE — 92526 ORAL FUNCTION THERAPY: CPT

## 2023-08-28 PROCEDURE — 97530 THERAPEUTIC ACTIVITIES: CPT

## 2023-08-28 PROCEDURE — 97110 THERAPEUTIC EXERCISES: CPT

## 2023-08-28 PROCEDURE — 25010000002 ENOXAPARIN PER 10 MG: Performed by: NURSE PRACTITIONER

## 2023-08-28 PROCEDURE — 80048 BASIC METABOLIC PNL TOTAL CA: CPT | Performed by: INTERNAL MEDICINE

## 2023-08-28 PROCEDURE — 97116 GAIT TRAINING THERAPY: CPT

## 2023-08-28 NOTE — PROGRESS NOTES
REED Mcleod APRN        Internal Medicine Progress Note    8/28/2023   09:50 CDT    Name:  Chaitanya Hair  MRN:    [id number]     Acct:     [id number]   Room:  [address] [place] [address] Day: 0     Admit Date: 8/17/2023  9:25 PM  PCP: Og Campos MD    Subjective:     C/C: Shortness of breath, weakness     Interval History: Status:  stayed the same. Resting in bed. Wife at bedside. Afebrile. Maintaining adequate 02 sats on room air. Progressing with therapies. Voice sounds stronger today and speech less garbled. Denies pain. Counts stable. Anxious for discharge plan.     Review of Systems   Constitutional: Positive for malaise/fatigue. Negative for chills, decreased appetite and fever.   HENT:  Negative for ear pain, hearing loss and hoarse voice.    Eyes:  Negative for blurred vision, discharge and pain.   Cardiovascular:  Positive for dyspnea on exertion. Negative for chest pain and claudication.   Respiratory:  Positive for cough and shortness of breath. Negative for hemoptysis.    Endocrine: Negative for cold intolerance, heat intolerance, polydipsia, polyphagia and polyuria.   Hematologic/Lymphatic: Negative for adenopathy and bleeding problem.   Skin:  Negative for color change, itching, nail changes and rash.   Musculoskeletal:  Positive for muscle weakness. Negative for arthritis and back pain.   Gastrointestinal:  Negative for abdominal pain, diarrhea, nausea and vomiting.   Genitourinary:  Negative for flank pain, hematuria and urgency.   Neurological:  Positive for weakness. Negative for dizziness and headaches.   Psychiatric/Behavioral:  Negative for altered mental status, depression, substance abuse, suicidal ideas and thoughts of violence. The patient is not nervous/anxious.    Allergic/Immunologic: Negative for environmental allergies, HIV exposure, hives and persistent infections.       Medications:     Allergies: No Known Allergies    Current Meds:   Current  Facility-Administered Medications:     acetaminophen (TYLENOL) tablet 650 mg, 650 mg, Oral, Q4H PRN **OR** acetaminophen (TYLENOL) suppository 650 mg, 650 mg, Rectal, Q4H PRN, Nikunj Jane MD    albuterol (PROVENTIL) nebulizer solution 0.083% 2.5 mg/3mL, 2.5 mg, Nebulization, Q4H PRN, Nikunj Jane MD    amLODIPine (NORVASC) tablet 5 mg, 5 mg, Oral, BID, Kaia Edwards APRN    aspirin tablet 325 mg, 325 mg, Oral, Daily, Nikunj Jane MD    atorvastatin (LIPITOR) tablet 40 mg, 40 mg, Oral, Nightly, Nikunj Jane MD    Enoxaparin Sodium (LOVENOX) syringe 40 mg, 40 mg, Subcutaneous, Daily, Kaia Edwards, ALY    famotidine (PEPCID) tablet 20 mg, 20 mg, Oral, Daily, Nikunj Jane MD    glycopyrrolate (ROBINUL) injection 0.2 mg, 0.2 mg, Intravenous, Q6H PRN, Nikunj Jane MD    guaifenesin (ROBITUSSIN) 100 MG/5ML liquid 200 mg, 200 mg, Oral, Q4H PRN, Nikunj Jane MD    hydrALAZINE (APRESOLINE) injection 10 mg, 10 mg, Intravenous, Q6H PRN, Nikunj Jane MD    ipratropium-albuterol (DUO-NEB) nebulizer solution 3 mL, 3 mL, Nebulization, BID - RT, Nikunj Jane MD    levETIRAcetam (KEPPRA) 100 MG/ML solution 500 mg, 500 mg, Oral, Q12H, Kaia Edwards, ALY    lisinopril (PRINIVIL,ZESTRIL) tablet 20 mg, 20 mg, Oral, BID, Kaia Edwards APRN    magnesium hydroxide (MILK OF MAGNESIA) 400 MG/5ML suspension 30 mL, 30 mL, Oral, Daily PRN, Nikunj Jane MD    melatonin tablet 9 mg, 9 mg, Oral, Nightly, Nikunj Jane MD    ondansetron (ZOFRAN) tablet 4 mg, 4 mg, Oral, Q6H PRN **OR** ondansetron (ZOFRAN) injection 4 mg, 4 mg, Intravenous, Q6H PRN, Nikunj Jane MD    sodium chloride 0.9 % flush 10 mL, 10 mL, Intravenous, Q12H, Nikunj Jane MD    sodium chloride 0.9 % flush 10 mL, 10 mL, Intravenous, PRN, Nikunj Jane MD    Data:     Code Status:    There are no questions and  "answers to display.       Family History   Problem Relation Age of Onset    Heart disease Father        Social History     Socioeconomic History    Marital status:    Tobacco Use    Smoking status: Every Day     Packs/day: 0.50     Years: 40.00     Pack years: 20.00     Types: Cigarettes    Smokeless tobacco: Never   Substance and Sexual Activity    Alcohol use: Yes     Comment: social drinker    Drug use: No    Sexual activity: Defer       Vitals:  Ht 193 cm (76\")   Wt 103 kg (226 lb 9.6 oz)   BMI 27.58 kg/m²   T 98.6 HR 89 RR 23 /81 SPO2 96%  (room air)          I/O (24Hr):  No intake or output data in the 24 hours ending 08/28/23 0950    Labs and imaging:      Recent Results (from the past 12 hour(s))   Basic Metabolic Panel    Collection Time: 08/28/23  5:14 AM    Specimen: Blood   Result Value Ref Range    Glucose 92 65 - 99 mg/dL    BUN 13 8 - 23 mg/dL    Creatinine 0.94 0.76 - 1.27 mg/dL    Sodium 141 136 - 145 mmol/L    Potassium 4.0 3.5 - 5.2 mmol/L    Chloride 108 (H) 98 - 107 mmol/L    CO2 25.0 22.0 - 29.0 mmol/L    Calcium 9.0 8.6 - 10.5 mg/dL    BUN/Creatinine Ratio 13.8 7.0 - 25.0    Anion Gap 8.0 5.0 - 15.0 mmol/L    eGFR 90.0 >60.0 mL/min/1.73   CBC Auto Differential    Collection Time: 08/28/23  5:14 AM    Specimen: Blood   Result Value Ref Range    WBC 6.16 3.40 - 10.80 10*3/mm3    RBC 4.04 (L) 4.14 - 5.80 10*6/mm3    Hemoglobin 12.1 (L) 13.0 - 17.7 g/dL    Hematocrit 37.5 37.5 - 51.0 %    MCV 92.8 79.0 - 97.0 fL    MCH 30.0 26.6 - 33.0 pg    MCHC 32.3 31.5 - 35.7 g/dL    RDW 13.5 12.3 - 15.4 %    RDW-SD 45.5 37.0 - 54.0 fl    MPV 10.4 6.0 - 12.0 fL    Platelets 191 140 - 450 10*3/mm3    Neutrophil % 55.2 42.7 - 76.0 %    Lymphocyte % 29.1 19.6 - 45.3 %    Monocyte % 8.1 5.0 - 12.0 %    Eosinophil % 6.5 (H) 0.3 - 6.2 %    Basophil % 0.8 0.0 - 1.5 %    Immature Grans % 0.3 0.0 - 0.5 %    Neutrophils, Absolute 3.40 1.70 - 7.00 10*3/mm3    Lymphocytes, Absolute 1.79 0.70 - 3.10 10*3/mm3 "    Monocytes, Absolute 0.50 0.10 - 0.90 10*3/mm3    Eosinophils, Absolute 0.40 0.00 - 0.40 10*3/mm3    Basophils, Absolute 0.05 0.00 - 0.20 10*3/mm3    Immature Grans, Absolute 0.02 0.00 - 0.05 10*3/mm3    nRBC 0.0 0.0 - 0.2 /100 WBC                       Physical Examination:        Physical Exam  Vitals and nursing note reviewed.   Constitutional:       Appearance: Normal appearance.   HENT:      Head: Normocephalic and atraumatic.      Right Ear: External ear normal.      Left Ear: External ear normal.      Nose: Nose normal.      Mouth/Throat:      Mouth: Mucous membranes are dry.      Pharynx: Oropharynx is clear.   Eyes:      Extraocular Movements: Extraocular movements intact.      Pupils: Pupils are equal, round, and reactive to light.   Cardiovascular:      Rate and Rhythm: Normal rate and regular rhythm.      Pulses: Normal pulses.      Heart sounds: Normal heart sounds.   Pulmonary:      Effort: Pulmonary effort is normal.      Breath sounds: Normal breath sounds.   Abdominal:      General: Abdomen is flat.      Palpations: Abdomen is soft.   Musculoskeletal:         General: Normal range of motion.      Cervical back: Normal range of motion and neck supple.      Comments: Profound generalized weakness   Skin:     General: Skin is warm and dry.      Capillary Refill: Capillary refill takes less than 2 seconds.   Neurological:      General: No focal deficit present.      Mental Status: He is alert and oriented to person, place, and time.   Psychiatric:         Mood and Affect: Mood normal.         Behavior: Behavior normal.         Thought Content: Thought content normal.         Judgment: Judgment normal.         Assessment:             Dysphagia, oropharyngeal    Dysphonia    Hoarseness    Vocal cord weakness    Dysarthria    Past Medical History:   Diagnosis Date    Abnormal stress echo 3/25/19-[facility]    Noted For Myocardial Ischemia    Acute MI     Atelectasis 04/11/2019    Noted on Chest XR     CAD (coronary artery disease) Since 2009    w/R Stent Placed    Chest pain due to CAD     Nitro PRN    DM (diabetes mellitus)     false positive    Elevated cholesterol     History of chest x-ray 04/11/2019    Suggestive For Atelectasis w/Chronic Inflammatory Scarring Noted    History of EKG 3/25/19-[facility]    Sinus Rhythm w/Nonspecific ST-T Wave Abnormalities    History of nephrolithiasis     History of stress test 2015    WNL    HLD (hyperlipidemia)     Controlled w/Meds    Hypertension     Controlled w/Meds    Kidney stones     S/p Lithotripsy    LAD stenosis 04/11/2019    90% Mid LAD Stenosis; Diffuse Stenosis of LCX--Noted on Cardiac Cath    Myocardial ischemia 03/25/2019    Noted on Echo Stress Test    RCA occlusion 04/11/2019    Mild Diffuse Disease--Noted on Cardiac Cath     Tobacco use     1.5-1 PPD        Plan:        Acute hypoxic respiratory failure  S/p COVID  Dysphagia  History of CAD  Tobacco abuse  HTN  HLD  Hypokalemia  Continue current treatment. Monitor counts. Increase activity. Labs Thursday. Aggressive therapies as tolerated. Maintain patient safety. Appreciate ENT evaluation and recommendations.       Electronically signed by AYL James on 8/28/2023 at 09:50 CDT     I have discussed the care of Chaitanya Hair, including pertinent history and exam findings, with the nurse practitioner.    I have seen and examined the patient and the key elements of all parts of the encounter have been performed by me.  I agree with the assessment, plan and orders as documented by ALY Ribeiro, after I modified the exam findings and the plan of treatments and the final version is my approved version of the assessment.        Electronically signed by Nikunj Jane MD on 8/28/2023 at 13:10 CDT

## 2023-08-28 NOTE — PROGRESS NOTES
Adult Nutrition  Assessment/PES    Patient Name:  Chaitanya Hair  YOB: 1960  MRN: [id number]  Admit Date:  8/17/2023    Assessment Date:  8/28/2023       Reason for Assessment       Row Name 08/28/23 1224          Reason for Assessment    Reason For Assessment follow-up protocol     Diagnosis pulmonary disease;renal disease     Identified At Risk by Screening Criteria difficulty chewing/swallowing                    Nutrition/Diet History       Row Name 08/28/23 1224          Nutrition/Diet History    Typical Intake (Food/Fluid/EN/PN) Pt and pt spouse in room. Hopeful for diet upgrade. Encouraged Magic Cup for swallowing exercises. Pt eating puree and NTL; has accepted this food for now. Wt 103kg; 12.5lb loss since admit.     Factors Affecting Nutritional Intake chewing difficulties;difficulty/impaired swallowing                    Labs/Tests/Procedures/Meds       Row Name 08/28/23 1225          Labs/Procedures/Meds    Lab Results Reviewed reviewed        Diagnostic Tests/Procedures    Diagnostic Test/Procedure Reviewed reviewed        Medications    Pertinent Medications Reviewed reviewed                    Physical Findings       Row Name 08/28/23 1225          Physical Findings    Overall Physical Appearance Room air, BM 8/23, generalized edema, no skin breakdown.                    Estimated/Assessed Needs - Anthropometrics       Row Name 08/28/23 1225          Anthropometrics    Weight for Calculation 103 kg (226 lb 9.6 oz)        Estimated/Assessed Needs    Additional Documentation Fluid Requirements (Group);KCAL/KG (Group);Protein Requirements (Group)        KCAL/KG    KCAL/KG 20 Kcal/Kg (kcal);25 Kcal/Kg (kcal)     20 Kcal/Kg (kcal) 2055.7     25 Kcal/Kg (kcal) 2569.625        Protein Requirements    Weight Used For Protein Calculations 103 kg (227 lb 1.2 oz)     Est Protein Requirement Amount (gms/kg) 1.0 gm protein     Estimated Protein Requirements (gms/day) 103        Fluid Requirements     Fluid Requirements (mL/day) 2570     RDA Method (mL) 2570                    Nutrition Prescription Ordered       Row Name 08/28/23 1226          Nutrition Prescription PO    Current PO Diet Pureed     Fluid Consistency Nectar/syrup thick     Supplement Magic Cup     Supplement Frequency 3 times a day                    Evaluation of Received Nutrient/Fluid Intake       Row Name 08/28/23 1226          Nutrient/Fluid Evaluation    Number of Days Evaluated 3 days        Fluid Intake Evaluation    Oral Fluid (mL) 1210        PO Evaluation    Number of Days PO Intake Evaluated 3 days     Number of Meals 9     % PO Intake 92                       Problem/Interventions:     Problem 2       Row Name 08/28/23 1226          Nutrition Diagnoses Problem 2    Problem 2 Inadequate Nutrient Intake     Etiology (related to) Factors Affecting Nutrition     Reported/Observed By Patient;Family     Food Habit/Preferences Dislike Modified Diet;Dislike Supplement     Oral Chewing Difficulty;Swallowing Difficulty     Signs/Symptoms (evidenced by) PO Intake;Unintended Weight Change     Percent (%) intake recorded 92 %     Over number of meals 9     Unintended Weight Change Loss     Number of Pounds Lost ~12.5lb     Weight loss time period 1 week                        Intervention Goal       Row Name 08/28/23 1226          Intervention Goal    General Meet nutritional needs for age/condition;Reduce/improve symptoms;Disease management/therapy     PO Continue positive trend;Meet estimated needs     Weight Maintain weight                    Nutrition Intervention       Row Name 08/28/23 1226          Nutrition Intervention    RD/Tech Action Care plan reviewd;Follow Tx progress;Encourage intake                    Nutrition Prescription       Row Name 08/28/23 1226          Nutrition Prescription PO    PO Prescription Other (comment)  per SLP recommendations; continue Magic Cup                    Education/Evaluation       Row Name 08/28/23  1227          Education    Education No discharge needs identified at this time        Monitor/Evaluation    Monitor Per protocol                     Electronically signed by:  Lucita Diallo RDN, [place]  08/28/23 12:27 CDT

## 2023-08-29 PROCEDURE — 97116 GAIT TRAINING THERAPY: CPT

## 2023-08-29 PROCEDURE — 92526 ORAL FUNCTION THERAPY: CPT

## 2023-08-29 PROCEDURE — 25010000002 ENOXAPARIN PER 10 MG: Performed by: NURSE PRACTITIONER

## 2023-08-29 NOTE — PROGRESS NOTES
REED Mcleod APRN        Internal Medicine Progress Note    8/29/2023   11:06 CDT    Name:  Chaitanya Hair  MRN:    [id number]     Acct:     [id number]   Room:  [address] [place] [address] Day: 0     Admit Date: 8/17/2023  9:25 PM  PCP: Og Campos MD    Subjective:     C/C: Shortness of breath, weakness     Interval History: Status:  stayed the same. Resting in bed. Talking on telephone. No family at bedside. Afebrile. Maintaining adequate 02 sats on room air. Progressing with therapies. Voice sounds stronger today and speech less garbled. Continues on modified po diet. Denies pain. Anxious for discharge plan.     Review of Systems   Constitutional: Positive for malaise/fatigue. Negative for chills, decreased appetite and fever.   HENT:  Negative for ear pain, hearing loss and hoarse voice.    Eyes:  Negative for blurred vision, discharge and pain.   Cardiovascular:  Positive for dyspnea on exertion. Negative for chest pain and claudication.   Respiratory:  Positive for cough and shortness of breath. Negative for hemoptysis.    Endocrine: Negative for cold intolerance, heat intolerance, polydipsia, polyphagia and polyuria.   Hematologic/Lymphatic: Negative for adenopathy and bleeding problem.   Skin:  Negative for color change, itching, nail changes and rash.   Musculoskeletal:  Positive for muscle weakness. Negative for arthritis and back pain.   Gastrointestinal:  Negative for abdominal pain, diarrhea, nausea and vomiting.   Genitourinary:  Negative for flank pain, hematuria and urgency.   Neurological:  Positive for weakness. Negative for dizziness and headaches.   Psychiatric/Behavioral:  Negative for altered mental status, depression, substance abuse, suicidal ideas and thoughts of violence. The patient is not nervous/anxious.    Allergic/Immunologic: Negative for environmental allergies, HIV exposure, hives and persistent infections.       Medications:     Allergies: No Known  Allergies    Current Meds:   Current Facility-Administered Medications:     acetaminophen (TYLENOL) tablet 650 mg, 650 mg, Oral, Q4H PRN **OR** acetaminophen (TYLENOL) suppository 650 mg, 650 mg, Rectal, Q4H PRN, Nikunj Jane MD    albuterol (PROVENTIL) nebulizer solution 0.083% 2.5 mg/3mL, 2.5 mg, Nebulization, Q4H PRN, Nikunj Jane MD    amLODIPine (NORVASC) tablet 5 mg, 5 mg, Oral, BID, Kaia Edwards, ALY    aspirin tablet 325 mg, 325 mg, Oral, Daily, Nikunj Jane MD    atorvastatin (LIPITOR) tablet 40 mg, 40 mg, Oral, Nightly, Nikunj Jane MD    Enoxaparin Sodium (LOVENOX) syringe 40 mg, 40 mg, Subcutaneous, Daily, Kiaa Edwards, ALY    famotidine (PEPCID) tablet 20 mg, 20 mg, Oral, Daily, Nikunj Jane MD    glycopyrrolate (ROBINUL) injection 0.2 mg, 0.2 mg, Intravenous, Q6H PRN, Nikunj Jane MD    guaifenesin (ROBITUSSIN) 100 MG/5ML liquid 200 mg, 200 mg, Oral, Q4H PRN, Nikunj Jane MD    hydrALAZINE (APRESOLINE) injection 10 mg, 10 mg, Intravenous, Q6H PRN, Nikunj Jane MD    ipratropium-albuterol (DUO-NEB) nebulizer solution 3 mL, 3 mL, Nebulization, BID - RT, Nikunj Jane MD    levETIRAcetam (KEPPRA) 100 MG/ML solution 500 mg, 500 mg, Oral, Q12H, Kaia Edwards APRN    lisinopril (PRINIVIL,ZESTRIL) tablet 20 mg, 20 mg, Oral, BID, Kaia Edwards APRN    magnesium hydroxide (MILK OF MAGNESIA) 400 MG/5ML suspension 30 mL, 30 mL, Oral, Daily PRN, Nikunj Jane MD    melatonin tablet 9 mg, 9 mg, Oral, Nightly, Nikunj Jane MD    ondansetron (ZOFRAN) tablet 4 mg, 4 mg, Oral, Q6H PRN **OR** ondansetron (ZOFRAN) injection 4 mg, 4 mg, Intravenous, Q6H PRN, Nikunj Jane MD    sodium chloride 0.9 % flush 10 mL, 10 mL, Intravenous, Q12H, Nikunj Jane MD    sodium chloride 0.9 % flush 10 mL, 10 mL, Intravenous, PRN, Nikunj Jane MD    Data:     Code  "Status:    There are no questions and answers to display.       Family History   Problem Relation Age of Onset    Heart disease Father        Social History     Socioeconomic History    Marital status:    Tobacco Use    Smoking status: Every Day     Packs/day: 0.50     Years: 40.00     Pack years: 20.00     Types: Cigarettes    Smokeless tobacco: Never   Substance and Sexual Activity    Alcohol use: Yes     Comment: social drinker    Drug use: No    Sexual activity: Defer       Vitals:  Ht 193 cm (76\")   Wt 103 kg (226 lb 9.6 oz)   BMI 27.58 kg/m²   T 97.7 HR 83 RR 21 /69 SPO2 93%  (room air)          I/O (24Hr):  No intake or output data in the 24 hours ending 08/29/23 1106    Labs and imaging:      No results found for this or any previous visit (from the past 12 hour(s)).                  Physical Examination:        Physical Exam  Vitals and nursing note reviewed.   Constitutional:       Appearance: Normal appearance.   HENT:      Head: Normocephalic and atraumatic.      Right Ear: External ear normal.      Left Ear: External ear normal.      Nose: Nose normal.      Mouth/Throat:      Mouth: Mucous membranes are dry.      Pharynx: Oropharynx is clear.   Eyes:      Extraocular Movements: Extraocular movements intact.      Pupils: Pupils are equal, round, and reactive to light.   Cardiovascular:      Rate and Rhythm: Normal rate and regular rhythm.      Pulses: Normal pulses.      Heart sounds: Normal heart sounds.   Pulmonary:      Effort: Pulmonary effort is normal.      Breath sounds: Normal breath sounds.   Abdominal:      General: Abdomen is flat.      Palpations: Abdomen is soft.   Musculoskeletal:         General: Normal range of motion.      Cervical back: Normal range of motion and neck supple.      Comments: Profound generalized weakness   Skin:     General: Skin is warm and dry.      Capillary Refill: Capillary refill takes less than 2 seconds.   Neurological:      General: No focal " deficit present.      Mental Status: He is alert and oriented to person, place, and time.   Psychiatric:         Mood and Affect: Mood normal.         Behavior: Behavior normal.         Thought Content: Thought content normal.         Judgment: Judgment normal.         Assessment:             Dysphagia, oropharyngeal    Dysphonia    Hoarseness    Vocal cord weakness    Dysarthria    Past Medical History:   Diagnosis Date    Abnormal stress echo 3/25/19-[facility]    Noted For Myocardial Ischemia    Acute MI     Atelectasis 04/11/2019    Noted on Chest XR    CAD (coronary artery disease) Since 2009    w/R Stent Placed    Chest pain due to CAD     Nitro PRN    DM (diabetes mellitus)     false positive    Elevated cholesterol     History of chest x-ray 04/11/2019    Suggestive For Atelectasis w/Chronic Inflammatory Scarring Noted    History of EKG 3/25/19-[facility]    Sinus Rhythm w/Nonspecific ST-T Wave Abnormalities    History of nephrolithiasis     History of stress test 2015    WNL    HLD (hyperlipidemia)     Controlled w/Meds    Hypertension     Controlled w/Meds    Kidney stones     S/p Lithotripsy    LAD stenosis 04/11/2019    90% Mid LAD Stenosis; Diffuse Stenosis of LCX--Noted on Cardiac Cath    Myocardial ischemia 03/25/2019    Noted on Echo Stress Test    RCA occlusion 04/11/2019    Mild Diffuse Disease--Noted on Cardiac Cath     Tobacco use     1.5-1 PPD        Plan:        Acute hypoxic respiratory failure  S/p COVID  Dysphagia  History of CAD  Tobacco abuse  HTN  HLD  Hypokalemia  Continue current treatment. Monitor counts. Increase activity. Labs Thursday. Aggressive therapies as tolerated. Maintain patient safety. Appreciate ENT evaluation and recommendations. Discharge planning.       Electronically signed by ALY James on 8/29/2023 at 11:06 CDT     I have discussed the care of Chaitanya Hair, including pertinent history and exam findings, with the nurse practitioner.    I  have seen and examined the patient and the key elements of all parts of the encounter have been performed by me.  I agree with the assessment, plan and orders as documented by ALY Ribeiro, after I modified the exam findings and the plan of treatments and the final version is my approved version of the assessment.        Electronically signed by Nikunj Jane MD on 8/29/2023 at 19:57 CDT

## 2023-08-30 LAB
ANION GAP SERPL CALCULATED.3IONS-SCNC: 9 MMOL/L (ref 5–15)
BUN SERPL-MCNC: 13 MG/DL (ref 8–23)
BUN/CREAT SERPL: 16 (ref 7–25)
CALCIUM SPEC-SCNC: 9 MG/DL (ref 8.6–10.5)
CHLORIDE SERPL-SCNC: 106 MMOL/L (ref 98–107)
CO2 SERPL-SCNC: 26 MMOL/L (ref 22–29)
CREAT SERPL-MCNC: 0.81 MG/DL (ref 0.76–1.27)
EGFRCR SERPLBLD CKD-EPI 2021: 97.8 ML/MIN/1.73
GLUCOSE SERPL-MCNC: 89 MG/DL (ref 65–99)
POTASSIUM SERPL-SCNC: 3.9 MMOL/L (ref 3.5–5.2)
SODIUM SERPL-SCNC: 141 MMOL/L (ref 136–145)

## 2023-08-30 PROCEDURE — 25010000002 ENOXAPARIN PER 10 MG: Performed by: NURSE PRACTITIONER

## 2023-08-30 PROCEDURE — 80048 BASIC METABOLIC PNL TOTAL CA: CPT | Performed by: INTERNAL MEDICINE

## 2023-08-30 PROCEDURE — 97530 THERAPEUTIC ACTIVITIES: CPT

## 2023-08-30 PROCEDURE — 92526 ORAL FUNCTION THERAPY: CPT

## 2023-08-30 PROCEDURE — 97535 SELF CARE MNGMENT TRAINING: CPT

## 2023-08-30 PROCEDURE — 97116 GAIT TRAINING THERAPY: CPT

## 2023-08-30 NOTE — PROGRESS NOTES
REED Mcleod APRN        Internal Medicine Progress Note    8/30/2023   13:49 CDT    Name:  Chaitanya Hair  MRN:    [id number]     Acct:     [id number]   Room:  [address] [place] [address] Day: 0     Admit Date: 8/17/2023  9:25 PM  PCP: Og Campos MD    Subjective:     C/C: Shortness of breath, weakness     Interval History: Status:  stayed the same. Up to chair. No family at bedside. Afebrile. Maintaining adequate 02 sats on room air. Progressing with therapies. Voice sounds stronger today and speech less garbled. Continues on modified po diet. Denies pain. Anxious for discharge plan.     Review of Systems   Constitutional: Positive for malaise/fatigue. Negative for chills, decreased appetite and fever.   HENT:  Negative for ear pain, hearing loss and hoarse voice.    Eyes:  Negative for blurred vision, discharge and pain.   Cardiovascular:  Positive for dyspnea on exertion. Negative for chest pain and claudication.   Respiratory:  Positive for cough and shortness of breath. Negative for hemoptysis.    Endocrine: Negative for cold intolerance, heat intolerance, polydipsia, polyphagia and polyuria.   Hematologic/Lymphatic: Negative for adenopathy and bleeding problem.   Skin:  Negative for color change, itching, nail changes and rash.   Musculoskeletal:  Positive for muscle weakness. Negative for arthritis and back pain.   Gastrointestinal:  Negative for abdominal pain, diarrhea, nausea and vomiting.   Genitourinary:  Negative for flank pain, hematuria and urgency.   Neurological:  Positive for weakness. Negative for dizziness and headaches.   Psychiatric/Behavioral:  Negative for altered mental status, depression, substance abuse, suicidal ideas and thoughts of violence. The patient is not nervous/anxious.    Allergic/Immunologic: Negative for environmental allergies, HIV exposure, hives and persistent infections.       Medications:     Allergies: No Known Allergies    Current  Meds:   Current Facility-Administered Medications:     acetaminophen (TYLENOL) tablet 650 mg, 650 mg, Oral, Q4H PRN **OR** acetaminophen (TYLENOL) suppository 650 mg, 650 mg, Rectal, Q4H PRN, Nikunj Jane MD    albuterol (PROVENTIL) nebulizer solution 0.083% 2.5 mg/3mL, 2.5 mg, Nebulization, Q4H PRN, Nikunj Jane MD    amLODIPine (NORVASC) tablet 5 mg, 5 mg, Oral, BID, Kaia Edwards APRN    aspirin tablet 325 mg, 325 mg, Oral, Daily, Nikunj Jane MD    atorvastatin (LIPITOR) tablet 40 mg, 40 mg, Oral, Nightly, Nikunj Jane MD    Enoxaparin Sodium (LOVENOX) syringe 40 mg, 40 mg, Subcutaneous, Daily, Kaia Edwards, ALY    famotidine (PEPCID) tablet 20 mg, 20 mg, Oral, Daily, Nikunj Jane MD    glycopyrrolate (ROBINUL) injection 0.2 mg, 0.2 mg, Intravenous, Q6H PRN, Nikunj Jane MD    guaifenesin (ROBITUSSIN) 100 MG/5ML liquid 200 mg, 200 mg, Oral, Q4H PRN, Nikunj Jane MD    hydrALAZINE (APRESOLINE) injection 10 mg, 10 mg, Intravenous, Q6H PRN, Nikunj Jane MD    levETIRAcetam (KEPPRA) 100 MG/ML solution 500 mg, 500 mg, Oral, Q12H, Kaia Edwards APRN    lisinopril (PRINIVIL,ZESTRIL) tablet 20 mg, 20 mg, Oral, BID, Kaia Edwards APRN    magnesium hydroxide (MILK OF MAGNESIA) 400 MG/5ML suspension 30 mL, 30 mL, Oral, Daily PRN, Nikunj Jane MD    melatonin tablet 9 mg, 9 mg, Oral, Nightly, Nikunj Jane MD    ondansetron (ZOFRAN) tablet 4 mg, 4 mg, Oral, Q6H PRN **OR** ondansetron (ZOFRAN) injection 4 mg, 4 mg, Intravenous, Q6H PRN, Nikunj Jane MD    sodium chloride 0.9 % flush 10 mL, 10 mL, Intravenous, Q12H, Nikunj Jane MD    sodium chloride 0.9 % flush 10 mL, 10 mL, Intravenous, PRN, Nikunj Jane MD    Data:     Code Status:    There are no questions and answers to display.       Family History   Problem Relation Age of Onset    Heart disease Father   "      Social History     Socioeconomic History    Marital status:    Tobacco Use    Smoking status: Every Day     Packs/day: 0.50     Years: 40.00     Pack years: 20.00     Types: Cigarettes    Smokeless tobacco: Never   Substance and Sexual Activity    Alcohol use: Yes     Comment: social drinker    Drug use: No    Sexual activity: Defer       Vitals:  Ht 193 cm (76\")   Wt 103 kg (226 lb 9.6 oz)   BMI 27.58 kg/m²   T 97.0  RR 13 /95 SPO2 100%  (room air)          I/O (24Hr):  No intake or output data in the 24 hours ending 08/30/23 1349    Labs and imaging:      Recent Results (from the past 12 hour(s))   Basic Metabolic Panel    Collection Time: 08/30/23  5:19 AM    Specimen: Blood   Result Value Ref Range    Glucose 89 65 - 99 mg/dL    BUN 13 8 - 23 mg/dL    Creatinine 0.81 0.76 - 1.27 mg/dL    Sodium 141 136 - 145 mmol/L    Potassium 3.9 3.5 - 5.2 mmol/L    Chloride 106 98 - 107 mmol/L    CO2 26.0 22.0 - 29.0 mmol/L    Calcium 9.0 8.6 - 10.5 mg/dL    BUN/Creatinine Ratio 16.0 7.0 - 25.0    Anion Gap 9.0 5.0 - 15.0 mmol/L    eGFR 97.8 >60.0 mL/min/1.73                     Physical Examination:        Physical Exam  Vitals and nursing note reviewed.   Constitutional:       Appearance: Normal appearance.   HENT:      Head: Normocephalic and atraumatic.      Right Ear: External ear normal.      Left Ear: External ear normal.      Nose: Nose normal.      Mouth/Throat:      Mouth: Mucous membranes are dry.      Pharynx: Oropharynx is clear.   Eyes:      Extraocular Movements: Extraocular movements intact.      Pupils: Pupils are equal, round, and reactive to light.   Cardiovascular:      Rate and Rhythm: Normal rate and regular rhythm.      Pulses: Normal pulses.      Heart sounds: Normal heart sounds.   Pulmonary:      Effort: Pulmonary effort is normal.      Breath sounds: Normal breath sounds.   Abdominal:      General: Abdomen is flat.      Palpations: Abdomen is soft.   Musculoskeletal:       "   General: Normal range of motion.      Cervical back: Normal range of motion and neck supple.      Comments: Generalized weakness   Skin:     General: Skin is warm and dry.      Capillary Refill: Capillary refill takes less than 2 seconds.   Neurological:      General: No focal deficit present.      Mental Status: He is alert and oriented to person, place, and time.   Psychiatric:         Mood and Affect: Mood normal.         Behavior: Behavior normal.         Thought Content: Thought content normal.         Judgment: Judgment normal.         Assessment:             Dysphagia, oropharyngeal    Dysphonia    Hoarseness    Vocal cord weakness    Dysarthria    Past Medical History:   Diagnosis Date    Abnormal stress echo 3/25/19-[facility]    Noted For Myocardial Ischemia    Acute MI     Atelectasis 04/11/2019    Noted on Chest XR    CAD (coronary artery disease) Since 2009    w/R Stent Placed    Chest pain due to CAD     Nitro PRN    DM (diabetes mellitus)     false positive    Elevated cholesterol     History of chest x-ray 04/11/2019    Suggestive For Atelectasis w/Chronic Inflammatory Scarring Noted    History of EKG 3/25/19-[facility]    Sinus Rhythm w/Nonspecific ST-T Wave Abnormalities    History of nephrolithiasis     History of stress test 2015    WNL    HLD (hyperlipidemia)     Controlled w/Meds    Hypertension     Controlled w/Meds    Kidney stones     S/p Lithotripsy    LAD stenosis 04/11/2019    90% Mid LAD Stenosis; Diffuse Stenosis of LCX--Noted on Cardiac Cath    Myocardial ischemia 03/25/2019    Noted on Echo Stress Test    RCA occlusion 04/11/2019    Mild Diffuse Disease--Noted on Cardiac Cath     Tobacco use     1.5-1 PPD        Plan:        Acute hypoxic respiratory failure  S/p COVID  Dysphagia  History of CAD  Tobacco abuse  HTN  HLD  Hypokalemia  Continue current treatment. Monitor counts. Increase activity. Labs in am. Aggressive therapies as tolerated. Maintain patient safety.  Appreciate ENT evaluation and recommendations. Discharge planning.       Electronically signed by ALY James on 8/30/2023 at 13:49 CDT

## 2023-08-31 LAB
BASOPHILS # BLD AUTO: 0.04 10*3/MM3 (ref 0–0.2)
BASOPHILS NFR BLD AUTO: 0.6 % (ref 0–1.5)
DEPRECATED RDW RBC AUTO: 45.6 FL (ref 37–54)
EOSINOPHIL # BLD AUTO: 0.48 10*3/MM3 (ref 0–0.4)
EOSINOPHIL NFR BLD AUTO: 7.8 % (ref 0.3–6.2)
ERYTHROCYTE [DISTWIDTH] IN BLOOD BY AUTOMATED COUNT: 13.6 % (ref 12.3–15.4)
HCT VFR BLD AUTO: 38.8 % (ref 37.5–51)
HGB BLD-MCNC: 12.6 G/DL (ref 13–17.7)
IMM GRANULOCYTES # BLD AUTO: 0.02 10*3/MM3 (ref 0–0.05)
IMM GRANULOCYTES NFR BLD AUTO: 0.3 % (ref 0–0.5)
LYMPHOCYTES # BLD AUTO: 1.96 10*3/MM3 (ref 0.7–3.1)
LYMPHOCYTES NFR BLD AUTO: 31.7 % (ref 19.6–45.3)
MCH RBC QN AUTO: 29.9 PG (ref 26.6–33)
MCHC RBC AUTO-ENTMCNC: 32.5 G/DL (ref 31.5–35.7)
MCV RBC AUTO: 92.2 FL (ref 79–97)
MONOCYTES # BLD AUTO: 0.53 10*3/MM3 (ref 0.1–0.9)
MONOCYTES NFR BLD AUTO: 8.6 % (ref 5–12)
NEUTROPHILS NFR BLD AUTO: 3.16 10*3/MM3 (ref 1.7–7)
NEUTROPHILS NFR BLD AUTO: 51 % (ref 42.7–76)
NRBC BLD AUTO-RTO: 0 /100 WBC (ref 0–0.2)
PLATELET # BLD AUTO: 177 10*3/MM3 (ref 140–450)
PMV BLD AUTO: 10.1 FL (ref 6–12)
RBC # BLD AUTO: 4.21 10*6/MM3 (ref 4.14–5.8)
WBC NRBC COR # BLD: 6.19 10*3/MM3 (ref 3.4–10.8)

## 2023-08-31 PROCEDURE — 97116 GAIT TRAINING THERAPY: CPT

## 2023-08-31 PROCEDURE — 92526 ORAL FUNCTION THERAPY: CPT

## 2023-08-31 PROCEDURE — 85025 COMPLETE CBC W/AUTO DIFF WBC: CPT | Performed by: INTERNAL MEDICINE

## 2023-08-31 PROCEDURE — 25010000002 ENOXAPARIN PER 10 MG: Performed by: NURSE PRACTITIONER

## 2023-08-31 PROCEDURE — 97110 THERAPEUTIC EXERCISES: CPT

## 2023-08-31 NOTE — PROGRESS NOTES
REED Mcleod APRN        Internal Medicine Progress Note    8/31/2023   11:52 CDT    Name:  Chaitanya Hair  MRN:    [id number]     Acct:     [id number]   Room:  [address] [place] [address] Day: 0     Admit Date: 8/17/2023  9:25 PM  PCP: Og Campos MD    Subjective:     C/C: Shortness of breath, weakness     Interval History: Status:  stayed the same. Up to chair. No family at bedside. Afebrile. Maintaining adequate 02 sats on room air. Progressing with therapies. Voice sounds stronger today and speech less garbled. Continues on modified po diet. Denies pain. Counts stable. Discharge planning underway.     Review of Systems   Constitutional: Positive for malaise/fatigue. Negative for chills, decreased appetite and fever.   HENT:  Negative for ear pain, hearing loss and hoarse voice.    Eyes:  Negative for blurred vision, discharge and pain.   Cardiovascular:  Positive for dyspnea on exertion. Negative for chest pain and claudication.   Respiratory:  Positive for cough and shortness of breath. Negative for hemoptysis.    Endocrine: Negative for cold intolerance, heat intolerance, polydipsia, polyphagia and polyuria.   Hematologic/Lymphatic: Negative for adenopathy and bleeding problem.   Skin:  Negative for color change, itching, nail changes and rash.   Musculoskeletal:  Positive for muscle weakness. Negative for arthritis and back pain.   Gastrointestinal:  Negative for abdominal pain, diarrhea, nausea and vomiting.   Genitourinary:  Negative for flank pain, hematuria and urgency.   Neurological:  Positive for weakness. Negative for dizziness and headaches.   Psychiatric/Behavioral:  Negative for altered mental status, depression, substance abuse, suicidal ideas and thoughts of violence. The patient is not nervous/anxious.    Allergic/Immunologic: Negative for environmental allergies, HIV exposure, hives and persistent infections.       Medications:     Allergies: No Known  Allergies    Current Meds:   Current Facility-Administered Medications:     acetaminophen (TYLENOL) tablet 650 mg, 650 mg, Oral, Q4H PRN **OR** acetaminophen (TYLENOL) suppository 650 mg, 650 mg, Rectal, Q4H PRN, Nikunj Jane MD    albuterol (PROVENTIL) nebulizer solution 0.083% 2.5 mg/3mL, 2.5 mg, Nebulization, Q4H PRN, Nikunj Jane MD    amLODIPine (NORVASC) tablet 5 mg, 5 mg, Oral, BID, Kaia Edwards APRN    aspirin tablet 325 mg, 325 mg, Oral, Daily, Nikunj Jane MD    atorvastatin (LIPITOR) tablet 40 mg, 40 mg, Oral, Nightly, Nikunj Jane MD    Enoxaparin Sodium (LOVENOX) syringe 40 mg, 40 mg, Subcutaneous, Daily, Kaia Edwards APRN    famotidine (PEPCID) tablet 20 mg, 20 mg, Oral, Daily, Nikunj Jane MD    glycopyrrolate (ROBINUL) injection 0.2 mg, 0.2 mg, Intravenous, Q6H PRN, Nikunj Jane MD    guaifenesin (ROBITUSSIN) 100 MG/5ML liquid 200 mg, 200 mg, Oral, Q4H PRN, Nikunj Jane MD    hydrALAZINE (APRESOLINE) injection 10 mg, 10 mg, Intravenous, Q6H PRN, Nikunj Jane MD    levETIRAcetam (KEPPRA) 100 MG/ML solution 500 mg, 500 mg, Oral, Q12H, Kaia Edwards APRN    lisinopril (PRINIVIL,ZESTRIL) tablet 20 mg, 20 mg, Oral, BID, Kaia Edwards APRN    magnesium hydroxide (MILK OF MAGNESIA) 400 MG/5ML suspension 30 mL, 30 mL, Oral, Daily PRN, Nikunj Jane MD    melatonin tablet 9 mg, 9 mg, Oral, Nightly, Nikunj Jane MD    ondansetron (ZOFRAN) tablet 4 mg, 4 mg, Oral, Q6H PRN **OR** ondansetron (ZOFRAN) injection 4 mg, 4 mg, Intravenous, Q6H PRN, Nikunj Jane MD    sodium chloride 0.9 % flush 10 mL, 10 mL, Intravenous, Q12H, Nikunj Jane MD    sodium chloride 0.9 % flush 10 mL, 10 mL, Intravenous, PRN, Nikunj Jane MD    Data:     Code Status:    There are no questions and answers to display.       Family History   Problem Relation Age of Onset    Heart  "disease Father        Social History     Socioeconomic History    Marital status:    Tobacco Use    Smoking status: Every Day     Packs/day: 0.50     Years: 40.00     Pack years: 20.00     Types: Cigarettes    Smokeless tobacco: Never   Substance and Sexual Activity    Alcohol use: Yes     Comment: social drinker    Drug use: No    Sexual activity: Defer       Vitals:  Ht 193 cm (76\")   Wt 103 kg (226 lb 9.6 oz)   BMI 27.58 kg/m²   T 97.4 HR 86 RR 20 /66 SPO2 93%  (room air)          I/O (24Hr):  No intake or output data in the 24 hours ending 08/31/23 1152    Labs and imaging:      Recent Results (from the past 12 hour(s))   CBC Auto Differential    Collection Time: 08/31/23  5:58 AM    Specimen: Blood   Result Value Ref Range    WBC 6.19 3.40 - 10.80 10*3/mm3    RBC 4.21 4.14 - 5.80 10*6/mm3    Hemoglobin 12.6 (L) 13.0 - 17.7 g/dL    Hematocrit 38.8 37.5 - 51.0 %    MCV 92.2 79.0 - 97.0 fL    MCH 29.9 26.6 - 33.0 pg    MCHC 32.5 31.5 - 35.7 g/dL    RDW 13.6 12.3 - 15.4 %    RDW-SD 45.6 37.0 - 54.0 fl    MPV 10.1 6.0 - 12.0 fL    Platelets 177 140 - 450 10*3/mm3    Neutrophil % 51.0 42.7 - 76.0 %    Lymphocyte % 31.7 19.6 - 45.3 %    Monocyte % 8.6 5.0 - 12.0 %    Eosinophil % 7.8 (H) 0.3 - 6.2 %    Basophil % 0.6 0.0 - 1.5 %    Immature Grans % 0.3 0.0 - 0.5 %    Neutrophils, Absolute 3.16 1.70 - 7.00 10*3/mm3    Lymphocytes, Absolute 1.96 0.70 - 3.10 10*3/mm3    Monocytes, Absolute 0.53 0.10 - 0.90 10*3/mm3    Eosinophils, Absolute 0.48 (H) 0.00 - 0.40 10*3/mm3    Basophils, Absolute 0.04 0.00 - 0.20 10*3/mm3    Immature Grans, Absolute 0.02 0.00 - 0.05 10*3/mm3    nRBC 0.0 0.0 - 0.2 /100 WBC                     Physical Examination:        Physical Exam  Vitals and nursing note reviewed.   Constitutional:       Appearance: Normal appearance.   HENT:      Head: Normocephalic and atraumatic.      Right Ear: External ear normal.      Left Ear: External ear normal.      Nose: Nose normal.      " Mouth/Throat:      Mouth: Mucous membranes are dry.      Pharynx: Oropharynx is clear.   Eyes:      Extraocular Movements: Extraocular movements intact.      Pupils: Pupils are equal, round, and reactive to light.   Cardiovascular:      Rate and Rhythm: Normal rate and regular rhythm.      Pulses: Normal pulses.      Heart sounds: Normal heart sounds.   Pulmonary:      Effort: Pulmonary effort is normal.      Breath sounds: Normal breath sounds.   Abdominal:      General: Abdomen is flat.      Palpations: Abdomen is soft.   Musculoskeletal:         General: Normal range of motion.      Cervical back: Normal range of motion and neck supple.      Comments: Generalized weakness   Skin:     General: Skin is warm and dry.      Capillary Refill: Capillary refill takes less than 2 seconds.   Neurological:      General: No focal deficit present.      Mental Status: He is alert and oriented to person, place, and time.   Psychiatric:         Mood and Affect: Mood normal.         Behavior: Behavior normal.         Thought Content: Thought content normal.         Judgment: Judgment normal.         Assessment:             Dysphagia, oropharyngeal    Dysphonia    Hoarseness    Vocal cord weakness    Dysarthria    Past Medical History:   Diagnosis Date    Abnormal stress echo 3/25/19-[facility]    Noted For Myocardial Ischemia    Acute MI     Atelectasis 04/11/2019    Noted on Chest XR    CAD (coronary artery disease) Since 2009    w/R Stent Placed    Chest pain due to CAD     Nitro PRN    DM (diabetes mellitus)     false positive    Elevated cholesterol     History of chest x-ray 04/11/2019    Suggestive For Atelectasis w/Chronic Inflammatory Scarring Noted    History of EKG 3/25/19-[facility]    Sinus Rhythm w/Nonspecific ST-T Wave Abnormalities    History of nephrolithiasis     History of stress test 2015    WNL    HLD (hyperlipidemia)     Controlled w/Meds    Hypertension     Controlled w/Meds    Kidney stones      S/p Lithotripsy    LAD stenosis 04/11/2019    90% Mid LAD Stenosis; Diffuse Stenosis of LCX--Noted on Cardiac Cath    Myocardial ischemia 03/25/2019    Noted on Echo Stress Test    RCA occlusion 04/11/2019    Mild Diffuse Disease--Noted on Cardiac Cath     Tobacco use     1.5-1 PPD        Plan:        Acute hypoxic respiratory failure  S/p COVID  Dysphagia  History of CAD  Tobacco abuse  HTN  HLD  Hypokalemia  Continue current treatment. Monitor counts. Increase activity. Aggressive therapies as tolerated. Maintain patient safety. Appreciate ENT evaluation and recommendations. Discharge planning.       Electronically signed by ALY James on 8/31/2023 at 11:52 CDT     I have discussed the care of Chaitanya Hair, including pertinent history and exam findings, with the nurse practitioner.    I have seen and examined the patient and the key elements of all parts of the encounter have been performed by me.  I agree with the assessment, plan and orders as documented by ALY Ribeiro, after I modified the exam findings and the plan of treatments and the final version is my approved version of the assessment.        Electronically signed by Nikunj Jane MD on 9/1/2023 at 06:41 CDT

## 2023-09-01 PROCEDURE — 25010000002 ENOXAPARIN PER 10 MG: Performed by: NURSE PRACTITIONER

## 2023-09-01 PROCEDURE — 97164 PT RE-EVAL EST PLAN CARE: CPT | Performed by: PHYSICAL THERAPIST

## 2023-09-01 PROCEDURE — 97535 SELF CARE MNGMENT TRAINING: CPT

## 2023-09-01 PROCEDURE — 97168 OT RE-EVAL EST PLAN CARE: CPT

## 2023-09-01 PROCEDURE — 92526 ORAL FUNCTION THERAPY: CPT

## 2023-09-01 NOTE — PROGRESS NOTES
[facility]  REED Pimentel APRN        Internal Medicine Progress Note    9/1/2023   09:37 CDT    Name:  Chaitanya Hair  MRN:    [id number]     Acct:     [id number]   Room:  [address] [place] [address] Day: 0     Admit Date: 8/17/2023  9:25 PM  PCP: Og Campos MD    Subjective:     C/C: Shortness of breath, weakness     Interval History: Status:  stayed the same. Up to chair. Wife at bedside. Afebrile. Maintaining adequate 02 sats on room air. Progressing with therapies. Voice sounds stronger today and speech less garbled. Continues on modified po diet - advanced to thin liquids. Denies pain.  Discharge planning underway for return to home Monday.     Review of Systems   Constitutional: Positive for malaise/fatigue. Negative for chills, decreased appetite and fever.   HENT:  Negative for ear pain, hearing loss and hoarse voice.    Eyes:  Negative for blurred vision, discharge and pain.   Cardiovascular:  Positive for dyspnea on exertion. Negative for chest pain and claudication.   Respiratory:  Positive for cough and shortness of breath. Negative for hemoptysis.    Endocrine: Negative for cold intolerance, heat intolerance, polydipsia, polyphagia and polyuria.   Hematologic/Lymphatic: Negative for adenopathy and bleeding problem.   Skin:  Negative for color change, itching, nail changes and rash.   Musculoskeletal:  Positive for muscle weakness. Negative for arthritis and back pain.   Gastrointestinal:  Negative for abdominal pain, diarrhea, nausea and vomiting.   Genitourinary:  Negative for flank pain, hematuria and urgency.   Neurological:  Positive for weakness. Negative for dizziness and headaches.   Psychiatric/Behavioral:  Negative for altered mental status, depression, substance abuse, suicidal ideas and thoughts of violence. The patient is not nervous/anxious.    Allergic/Immunologic: Negative for environmental allergies, HIV exposure, hives and persistent infections.       Medications:      Allergies: No Known Allergies    Current Meds:   Current Facility-Administered Medications:     acetaminophen (TYLENOL) tablet 650 mg, 650 mg, Oral, Q4H PRN **OR** acetaminophen (TYLENOL) suppository 650 mg, 650 mg, Rectal, Q4H PRN, Nikunj Jane MD    albuterol (PROVENTIL) nebulizer solution 0.083% 2.5 mg/3mL, 2.5 mg, Nebulization, Q4H PRN, Nikunj Jane MD    amLODIPine (NORVASC) tablet 5 mg, 5 mg, Oral, BID, Kaia Edwards APRN    aspirin tablet 325 mg, 325 mg, Oral, Daily, Nikunj Jane MD    atorvastatin (LIPITOR) tablet 40 mg, 40 mg, Oral, Nightly, Nikunj Jane MD    Enoxaparin Sodium (LOVENOX) syringe 40 mg, 40 mg, Subcutaneous, Daily, Kaia Edwards APRN    famotidine (PEPCID) tablet 20 mg, 20 mg, Oral, Daily, Nikunj Jane MD    glycopyrrolate (ROBINUL) injection 0.2 mg, 0.2 mg, Intravenous, Q6H PRN, Nikunj Jane MD    guaifenesin (ROBITUSSIN) 100 MG/5ML liquid 200 mg, 200 mg, Oral, Q4H PRN, Nikunj Jane MD    hydrALAZINE (APRESOLINE) injection 10 mg, 10 mg, Intravenous, Q6H PRN, Nikunj Jane MD    levETIRAcetam (KEPPRA) 100 MG/ML solution 500 mg, 500 mg, Oral, Q12H, Kaia Edwards APRN    lisinopril (PRINIVIL,ZESTRIL) tablet 20 mg, 20 mg, Oral, BID, Kaia Edwards APRN    magnesium hydroxide (MILK OF MAGNESIA) 400 MG/5ML suspension 30 mL, 30 mL, Oral, Daily PRN, Nikunj Jane MD    melatonin tablet 9 mg, 9 mg, Oral, Nightly, Nikunj Jane MD    ondansetron (ZOFRAN) tablet 4 mg, 4 mg, Oral, Q6H PRN **OR** ondansetron (ZOFRAN) injection 4 mg, 4 mg, Intravenous, Q6H PRN, Nikunj Jane MD    sodium chloride 0.9 % flush 10 mL, 10 mL, Intravenous, Q12H, Nikunj Jane MD    sodium chloride 0.9 % flush 10 mL, 10 mL, Intravenous, PRN, Nikunj Jane MD    Data:     Code Status:    There are no questions and answers to display.       Family History   Problem Relation  "Age of Onset    Heart disease Father        Social History     Socioeconomic History    Marital status:    Tobacco Use    Smoking status: Every Day     Packs/day: 0.50     Years: 40.00     Pack years: 20.00     Types: Cigarettes    Smokeless tobacco: Never   Substance and Sexual Activity    Alcohol use: Yes     Comment: social drinker    Drug use: No    Sexual activity: Defer       Vitals:  Ht 193 cm (76\")   Wt 103 kg (226 lb 9.6 oz)   BMI 27.58 kg/m²   T 97.4 HR 95 RR 16 /89 SPO2 99%  (room air)          I/O (24Hr):  No intake or output data in the 24 hours ending 09/01/23 0937    Labs and imaging:      No results found for this or any previous visit (from the past 12 hour(s)).                    Physical Examination:        Physical Exam  Vitals and nursing note reviewed.   Constitutional:       Appearance: Normal appearance.   HENT:      Head: Normocephalic and atraumatic.      Right Ear: External ear normal.      Left Ear: External ear normal.      Nose: Nose normal.      Mouth/Throat:      Mouth: Mucous membranes are dry.      Pharynx: Oropharynx is clear.   Eyes:      Extraocular Movements: Extraocular movements intact.      Pupils: Pupils are equal, round, and reactive to light.   Cardiovascular:      Rate and Rhythm: Normal rate and regular rhythm.      Pulses: Normal pulses.      Heart sounds: Normal heart sounds.   Pulmonary:      Effort: Pulmonary effort is normal.      Breath sounds: Normal breath sounds.   Abdominal:      General: Abdomen is flat.      Palpations: Abdomen is soft.   Musculoskeletal:         General: Normal range of motion.      Cervical back: Normal range of motion and neck supple.      Comments: Generalized weakness   Skin:     General: Skin is warm and dry.      Capillary Refill: Capillary refill takes less than 2 seconds.   Neurological:      General: No focal deficit present.      Mental Status: He is alert and oriented to person, place, and time.   Psychiatric:       "   Mood and Affect: Mood normal.         Behavior: Behavior normal.         Thought Content: Thought content normal.         Judgment: Judgment normal.         Assessment:             Dysphagia, oropharyngeal    Dysphonia    Hoarseness    Vocal cord weakness    Dysarthria    Past Medical History:   Diagnosis Date    Abnormal stress echo 3/25/19-[facility]    Noted For Myocardial Ischemia    Acute MI     Atelectasis 04/11/2019    Noted on Chest XR    CAD (coronary artery disease) Since 2009    w/R Stent Placed    Chest pain due to CAD     Nitro PRN    DM (diabetes mellitus)     false positive    Elevated cholesterol     History of chest x-ray 04/11/2019    Suggestive For Atelectasis w/Chronic Inflammatory Scarring Noted    History of EKG 3/25/19-[facility]    Sinus Rhythm w/Nonspecific ST-T Wave Abnormalities    History of nephrolithiasis     History of stress test 2015    WNL    HLD (hyperlipidemia)     Controlled w/Meds    Hypertension     Controlled w/Meds    Kidney stones     S/p Lithotripsy    LAD stenosis 04/11/2019    90% Mid LAD Stenosis; Diffuse Stenosis of LCX--Noted on Cardiac Cath    Myocardial ischemia 03/25/2019    Noted on Echo Stress Test    RCA occlusion 04/11/2019    Mild Diffuse Disease--Noted on Cardiac Cath     Tobacco use     1.5-1 PPD        Plan:        Acute hypoxic respiratory failure  S/p COVID  Dysphagia  History of CAD  Tobacco abuse  HTN  HLD  Hypokalemia  Continue current treatment. Monitor counts. Increase activity. Aggressive therapies as tolerated. Maintain patient safety. Appreciate ENT evaluation and recommendations. Discharge planning for return to home Monday.       Electronically signed by ALY James on 9/1/2023 at 09:37 CDT     I have discussed the care of Chaitanya Hair, including pertinent history and exam findings, with the nurse practitioner.    I have seen and examined the patient and the key elements of all parts of the encounter have been  performed by me.  I agree with the assessment, plan and orders as documented by ALY Ribeiro, after I modified the exam findings and the plan of treatments and the final version is my approved version of the assessment.        Electronically signed by Nikunj Jane MD on 9/1/2023 at 19:18 CDT

## 2023-09-01 NOTE — PROGRESS NOTES
[facility] Fall Risk Assessment Note    Name: Chaitanya Hair  MRN: [id number]  CSN: [id number]  Admit Date/Time: 8/17/2023  9:25 PM.    Currently ordered medications associated with an increased risk for fall include:    Scheduled Meds:  amLODIPine, 5 mg, Oral, BID  levETIRAcetam, 500 mg, Oral, Q12H  lisinopril, 20 mg, Oral, BID  melatonin, 9 mg, Oral, Nightly    PRN Meds:    glycopyrrolate    hydrALAZINE    magnesium hydroxide    ondansetron     [place], PharmPRINCE  09/01/23 15:13 CDT

## 2023-09-02 PROCEDURE — 97116 GAIT TRAINING THERAPY: CPT

## 2023-09-02 PROCEDURE — 25010000002 ENOXAPARIN PER 10 MG: Performed by: NURSE PRACTITIONER

## 2023-09-02 NOTE — PROGRESS NOTES
[facility]  REED Pimentel APRN        Internal Medicine Progress Note    9/2/2023   08:43 CDT    Name:  Chaitanya Hair  MRN:    [id number]     Acct:     [id number]   Room:  [address] [place] [address] Day: 0     Admit Date: 8/17/2023  9:25 PM  PCP: Og Campos MD    Subjective:     C/C: Shortness of breath, weakness     Interval History: Status:  stayed the same. Sitting up in bed. Tolerating thin liquids.  Anxious for discharge on Monday.  No new complaints.     Review of Systems   Constitutional: Positive for malaise/fatigue. Negative for chills, decreased appetite and fever.   HENT:  Negative for ear pain, hearing loss and hoarse voice.    Eyes:  Negative for blurred vision, discharge and pain.   Cardiovascular:  Positive for dyspnea on exertion. Negative for chest pain and claudication.   Respiratory:  Positive for cough and shortness of breath. Negative for hemoptysis.    Endocrine: Negative for cold intolerance, heat intolerance, polydipsia, polyphagia and polyuria.   Hematologic/Lymphatic: Negative for adenopathy and bleeding problem.   Skin:  Negative for color change, itching, nail changes and rash.   Musculoskeletal:  Positive for muscle weakness. Negative for arthritis and back pain.   Gastrointestinal:  Negative for abdominal pain, diarrhea, nausea and vomiting.   Genitourinary:  Negative for flank pain, hematuria and urgency.   Neurological:  Positive for weakness. Negative for dizziness and headaches.   Psychiatric/Behavioral:  Negative for altered mental status, depression, substance abuse, suicidal ideas and thoughts of violence. The patient is not nervous/anxious.    Allergic/Immunologic: Negative for environmental allergies, HIV exposure, hives and persistent infections.       Medications:     Allergies: No Known Allergies    Current Meds:   Current Facility-Administered Medications:     acetaminophen (TYLENOL) tablet 650 mg, 650 mg, Oral, Q4H PRN **OR** acetaminophen (TYLENOL)  suppository 650 mg, 650 mg, Rectal, Q4H PRN, Nikunj Jane MD    albuterol (PROVENTIL) nebulizer solution 0.083% 2.5 mg/3mL, 2.5 mg, Nebulization, Q4H PRN, Nikunj Jane MD    amLODIPine (NORVASC) tablet 5 mg, 5 mg, Oral, BID, Kaia Edwards, ALY    aspirin tablet 325 mg, 325 mg, Oral, Daily, Nikunj Jane MD    atorvastatin (LIPITOR) tablet 40 mg, 40 mg, Oral, Nightly, Nikunj Jane MD    Enoxaparin Sodium (LOVENOX) syringe 40 mg, 40 mg, Subcutaneous, Daily, Kaia Edwards APRN    famotidine (PEPCID) tablet 20 mg, 20 mg, Oral, Daily, Nikunj Jane MD    glycopyrrolate (ROBINUL) injection 0.2 mg, 0.2 mg, Intravenous, Q6H PRN, Nikunj Jane MD    guaifenesin (ROBITUSSIN) 100 MG/5ML liquid 200 mg, 200 mg, Oral, Q4H PRN, Nikunj Jane MD    hydrALAZINE (APRESOLINE) injection 10 mg, 10 mg, Intravenous, Q6H PRN, Nikunj Jane MD    levETIRAcetam (KEPPRA) 100 MG/ML solution 500 mg, 500 mg, Oral, Q12H, Kaia Edwards, ALY    lisinopril (PRINIVIL,ZESTRIL) tablet 20 mg, 20 mg, Oral, BID, Kaia Edwards APRN    magnesium hydroxide (MILK OF MAGNESIA) 400 MG/5ML suspension 30 mL, 30 mL, Oral, Daily PRN, Nikunj Jane MD    melatonin tablet 9 mg, 9 mg, Oral, Nightly, Nikunj Jane MD    ondansetron (ZOFRAN) tablet 4 mg, 4 mg, Oral, Q6H PRN **OR** ondansetron (ZOFRAN) injection 4 mg, 4 mg, Intravenous, Q6H PRN, Niknuj Jane MD    sodium chloride 0.9 % flush 10 mL, 10 mL, Intravenous, Q12H, Nikunj Jane MD    sodium chloride 0.9 % flush 10 mL, 10 mL, Intravenous, PRN, Nikunj Jane MD    Data:     Code Status:    There are no questions and answers to display.       Family History   Problem Relation Age of Onset    Heart disease Father        Social History     Socioeconomic History    Marital status:    Tobacco Use    Smoking status: Every Day     Packs/day: 0.50     Years: 40.00  "    Pack years: 20.00     Types: Cigarettes    Smokeless tobacco: Never   Substance and Sexual Activity    Alcohol use: Yes     Comment: social drinker    Drug use: No    Sexual activity: Defer       Vitals:  Ht 193 cm (76\")   Wt 103 kg (226 lb 9.6 oz)   BMI 27.58 kg/m²   T 97.8 HR 87 RR 16 /70 SPO2 97%  (room air)          I/O (24Hr):  No intake or output data in the 24 hours ending 09/02/23 0843    Labs and imaging:      No results found for this or any previous visit (from the past 12 hour(s)).                    Physical Examination:        Physical Exam  Vitals and nursing note reviewed.   Constitutional:       Appearance: Normal appearance.   HENT:      Head: Normocephalic and atraumatic.      Right Ear: External ear normal.      Left Ear: External ear normal.      Nose: Nose normal.      Mouth/Throat:      Mouth: Mucous membranes are dry.      Pharynx: Oropharynx is clear.   Eyes:      Extraocular Movements: Extraocular movements intact.      Pupils: Pupils are equal, round, and reactive to light.   Cardiovascular:      Rate and Rhythm: Normal rate and regular rhythm.      Pulses: Normal pulses.      Heart sounds: Normal heart sounds.   Pulmonary:      Effort: Pulmonary effort is normal.      Breath sounds: Normal breath sounds.   Abdominal:      General: Abdomen is flat.      Palpations: Abdomen is soft.   Musculoskeletal:         General: Normal range of motion.      Cervical back: Normal range of motion and neck supple.      Comments: Generalized weakness   Skin:     General: Skin is warm and dry.      Capillary Refill: Capillary refill takes less than 2 seconds.   Neurological:      General: No focal deficit present.      Mental Status: He is alert and oriented to person, place, and time.   Psychiatric:         Mood and Affect: Mood normal.         Behavior: Behavior normal.         Thought Content: Thought content normal.         Judgment: Judgment normal.         Assessment:             " Dysphagia, oropharyngeal    Dysphonia    Hoarseness    Vocal cord weakness    Dysarthria    Past Medical History:   Diagnosis Date    Abnormal stress echo 3/25/19-[facility]    Noted For Myocardial Ischemia    Acute MI     Atelectasis 04/11/2019    Noted on Chest XR    CAD (coronary artery disease) Since 2009    w/R Stent Placed    Chest pain due to CAD     Nitro PRN    DM (diabetes mellitus)     false positive    Elevated cholesterol     History of chest x-ray 04/11/2019    Suggestive For Atelectasis w/Chronic Inflammatory Scarring Noted    History of EKG 3/25/19-[facility]    Sinus Rhythm w/Nonspecific ST-T Wave Abnormalities    History of nephrolithiasis     History of stress test 2015    WNL    HLD (hyperlipidemia)     Controlled w/Meds    Hypertension     Controlled w/Meds    Kidney stones     S/p Lithotripsy    LAD stenosis 04/11/2019    90% Mid LAD Stenosis; Diffuse Stenosis of LCX--Noted on Cardiac Cath    Myocardial ischemia 03/25/2019    Noted on Echo Stress Test    RCA occlusion 04/11/2019    Mild Diffuse Disease--Noted on Cardiac Cath     Tobacco use     1.5-1 PPD        Plan:        Acute hypoxic respiratory failure  S/p COVID  Dysphagia  History of CAD  Tobacco abuse  HTN  HLD  Hypokalemia  Continue current treatment. Monitor counts. Increase activity. Aggressive therapies as tolerated. Maintain patient safety. Appreciate ENT evaluation and recommendations. Discharge planning for return to home Monday.       Electronically signed by ALY Huynh on 9/2/2023 at 08:43 CDT

## 2023-09-03 VITALS — HEIGHT: 76 IN | WEIGHT: 232.8 LBS | BODY MASS INDEX: 28.35 KG/M2

## 2023-09-03 PROCEDURE — 97116 GAIT TRAINING THERAPY: CPT

## 2023-09-03 PROCEDURE — 25010000002 ENOXAPARIN PER 10 MG: Performed by: NURSE PRACTITIONER

## 2023-09-03 NOTE — PROGRESS NOTES
REED Mcleod APRN        Internal Medicine Progress Note    9/3/2023   09:03 CDT    Name:  Chaitanya Hair  MRN:    [id number]     Acct:     [id number]   Room:  [address] [place] [address] Day: 0     Admit Date: 8/17/2023  9:25 PM  PCP: Og Campos MD    Subjective:     C/C: Shortness of breath, weakness     Interval History: Status:  stayed the same. Sitting in recliner. RN at bedside. No family at bedside. No complaints this morning. Anticipating discharge in am.     Review of Systems   Constitutional: Positive for malaise/fatigue. Negative for chills, decreased appetite and fever.   HENT:  Negative for ear pain, hearing loss and hoarse voice.    Eyes:  Negative for blurred vision, discharge and pain.   Cardiovascular:  Positive for dyspnea on exertion. Negative for chest pain and claudication.   Respiratory:  Positive for cough and shortness of breath. Negative for hemoptysis.    Endocrine: Negative for cold intolerance, heat intolerance, polydipsia, polyphagia and polyuria.   Hematologic/Lymphatic: Negative for adenopathy and bleeding problem.   Skin:  Negative for color change, itching, nail changes and rash.   Musculoskeletal:  Positive for muscle weakness. Negative for arthritis and back pain.   Gastrointestinal:  Negative for abdominal pain, diarrhea, nausea and vomiting.   Genitourinary:  Negative for flank pain, hematuria and urgency.   Neurological:  Positive for weakness. Negative for dizziness and headaches.   Psychiatric/Behavioral:  Negative for altered mental status, depression, substance abuse, suicidal ideas and thoughts of violence. The patient is not nervous/anxious.    Allergic/Immunologic: Negative for environmental allergies, HIV exposure, hives and persistent infections.       Medications:     Allergies: No Known Allergies    Current Meds:   Current Facility-Administered Medications:     acetaminophen (TYLENOL) tablet 650 mg, 650 mg, Oral, Q4H PRN **OR**  acetaminophen (TYLENOL) suppository 650 mg, 650 mg, Rectal, Q4H PRN, Nikunj Jane MD    albuterol (PROVENTIL) nebulizer solution 0.083% 2.5 mg/3mL, 2.5 mg, Nebulization, Q4H PRN, Nikunj Jane MD    amLODIPine (NORVASC) tablet 5 mg, 5 mg, Oral, BID, Kaia Edwards APRN    aspirin tablet 325 mg, 325 mg, Oral, Daily, Nikunj Jane MD    atorvastatin (LIPITOR) tablet 40 mg, 40 mg, Oral, Nightly, Nikunj Jane MD    Enoxaparin Sodium (LOVENOX) syringe 40 mg, 40 mg, Subcutaneous, Daily, Kaia Edwards APRN    famotidine (PEPCID) tablet 20 mg, 20 mg, Oral, Daily, Nikunj Jane MD    glycopyrrolate (ROBINUL) injection 0.2 mg, 0.2 mg, Intravenous, Q6H PRN, Nikunj Jane MD    guaifenesin (ROBITUSSIN) 100 MG/5ML liquid 200 mg, 200 mg, Oral, Q4H PRN, Nikunj Jane MD    hydrALAZINE (APRESOLINE) injection 10 mg, 10 mg, Intravenous, Q6H PRN, Nikunj Jane MD    levETIRAcetam (KEPPRA) 100 MG/ML solution 500 mg, 500 mg, Oral, Q12H, Kaia Edwards APRN    lisinopril (PRINIVIL,ZESTRIL) tablet 20 mg, 20 mg, Oral, BID, Kaia Edwards APRN    magnesium hydroxide (MILK OF MAGNESIA) 400 MG/5ML suspension 30 mL, 30 mL, Oral, Daily PRN, Nikunj Jane MD    melatonin tablet 9 mg, 9 mg, Oral, Nightly, Nikunj Jane MD    ondansetron (ZOFRAN) tablet 4 mg, 4 mg, Oral, Q6H PRN **OR** ondansetron (ZOFRAN) injection 4 mg, 4 mg, Intravenous, Q6H PRN, Nikunj Jane MD    sodium chloride 0.9 % flush 10 mL, 10 mL, Intravenous, Q12H, Nikunj Jane MD    sodium chloride 0.9 % flush 10 mL, 10 mL, Intravenous, PRN, Nikunj Jane MD    Data:     Code Status:    There are no questions and answers to display.       Family History   Problem Relation Age of Onset    Heart disease Father        Social History     Socioeconomic History    Marital status:    Tobacco Use    Smoking status: Every Day      "Packs/day: 0.50     Years: 40.00     Pack years: 20.00     Types: Cigarettes    Smokeless tobacco: Never   Substance and Sexual Activity    Alcohol use: Yes     Comment: social drinker    Drug use: No    Sexual activity: Defer       Vitals:  Ht 193 cm (76\")   Wt 103 kg (226 lb 9.6 oz)   BMI 27.58 kg/m²   T 97.5  RR 16 /71 SPO2 93%  (room air)          I/O (24Hr):  No intake or output data in the 24 hours ending 09/03/23 0903    Labs and imaging:      No results found for this or any previous visit (from the past 12 hour(s)).                    Physical Examination:        Physical Exam  Vitals and nursing note reviewed.   Constitutional:       Appearance: Normal appearance.   HENT:      Head: Normocephalic and atraumatic.      Right Ear: External ear normal.      Left Ear: External ear normal.      Nose: Nose normal.      Mouth/Throat:      Mouth: Mucous membranes are dry.      Pharynx: Oropharynx is clear.   Eyes:      Extraocular Movements: Extraocular movements intact.      Pupils: Pupils are equal, round, and reactive to light.   Cardiovascular:      Rate and Rhythm: Normal rate and regular rhythm.      Pulses: Normal pulses.      Heart sounds: Normal heart sounds.   Pulmonary:      Effort: Pulmonary effort is normal.      Breath sounds: Normal breath sounds.   Abdominal:      General: Abdomen is flat.      Palpations: Abdomen is soft.   Musculoskeletal:         General: Normal range of motion.      Cervical back: Normal range of motion and neck supple.      Comments: Generalized weakness   Skin:     General: Skin is warm and dry.      Capillary Refill: Capillary refill takes less than 2 seconds.   Neurological:      General: No focal deficit present.      Mental Status: He is alert and oriented to person, place, and time.   Psychiatric:         Mood and Affect: Mood normal.         Behavior: Behavior normal.         Thought Content: Thought content normal.         Judgment: Judgment normal. "         Assessment:             Dysphagia, oropharyngeal    Dysphonia    Hoarseness    Vocal cord weakness    Dysarthria    Past Medical History:   Diagnosis Date    Abnormal stress echo 3/25/19-[facility]    Noted For Myocardial Ischemia    Acute MI     Atelectasis 04/11/2019    Noted on Chest XR    CAD (coronary artery disease) Since 2009    w/R Stent Placed    Chest pain due to CAD     Nitro PRN    DM (diabetes mellitus)     false positive    Elevated cholesterol     History of chest x-ray 04/11/2019    Suggestive For Atelectasis w/Chronic Inflammatory Scarring Noted    History of EKG 3/25/19-[facility]    Sinus Rhythm w/Nonspecific ST-T Wave Abnormalities    History of nephrolithiasis     History of stress test 2015    WNL    HLD (hyperlipidemia)     Controlled w/Meds    Hypertension     Controlled w/Meds    Kidney stones     S/p Lithotripsy    LAD stenosis 04/11/2019    90% Mid LAD Stenosis; Diffuse Stenosis of LCX--Noted on Cardiac Cath    Myocardial ischemia 03/25/2019    Noted on Echo Stress Test    RCA occlusion 04/11/2019    Mild Diffuse Disease--Noted on Cardiac Cath     Tobacco use     1.5-1 PPD        Plan:        Acute hypoxic respiratory failure  S/p COVID  Dysphagia  History of CAD  Tobacco abuse  HTN  HLD  Hypokalemia  Continue current treatment. Monitor counts. Increase activity. Aggressive therapies as tolerated. Maintain patient safety. Appreciate ENT evaluation and recommendations. Discharge planning for return to home Monday.       Electronically signed by ALY Huynh on 9/3/2023 at 09:03 CDT     I have discussed the care of Chaitanya Hair, including pertinent history and exam findings, with the nurse practitioner.    I have seen and examined the patient and the key elements of all parts of the encounter have been performed by me.  I agree with the assessment, plan and orders as documented by ALY Cheng, after I modified the exam findings and the plan of  treatments and the final version is my approved version of the assessment.        Electronically signed by Nikunj Jane MD on 9/4/2023 at 07:36 CDT

## 2023-09-04 PROCEDURE — 97116 GAIT TRAINING THERAPY: CPT

## 2023-09-04 PROCEDURE — 25010000002 ENOXAPARIN PER 10 MG: Performed by: NURSE PRACTITIONER

## 2023-09-04 NOTE — DISCHARGE SUMMARY
REED Mcleod APRN      Internal Medicine Discharge Summary    Patient ID: Chaitanya Hair  MRN: [id number]     Acct:  [id number]       Patient's PCP: Og Campos MD    Admit Date: 8/17/2023     Discharge Date:   9/4/2023    Admitting Physician: Nikunj Jane MD    Discharge Physician: ALY Huynh     Active Discharge Diagnoses:    Acute hypoxic respiratory failure  S/p COVID  Dysphagia  History of CAD  Tobacco abuse  HTN  HLD  Hypokalemia      Primary Problem  <principal problem not specified>      Hospital Problems    Dysphagia, oropharyngeal    Dysphonia    Hoarseness    Vocal cord weakness    Dysarthria     Past Medical History:   Diagnosis Date    Abnormal stress echo 3/25/19-[facility]    Noted For Myocardial Ischemia    Acute MI     Atelectasis 04/11/2019    Noted on Chest XR    CAD (coronary artery disease) Since 2009    w/R Stent Placed    Chest pain due to CAD     Nitro PRN    DM (diabetes mellitus)     false positive    Elevated cholesterol     History of chest x-ray 04/11/2019    Suggestive For Atelectasis w/Chronic Inflammatory Scarring Noted    History of EKG 3/25/19-[facility]    Sinus Rhythm w/Nonspecific ST-T Wave Abnormalities    History of nephrolithiasis     History of stress test 2015    WNL    HLD (hyperlipidemia)     Controlled w/Meds    Hypertension     Controlled w/Meds    Kidney stones     S/p Lithotripsy    LAD stenosis 04/11/2019    90% Mid LAD Stenosis; Diffuse Stenosis of LCX--Noted on Cardiac Cath    Myocardial ischemia 03/25/2019    Noted on Echo Stress Test    RCA occlusion 04/11/2019    Mild Diffuse Disease--Noted on Cardiac Cath     Tobacco use     1.5-1 PPD       The patient was seen and examined on the day of discharge and this discharge summary is in conjunction with any daily progress note from day of discharge.    Code Status:    There are no questions and answers to display.       Hospital Course:      Chaitanya Hair is a  63 y.o.  male who presented with need for continued oxygen weaning and rehabilitation efforts following recent acute care stay. The patient had been in his usual state of health when he developed increased shortness of breath. He presented to ER  on 8/4/23 with his complaints and was evaluated and discharged to home. Of note, he was found positive for COVID 19 5 days prior on July 31. He returned to ER the following day with progression of symptoms now including increased confusion, diarrhea, fever, and fatigue with questionable seizure activity. He was found to be significantly hypoxic on intake and requiring emergent intubation with mechanical ventilation. CXR showed significant worsening from prior imaging. He was treated with IV antibiotics and IV steroids. Patient was evaluated by neurology and placed on prophylactic treatment with keppra pending workup for possible seizure activity. He was sedated with propofol and required pressor support. He developed fluid volume overload and was aggressive diuresed resulting in declining renal function. Patient stabilized and tolerated sedation weaning and spontaneous breathing trials and was extubated to nasal cannula on 8/13. Patient was evaluated by SLP and found to be at high risk for aspiration and patient was recommended to continued NPO with DHT for nutrition support with AMONs. Mr. Hair progressed well with physical and occupational therapy.  He did not pass his swallowing evaluation after admission. SLP worked with him routinely and he was able to progress to a soft diet with thickened liquids, eventually being able to progress to regular diet and thin liquids last week.  He is being discharged today to his home with his spouse in stable condition.        Review of Systems   Constitutional:  Negative for chills, decreased appetite and fever.   HENT:  Negative for ear pain, hearing loss and hoarse voice.    Eyes:  Negative for  blurred vision, discharge and pain.   Cardiovascular:  Negative for chest pain and claudication.   Respiratory:  Negative for cough, shortness of breath. Negative for hemoptysis.    Endocrine: Negative for cold intolerance, heat intolerance, polydipsia, polyphagia and polyuria.   Hematologic/Lymphatic: Negative for adenopathy and bleeding problem.   Skin:  Negative for color change, itching, nail changes and rash.   Musculoskeletal:  Negative for arthritis and back pain.   Gastrointestinal:  Negative for abdominal pain, diarrhea, nausea and vomiting.   Genitourinary:  Negative for flank pain, hematuria and urgency.   Neurological:  Negative for weakness  Negative for dizziness and headaches.   Psychiatric/Behavioral:  Negative for altered mental status, depression, substance abuse, suicidal ideas and thoughts of violence. The patient is not nervous/anxious.    Allergic/Immunologic: Negative for environmental allergies, HIV exposure, hives and persistent infections.     Physical Exam  Vitals and nursing note reviewed.   Constitutional:       Appearance: Normal appearance.   HENT:      Head: Normocephalic and atraumatic.      Right Ear: External ear normal.      Left Ear: External ear normal.      Nose: Nose normal.      Mouth/Throat:      Mouth: Mucous membranes moist.      Pharynx: Oropharynx is clear.   Eyes:      Extraocular Movements: Extraocular movements intact.      Pupils: Pupils are equal, round, and reactive to light.   Cardiovascular:      Rate and Rhythm: Normal rate and regular rhythm.      Pulses: Normal pulses.      Heart sounds: Normal heart sounds.   Pulmonary:      Effort: Pulmonary effort is normal.      Breath sounds: Normal breath sounds.   Abdominal:      General: Abdomen is flat.      Palpations: Abdomen is soft.   Musculoskeletal:         General: Normal range of motion.      Cervical back: Normal range of motion and neck supple.   Skin:     General: Skin is warm and dry.      Capillary  Refill: Capillary refill takes less than 2 seconds.   Neurological:      General: No focal deficit present.      Mental Status: He is alert and oriented to person, place, and time.   Psychiatric:         Mood and Affect: Mood normal.         Behavior: Behavior normal.         Thought Content: Thought content normal.         Judgment: Judgment normal.     T 97.6 HR 82 RR 20 /86 SPO2 96%     Consults:  None    Disposition: home    Discharged Condition: Stable    Follow Up: No follow-up provider specified.    Diet: Diet: Regular/House Diet; Texture: Soft to Chew (NDD 3); Soft to Chew: Whole Meat; Fluid Consistency: Thin (IDDSI 0)    Discharge Medications:      Discharge Medications        ASK your doctor about these medications        Instructions Start Date   aspirin 81 MG EC tablet   81 mg, Oral, Daily      furosemide 20 MG tablet  Commonly known as: LASIX   20 mg, Oral, Daily      lisinopril 5 MG tablet  Commonly known as: PRINIVIL,ZESTRIL   5 mg, Oral, Daily      metoprolol tartrate 50 MG tablet  Commonly known as: LOPRESSOR   50 mg, Oral, 2 Times Daily      Multi-Vitamin tablet tablet  Generic drug: multivitamin   Oral      potassium chloride 10 MEQ CR capsule  Commonly known as: MICRO-K   10 mEq, Oral, Daily      rosuvastatin 20 MG tablet  Commonly known as: CRESTOR   20 mg, Oral               Time Spent on discharge is  32 minutes in patient examination, evaluation, patient/family counseling as well as medication reconciliation, prescriptions for required medications, discharge plan and follow up.     Electronically signed by ALY Huynh on 9/4/2023 at 09:14 CDT     I have discussed the care of Chaitanya Hair, including pertinent history and exam findings, with the nurse practitioner.    I have seen and examined the patient and the key elements of all parts of the encounter have been performed by me.  I agree with the assessment, plan and orders as documented by ALY Cheng, after I  modified the exam findings and the plan of treatments and the final version is my approved version of the assessment.        Electronically signed by Nikunj Jane MD on 9/5/2023 at 19:45 CDT

## 2023-09-20 ENCOUNTER — TRANSCRIBE ORDERS (OUTPATIENT)
Dept: PHYSICAL THERAPY | Facility: CLINIC | Age: 63
End: 2023-09-20
Payer: COMMERCIAL

## 2023-09-20 DIAGNOSIS — Z86.16 PERSONAL HISTORY OF COVID-19: Primary | ICD-10-CM

## 2023-09-20 DIAGNOSIS — J96.01 ACUTE RESPIRATORY FAILURE WITH HYPOXIA: Primary | ICD-10-CM

## 2023-09-21 ENCOUNTER — TREATMENT (OUTPATIENT)
Dept: PHYSICAL THERAPY | Facility: CLINIC | Age: 63
End: 2023-09-21
Payer: MEDICARE

## 2023-09-21 DIAGNOSIS — Z86.16 PERSONAL HISTORY OF COVID-19: ICD-10-CM

## 2023-09-21 DIAGNOSIS — Z74.09 IMPAIRED FUNCTIONAL MOBILITY, BALANCE, GAIT, AND ENDURANCE: Primary | ICD-10-CM

## 2023-09-21 NOTE — PROGRESS NOTES
Physical Therapy Initial Evaluation and Plan of Care  [address] [place] [address]    Patient: Chaitanya Hair   : 1960  Referring practitioner: Nikunj Jane MD  Date of Initial Visit: 2023  Today's Date: 2023  Patient seen for 1 sessions    Visit Diagnoses:    ICD-10-CM ICD-9-CM   1. Impaired functional mobility, balance, gait, and endurance  Z74.09 V49.89   2. Personal history of COVID-19  Z86.16 V12.09     Past Medical History:   Diagnosis Date    Abnormal stress echo 3/25/19-[facility]    Noted For Myocardial Ischemia    Acute MI     Atelectasis 2019    Noted on Chest XR    CAD (coronary artery disease) Since     w/R Stent Placed    Chest pain due to CAD     Nitro PRN    DM (diabetes mellitus)     false positive    Elevated cholesterol     History of chest x-ray 2019    Suggestive For Atelectasis w/Chronic Inflammatory Scarring Noted    History of EKG 3/25/19-[facility]    Sinus Rhythm w/Nonspecific ST-T Wave Abnormalities    History of nephrolithiasis     History of stress test     WNL    HLD (hyperlipidemia)     Controlled w/Meds    Hypertension     Controlled w/Meds    Kidney stones     S/p Lithotripsy    LAD stenosis 2019    90% Mid LAD Stenosis; Diffuse Stenosis of LCX--Noted on Cardiac Cath    Myocardial ischemia 2019    Noted on Echo Stress Test    RCA occlusion 2019    Mild Diffuse Disease--Noted on Cardiac Cath     Tobacco use     1.5-1 PPD     Past Surgical History:   Procedure Laterality Date    BACK SURGERY  2013    CARDIAC CATHETERIZATION      RCA Stent    CARDIAC CATHETERIZATION  2019-[facility]    w/L Ventriculogram/Angiography--Dr. Garcia--CAD Involving LAD    COLONOSCOPY  2008    CORONARY ANGIOPLASTY WITH STENT PLACEMENT Right 2009    CORONARY ARTERY BYPASS GRAFT N/A 2019    Procedure: MEDIAN STERNOTOMY, CORONARY ARTERY BYPASS X2  USING LEFT INTERNAL MAMMARY ARTERY GRAFT, PRP, INTRAOP LAURI;  Surgeon: Jesse Perez MD;  Location: [facility] OR;  Service: Cardiothoracic    CYSTOSCOPY W/ LASER LITHOTRIPSY           SUBJECTIVE     Subjective Evaluation    History of Present Illness  Mechanism of injury: He had COVID-19 and passed out, he was on a ventilator for 8 days. He was in the hospital for 30 days. He lost a lot of muscle and is very weak. Wife Syl present throughout eval. He is having difficulty w/ his speech, UE strength, and leg strength. 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 No falls recently. Denies any n/t. He stood ~15-20 mins yesterday which is most likely the longest he has done since he has been home. Denies difficulty w/ stairs, 2 ELTON. He has been sitting on a shower bench because he is afraid of falling. He landed on his L shoulder when he fell and cannot lift above ~90 degrees now. Pt enjoys camping, he would like to be able to do what he needs to do to attach his camper and trailer. He has been monitoring his SpO2 at home and it has been >/=~97% but it had dropped to 50% when he passed out. He has been having LE swelling. He had a MI in 2009, back surgery in 2013, double bypass in 2019, appendectomy in 2023. He gets lightheaded/dizzy when he stands and when he rolls to the R.              OBJECTIVE     Objective     BALANCE TESTING  miniBEST: 19/28  1. 2 2. 2 3. 1 4. 2 5. 0 6. 0 7. 2 8. 2 9. 0 10. 2 11. 2 12. 2 13. 2 14.0 (TUG w/o CDT: 8.86, TUG w/ CDT: 10.67  6 MWT:   BEFORE TEST: HR 76 bpm SpO2 99% room air   899' no AD, no LOB or evidence of instability   AFTER TEST: SpO2 99% room air HR 85 BPM DEBRA RPE 11/20 on Debra 6-20 scale no dyspnea reported    LE MMT   HIP Strength L Strength R   flexion 4  4+   extension 2+  2+   ABD 4+ 4+    KNEE     flexion 5  5   extension 4+  4   ANKLE     dorsiflexion 4-  4        Therapy Education/Self Care 82028   Education offered today POC,  HEP, implications of functional test results, BPPV etiology   Medbride Code    Ongoing HEP   To be assigned at next visit   Timed Minutes 8       Total Timed Treatment:     8   mins  Total Time of Visit:            45   mins    ASSESSMENT/PLAN     GOALS:  Goals                                                    Progress Note due by 10/20/2023                                                                Recert due by 12/20/2023   LTG by: 12 weeks Comments Date Status   Patient will report compliance with initial HEP.        Patient to perform TUG within 8 sec without LOB for improved functional mobility.       Patient to ambulate >/=1877' (age/sex-matched norm) during 6 MWT outdoors on uneven terrain w/ Debra RPE </=8/20 on Debra 6-20 scale without LOB and VSS for improved gait alanna and functional mobility.       Improve gross LE MMT to 5/5      Patient to improve mini-BEST test balance score to >/= 26/28 to decrease risk of falls.           Assessment & Plan       Assessment  Impairments: abnormal coordination, abnormal gait, abnormal muscle firing, abnormal muscle tone, abnormal or restricted ROM, activity intolerance, impaired balance, impaired physical strength, lacks appropriate home exercise program, pain with function and safety issue   Functional limitations: carrying objects, lifting, sleeping, walking, pulling, pushing, uncomfortable because of pain, moving in bed, sitting, standing, stooping, reaching behind back, reaching overhead and unable to perform repetitive tasks   Assessment details: Chaitanya Hair is a 63 year old male who presents w/ impaired functional mobility secondary to medical events detailed per chart review and pt subjective report including personal history of COVID-19. The pt exhibits B LE weakness w/ functional and formal testing; impaired static/dynamic standing balance as evidenced by miniBEST and TUG scores/results; and impaired activity tolerance evidenced by 6 MWT results. He  also reports s/s consistent w/ BPPV though this will need to be further assessed in subsequent sessions d/t time constraints today. Due to the aforementioned anatomical and functional limitations, the pt will require skilled OP PT services to optimize functional recovery, safety, and independence.  Prognosis: good    Plan  Therapy options: will be seen for skilled therapy services  Planned modality interventions: thermotherapy (hydrocollator packs), cryotherapy, low level laser therapy, TENS, dry needling, electrical stimulation/Australian stimulation and ultrasound  Planned therapy interventions: abdominal trunk stabilization, manual therapy, ADL retraining, motor coordination training, balance/weight-bearing training, neuromuscular re-education, body mechanics training, postural training, soft tissue mobilization, spinal/joint mobilization, fine motor coordination training, flexibility, functional ROM exercises, strengthening, gait training, stretching, home exercise program, therapeutic activities, IADL retraining, joint mobilization and transfer training  Frequency: 1x week  Duration in weeks: 12  Treatment plan discussed with: patient and family  Plan details: Patient will be seen 1x/wk x 12wks with treatment to include strengthening, stretching, manual therapy, neuromuscular re-education, balance, gait and endurance training.     **ASSIGN HEP NEXT SESSION**  **EVALUATE FOR BPPV NEXT SESSION**       SIGNATURE: Qian Rivas, PT, [place] License #: [id number]  Electronically Signed on 9/21/2023    Initial Certification  Certification Period: 9/21/2023 through 12/19/2023  I certify that the therapy services are furnished while this patient is under my care.  The services outlined above are required by this patient, and will be reviewed every 90 days.     PHYSICIAN: Nikunj Jane MD (NPI: [id number])    Signature: _______________________________________DATE: _________    Please sign and return via fax to  [phone].   Thank you so much for letting us work with Chaitanya. I appreciate your letting us work with your patients. If you have any questions or concerns, please don't hesitate to contact me.          [address] [id number]  [phone]

## 2023-09-26 ENCOUNTER — TREATMENT (OUTPATIENT)
Dept: PHYSICAL THERAPY | Facility: CLINIC | Age: 63
End: 2023-09-26
Payer: MEDICARE

## 2023-09-26 ENCOUNTER — OFFICE VISIT (OUTPATIENT)
Dept: PHYSICAL THERAPY | Facility: CLINIC | Age: 63
End: 2023-09-26
Payer: MEDICARE

## 2023-09-26 DIAGNOSIS — R49.0 DYSPHONIA: ICD-10-CM

## 2023-09-26 DIAGNOSIS — R53.1 GENERALIZED WEAKNESS: ICD-10-CM

## 2023-09-26 DIAGNOSIS — R13.12 OROPHARYNGEAL DYSPHAGIA: ICD-10-CM

## 2023-09-26 DIAGNOSIS — R47.1 ACQUIRED DYSARTHRIA: Primary | ICD-10-CM

## 2023-09-26 DIAGNOSIS — Z86.16 PERSONAL HISTORY OF COVID-19: Primary | ICD-10-CM

## 2023-09-26 NOTE — PROGRESS NOTES
Occupational Therapy Initial Evaluation and Plan of Care and Discharge  [address] [place] [address]    Patient: Chaitanya Hair   : 1960  Referring practitioner: Nikunj Jane MD  Date of Initial Visit: 2023  Today's Date: 2023  Patient seen for 1 sessions    Visit Diagnoses:    ICD-10-CM ICD-9-CM   1. Personal history of COVID-19  Z86.16 V12.09   2. Generalized weakness  R53.1 780.79     Past Medical History:   Diagnosis Date    Abnormal stress echo 3/25/19-[facility]    Noted For Myocardial Ischemia    Acute MI     Atelectasis 2019    Noted on Chest XR    CAD (coronary artery disease) Since     w/R Stent Placed    Chest pain due to CAD     Nitro PRN    DM (diabetes mellitus)     false positive    Elevated cholesterol     History of chest x-ray 2019    Suggestive For Atelectasis w/Chronic Inflammatory Scarring Noted    History of EKG 3/25/19-[facility]    Sinus Rhythm w/Nonspecific ST-T Wave Abnormalities    History of nephrolithiasis     History of stress test     WNL    HLD (hyperlipidemia)     Controlled w/Meds    Hypertension     Controlled w/Meds    Kidney stones     S/p Lithotripsy    LAD stenosis 2019    90% Mid LAD Stenosis; Diffuse Stenosis of LCX--Noted on Cardiac Cath    Myocardial ischemia 2019    Noted on Echo Stress Test    RCA occlusion 2019    Mild Diffuse Disease--Noted on Cardiac Cath     Tobacco use     1.5-1 PPD     Past Surgical History:   Procedure Laterality Date    BACK SURGERY  2013    CARDIAC CATHETERIZATION      RCA Stent    CARDIAC CATHETERIZATION  2019-[facility]    w/L Ventriculogram/Angiography--Dr. Garcia--CAD Involving LAD    COLONOSCOPY  2008    CORONARY ANGIOPLASTY WITH STENT PLACEMENT Right 2009    CORONARY ARTERY BYPASS GRAFT N/A 2019    Procedure: MEDIAN STERNOTOMY, CORONARY ARTERY BYPASS X2 USING LEFT INTERNAL  MAMMARY ARTERY GRAFT, PRP, INTRAOP LAURI;  Surgeon: Jesse Perez MD;  Location: [facility] OR;  Service: Cardiothoracic    CYSTOSCOPY W/ LASER LITHOTRIPSY           SUBJECTIVE     Subjective Evaluation    History of Present Illness  Date of onset: 2023  Mechanism of injury: Pt presents to the clinic today post hospital stay for COVID-19 which required intubation for 8 days and a total hospital stay of 30 days. Pt reports that before his hospitalization he collapsed at home, falling on his L side and has had L shoulder pain since. Pt is a retired  and lives at home with his wife. Pt reports he is independent with all ADLs with the use of a shower chair for bathing. Pt reports he is independent with household IADLs and is performing light yard work IADLs with breaks. Pt is not driving currently d/t being on Keppra for possible [place] activity. Pt is no longer on oxygen and presents with O2 sat of 97% and HR of 83 bpm. Pt reports his concerns include balance, dizziness, strength, and decreased endurance.     Pain  Current pain ratin  At worst pain ratin  Location: L shoulder  Quality: dull ache  Aggravating factors: overhead activity    Social Support  Lives with: spouse    Hand dominance: right    Diagnostic Tests  No diagnostic tests performed    Treatments  Current treatment: physical therapy  Discharged from (in last 30 days): inpatient hospitalization  Patient Goals  Patient goals for therapy: improved balance, increased strength, increased motion, independence with ADLs/IADLs and return to sport/leisure activities         Outcome Measure:   9 Hole Peg Test: Left: 22.12 Right: 21.12    PT G-Codes  Outcome Measure Options: Quick DASH  Quick DASH Score: 6.82% impaired with UE use.     OBJECTIVE     Objective          Active Range of Motion   Left Shoulder   Flexion: WFL and with pain  Extension: WFL  Horizontal abduction: WFL and with pain    Right Shoulder   Flexion: WFL  Extension:  WFL  Horizontal abduction: WFL    Left Elbow   Flexion: WFL  Extension: WFL  Forearm supination: WFL  Forearm pronation: WFL    Right Elbow   Flexion: WFL  Extension: WFL  Forearm supination: WFL  Forearm pronation: WFL    Left Wrist   Wrist flexion: WFL  Wrist extension: WFL  Radial deviation: WFL  Ulnar deviation: WFL      Right Wrist   Wrist flexion: WFL  Wrist extension: WFl  Radial deviation: WFL  Ulnar deviation: WFL    Additional Active Range of Motion Details  Pt reports tightness and pain of 2/10 around 90 degrees of L shoulder flexion and horizontal abduction. Is able to reach WFL ROM with pain in L shoulder.     Strength/Myotome Testing     Left Wrist/Hand      (2nd hand position)     Trial 1: 61 lbs    Trial 2: 72 lbs    Trial 3: 64 lbs    Average: 65.67 lbs    Right Wrist/Hand      (2nd hand position)     Trial 1: 65 lbs    Trial 2: 70 lbs    Trial 3: 75 lbs    Average: 70 lbs    Additional Strength Details  R UE 5/5; L shoulder 4/5, L distal UE 5/5      Vision assessment: normal and wears corrected lenses/contact.    Cognitive status: oriented to Person, Place, Time, and Situation    Sensation: no sensory deficits noted.    ADL Assessment:  Upper Body Dressing: Able to perform  Lower Body Dressing: Able to perform  Grooming--Handwashing: Able to perform  Grooming--Toothbrushing: Able to perform  Grooming--Hair Care: Able to perform  Grooming--Nail Care: Able to perform  Grooming--Shaving Face: Able to perform  Grooming--Washing Face: Able to perform  Toileting--Clothing Management: Able to perform  Toileting--Flushing: Able to perform  Toileting--Hygiene: Able to perform  Eating--Utensils Indepedence: Able to perform  Eating--Finger Feeding: Able to perform  Eating--Cup Drinking: Able to perform  Eating--Straw Drinking: Able to perform  Bathing Level of [place]: Able to perform  Bathing Assistive Devices: shower chair with back  Bathing Position: unsupported sitting and unsupported  standing    Therapeutic Exercises    07531 Units Comments   Added B UE strengthening exercises to be performed at home with 6# free weights 10-20 reps in all planes of the shoulder, elbow and wrist.      Chair push ups added to HEP for triceps strengthening.      Added modified wall push ups and B hand  strengthening with stress ball to HEP.                Timed Minutes 12     Therapy Education/Self Care [id number]   Education offered today B UE strengthening program with free weights, chair and wall push ups.     Medbride Code    Ongoing HEP   B UE strengthening program.   Timed Minutes        Total Timed Treatment:     12   mins  Total Time of Visit:            40   mins    ASSESSMENT/PLAN     Assessment & Plan       Assessment  Assessment details: This pt was referred to OT services d/t recent 30 day hospitalization for COVID-19 complications. He is currently living at home with his wife and reports he is independent with all ADL and most IADLs. Pt reports he is limited in some heavy outdoor IADL performance, but with rest breaks he has been able to complete all the tasks he's attempts thus far. He demonstrates excellent UE strength with the only deficit being in his L shoulder. Pt experiences a small increase in pain in the L shoulder when completing overhead movements but this does not limit his performance. He demonstrates decreased  strength in B hands compared to the norms for his age. Established a BUE strengthening HEP which he can perform with 6# free weights he already has. Skilled OT treatment for this pt is not necessary at this time d/t his high level of performance.      Plan  Frequency: Eval Only.  Duration in visits: 1  Treatment plan discussed with: patient and caregiver  Plan details: Skilled OT intervention not necessary for pt at this time.       SIGNATURE: Carla Nathan, OT Student  Electronically Signed on 9/26/2023    The clinical instructor and/or supervising staff, Rachel Avery,  LULU/L, was present in clinic guiding the visit by approving, concurring, and confirming the skilled judgement for all services rendered.    Signature:  CHACHO Marie [place] License #: [id number]    Electronically signed on 9/26/2023      Initial Certification  Certification Period: 9/26/2023 through 12/24/2023  I certify that the therapy services are furnished while this patient is under my care.  The services outlined above are required by this patient, and will be reviewed every 90 days.     PHYSICIAN: Nikunj Jane MD (NPI: [id number])    Signature: _______________________________________DATE: _________    Please sign and return via fax to [phone].           [address] [address]  [phone]

## 2023-09-26 NOTE — PROGRESS NOTES
Speech/Language Pathology Initial Evaluation and Plan of Care  [address] [place] [address]    Patient: Chaitanya Hair               : 1960  Visit Date: 2023  Referring practitioner: Nikunj Jane MD  Date of Initial Visit: 2023  Patient seen for 1 sessions    Visit Diagnoses:    ICD-10-CM ICD-9-CM   1. Acquired dysarthria  R47.1 784.51   2. Oropharyngeal dysphagia  R13.12 787.22   3. Dysphonia  R49.0 784.42     Past Medical History:   Diagnosis Date    Abnormal stress echo 3/25/19-[facility]    Noted For Myocardial Ischemia    Acute MI     Atelectasis 2019    Noted on Chest XR    CAD (coronary artery disease) Since     w/R Stent Placed    Chest pain due to CAD     Nitro PRN    DM (diabetes mellitus)     false positive    Elevated cholesterol     History of chest x-ray 2019    Suggestive For Atelectasis w/Chronic Inflammatory Scarring Noted    History of EKG 3/25/19-[facility]    Sinus Rhythm w/Nonspecific ST-T Wave Abnormalities    History of nephrolithiasis     History of stress test     WNL    HLD (hyperlipidemia)     Controlled w/Meds    Hypertension     Controlled w/Meds    Kidney stones     S/p Lithotripsy    LAD stenosis 2019    90% Mid LAD Stenosis; Diffuse Stenosis of LCX--Noted on Cardiac Cath    Myocardial ischemia 2019    Noted on Echo Stress Test    RCA occlusion 2019    Mild Diffuse Disease--Noted on Cardiac Cath     Tobacco use     1.5-1 PPD     Past Surgical History:   Procedure Laterality Date    BACK SURGERY  2013    CARDIAC CATHETERIZATION      RCA Stent    CARDIAC CATHETERIZATION  2019-[facility]    w/L Ventriculogram/Angiography--Dr. Garcia--CAD Involving LAD    COLONOSCOPY  2008    CORONARY ANGIOPLASTY WITH STENT PLACEMENT Right 2009    CORONARY ARTERY BYPASS GRAFT N/A 2019    Procedure: MEDIAN STERNOTOMY, CORONARY ARTERY BYPASS X2  "USING LEFT INTERNAL MAMMARY ARTERY GRAFT, PRP, INTRAOP LAURI;  Surgeon: Jesse Perez MD;  Location: [facility] OR;  Service: Cardiothoracic    CYSTOSCOPY W/ LASER LITHOTRIPSY         SUBJECTIVE     Subjective Patient is a pleasant 63 year old man who is seen today for difficulty with speech and swallowing. Cognition and language appear intact. Patient is accompanied by his wife.   Patient presents with the following symptoms: drooling, globus, and cannot clear his throat or cough up phlegm.  Tongue feels \"thick\", speech is intelligible but slurred/different from before, speech is \"juicy\" with spitting while talking, feels like he has \"extra saliva\".  voice is husky and \"gunky\".   Date of Onset: 8/14/2023  History of present problems: Patient was intubated on 8/5/2023 for acute hypoxic respiratory failure due to severe COVID-19. He was extubated 8 days later. He was [facility] to [facility] on 8/18/23-9/4/23. He pulled his NG tube during transfer and had severe nose bleed when they attempted to replace it, resulting in him refusing to have it replaced for two days. He was seen by ENT while in LTACH.   The functional impact on the patient: Difficulty with speech, speech sounds different, voice sounds different, cannot clear throat, spits when talking, cannot whistle anymore.    Prior level of function: WFL. Patient is a retired .   Pertinent Medical History Related to this Problem: COVID-19 with acute hypoxic respiratory failure resulting in 8 days of intubation, NG tube, deconditioning. MI, atelectasis, coronary artery bypass graft, back surgery, see chart for full history.   Current Diet Level:   Solid: 7 -  Easy to Chew/Regular  Liquid: 0 - Thin  Non-oral Feeding: N/A    OBJECTIVE   The Frenchay Dysarthria Assessment (2nd Edition), (Vinayak and Angel 2008) is a rating scale with which clinicians assess patients' performance on a range of behaviors related to speech function. " There are seven areas tested, which are scored on a defined 5-point scale from normal function to no function. The fix levels are described here as normal, mild, moderate, severe and profound impact on function. Influencing factors are also described.    Task Severity   Reflexes Cough Mild   Reflexes Swallow Mild   Reflexes Drool Mild   Respiration at Rest Normal   Respiration Speech Normal   Lips at Rest Normal   Lip Spread Normal   Lip Seal Normal   Lips Alternate Normal   Lips in Speech Normal   Palate Fluids Normal   Palate Maintenance Normal   Palate Speech Normal   Laryngeal Time Moderate   Laryngeal Pitch Mild   Laryngeal Volume Mild   Laryngeal Speech Mild   Tongue Rest Moderate   Tongue Protrusion Mild   Tongue Elevation Moderate   Tongue Alernate Mild   Tongue in Speech Mild   Intelligibility Words Mild   Intelligibility Sentences Mild   Intelligibility Conversation Mild     Comments: Speech sounds different to patient and his wife. Intelligibility is good.     ORAL MECH  Respiratory/Swallow Coordination: WFL  Position During Evaluation: Upright  Anatomy/Physiology: Patient presents with lingual hypertrophy on right side resulting in deviation to the left at rest and on protrusion. Limited ROM to the right. Right buccal weakness.   Dentition: Patient has own teeth  Oral Health: Oral cavity is clean  Awareness/Control of Secretions: Patient swallows saliva, There are excess secretions, There is anterior loss of secretions, Patient is aware of drooling, and patient c/o spitting when he talks.     Method of Food Administration: Cup    CLINICAL OBSERVATIONS  Oral Phase: Impaired lip closure, reduced lingual propulsion of the bolus (per VFSS)  Pharyngeal Symptoms: throat clearing with consistencies of thin watere and c/o difficulty  Summary of Clinical Exam: Patient c/o difficulty with swallowing, oral phase difficulty, and difficulty clearing his throat.     INSTRUMENTAL ASSESSMENT  Instrumental Exam  Completed?: Yes :  Date: 8/21/21  Hospital/Medical Center: [facility]  Results: Poor lip seal/suction, unable to draw nectar or thin liquid via straw, poor oral prep, decreased back of tongue control, poor laryngeal elevation and epiglottic inversion, mild to moderate residue with all consistencies, No definite penetration or aspiration with any consistency.   Recommendations: puree food with honey thick liquid.   Please see SLP report of VFSS  Severity Level of Dysphagia: moderate dysphagia  Consistencies Aspirated/Penetrated:  None  Summary Statement: Patient progressed with therapy to regular diet with thin liquids. He continues to c/o difficulty    IMPRESSION/RECOMMENDATIONS  Factors Affecting Performance: no difficulty participating in study  Learning Motivation: strong  Education/Learning Comments: Patient demonstrated understanding of evaluation results and plan of care.     PATIENT SELF ASSESSMENT    EAT 10  0= No problem 4= Severe problem  My swallowing has caused me to lose weight 0   My Swallowing problem interferes with my ability to go out for meals  0   Swallowing liquids takes extra effort 1   Swallowing solids takes extra effort 1   Swallowing pills takes extra effort 1   Swallowing is painful 0   The pleasure of eating is affected by my swallowing 0   When I swallow, food sticks in my throat 0   I cough when I eat 0   Swallowing is stressful 0                                                                                                  Total Score 3     0-9 Minimal  10-19 Mild  20-29 Moderate 30-40 Severe      Clinical Impression:   Mild: oropharyngeal phase dysphagia, mild dysarthria characterized by thickened right side of tongue and right cheek resulting in mildly slurred speech and spitting when talking, husky, rough voice. Unable to clear throat, globus.   Impact on Function: risk for aspiration, pneumonia, and social aspects of eating, speech sounds  different, voice sounds different.  Prognosis Comment: Good      Therapy Education/Self Care    Details: Evaluation results and POC   Given    Progress: New   Education provided to:  Patient and Spouse/SO   Level of understanding Verbalized           Total Time of Visit:            60   mins    ASSESSMENT/PLAN     Goals                                            STG  Comments Status   Patient will improve oral skills to enhance safety and increase eating efficiency and bolus control as measured by increased lip closure, improved bolus formation, improved posterior propulsion of the bolus, and decreased drooling and decreased spitting when talking.  New   Patient will improve hyolaryngeal elevation, improve epiglottic inversion, and improve UES opening as measured by decreased overt signs and symptoms of aspiration and decreased penetration and aspiration on repeat instrumental swallow study, if applicable.   New   Patient will report decreased globus sensation and decreased difficulty with swallowing.  New   Patient will increase strength and power of articulators to produce speech that is natural sounding to patient and familiar listeners.   New   LTG      Patient will be able to engage in speech at the conversational level so that speech is understood by familiar /unfamiliar listeners 90% of the time after spontaneous repair.     Patient will safely consume full PO diet of regular consistency food and thin liquids without discomfort, globus, and difficulty or complications such as aspiration or pneumonia.                  ASSESSMENT:   Patient is indicated for skilled care by a Speech/Language Pathologist.     Summary of Assessment: Patient presents with mild dysphagia and mild dysarthria secondary to intubation for acute hypoxia from COVID-19. He presents with right lip and cheek weakness, right lingual hypertrophy, and decreased lingual ROM and dexterity. He feels his speech is slurred and he spits when he  talks.     Recommendations for Diet/Nutrition: full oral diet  Swallowing Precautions: alternative liquids and solids while eating  Therapy Recommendations: Initiate therapy for swallowing and speech  Screening indicates the further need for Neurology.    PLAN:  Details of Plan of Care: Initiate therapy and home exercises for swallowing and speech    Frequency: 1 time per week  Duration: 12 visits  Estimated Length of Session: 45 minutes    SPEECH/LANGUAGE PATHOLOGIST SIGNATURE: Josefina Booth, CCC-SLP, [place] License #: [id number]  Electronically Signed on 9/26/2023        Initial Certification  Certification Period: 9/26/2023 through 12/24/2023  I certify that the therapy services are furnished while this patient is under my care.  The services outlined above are required by this patient, and will be reviewed every 90 days.     PHYSICIAN: Nikunj Jane MD (NPI: [id number])    Signature:____________________________________________DATE: _________     Please sign and return via fax to [phone].   Thank you so much for letting us work with Chaitanya. I appreciate your letting us work with your patients. If you have any questions or concerns, please don't hesitate to contact me.          [address] [address]  [phone]

## 2023-09-27 ENCOUNTER — TREATMENT (OUTPATIENT)
Dept: PHYSICAL THERAPY | Facility: CLINIC | Age: 63
End: 2023-09-27
Payer: MEDICARE

## 2023-09-27 DIAGNOSIS — Z86.16 PERSONAL HISTORY OF COVID-19: ICD-10-CM

## 2023-09-27 DIAGNOSIS — Z74.09 IMPAIRED FUNCTIONAL MOBILITY, BALANCE, GAIT, AND ENDURANCE: Primary | ICD-10-CM

## 2023-09-27 NOTE — PROGRESS NOTES
Physical Therapy Treatment Note  [address] [place] [address]    Patient: Chaitanya Hair                                                 Visit Date: 2023  :     1960    Referring practitioner:    Nikunj Jane MD  Date of Initial Visit:          Type: THERAPY  Noted: 2023    Patient seen for 2 sessions    Visit Diagnoses:    ICD-10-CM ICD-9-CM   1. Impaired functional mobility, balance, gait, and endurance  Z74.09 V49.89   2. Personal history of COVID-19  Z86.16 V12.09     SUBJECTIVE     Subjective: Nothing new to report.    PAIN: 0/10         OBJECTIVE     Objective     Neuromuscular Reeducation     67499 Comments   BOSU squat EO    BOSU forward/lateral lunge EO/EC    Attempted TK <> HK, D/C'd d/t pain    Roberto-Halpike negative   Supine roll negative   Timed Minutes 25        Therapeutic Exercises    94342 Units Comments   Half jumping gladys EO 10    Half jumping gladys EC 5    Full jumping gladys EC 5  bpm SpO2 96%   Resisted nautilus leg extension, 70# 10 D/C'd d/t shin pain     116 bpm SpO2 96% at end of session   Timed Minutes 20       Therapy Education/Self Care [id number]   Education offered today    Medvaishalie Code    Ongoing HEP   Assign at next session   Timed Minutes        GOALS:  Goals                                                    Progress Note due by 10/20/2023                                                                Recert due by 2023   [facility] by: 12 weeks Comments Date Status   Patient will report compliance with initial HEP.        Patient to perform TUG within 8 sec without LOB for improved functional mobility.       Patient to ambulate >/=1877' (age/sex-matched norm) during 6 MWT outdoors on uneven terrain w/ Debra RPE </=8/20 on Debra 6-20 scale without LOB and VSS for improved gait alanna and functional mobility.       Improve gross LE MMT to 5/5      Patient to improve mini-BEST test balance  score to >/= 26/28 to decrease risk of falls.        Total Timed Treatment:     45   mins  Total Time of Visit:             45   mins         ASSESSMENT/PLAN     Assessment/Plan     ASSESSMENT: He is most challenged w/ endurance/conditioning activities as well as NMR activities w/ EC. Nystagmus noted during very brief [place]-Halpike noted on PT IE, however none noted today and no dizziness produced; will check this periodically throughout POC. Of note, his HR spiked considerably w/ dynamic/ballistic activities and he required adequate recovery time ~3-4 mins, see vitals.     PLAN: conditioning, strengthening, endurance, NMR, balance re-ed, monitor for BPPV    SIGNATURE: Qian Rivas, PT, [place] License #: [id number]  Electronically Signed on 9/27/2023        [address] [place] [address] [place] [address]  [phone]

## 2023-10-04 ENCOUNTER — TREATMENT (OUTPATIENT)
Dept: PHYSICAL THERAPY | Facility: CLINIC | Age: 63
End: 2023-10-04
Payer: MEDICARE

## 2023-10-04 DIAGNOSIS — Z86.16 PERSONAL HISTORY OF COVID-19: ICD-10-CM

## 2023-10-04 DIAGNOSIS — R47.1 ACQUIRED DYSARTHRIA: ICD-10-CM

## 2023-10-04 DIAGNOSIS — Z74.09 IMPAIRED FUNCTIONAL MOBILITY, BALANCE, GAIT, AND ENDURANCE: Primary | ICD-10-CM

## 2023-10-04 DIAGNOSIS — R13.12 OROPHARYNGEAL DYSPHAGIA: Primary | ICD-10-CM

## 2023-10-04 DIAGNOSIS — R49.0 DYSPHONIA: ICD-10-CM

## 2023-10-04 NOTE — PROGRESS NOTES
Speech Language Pathology Treatment Note  [address] [place] [address]    Patient: Chaitanya Hair                                                                                     Visit Date: 10/4/2023  :     1960    Referring practitioner:    Nikunj Jane MD  Date of Initial Visit:          Type: THERAPY  Noted: 2023    Patient seen for 2 sessions    Visit Diagnoses:    ICD-10-CM ICD-9-CM   1. Oropharyngeal dysphagia  R13.12 787.22   2. Dysphonia  R49.0 784.42   3. Acquired dysarthria  R47.1 784.51     SUBJECTIVE     Patient was alert and appeared ready for his first treatment session. Patient arrived with his wife. Due to regular SLP being out of office, SLP, Rosalie Peralta, led session with SLP student.    OBJECTIVE   GOALS  Goals                                            STG  Comments Status   Patient will improve oral skills to enhance safety and increase eating efficiency and bolus control as measured by increased lip closure, improved bolus formation, improved posterior propulsion of the bolus, and decreased drooling and decreased spitting when talking. SLP introduced and patient demonstrated front of tongue (3 sets of 10) and top of tongue exercises (3 sets of 10) with maximal cues.  SLP introduced and patient demonstrated lip press exercises (5 reps with 5 second hold) with maximal cues.   New   Patient will improve hyolaryngeal elevation, improve epiglottic inversion, and improve UES opening as measured by decreased overt signs and symptoms of aspiration and decreased penetration and aspiration on repeat instrumental swallow study, if applicable.  SLP introduced and patient demonstrated effortful swallow and CTAR exercises with maximal cues.  New   Patient will report decreased globus sensation and decreased difficulty with swallowing.  Patient reported globus sensation after swallowing dry chicken for dinner  "this week. SLP recommended moistening the meat and taking smaller bites at meals.  New   Patient will increase strength and power of articulators to produce speech that is natural sounding to patient and familiar listeners.   Patient was given simple CV and CVC words to target over articulation of the first consonant and vowel prolongation in the word.   Patient was able to demonstrate with maximal cues and repetitions.  New   LTG        Patient will be able to engage in speech at the conversational level so that speech is understood by familiar /unfamiliar listeners 90% of the time after spontaneous repair.   Patient explained to SLP and SLP student that he feels his voice has \"no hope.\" SLP and SLP student reassured him that we are working on his voice/intelligibility in therapy.  New   Patient will safely consume full PO diet of regular consistency food and thin liquids without discomfort, globus, and difficulty or complications such as aspiration or pneumonia.   Patient reported that he had globus sensation after eating dry chicken and \"couldn't get it up.\" Patient was recommended to moisten food and take smaller bites. Patient was given recommendation to cough and swallow to replace throat clearing habit.   Patient was able to demonstrate all swallowing and oral skill exercises.  New                            Therapy Education/Self Care    Details: POC   Given Home Exercise Program  [employer] HEP (access code [email or web address])   Progress: New   Education provided to:  Patient and Spouse/SO   Level of understanding Verbalized and Demonstrated             Total Time of Visit:             45   mins         ASSESSMENT/PLAN     ASSESSMENT: Patient demonstrated use and understanding of all oral skills and swallowing exercises. Patient was introduced to vocal exercises with simple CV and CVC words using vowel prolongation and over articulation of the first consonant of a word.     PLAN: Continue therapy and plan of " care.    Progress Note Due Date: 10/24/23  Recertification Due Date:12/24/2023    SIGNATURE: Denia Lopez, Speech Therapy Student,   Electronically Signed on 10/4/2023    The clinical instructor and/or supervising staff, Rosalie Peralta CCC-SLP, was present in clinic guiding the visit by approving, concurring, and confirming the skilled judgement for all services rendered.    Signature:  Rosalie Peralta CCC-SLP, [place] License #: [id number]  Electronically signed on 10/4/2023          [address] [place] [address] [place]  [place] [address]  [phone]

## 2023-10-04 NOTE — PROGRESS NOTES
Physical Therapy Treatment Note  [address] [place] [address]    Patient: Chaitanya Hair                                                 Visit Date: 10/4/2023  :     1960    Referring practitioner:    Nikunj Jane MD  Date of Initial Visit:          Type: THERAPY  Noted: 2023    Patient seen for 3 sessions    Visit Diagnoses:    ICD-10-CM ICD-9-CM   1. Impaired functional mobility, balance, gait, and endurance  Z74.09 V49.89   2. Personal history of COVID-19  Z86.16 V12.09     SUBJECTIVE     Subjective:He reports he feels like he feels like he is regaining his energy still not able to do what he wants. Denies pain or SOA      PAIN: 0/10         OBJECTIVE     Objective     Therapeutic Exercises    09879 Units Comments   /77 HR 71 99% O2     Resisted gait with TRX Rip Trainer x 40 ft x 6     Resisted B side stepping with TRX Rip Trainer x 40 ft x 2 each     /80 HR 76 98% O2     Paloff Press @ [employer] #2 x 20 each     Walking on treadmill .8 mph 2% incline with #20 vest x 6 min     /73 HR 85 97% O2     B pec and thoracic stretches with 1/2 roller in sitting      B HF stretches in SL     Timed Minutes 40        Therapy Education/Self Care 36782   Education offered today    [facility] Code    Ongoing HEP      Timed Minutes        Total Timed Treatment:     40   mins  Total Time of Visit:             40   mins         ASSESSMENT/PLAN     GOALS  Goals                                                    Progress Note due by 10/20/2023                                                                Recert due by 2023   LTG by: 12 weeks Comments Date Status   Patient will report compliance with initial HEP.          Patient to perform TUG within 8 sec without LOB for improved functional mobility.         Patient to ambulate >/=1877' (age/sex-matched norm) during 6 MWT outdoors on uneven terrain w/ Debra RPE </=8/20 on  Debra 6-20 scale without LOB and VSS for improved gait alanna and functional mobility.         Improve gross LE MMT to 5/5         Patient to improve mini-BEST test balance score to >/= 26/28 to decrease risk of falls.             Assessment/Plan     ASSESSMENT:   No goals have been met at this time; however, today was the first treatment session following the initial evaluation. I monitored his vitals intermittently today and his values were WFL.    PLAN:   Continue to work to improve overall strength and endurance as well as static and dynamic balance challenges.    SIGNATURE: Matthew King PTA, [place] License #: [id number]  Electronically Signed on 10/4/2023        [address] [place] [address] [place]  [place] [address]  [phone]

## 2023-10-11 ENCOUNTER — TREATMENT (OUTPATIENT)
Dept: PHYSICAL THERAPY | Facility: CLINIC | Age: 63
End: 2023-10-11
Payer: MEDICARE

## 2023-10-11 DIAGNOSIS — R47.1 ACQUIRED DYSARTHRIA: ICD-10-CM

## 2023-10-11 DIAGNOSIS — R13.12 OROPHARYNGEAL DYSPHAGIA: Primary | ICD-10-CM

## 2023-10-11 DIAGNOSIS — Z74.09 IMPAIRED FUNCTIONAL MOBILITY, BALANCE, GAIT, AND ENDURANCE: Primary | ICD-10-CM

## 2023-10-11 DIAGNOSIS — Z86.16 PERSONAL HISTORY OF COVID-19: ICD-10-CM

## 2023-10-11 DIAGNOSIS — R49.0 DYSPHONIA: ICD-10-CM

## 2023-10-11 NOTE — PROGRESS NOTES
Physical Therapy Treatment Note  [address] [place] [address]    Patient: Chaitanya Hair                                                 Visit Date: 10/11/2023  :     1960    Referring practitioner:    Nikunj Jane MD  Date of Initial Visit:          Type: THERAPY  Noted: 2023    Patient seen for 4 sessions    Visit Diagnoses:    ICD-10-CM ICD-9-CM   1. Impaired functional mobility, balance, gait, and endurance  Z74.09 V49.89   2. Personal history of COVID-19  Z86.16 V12.09       SUBJECTIVE     Subjective: Nothing new to report. Arm is doing okay - he is doing the HEP OT gave him.    PAIN: 0/10         OBJECTIVE     Objective     Therapeutic Exercises    17377 Units Comments   Rhythmic step ups 72 bpm metronome 1 min x2 Stepping up w/ one leg one minute, opp next minute, Limiting UE support/a   Rhythmic lunges 1 min x2 Limiting UE support/a   STS no UE support/a from low surface height 1 min*2    Glute bridge 1 min*2    Ellipitcal  1 min x2, 30 sec x1  bpm, SpO2 95% on room air   Side plank w/ clam 10 B    Timed Minutes 45        Therapy Education/Self Care 20974   Education offered today    [email or web address]   Ongoing HEP       Date: 10/11/2023  Prepared by: Qian Rivas    Exercises  - Forward Step Up with Counter Support  - 1 x daily - 4-5 x weekly - 2-3 sets - 1 minute  - Lunge with Counter Support  - 1 x daily - 4-5 x weekly - 2-3 sets - 1 minute  - Sit to Stand with Arms Crossed  - 1 x daily - 4-5 x weekly - 2-3 sets - 1 minute  - Supine Bridge with Gluteal Set and Spinal Articulation  - 1 x daily - 4-5 x weekly - 2-3 sets - 1 minute  - Side Plank with Clam  - 1 x daily - 4-5 x weekly - 2-3 sets - 10 reps   Timed Minutes        Total Timed Treatment:     45   mins  Total Time of Visit:             45   mins         ASSESSMENT/PLAN     GOALS  Goals                                                  Progress  Note due by 10/20/2023                                                                Recert due by 12/20/2023   LTG by: 12 weeks Comments Date Status   Patient will report compliance with initial HEP.          Patient to perform TUG within 8 sec without LOB for improved functional mobility.         Patient to ambulate >/=1877' (age/sex-matched norm) during 6 MWT outdoors on uneven terrain w/ Debra RPE </=8/20 on Debra 6-20 scale without LOB and VSS for improved gait alanna and functional mobility.         Improve gross LE MMT to 5/5         Patient to improve mini-BEST test balance score to >/= 26/28 to decrease risk of falls.             Assessment/Plan     ASSESSMENT: Assigned HEP for strength and conditioning today which pt was able to perform w/o production of pain but was sufficiently challenged. He did require adequate recovery time between tasks d/t dyspnea and elevated HR.    PLAN: Continue to work to improve overall strength and endurance as well as static and dynamic balance challenges.    SIGNATURE: Qian Rivas PT, [place] License #: [id number]  Electronically Signed on 10/11/2023        [place] [address]  [phone]

## 2023-10-11 NOTE — PROGRESS NOTES
Speech Language Pathology Treatment Note  [address] [place] [address]    Patient: Chaitanya Hair                                                                                     Visit Date: 10/11/2023  :     1960    Referring practitioner:    Nikunj Jane MD  Date of Initial Visit:          Type: THERAPY  Noted: 2023    Patient seen for 3 sessions    Visit Diagnoses:    ICD-10-CM ICD-9-CM   1. Oropharyngeal dysphagia  R13.12 787.22   2. Dysphonia  R49.0 784.42   3. Acquired dysarthria  R47.1 784.51     SUBJECTIVE     Patient was alert and appeared ready for therapy.     OBJECTIVE   GOALS  Goals                                            STG  Comments Status   Patient will improve oral skills to enhance safety and increase eating efficiency and bolus control as measured by increased lip closure, improved bolus formation, improved posterior propulsion of the bolus, and decreased drooling and decreased spitting when talking. Patient able to demonstrate tongue and lip exercises with minimal cues.   Ongoing   Patient will improve hyolaryngeal elevation, improve epiglottic inversion, and improve UES opening as measured by decreased overt signs and symptoms of aspiration and decreased penetration and aspiration on repeat instrumental swallow study, if applicable.  Patient able to demonstrate effortful swallow and CTAR exercises.  Ongoing   Patient will report decreased globus sensation and decreased difficulty with swallowing.  Patient reported globus improving.  Ongoing   Patient will increase strength and power of articulators to produce speech that is natural sounding to patient and familiar listeners.   Patient was able to read phrases of increasing length with slow pacing and over articulation. He feels his speech is getting better, especially if he slows down.  Ongoing   LTG        Patient will be able to engage in  speech at the conversational level so that speech is understood by familiar /unfamiliar listeners 90% of the time after spontaneous repair.   Patient stated that he feels his speech is getting better. Able to demonstrate pacing and over articulation.   Ongoing   Patient will safely consume full PO diet of regular consistency food and thin liquids without discomfort, globus, and difficulty or complications such as aspiration or pneumonia.   Patient reports that he feels his swallowing is improving. Able to demonstrate exercises with minimal cues.   Ongoing                            Therapy Education/Self Care    Details: POC   Given Home Exercise Program  [facility] (access code [email or web address])   Progress: Reinforced   Education provided to:  Patient and Spouse/SO   Level of understanding Verbalized and Demonstrated             Total Time of Visit:             45   mins         ASSESSMENT/PLAN     ASSESSMENT: Patient demonstrated use and understanding of all oral skills and swallowing exercises. Patient able to demonstrate pacing and overarticulation of speech.   PLAN: Continue therapy and plan of care.    Progress Note Due Date: 10/24/23  Recertification Due Date:12/24/2023    SIGNATURE: Josefina Booth CCC-SLP,   Electronically Signed on 10/11/2023          [facility] [place] [address]  [phone]

## 2023-10-18 ENCOUNTER — TREATMENT (OUTPATIENT)
Dept: PHYSICAL THERAPY | Facility: CLINIC | Age: 63
End: 2023-10-18
Payer: MEDICARE

## 2023-10-18 DIAGNOSIS — R49.0 DYSPHONIA: ICD-10-CM

## 2023-10-18 DIAGNOSIS — Z74.09 IMPAIRED FUNCTIONAL MOBILITY, BALANCE, GAIT, AND ENDURANCE: Primary | ICD-10-CM

## 2023-10-18 DIAGNOSIS — R13.12 OROPHARYNGEAL DYSPHAGIA: Primary | ICD-10-CM

## 2023-10-18 DIAGNOSIS — R47.1 ACQUIRED DYSARTHRIA: ICD-10-CM

## 2023-10-18 DIAGNOSIS — Z86.16 PERSONAL HISTORY OF COVID-19: ICD-10-CM

## 2023-10-18 NOTE — PROGRESS NOTES
Speech Language Pathology Treatment Note  [address] [place] [address]    Patient: Chaitanya Hair                                                                                     Visit Date: 10/18/2023  :     1960    Referring practitioner:    Nikunj Jane MD  Date of Initial Visit:          Type: THERAPY  Noted: 2023    Patient seen for 4 sessions    Visit Diagnoses:    ICD-10-CM ICD-9-CM   1. Oropharyngeal dysphagia  R13.12 787.22   2. Dysphonia  R49.0 784.42   3. Acquired dysarthria  R47.1 784.51     SUBJECTIVE     Patient was alert and appeared ready for therapy. Patient reported that his doctor, family, and friends have seen improvement in his voice. However, he believes he he can do better. Patient reported he has seen significant improvement in his swallowing.     OBJECTIVE   GOALS  Goals                                            STG  Comments Status   Patient will improve oral skills to enhance safety and increase eating efficiency and bolus control as measured by increased lip closure, improved bolus formation, improved posterior propulsion of the bolus, and decreased drooling and decreased spitting when talking. Patient demonstrated tongue and lip exercises independently. Patient reports he has increased mobility and strength in his tongue.  Ongoing   Patient will improve hyolaryngeal elevation, improve epiglottic inversion, and improve UES opening as measured by decreased overt signs and symptoms of aspiration and decreased penetration and aspiration on repeat instrumental swallow study, if applicable.  Patient demonstrated effortful swallow (25x) and CTAR exercises independently. Patient reports that effortful swallow remains difficult, but he has progressively gotten better.  Ongoing   Patient will report decreased globus sensation and decreased difficulty with swallowing.  Patient reported he is not  "experiencing globus sensation and is making progress with being able to \"hawk up\" phlegm. Swallowing is improving.  Ongoing- good progress   Patient will increase strength and power of articulators to produce speech that is natural sounding to patient and familiar listeners.   Chaitanya read phrases of increasing length with slow pacing and over articulation independently.   Patient was introduced to tongue twisters focusing on slow to faster pace while maintaining clear speech.  Ongoing/New   LTG        Patient will be able to engage in speech at the conversational level so that speech is understood by familiar /unfamiliar listeners 90% of the time after spontaneous repair.   Patient stated that others are noticing an improvement in his speech and voice, he believes it can be better. Chaitanya demonstrated pacing and over articulation to maintain clear speech.    Ongoing   Patient will safely consume full PO diet of regular consistency food and thin liquids without discomfort, globus, and difficulty or complications such as aspiration or pneumonia.   Patient reports that he has seen significant improvement in his swallowing, and mobility and strength in his tongue. Chaitanya demonstrated all exercises independently.   Chaitanya reports no globus sensation and is able to now \"hack up\" phlegm.  Ongoing                            Therapy Education/Self Care    Details: POC   Given Home Exercise Program  [employer] [facility] (access code [email or web address])   Progress: New, Reinforced, and Progressed   Education provided to:  Patient and Spouse/SO   Level of understanding Verbalized and Demonstrated             Total Time of Visit:             45   mins         ASSESSMENT/PLAN     ASSESSMENT: Patient stated he would like to see more improvement in his voice, despite others seeing improvement (doctor, family, friends).   Chaitanya demonstrated pacing and over articulation to maintain clear speech in various speech activities. Patient reports that he " has seen significant improvement in his swallowing, and his mobility and strength in his tongue. Chaitanya demonstrated all exercises independently. Chaitanya reports no globus sensation and is making progress with being able to bring up phlegm.    PLAN: Continue therapy and plan of care.    Progress Note Due Date: 10/24/23  Recertification Due Date:12/24/2023    SIGNATURE: Denia Lopez, Speech Therapy Student,   Electronically Signed on 10/18/2023    The clinical instructor and/or supervising staff, Josefina Booth CCC-SLP, was present in clinic guiding the visit by approving, concurring, and confirming the skilled judgement for all services rendered.    Signature:  Josefina Booth CCC-SLP, [place] License #: [id number]  Electronically signed on 10/18/2023          [address] [place] [address]  [place] [address]  [phone]

## 2023-10-18 NOTE — PROGRESS NOTES
Progress Note Addendum      Patient: Chaitanya Hair           : 1960  Visit Date: 10/18/2023  Referring practitioner: Nikunj Jane MD  Date of Initial Visit: Type: THERAPY  Noted: 2023  Patient seen for 5 sessions  Visit Diagnoses:    ICD-10-CM ICD-9-CM   1. Impaired functional mobility, balance, gait, and endurance  Z74.09 V49.89   2. Personal history of COVID-19  Z86.16 V12.09       PT G-Codes  Outcome Measure Options: 6 Minute Walk Test  TUG Test 1: 7.26 seconds  TUG Test 2: 7.22 seconds  Clinical Progress: improved  Home Program Compliance: Yes  Progress toward previous goals: Partially Met  Prognosis to achieve goals: good    Objective     Assessment & Plan       Assessment  Impairments: abnormal coordination, abnormal gait, abnormal muscle firing, abnormal muscle tone, abnormal or restricted ROM, activity intolerance, impaired balance, impaired physical strength, lacks appropriate home exercise program, pain with function and safety issue   Functional limitations: carrying objects, lifting, sleeping, walking, pulling, pushing, uncomfortable because of pain, moving in bed, sitting, standing, stooping, reaching behind back, reaching overhead and unable to perform repetitive tasks   Prognosis: good    Plan  Therapy options: will be seen for skilled therapy services  Planned modality interventions: thermotherapy (hydrocollator packs), cryotherapy, low level laser therapy, TENS, dry needling, electrical stimulation/Azerbaijani stimulation and ultrasound  Planned therapy interventions: abdominal trunk stabilization, manual therapy, ADL retraining, motor coordination training, balance/weight-bearing training, neuromuscular re-education, body mechanics training, postural training, soft tissue mobilization, spinal/joint mobilization, fine motor coordination training, flexibility, functional ROM exercises, strengthening, gait training, stretching, home exercise program, therapeutic activities, IADL  retraining, joint mobilization and transfer training  Frequency: 1x week (Eval Only)  Duration in weeks: 4  Treatment plan discussed with: patient and caregiver      I reviewed the treatment and goals with Dianne Davis PTA and agree with the POC.    SIGNATURE: Lou Flores PT, License #: [id number]    Electronically Signed on 10/18/2023

## 2023-10-18 NOTE — PROGRESS NOTES
"                                                                Physical Therapy Treatment Note  [address] [place] [address]    Patient: Chaitanya Hair                                                 Visit Date: 10/18/2023  :     1960    Referring practitioner:    Nikunj Jane MD  Date of Initial Visit:          Type: THERAPY  Noted: 2023    Patient seen for 5 sessions    Visit Diagnoses:    ICD-10-CM ICD-9-CM   1. Impaired functional mobility, balance, gait, and endurance  Z74.09 V49.89   2. Personal history of COVID-19  Z86.16 V12.09       SUBJECTIVE     Subjective: No new changes or complaints. He reports he \"figured somethin' out since last time\". He reports his dr made him stop taking his lowpresser cold turkey, so he started taking it again and now his HR is much better. He reports feeling about 95% improved since his initial evaluation.     PAIN: 1/10     OBJECTIVE     Objective     Therapeutic Activities    82631 Comments   6 MWT See flowsheets/goals table   TUG test See flowsheets/goals table   miniBEST See flowsheets/goals table   B hip MMT  See goals       Timed Minutes 40     Therapy Education/Self Care 22312   Education offered today HEP, POC   [employer] Code [email or web address]   Ongoing HEP   Date: 10/11/2023  Prepared by: Qian Rivas    Exercises  - Forward Step Up with Counter Support  - 1 x daily - 4-5 x weekly - 2-3 sets - 1 minute  - Lunge with Counter Support  - 1 x daily - 4-5 x weekly - 2-3 sets - 1 minute  - Sit to Stand with Arms Crossed  - 1 x daily - 4-5 x weekly - 2-3 sets - 1 minute  - Supine Bridge with Gluteal Set and Spinal Articulation  - 1 x daily - 4-5 x weekly - 2-3 sets - 1 minute  - Side Plank with Clam  - 1 x daily - 4-5 x weekly - 2-3 sets - 10 reps   Timed Minutes 5     Total Timed Treatment:     45   mins  Total Time of Visit:            49   mins         ASSESSMENT/PLAN     GOALS  Goals                                                  Progress Note due " by 11/17/2023                                                                Recert due by 12/20/2023   LTG by: 12 weeks Comments Date Status   Patient will report compliance with initial HEP.  Reports daily compliance  10/18 ongoing   Patient to perform TUG within 8 sec without LOB for improved functional mobility. 7.24 sec average  10/18 MET   Patient to ambulate >/=1877' (age/sex-matched norm) during 6 MWT outdoors on uneven terrain w/ Debra RPE </=8/20 on Debra 6-20 scale without LOB and VSS for improved gait alanna and functional mobility. 1488 ft  10/18 ongoing   Improve gross LE MMT to 5/5 Flex: 4+/5, abd (L) 5/5 (R) 4+/5, ext 4/5 10/18 ongoing   Patient to improve mini-BEST test balance score to >/= 26/28 to decrease risk of falls.  22/28 10/18 ongoing     Assessment/Plan     ASSESSMENT: Patient has progressed very well, meeting one of his five goals thus far. He reports feeling about 95% improved, though is limited with ankle stability and UE strength. He demonstrated decreased hip strength and ankle stability today, which impacts his ability to balance well. He reports that his HR has been much better managed since resuming his medication and HR did not rise above 67 bpm today during 6 MWT. Overall, he has progressed very well and likely nearing d/c, though continued skilled PT indicated to address ankle strategy and improved static balance to decrease his fall risk.     PLAN: Continue to progress with prioritizing ankle strategy and proprioception. Continue 1x/week for 4 weeks, then likely d/c home with HEP.     SIGNATURE: Dianne Davis PTA [place] License #: [id number]  Electronically Signed on 10/18/2023        [place] [address]  [phone]

## 2023-10-25 ENCOUNTER — TREATMENT (OUTPATIENT)
Dept: PHYSICAL THERAPY | Facility: CLINIC | Age: 63
End: 2023-10-25
Payer: MEDICARE

## 2023-10-25 DIAGNOSIS — Z86.16 PERSONAL HISTORY OF COVID-19: ICD-10-CM

## 2023-10-25 DIAGNOSIS — R13.12 OROPHARYNGEAL DYSPHAGIA: Primary | ICD-10-CM

## 2023-10-25 DIAGNOSIS — R47.1 ACQUIRED DYSARTHRIA: ICD-10-CM

## 2023-10-25 DIAGNOSIS — Z74.09 IMPAIRED FUNCTIONAL MOBILITY, BALANCE, GAIT, AND ENDURANCE: Primary | ICD-10-CM

## 2023-10-25 DIAGNOSIS — R49.0 DYSPHONIA: ICD-10-CM

## 2023-10-25 NOTE — PROGRESS NOTES
Physical Therapy Treatment Note  [address] [place] [address]    Patient: Chaitanya Hair                                                 Visit Date: 10/25/2023  :     1960    Referring practitioner:    Nikunj Jane MD  Date of Initial Visit:          Type: THERAPY  Noted: 2023    Patient seen for 6 sessions    Visit Diagnoses:    ICD-10-CM ICD-9-CM   1. Impaired functional mobility, balance, gait, and endurance  Z74.09 V49.89   2. Personal history of COVID-19  Z86.16 V12.09         SUBJECTIVE     Subjective: Nothing new to report.     PAIN: 0/10     OBJECTIVE     Objective     Neuromuscular Reeducation     07965 Comments   Tandem airex EC head nods/turns no UE support/a    Pulse lunge to ball side BOSU no UE support/a    STS B LE on airex performing ball rainbows 9# HR 99 bpm   Staggered STS from airex performing ball rainbows 9#  bpm   Mod sit up 12#    Symmetric LAQ w/ posterior lean for core emphasis 4# MB between ankles    Mod Citizen of the [place] twist 9#    Timed Minutes 40       Therapy Education/Self Care 32850   Education offered today HEP, POC   [employer] Code [email or web address]   Ongoing HEP   Date: 10/11/2023  Prepared by: Qian Rivas    Exercises  - Forward Step Up with Counter Support  - 1 x daily - 4-5 x weekly - 2-3 sets - 1 minute  - Lunge with Counter Support  - 1 x daily - 4-5 x weekly - 2-3 sets - 1 minute  - Sit to Stand with Arms Crossed  - 1 x daily - 4-5 x weekly - 2-3 sets - 1 minute  - Supine Bridge with Gluteal Set and Spinal Articulation  - 1 x daily - 4-5 x weekly - 2-3 sets - 1 minute  - Side Plank with Clam  - 1 x daily - 4-5 x weekly - 2-3 sets - 10 reps   Timed Minutes      Total Timed Treatment:     40   mins  Total Time of Visit:            40   mins         ASSESSMENT/PLAN     GOALS  Goals                                                  Progress Note due by 2023                                                                 Recert due by 12/20/2023   LTG by: 12 weeks Comments Date Status   Patient will report compliance with initial HEP.  Reports daily compliance  10/18 ongoing   Patient to perform TUG within 8 sec without LOB for improved functional mobility. 7.24 sec average  10/18 MET   Patient to ambulate >/=1877' (age/sex-matched norm) during 6 MWT outdoors on uneven terrain w/ Debra RPE </=8/20 on Debra 6-20 scale without LOB and VSS for improved gait alanna and functional mobility. 1488 ft  10/18 ongoing   Improve gross LE MMT to 5/5 Flex: 4+/5, abd (L) 5/5 (R) 4+/5, ext 4/5 10/18 ongoing   Patient to improve mini-BEST test balance score to >/= 26/28 to decrease risk of falls.  22/28 10/18 ongoing     Assessment/Plan     ASSESSMENT: He experienced LE muscular fatigue w/ progressed LE strengthening and endurance tasks today. His HR did reach 130s today though recovered much quicker than in previous sessions which may be attributable in part d/t previously reported medication change and improved endurance. He required VC to avoid Valsalva during core strengthening exs.    PLAN: Continue to progress with prioritizing ankle strategy and proprioception. Continue 1x/week for 4 weeks, then likely d/c home with HEP.     SIGNATURE: Qian Rivas, PT, [place] License #: [id number]  Electronically Signed on 10/25/2023        [place] [address]  [phone]

## 2023-10-25 NOTE — PROGRESS NOTES
Speech Language Pathology Treatment Note and Discharge Note  [address] [place] [address]    Patient: Chaitanya Hair                                                                                     Visit Date: 10/25/2023  :     1960    Referring practitioner:    Nikunj Jane MD  Date of Initial Visit:          Type: THERAPY  Noted: 2023    Patient seen for 5 sessions    Visit Diagnoses:    ICD-10-CM ICD-9-CM   1. Oropharyngeal dysphagia  R13.12 787.22   2. Dysphonia  R49.0 784.42   3. Acquired dysarthria  R47.1 784.51     SUBJECTIVE     Patient was alert and appeared ready for therapy. Patient reported he continues to see significant improvement from therapy. He stated that he is ready for discharge.     OBJECTIVE   GOALS  Goals                                            STG  Comments Status   Patient will improve oral skills to enhance safety and increase eating efficiency and bolus control as measured by increased lip closure, improved bolus formation, improved posterior propulsion of the bolus, and decreased drooling and decreased spitting when talking. Not directly assessed.  Chaitanya reports no issues with tongue and lip exercises. He reports significant improvement in his mobility and strength in his tongue and lips. He is now able to clear residue from around his back teeth with his tongue.  Met      Patient will improve hyolaryngeal elevation, improve epiglottic inversion, and improve UES opening as measured by decreased overt signs and symptoms of aspiration and decreased penetration and aspiration on repeat instrumental swallow study, if applicable.  Chaitanya demonstrated effortful swallow (30x) and CTAR exercises independently and reported no issues. He feels that his swallowing is <90% resolved.  Met      Patient will report decreased globus sensation and decreased difficulty with swallowing.  Patient reported  he no longer experiences globus sensation and has seen significant improvement in swallowing pills, solids, and liquids.  He stated that he is now able to swallow his whole handful of pills again with no problems.  Met      Patient will increase strength and power of articulators to produce speech that is natural sounding to patient and familiar listeners.   Patient reports that he feels that his speech is much better and others all state that he sounds like himself again. He does feel that he continues to have difficulty saying his /s/ sounds, that it sounds a little different than before.  Patient read /s/ words and phrases independently with 100% intelligibility. SLP added a tongue retraction exercise to help the patient gain more control over his tongue with /s/ productions.    Met   LTG        Patient will be able to engage in speech at the conversational level so that speech is understood by familiar /unfamiliar listeners 90% of the time after spontaneous repair.   Patient stated he is seeing improvement in his voice. Patient's overall speech intelligibility has improved and he has met all criteria.  Chaitanya will continue vocal exercises to maintain and improve intelligibility.   Met   Patient will safely consume full PO diet of regular consistency food and thin liquids without discomfort, globus, and difficulty or complications such as aspiration or pneumonia.   Chaitanya has seen improvement in globus sensation and swallowing medications, solids, and liquids. He has also reported increased mobility and strength in his tongue and lip closure. Chaitanya has demonstrated all exercises independently and reports of no issues.   Chaitanya will continue oral skills and swallowing exercises to maintain and continue improving his swallow.   Met            Therapy Education/Self Care    Details: POC   Given Home Exercise Program  [employer] [facility] (access code [email or web address])   Progress: Progressed   Education provided to:  Patient    Level of understanding Verbalized and Demonstrated             Total Time of Visit:             40   mins         ASSESSMENT/PLAN     ASSESSMENT: Patient has seen overall improvement in his swallowing and voice. Patient has met all criteria and goals in therapy.     DISCHARGE SUMMARY   Discharge date 10/25/2023   Dates of this episode 9/26/23 through 10/25/2023   Number of visits on this episode 5   Reason for discharge all goals met  Independent   Outcomes achieved Refer to the goals table for specifics on goals   Discharge plan Continue with current home exercise program as instructed   Summary of care Patient has made good progress with speech and swallowing. Problems resolved.                SIGNATURE: Denia Lopez, Speech Therapy Student,   Electronically Signed on 10/25/2023    The clinical instructor and/or supervising staff, Josefina Booth CCC-SLP, was present in clinic guiding the visit by approving, concurring, and confirming the skilled judgement for all services rendered.    Signature:  Josefina Booth CCC-SLP, [place] License #: [id number]  Electronically signed on 10/25/2023            [address] [place] [address] [place]  [place] [address]  [phone]

## 2023-11-01 ENCOUNTER — TREATMENT (OUTPATIENT)
Dept: PHYSICAL THERAPY | Facility: CLINIC | Age: 63
End: 2023-11-01
Payer: MEDICARE

## 2023-11-01 DIAGNOSIS — Z86.16 PERSONAL HISTORY OF COVID-19: ICD-10-CM

## 2023-11-01 DIAGNOSIS — Z74.09 IMPAIRED FUNCTIONAL MOBILITY, BALANCE, GAIT, AND ENDURANCE: Primary | ICD-10-CM

## 2023-11-01 PROCEDURE — 97112 NEUROMUSCULAR REEDUCATION: CPT | Performed by: PHYSICAL THERAPIST

## 2023-11-01 PROCEDURE — 97110 THERAPEUTIC EXERCISES: CPT | Performed by: PHYSICAL THERAPIST

## 2023-11-01 NOTE — PROGRESS NOTES
Physical Therapy Treatment Note  [address] [place] [address]    Patient: Chaitanya Hair                                                 Visit Date: 2023  :     1960    Referring practitioner:    Nikunj Jane MD  Date of Initial Visit:          Type: THERAPY  Noted: 2023    Patient seen for 7 sessions    Visit Diagnoses:    ICD-10-CM ICD-9-CM   1. Impaired functional mobility, balance, gait, and endurance  Z74.09 V49.89   2. Personal history of COVID-19  Z86.16 V12.09         SUBJECTIVE     Subjective:  He states he can do what he wants to do, he feels like his walking and balance are better. No falls to report since starting treatment with us.  He feels like his HEP is appropriate, he still gets sore so they are not too easy. 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  He does thinks like painting, and squatting and crawling to fix the paths at the campground.     PAIN: 0/10     OBJECTIVE     Objective     Neuromuscular Reeducation     85315 Comments   Floor ladder cone taps X 4   Floor ladder squatting to  cones X 4   Standing on both legs catching small ball in all directions X 2 min   Single leg catching a ball in all directions 4 x 1 min   Catching the ball when squatting then standing to throw the ball.            Timed Minutes 25     Therapeutic Exercises    68230 Units Comments   Wall squats     Crawling on the floor  Padding provided for knees   Standing at the parallel bars with one hand held for support hip abduction 2 x 10 each LE              Timed Minutes 15        Therapy Education/Self Care 61094   Education offered today HEP, [facility]   [facility] Code [email or web address]   Ongoing HEP   Date: 10/11/2023  Prepared by: Qian Rivas    Exercises  - Forward Step Up with Counter Support  - 1 x daily - 4-5 x weekly - 2-3 sets - 1 minute  - Lunge with Counter Support  - 1 x daily - 4-5 x weekly - 2-3 sets  - 1 minute  - Sit to Stand with Arms Crossed  - 1 x daily - 4-5 x weekly - 2-3 sets - 1 minute  - Supine Bridge with Gluteal Set and Spinal Articulation  - 1 x daily - 4-5 x weekly - 2-3 sets - 1 minute  - Side Plank with Clam  - 1 x daily - 4-5 x weekly - 2-3 sets - 10 reps   Timed Minutes      Total Timed Treatment:     40   mins  Total Time of Visit:            40   mins         ASSESSMENT/PLAN     GOALS  Goals                                                  Progress Note due by 11/17/2023                                                                Recert due by 12/20/2023   LTG by: 12 weeks Comments Date Status   Patient will report compliance with initial HEP.  He continues to work on his HEP. 11/1 ongoing   Patient to perform TUG within 8 sec without LOB for improved functional mobility. 7.24 sec average  10/18 MET   Patient to ambulate >/=1877' (age/sex-matched norm) during 6 MWT outdoors on uneven terrain w/ Debra RPE </=8/20 on Debra 6-20 scale without LOB and VSS for improved gait alanna and functional mobility. 1488 ft  10/18 ongoing   Improve gross LE MMT to 5/5 Flex: 4+/5, abd (L) 5/5 (R) 4+/5, ext 4/5 10/18 ongoing   Patient to improve mini-BEST test balance score to >/= 26/28 to decrease risk of falls.  22/28 10/18 ongoing     Assessment/Plan     ASSESSMENT: Patient is pleased with the progress he has made so far. 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  He does show hip compensation with leaning when working on SLS.  He was able to crawl and squat today but was fatigued after the exercises.     PLAN: Continue to progress with prioritizing ankle strategy and proprioception. Continue 1x/week for 4 weeks, then likely d/c home with HEP.     SIGNATURE: Kylie Park PTA, [facility] License #: [id number]  Electronically Signed on 11/1/2023        [address] [facility] [place]  [address]  [phone]

## 2023-11-08 ENCOUNTER — TREATMENT (OUTPATIENT)
Dept: PHYSICAL THERAPY | Facility: CLINIC | Age: 63
End: 2023-11-08
Payer: MEDICARE

## 2023-11-08 DIAGNOSIS — Z74.09 IMPAIRED FUNCTIONAL MOBILITY, BALANCE, GAIT, AND ENDURANCE: Primary | ICD-10-CM

## 2023-11-08 PROCEDURE — 97110 THERAPEUTIC EXERCISES: CPT | Performed by: PHYSICAL THERAPIST

## 2023-11-08 PROCEDURE — 97112 NEUROMUSCULAR REEDUCATION: CPT | Performed by: PHYSICAL THERAPIST

## 2023-11-08 NOTE — PROGRESS NOTES
Physical Therapy Treatment Note  [address] [place] [address]    Patient: Chaitanya Hair                                                 Visit Date: 2023  :     1960    Referring practitioner:    Nikunj Jane MD  Date of Initial Visit:          Type: THERAPY  Noted: 2023    Patient seen for 8 sessions    Visit Diagnoses:    ICD-10-CM ICD-9-CM   1. Impaired functional mobility, balance, gait, and endurance  Z74.09 V49.89           SUBJECTIVE     Subjective: Feeling good today. Patient stated he did not hurt and was not really sore after last visit.     PAIN: 0/10     OBJECTIVE     Objective     Neuromuscular Reeducation     66091 Comments   Neurocom testing and analysis Limit stability and rhythmic WS           Timed Minutes 25     Therapeutic Exercises    05906 Units Comments   Wobble board forward/backwards 1 min hold    Wobble board laterally 1 min hold    3 way lunge series both sides  5 each direction Forward, side, backwards   Lunges forward to blue foam Bilateral 10     Lunge laterally to blue foam Bilateral 10    Timed Minutes 15        Therapy Education/Self Care 81954   Education offered today Analysis of NeuroCom    [employer] Code [email or web address]   Ongoing HEP   Date: 10/11/2023  Prepared by: Qian Rivas    Exercises  - Forward Step Up with Counter Support  - 1 x daily - 4-5 x weekly - 2-3 sets - 1 minute  - Lunge with Counter Support  - 1 x daily - 4-5 x weekly - 2-3 sets - 1 minute  - Sit to Stand with Arms Crossed  - 1 x daily - 4-5 x weekly - 2-3 sets - 1 minute  - Supine Bridge with Gluteal Set and Spinal Articulation  - 1 x daily - 4-5 x weekly - 2-3 sets - 1 minute  - Side Plank with Clam  - 1 x daily - 4-5 x weekly - 2-3 sets - 10 reps   Timed Minutes      Total Timed Treatment:    40    mins  Total Time of Visit:             40  mins         ASSESSMENT/PLAN     GOALS  Goals                                                   Progress Note due by 11/17/2023                                                                Recert due by 12/20/2023   LTG by: 12 weeks Comments Date Status   Patient will report compliance with initial HEP.  He continues to work on his HEP. 11/1 ongoing   Patient to perform TUG within 8 sec without LOB for improved functional mobility. 7.24 sec average  10/18 MET   Patient to ambulate >/=1877' (age/sex-matched norm) during 6 MWT outdoors on uneven terrain w/ Debra RPE </=8/20 on Debra 6-20 scale without LOB and VSS for improved gait alanna and functional mobility. 1488 ft  10/18 ongoing   Improve gross LE MMT to 5/5 Flex: 4+/5, abd (L) 5/5 (R) 4+/5, ext 4/5 10/18 ongoing   Patient to improve mini-BEST test balance score to >/= 26/28 to decrease risk of falls.  22/28 10/18 ongoing     Assessment/Plan     ASSESSMENT: Patient seems to be doing well. Used NeuroCom to assess WS and balance today. He has constant anterior lean with causes forward WS to be more difficult. Also has slower reaction time to the R than L. Pt believes that is related to poor ankle stability resulting from previous injury and excessive scar tissue. Patient tolerated exercises consisting of Wobble board and lunge series with blue foam pad. Pt tends start with narrow ILA and shift heels together throughout exercises causing toe out.     PLAN: Continue to prioritize ankle strategy and ILA during exercises. Needs cueing for maintaining ILA. Continue to encourage patient confidence and then d/c home with HEP soon.     SIGNATURE: Ra Cleveland PTA [facility], [place] License #:  Electronically Signed on 11/8/2023  I have reviewed the notes, assessments, and/or procedures performed by PRANAY Benton, I concur with her/his documentation of Chaitanya Hair.   Matthew King PTA #[id number]          [address] [place] [address]  [phone]

## 2023-11-15 ENCOUNTER — TREATMENT (OUTPATIENT)
Dept: PHYSICAL THERAPY | Facility: CLINIC | Age: 63
End: 2023-11-15
Payer: MEDICARE

## 2023-11-15 DIAGNOSIS — Z74.09 IMPAIRED FUNCTIONAL MOBILITY, BALANCE, GAIT, AND ENDURANCE: Primary | ICD-10-CM

## 2023-11-15 PROCEDURE — 97110 THERAPEUTIC EXERCISES: CPT | Performed by: PHYSICAL THERAPIST

## 2023-11-15 PROCEDURE — 97112 NEUROMUSCULAR REEDUCATION: CPT | Performed by: PHYSICAL THERAPIST

## 2023-11-15 NOTE — PROGRESS NOTES
Physical Therapy Treatment Note, 30 Day Progress Note, and Discharge Note  [address] [place] [address]    Patient: Chaitanya Hair                                                 Visit Date: 11/15/2023  :     1960    Referring practitioner:    Nikunj Jane MD  Date of Initial Visit:          Type: THERAPY  Noted: 2023    Patient seen for 9 sessions    Visit Diagnoses:    ICD-10-CM ICD-9-CM   1. Impaired functional mobility, balance, gait, and endurance  Z74.09 V49.89     SUBJECTIVE     Subjective:He denies pain, falls or new issues. He reports his breathing has been good. He reports 99% improvement at this time.       PAIN: 0/10         OBJECTIVE     Objective     Therapeutic Exercises    24387 Units Comments   MMT B LE     6 MWT                    Timed Minutes 17      Neuromuscular Reeducation     74362 Comments   Mini Best assessment                    Timed Minutes 33      LE MMT   HIP Strength L Strength R   flexion 5/5 5/5   extension 5/5 5/5   ABD 5/5 5/5   ADD      IR     ER          KNEE     flexion 5/5 5/5   extension 5/5 5/5         ANKLE     dorsiflexion 5/5 5/5   plantarflexion     eversion     inversion     *pain     Therapy Education/Self Care 01202   Education offered today    [facility] Code [email or web address]    Ongoing HEP   Date: 10/11/2023  Prepared by: Qian Rivas     Exercises  - Forward Step Up with Counter Support  - 1 x daily - 4-5 x weekly - 2-3 sets - 1 minute  - Lunge with Counter Support  - 1 x daily - 4-5 x weekly - 2-3 sets - 1 minute  - Sit to Stand with Arms Crossed  - 1 x daily - 4-5 x weekly - 2-3 sets - 1 minute  - Supine Bridge with Gluteal Set and Spinal Articulation  - 1 x daily - 4-5 x weekly - 2-3 sets - 1 minute  - Side Plank with Clam  - 1 x daily - 4-5 x weekly - 2-3 sets - 10 reps   Timed Minutes        Total Timed Treatment:     40   mins  Total Time of Visit:             40   mins          ASSESSMENT/PLAN     GOALS  Goals                                                  Progress Note due by 12/15/2023                                                                Recert due by 12/20/2023   LTG by: 12 weeks Comments Date Status   Patient will report compliance with initial HEP.  Reinforced today 11/15 Met   Patient to perform TUG within 8 sec without LOB for improved functional mobility. 7.24 sec average  10/18 MET   Patient to ambulate >/=1877' (age/sex-matched norm) during 6 MWT outdoors on uneven terrain w/ Debra RPE </=8/20 on Debra 6-20 scale without LOB and VSS for improved gait alanna and functional mobility. 1246' Debra 2/20 11/15 ongoing   Improve gross LE MMT to 5/5 See table 11/15 Met   Patient to improve mini-BEST test balance score to >/= 26/28 to decrease risk of falls. 27/28 11/15 Met       Assessment/Plan     ASSESSMENT:   At this point not only has he met four of his five POC goals but he has reached the point of not requiring skilled services. I believe he is very close to his PLOF and that he is compliant with his HEP which will continue his progression.    PLAN:   D/C    SIGNATURE: Matthew King PTA, [place] License #: [id number]  Electronically Signed on 11/15/2023        [facility] [place] [address]  [phone]

## 2023-11-20 NOTE — PROGRESS NOTES
I have reviewed the notes, assessments, and/or procedures performed by Matthew King PTA, I concur with her/his documentation of Chaitanya Hair.

## 2024-04-01 NOTE — PROGRESS NOTES
Hospitalist Progress Note    Patient:  Latisha Medley  YOB: 1960  Date of Service: 8/6/2023  MRN: [id number]   Acct: [de-identified]   Primary Care Physician: Gabino Smith MD  Advance Directive: Full Code  Admit Date: 8/5/2023       Hospital Day: 1  Referring Provider: Bert Monteiro DO    Patient Seen, Chart, Consults, Notes, Labs, Radiology studies reviewed. Subjective: Latisha Medley is a 70 y.o. male  whom we are following for COVID-pneumonia, hypoxemic respiratory failure, acute kidney injury. He has been bradycardic. We have started Levophed. Hemodynamics are stable. His O2 requirements are decreasing. He is on assist-control rate of 20, tidal volume of 520, 8 of PEEP, FiO2 of 0.85. Allergies:  Patient has no known allergies.     Medicines:  Current Facility-Administered Medications   Medication Dose Route Frequency Provider Last Rate Last Admin    phenylephrine (VAZCULEP) 10 MG/ML injection             phenylephrine (MARCIA-SYNEPHRINE) 50 mg in sodium chloride 0.9 % 250 mL infusion ([id number])   mcg/min IntraVENous Continuous Bert Dural, DO 21 mL/hr at 08/06/23 1145 70 mcg/min at 08/06/23 1145    phenylephrine (VAZCULEP) 10 MG/ML injection             norepinephrine (LEVOPHED) 16 mg in sodium chloride 0.9 % 250 mL infusion  1-100 mcg/min IntraVENous Continuous Bert Dural, DO 4.7 mL/hr at 08/06/23 1203 5 mcg/min at 08/06/23 1203    midazolam (VERSED) 100mg/100mL in NS infusion  1-10 mg/hr IntraVENous Continuous Von Browne MD   Stopped at 08/06/23 1145    sodium chloride flush 0.9 % injection 10 mL  10 mL IntraVENous 2 times per day Bert Dural, DO   10 mL at 08/06/23 7957    sodium chloride flush 0.9 % injection 10 mL  10 mL IntraVENous PRN Bert Dural, DO        0.9 % sodium chloride infusion   IntraVENous PRN Bert Dural, DO        enoxaparin (LOVENOX) injection 60 mg  0.5 mg/kg SubCUTAneous BID Bert Dural, DO   60 mg at Procedure:  AMPUTATION great TOE (Right: Toe)    Relevant Problems   CARDIO   (+) HTN (hypertension)   (+) PAD (peripheral artery disease) (HCC)      ENDO   (+) Diabetes mellitus type 2 with peripheral artery disease (HCC)   (+) Type 2 diabetes mellitus, without long-term current use of insulin (HCC)      Orthopedic/Musculoskeletal   (+) Gangrene of toe of right foot (HCC)      Other   (+) Dyslipidemia   (+) Morbid obesity (HCC)        Physical Exam    Airway    Mallampati score: III  TM Distance: >3 FB  Neck ROM: full     Dental   Comment: Very poor dentition. Many rotten teeth     Cardiovascular  Rhythm: regular, Rate: normal, Cardiovascular exam normal    Pulmonary  Pulmonary exam normal Breath sounds clear to auscultation    Other Findings  post-pubertal.      Anesthesia Plan  ASA Score- 3     Anesthesia Type- IV sedation with anesthesia with ASA Monitors.         Additional Monitors:     Airway Plan:     Comment: Pt requests IV sedation if poassible.  Very poor dentition.       Plan Factors-Exercise tolerance (METS): <4 METS.    Chart reviewed. EKG reviewed. Imaging results reviewed. Existing labs reviewed. Patient summary reviewed.                  Induction-     Postoperative Plan-     Informed Consent- Anesthetic plan and risks discussed with patient.

## 2024-08-27 ENCOUNTER — OFFICE VISIT (OUTPATIENT)
Dept: GASTROENTEROLOGY | Facility: CLINIC | Age: 64
End: 2024-08-27
Payer: MEDICARE

## 2024-08-27 VITALS
HEIGHT: 76 IN | DIASTOLIC BLOOD PRESSURE: 80 MMHG | HEART RATE: 61 BPM | BODY MASS INDEX: 30.59 KG/M2 | OXYGEN SATURATION: 98 % | SYSTOLIC BLOOD PRESSURE: 130 MMHG | WEIGHT: 251.2 LBS | TEMPERATURE: 97.5 F

## 2024-08-27 DIAGNOSIS — Z12.11 ENCOUNTER FOR SCREENING FOR MALIGNANT NEOPLASM OF COLON: Primary | ICD-10-CM

## 2024-08-27 DIAGNOSIS — Z78.9 NONSMOKER: ICD-10-CM

## 2024-08-27 DIAGNOSIS — I10 HTN (HYPERTENSION), BENIGN: ICD-10-CM

## 2024-08-27 RX ORDER — AMLODIPINE BESYLATE 10 MG/1
1 TABLET ORAL DAILY
COMMUNITY
Start: 2024-08-26

## 2024-08-27 RX ORDER — POLYETHYLENE GLYCOL 3350, SODIUM CHLORIDE, SODIUM BICARBONATE, POTASSIUM CHLORIDE 420; 11.2; 5.72; 1.48 G/4L; G/4L; G/4L; G/4L
4000 POWDER, FOR SOLUTION ORAL ONCE
Qty: 4000 ML | Refills: 0 | Status: SHIPPED | OUTPATIENT
Start: 2024-08-27 | End: 2024-08-27

## 2024-08-27 RX ORDER — LISINOPRIL AND HYDROCHLOROTHIAZIDE 12.5; 2 MG/1; MG/1
2 TABLET ORAL DAILY
COMMUNITY
Start: 2024-06-25

## 2024-08-27 NOTE — PROGRESS NOTES
Chaitanya Hair  1960 8/27/2024  Chief Complaint   Patient presents with    Colonoscopy     Colon screening     Subjective   HPI  Chaitanya Hair is a 64 y.o. male who presents as a referral for preventative maintenance. He has no complaints of nausea or vomiting. No change in bowels. No wt loss. No BRBPR. No melena. There is no family hx for colon cancer. No abdominal pain.  Past Medical History:   Diagnosis Date    Abnormal stress echo 3/25/19-[facility]    Noted For Myocardial Ischemia    Acute MI     Atelectasis 04/11/2019    Noted on Chest XR    CAD (coronary artery disease) Since 2009    w/R Stent Placed    Chest pain due to CAD     Nitro PRN    DM (diabetes mellitus)     false positive    Elevated cholesterol     History of chest x-ray 04/11/2019    Suggestive For Atelectasis w/Chronic Inflammatory Scarring Noted    History of EKG 3/25/19-[facility]    Sinus Rhythm w/Nonspecific ST-T Wave Abnormalities    History of nephrolithiasis     History of stress test 2015    WNL    HLD (hyperlipidemia)     Controlled w/Meds    Hypertension     Controlled w/Meds    Kidney stones     S/p Lithotripsy    LAD stenosis 04/11/2019    90% Mid LAD Stenosis; Diffuse Stenosis of LCX--Noted on Cardiac Cath    Myocardial ischemia 03/25/2019    Noted on Echo Stress Test    RCA occlusion 04/11/2019    Mild Diffuse Disease--Noted on Cardiac Cath     Tobacco use     1.5-1 PPD     Past Surgical History:   Procedure Laterality Date    ABDOMINAL SURGERY  2023    APPENDECTOMY  2023    BACK SURGERY  2013    CARDIAC CATHETERIZATION  2009    RCA Stent    CARDIAC CATHETERIZATION  04/11/2019-[facility]    w/L Ventriculogram/Angiography--Dr. Garcia--CAD Involving LAD    COLONOSCOPY  06/2008    CORONARY ANGIOPLASTY WITH STENT PLACEMENT Right 2009    CORONARY ARTERY BYPASS GRAFT N/A 04/16/2019    Procedure: MEDIAN STERNOTOMY, CORONARY ARTERY BYPASS X2 USING LEFT INTERNAL MAMMARY ARTERY GRAFT, PRP, INTRAOP LAURI;   Surgeon: Jesse Perez MD;  Location: [facility];  Service: Cardiothoracic    CYSTOSCOPY W/ LASER LITHOTRIPSY       Outpatient Medications Marked as Taking for the 8/27/24 encounter (Office Visit) with Germania Che APRN   Medication Sig Dispense Refill    amLODIPine (NORVASC) 10 MG tablet Take 1 tablet by mouth Daily.      aspirin 81 MG EC tablet Take 1 tablet by mouth Daily. 100 tablet 99    lisinopril-hydrochlorothiazide (PRINZIDE,ZESTORETIC) 20-12.5 MG per tablet Take 2 tablets by mouth Daily.      metoprolol tartrate (LOPRESSOR) 50 MG tablet Take 1 tablet by mouth 2 (Two) Times a Day. 60 tablet 1    Multiple Vitamin (MULTI-VITAMIN) tablet Take  by mouth.      Rosuvastatin Calcium 40 MG capsule sprinkle Take 40 mg by mouth Daily.       No Known Allergies  Social History     Socioeconomic History    Marital status:    Tobacco Use    Smoking status: Every Day     Current packs/day: 0.50     Average packs/day: 0.7 packs/day for 60.0 years (40.0 ttl pk-yrs)     Types: Cigarettes    Smokeless tobacco: Never   Substance and Sexual Activity    Alcohol use: Yes     Comment: Maybe once a month    Drug use: No    Sexual activity: Yes     Partners: Female     Family History   Problem Relation Age of Onset    Heart disease Father     Colon cancer Neg Hx     Colon polyps Neg Hx      Health Maintenance   Topic Date Due    COLORECTAL CANCER SCREENING  Never done    Pneumococcal Vaccine 65+ (1 of 2 - PCV) Never done    TDAP/TD VACCINES (1 - Tdap) Never done    ZOSTER VACCINE (1 of 2) Never done    HEPATITIS C SCREENING  Never done    ANNUAL WELLNESS VISIT  Never done    LIPID PANEL  04/15/2020    COVID-19 Vaccine (1 - 2023-24 season) Never done    INFLUENZA VACCINE  08/01/2024    BMI FOLLOWUP  09/20/2024       REVIEW OF SYSTEMS  General: well appearing, no fever chills or sweats, no unexplained wt loss  HEENT: no acute visual or hearing disturbances  Cardiovascular: No chest pain or  "palpitations  Pulmonary: No shortness of breath, coughing, wheezing or hemoptysis  : No burning, urgency, hematuria, or dysuria  Musculoskeletal: No joint pain or stiffness  Peripheral: no edema  Skin: No lesions or rashes  Neuro: No dizziness, headaches, stroke, syncope  Endocrine: No hot or cold intolerances  Hematological: No blood dyscrasias    Objective   Vitals:    08/27/24 1352   BP: 130/80   BP Location: Left arm   Pulse: 61   Temp: 97.5 °F (36.4 °C)   TempSrc: Temporal   SpO2: 98%   Weight: 114 kg (251 lb 3.2 oz)   Height: 193 cm (75.98\")     Body mass index is 30.59 kg/m².         PHYSICAL EXAM  General: age appropriate well nourished well appearing, no acute distress  Head: normocephalic and atraumatic  Global assessment-supple  Neck-No JVD noted, no lymphadenopathy  Pulmonary-clear to auscultation bilaterally, normal respiratory effort  Cardiovascular-normal rate and rhythm, normal heart sounds, S1 and S2 noted  Abdomen-soft, non tender, non distended, normal bowel sounds all 4 quadrants, no hepatosplenomegaly noted  Extremities-No clubbing cyanosis or edema  Neuro-Non focal, converses appropriately, awake, alert, oriented    Assessment & Plan     Diagnoses and all orders for this visit:    1. Encounter for screening for malignant neoplasm of colon (Primary)  -     Case Request; Standing  -     Case Request    2. Nonsmoker    3. HTN (hypertension), benign  Comments:  cont BP medication the day of procedure    Other orders  -     Implement Anesthesia Orders Day of Procedure; Standing  -     Follow Anesthesia Guidelines / Protocol; Future  -     Obtain Informed Consent; Future  -     Verify bowel prep was successful; Standing  -     Obtain Informed Consent; Standing  -     polyethylene glycol-electrolytes (NULYTELY) 420 g solution; Take 4,000 mL by mouth 1 (One) Time for 1 dose.  Dispense: 4000 mL; Refill: 0        COLONOSCOPY WITH ANESTHESIA (N/A)  Body mass index is 30.59 kg/m².    Patient instructions " on prep prior to procedure provided to the patient.    All risks, benefits, alternatives, and indications of colonoscopy procedure have been discussed with the patient. Risks to include perforation of the colon requiring possible surgery or colostomy, risk of bleeding from biopsies or removal of colon tissue, possibility of missing a colon polyp or cancer, or adverse drug reaction.  Benefits to include the diagnosis and management of disease of the colon and rectum. Alternatives to include barium enema, radiographic evaluation, lab testing or no intervention. Pt verbalizes understanding and agrees.     Germania Che, APRN  2024  14:02 CDT      IF YOU SMOKE OR USE TOBACCO PLEASE READ THE FOLLOWIN minutes reading provided    Why is smoking bad for me?  Smoking increases the risk of heart disease, lung disease, vascular disease, stroke, and cancer.     If you smoke, STOP!    If you would like more information on quitting smoking, please visit the [employer] website: [email or web address]   This link will provide additional resources including the QUIT line and the Beat the Pack support groups.     For more information:    Quit Now [place]  -QUIT-NOW  [email or web address]

## 2024-08-28 ENCOUNTER — TELEPHONE (OUTPATIENT)
Dept: GASTROENTEROLOGY | Facility: CLINIC | Age: 64
End: 2024-08-28
Payer: MEDICARE

## 2024-10-16 ENCOUNTER — HOSPITAL ENCOUNTER (OUTPATIENT)
Facility: HOSPITAL | Age: 64
Setting detail: HOSPITAL OUTPATIENT SURGERY
Discharge: HOME | End: 2024-10-16
Attending: INTERNAL MEDICINE | Admitting: INTERNAL MEDICINE
Payer: MEDICARE

## 2024-10-16 ENCOUNTER — ANESTHESIA EVENT (OUTPATIENT)
Dept: GASTROENTEROLOGY | Facility: HOSPITAL | Age: 64
End: 2024-10-16
Payer: MEDICARE

## 2024-10-16 ENCOUNTER — ANESTHESIA (OUTPATIENT)
Dept: GASTROENTEROLOGY | Facility: HOSPITAL | Age: 64
End: 2024-10-16
Payer: MEDICARE

## 2024-10-16 VITALS
TEMPERATURE: 97.4 F | HEIGHT: 76 IN | DIASTOLIC BLOOD PRESSURE: 76 MMHG | SYSTOLIC BLOOD PRESSURE: 104 MMHG | RESPIRATION RATE: 12 BRPM | BODY MASS INDEX: 29.96 KG/M2 | HEART RATE: 61 BPM | OXYGEN SATURATION: 95 % | WEIGHT: 246 LBS

## 2024-10-16 DIAGNOSIS — Z12.11 ENCOUNTER FOR SCREENING FOR MALIGNANT NEOPLASM OF COLON: ICD-10-CM

## 2024-10-16 PROCEDURE — 45385 COLONOSCOPY W/LESION REMOVAL: CPT | Performed by: INTERNAL MEDICINE

## 2024-10-16 PROCEDURE — 88305 TISSUE EXAM BY PATHOLOGIST: CPT | Performed by: INTERNAL MEDICINE

## 2024-10-16 PROCEDURE — 25010000002 LIDOCAINE PF 2% 2 % SOLUTION: Performed by: NURSE ANESTHETIST, CERTIFIED REGISTERED

## 2024-10-16 PROCEDURE — 25010000002 PROPOFOL 10 MG/ML EMULSION: Performed by: NURSE ANESTHETIST, CERTIFIED REGISTERED

## 2024-10-16 RX ORDER — PROPOFOL 10 MG/ML
VIAL (ML) INTRAVENOUS AS NEEDED
Status: DISCONTINUED | OUTPATIENT
Start: 2024-10-16 | End: 2024-10-16 | Stop reason: SURG

## 2024-10-16 RX ORDER — SODIUM CHLORIDE 9 MG/ML
500 INJECTION, SOLUTION INTRAVENOUS CONTINUOUS PRN
Status: CANCELLED | OUTPATIENT
Start: 2024-10-16 | End: 2024-10-17

## 2024-10-16 RX ORDER — LIDOCAINE HYDROCHLORIDE 20 MG/ML
INJECTION, SOLUTION EPIDURAL; INFILTRATION; INTRACAUDAL; PERINEURAL AS NEEDED
Status: DISCONTINUED | OUTPATIENT
Start: 2024-10-16 | End: 2024-10-16 | Stop reason: SURG

## 2024-10-16 RX ORDER — SODIUM CHLORIDE 0.9 % (FLUSH) 0.9 %
10 SYRINGE (ML) INJECTION AS NEEDED
Status: CANCELLED | OUTPATIENT
Start: 2024-10-16

## 2024-10-16 RX ORDER — LIDOCAINE HYDROCHLORIDE 10 MG/ML
0.5 INJECTION, SOLUTION EPIDURAL; INFILTRATION; INTRACAUDAL; PERINEURAL ONCE AS NEEDED
Status: CANCELLED | OUTPATIENT
Start: 2024-10-16

## 2024-10-16 RX ADMIN — PROPOFOL 30 MG: 10 INJECTION, EMULSION INTRAVENOUS at 10:33

## 2024-10-16 RX ADMIN — PROPOFOL 30 MG: 10 INJECTION, EMULSION INTRAVENOUS at 10:29

## 2024-10-16 RX ADMIN — PROPOFOL 30 MG: 10 INJECTION, EMULSION INTRAVENOUS at 10:37

## 2024-10-16 RX ADMIN — PROPOFOL 30 MG: 10 INJECTION, EMULSION INTRAVENOUS at 10:35

## 2024-10-16 RX ADMIN — PROPOFOL 30 MG: 10 INJECTION, EMULSION INTRAVENOUS at 10:32

## 2024-10-16 RX ADMIN — PROPOFOL 30 MG: 10 INJECTION, EMULSION INTRAVENOUS at 10:31

## 2024-10-16 RX ADMIN — LIDOCAINE HYDROCHLORIDE 100 MG: 20 INJECTION, SOLUTION EPIDURAL; INFILTRATION; INTRACAUDAL; PERINEURAL at 10:25

## 2024-10-16 RX ADMIN — PROPOFOL 30 MG: 10 INJECTION, EMULSION INTRAVENOUS at 10:42

## 2024-10-16 RX ADMIN — PROPOFOL 30 MG: 10 INJECTION, EMULSION INTRAVENOUS at 10:27

## 2024-10-16 RX ADMIN — PROPOFOL 30 MG: 10 INJECTION, EMULSION INTRAVENOUS at 10:39

## 2024-10-16 RX ADMIN — PROPOFOL 30 MG: 10 INJECTION, EMULSION INTRAVENOUS at 10:28

## 2024-10-16 RX ADMIN — PROPOFOL 30 MG: 10 INJECTION, EMULSION INTRAVENOUS at 10:30

## 2024-10-16 RX ADMIN — PROPOFOL 40 MG: 10 INJECTION, EMULSION INTRAVENOUS at 10:26

## 2024-10-16 RX ADMIN — PROPOFOL 30 MG: 10 INJECTION, EMULSION INTRAVENOUS at 10:44

## 2024-10-16 RX ADMIN — PROPOFOL 70 MG: 10 INJECTION, EMULSION INTRAVENOUS at 10:25

## 2024-10-16 NOTE — ANESTHESIA POSTPROCEDURE EVALUATION
Patient: Chaitanya Hair    Procedure Summary       Date: 10/16/24 Room / Location: [facility] ENDOSCOPY 6 / [facility]    Anesthesia Start: 1023 Anesthesia Stop: 1053    Procedure: COLONOSCOPY WITH ANESTHESIA Diagnosis:       Encounter for screening for malignant neoplasm of colon      (Encounter for screening for malignant neoplasm of colon [Z12.11])    Surgeons: Nathaly Nino MD Provider: Wesley Abbott CRNA    Anesthesia Type: MAC ASA Status: 3            Anesthesia Type: MAC    Vitals  Vitals Value Taken Time   /72 10/16/24 1111   Temp     Pulse 67 10/16/24 1111   Resp 18 10/16/24 1055   SpO2 92 % 10/16/24 1111   Vitals shown include unfiled device data.        Post Anesthesia Care and Evaluation    Patient location during evaluation: PHASE II  Patient participation: complete - patient participated  Level of consciousness: awake  Pain score: 0  Pain management: adequate    Airway patency: patent  Anesthetic complications: No anesthetic complications  PONV Status: none  Cardiovascular status: acceptable  Respiratory status: acceptable  Hydration status: acceptable

## 2024-10-16 NOTE — H&P
Chief Complaint:   Colon cancer screening    Subjective     HPI:   Patient presents for for screening colonoscopy.  Family history is negative.    Past Medical History:   Past Medical History:   Diagnosis Date    Abnormal stress echo 3/25/19-[facility]    Noted For Myocardial Ischemia    Acute MI     Atelectasis 04/11/2019    Noted on Chest XR    CAD (coronary artery disease) Since 2009    w/R Stent Placed    Chest pain due to CAD     Nitro PRN    DM (diabetes mellitus)     false positive    Elevated cholesterol     History of chest x-ray 04/11/2019    Suggestive For Atelectasis w/Chronic Inflammatory Scarring Noted    History of EKG 3/25/19-[facility]    Sinus Rhythm w/Nonspecific ST-T Wave Abnormalities    History of nephrolithiasis     History of stress test 2015    WNL    HLD (hyperlipidemia)     Controlled w/Meds    Hypertension     Controlled w/Meds    Kidney stones     S/p Lithotripsy    LAD stenosis 04/11/2019    90% Mid LAD Stenosis; Diffuse Stenosis of LCX--Noted on Cardiac Cath    Myocardial ischemia 03/25/2019    Noted on Echo Stress Test    RCA occlusion 04/11/2019    Mild Diffuse Disease--Noted on Cardiac Cath     Tobacco use     1.5-1 PPD       Past Surgical History:  Past Surgical History:   Procedure Laterality Date    ABDOMINAL SURGERY  2023    APPENDECTOMY  2023    BACK SURGERY  2013    CARDIAC CATHETERIZATION  2009    RCA Stent    CARDIAC CATHETERIZATION  04/11/2019-[facility]    w/L Ventriculogram/Angiography--Dr. Garcia--CAD Involving LAD    CORONARY ANGIOPLASTY WITH STENT PLACEMENT Right 2009    CORONARY ARTERY BYPASS GRAFT N/A 04/16/2019    Procedure: MEDIAN STERNOTOMY, CORONARY ARTERY BYPASS X2 USING LEFT INTERNAL MAMMARY ARTERY GRAFT, PRP, INTRAOP LAURI;  Surgeon: Jesse Perez MD;  Location: [facility];  Service: Cardiothoracic    CYSTOSCOPY W/ LASER LITHOTRIPSY          Family History:  Family History   Problem Relation Age of Onset    Heart disease Father   "   Colon cancer Neg Hx     Colon polyps Neg Hx        Social History:   reports that he has been smoking cigarettes. He has a 40 pack-year smoking history. He has never used smokeless tobacco. He reports current alcohol use. He reports that he does not use drugs.    Medications:   Medications Prior to Admission   Medication Sig Dispense Refill Last Dose/Taking    amLODIPine (NORVASC) 10 MG tablet Take 1 tablet by mouth Daily.   10/15/2024 Evening    lisinopril-hydrochlorothiazide (PRINZIDE,ZESTORETIC) 20-12.5 MG per tablet Take 2 tablets by mouth Daily.   10/15/2024 Evening    metoprolol tartrate (LOPRESSOR) 50 MG tablet Take 1 tablet by mouth 2 (Two) Times a Day. 60 tablet 1 10/15/2024 Evening    Rosuvastatin Calcium 40 MG capsule sprinkle Take 40 mg by mouth Daily.   10/15/2024 Evening    aspirin 81 MG EC tablet Take 1 tablet by mouth Daily. (Patient not taking: Reported on 10/16/2024) 100 tablet 99 Not Taking    Multiple Vitamin (MULTI-VITAMIN) tablet Take  by mouth.   Unknown       Allergies:  Patient has no known allergies.    ROS:    Resp: No SOA  Cardiovascular: No CP      Objective     /74   Pulse 69   Temp 97.4 °F (36.3 °C) (Temporal)   Resp 20   Ht 193 cm (76\")   Wt 112 kg (246 lb)   SpO2 98%   BMI 29.94 kg/m²     Physical Exam   Constitutional: Patient is oriented to person, place, and in no distress.  Pulmonary/Chest: No distress.  No audible wheezes  Psychiatric: Mood, memory, affect and judgment appear normal.     Assessment & Plan     Diagnosis:  Colon cancer screening    Anticipated Surgical Procedure:  Colonoscopy    The risks, benefits, and alternatives of colonoscopy were reviewed with the patient today.  Risks including perforation of the colon possibly requiring surgery or colostomy.  Additional risks include risk of bleeding from biopsies or removal of colon tissue.  There is also the risk of a drug reaction or problems with anesthesia.  This will be discussed with the patient " further by the anesthesia team on the day of the procedure.  Lastly there is a possibility of missing a colon polyp or cancer.  The benefits include the diagnosis and management of disease of the colon and rectum.  Alternatives to colonoscopy include barium enema, laboratory testing, radiographic evaluation, or no intervention.  The patient verbalizes understanding and agrees.        Please note that portions of this note were completed with a voice recognition program.

## 2024-10-16 NOTE — ANESTHESIA PREPROCEDURE EVALUATION
Anesthesia Evaluation     Patient summary reviewed and Nursing notes reviewed   NPO Solid Status: > 8 hours  NPO Liquid Status: > 8 hours           Airway   Mallampati: II  Dental    (+) poor dentition    Pulmonary - normal exam   (+) a smoker Current, Smoked day of surgery, cigarettes,  Cardiovascular   Exercise tolerance: good (4-7 METS)    ECG reviewed  Patient on routine beta blocker and Beta blocker given within 24 hours of surgery  Rhythm: regular  Rate: normal    (+) hypertension well controlled 2 medications or greater, past MI  >12 months, CAD, CABG >6 Months, angina, hyperlipidemia      Neuro/Psych- negative ROS  GI/Hepatic/Renal/Endo    (+) obesity, renal disease- CRI, diabetes mellitus type 2    Musculoskeletal (-) negative ROS    Abdominal   (+) obese   Substance History - negative use     OB/GYN negative ob/gyn ROS         Other - negative ROS                   Anesthesia Plan    ASA 3     MAC     intravenous induction     Anesthetic plan, risks, benefits, and alternatives have been provided, discussed and informed consent has been obtained with: patient.    CODE STATUS:

## 2024-10-17 PROBLEM — Z86.0101 PERSONAL HISTORY OF ADENOMATOUS AND SERRATED COLON POLYPS: Status: ACTIVE | Noted: 2024-08-27

## 2024-10-17 LAB
CYTO UR: NORMAL
LAB AP CASE REPORT: NORMAL
Lab: NORMAL
PATH REPORT.FINAL DX SPEC: NORMAL
PATH REPORT.GROSS SPEC: NORMAL

## 2024-11-07 ENCOUNTER — OFFICE VISIT (OUTPATIENT)
Dept: CARDIOLOGY | Facility: CLINIC | Age: 64
End: 2024-11-07
Payer: MEDICARE

## 2024-11-07 VITALS
SYSTOLIC BLOOD PRESSURE: 120 MMHG | HEART RATE: 68 BPM | OXYGEN SATURATION: 97 % | DIASTOLIC BLOOD PRESSURE: 74 MMHG | WEIGHT: 251 LBS | HEIGHT: 76 IN | BODY MASS INDEX: 30.56 KG/M2

## 2024-11-07 DIAGNOSIS — I10 PRIMARY HYPERTENSION: ICD-10-CM

## 2024-11-07 DIAGNOSIS — E78.2 MIXED HYPERLIPIDEMIA: ICD-10-CM

## 2024-11-07 DIAGNOSIS — Z72.0 TOBACCO ABUSE: ICD-10-CM

## 2024-11-07 DIAGNOSIS — I25.10 CORONARY ARTERY DISEASE INVOLVING NATIVE CORONARY ARTERY OF NATIVE HEART WITHOUT ANGINA PECTORIS: Primary | ICD-10-CM

## 2024-11-07 PROCEDURE — 93000 ELECTROCARDIOGRAM COMPLETE: CPT | Performed by: INTERNAL MEDICINE

## 2024-11-07 PROCEDURE — 3074F SYST BP LT 130 MM HG: CPT | Performed by: INTERNAL MEDICINE

## 2024-11-07 PROCEDURE — 1160F RVW MEDS BY RX/DR IN RCRD: CPT | Performed by: INTERNAL MEDICINE

## 2024-11-07 PROCEDURE — 3078F DIAST BP <80 MM HG: CPT | Performed by: INTERNAL MEDICINE

## 2024-11-07 PROCEDURE — 1159F MED LIST DOCD IN RCRD: CPT | Performed by: INTERNAL MEDICINE

## 2024-11-07 PROCEDURE — 99204 OFFICE O/P NEW MOD 45 MIN: CPT | Performed by: INTERNAL MEDICINE

## 2024-11-07 NOTE — PROGRESS NOTES
Subjective:     Encounter Date:11/07/2024      Patient ID: Chaitanya Hair is a 64 y.o. male with coronary artery disease with prior PCI number of years ago followed by subsequent coronary artery bypass grafting in 2019 in [place] by Dr. Garcia (DELACRUZ to diagonal and then sequentially to the LAD), hypertension, hyperlipidemia, tobacco abuse, presenting today to establish care.    Referring Provider: Mason Peguero MD  Reason for Referral: Atherosclerotic heart disease of native coronary artery without angina pectoris    Chief Complaint: Here to establish care, CAD    History of Present Illness    This is a 64-year-old male with coronary artery disease, prior PCI followed by coronary artery bypass grafting,'s with hypertension, hyperlipidemia, tobacco abuse, presenting today to establish care.  The patient says that he has done well since his bypass surgery in 2019.  He denies having any chest pain.  He describes stable breathing.  No excessive shortness of breath with exertion.  He maintains a relatively active lifestyle and has noticed no decline in exercise tolerance.  He denies having significant coughing, wheezing.  Unfortunately, he does continue to smoke.  He denies having orthopnea, PND, edema.  He denies having palpitations, lightheadedness, dizziness, syncope.  He is tolerating all of his medications well.  He says that he takes aspirin most days but does miss a few days here or there.  Otherwise, his blood pressure and cholesterol been well-controlled and he denies having any side effects from any of his medications.    The following portions of the patient's history were reviewed and updated as appropriate: allergies, current medications, past family history, past medical history, past social history, past surgical history, and problem list.    Past Medical History:   Diagnosis Date    Abnormal stress echo 3/25/19-[facility]    Noted For Myocardial Ischemia    Acute MI     Atelectasis  04/11/2019    Noted on Chest XR    CAD (coronary artery disease) Since 2009    w/R Stent Placed    Chest pain due to CAD     Nitro PRN    DM (diabetes mellitus)     false positive    Elevated cholesterol     History of chest x-ray 04/11/2019    Suggestive For Atelectasis w/Chronic Inflammatory Scarring Noted    History of EKG 3/25/19-[facility]    Sinus Rhythm w/Nonspecific ST-T Wave Abnormalities    History of nephrolithiasis     History of stress test 2015    WNL    HLD (hyperlipidemia)     Controlled w/Meds    Hypertension     Controlled w/Meds    Kidney stones     S/p Lithotripsy    LAD stenosis 04/11/2019    90% Mid LAD Stenosis; Diffuse Stenosis of LCX--Noted on Cardiac Cath    Myocardial ischemia 03/25/2019    Noted on Echo Stress Test    RCA occlusion 04/11/2019    Mild Diffuse Disease--Noted on Cardiac Cath     Tobacco use     1.5-1 PPD       Current Outpatient Medications:     amLODIPine (NORVASC) 10 MG tablet, Take 1 tablet by mouth Daily., Disp: , Rfl:     aspirin 81 MG EC tablet, Take 1 tablet by mouth Daily., Disp: 100 tablet, Rfl: 99    lisinopril-hydrochlorothiazide (PRINZIDE,ZESTORETIC) 20-12.5 MG per tablet, Take 2 tablets by mouth Daily., Disp: , Rfl:     metoprolol tartrate (LOPRESSOR) 50 MG tablet, Take 1 tablet by mouth 2 (Two) Times a Day., Disp: 60 tablet, Rfl: 1    Multiple Vitamin (MULTI-VITAMIN) tablet, Take  by mouth., Disp: , Rfl:     Rosuvastatin Calcium 40 MG capsule sprinkle, Take 40 mg by mouth Daily., Disp: , Rfl:     No Known Allergies    Social History     Tobacco Use    Smoking status: Every Day     Current packs/day: 0.50     Average packs/day: 0.7 packs/day for 60.0 years (40.0 ttl pk-yrs)     Types: Cigarettes    Smokeless tobacco: Never   Substance Use Topics    Alcohol use: Yes     Comment: Maybe once a month     Family History   Problem Relation Age of Onset    Heart disease Father     Colon cancer Neg Hx     Colon polyps Neg Hx      Review of Systems    Constitutional: Negative for malaise/fatigue and weight gain.   Cardiovascular:  Negative for chest pain, claudication, dyspnea on exertion, leg swelling, orthopnea, palpitations, paroxysmal nocturnal dyspnea and syncope.   Respiratory:  Negative for cough, shortness of breath and wheezing.    Endocrine: Negative for cold intolerance and heat intolerance.   Hematologic/Lymphatic: Negative for bleeding problem. Does not bruise/bleed easily.   Gastrointestinal:  Negative for abdominal pain, nausea and vomiting.   Neurological:  Negative for dizziness, headaches and light-headedness.   All other systems reviewed and are negative.        ECG 12 Lead    Date/Time: 11/7/2024 2:33 PM  Performed by: Moises Bunn MD    Authorized by: Moises Bunn MD  Comparison: compared with previous ECG from 4/20/2019  Similar to previous ECG  Rhythm: sinus rhythm  Rate: normal  BPM: 63  Conduction: incomplete right bundle branch block  ST Segments: ST segments normal  T Waves: T waves normal  QRS axis: normal    Clinical impression: non-specific ECG             Objective:     Vitals reviewed.   Constitutional:       General: Not in acute distress.     Appearance: Healthy appearance. Well-developed.   Eyes:      Extraocular Movements: Extraocular movements intact.      Pupils: Pupils are equal, round, and reactive to light.   HENT:      Head: Normocephalic and atraumatic.    Mouth/Throat:      Pharynx: Oropharynx is clear.   Neck:      Vascular: No carotid bruit or JVD.   Pulmonary:      Effort: Pulmonary effort is normal.      Breath sounds: Normal breath sounds. No wheezing. No rhonchi. No rales.   Cardiovascular:      Normal rate. Regular rhythm.      Murmurs: There is no murmur.      No gallop.    Pulses:     Intact distal pulses.   Edema:     Peripheral edema absent.   Abdominal:      General: Bowel sounds are normal. There is no distension.      Palpations: Abdomen is soft.      Tenderness: There is no  "abdominal tenderness.   Musculoskeletal: Normal range of motion.      Extremities: No clubbing present.Skin:     General: Skin is warm and dry. There is no cyanosis.      Findings: No erythema or rash.   Neurological:      Mental Status: Alert and oriented to person, place, and time.      Cranial Nerves: No cranial nerve deficit.       /74   Pulse 68   Ht 193 cm (76\")   Wt 114 kg (251 lb)   SpO2 97%   BMI 30.55 kg/m²     Data/Lab Review:     Reviewed an operative report from Dr. Garcia in 2019 which indicates the patient underwent coronary artery bypass grafting x 2 with \"skeletonized LIMA to the diagonal branch of the LAD sequentially.\"    I reviewed the patient's most recent cardiac catheterization which was prior to bypass surgery in 2019.  At that time, estimate ejection fraction greater than 60% with no regional wall motion abnormalities noted.      Assessment:          Diagnosis Plan   1. Coronary artery disease involving native coronary artery of native heart without angina pectoris  ECG 12 Lead      2. Primary hypertension        3. Mixed hyperlipidemia        4. Tobacco abuse               Plan:       1.  Coronary artery disease: Stable at this time.  The patient does not describe any anginal symptoms.  Discussed the use of aspirin, recommended to be used daily.  The patient will try to increase his frequency to daily at this time.  He does continue beta-blocker and statin therapies.  No testing indicated as the patient is currently asymptomatic.    2.  Primary hypertension: Blood pressure is currently well-controlled on metoprolol, lisinopril, hydrochlorothiazide and amlodipine.  These medications.    3.  Mixed hyperlipidemia: The patient is on high intensity statin therapy.  He is tolerating this well.  LDL cholesterol goal is less than 70.  Continue statin therapy at this time.    4.  Tobacco abuse: Chaitanya Hair  reports that he has been smoking cigarettes. He has a 40 pack-year smoking " history. He has never used smokeless tobacco. I have educated him on the risk of diseases from using tobacco products such as cancer, COPD, and heart disease. I advised him to quit and he is not willing to quit. I spent 3  minutes counseling the patient.    I will plan to see the patient again in 12 months unless otherwise needed sooner.

## 2025-02-10 ENCOUNTER — APPOINTMENT (OUTPATIENT)
Dept: GENERAL RADIOLOGY | Facility: HOSPITAL | Age: 65
End: 2025-02-10
Payer: MEDICARE

## 2025-02-10 ENCOUNTER — HOSPITAL ENCOUNTER (INPATIENT)
Facility: HOSPITAL | Age: 65
LOS: 10 days | Discharge: HOME | End: 2025-02-20
Attending: FAMILY MEDICINE
Payer: MEDICARE

## 2025-02-10 ENCOUNTER — APPOINTMENT (OUTPATIENT)
Dept: CT IMAGING | Facility: HOSPITAL | Age: 65
End: 2025-02-10
Payer: MEDICARE

## 2025-02-10 DIAGNOSIS — J96.01 ACUTE RESPIRATORY FAILURE WITH HYPOXIA: ICD-10-CM

## 2025-02-10 DIAGNOSIS — J18.9 PNEUMONIA DUE TO INFECTIOUS ORGANISM, UNSPECIFIED LATERALITY, UNSPECIFIED PART OF LUNG: ICD-10-CM

## 2025-02-10 DIAGNOSIS — N17.9 ACUTE RENAL FAILURE, UNSPECIFIED ACUTE RENAL FAILURE TYPE: Primary | ICD-10-CM

## 2025-02-10 DIAGNOSIS — Z74.09 IMPAIRED MOBILITY: ICD-10-CM

## 2025-02-10 DIAGNOSIS — J10.1 INFLUENZA A: ICD-10-CM

## 2025-02-10 DIAGNOSIS — J18.1 LOBAR PNEUMONIA: ICD-10-CM

## 2025-02-10 DIAGNOSIS — G72.81 INTENSIVE CARE (ICU) MYOPATHY: ICD-10-CM

## 2025-02-10 DIAGNOSIS — R13.10 DYSPHAGIA, UNSPECIFIED TYPE: ICD-10-CM

## 2025-02-10 LAB
ALBUMIN SERPL-MCNC: 3.2 G/DL (ref 3.5–5.2)
ALBUMIN/GLOB SERPL: 1.1 G/DL
ALP SERPL-CCNC: 80 U/L (ref 39–117)
ALT SERPL W P-5'-P-CCNC: 66 U/L (ref 1–41)
ANION GAP SERPL CALCULATED.3IONS-SCNC: 14 MMOL/L (ref 5–15)
ANISOCYTOSIS BLD QL: ABNORMAL
ARTERIAL PATENCY WRIST A: POSITIVE
AST SERPL-CCNC: 66 U/L (ref 1–40)
ATMOSPHERIC PRESS: 760 MMHG
B PARAPERT DNA SPEC QL NAA+PROBE: NOT DETECTED
B PERT DNA SPEC QL NAA+PROBE: NOT DETECTED
BASE EXCESS BLDA CALC-SCNC: 0.5 MMOL/L (ref 0–2)
BASOPHILS # BLD MANUAL: 0 10*3/MM3 (ref 0–0.2)
BASOPHILS NFR BLD MANUAL: 0 % (ref 0–1.5)
BDY SITE: ABNORMAL
BILIRUB SERPL-MCNC: 0.5 MG/DL (ref 0–1.2)
BILIRUB UR QL STRIP: NEGATIVE
BODY TEMPERATURE: 37
BUN SERPL-MCNC: 60 MG/DL (ref 8–23)
BUN/CREAT SERPL: 25.6 (ref 7–25)
C PNEUM DNA NPH QL NAA+NON-PROBE: NOT DETECTED
CALCIUM SPEC-SCNC: 7.9 MG/DL (ref 8.6–10.5)
CHLORIDE SERPL-SCNC: 100 MMOL/L (ref 98–107)
CLARITY UR: CLEAR
CLUMPED PLATELETS: PRESENT
CO2 SERPL-SCNC: 21 MMOL/L (ref 22–29)
COLOR UR: YELLOW
CREAT SERPL-MCNC: 2.34 MG/DL (ref 0.76–1.27)
D-LACTATE SERPL-SCNC: 1.5 MMOL/L (ref 0.5–2)
DEPRECATED RDW RBC AUTO: 43.2 FL (ref 37–54)
EGFRCR SERPLBLD CKD-EPI 2021: 29.9 ML/MIN/1.73
EOSINOPHIL # BLD MANUAL: 0 10*3/MM3 (ref 0–0.4)
EOSINOPHIL NFR BLD MANUAL: 0 % (ref 0.3–6.2)
ERYTHROCYTE [DISTWIDTH] IN BLOOD BY AUTOMATED COUNT: 13.2 % (ref 12.3–15.4)
FLUAV H1 2009 PAND RNA NPH QL NAA+PROBE: DETECTED
FLUBV RNA ISLT QL NAA+PROBE: NOT DETECTED
GAS FLOW AIRWAY: 15 LPM
GEN 5 1HR TROPONIN T REFLEX: 76 NG/L
GIANT PLATELETS: ABNORMAL
GLOBULIN UR ELPH-MCNC: 3 GM/DL
GLUCOSE SERPL-MCNC: 106 MG/DL (ref 65–99)
GLUCOSE UR STRIP-MCNC: NEGATIVE MG/DL
HADV DNA SPEC NAA+PROBE: NOT DETECTED
HCO3 BLDA-SCNC: 23.7 MMOL/L (ref 20–26)
HCOV 229E RNA SPEC QL NAA+PROBE: NOT DETECTED
HCOV HKU1 RNA SPEC QL NAA+PROBE: NOT DETECTED
HCOV NL63 RNA SPEC QL NAA+PROBE: NOT DETECTED
HCOV OC43 RNA SPEC QL NAA+PROBE: NOT DETECTED
HCT VFR BLD AUTO: 40.3 % (ref 37.5–51)
HGB BLD-MCNC: 14.1 G/DL (ref 13–17.7)
HGB UR QL STRIP.AUTO: NEGATIVE
HMPV RNA NPH QL NAA+NON-PROBE: NOT DETECTED
HPIV1 RNA ISLT QL NAA+PROBE: NOT DETECTED
HPIV2 RNA SPEC QL NAA+PROBE: NOT DETECTED
HPIV3 RNA NPH QL NAA+PROBE: NOT DETECTED
HPIV4 P GENE NPH QL NAA+PROBE: NOT DETECTED
KETONES UR QL STRIP: NEGATIVE
LEUKOCYTE ESTERASE UR QL STRIP.AUTO: NEGATIVE
LIPASE SERPL-CCNC: 74 U/L (ref 13–60)
LYMPHOCYTES # BLD MANUAL: 0.25 10*3/MM3 (ref 0.7–3.1)
LYMPHOCYTES NFR BLD MANUAL: 7.1 % (ref 5–12)
Lab: ABNORMAL
M PNEUMO IGG SER IA-ACNC: NOT DETECTED
MAGNESIUM SERPL-MCNC: 1.9 MG/DL (ref 1.6–2.4)
MCH RBC QN AUTO: 31.1 PG (ref 26.6–33)
MCHC RBC AUTO-ENTMCNC: 35 G/DL (ref 31.5–35.7)
MCV RBC AUTO: 88.8 FL (ref 79–97)
MODALITY: ABNORMAL
MONOCYTES # BLD: 0.45 10*3/MM3 (ref 0.1–0.9)
NEUTROPHILS # BLD AUTO: 5.61 10*3/MM3 (ref 1.7–7)
NEUTROPHILS NFR BLD MANUAL: 79.8 % (ref 42.7–76)
NEUTS BAND NFR BLD MANUAL: 9.1 % (ref 0–5)
NITRITE UR QL STRIP: NEGATIVE
NT-PROBNP SERPL-MCNC: 147.5 PG/ML (ref 0–900)
PCO2 BLDA: 33.6 MM HG (ref 35–45)
PCO2 TEMP ADJ BLD: 33.6 MM HG (ref 35–45)
PH BLDA: 7.46 PH UNITS (ref 7.35–7.45)
PH UR STRIP.AUTO: <=5 [PH] (ref 5–8)
PH, TEMP CORRECTED: 7.46 PH UNITS (ref 7.35–7.45)
PLATELET # BLD AUTO: 95 10*3/MM3 (ref 140–450)
PMV BLD AUTO: 11.3 FL (ref 6–12)
PO2 BLDA: 64.6 MM HG (ref 83–108)
PO2 TEMP ADJ BLD: 64.6 MM HG (ref 83–108)
POIKILOCYTOSIS BLD QL SMEAR: ABNORMAL
POTASSIUM SERPL-SCNC: 3.8 MMOL/L (ref 3.5–5.2)
PROCALCITONIN SERPL-MCNC: 0.47 NG/ML (ref 0–0.25)
PROT SERPL-MCNC: 6.2 G/DL (ref 6–8.5)
PROT UR QL STRIP: NEGATIVE
RBC # BLD AUTO: 4.54 10*6/MM3 (ref 4.14–5.8)
RHINOVIRUS RNA SPEC NAA+PROBE: NOT DETECTED
RSV RNA NPH QL NAA+NON-PROBE: NOT DETECTED
SAO2 % BLDCOA: 94.1 % (ref 94–99)
SARS-COV-2 RNA RESP QL NAA+PROBE: NOT DETECTED
SMALL PLATELETS BLD QL SMEAR: ABNORMAL
SODIUM SERPL-SCNC: 135 MMOL/L (ref 136–145)
SP GR UR STRIP: 1.02 (ref 1–1.03)
SPHEROCYTES BLD QL SMEAR: ABNORMAL
T3FREE SERPL-MCNC: 2.99 PG/ML (ref 2–4.4)
T4 FREE SERPL-MCNC: 1.28 NG/DL (ref 0.93–1.7)
TROPONIN T % DELTA: -3
TROPONIN T NUMERIC DELTA: -2 NG/L
TROPONIN T SERPL HS-MCNC: 78 NG/L
TSH SERPL DL<=0.05 MIU/L-ACNC: 2.45 UIU/ML (ref 0.27–4.2)
UROBILINOGEN UR QL STRIP: NORMAL
VARIANT LYMPHS NFR BLD MANUAL: 1 % (ref 0–5)
VARIANT LYMPHS NFR BLD MANUAL: 3 % (ref 19.6–45.3)
VENTILATOR MODE: ABNORMAL
WBC MORPH BLD: NORMAL
WBC NRBC COR # BLD AUTO: 6.31 10*3/MM3 (ref 3.4–10.8)

## 2025-02-10 PROCEDURE — 83880 ASSAY OF NATRIURETIC PEPTIDE: CPT | Performed by: NURSE PRACTITIONER

## 2025-02-10 PROCEDURE — 25810000003 SODIUM CHLORIDE 0.9 % SOLUTION: Performed by: NURSE PRACTITIONER

## 2025-02-10 PROCEDURE — 81003 URINALYSIS AUTO W/O SCOPE: CPT | Performed by: NURSE PRACTITIONER

## 2025-02-10 PROCEDURE — 83690 ASSAY OF LIPASE: CPT | Performed by: NURSE PRACTITIONER

## 2025-02-10 PROCEDURE — 74176 CT ABD & PELVIS W/O CONTRAST: CPT

## 2025-02-10 PROCEDURE — 84145 PROCALCITONIN (PCT): CPT | Performed by: FAMILY MEDICINE

## 2025-02-10 PROCEDURE — 25810000003 LACTATED RINGERS PER 1000 ML: Performed by: FAMILY MEDICINE

## 2025-02-10 PROCEDURE — 93010 ELECTROCARDIOGRAM REPORT: CPT | Performed by: INTERNAL MEDICINE

## 2025-02-10 PROCEDURE — 25010000002 AZITHROMYCIN PER 500 MG: Performed by: NURSE PRACTITIONER

## 2025-02-10 PROCEDURE — 84484 ASSAY OF TROPONIN QUANT: CPT | Performed by: NURSE PRACTITIONER

## 2025-02-10 PROCEDURE — 25810000003 SODIUM CHLORIDE 0.9 % SOLUTION 250 ML FLEX CONT: Performed by: NURSE PRACTITIONER

## 2025-02-10 PROCEDURE — 83605 ASSAY OF LACTIC ACID: CPT | Performed by: NURSE PRACTITIONER

## 2025-02-10 PROCEDURE — 82803 BLOOD GASES ANY COMBINATION: CPT

## 2025-02-10 PROCEDURE — 93005 ELECTROCARDIOGRAM TRACING: CPT | Performed by: NURSE PRACTITIONER

## 2025-02-10 PROCEDURE — 71045 X-RAY EXAM CHEST 1 VIEW: CPT

## 2025-02-10 PROCEDURE — 25010000002 CEFTRIAXONE PER 250 MG: Performed by: NURSE PRACTITIONER

## 2025-02-10 PROCEDURE — 85025 COMPLETE CBC W/AUTO DIFF WBC: CPT | Performed by: NURSE PRACTITIONER

## 2025-02-10 PROCEDURE — 94799 UNLISTED PULMONARY SVC/PX: CPT

## 2025-02-10 PROCEDURE — 84439 ASSAY OF FREE THYROXINE: CPT | Performed by: NURSE PRACTITIONER

## 2025-02-10 PROCEDURE — 25010000002 ENOXAPARIN PER 10 MG: Performed by: FAMILY MEDICINE

## 2025-02-10 PROCEDURE — 87040 BLOOD CULTURE FOR BACTERIA: CPT | Performed by: NURSE PRACTITIONER

## 2025-02-10 PROCEDURE — 25010000002 METHYLPREDNISOLONE PER 125 MG: Performed by: FAMILY MEDICINE

## 2025-02-10 PROCEDURE — 84481 FREE ASSAY (FT-3): CPT | Performed by: NURSE PRACTITIONER

## 2025-02-10 PROCEDURE — 85007 BL SMEAR W/DIFF WBC COUNT: CPT | Performed by: NURSE PRACTITIONER

## 2025-02-10 PROCEDURE — 99285 EMERGENCY DEPT VISIT HI MDM: CPT

## 2025-02-10 PROCEDURE — 83735 ASSAY OF MAGNESIUM: CPT | Performed by: NURSE PRACTITIONER

## 2025-02-10 PROCEDURE — 36415 COLL VENOUS BLD VENIPUNCTURE: CPT

## 2025-02-10 PROCEDURE — 94761 N-INVAS EAR/PLS OXIMETRY MLT: CPT

## 2025-02-10 PROCEDURE — 0202U NFCT DS 22 TRGT SARS-COV-2: CPT | Performed by: NURSE PRACTITIONER

## 2025-02-10 PROCEDURE — 71250 CT THORAX DX C-: CPT

## 2025-02-10 PROCEDURE — 80053 COMPREHEN METABOLIC PANEL: CPT | Performed by: NURSE PRACTITIONER

## 2025-02-10 PROCEDURE — 84443 ASSAY THYROID STIM HORMONE: CPT | Performed by: NURSE PRACTITIONER

## 2025-02-10 PROCEDURE — 36600 WITHDRAWAL OF ARTERIAL BLOOD: CPT

## 2025-02-10 RX ORDER — SODIUM CHLORIDE 0.9 % (FLUSH) 0.9 %
10 SYRINGE (ML) INJECTION AS NEEDED
Status: DISCONTINUED | OUTPATIENT
Start: 2025-02-10 | End: 2025-02-12

## 2025-02-10 RX ORDER — BISACODYL 10 MG
10 SUPPOSITORY, RECTAL RECTAL DAILY PRN
Status: DISCONTINUED | OUTPATIENT
Start: 2025-02-10 | End: 2025-02-20 | Stop reason: HOSPADM

## 2025-02-10 RX ORDER — AMLODIPINE BESYLATE 5 MG/1
10 TABLET ORAL DAILY
Status: DISCONTINUED | OUTPATIENT
Start: 2025-02-10 | End: 2025-02-20 | Stop reason: HOSPADM

## 2025-02-10 RX ORDER — BISACODYL 5 MG/1
5 TABLET, DELAYED RELEASE ORAL DAILY PRN
Status: DISCONTINUED | OUTPATIENT
Start: 2025-02-10 | End: 2025-02-20 | Stop reason: HOSPADM

## 2025-02-10 RX ORDER — AMOXICILLIN 250 MG
2 CAPSULE ORAL 2 TIMES DAILY PRN
Status: DISCONTINUED | OUTPATIENT
Start: 2025-02-10 | End: 2025-02-20 | Stop reason: HOSPADM

## 2025-02-10 RX ORDER — SODIUM CHLORIDE 9 MG/ML
40 INJECTION, SOLUTION INTRAVENOUS AS NEEDED
Status: DISCONTINUED | OUTPATIENT
Start: 2025-02-10 | End: 2025-02-12

## 2025-02-10 RX ORDER — OSELTAMIVIR PHOSPHATE 30 MG/1
30 CAPSULE ORAL EVERY 12 HOURS SCHEDULED
Status: DISCONTINUED | OUTPATIENT
Start: 2025-02-11 | End: 2025-02-14

## 2025-02-10 RX ORDER — ACETAMINOPHEN 500 MG
1000 TABLET ORAL ONCE
Status: COMPLETED | OUTPATIENT
Start: 2025-02-10 | End: 2025-02-10

## 2025-02-10 RX ORDER — ASPIRIN 81 MG/1
81 TABLET ORAL DAILY
Status: DISCONTINUED | OUTPATIENT
Start: 2025-02-10 | End: 2025-02-20 | Stop reason: HOSPADM

## 2025-02-10 RX ORDER — ACETAMINOPHEN 325 MG/1
650 TABLET ORAL EVERY 4 HOURS PRN
Status: DISCONTINUED | OUTPATIENT
Start: 2025-02-10 | End: 2025-02-20 | Stop reason: HOSPADM

## 2025-02-10 RX ORDER — ONDANSETRON 2 MG/ML
4 INJECTION INTRAMUSCULAR; INTRAVENOUS EVERY 6 HOURS PRN
Status: DISCONTINUED | OUTPATIENT
Start: 2025-02-10 | End: 2025-02-20 | Stop reason: HOSPADM

## 2025-02-10 RX ORDER — ONDANSETRON 4 MG/1
4 TABLET, ORALLY DISINTEGRATING ORAL EVERY 6 HOURS PRN
Status: DISCONTINUED | OUTPATIENT
Start: 2025-02-10 | End: 2025-02-20 | Stop reason: HOSPADM

## 2025-02-10 RX ORDER — ACETAMINOPHEN 650 MG/1
650 SUPPOSITORY RECTAL EVERY 4 HOURS PRN
Status: DISCONTINUED | OUTPATIENT
Start: 2025-02-10 | End: 2025-02-20 | Stop reason: HOSPADM

## 2025-02-10 RX ORDER — AZITHROMYCIN 250 MG/1
500 TABLET, FILM COATED ORAL
Status: DISCONTINUED | OUTPATIENT
Start: 2025-02-11 | End: 2025-02-12

## 2025-02-10 RX ORDER — POLYETHYLENE GLYCOL 3350 17 G/17G
17 POWDER, FOR SOLUTION ORAL DAILY PRN
Status: DISCONTINUED | OUTPATIENT
Start: 2025-02-10 | End: 2025-02-20 | Stop reason: HOSPADM

## 2025-02-10 RX ORDER — ENOXAPARIN SODIUM 100 MG/ML
40 INJECTION SUBCUTANEOUS
Status: DISCONTINUED | OUTPATIENT
Start: 2025-02-10 | End: 2025-02-20 | Stop reason: HOSPADM

## 2025-02-10 RX ORDER — METOPROLOL TARTRATE 50 MG
50 TABLET ORAL 2 TIMES DAILY
Status: DISCONTINUED | OUTPATIENT
Start: 2025-02-10 | End: 2025-02-17

## 2025-02-10 RX ORDER — METHYLPREDNISOLONE SODIUM SUCCINATE 125 MG/2ML
60 INJECTION, POWDER, LYOPHILIZED, FOR SOLUTION INTRAMUSCULAR; INTRAVENOUS EVERY 12 HOURS
Status: DISCONTINUED | OUTPATIENT
Start: 2025-02-10 | End: 2025-02-12

## 2025-02-10 RX ORDER — ACETAMINOPHEN 160 MG/5ML
650 SOLUTION ORAL EVERY 4 HOURS PRN
Status: DISCONTINUED | OUTPATIENT
Start: 2025-02-10 | End: 2025-02-20 | Stop reason: HOSPADM

## 2025-02-10 RX ORDER — SODIUM CHLORIDE, SODIUM LACTATE, POTASSIUM CHLORIDE, CALCIUM CHLORIDE 600; 310; 30; 20 MG/100ML; MG/100ML; MG/100ML; MG/100ML
125 INJECTION, SOLUTION INTRAVENOUS CONTINUOUS
Status: DISPENSED | OUTPATIENT
Start: 2025-02-10 | End: 2025-02-11

## 2025-02-10 RX ORDER — SODIUM CHLORIDE 0.9 % (FLUSH) 0.9 %
10 SYRINGE (ML) INJECTION EVERY 12 HOURS SCHEDULED
Status: DISCONTINUED | OUTPATIENT
Start: 2025-02-10 | End: 2025-02-20 | Stop reason: HOSPADM

## 2025-02-10 RX ADMIN — SODIUM CHLORIDE 1000 MG: 900 INJECTION INTRAVENOUS at 14:36

## 2025-02-10 RX ADMIN — OSELTAMIVIR PHOSPHATE 75 MG: 45 CAPSULE ORAL at 17:01

## 2025-02-10 RX ADMIN — AMLODIPINE BESYLATE 10 MG: 10 TABLET ORAL at 16:51

## 2025-02-10 RX ADMIN — SODIUM CHLORIDE 500 ML: 9 INJECTION, SOLUTION INTRAVENOUS at 10:17

## 2025-02-10 RX ADMIN — ASPIRIN 81 MG: 81 TABLET, COATED ORAL at 16:51

## 2025-02-10 RX ADMIN — METOPROLOL TARTRATE 50 MG: 50 TABLET, FILM COATED ORAL at 21:06

## 2025-02-10 RX ADMIN — SODIUM CHLORIDE, POTASSIUM CHLORIDE, SODIUM LACTATE AND CALCIUM CHLORIDE 125 ML/HR: 600; 310; 30; 20 INJECTION, SOLUTION INTRAVENOUS at 16:25

## 2025-02-10 RX ADMIN — ENOXAPARIN SODIUM 40 MG: 100 INJECTION SUBCUTANEOUS at 17:02

## 2025-02-10 RX ADMIN — Medication 10 ML: at 21:06

## 2025-02-10 RX ADMIN — ACETAMINOPHEN 1000 MG: 500 TABLET, FILM COATED ORAL at 13:26

## 2025-02-10 RX ADMIN — METHYLPREDNISOLONE SODIUM SUCCINATE 60 MG: 125 INJECTION, POWDER, FOR SOLUTION INTRAMUSCULAR; INTRAVENOUS at 16:54

## 2025-02-10 RX ADMIN — AZITHROMYCIN DIHYDRATE 500 MG: 500 INJECTION, POWDER, LYOPHILIZED, FOR SOLUTION INTRAVENOUS at 15:55

## 2025-02-10 NOTE — ED NOTES
"Nursing report ED to floor  Chaitanya Hair  64 y.o.  male    HPI:   Chief Complaint   Patient presents with    Weakness - Generalized    Diarrhea       Admitting doctor:   Mason Peguero MD    Consulting provider(s):  Consults       No orders found from 1/12/2025 to 2/11/2025.             Admitting diagnosis:   The primary encounter diagnosis was Acute renal failure, unspecified acute renal failure type. Diagnoses of Influenza A and Pneumonia due to infectious organism, unspecified laterality, unspecified part of lung were also pertinent to this visit.    Code status:   Current Code Status       Date Active Code Status Order ID Comments User Context       Prior            Allergies:   Patient has no known allergies.    Intake and Output  No intake or output data in the 24 hours ending 02/10/25 1552    Weight:       02/10/25  0832   Weight: 116 kg (255 lb)       Most recent vitals:   Vitals:    02/10/25 0832   BP: 97/56   Pulse: 104   Resp: 18   Temp: 97.4 °F (36.3 °C)   TempSrc: Oral   SpO2: 96%   Weight: 116 kg (255 lb)   Height: 193 cm (76\")     Oxygen Therapy: .    Active LDAs/IV Access:   Lines, Drains & Airways       Active LDAs       Name Placement date Placement time Site Days    Peripheral IV 02/10/25 Right Antecubital 02/10/25  --  Antecubital  less than 1                    Labs (abnormal labs have a star):   Labs Reviewed   RESPIRATORY PANEL PCR W/ COVID-19 (SARS-COV-2), NP SWAB IN UTM/VTP, 2 HR TAT - Abnormal; Notable for the following components:       Result Value    Influenza A H1 2009 PCR Detected (*)     All other components within normal limits    Narrative:     In the setting of a positive respiratory panel with a viral infection PLUS a negative procalcitonin without other underlying concern for bacterial infection, consider observing off antibiotics or discontinuation of antibiotics and continue supportive care. If the respiratory panel is positive for atypical bacterial infection " (Bordetella pertussis, Chlamydophila pneumoniae, or Mycoplasma pneumoniae), consider antibiotic de-escalation to target atypical bacterial infection.   COMPREHENSIVE METABOLIC PANEL - Abnormal; Notable for the following components:    Glucose 106 (*)     BUN 60 (*)     Creatinine 2.34 (*)     Sodium 135 (*)     CO2 21.0 (*)     Calcium 7.9 (*)     Albumin 3.2 (*)     ALT (SGPT) 66 (*)     AST (SGOT) 66 (*)     BUN/Creatinine Ratio 25.6 (*)     eGFR 29.9 (*)     All other components within normal limits    Narrative:     GFR Categories in Chronic Kidney Disease (CKD)      GFR Category          GFR (mL/min/1.73)    Interpretation  G1                     90 or greater         Normal or high (1)  G2                      60-89                Mild decrease (1)  G3a                   45-59                Mild to moderate decrease  G3b                   30-44                Moderate to severe decrease  G4                    15-29                Severe decrease  G5                    14 or less           Kidney failure          (1)In the absence of evidence of kidney disease, neither GFR category G1 or G2 fulfill the criteria for CKD.    eGFR calculation 2021 CKD-EPI creatinine equation, which does not include race as a factor   LIPASE - Abnormal; Notable for the following components:    Lipase 74 (*)     All other components within normal limits   TROPONIN - Abnormal; Notable for the following components:    HS Troponin T 78 (*)     All other components within normal limits    Narrative:     High Sensitive Troponin T Reference Range:  <14.0 ng/L- Negative Female for AMI  <22.0 ng/L- Negative Male for AMI  >=14 - Abnormal Female indicating possible myocardial injury.  >=22 - Abnormal Male indicating possible myocardial injury.   Clinicians would have to utilize clinical acumen, EKG, Troponin, and serial changes to determine if it is an Acute Myocardial Infarction or myocardial injury due to an underlying chronic  condition.        CBC WITH AUTO DIFFERENTIAL - Abnormal; Notable for the following components:    Platelets 95 (*)     All other components within normal limits   MANUAL DIFFERENTIAL - Abnormal; Notable for the following components:    Neutrophil % 79.8 (*)     Lymphocyte % 3.0 (*)     Eosinophil % 0.0 (*)     Bands %  9.1 (*)     Lymphocytes Absolute 0.25 (*)     All other components within normal limits   HIGH SENSITIVITIY TROPONIN T 1HR - Abnormal; Notable for the following components:    HS Troponin T 76 (*)     All other components within normal limits    Narrative:     High Sensitive Troponin T Reference Range:  <14.0 ng/L- Negative Female for AMI  <22.0 ng/L- Negative Male for AMI  >=14 - Abnormal Female indicating possible myocardial injury.  >=22 - Abnormal Male indicating possible myocardial injury.   Clinicians would have to utilize clinical acumen, EKG, Troponin, and serial changes to determine if it is an Acute Myocardial Infarction or myocardial injury due to an underlying chronic condition.        URINALYSIS W/ MICROSCOPIC IF INDICATED (NO CULTURE) - Normal    Narrative:     Urine microscopic not indicated.   LACTIC ACID, PLASMA - Normal   MAGNESIUM - Normal   TSH - Normal   T4, FREE - Normal   BNP (IN-HOUSE) - Normal    Narrative:     This assay is used as an aid in the diagnosis of individuals suspected of having heart failure. It can be used as an aid in the diagnosis of acute decompensated heart failure (ADHF) in patients presenting with signs and symptoms of ADHF to the emergency department (ED). In addition, NT-proBNP of <300 pg/mL indicates ADHF is not likely.    Age Range Result Interpretation  NT-proBNP Concentration (pg/mL:      <50             Positive            >450                   Gray                 300-450                    Negative             <300    50-75           Positive            >900                  Gray                300-900                  Negative             <300      >75             Positive            >1800                  Gray                300-1800                  Negative            <300   BLOOD CULTURE   BLOOD CULTURE   T3, FREE   CBC AND DIFFERENTIAL    Narrative:     The following orders were created for panel order CBC & Differential.  Procedure                               Abnormality         Status                     ---------                               -----------         ------                     CBC Auto Differential[[id number]]        Abnormal            Final result                 Please view results for these tests on the individual orders.       Meds given in ED:   Medications   sodium chloride 0.9 % flush 10 mL (has no administration in time range)   azithromycin (ZITHROMAX) 500 mg in sodium chloride 0.9 % 250 mL IVPB-VTB (has no administration in time range)   sodium chloride 0.9 % bolus 500 mL (0 mL Intravenous Stopped 2/10/25 1231)   acetaminophen (TYLENOL) tablet 1,000 mg (1,000 mg Oral Given 2/10/25 1326)   cefTRIAXone (ROCEPHIN) 1,000 mg in sodium chloride 0.9 % 100 mL MBP (1,000 mg Intravenous New Bag 2/10/25 1436)           NIH Stroke Scale:       Isolation/Infection(s):  Droplet   Influenza     COVID Testing  Collected .  Resulted .    Nursing report ED to floor:  Mental status: .a&ox4  Ambulatory status: .assistance x2  Precautions: .fall, droplet    ED nurse phone extentsion- ..

## 2025-02-10 NOTE — Clinical Note
Level of Care: Med/Surg [1]   Diagnosis: Acute renal failure [693751]   Admitting Physician: BENITA QUIROZ [[id number]]   Attending Physician: BENITA QUIROZ [[id number]]   Certification: I Certify That Inpatient Hospital Services Are Medically Necessary For Greater Than 2 Midnights

## 2025-02-10 NOTE — PROGRESS NOTES
"Pharmacy Dosing Service  Antimicrobial Dosing  Oseltamivir    Assessment/Action/Plan:  Based on indication and renal function, oseltamivir 75 mg PO once then 30 mg every 12 hours for 5 days. Pharmacy will continue to monitor daily and make further adjustment(s) accordingly. Patient renal function noted.      Subjective:  Chaitanya Hair is a 64 y.o. male with a  \"Pharmacy to Dose oseltamivir\" consult for the treatment of influenza A infection , day 1 of 5 of treatment.    Objective:  Ht: 193 cm (76\"); Wt: 116 kg (255 lb)  Estimated Creatinine Clearance: 43.3 mL/min (A) (by C-G formula based on SCr of 2.34 mg/dL (H)).   Creatinine   Date Value Ref Range Status   02/10/2025 2.34 (H) 0.76 - 1.27 mg/dL Final   08/30/2023 0.81 0.76 - 1.27 mg/dL Final   08/28/2023 0.94 0.76 - 1.27 mg/dL Final   04/11/2019 1.1 0.5 - 1.2 mg/dL Final      Lab Results   Component Value Date    WBC 6.31 02/10/2025    WBC 6.19 08/31/2023    WBC 6.16 08/28/2023      Baseline culture results:  Microbiology Results (last 10 days)       Procedure Component Value - Date/Time    Respiratory Panel PCR w/COVID-19(SARS-CoV-2) [facility] In-House, NP Swab in UTM/VTM, 2 HR TAT - Swab, Nasopharynx [[id number]]  (Abnormal) Collected: 02/10/25 1013    Lab Status: Final result Specimen: Swab from Nasopharynx Updated: 02/10/25 1136     ADENOVIRUS, PCR Not Detected     Coronavirus 229E Not Detected     Coronavirus HKU1 Not Detected     Coronavirus NL63 Not Detected     Coronavirus OC43 Not Detected     COVID19 Not Detected     Human Metapneumovirus Not Detected     Human Rhinovirus/Enterovirus Not Detected     Influenza A H1 2009 PCR Detected     Influenza B PCR Not Detected     Parainfluenza Virus 1 Not Detected     Parainfluenza Virus 2 Not Detected     Parainfluenza Virus 3 Not Detected     Parainfluenza Virus 4 Not Detected     RSV, PCR Not Detected     Bordetella pertussis pcr Not Detected     Bordetella parapertussis PCR Not Detected    "  Chlamydophila pneumoniae PCR Not Detected     Mycoplasma pneumo by PCR Not Detected    Narrative:      In the setting of a positive respiratory panel with a viral infection PLUS a negative procalcitonin without other underlying concern for bacterial infection, consider observing off antibiotics or discontinuation of antibiotics and continue supportive care. If the respiratory panel is positive for atypical bacterial infection (Bordetella pertussis, Chlamydophila pneumoniae, or Mycoplasma pneumoniae), consider antibiotic de-escalation to target atypical bacterial infection.            Dario Tovar, PharmD  02/10/25 16:41 CST

## 2025-02-10 NOTE — H&P
History and Physical    Patient:  Chaitanya Hair  MRN: [id number]    CHIEF COMPLAINT: Fever, shortness of breath, weakness    History Obtained From: the patient   PCP: Mason Peguero MD    HISTORY OF PRESENT ILLNESS:   The patient is a 64 y.o. male who presents with patient started with a fever 2 days ago with worsening symptoms of weakness and diarrhea.  Admits cough and congestion as well as well as worsening shortness of breath over the last 24 hours.  He did receive a flu vaccine but just 2 weeks ago.  He is a longtime smoker and has significant history of coronary artery disease, hypertension.  In the ER he was found to be mildly hypoxic and influenza A positive.  Currently he is resting in bed but oxygen has decreased on nurses just increased his nasal cannula up to 4 L from 2 L any satting right at 90%.  Patient's ill-appearing and feels that he is a little bit confused.    REVIEW OF SYSTEMS:    Review of Systems   Constitutional:  Positive for activity change, appetite change, chills, fatigue and fever. Negative for diaphoresis.   HENT:  Positive for congestion and sore throat.    Respiratory:  Positive for cough and shortness of breath.    Gastrointestinal:  Positive for diarrhea. Negative for abdominal distention, abdominal pain and vomiting.   Genitourinary:  Positive for decreased urine volume. Negative for dysuria and urgency.   Musculoskeletal:  Positive for myalgias. Negative for back pain.   Skin:  Negative for pallor and rash.   Neurological:  Positive for dizziness and light-headedness.   Psychiatric/Behavioral:  Negative for agitation and confusion.           Past Medical History:  Past Medical History:   Diagnosis Date    Abnormal stress echo 3/25/19-[facility]    Noted For Myocardial Ischemia    Acute MI     Atelectasis 04/11/2019    Noted on Chest XR    CAD (coronary artery disease) Since 2009    w/R Stent Placed    Chest pain due to CAD     Nitro PRN    DM (diabetes  mellitus)     false positive    Elevated cholesterol     History of chest x-ray 04/11/2019    Suggestive For Atelectasis w/Chronic Inflammatory Scarring Noted    History of EKG 3/25/19-[facility]    Sinus Rhythm w/Nonspecific ST-T Wave Abnormalities    History of nephrolithiasis     History of stress test 2015    WNL    HLD (hyperlipidemia)     Controlled w/Meds    Hypertension     Controlled w/Meds    Kidney stones     S/p Lithotripsy    LAD stenosis 04/11/2019    90% Mid LAD Stenosis; Diffuse Stenosis of LCX--Noted on Cardiac Cath    Myocardial ischemia 03/25/2019    Noted on Echo Stress Test    RCA occlusion 04/11/2019    Mild Diffuse Disease--Noted on Cardiac Cath     Tobacco use     1.5-1 PPD       Past Surgical History:  Past Surgical History:   Procedure Laterality Date    ABDOMINAL SURGERY  2023    APPENDECTOMY  2023    BACK SURGERY  2013    CARDIAC CATHETERIZATION  2009    RCA Stent    CARDIAC CATHETERIZATION  04/11/2019-[facility]    w/L Ventriculogram/Angiography--Dr. Garcia--CAD Involving LAD    COLONOSCOPY N/A 10/16/2024    Procedure: COLONOSCOPY WITH ANESTHESIA;  Surgeon: Nathaly Nino MD;  Location:  [facility];  Service: Gastroenterology;  Laterality: N/A;  pre screening  post diverticulosis; polyp  pcp  Mason Peguero MD    CORONARY ANGIOPLASTY WITH STENT PLACEMENT Right 2009    CORONARY ARTERY BYPASS GRAFT N/A 04/16/2019    Procedure: MEDIAN STERNOTOMY, CORONARY ARTERY BYPASS X2 USING LEFT INTERNAL MAMMARY ARTERY GRAFT, PRP, INTRAOP LAURI;  Surgeon: Jesse Perez MD;  Location: [facility];  Service: Cardiothoracic    CYSTOSCOPY W/ LASER LITHOTRIPSY         Medications Prior to Admission:    (Not in a hospital admission)      Allergies:  Patient has no known allergies.    Social History:   Social History     Socioeconomic History    Marital status:    Tobacco Use    Smoking status: Every Day     Current packs/day: 0.50     Average packs/day: 0.7 packs/day for  "60.0 years (40.0 ttl pk-yrs)     Types: Cigarettes    Smokeless tobacco: Never   Vaping Use    Vaping status: Never Used   Substance and Sexual Activity    Alcohol use: Yes     Comment: Maybe once a month    Drug use: No    Sexual activity: Yes     Partners: Female       Family History:   Family History   Problem Relation Age of Onset    Heart disease Father     Colon cancer Neg Hx     Colon polyps Neg Hx            Physical Exam:    Vitals: BP 97/56   Pulse 104   Temp 97.4 °F (36.3 °C) (Oral)   Resp 18   Ht 193 cm (76\")   Wt 116 kg (255 lb)   SpO2 96%   BMI 31.04 kg/m²   Physical Exam  Constitutional:       Appearance: He is well-developed. He is ill-appearing and diaphoretic.   HENT:      Head: Normocephalic and atraumatic.   Eyes:      Conjunctiva/sclera: Conjunctivae normal.      Pupils: Pupils are equal, round, and reactive to light.   Cardiovascular:      Rate and Rhythm: Normal rate and regular rhythm. Tachycardia present.      Heart sounds: Normal heart sounds. No murmur heard.     No friction rub.   Pulmonary:      Effort: No respiratory distress.      Breath sounds: Wheezing and rhonchi present. No rales.   Abdominal:      General: Bowel sounds are normal. There is no distension.      Palpations: Abdomen is soft.      Tenderness: There is no abdominal tenderness.   Musculoskeletal:         General: Normal range of motion.      Cervical back: Normal range of motion.   Skin:     Capillary Refill: Capillary refill takes less than 2 seconds.   Neurological:      Mental Status: He is alert and oriented to person, place, and time.      Cranial Nerves: No cranial nerve deficit.   Psychiatric:         Behavior: Behavior normal.           Lab Results (last 24 hours)       Procedure Component Value Units Date/Time    High Sensitivity Troponin T 1Hr [[id number]]  (Abnormal) Collected: 02/10/25 1216    Specimen: Blood Updated: 02/10/25 1249     HS Troponin T 76 ng/L      Troponin T Numeric Delta -2 ng/L      " Troponin T % Delta -3    Narrative:      High Sensitive Troponin T Reference Range:  <14.0 ng/L- Negative Female for AMI  <22.0 ng/L- Negative Male for AMI  >=14 - Abnormal Female indicating possible myocardial injury.  >=22 - Abnormal Male indicating possible myocardial injury.   Clinicians would have to utilize clinical acumen, EKG, Troponin, and serial changes to determine if it is an Acute Myocardial Infarction or myocardial injury due to an underlying chronic condition.         BNP [[id number]]  (Normal) Collected: 02/10/25 1216    Specimen: Blood Updated: 02/10/25 1246     proBNP 147.5 pg/mL     Narrative:      This assay is used as an aid in the diagnosis of individuals suspected of having heart failure. It can be used as an aid in the diagnosis of acute decompensated heart failure (ADHF) in patients presenting with signs and symptoms of ADHF to the emergency department (ED). In addition, NT-proBNP of <300 pg/mL indicates ADHF is not likely.    Age Range Result Interpretation  NT-proBNP Concentration (pg/mL:      <50             Positive            >450                   Gray                 300-450                    Negative             <300    50-75           Positive            >900                  Gray                300-900                  Negative            <300      >75             Positive            >1800                  Gray                300-1800                  Negative            <300    Urinalysis With Microscopic If Indicated (No Culture) - Urine, Clean Catch [[id number]]  (Normal) Collected: 02/10/25 1231    Specimen: Urine, Clean Catch Updated: 02/10/25 1246     Color, UA Yellow     Appearance, UA Clear     pH, UA <=5.0     Specific Gravity, UA 1.016     Glucose, UA Negative     Ketones, UA Negative     Bilirubin, UA Negative     Blood, UA Negative     Protein, UA Negative     Leuk Esterase, UA Negative     Nitrite, UA Negative     Urobilinogen, UA 0.2 E.U./dL    Narrative:      Urine  microscopic not indicated.    CBC & Differential [[id number]]  (Abnormal) Collected: 02/10/25 1109    Specimen: Blood Updated: 02/10/25 1159    Narrative:      The following orders were created for panel order CBC & Differential.  Procedure                               Abnormality         Status                     ---------                               -----------         ------                     CBC Auto Differential[[id number]]        Abnormal            Final result                 Please view results for these tests on the individual orders.    CBC Auto Differential [[id number]]  (Abnormal) Collected: 02/10/25 1109    Specimen: Blood Updated: 02/10/25 1159     WBC 6.31 10*3/mm3      RBC 4.54 10*6/mm3      Hemoglobin 14.1 g/dL      Hematocrit 40.3 %      MCV 88.8 fL      MCH 31.1 pg      MCHC 35.0 g/dL      RDW 13.2 %      RDW-SD 43.2 fl      MPV 11.3 fL      Platelets 95 10*3/mm3     Manual Differential [[id number]]  (Abnormal) Collected: 02/10/25 1109    Specimen: Blood Updated: 02/10/25 1159     Neutrophil % 79.8 %      Lymphocyte % 3.0 %      Monocyte % 7.1 %      Eosinophil % 0.0 %      Basophil % 0.0 %      Bands %  9.1 %      Atypical Lymphocyte % 1.0 %      Neutrophils Absolute 5.61 10*3/mm3      Lymphocytes Absolute 0.25 10*3/mm3      Monocytes Absolute 0.45 10*3/mm3      Eosinophils Absolute 0.00 10*3/mm3      Basophils Absolute 0.00 10*3/mm3      Anisocytosis Slight/1+     Poikilocytes Slight/1+     Spherocytes Slight/1+     WBC Morphology Normal     Platelet Estimate Decreased     Clumped Platelets Present     Giant Platelets Slight/1+    TSH [[id number]]  (Normal) Collected: 02/10/25 1109    Specimen: Blood Updated: 02/10/25 1151     TSH 2.450 uIU/mL     T4, Free [[id number]]  (Normal) Collected: 02/10/25 1109    Specimen: Blood Updated: 02/10/25 1151     Free T4 1.28 ng/dL     Comprehensive Metabolic Panel [[id number]]  (Abnormal) Collected: 02/10/25 1109    Specimen: Blood Updated: 02/10/25 1150      Glucose 106 mg/dL      BUN 60 mg/dL      Creatinine 2.34 mg/dL      Sodium 135 mmol/L      Potassium 3.8 mmol/L      Chloride 100 mmol/L      CO2 21.0 mmol/L      Calcium 7.9 mg/dL      Total Protein 6.2 g/dL      Albumin 3.2 g/dL      ALT (SGPT) 66 U/L      AST (SGOT) 66 U/L      Alkaline Phosphatase 80 U/L      Total Bilirubin 0.5 mg/dL      Globulin 3.0 gm/dL      A/G Ratio 1.1 g/dL      BUN/Creatinine Ratio 25.6     Anion Gap 14.0 mmol/L      eGFR 29.9 mL/min/1.73     Narrative:      GFR Categories in Chronic Kidney Disease (CKD)      GFR Category          GFR (mL/min/1.73)    Interpretation  G1                     90 or greater         Normal or high (1)  G2                      60-89                Mild decrease (1)  G3a                   45-59                Mild to moderate decrease  G3b                   30-44                Moderate to severe decrease  G4                    15-29                Severe decrease  G5                    14 or less           Kidney failure          (1)In the absence of evidence of kidney disease, neither GFR category G1 or G2 fulfill the criteria for CKD.    eGFR calculation 2021 CKD-EPI creatinine equation, which does not include race as a factor    Lactic Acid, Plasma [[id number]]  (Normal) Collected: 02/10/25 1109    Specimen: Blood Updated: 02/10/25 1149     Lactate 1.5 mmol/L     Magnesium [[id number]]  (Normal) Collected: 02/10/25 1109    Specimen: Blood Updated: 02/10/25 1147     Magnesium 1.9 mg/dL     High Sensitivity Troponin T [[id number]]  (Abnormal) Collected: 02/10/25 1109    Specimen: Blood Updated: 02/10/25 1147     HS Troponin T 78 ng/L     Narrative:      High Sensitive Troponin T Reference Range:  <14.0 ng/L- Negative Female for AMI  <22.0 ng/L- Negative Male for AMI  >=14 - Abnormal Female indicating possible myocardial injury.  >=22 - Abnormal Male indicating possible myocardial injury.   Clinicians would have to utilize clinical acumen, EKG, Troponin, and  serial changes to determine if it is an Acute Myocardial Infarction or myocardial injury due to an underlying chronic condition.         Lipase [[id number]]  (Abnormal) Collected: 02/10/25 1109    Specimen: Blood Updated: 02/10/25 1145     Lipase 74 U/L     Respiratory Panel PCR w/COVID-19(SARS-CoV-2) [facility] In-House, NP Swab in UTM/VTM, 2 HR TAT - Swab, Nasopharynx [[id number]]  (Abnormal) Collected: 02/10/25 1013    Specimen: Swab from Nasopharynx Updated: 02/10/25 1136     ADENOVIRUS, PCR Not Detected     Coronavirus 229E Not Detected     Coronavirus HKU1 Not Detected     Coronavirus NL63 Not Detected     Coronavirus OC43 Not Detected     COVID19 Not Detected     Human Metapneumovirus Not Detected     Human Rhinovirus/Enterovirus Not Detected     Influenza A H1 2009 PCR Detected     Influenza B PCR Not Detected     Parainfluenza Virus 1 Not Detected     Parainfluenza Virus 2 Not Detected     Parainfluenza Virus 3 Not Detected     Parainfluenza Virus 4 Not Detected     RSV, PCR Not Detected     Bordetella pertussis pcr Not Detected     Bordetella parapertussis PCR Not Detected     Chlamydophila pneumoniae PCR Not Detected     Mycoplasma pneumo by PCR Not Detected    Narrative:      In the setting of a positive respiratory panel with a viral infection PLUS a negative procalcitonin without other underlying concern for bacterial infection, consider observing off antibiotics or discontinuation of antibiotics and continue supportive care. If the respiratory panel is positive for atypical bacterial infection (Bordetella pertussis, Chlamydophila pneumoniae, or Mycoplasma pneumoniae), consider antibiotic de-escalation to target atypical bacterial infection.    T3, Free [[id number]] Collected: 02/10/25 1109    Specimen: Blood Updated: 02/10/25 1117    Blood Culture - Blood, Arm, Right [[id number]] Collected: 02/10/25 1013    Specimen: Blood from Arm, Right Updated: 02/10/25 1027    Blood Culture - Blood, Arm,  Left [[id number]] Collected: 02/10/25 1013    Specimen: Blood from Arm, Left Updated: 02/10/25 1027             -----------------------------------------------------------------  EKG:   Radiology:     CT Chest Without Contrast Diagnostic    Result Date: 2/10/2025  EXAMINATION: CT CHEST WO CONTRAST DIAGNOSTIC-   2/10/2025 12:00 PM  HISTORY: cough; shortness of breath  In order to have a CT radiation dose as low as reasonably achievable Automated Exposure Control was utilized for adjustment of the mA and/or KV according to patient size.  Total DLP = 1205.9 mGy.cm  The CT scan of the chest tube performed without intravenous contrast enhancement.  The images are acquired in axial plane and subsequent reconstruction in coronal and sagittal planes.  There is no previous similar study for comparison.  The lungs are well-expanded.  There are multifocal, scattered, groundglass and nodular infiltrate in the lungs bilaterally most severely in the lower lobes, right more than the left.  No areas of significant consolidation or air bronchograms.  There are areas of interlobular septal thickening which may represent an evolving pulmonary edema?.  Emphysematous changes are seen in the upper lungs bilaterally. Central airway is patent and clear. No endobronchial abnormality.  Limited visualized soft tissue of the neck are unremarkable.  Limited evaluated thyroid gland is unremarkable.  No axillary lymphadenopathy.  Atheromatous changes of the thoracic aorta. No aneurysmal dilatation.  Central pulmonary arteries are of normal size and shape.  There are several nonspecific fatty infiltrated mediastinal lymph nodes.  Severe atheromatous changes of coronary arteries are noted.  The abdomen is separately reviewed and reported.      1. Multifocal, predominantly peripherally located, patchy areas of groundglass infiltrate and nodular infiltrate, with smooth interlobular septal thickening, may represent acute eosinophilic pneumonia. 2. No  definite lung nodules. However the nodules may be obscured by the infiltrate. 3. The abdomen is separately reviewed and reported.                This report was signed and finalized on 2/10/2025 1:54 PM by Dr. John Smith MD.      CT Abdomen Pelvis Without Contrast    Result Date: 2/10/2025  EXAMINATION: CT ABDOMEN PELVIS WO CONTRAST-  2/10/2025 1:14 PM  HISTORY: abdominal pain; diarrhea, chills, weakness, shakes, possible dehydration. History of kidney stones and diabetes.  COMPARISON: None  DLP: 1205.9 mGy.cm  In order to have a CT radiation dose as low as reasonably achievable, Automated Exposure Control was utilized for adjustment of the mA and/or KV according to patient size.  TECHNIQUE: Noncontrast images of the abdomen and pelvis obtained without oral contrast. Sagittal and coronal reformatted images were obtained and reviewed as well.  FINDINGS: This examination is somewhat limited by the lack of intravenous contrast.  Please see the dedicated CT chest for details regarding the lung bases. Patchy opacities in both lower lobes. Coronary atherosclerosis also noted.  LIVER: There are small indeterminate low-density lesions in the liver. The largest in segment 4 measures 1.6 x 1.3 cm and the largest in the RIGHT lobe measures 1.5 x 1.6 cm (series 2, image 19). These are possibly cysts but are indeterminate. Other low-density nodules scattered about the liver are smaller.  BILIARY SYSTEM: Some sludge or stones in a phrygian cap. There may be some mild gallbladder wall thickening as well as some mild pericholecystic edema.  PANCREAS: No focal pancreatic lesion.  SPLEEN: Normal size and contour.  ADRENALS: Normal.  KIDNEYS: No hydronephrosis. Tiny exophytic renal lesion anteriorly on the LEFT is too small for characterization with any modality, measuring approximately 5 mm. The ureters are decompressed and normal in appearance.  RETROPERITONEUM: No mass, lymphadenopathy or hemorrhage.  GI TRACT: Colonic  diverticulosis but no diverticulitis. Previous appendectomy. No small bowel obstruction.  OTHER: There is no mesenteric mass, lymphadenopathy or fluid collection. Moderate calcified atherosclerotic vascular disease.  SOFT TISSUES: Surrounding soft tissues are normal in appearance.  OSSEOUS: No acute osseous abnormality. Arthritis scattered throughout the lumbar spine. This is most pronounced at L5-S1 where there is moderate bilateral foraminal narrowing and moderate disc space height loss.  PELVIS: No mass lesion, fluid collection or significant lymphadenopathy is seen in the pelvis. Decompressed urinary bladder.        1.  Patchy airspace consolidations in the lung bases bilaterally, nonspecific and only partially included. Please see the dedicated CT chest report for details regarding these findings.  2.  Small indeterminate low-density lesions in the liver. While these may represent cysts, they are not completely characterized and follow-up with contrast-enhanced CT is recommended on a nonemergent basis for further characterization.  3.  Some subtle edema surrounding the gallbladder with possible mild gallbladder wall thickening. There is also some high density material within a phrygian cap. Gallbladder ultrasound recommended for further characterization.  4.  Atherosclerotic vascular disease in the coronary arteries and throughout the abdominopelvic vasculature.  5.  Arthritis at the lumbosacral junction as discussed above.  This report was signed and finalized on 2/10/2025 1:21 PM by Dr. To Rashid MD.      XR Chest 1 View    Result Date: 2/10/2025  EXAMINATION: XR CHEST 1 VW- 2/10/2025 10:33 AM  HISTORY: cough with congestion.  REPORT: A frontal view of the chest was obtained.  COMPARISON: Chest x-ray 8/20/2023.  There are new interstitial infiltrates within both lungs without consolidation. This could be related to mild interstitial edema or interstitial pneumonitis. Heart size is normal. Previous  median sternotomy is noted. No pneumothorax or pleural effusion is identified. The pulmonary vasculature appears within normal limits. No acute osseous abnormality.      New bilateral interstitial infiltrates without lung consolidation, may represent mild interstitial edema versus interstitial pneumonitis and viral infection.  This report was signed and finalized on 2/10/2025 10:35 AM by Dr. Baltazar Ulloa MD.        Assessment and Plan   Acute respiratory failure with hypoxia  Secondary to influenza A and bilateral lobar pneumonia.  Supportive oxygen.  Given underlying smoking history will start on steroids in addition to his antibiotics.  Continue Rocephin, azithromycin.    Influenza A  Order Tamiflu as his symptoms started within the last 48 hours and he has a high risk individual.    Lobar pneumonia  Plan as above with IV antibiotics and steroids.  Unclear if this is bacterial pneumonia versus viral.  Will check a procalcitonin to further delineate.    Acute renal failure  Baseline normal creatinine was severe creatinine elevation in the setting of dehydration likely prerenal in etiology.  Aggressive IV fluids.    Troponin elevation  Likely related to acute renal failure, no delta    Disposition: Inpatient    Close status: Full    DVT prophylaxis: Lovenox      Acute respiratory failure with hypoxia    Nicotine abuse    CAD (coronary artery disease)    Acute renal failure    Influenza A    Lobar pneumonia      Mason Peguero MD 2/10/2025 16:31 CST

## 2025-02-10 NOTE — PROGRESS NOTES
"Pharmacy Dosing Service  Anticoagulant  Enoxaparin    Assessment/Action/Plan:  Pharmacy to dose enoxaparin: VTE prophylaxis.  Enoxaparin 40 mg sq q24h. Labs reviewed. Continue routine follow-up evaluation.     Subjective:  Chaitanya Hair is a 64 y.o. male on Enoxaparin 40 mg SQ every 24 hours for VTE prophylaxis.    Objective:  [Ht: 193 cm (76\"); Wt: 116 kg (255 lb); BMI: Body mass index is 31.04 kg/m².]  Estimated Creatinine Clearance: 43.3 mL/min (A) (by C-G formula based on SCr of 2.34 mg/dL (H)).   Lab Results   Component Value Date    INR 1.13 08/06/2023    INR 1.08 08/05/2023    INR 1.14 (H) 04/17/2019    PROTIME 14.2 08/06/2023    PROTIME 13.7 08/05/2023    PROTIME 14.3 (H) 04/17/2019      Lab Results   Component Value Date    HGB 14.1 02/10/2025    HGB 12.6 (L) 08/31/2023    HGB 12.1 (L) 08/28/2023      Lab Results   Component Value Date    PLT 95 (L) 02/10/2025     08/31/2023     08/28/2023       Dario Tovar, PharmD  02/10/25 16:34 CST   "

## 2025-02-10 NOTE — ED PROVIDER NOTES
Subjective   History of Present Illness  Patient is a 64-year-old male who presents to the ER with chief complaints of generalized weakness.  Patient states that he has had a fever, cough with congestion, and diarrhea for the past several days.  He states he has had a decreased appetite and not able to eat much.  He thought his fever broke yesterday and then this morning woke up with what is described as shaking chills/rigors.  He complains of aches and pains all over and is so weak he is having difficulty with any ambulation.  He is taken over-the-counter Tylenol and Mucinex for discomfort and cough with congestion.  Due to symptoms described he came to the ER for evaluation and treatment.  Past medical history significant for MI, coronary artery disease, diabetes, hyperlipidemia, hypertension, kidney stones, LAD stenosis, RCA occlusion        Review of Systems   Constitutional:  Positive for activity change, appetite change, chills, fatigue and fever.   HENT:  Positive for congestion.    Eyes: Negative.    Respiratory:  Positive for cough.    Cardiovascular: Negative.  Negative for chest pain.   Gastrointestinal:  Positive for diarrhea and nausea. Negative for abdominal pain, blood in stool, constipation and vomiting.   Genitourinary:  Negative for dysuria.   Musculoskeletal:  Positive for myalgias.   Neurological:  Positive for weakness and headaches.   All other systems reviewed and are negative.      Past Medical History:   Diagnosis Date    Abnormal stress echo 3/25/19-[facility]    Noted For Myocardial Ischemia    Acute MI     Atelectasis 04/11/2019    Noted on Chest XR    CAD (coronary artery disease) Since 2009    w/R Stent Placed    Chest pain due to CAD     Nitro PRN    DM (diabetes mellitus)     false positive    Elevated cholesterol     History of chest x-ray 04/11/2019    Suggestive For Atelectasis w/Chronic Inflammatory Scarring Noted    History of EKG 3/25/19-[facility]    Sinus Rhythm  w/Nonspecific ST-T Wave Abnormalities    History of nephrolithiasis     History of stress test 2015    WNL    HLD (hyperlipidemia)     Controlled w/Meds    Hypertension     Controlled w/Meds    Kidney stones     S/p Lithotripsy    LAD stenosis 04/11/2019    90% Mid LAD Stenosis; Diffuse Stenosis of LCX--Noted on Cardiac Cath    Myocardial ischemia 03/25/2019    Noted on Echo Stress Test    RCA occlusion 04/11/2019    Mild Diffuse Disease--Noted on Cardiac Cath     Tobacco use     1.5-1 PPD       No Known Allergies    Past Surgical History:   Procedure Laterality Date    ABDOMINAL SURGERY  2023    APPENDECTOMY  2023    BACK SURGERY  2013    CARDIAC CATHETERIZATION  2009    RCA Stent    CARDIAC CATHETERIZATION  04/11/2019-[facility]    w/L Ventriculogram/Angiography--Dr. Garcia--CAD Involving LAD    COLONOSCOPY N/A 10/16/2024    Procedure: COLONOSCOPY WITH ANESTHESIA;  Surgeon: Nathaly Nino MD;  Location:  [facility];  Service: Gastroenterology;  Laterality: N/A;  pre screening  post diverticulosis; polyp  pcp  Mason Peguero MD    CORONARY ANGIOPLASTY WITH STENT PLACEMENT Right 2009    CORONARY ARTERY BYPASS GRAFT N/A 04/16/2019    Procedure: MEDIAN STERNOTOMY, CORONARY ARTERY BYPASS X2 USING LEFT INTERNAL MAMMARY ARTERY GRAFT, PRP, INTRAOP LAURI;  Surgeon: Jesse Perez MD;  Location: [facility] OR;  Service: Cardiothoracic    CYSTOSCOPY W/ LASER LITHOTRIPSY         Family History   Problem Relation Age of Onset    Heart disease Father     Colon cancer Neg Hx     Colon polyps Neg Hx        Social History     Socioeconomic History    Marital status:    Tobacco Use    Smoking status: Every Day     Current packs/day: 0.50     Average packs/day: 0.7 packs/day for 60.0 years (40.0 ttl pk-yrs)     Types: Cigarettes    Smokeless tobacco: Never   Vaping Use    Vaping status: Never Used   Substance and Sexual Activity    Alcohol use: Yes     Comment: Maybe once a month    Drug use: No     Sexual activity: Yes     Partners: Female           Objective   Physical Exam  Vitals and nursing note reviewed.   Constitutional:       General: He is not in acute distress.     Appearance: He is well-developed. He is ill-appearing. He is not toxic-appearing or diaphoretic.      Comments: Evaluated in hallway; appears as if he doesn't feel well   HENT:      Head: Atraumatic.      Right Ear: External ear normal.      Left Ear: External ear normal.      Nose: Nose normal.      Mouth/Throat:      Mouth: Mucous membranes are dry.   Eyes:      General: No scleral icterus.     Extraocular Movements: Extraocular movements intact.      Conjunctiva/sclera: Conjunctivae normal.      Pupils: Pupils are equal, round, and reactive to light.   Neck:      Thyroid: No thyromegaly.      Vascular: No JVD.   Cardiovascular:      Rate and Rhythm: Normal rate and regular rhythm.      Heart sounds: Normal heart sounds. No murmur heard.  Pulmonary:      Effort: Pulmonary effort is normal. No respiratory distress.      Breath sounds: Rales present. No wheezing.   Chest:      Chest wall: No tenderness.   Abdominal:      General: Bowel sounds are normal. There is no distension.      Palpations: Abdomen is soft. There is no mass.      Tenderness: There is no abdominal tenderness. There is no guarding or rebound.   Musculoskeletal:         General: Normal range of motion.      Cervical back: Normal range of motion and neck supple.   Lymphadenopathy:      Cervical: No cervical adenopathy.   Skin:     General: Skin is warm and dry.      Coloration: Skin is not pale.      Findings: No erythema or rash.   Neurological:      Mental Status: He is alert and oriented to person, place, and time.      Cranial Nerves: No cranial nerve deficit.      Coordination: Coordination normal.      Deep Tendon Reflexes: Reflexes are normal and symmetric.   Psychiatric:         Mood and Affect: Mood normal.         Behavior: Behavior normal.         Thought  Content: Thought content normal.         Judgment: Judgment normal.         Procedures           ED Course                                                       Medical Decision Making  Patient is a 64-year-old male who presents to the ER with chief complaints of generalized weakness.  Patient states that he has had a fever, cough with congestion, and diarrhea for the past several days.  He states he has had a decreased appetite and not able to eat much.  He thought his fever broke yesterday and then this morning woke up with what is described as shaking chills/rigors.  He complains of aches and pains all over and is so weak he is having difficulty with any ambulation.  He is taken over-the-counter Tylenol and Mucinex for discomfort and cough with congestion.  Due to symptoms described he came to the ER for evaluation and treatment.  Past medical history significant for MI, coronary artery disease, diabetes, hyperlipidemia, hypertension, kidney stones, LAD stenosis, RCA occlusion    Differential diagnosis includes but not limited to sepsis, viral illness, pneumonia, electrolyte abnormality, and other etiologies    Amount and/or Complexity of Data Reviewed  Labs: ordered.  Radiology: ordered.  ECG/medicine tests: ordered.    Risk  OTC drugs.  Prescription drug management.      Labs Reviewed   RESPIRATORY PANEL PCR W/ COVID-19 (SARS-COV-2), NP SWAB IN UTM/VTP, 2 HR TAT - Abnormal; Notable for the following components:       Result Value    Influenza A H1 2009 PCR Detected (*)     All other components within normal limits    Narrative:     In the setting of a positive respiratory panel with a viral infection PLUS a negative procalcitonin without other underlying concern for bacterial infection, consider observing off antibiotics or discontinuation of antibiotics and continue supportive care. If the respiratory panel is positive for atypical bacterial infection (Bordetella pertussis, Chlamydophila pneumoniae, or  Mycoplasma pneumoniae), consider antibiotic de-escalation to target atypical bacterial infection.   COMPREHENSIVE METABOLIC PANEL - Abnormal; Notable for the following components:    Glucose 106 (*)     BUN 60 (*)     Creatinine 2.34 (*)     Sodium 135 (*)     CO2 21.0 (*)     Calcium 7.9 (*)     Albumin 3.2 (*)     ALT (SGPT) 66 (*)     AST (SGOT) 66 (*)     BUN/Creatinine Ratio 25.6 (*)     eGFR 29.9 (*)     All other components within normal limits    Narrative:     GFR Categories in Chronic Kidney Disease (CKD)      GFR Category          GFR (mL/min/1.73)    Interpretation  G1                     90 or greater         Normal or high (1)  G2                      60-89                Mild decrease (1)  G3a                   45-59                Mild to moderate decrease  G3b                   30-44                Moderate to severe decrease  G4                    15-29                Severe decrease  G5                    14 or less           Kidney failure          (1)In the absence of evidence of kidney disease, neither GFR category G1 or G2 fulfill the criteria for CKD.    eGFR calculation 2021 CKD-EPI creatinine equation, which does not include race as a factor   LIPASE - Abnormal; Notable for the following components:    Lipase 74 (*)     All other components within normal limits   TROPONIN - Abnormal; Notable for the following components:    HS Troponin T 78 (*)     All other components within normal limits    Narrative:     High Sensitive Troponin T Reference Range:  <14.0 ng/L- Negative Female for AMI  <22.0 ng/L- Negative Male for AMI  >=14 - Abnormal Female indicating possible myocardial injury.  >=22 - Abnormal Male indicating possible myocardial injury.   Clinicians would have to utilize clinical acumen, EKG, Troponin, and serial changes to determine if it is an Acute Myocardial Infarction or myocardial injury due to an underlying chronic condition.        CBC WITH AUTO DIFFERENTIAL - Abnormal; Notable  for the following components:    Platelets 95 (*)     All other components within normal limits   MANUAL DIFFERENTIAL - Abnormal; Notable for the following components:    Neutrophil % 79.8 (*)     Lymphocyte % 3.0 (*)     Eosinophil % 0.0 (*)     Bands %  9.1 (*)     Lymphocytes Absolute 0.25 (*)     All other components within normal limits   HIGH SENSITIVITIY TROPONIN T 1HR - Abnormal; Notable for the following components:    HS Troponin T 76 (*)     All other components within normal limits    Narrative:     High Sensitive Troponin T Reference Range:  <14.0 ng/L- Negative Female for AMI  <22.0 ng/L- Negative Male for AMI  >=14 - Abnormal Female indicating possible myocardial injury.  >=22 - Abnormal Male indicating possible myocardial injury.   Clinicians would have to utilize clinical acumen, EKG, Troponin, and serial changes to determine if it is an Acute Myocardial Infarction or myocardial injury due to an underlying chronic condition.        URINALYSIS W/ MICROSCOPIC IF INDICATED (NO CULTURE) - Normal    Narrative:     Urine microscopic not indicated.   LACTIC ACID, PLASMA - Normal   MAGNESIUM - Normal   TSH - Normal   T4, FREE - Normal   BNP (IN-HOUSE) - Normal    Narrative:     This assay is used as an aid in the diagnosis of individuals suspected of having heart failure. It can be used as an aid in the diagnosis of acute decompensated heart failure (ADHF) in patients presenting with signs and symptoms of ADHF to the emergency department (ED). In addition, NT-proBNP of <300 pg/mL indicates ADHF is not likely.    Age Range Result Interpretation  NT-proBNP Concentration (pg/mL:      <50             Positive            >450                   Gray                 300-450                    Negative             <300    50-75           Positive            >900                  Gray                300-900                  Negative            <300      >75             Positive            >1800                  Powell                 300-1800                  Negative            <300   BLOOD CULTURE   BLOOD CULTURE   T3, FREE   CBC AND DIFFERENTIAL    Narrative:     The following orders were created for panel order CBC & Differential.  Procedure                               Abnormality         Status                     ---------                               -----------         ------                     CBC Auto Differential[[id number]]        Abnormal            Final result                 Please view results for these tests on the individual orders.      CT Chest Without Contrast Diagnostic   Final Result   1. Multifocal, predominantly peripherally located, patchy areas of   groundglass infiltrate and nodular infiltrate, with smooth interlobular   septal thickening, may represent acute eosinophilic pneumonia.   2. No definite lung nodules. However the nodules may be obscured by the   infiltrate.   3. The abdomen is separately reviewed and reported.                                                               This report was signed and finalized on 2/10/2025 1:54 PM by Dr. John Smith MD.          CT Abdomen Pelvis Without Contrast   Final Result       1.  Patchy airspace consolidations in the lung bases bilaterally,   nonspecific and only partially included. Please see the dedicated CT   chest report for details regarding these findings.       2.  Small indeterminate low-density lesions in the liver. While these   may represent cysts, they are not completely characterized and follow-up   with contrast-enhanced CT is recommended on a nonemergent basis for   further characterization.       3.  Some subtle edema surrounding the gallbladder with possible mild   gallbladder wall thickening. There is also some high density material   within a phrygian cap. Gallbladder ultrasound recommended for further   characterization.       4.  Atherosclerotic vascular disease in the coronary arteries and   throughout the  abdominopelvic vasculature.       5.  Arthritis at the lumbosacral junction as discussed above.        This report was signed and finalized on 2/10/2025 1:21 PM by Dr. To Rashid MD.          XR Chest 1 View   Final Result   New bilateral interstitial infiltrates without lung   consolidation, may represent mild interstitial edema versus interstitial   pneumonitis and viral infection.       This report was signed and finalized on 2/10/2025 10:35 AM by Dr. Baltazar Ulloa MD.             Labs Reviewed   RESPIRATORY PANEL PCR W/ COVID-19 (SARS-COV-2), NP SWAB IN UTM/VTP, 2 HR TAT - Abnormal; Notable for the following components:       Result Value    Influenza A H1 2009 PCR Detected (*)     All other components within normal limits    Narrative:     In the setting of a positive respiratory panel with a viral infection PLUS a negative procalcitonin without other underlying concern for bacterial infection, consider observing off antibiotics or discontinuation of antibiotics and continue supportive care. If the respiratory panel is positive for atypical bacterial infection (Bordetella pertussis, Chlamydophila pneumoniae, or Mycoplasma pneumoniae), consider antibiotic de-escalation to target atypical bacterial infection.   COMPREHENSIVE METABOLIC PANEL - Abnormal; Notable for the following components:    Glucose 106 (*)     BUN 60 (*)     Creatinine 2.34 (*)     Sodium 135 (*)     CO2 21.0 (*)     Calcium 7.9 (*)     Albumin 3.2 (*)     ALT (SGPT) 66 (*)     AST (SGOT) 66 (*)     BUN/Creatinine Ratio 25.6 (*)     eGFR 29.9 (*)     All other components within normal limits    Narrative:     GFR Categories in Chronic Kidney Disease (CKD)      GFR Category          GFR (mL/min/1.73)    Interpretation  G1                     90 or greater         Normal or high (1)  G2                      60-89                Mild decrease (1)  G3a                   45-59                Mild to moderate decrease  G3b                    30-44                Moderate to severe decrease  G4                    15-29                Severe decrease  G5                    14 or less           Kidney failure          (1)In the absence of evidence of kidney disease, neither GFR category G1 or G2 fulfill the criteria for CKD.    eGFR calculation 2021 CKD-EPI creatinine equation, which does not include race as a factor   LIPASE - Abnormal; Notable for the following components:    Lipase 74 (*)     All other components within normal limits   TROPONIN - Abnormal; Notable for the following components:    HS Troponin T 78 (*)     All other components within normal limits    Narrative:     High Sensitive Troponin T Reference Range:  <14.0 ng/L- Negative Female for AMI  <22.0 ng/L- Negative Male for AMI  >=14 - Abnormal Female indicating possible myocardial injury.  >=22 - Abnormal Male indicating possible myocardial injury.   Clinicians would have to utilize clinical acumen, EKG, Troponin, and serial changes to determine if it is an Acute Myocardial Infarction or myocardial injury due to an underlying chronic condition.        CBC WITH AUTO DIFFERENTIAL - Abnormal; Notable for the following components:    Platelets 95 (*)     All other components within normal limits   MANUAL DIFFERENTIAL - Abnormal; Notable for the following components:    Neutrophil % 79.8 (*)     Lymphocyte % 3.0 (*)     Eosinophil % 0.0 (*)     Bands %  9.1 (*)     Lymphocytes Absolute 0.25 (*)     All other components within normal limits   HIGH SENSITIVITIY TROPONIN T 1HR - Abnormal; Notable for the following components:    HS Troponin T 76 (*)     All other components within normal limits    Narrative:     High Sensitive Troponin T Reference Range:  <14.0 ng/L- Negative Female for AMI  <22.0 ng/L- Negative Male for AMI  >=14 - Abnormal Female indicating possible myocardial injury.  >=22 - Abnormal Male indicating possible myocardial injury.   Clinicians would have to utilize clinical  acumen, EKG, Troponin, and serial changes to determine if it is an Acute Myocardial Infarction or myocardial injury due to an underlying chronic condition.        URINALYSIS W/ MICROSCOPIC IF INDICATED (NO CULTURE) - Normal    Narrative:     Urine microscopic not indicated.   LACTIC ACID, PLASMA - Normal   MAGNESIUM - Normal   TSH - Normal   T4, FREE - Normal   BNP (IN-HOUSE) - Normal    Narrative:     This assay is used as an aid in the diagnosis of individuals suspected of having heart failure. It can be used as an aid in the diagnosis of acute decompensated heart failure (ADHF) in patients presenting with signs and symptoms of ADHF to the emergency department (ED). In addition, NT-proBNP of <300 pg/mL indicates ADHF is not likely.    Age Range Result Interpretation  NT-proBNP Concentration (pg/mL:      <50             Positive            >450                   Gray                 300-450                    Negative             <300    50-75           Positive            >900                  Gray                300-900                  Negative            <300      >75             Positive            >1800                  Gray                300-1800                  Negative            <300   BLOOD CULTURE   BLOOD CULTURE   T3, FREE   CBC AND DIFFERENTIAL    Narrative:     The following orders were created for panel order CBC & Differential.  Procedure                               Abnormality         Status                     ---------                               -----------         ------                     CBC Auto Differential[[id number]]        Abnormal            Final result                 Please view results for these tests on the individual orders.      CT Chest Without Contrast Diagnostic   Final Result   1. Multifocal, predominantly peripherally located, patchy areas of   groundglass infiltrate and nodular infiltrate, with smooth interlobular   septal thickening, may represent acute eosinophilic  pneumonia.   2. No definite lung nodules. However the nodules may be obscured by the   infiltrate.   3. The abdomen is separately reviewed and reported.                                                               This report was signed and finalized on 2/10/2025 1:54 PM by Dr. John Smith MD.          CT Abdomen Pelvis Without Contrast   Final Result       1.  Patchy airspace consolidations in the lung bases bilaterally,   nonspecific and only partially included. Please see the dedicated CT   chest report for details regarding these findings.       2.  Small indeterminate low-density lesions in the liver. While these   may represent cysts, they are not completely characterized and follow-up   with contrast-enhanced CT is recommended on a nonemergent basis for   further characterization.       3.  Some subtle edema surrounding the gallbladder with possible mild   gallbladder wall thickening. There is also some high density material   within a phrygian cap. Gallbladder ultrasound recommended for further   characterization.       4.  Atherosclerotic vascular disease in the coronary arteries and   throughout the abdominopelvic vasculature.       5.  Arthritis at the lumbosacral junction as discussed above.        This report was signed and finalized on 2/10/2025 1:21 PM by Dr. To Rashid MD.          XR Chest 1 View   Final Result   New bilateral interstitial infiltrates without lung   consolidation, may represent mild interstitial edema versus interstitial   pneumonitis and viral infection.       This report was signed and finalized on 2/10/2025 10:35 AM by Dr. Baltazar Ulloa MD.          Patient tested positive for influenza A.  He has acute renal failure with a BUN of 60 and creatinine of 2.34.  Elevated troponins with a normal delta percentage, no chest pain.  CT scan consistent with possible eosinophilic pneumonia.  Slight edema at the gallbladder, recommend gallbladder ultrasound.  Patient has  no abdominal pain on re -exam.  Discussed case with Dr. Quiroz who plans to admit the patient.  Patient has received IV fluids, IV antibiotics.  Please see his note for complete details.  Final diagnoses:   Acute renal failure, unspecified acute renal failure type   Influenza A   Pneumonia due to infectious organism, unspecified laterality, unspecified part of lung       ED Disposition  ED Disposition       ED Disposition   Decision to Admit    Condition   --    Comment   Level of Care: Med/Surg [1]   Diagnosis: Acute renal failure [719782]   Admitting Physician: BENITA QUIROZ [[id number]]   Attending Physician: BENITA QUIROZ [[id number]]   Certification: I Certify That Inpatient Hospital Services Are Medically Necessary For Greater Than 2 Midnights                 No follow-up provider specified.       Medication List      No changes were made to your prescriptions during this visit.            Rachel Calderon, APRN  02/10/25 143

## 2025-02-11 LAB
ALBUMIN SERPL-MCNC: 2.9 G/DL (ref 3.5–5.2)
ALBUMIN/GLOB SERPL: 0.8 G/DL
ALP SERPL-CCNC: 86 U/L (ref 39–117)
ALT SERPL W P-5'-P-CCNC: 57 U/L (ref 1–41)
ANION GAP SERPL CALCULATED.3IONS-SCNC: 11 MMOL/L (ref 5–15)
ARTERIAL PATENCY WRIST A: POSITIVE
AST SERPL-CCNC: 63 U/L (ref 1–40)
ATMOSPHERIC PRESS: 753 MMHG
BASE EXCESS BLDA CALC-SCNC: 0.4 MMOL/L (ref 0–2)
BASOPHILS # BLD AUTO: 0.01 10*3/MM3 (ref 0–0.2)
BASOPHILS NFR BLD AUTO: 0.2 % (ref 0–1.5)
BDY SITE: ABNORMAL
BILIRUB SERPL-MCNC: 0.4 MG/DL (ref 0–1.2)
BODY TEMPERATURE: 37
BUN SERPL-MCNC: 59 MG/DL (ref 8–23)
BUN/CREAT SERPL: 28.1 (ref 7–25)
CALCIUM SPEC-SCNC: 8.2 MG/DL (ref 8.6–10.5)
CHLORIDE SERPL-SCNC: 105 MMOL/L (ref 98–107)
CO2 SERPL-SCNC: 22 MMOL/L (ref 22–29)
CREAT SERPL-MCNC: 2.1 MG/DL (ref 0.76–1.27)
DEPRECATED RDW RBC AUTO: 42.3 FL (ref 37–54)
EGFRCR SERPLBLD CKD-EPI 2021: 34.1 ML/MIN/1.73
EOSINOPHIL # BLD AUTO: 0 10*3/MM3 (ref 0–0.4)
EOSINOPHIL NFR BLD AUTO: 0 % (ref 0.3–6.2)
ERYTHROCYTE [DISTWIDTH] IN BLOOD BY AUTOMATED COUNT: 13.2 % (ref 12.3–15.4)
GAS FLOW AIRWAY: 30 LPM
GLOBULIN UR ELPH-MCNC: 3.5 GM/DL
GLUCOSE SERPL-MCNC: 141 MG/DL (ref 65–99)
HCO3 BLDA-SCNC: 23.7 MMOL/L (ref 20–26)
HCT VFR BLD AUTO: 41.6 % (ref 37.5–51)
HGB BLD-MCNC: 14.5 G/DL (ref 13–17.7)
IMM GRANULOCYTES # BLD AUTO: 0.04 10*3/MM3 (ref 0–0.05)
IMM GRANULOCYTES NFR BLD AUTO: 0.7 % (ref 0–0.5)
INHALED O2 CONCENTRATION: 100 %
LYMPHOCYTES # BLD AUTO: 0.74 10*3/MM3 (ref 0.7–3.1)
LYMPHOCYTES NFR BLD AUTO: 12.4 % (ref 19.6–45.3)
Lab: ABNORMAL
MCH RBC QN AUTO: 30.5 PG (ref 26.6–33)
MCHC RBC AUTO-ENTMCNC: 34.9 G/DL (ref 31.5–35.7)
MCV RBC AUTO: 87.6 FL (ref 79–97)
MODALITY: ABNORMAL
MONOCYTES # BLD AUTO: 0.42 10*3/MM3 (ref 0.1–0.9)
MONOCYTES NFR BLD AUTO: 7 % (ref 5–12)
NEUTROPHILS NFR BLD AUTO: 4.78 10*3/MM3 (ref 1.7–7)
NEUTROPHILS NFR BLD AUTO: 79.7 % (ref 42.7–76)
PCO2 BLDA: 33.9 MM HG (ref 35–45)
PCO2 TEMP ADJ BLD: 33.9 MM HG (ref 35–45)
PH BLDA: 7.45 PH UNITS (ref 7.35–7.45)
PH, TEMP CORRECTED: 7.45 PH UNITS (ref 7.35–7.45)
PLATELET # BLD AUTO: 99 10*3/MM3 (ref 140–450)
PMV BLD AUTO: 11.2 FL (ref 6–12)
PO2 BLD: 59 MM[HG] (ref 0–500)
PO2 BLDA: 59.1 MM HG (ref 83–108)
PO2 TEMP ADJ BLD: 59.1 MM HG (ref 83–108)
POTASSIUM SERPL-SCNC: 4.3 MMOL/L (ref 3.5–5.2)
PROT SERPL-MCNC: 6.4 G/DL (ref 6–8.5)
RBC # BLD AUTO: 4.75 10*6/MM3 (ref 4.14–5.8)
SAO2 % BLDCOA: 91.7 % (ref 94–99)
SODIUM SERPL-SCNC: 138 MMOL/L (ref 136–145)
VENTILATOR MODE: ABNORMAL
WBC NRBC COR # BLD AUTO: 5.99 10*3/MM3 (ref 3.4–10.8)

## 2025-02-11 PROCEDURE — 99222 1ST HOSP IP/OBS MODERATE 55: CPT | Performed by: INTERNAL MEDICINE

## 2025-02-11 PROCEDURE — 94799 UNLISTED PULMONARY SVC/PX: CPT

## 2025-02-11 PROCEDURE — 97162 PT EVAL MOD COMPLEX 30 MIN: CPT | Performed by: PHYSICAL THERAPIST

## 2025-02-11 PROCEDURE — 80053 COMPREHEN METABOLIC PANEL: CPT | Performed by: FAMILY MEDICINE

## 2025-02-11 PROCEDURE — 36600 WITHDRAWAL OF ARTERIAL BLOOD: CPT

## 2025-02-11 PROCEDURE — 85025 COMPLETE CBC W/AUTO DIFF WBC: CPT | Performed by: FAMILY MEDICINE

## 2025-02-11 PROCEDURE — 94660 CPAP INITIATION&MGMT: CPT

## 2025-02-11 PROCEDURE — 94640 AIRWAY INHALATION TREATMENT: CPT

## 2025-02-11 PROCEDURE — 25010000002 CEFTRIAXONE PER 250 MG: Performed by: FAMILY MEDICINE

## 2025-02-11 PROCEDURE — 25010000002 METHYLPREDNISOLONE PER 125 MG: Performed by: FAMILY MEDICINE

## 2025-02-11 PROCEDURE — 36415 COLL VENOUS BLD VENIPUNCTURE: CPT | Performed by: FAMILY MEDICINE

## 2025-02-11 PROCEDURE — 25010000002 ENOXAPARIN PER 10 MG: Performed by: FAMILY MEDICINE

## 2025-02-11 PROCEDURE — 82803 BLOOD GASES ANY COMBINATION: CPT

## 2025-02-11 PROCEDURE — 94761 N-INVAS EAR/PLS OXIMETRY MLT: CPT

## 2025-02-11 RX ORDER — ALBUTEROL SULFATE 5 MG/ML
1.25 SOLUTION RESPIRATORY (INHALATION)
Status: DISCONTINUED | OUTPATIENT
Start: 2025-02-11 | End: 2025-02-16

## 2025-02-11 RX ORDER — EZETIMIBE 10 MG/1
10 TABLET ORAL DAILY
COMMUNITY

## 2025-02-11 RX ORDER — BUDESONIDE AND FORMOTEROL FUMARATE DIHYDRATE 160; 4.5 UG/1; UG/1
2 AEROSOL RESPIRATORY (INHALATION)
Status: DISCONTINUED | OUTPATIENT
Start: 2025-02-11 | End: 2025-02-12

## 2025-02-11 RX ADMIN — ALBUTEROL SULFATE 1.25 MG: 2.5 SOLUTION RESPIRATORY (INHALATION) at 19:32

## 2025-02-11 RX ADMIN — Medication 10 ML: at 20:42

## 2025-02-11 RX ADMIN — AZITHROMYCIN 500 MG: 250 TABLET, FILM COATED ORAL at 14:02

## 2025-02-11 RX ADMIN — IPRATROPIUM BROMIDE 0.5 MG: 0.5 SOLUTION RESPIRATORY (INHALATION) at 19:32

## 2025-02-11 RX ADMIN — SODIUM CHLORIDE 1000 MG: 900 INJECTION INTRAVENOUS at 14:02

## 2025-02-11 RX ADMIN — ACETAMINOPHEN 650 MG: 325 TABLET ORAL at 20:29

## 2025-02-11 RX ADMIN — OSELTAMIVIR PHOSPHATE 30 MG: 30 CAPSULE ORAL at 20:30

## 2025-02-11 RX ADMIN — ENOXAPARIN SODIUM 40 MG: 100 INJECTION SUBCUTANEOUS at 17:29

## 2025-02-11 RX ADMIN — IPRATROPIUM BROMIDE 0.5 MG: 0.5 SOLUTION RESPIRATORY (INHALATION) at 14:29

## 2025-02-11 RX ADMIN — OSELTAMIVIR PHOSPHATE 30 MG: 30 CAPSULE ORAL at 08:34

## 2025-02-11 RX ADMIN — METOPROLOL TARTRATE 50 MG: 50 TABLET, FILM COATED ORAL at 08:34

## 2025-02-11 RX ADMIN — METHYLPREDNISOLONE SODIUM SUCCINATE 60 MG: 125 INJECTION, POWDER, FOR SOLUTION INTRAMUSCULAR; INTRAVENOUS at 05:17

## 2025-02-11 RX ADMIN — ASPIRIN 81 MG: 81 TABLET, COATED ORAL at 08:34

## 2025-02-11 RX ADMIN — ALBUTEROL SULFATE 1.25 MG: 2.5 SOLUTION RESPIRATORY (INHALATION) at 14:29

## 2025-02-11 RX ADMIN — METOPROLOL TARTRATE 50 MG: 50 TABLET, FILM COATED ORAL at 20:29

## 2025-02-11 RX ADMIN — Medication 10 ML: at 08:35

## 2025-02-11 RX ADMIN — METHYLPREDNISOLONE SODIUM SUCCINATE 60 MG: 125 INJECTION, POWDER, FOR SOLUTION INTRAMUSCULAR; INTRAVENOUS at 17:29

## 2025-02-11 NOTE — PLAN OF CARE
Problem: Adult Inpatient Plan of Care  Goal: Plan of Care Review  Outcome: Progressing  Goal: Patient-Specific Goal (Individualized)  Outcome: Progressing  Goal: Absence of Hospital-Acquired Illness or Injury  Outcome: Progressing  Intervention: Identify and Manage Fall Risk  Recent Flowsheet Documentation  Taken 2/11/2025 0200 by Rocio Estevez RN  Safety Promotion/Fall Prevention: safety round/check completed  Taken 2/11/2025 0000 by Rocio Estevez RN  Safety Promotion/Fall Prevention: safety round/check completed  Taken 2/10/2025 2200 by Rocio Estevez RN  Safety Promotion/Fall Prevention: safety round/check completed  Taken 2/10/2025 2100 by Rocio Estevez RN  Safety Promotion/Fall Prevention: safety round/check completed  Taken 2/10/2025 2000 by Rocio Estevez RN  Safety Promotion/Fall Prevention: safety round/check completed  Taken 2/10/2025 1930 by Rocio Estevez RN  Safety Promotion/Fall Prevention: safety round/check completed  Taken 2/10/2025 1900 by Rocio Estevez RN  Safety Promotion/Fall Prevention: safety round/check completed  Intervention: Prevent Skin Injury  Recent Flowsheet Documentation  Taken 2/10/2025 2200 by Rocio Estevez RN  Body Position: position changed independently  Taken 2/10/2025 1930 by Rocio Estevez RN  Body Position: position changed independently  Goal: Optimal Comfort and Wellbeing  Outcome: Progressing  Goal: Readiness for Transition of Care  Outcome: Progressing     Problem: Fall Injury Risk  Goal: Absence of Fall and Fall-Related Injury  Outcome: Progressing  Intervention: Promote Injury-Free Environment  Recent Flowsheet Documentation  Taken 2/11/2025 0200 by Rocio Estevez RN  Safety Promotion/Fall Prevention: safety round/check completed  Taken 2/11/2025 0000 by Rocio Estevez RN  Safety Promotion/Fall Prevention: safety round/check completed  Taken 2/10/2025 2200 by Rocio Estevez RN  Safety Promotion/Fall Prevention:  safety round/check completed  Taken 2/10/2025 2100 by Rocio Estevez RN  Safety Promotion/Fall Prevention: safety round/check completed  Taken 2/10/2025 2000 by Rocio Estevez RN  Safety Promotion/Fall Prevention: safety round/check completed  Taken 2/10/2025 1930 by Rocio Estevez RN  Safety Promotion/Fall Prevention: safety round/check completed  Taken 2/10/2025 1900 by Rocio Estevez RN  Safety Promotion/Fall Prevention: safety round/check completed     Problem: Infection  Goal: Absence of Infection Signs and Symptoms  Outcome: Progressing  Intervention: Prevent or Manage Infection  Recent Flowsheet Documentation  Taken 2/10/2025 1930 by Rocio Estevez RN  Isolation Precautions:   precautions initiated   droplet     Problem: Acute Coronary Syndrome  Goal: Optimal Coping  Outcome: Progressing  Goal: Stable Heart Rate and Rhythm  Outcome: Progressing  Goal: Optimal Cardiac Function and Comfort  Outcome: Progressing   Goal Outcome Evaluation:   Patient placed on vapotherm last night. Continues with IVF and IV steroids. Tele monitoring. Safety maintained. Wife at bedside.

## 2025-02-11 NOTE — PROGRESS NOTES
Daily Progress Note  Chaitanya Hair  MRN: [id number] LOS: 1    Admit Date: 2/10/2025   2/11/2025 07:44 CST    Subjective:          Chief Complaint:  Chief Complaint   Patient presents with    Weakness - Generalized    Diarrhea       Interval History:    Reviewed overnight events and nursing notes.   Patient had worsening respiratory status overnight.  He remains mildly confused but he has been escalated to Vapotherm with 100% FiO2 and 30 L satting in the low 90s.  Wife notes that he would wish to be intubated if his condition worsens as he had a similar situation with COVID about a year and a half ago.    Review of Systems   Constitutional:  Positive for fatigue.   HENT:  Positive for congestion.    Respiratory:  Positive for cough and shortness of breath.    Psychiatric/Behavioral:  Positive for confusion.        DIET:  Diet: Regular/House; Fluid Consistency: Thin (IDDSI 0)    Medications:   lactated ringers, 125 mL/hr, Last Rate: 125 mL/hr (02/10/25 1625)  Pharmacy to Dose enoxaparin (LOVENOX),   Pharmacy to Dose Oseltamivir (TAMIFLU),       amLODIPine, 10 mg, Oral, Daily  aspirin, 81 mg, Oral, Daily  azithromycin, 500 mg, Oral, Q24H  cefTRIAXone, 1,000 mg, Intravenous, Q24H  enoxaparin, 40 mg, Subcutaneous, Q24H  methylPREDNISolone sodium succinate, 60 mg, Intravenous, Q12H  metoprolol tartrate, 50 mg, Oral, BID  oseltamivir, 30 mg, Oral, Q12H  sodium chloride, 10 mL, Intravenous, Q12H        Data:     Code Status:   Code Status and Medical Interventions: CPR (Attempt to Resuscitate); Full Support   Ordered at: 02/11/25 0743     Code Status (Patient has no pulse and is not breathing):    CPR (Attempt to Resuscitate)     Medical Interventions (Patient has pulse or is breathing):    Full Support       Family History   Problem Relation Age of Onset    Heart disease Father     Colon cancer Neg Hx     Colon polyps Neg Hx      Social History     Socioeconomic History    Marital status:    Tobacco Use     Smoking status: Every Day     Current packs/day: 0.50     Average packs/day: 0.7 packs/day for 60.0 years (40.0 ttl pk-yrs)     Types: Cigarettes    Smokeless tobacco: Never   Vaping Use    Vaping status: Never Used   Substance and Sexual Activity    Alcohol use: Yes     Comment: Maybe once a month    Drug use: No    Sexual activity: Yes     Partners: Female       Labs:    Lab Results (last 72 hours)       Procedure Component Value Units Date/Time    Comprehensive Metabolic Panel [[id number]]  (Abnormal) Collected: 02/11/25 0308    Specimen: Blood Updated: 02/11/25 0410     Glucose 141 mg/dL      BUN 59 mg/dL      Creatinine 2.10 mg/dL      Sodium 138 mmol/L      Potassium 4.3 mmol/L      Comment: Slight hemolysis detected by analyzer. Result may be falsely elevated.        Chloride 105 mmol/L      CO2 22.0 mmol/L      Calcium 8.2 mg/dL      Total Protein 6.4 g/dL      Albumin 2.9 g/dL      ALT (SGPT) 57 U/L      AST (SGOT) 63 U/L      Alkaline Phosphatase 86 U/L      Total Bilirubin 0.4 mg/dL      Globulin 3.5 gm/dL      A/G Ratio 0.8 g/dL      BUN/Creatinine Ratio 28.1     Anion Gap 11.0 mmol/L      eGFR 34.1 mL/min/1.73     Narrative:      GFR Categories in Chronic Kidney Disease (CKD)      GFR Category          GFR (mL/min/1.73)    Interpretation  G1                     90 or greater         Normal or high (1)  G2                      60-89                Mild decrease (1)  G3a                   45-59                Mild to moderate decrease  G3b                   30-44                Moderate to severe decrease  G4                    15-29                Severe decrease  G5                    14 or less           Kidney failure          (1)In the absence of evidence of kidney disease, neither GFR category G1 or G2 fulfill the criteria for CKD.    eGFR calculation 2021 CKD-EPI creatinine equation, which does not include race as a factor    CBC & Differential [[id number]]  (Abnormal) Collected: 02/11/25 0308     Specimen: Blood Updated: 02/11/25 0409    Narrative:      The following orders were created for panel order CBC & Differential.  Procedure                               Abnormality         Status                     ---------                               -----------         ------                     CBC Auto Differential[[id number]]        Abnormal            Final result                 Please view results for these tests on the individual orders.    CBC Auto Differential [[id number]]  (Abnormal) Collected: 02/11/25 0308    Specimen: Blood Updated: 02/11/25 0409     WBC 5.99 10*3/mm3      RBC 4.75 10*6/mm3      Hemoglobin 14.5 g/dL      Hematocrit 41.6 %      MCV 87.6 fL      MCH 30.5 pg      MCHC 34.9 g/dL      RDW 13.2 %      RDW-SD 42.3 fl      MPV 11.2 fL      Platelets 99 10*3/mm3      Neutrophil % 79.7 %      Lymphocyte % 12.4 %      Monocyte % 7.0 %      Eosinophil % 0.0 %      Basophil % 0.2 %      Immature Grans % 0.7 %      Neutrophils, Absolute 4.78 10*3/mm3      Lymphocytes, Absolute 0.74 10*3/mm3      Monocytes, Absolute 0.42 10*3/mm3      Eosinophils, Absolute 0.00 10*3/mm3      Basophils, Absolute 0.01 10*3/mm3      Immature Grans, Absolute 0.04 10*3/mm3     Blood Gas, Arterial - [[id number]]  (Abnormal) Collected: 02/10/25 2026    Specimen: Arterial Blood Updated: 02/10/25 2026     Site Right Radial     Desmond's Test Positive     pH, Arterial 7.457 pH units      Comment: 83 Value above reference range        pCO2, Arterial 33.6 mm Hg      Comment: 84 Value below reference range        pO2, Arterial 64.6 mm Hg      Comment: 84 Value below reference range        HCO3, Arterial 23.7 mmol/L      Base Excess, Arterial 0.5 mmol/L      O2 Saturation, Arterial 94.1 %      Temperature 37.0     Barometric Pressure for Blood Gas 760 mmHg      Modality HFNC     Flow Rate 15.0 lpm      Ventilator Mode NA     Collected by [id number]     Comment: Meter: [id number]     :  Radha Fuentes RRT        pCO2,  "Temperature Corrected 33.6 mm Hg      pH, Temp Corrected 7.457 pH Units      pO2, Temperature Corrected 64.6 mm Hg     T3, Free [[id number]]  (Normal) Collected: 02/10/25 1109    Specimen: Blood Updated: 02/10/25 1950     T3, Free 2.99 pg/mL     Procalcitonin [[id number]]  (Abnormal) Collected: 02/10/25 1109    Specimen: Blood Updated: 02/10/25 1653     Procalcitonin 0.47 ng/mL     Narrative:      As a Marker for Sepsis (Non-Neonates):    1. <0.5 ng/mL represents a low risk of severe sepsis and/or septic shock.  2. >2 ng/mL represents a high risk of severe sepsis and/or septic shock.    As a Marker for Lower Respiratory Tract Infections that require antibiotic therapy:    PCT on Admission    Antibiotic Therapy       6-12 Hrs later    >0.5                Strongly Recommended  >0.25 - <0.5        Recommended   0.1 - 0.25          Discouraged              Remeasure/reassess PCT  <0.1                Strongly Discouraged     Remeasure/reassess PCT    As 28 day mortality risk marker: \"Change in Procalcitonin Result\" (>80% or <=80%) if Day 0 (or Day 1) and Day 4 values are available. Refer to [email or web address]    Change in PCT <=80%  A decrease of PCT levels below or equal to 80% defines a positive change in PCT test result representing a higher risk for 28-day all-cause mortality of patients diagnosed with severe sepsis for septic shock.    Change in PCT >80%  A decrease of PCT levels of more than 80% defines a negative change in PCT result representing a lower risk for 28-day all-cause mortality of patients diagnosed with severe sepsis or septic shock.       High Sensitivity Troponin T 1Hr [[id number]]  (Abnormal) Collected: 02/10/25 1216    Specimen: Blood Updated: 02/10/25 1249     HS Troponin T 76 ng/L      Troponin T Numeric Delta -2 ng/L      Troponin T % Delta -3    Narrative:      High Sensitive Troponin T Reference Range:  <14.0 ng/L- Negative Female for AMI  <22.0 ng/L- Negative Male for " AMI  >=14 - Abnormal Female indicating possible myocardial injury.  >=22 - Abnormal Male indicating possible myocardial injury.   Clinicians would have to utilize clinical acumen, EKG, Troponin, and serial changes to determine if it is an Acute Myocardial Infarction or myocardial injury due to an underlying chronic condition.         BNP [[id number]]  (Normal) Collected: 02/10/25 1216    Specimen: Blood Updated: 02/10/25 1246     proBNP 147.5 pg/mL     Narrative:      This assay is used as an aid in the diagnosis of individuals suspected of having heart failure. It can be used as an aid in the diagnosis of acute decompensated heart failure (ADHF) in patients presenting with signs and symptoms of ADHF to the emergency department (ED). In addition, NT-proBNP of <300 pg/mL indicates ADHF is not likely.    Age Range Result Interpretation  NT-proBNP Concentration (pg/mL:      <50             Positive            >450                   Gray                 300-450                    Negative             <300    50-75           Positive            >900                  Gray                300-900                  Negative            <300      >75             Positive            >1800                  Gray                300-1800                  Negative            <300    Urinalysis With Microscopic If Indicated (No Culture) - Urine, Clean Catch [[id number]]  (Normal) Collected: 02/10/25 1231    Specimen: Urine, Clean Catch Updated: 02/10/25 1246     Color, UA Yellow     Appearance, UA Clear     pH, UA <=5.0     Specific Gravity, UA 1.016     Glucose, UA Negative     Ketones, UA Negative     Bilirubin, UA Negative     Blood, UA Negative     Protein, UA Negative     Leuk Esterase, UA Negative     Nitrite, UA Negative     Urobilinogen, UA 0.2 E.U./dL    Narrative:      Urine microscopic not indicated.    CBC & Differential [[id number]]  (Abnormal) Collected: 02/10/25 1109    Specimen: Blood Updated: 02/10/25 1159    Narrative:       The following orders were created for panel order CBC & Differential.  Procedure                               Abnormality         Status                     ---------                               -----------         ------                     CBC Auto Differential[[id number]]        Abnormal            Final result                 Please view results for these tests on the individual orders.    CBC Auto Differential [[id number]]  (Abnormal) Collected: 02/10/25 1109    Specimen: Blood Updated: 02/10/25 1159     WBC 6.31 10*3/mm3      RBC 4.54 10*6/mm3      Hemoglobin 14.1 g/dL      Hematocrit 40.3 %      MCV 88.8 fL      MCH 31.1 pg      MCHC 35.0 g/dL      RDW 13.2 %      RDW-SD 43.2 fl      MPV 11.3 fL      Platelets 95 10*3/mm3     Manual Differential [[id number]]  (Abnormal) Collected: 02/10/25 1109    Specimen: Blood Updated: 02/10/25 1159     Neutrophil % 79.8 %      Lymphocyte % 3.0 %      Monocyte % 7.1 %      Eosinophil % 0.0 %      Basophil % 0.0 %      Bands %  9.1 %      Atypical Lymphocyte % 1.0 %      Neutrophils Absolute 5.61 10*3/mm3      Lymphocytes Absolute 0.25 10*3/mm3      Monocytes Absolute 0.45 10*3/mm3      Eosinophils Absolute 0.00 10*3/mm3      Basophils Absolute 0.00 10*3/mm3      Anisocytosis Slight/1+     Poikilocytes Slight/1+     Spherocytes Slight/1+     WBC Morphology Normal     Platelet Estimate Decreased     Clumped Platelets Present     Giant Platelets Slight/1+    TSH [[id number]]  (Normal) Collected: 02/10/25 1109    Specimen: Blood Updated: 02/10/25 1151     TSH 2.450 uIU/mL     T4, Free [[id number]]  (Normal) Collected: 02/10/25 1109    Specimen: Blood Updated: 02/10/25 1151     Free T4 1.28 ng/dL     Comprehensive Metabolic Panel [[id number]]  (Abnormal) Collected: 02/10/25 1109    Specimen: Blood Updated: 02/10/25 1150     Glucose 106 mg/dL      BUN 60 mg/dL      Creatinine 2.34 mg/dL      Sodium 135 mmol/L      Potassium 3.8 mmol/L      Chloride 100 mmol/L      CO2 21.0  mmol/L      Calcium 7.9 mg/dL      Total Protein 6.2 g/dL      Albumin 3.2 g/dL      ALT (SGPT) 66 U/L      AST (SGOT) 66 U/L      Alkaline Phosphatase 80 U/L      Total Bilirubin 0.5 mg/dL      Globulin 3.0 gm/dL      A/G Ratio 1.1 g/dL      BUN/Creatinine Ratio 25.6     Anion Gap 14.0 mmol/L      eGFR 29.9 mL/min/1.73     Narrative:      GFR Categories in Chronic Kidney Disease (CKD)      GFR Category          GFR (mL/min/1.73)    Interpretation  G1                     90 or greater         Normal or high (1)  G2                      60-89                Mild decrease (1)  G3a                   45-59                Mild to moderate decrease  G3b                   30-44                Moderate to severe decrease  G4                    15-29                Severe decrease  G5                    14 or less           Kidney failure          (1)In the absence of evidence of kidney disease, neither GFR category G1 or G2 fulfill the criteria for CKD.    eGFR calculation 2021 CKD-EPI creatinine equation, which does not include race as a factor    Lactic Acid, Plasma [[id number]]  (Normal) Collected: 02/10/25 1109    Specimen: Blood Updated: 02/10/25 1149     Lactate 1.5 mmol/L     Magnesium [[id number]]  (Normal) Collected: 02/10/25 1109    Specimen: Blood Updated: 02/10/25 1147     Magnesium 1.9 mg/dL     High Sensitivity Troponin T [[id number]]  (Abnormal) Collected: 02/10/25 1109    Specimen: Blood Updated: 02/10/25 1147     HS Troponin T 78 ng/L     Narrative:      High Sensitive Troponin T Reference Range:  <14.0 ng/L- Negative Female for AMI  <22.0 ng/L- Negative Male for AMI  >=14 - Abnormal Female indicating possible myocardial injury.  >=22 - Abnormal Male indicating possible myocardial injury.   Clinicians would have to utilize clinical acumen, EKG, Troponin, and serial changes to determine if it is an Acute Myocardial Infarction or myocardial injury due to an underlying chronic condition.         Lipase  [[id number]]  (Abnormal) Collected: 02/10/25 1109    Specimen: Blood Updated: 02/10/25 1145     Lipase 74 U/L     Respiratory Panel PCR w/COVID-19(SARS-CoV-2) SIMIN/ALVERTO/MYRA/PAD/COR/MIK In-House, NP Swab in UTM/VTM, 2 HR TAT - Swab, Nasopharynx [[id number]]  (Abnormal) Collected: 02/10/25 1013    Specimen: Swab from Nasopharynx Updated: 02/10/25 1136     ADENOVIRUS, PCR Not Detected     Coronavirus 229E Not Detected     Coronavirus HKU1 Not Detected     Coronavirus NL63 Not Detected     Coronavirus OC43 Not Detected     COVID19 Not Detected     Human Metapneumovirus Not Detected     Human Rhinovirus/Enterovirus Not Detected     Influenza A H1 2009 PCR Detected     Influenza B PCR Not Detected     Parainfluenza Virus 1 Not Detected     Parainfluenza Virus 2 Not Detected     Parainfluenza Virus 3 Not Detected     Parainfluenza Virus 4 Not Detected     RSV, PCR Not Detected     Bordetella pertussis pcr Not Detected     Bordetella parapertussis PCR Not Detected     Chlamydophila pneumoniae PCR Not Detected     Mycoplasma pneumo by PCR Not Detected    Narrative:      In the setting of a positive respiratory panel with a viral infection PLUS a negative procalcitonin without other underlying concern for bacterial infection, consider observing off antibiotics or discontinuation of antibiotics and continue supportive care. If the respiratory panel is positive for atypical bacterial infection (Bordetella pertussis, Chlamydophila pneumoniae, or Mycoplasma pneumoniae), consider antibiotic de-escalation to target atypical bacterial infection.    Blood Culture - Blood, Arm, Right [[id number]] Collected: 02/10/25 1013    Specimen: Blood from Arm, Right Updated: 02/10/25 1027    Blood Culture - Blood, Arm, Left [[id number]] Collected: 02/10/25 1013    Specimen: Blood from Arm, Left Updated: 02/10/25 1027              Objective:     Vitals: /67 (BP Location: Left arm, Patient Position: Lying)   Pulse 80   Temp 98.2 °F (36.8 °C)  "(Axillary)   Resp 16   Ht 193 cm (76\")   Wt 116 kg (255 lb)   SpO2 92%   BMI 31.04 kg/m²    Intake/Output Summary (Last 24 hours) at 2025 0744  Last data filed at 2025 0120  Gross per 24 hour   Intake --   Output 1050 ml   Net -1050 ml    Temp (24hrs), Av.1 °F (36.7 °C), Min:97.4 °F (36.3 °C), Max:98.6 °F (37 °C)      Physical Exam  Constitutional:       Appearance: He is well-developed. He is ill-appearing.   HENT:      Head: Normocephalic and atraumatic.   Eyes:      Conjunctiva/sclera: Conjunctivae normal.      Pupils: Pupils are equal, round, and reactive to light.   Cardiovascular:      Rate and Rhythm: Normal rate and regular rhythm.      Heart sounds: Normal heart sounds. No murmur heard.     No friction rub.   Pulmonary:      Effort: No respiratory distress.      Breath sounds: Wheezing and rhonchi present. No rales.   Abdominal:      General: Bowel sounds are normal. There is no distension.      Palpations: Abdomen is soft.      Tenderness: There is no abdominal tenderness.   Musculoskeletal:         General: Normal range of motion.      Cervical back: Normal range of motion.   Skin:     Capillary Refill: Capillary refill takes less than 2 seconds.   Neurological:      Mental Status: He is alert.      Cranial Nerves: No cranial nerve deficit.      Comments: Confused but alert   Psychiatric:         Behavior: Behavior normal.             Assessment and Plan:     Primary Problem:  Acute respiratory failure with hypoxia    Hospital Problem list:    Acute respiratory failure with hypoxia    Nicotine abuse    CAD (coronary artery disease)    Acute renal failure    Influenza A    Lobar pneumonia      PMH:  Past Medical History:   Diagnosis Date    Abnormal stress echo 3/25/19-[facility]    Noted For Myocardial Ischemia    Acute MI     Atelectasis 2019    Noted on Chest XR    CAD (coronary artery disease) Since     w/R Stent Placed    Chest pain due to CAD     Nitro PRN    DM " (diabetes mellitus)     false positive    Elevated cholesterol     History of chest x-ray 04/11/2019    Suggestive For Atelectasis w/Chronic Inflammatory Scarring Noted    History of EKG 3/25/19-[facility]    Sinus Rhythm w/Nonspecific ST-T Wave Abnormalities    History of nephrolithiasis     History of stress test 2015    WNL    HLD (hyperlipidemia)     Controlled w/Meds    Hypertension     Controlled w/Meds    Kidney stones     S/p Lithotripsy    LAD stenosis 04/11/2019    90% Mid LAD Stenosis; Diffuse Stenosis of LCX--Noted on Cardiac Cath    Myocardial ischemia 03/25/2019    Noted on Echo Stress Test    RCA occlusion 04/11/2019    Mild Diffuse Disease--Noted on Cardiac Cath     Tobacco use     1.5-1 PPD       Treatment Plan:  Acute respiratory failure with hypoxia  Secondary to influenza A and bilateral lobar pneumonia.  Supportive oxygen.  Continue Rocephin and azithromycin given underlying smoking history was placed on steroids as well.  Last PFT in 2019 showed no COPD but he may have developed underlying COPD with continued smoking.  Given worsening of his hypoxia he is now on Vapotherm and pulmonology has been consulted.  Of note, he had similar experience with COVID about a year and a half ago and had to be intubated.     Influenza A  On Tamiflu, continue supportive oxygen     Lobar pneumonia  Plan as above with IV antibiotics and steroids.  Unclear if this is bacterial pneumonia versus viral.  Procalcitonin is elevated so I think antibiotics are warranted to continue for full course     Acute renal failure  Baseline normal creatinine was severe creatinine elevation in the setting of dehydration likely prerenal in etiology.  Aggressive IV fluids.  Improving with IV fluids     Troponin elevation  Likely related to acute renal failure, no delta    Disposition: Continue patient care, prognosis guarded.  Should oxygenation worsen he may warrant transfer to the ICU    Reviewed treatment plans with the  patient and/or family.     Electronically signed by Mason Peguero MD on 2/11/2025 at 07:44 CST

## 2025-02-11 NOTE — PLAN OF CARE
Goal Outcome Evaluation:  Plan of Care Reviewed With: patient        Progress: no change  Outcome Evaluation: PT eval completed. A&Ox2, needing cueing to recall name but able to state where he was. Strength observed 3/5 BLE during bed mobility and 3/5 BUE. Patient shows extreme tremoring and shaking w/ get worse w/ movement. Patient had difficulty controlling movements w/ upper and lower extremities. Patient stated this was not common for him. Wife has been staying in room w/ patient but was not present to recieve history from. Due to shakiness and lack of muscle control/coordination, Only bed mobility was assessed. Patient able to roll to B w/ increased difficulty but SBA. Patient felt hot to touch but oral temp taken at 98.5 F. Other vitals in chart. Patient on Vapotherm 15 LPM during entirety of visit. Call light w/in reach of patient and safety maintained. Patient is a good candidate for skilled therapy services. Discharge recommendation: SNF vs home w/ assist pending patient progression.    Anticipated Discharge Disposition (PT): home with assist, skilled nursing facility

## 2025-02-11 NOTE — THERAPY EVALUATION
Patient Name: Chaitanya Hair  : 1960    MRN: [id number]                              Today's Date: 2025       Admit Date: 2/10/2025    Visit Dx:     ICD-10-CM ICD-9-CM   1. Acute renal failure, unspecified acute renal failure type  N17.9 584.9   2. Influenza A  J10.1 487.1   3. Pneumonia due to infectious organism, unspecified laterality, unspecified part of lung  J18.9 486   4. Impaired mobility [Z74.09]  Z74.09 799.89     Patient Active Problem List   Diagnosis    Coronary artery disease involving native coronary artery of native heart without angina pectoris    Hypertension    Nicotine abuse    Old inferior wall myocardial infarction    Presence of stent in right coronary artery    Tobacco abuse    CAD (coronary artery disease)    Dysphagia, oropharyngeal    Dysphonia    Hoarseness    Vocal cord weakness    Dysarthria    Personal history of adenomatous and serrated colon polyps    HLD (hyperlipidemia)    Acute renal failure    Acute respiratory failure with hypoxia    Influenza A    Lobar pneumonia     Past Medical History:   Diagnosis Date    Abnormal stress echo 3/25/19-[facility]    Noted For Myocardial Ischemia    Acute MI     Atelectasis 2019    Noted on Chest XR    CAD (coronary artery disease) Since     w/R Stent Placed    Chest pain due to CAD     Nitro PRN    DM (diabetes mellitus)     false positive    Elevated cholesterol     History of chest x-ray 2019    Suggestive For Atelectasis w/Chronic Inflammatory Scarring Noted    History of EKG 3/25/19-[facility]    Sinus Rhythm w/Nonspecific ST-T Wave Abnormalities    History of nephrolithiasis     History of stress test     WNL    HLD (hyperlipidemia)     Controlled w/Meds    Hypertension     Controlled w/Meds    Kidney stones     S/p Lithotripsy    LAD stenosis 2019    90% Mid LAD Stenosis; Diffuse Stenosis of LCX--Noted on Cardiac Cath    Myocardial ischemia 2019    Noted on Echo Stress Test     RCA occlusion 04/11/2019    Mild Diffuse Disease--Noted on Cardiac Cath     Tobacco use     1.5-1 PPD     Past Surgical History:   Procedure Laterality Date    ABDOMINAL SURGERY  2023    APPENDECTOMY  2023    BACK SURGERY  2013    CARDIAC CATHETERIZATION  2009    RCA Stent    CARDIAC CATHETERIZATION  04/11/2019-[facility]    w/L Ventriculogram/Angiography--Dr. Garcia--CAD Involving LAD    COLONOSCOPY N/A 10/16/2024    Procedure: COLONOSCOPY WITH ANESTHESIA;  Surgeon: Nathaly Nino MD;  Location:  [facility];  Service: Gastroenterology;  Laterality: N/A;  pre screening  post diverticulosis; polyp  pcp  Mason Peguero MD    CORONARY ANGIOPLASTY WITH STENT PLACEMENT Right 2009    CORONARY ARTERY BYPASS GRAFT N/A 04/16/2019    Procedure: MEDIAN STERNOTOMY, CORONARY ARTERY BYPASS X2 USING LEFT INTERNAL MAMMARY ARTERY GRAFT, PRP, INTRAOP LAURI;  Surgeon: Jesse Perez MD;  Location: [facility] MAIN OR;  Service: Cardiothoracic    CYSTOSCOPY W/ LASER LITHOTRIPSY        General Information       Row Name 02/11/25 1009          Physical Therapy Time and Intention    Document Type evaluation  Dx: Influenza A, B lobe pneumonia. Hx: CAD, HTN  -MS (r) BL (t) MS (c)     Mode of Treatment physical therapy  -MS (r) BL (t) MS (c)       Row Name 02/11/25 1009          General Information    Patient Profile Reviewed yes  -MS (r) BL (t) MS (c)     Prior Level of Function independent:;gait;transfer;bathing;feeding;ADL's  -MS (r) BL (t) MS (c)     Barriers to Rehab medically complex  -MS (r) BL (t) MS (c)       Row Name 02/11/25 1009          Living Environment    People in Home spouse  -MS (r) BL (t) MS (c)       Row Name 02/11/25 1009          Cognition    Orientation Status (Cognition) oriented to;person;place;disoriented to;situation;time;verbal cues/prompts needed for orientation  Oriented x2. poor historian and needs arousal to respond to questions  -MS (r) BL (t) MS (c)       Row Name 02/11/25 1009           Safety Issues/Impairments Affecting Functional Mobility    Safety Issues Affecting Function (Mobility) judgment;problem-solving;sequencing abilities  -MS (r) BL (t) MS (c)     Impairments Affecting Function (Mobility) cognition;coordination;motor control;endurance/activity tolerance;strength  -MS (r) BL (t) MS (c)     Cognitive Impairments, Mobility Safety/Performance attention;awareness, need for assistance;problem-solving/reasoning;sequencing abilities  -MS (r) BL (t) MS (c)               User Key  (r) = Recorded By, (t) = Taken By, (c) = Cosigned By      Initials Name Provider Type    Daphney Munoz ERIN, PT, DPT, NCS Physical Therapist    BL Long, [place], PT Student PT Student                   Mobility       Row Name 02/11/25 1055          Bed Mobility    Bed Mobility rolling left;rolling right  -MS (r) BL (t) MS (c)     Rolling Left [place] (Bed Mobility) standby assist;verbal cues;nonverbal cues (demo/gesture)  -MS (r) BL (t) MS (c)     Rolling Right [place] (Bed Mobility) standby assist;verbal cues;nonverbal cues (demo/gesture)  -MS (r) BL (t) MS (c)     Assistive Device (Bed Mobility) bed rails  -MS (r) BL (t) MS (c)               User Key  (r) = Recorded By, (t) = Taken By, (c) = Cosigned By      Initials Name Provider Type    Daphney Munoz ERIN, PT, DPT, NCS Physical Therapist    BL Eric, [place], PT Student PT Student                   Obj/Interventions       Row Name 02/11/25 1052          Range of Motion Comprehensive    General Range of Motion no range of motion deficits identified  -MS (r) BL (t) MS (c)       Row Name 02/11/25 1052          Strength Comprehensive (MMT)    Comment, General Manual Muscle Testing (MMT) Assessment BUE and BLE 3/5 observed  -MS (r) BL (t) MS (c)       Row Name 02/11/25 1052          Motor Skills    Motor Skills coordination;neuro-muscular function  -MS (r) BL (t) MS (c)     Coordination gross motor deficit;ataxia;bilateral;upper extremity;lower  extremity;severe impairment  -MS (r) BL (t) MS (c)     Neuromuscular Function ataxia;bilateral;upper extremity;lower extremity;severe impairment;tremor, intention  -MS (r) BL (t) MS (c)       Row Name 02/11/25 1052          Sensory Assessment (Somatosensory)    Sensory Assessment (Somatosensory) unable/difficult to assess  -MS (r) BL (t) MS (c)               User Key  (r) = Recorded By, (t) = Taken By, (c) = Cosigned By      Initials Name Provider Type    MS Hollis Daphney ERIN, PT, DPT, NCS Physical Therapist    Hernandez Nayak PT Student PT Student                   Goals/Plan       Row Name 02/11/25 1047          Bed Mobility Goal 1 (PT)    Activity/Assistive Device (Bed Mobility Goal 1, PT) rolling to left;rolling to right;sidelying to sit;sit to sidelying;sit to supine;supine to sit  -MS (r) BL (t) MS (c)     [place] Level/Cues Needed (Bed Mobility Goal 1, PT) supervision required;verbal cues required;nonverbal cues (demo/gesture) required  -MS (r) BL (t) MS (c)     Time Frame (Bed Mobility Goal 1, PT) long term goal (LTG);by discharge  -MS (r) BL (t) MS (c)     Progress/Outcomes (Bed Mobility Goal 1, PT) new goal  -MS (r) BL (t) MS (c)       Row Name 02/11/25 1047          Transfer Goal 1 (PT)    Activity/Assistive Device (Transfer Goal 1, PT) sit-to-stand/stand-to-sit;bed-to-chair/chair-to-bed  -MS (r) BL (t) MS (c)     [place] Level/Cues Needed (Transfer Goal 1, PT) standby assist  -MS (r) BL (t) MS (c)     Time Frame (Transfer Goal 1, PT) long term goal (LTG);by discharge  -MS (r) BL (t) MS (c)     Progress/Outcome (Transfer Goal 1, PT) new goal  -MS (r) BL (t) MS (c)       Row Name 02/11/25 1047          Gait Training Goal 1 (PT)    Activity/Assistive Device (Gait Training Goal 1, PT) gait (walking locomotion);decrease fall risk;improve balance and speed;increase endurance/gait distance  -MS (r) BL (t) MS (c)     [place] Level (Gait Training Goal 1, PT) independent  -MS (r) BL (t) MS (c)      Distance (Gait Training Goal 1, PT) 50  -MS (r) BL (t) MS (c)     Time Frame (Gait Training Goal 1, PT) long term goal (LTG);by discharge  -MS (r) BL (t) MS (c)     Progress/Outcome (Gait Training Goal 1, PT) new goal  -MS (r) BL (t) MS (c)       Row Name 02/11/25 1047          Balance Goal 1 (PT)    Activity/Assistive Device (Balance Goal) sitting static balance;sitting dynamic balance;unsupported;with functional reaching activities;with functional mobility activities  -MS (r) BL (t) MS (c)     [place] Level/Cues Needed (Balance Goal 1, PT) supervision required  -MS (r) BL (t) MS (c)     Time Frame (Balance Goal 1, PT) long-term goal (LTG);by discharge  -MS (r) BL (t) MS (c)     Progress/Outcomes (Balance Goal 1, PT) other (see comments)  New goal  -MS (r) BL (t) MS (c)       Row Name 02/11/25 1047          Therapy Assessment/Plan (PT)    Planned Therapy Interventions (PT) balance training;bed mobility training;gait training;home exercise program;neuromuscular re-education;patient/family education;strengthening;transfer training  -MS (r) BL (t) MS (c)               User Key  (r) = Recorded By, (t) = Taken By, (c) = Cosigned By      Initials Name Provider Type    MS Hollis Daphney R, PT, DPT, NCS Physical Therapist    Hernandez Nayak PT Student PT Student                   Clinical Impression       Row Name 02/11/25 1010          Pain    Pretreatment Pain Rating 0/10 - no pain  -MS (r) BL (t) MS (c)     Posttreatment Pain Rating 0/10 - no pain  -MS (r) BL (t) MS (c)       Row Name 02/11/25 1010          Plan of Care Review    Plan of Care Reviewed With patient  -MS (r) BL (t) MS (c)     Progress no change  -MS (r) BL (t) MS (c)     Outcome Evaluation PT eval completed. A&Ox2, needing cueing to recall name but able to state where he was. Strength observed 3/5 BLE during bed mobility and 3/5 BUE. Patient shows extreme tremoring and shaking w/ get worse w/ movement. Patient had difficulty controlling movements  w/ upper and lower extremities. Patient stated this was not common for him. Wife has been staying in room w/ patient but was not present to recieve history from. Due to shakiness and lack of muscle control/coordination, Only bed mobility was assessed. Patient able to roll to B w/ increased difficulty but SBA. Patient felt hot to touch but oral temp taken at 98.5 F. Other vitals in chart. Patient on Vapotherm 15 LPM during entirety of visit. Call light w/in reach of patient and safety maintained. Patient is a good candidate for skilled therapy services. Discharge recommendation: SNF vs home w/ assist pending patient progression.  -MS (r) BL (t) MS (c)       Row Name 02/11/25 1010          Therapy Assessment/Plan (PT)    Patient/Family Therapy Goals Statement (PT) Return home  -MS (r) BL (t) MS (c)     Rehab Potential (PT) good  -MS (r) BL (t) MS (c)     Criteria for Skilled Interventions Met (PT) yes;meets criteria;skilled treatment is necessary  -MS (r) BL (t) MS (c)     Therapy Frequency (PT) 2 times/day  -MS (r) BL (t) MS (c)     Predicted Duration of Therapy Intervention (PT) Until discharge  -MS (r) BL (t) MS (c)       Row Name 02/11/25 1010          Vital Signs    Intra Systolic BP Rehab 118  -MS (r) BL (t) MS (c)     Intra Treatment Diastolic BP 61  -MS (r) BL (t) MS (c)     Pretreatment Heart Rate (beats/min) 68  -MS (r) BL (t) MS (c)     Intratreatment Heart Rate (beats/min) 63  -MS (r) BL (t) MS (c)     Posttreatment Heart Rate (beats/min) 63  -MS (r) BL (t) MS (c)     Pre SpO2 (%) 94  -MS (r) BL (t) MS (c)     O2 Delivery Pre Treatment hi-flow  Vapotherm  -MS (r) BL (t) MS (c)     Intra SpO2 (%) 93  -MS (r) BL (t) MS (c)     O2 Delivery Intra Treatment hi-flow  Vapotherm  -MS (r) BL (t) MS (c)     Post SpO2 (%) 90  -MS (r) BL (t) MS (c)     O2 Delivery Post Treatment hi-flow  Vapotherm  -MS (r) BL (t) MS (c)     Pre Patient Position Supine  -MS (r) BL (t) MS (c)     Intra Patient Position Side Lying  -MS  (r) BL (t) MS (c)     Post Patient Position Supine  -MS (r) BL (t) MS (c)       Row Name 02/11/25 1010          Positioning and Restraints    Pre-Treatment Position in bed  -MS (r) BL (t) MS (c)     Post Treatment Position bed  -MS (r) BL (t) MS (c)     In Bed supine;fowlers;call light within reach;encouraged to call for assist;exit alarm on;side rails up x3  -MS (r) BL (t) MS (c)               User Key  (r) = Recorded By, (t) = Taken By, (c) = Cosigned By      Initials Name Provider Type    Daphney Munoz, PT, DPT, NCS Physical Therapist    Hernandez Naayk PT Student PT Student                   Outcome Measures       Row Name 02/11/25 1051 02/11/25 0800       How much help from another person do you currently need...    Turning from your back to your side while in flat bed without using bedrails? 3  -MS (r) BL (t) MS (c) 3  -KJ    Moving from lying on back to sitting on the side of a flat bed without bedrails? 3  -MS (r) BL (t) MS (c) 3  -KJ    Moving to and from a bed to a chair (including a wheelchair)? 2  -MS (r) BL (t) MS (c) 3  -KJ    Standing up from a chair using your arms (e.g., wheelchair, bedside chair)? 1  -MS (r) BL (t) MS (c) 3  -KJ    Climbing 3-5 steps with a railing? 1  -MS (r) BL (t) MS (c) 3  -KJ    To walk in hospital room? 1  -MS (r) BL (t) MS (c) 3  -KJ    AM-PAC 6 Clicks Score (PT) 11  -MS (r) BL (t) 18  -KJ    Highest Level of Mobility Goal 4 --> Transfer to chair/commode  -MS (r) BL (t) 6 --> Walk 10 steps or more  -KJ      Row Name 02/11/25 1051          Functional Assessment    Outcome Measure Options AM-PAC 6 Clicks Basic Mobility (PT)  -MS (r) BL (t) MS (c)               User Key  (r) = Recorded By, (t) = Taken By, (c) = Cosigned By      Initials Name Provider Type    Daphney Munoz R, PT, DPT, NCS Physical Therapist    Shawna Massey, RN Registered Nurse    Hernandez Nayak, LORI Student PT Student                                 Physical Therapy Education       Title: PT  OT SLP Therapies (In Progress)       Topic: Physical Therapy (In Progress)       Point: Mobility training (Done)       Learning Progress Summary            Patient Acceptance, E, VU by BL at 2/11/2025 1059    Comment: Bed mobility techinques, plan of care, home safety     by BL at 2/11/2025 1058                      Point: Home exercise program (Not Started)       Learner Progress:  Not documented in this visit.              Point: Body mechanics (Done)       Learning Progress Summary            Patient Acceptance, E, VU by BL at 2/11/2025 1059    Comment: Bed mobility techinques, plan of care, home safety     by BL at 2/11/2025 1058                      Point: Precautions (Done)       Learning Progress Summary            Patient Acceptance, E, VU by BL at 2/11/2025 1059    Comment: Bed mobility techinques, plan of care, home safety     by BL at 2/11/2025 1058                                      User Key       Initials Effective Dates Name Provider Type Discipline     12/17/24 -  Long, [place], PT Student PT Student PT                  PT Recommendation and Plan  Planned Therapy Interventions (PT): balance training, bed mobility training, gait training, home exercise program, neuromuscular re-education, patient/family education, strengthening, transfer training  Progress: no change  Outcome Evaluation: PT eval completed. A&Ox2, needing cueing to recall name but able to state where he was. Strength observed 3/5 BLE during bed mobility and 3/5 BUE. Patient shows extreme tremoring and shaking w/ get worse w/ movement. Patient had difficulty controlling movements w/ upper and lower extremities. Patient stated this was not common for him. Wife has been staying in room w/ patient but was not present to recieve history from. Due to shakiness and lack of muscle control/coordination, Only bed mobility was assessed. Patient able to roll to B w/ increased difficulty but SBA. Patient felt hot to touch but oral temp taken at  98.5 F. Other vitals in chart. Patient on Vapotherm 15 LPM during entirety of visit. Call light w/in reach of patient and safety maintained. Patient is a good candidate for skilled therapy services. Discharge recommendation: SNF vs home w/ assist pending patient progression.     Time Calculation:         PT Charges       Row Name 02/11/25 1027             Time Calculation    Start Time 1004  10 min chart time  -MS (r) BL (t) MS (c)      Stop Time 1026  -MS (r) BL (t) MS (c)      Time Calculation (min) 22 min  -MS (r) BL (t)      PT Received On 02/11/25  -MS (r) BL (t) MS (c)      PT Goal Re-Cert Due Date 02/21/25  -MS (r) BL (t) MS (c)         Untimed Charges    PT Eval/Re-eval Minutes 32  -MS (r) BL (t) MS (c)         Total Minutes    Untimed Charges Total Minutes 32  -MS (r) BL (t)       Total Minutes 32  -MS (r) BL (t)                User Key  (r) = Recorded By, (t) = Taken By, (c) = Cosigned By      Initials Name Provider Type    Daphney Munoz R, PT, DPT, NCS Physical Therapist    Hernandez Nayak PT Student PT Student                      PT G-Codes  Outcome Measure Options: AM-PAC 6 Clicks Basic Mobility (PT)  AM-PAC 6 Clicks Score (PT): 11  PT Discharge Summary  Anticipated Discharge Disposition (PT): home with assist, skilled nursing facility    Hernandez Reyes PT Student  2/11/2025

## 2025-02-12 ENCOUNTER — APPOINTMENT (OUTPATIENT)
Dept: CT IMAGING | Facility: HOSPITAL | Age: 65
End: 2025-02-12
Payer: MEDICARE

## 2025-02-12 LAB
ALBUMIN SERPL-MCNC: 3 G/DL (ref 3.5–5.2)
ALBUMIN SERPL-MCNC: 3.1 G/DL (ref 3.5–5.2)
ALBUMIN/GLOB SERPL: 0.9 G/DL
ALBUMIN/GLOB SERPL: 0.9 G/DL
ALP SERPL-CCNC: 85 U/L (ref 39–117)
ALP SERPL-CCNC: 86 U/L (ref 39–117)
ALT SERPL W P-5'-P-CCNC: 46 U/L (ref 1–41)
ALT SERPL W P-5'-P-CCNC: 48 U/L (ref 1–41)
AMPHET+METHAMPHET UR QL: NEGATIVE
AMPHETAMINES UR QL: NEGATIVE
ANION GAP SERPL CALCULATED.3IONS-SCNC: 11 MMOL/L (ref 5–15)
ANION GAP SERPL CALCULATED.3IONS-SCNC: 12 MMOL/L (ref 5–15)
ANISOCYTOSIS BLD QL: ABNORMAL
ARTERIAL PATENCY WRIST A: ABNORMAL
ARTERIAL PATENCY WRIST A: POSITIVE
AST SERPL-CCNC: 59 U/L (ref 1–40)
AST SERPL-CCNC: 64 U/L (ref 1–40)
ATMOSPHERIC PRESS: 747 MMHG
ATMOSPHERIC PRESS: 750 MMHG
BARBITURATES UR QL SCN: NEGATIVE
BASE EXCESS BLDA CALC-SCNC: 0.4 MMOL/L (ref 0–2)
BASE EXCESS BLDA CALC-SCNC: 1 MMOL/L (ref 0–2)
BASOPHILS # BLD MANUAL: 0 10*3/MM3 (ref 0–0.2)
BASOPHILS NFR BLD MANUAL: 0 % (ref 0–1.5)
BDY SITE: ABNORMAL
BDY SITE: NORMAL
BENZODIAZ UR QL SCN: NEGATIVE
BILIRUB SERPL-MCNC: 0.4 MG/DL (ref 0–1.2)
BILIRUB SERPL-MCNC: 0.6 MG/DL (ref 0–1.2)
BODY TEMPERATURE: 37
BODY TEMPERATURE: 37
BUN SERPL-MCNC: 66 MG/DL (ref 8–23)
BUN SERPL-MCNC: 69 MG/DL (ref 8–23)
BUN/CREAT SERPL: 36.3 (ref 7–25)
BUN/CREAT SERPL: 37.5 (ref 7–25)
BUPRENORPHINE SERPL-MCNC: NEGATIVE NG/ML
BURR CELLS BLD QL SMEAR: ABNORMAL
CALCIUM SPEC-SCNC: 8.8 MG/DL (ref 8.6–10.5)
CALCIUM SPEC-SCNC: 9 MG/DL (ref 8.6–10.5)
CANNABINOIDS SERPL QL: NEGATIVE
CHLORIDE SERPL-SCNC: 107 MMOL/L (ref 98–107)
CHLORIDE SERPL-SCNC: 111 MMOL/L (ref 98–107)
CO2 SERPL-SCNC: 23 MMOL/L (ref 22–29)
CO2 SERPL-SCNC: 24 MMOL/L (ref 22–29)
COCAINE UR QL: NEGATIVE
CREAT SERPL-MCNC: 1.82 MG/DL (ref 0.76–1.27)
CREAT SERPL-MCNC: 1.84 MG/DL (ref 0.76–1.27)
D DIMER PPP FEU-MCNC: 0.91 MCGFEU/ML (ref 0–0.66)
DEPRECATED RDW RBC AUTO: 43.3 FL (ref 37–54)
EGFRCR SERPLBLD CKD-EPI 2021: 39.9 ML/MIN/1.73
EGFRCR SERPLBLD CKD-EPI 2021: 40.5 ML/MIN/1.73
EOSINOPHIL # BLD MANUAL: 0 10*3/MM3 (ref 0–0.4)
EOSINOPHIL NFR BLD MANUAL: 0 % (ref 0.3–6.2)
EPAP: 8
EPAP: 8
ERYTHROCYTE [DISTWIDTH] IN BLOOD BY AUTOMATED COUNT: 13.3 % (ref 12.3–15.4)
FENTANYL UR-MCNC: NEGATIVE NG/ML
FOLATE SERPL-MCNC: 4.9 NG/ML (ref 4.78–24.2)
GEN 5 1HR TROPONIN T REFLEX: 39 NG/L
GIANT PLATELETS: ABNORMAL
GLOBULIN UR ELPH-MCNC: 3.4 GM/DL
GLOBULIN UR ELPH-MCNC: 3.5 GM/DL
GLUCOSE BLDC GLUCOMTR-MCNC: 117 MG/DL (ref 70–130)
GLUCOSE BLDC GLUCOMTR-MCNC: 121 MG/DL (ref 70–130)
GLUCOSE BLDC GLUCOMTR-MCNC: 140 MG/DL (ref 70–130)
GLUCOSE BLDC GLUCOMTR-MCNC: 141 MG/DL (ref 70–130)
GLUCOSE SERPL-MCNC: 129 MG/DL (ref 65–99)
GLUCOSE SERPL-MCNC: 144 MG/DL (ref 65–99)
HCO3 BLDA-SCNC: 24.7 MMOL/L (ref 20–26)
HCO3 BLDA-SCNC: 25.4 MMOL/L (ref 20–26)
HCT VFR BLD AUTO: 42.4 % (ref 37.5–51)
HGB BLD-MCNC: 14.9 G/DL (ref 13–17.7)
INHALED O2 CONCENTRATION: 75 %
INHALED O2 CONCENTRATION: 75 %
IPAP: 16
IPAP: 16
L PNEUMO1 AG UR QL IA: NEGATIVE
LYMPHOCYTES # BLD MANUAL: 0.34 10*3/MM3 (ref 0.7–3.1)
LYMPHOCYTES NFR BLD MANUAL: 2 % (ref 5–12)
Lab: ABNORMAL
Lab: NORMAL
MAGNESIUM SERPL-MCNC: 2.4 MG/DL (ref 1.6–2.4)
MCH RBC QN AUTO: 30.9 PG (ref 26.6–33)
MCHC RBC AUTO-ENTMCNC: 35.1 G/DL (ref 31.5–35.7)
MCV RBC AUTO: 88 FL (ref 79–97)
METHADONE UR QL SCN: NEGATIVE
MICROCYTES BLD QL: ABNORMAL
MODALITY: ABNORMAL
MODALITY: NORMAL
MONOCYTES # BLD: 0.23 10*3/MM3 (ref 0.1–0.9)
MRSA DNA SPEC QL NAA+PROBE: NORMAL
NEUTROPHILS # BLD AUTO: 10.71 10*3/MM3 (ref 1.7–7)
NEUTROPHILS NFR BLD MANUAL: 93 % (ref 42.7–76)
NEUTS BAND NFR BLD MANUAL: 2 % (ref 0–5)
NRBC SPEC MANUAL: 2 /100 WBC (ref 0–0.2)
NT-PROBNP SERPL-MCNC: 432.5 PG/ML (ref 0–900)
OPIATES UR QL: NEGATIVE
OXYCODONE UR QL SCN: NEGATIVE
PCO2 BLDA: 38.1 MM HG (ref 35–45)
PCO2 BLDA: 38.9 MM HG (ref 35–45)
PCO2 TEMP ADJ BLD: 38.1 MM HG (ref 35–45)
PCO2 TEMP ADJ BLD: 38.9 MM HG (ref 35–45)
PCP UR QL SCN: NEGATIVE
PH BLDA: 7.42 PH UNITS (ref 7.35–7.45)
PH BLDA: 7.42 PH UNITS (ref 7.35–7.45)
PH, TEMP CORRECTED: 7.42 PH UNITS (ref 7.35–7.45)
PH, TEMP CORRECTED: 7.42 PH UNITS (ref 7.35–7.45)
PHOSPHATE SERPL-MCNC: 3.6 MG/DL (ref 2.5–4.5)
PLATELET # BLD AUTO: 128 10*3/MM3 (ref 140–450)
PMV BLD AUTO: 11.1 FL (ref 6–12)
PO2 BLD: 107 MM[HG] (ref 0–500)
PO2 BLD: 128 MM[HG] (ref 0–500)
PO2 BLDA: 80.1 MM HG (ref 83–108)
PO2 BLDA: 96.3 MM HG (ref 83–108)
PO2 TEMP ADJ BLD: 80.1 MM HG (ref 83–108)
PO2 TEMP ADJ BLD: 96.3 MM HG (ref 83–108)
POIKILOCYTOSIS BLD QL SMEAR: ABNORMAL
POTASSIUM SERPL-SCNC: 4.3 MMOL/L (ref 3.5–5.2)
POTASSIUM SERPL-SCNC: 4.6 MMOL/L (ref 3.5–5.2)
PROT SERPL-MCNC: 6.4 G/DL (ref 6–8.5)
PROT SERPL-MCNC: 6.6 G/DL (ref 6–8.5)
QT INTERVAL: 350 MS
QTC INTERVAL: 413 MS
RBC # BLD AUTO: 4.82 10*6/MM3 (ref 4.14–5.8)
S PNEUM AG SPEC QL LA: NEGATIVE
SAO2 % BLDCOA: 96.1 % (ref 94–99)
SAO2 % BLDCOA: 97.9 % (ref 94–99)
SET MECH RESP RATE: 20
SET MECH RESP RATE: 20
SMALL PLATELETS BLD QL SMEAR: ABNORMAL
SODIUM SERPL-SCNC: 142 MMOL/L (ref 136–145)
SODIUM SERPL-SCNC: 146 MMOL/L (ref 136–145)
TRICYCLICS UR QL SCN: NEGATIVE
TROPONIN T % DELTA: -5
TROPONIN T NUMERIC DELTA: -2 NG/L
TROPONIN T SERPL HS-MCNC: 41 NG/L
VARIANT LYMPHS NFR BLD MANUAL: 3 % (ref 19.6–45.3)
VENTILATOR MODE: ABNORMAL
VENTILATOR MODE: NORMAL
VIT B12 BLD-MCNC: 402 PG/ML (ref 211–946)
WBC MORPH BLD: NORMAL
WBC NRBC COR # BLD AUTO: 11.27 10*3/MM3 (ref 3.4–10.8)

## 2025-02-12 PROCEDURE — 80307 DRUG TEST PRSMV CHEM ANLYZR: CPT | Performed by: FAMILY MEDICINE

## 2025-02-12 PROCEDURE — 80053 COMPREHEN METABOLIC PANEL: CPT | Performed by: INTERNAL MEDICINE

## 2025-02-12 PROCEDURE — 94762 N-INVAS EAR/PLS OXIMTRY CONT: CPT

## 2025-02-12 PROCEDURE — 94799 UNLISTED PULMONARY SVC/PX: CPT

## 2025-02-12 PROCEDURE — 84100 ASSAY OF PHOSPHORUS: CPT | Performed by: INTERNAL MEDICINE

## 2025-02-12 PROCEDURE — 25010000002 CEFTRIAXONE PER 250 MG: Performed by: INTERNAL MEDICINE

## 2025-02-12 PROCEDURE — 84484 ASSAY OF TROPONIN QUANT: CPT | Performed by: INTERNAL MEDICINE

## 2025-02-12 PROCEDURE — 82948 REAGENT STRIP/BLOOD GLUCOSE: CPT

## 2025-02-12 PROCEDURE — 70450 CT HEAD/BRAIN W/O DYE: CPT

## 2025-02-12 PROCEDURE — 25510000001 IOPAMIDOL PER 1 ML: Performed by: INTERNAL MEDICINE

## 2025-02-12 PROCEDURE — 87641 MR-STAPH DNA AMP PROBE: CPT | Performed by: INTERNAL MEDICINE

## 2025-02-12 PROCEDURE — 82607 VITAMIN B-12: CPT | Performed by: INTERNAL MEDICINE

## 2025-02-12 PROCEDURE — 25010000002 METHYLPREDNISOLONE PER 125 MG: Performed by: FAMILY MEDICINE

## 2025-02-12 PROCEDURE — 25010000002 ENOXAPARIN PER 10 MG: Performed by: FAMILY MEDICINE

## 2025-02-12 PROCEDURE — 82803 BLOOD GASES ANY COMBINATION: CPT

## 2025-02-12 PROCEDURE — 25010000002 LORAZEPAM PER 2 MG

## 2025-02-12 PROCEDURE — 85025 COMPLETE CBC W/AUTO DIFF WBC: CPT | Performed by: FAMILY MEDICINE

## 2025-02-12 PROCEDURE — 99233 SBSQ HOSP IP/OBS HIGH 50: CPT | Performed by: INTERNAL MEDICINE

## 2025-02-12 PROCEDURE — 85379 FIBRIN DEGRADATION QUANT: CPT | Performed by: INTERNAL MEDICINE

## 2025-02-12 PROCEDURE — 83880 ASSAY OF NATRIURETIC PEPTIDE: CPT | Performed by: INTERNAL MEDICINE

## 2025-02-12 PROCEDURE — 25010000002 DIPHENHYDRAMINE PER 50 MG: Performed by: INTERNAL MEDICINE

## 2025-02-12 PROCEDURE — 36600 WITHDRAWAL OF ARTERIAL BLOOD: CPT

## 2025-02-12 PROCEDURE — 94664 DEMO&/EVAL PT USE INHALER: CPT

## 2025-02-12 PROCEDURE — 80053 COMPREHEN METABOLIC PANEL: CPT | Performed by: FAMILY MEDICINE

## 2025-02-12 PROCEDURE — 83735 ASSAY OF MAGNESIUM: CPT | Performed by: INTERNAL MEDICINE

## 2025-02-12 PROCEDURE — 87899 AGENT NOS ASSAY W/OPTIC: CPT | Performed by: INTERNAL MEDICINE

## 2025-02-12 PROCEDURE — 25810000003 LACTATED RINGERS PER 1000 ML: Performed by: FAMILY MEDICINE

## 2025-02-12 PROCEDURE — 82746 ASSAY OF FOLIC ACID SERUM: CPT | Performed by: INTERNAL MEDICINE

## 2025-02-12 PROCEDURE — 71275 CT ANGIOGRAPHY CHEST: CPT

## 2025-02-12 PROCEDURE — 25010000002 PIPERACILLIN SOD-TAZOBACTAM PER 1 G: Performed by: FAMILY MEDICINE

## 2025-02-12 PROCEDURE — 87449 NOS EACH ORGANISM AG IA: CPT | Performed by: INTERNAL MEDICINE

## 2025-02-12 PROCEDURE — 94660 CPAP INITIATION&MGMT: CPT

## 2025-02-12 PROCEDURE — 36415 COLL VENOUS BLD VENIPUNCTURE: CPT | Performed by: FAMILY MEDICINE

## 2025-02-12 PROCEDURE — 82077 ASSAY SPEC XCP UR&BREATH IA: CPT

## 2025-02-12 PROCEDURE — 85007 BL SMEAR W/DIFF WBC COUNT: CPT | Performed by: FAMILY MEDICINE

## 2025-02-12 PROCEDURE — 94761 N-INVAS EAR/PLS OXIMETRY MLT: CPT

## 2025-02-12 RX ORDER — LORAZEPAM 2 MG/ML
1 INJECTION INTRAMUSCULAR ONCE
Status: COMPLETED | OUTPATIENT
Start: 2025-02-12 | End: 2025-02-12

## 2025-02-12 RX ORDER — IOPAMIDOL 755 MG/ML
100 INJECTION, SOLUTION INTRAVASCULAR
Status: COMPLETED | OUTPATIENT
Start: 2025-02-12 | End: 2025-02-12

## 2025-02-12 RX ORDER — POLYETHYLENE GLYCOL 3350 17 G/17G
17 POWDER, FOR SOLUTION ORAL DAILY
Status: DISCONTINUED | OUTPATIENT
Start: 2025-02-12 | End: 2025-02-20 | Stop reason: HOSPADM

## 2025-02-12 RX ORDER — BUDESONIDE 0.5 MG/2ML
0.5 INHALANT ORAL
Status: DISCONTINUED | OUTPATIENT
Start: 2025-02-12 | End: 2025-02-16

## 2025-02-12 RX ORDER — NICOTINE POLACRILEX 4 MG
15 LOZENGE BUCCAL
Status: DISCONTINUED | OUTPATIENT
Start: 2025-02-12 | End: 2025-02-16

## 2025-02-12 RX ORDER — CHLORHEXIDINE GLUCONATE 500 MG/1
1 CLOTH TOPICAL DAILY
Status: DISCONTINUED | OUTPATIENT
Start: 2025-02-12 | End: 2025-02-20 | Stop reason: HOSPADM

## 2025-02-12 RX ORDER — DIPHENHYDRAMINE HYDROCHLORIDE 50 MG/ML
50 INJECTION INTRAMUSCULAR; INTRAVENOUS ONCE
Status: COMPLETED | OUTPATIENT
Start: 2025-02-12 | End: 2025-02-12

## 2025-02-12 RX ORDER — IBUPROFEN 600 MG/1
1 TABLET ORAL
Status: DISCONTINUED | OUTPATIENT
Start: 2025-02-12 | End: 2025-02-16

## 2025-02-12 RX ORDER — DEXTROSE MONOHYDRATE 25 G/50ML
25 INJECTION, SOLUTION INTRAVENOUS
Status: DISCONTINUED | OUTPATIENT
Start: 2025-02-12 | End: 2025-02-16

## 2025-02-12 RX ORDER — SODIUM CHLORIDE 450 MG/100ML
100 INJECTION, SOLUTION INTRAVENOUS CONTINUOUS
Status: DISPENSED | OUTPATIENT
Start: 2025-02-12 | End: 2025-02-13

## 2025-02-12 RX ORDER — SODIUM CHLORIDE, SODIUM LACTATE, POTASSIUM CHLORIDE, CALCIUM CHLORIDE 600; 310; 30; 20 MG/100ML; MG/100ML; MG/100ML; MG/100ML
125 INJECTION, SOLUTION INTRAVENOUS CONTINUOUS
Status: DISCONTINUED | OUTPATIENT
Start: 2025-02-12 | End: 2025-02-12

## 2025-02-12 RX ADMIN — BUDESONIDE 0.5 MG: 0.5 INHALANT RESPIRATORY (INHALATION) at 20:15

## 2025-02-12 RX ADMIN — Medication 10 ML: at 21:25

## 2025-02-12 RX ADMIN — SODIUM CHLORIDE, POTASSIUM CHLORIDE, SODIUM LACTATE AND CALCIUM CHLORIDE 125 ML/HR: 600; 310; 30; 20 INJECTION, SOLUTION INTRAVENOUS at 08:35

## 2025-02-12 RX ADMIN — IPRATROPIUM BROMIDE 0.5 MG: 0.5 SOLUTION RESPIRATORY (INHALATION) at 14:40

## 2025-02-12 RX ADMIN — ALBUTEROL SULFATE 1.25 MG: 2.5 SOLUTION RESPIRATORY (INHALATION) at 07:31

## 2025-02-12 RX ADMIN — IOPAMIDOL 100 ML: 755 INJECTION, SOLUTION INTRAVENOUS at 15:46

## 2025-02-12 RX ADMIN — METHYLPREDNISOLONE SODIUM SUCCINATE 60 MG: 125 INJECTION, POWDER, FOR SOLUTION INTRAMUSCULAR; INTRAVENOUS at 06:20

## 2025-02-12 RX ADMIN — CEFTRIAXONE SODIUM 2000 MG: 2 INJECTION, POWDER, FOR SOLUTION INTRAMUSCULAR; INTRAVENOUS at 12:53

## 2025-02-12 RX ADMIN — POLYETHYLENE GLYCOL 3350 17 G: 17 POWDER, FOR SOLUTION ORAL at 12:53

## 2025-02-12 RX ADMIN — LORAZEPAM 1 MG: 2 INJECTION INTRAMUSCULAR; INTRAVENOUS at 21:24

## 2025-02-12 RX ADMIN — CHLORHEXIDINE GLUCONATE 1 APPLICATION: 500 CLOTH TOPICAL at 21:25

## 2025-02-12 RX ADMIN — ALBUTEROL SULFATE 1.25 MG: 2.5 SOLUTION RESPIRATORY (INHALATION) at 20:15

## 2025-02-12 RX ADMIN — IPRATROPIUM BROMIDE 0.5 MG: 0.5 SOLUTION RESPIRATORY (INHALATION) at 20:15

## 2025-02-12 RX ADMIN — Medication 10 ML: at 08:36

## 2025-02-12 RX ADMIN — Medication 1 APPLICATION: at 21:25

## 2025-02-12 RX ADMIN — IPRATROPIUM BROMIDE 0.5 MG: 0.5 SOLUTION RESPIRATORY (INHALATION) at 10:28

## 2025-02-12 RX ADMIN — IPRATROPIUM BROMIDE 0.5 MG: 0.5 SOLUTION RESPIRATORY (INHALATION) at 07:31

## 2025-02-12 RX ADMIN — ALBUTEROL SULFATE 1.25 MG: 2.5 SOLUTION RESPIRATORY (INHALATION) at 10:28

## 2025-02-12 RX ADMIN — SODIUM CHLORIDE 100 ML/HR: 0.45 INJECTION, SOLUTION INTRAVENOUS at 16:17

## 2025-02-12 RX ADMIN — PIPERACILLIN AND TAZOBACTAM 4.5 G: 4; .5 INJECTION, POWDER, FOR SOLUTION INTRAVENOUS at 08:36

## 2025-02-12 RX ADMIN — ALBUTEROL SULFATE 1.25 MG: 2.5 SOLUTION RESPIRATORY (INHALATION) at 14:40

## 2025-02-12 RX ADMIN — BUDESONIDE AND FORMOTEROL FUMARATE DIHYDRATE 2 PUFF: 160; 4.5 AEROSOL RESPIRATORY (INHALATION) at 07:43

## 2025-02-12 RX ADMIN — ENOXAPARIN SODIUM 40 MG: 100 INJECTION SUBCUTANEOUS at 18:06

## 2025-02-12 RX ADMIN — DIPHENHYDRAMINE HYDROCHLORIDE 50 MG: 50 INJECTION, SOLUTION INTRAMUSCULAR; INTRAVENOUS at 12:52

## 2025-02-12 NOTE — PLAN OF CARE
Goal Outcome Evaluation:  Plan of Care Reviewed With: spouse, patient        Progress: declining     A/O x 1. Pt restless, agitated, and pulling at tubes. Dr. Peguero office contacted, Ordered restraints x 2. Family refused restraints at this time. Voiding. BiPAP/ Vapo therm. bed alarm set. Wife at bedside. Safety maintained.

## 2025-02-12 NOTE — PROGRESS NOTES
Daily Progress Note  Chaitanya Hair  MRN: [id number] LOS: 2    Admit Date: 2/10/2025   2/12/2025 07:42 CST    Subjective:          Chief Complaint:  Chief Complaint   Patient presents with    Weakness - Generalized    Diarrhea       Interval History:    Reviewed overnight events and nursing notes.   Patient continued to have some decline yesterday and was escalated to BiPAP.  Has somewhat stabilized on 75% FiO2 16/8.  Significantly confused and delirious overnight.  Tremulous this morning    Review of Systems   Constitutional:  Positive for fatigue.   HENT:  Positive for congestion.    Respiratory:  Positive for cough and shortness of breath.    Psychiatric/Behavioral:  Positive for confusion.        DIET:  Diet: Regular/House; Fluid Consistency: Thin (IDDSI 0)    Medications:   lactated ringers, 125 mL/hr  Pharmacy to Dose Oseltamivir (TAMIFLU),       albuterol, 1.25 mg, Nebulization, 4x Daily - RT   And  ipratropium, 0.5 mg, Nebulization, 4x Daily - RT  amLODIPine, 10 mg, Oral, Daily  aspirin, 81 mg, Oral, Daily  azithromycin, 500 mg, Oral, Q24H  budesonide-formoterol, 2 puff, Inhalation, BID - RT  cefTRIAXone, 1,000 mg, Intravenous, Q24H  enoxaparin, 40 mg, Subcutaneous, Q24H  insulin regular, 2-7 Units, Subcutaneous, Q6H  methylPREDNISolone sodium succinate, 60 mg, Intravenous, Q12H  metoprolol tartrate, 50 mg, Oral, BID  oseltamivir, 30 mg, Oral, Q12H  sodium chloride, 10 mL, Intravenous, Q12H        Data:     Code Status:   Code Status and Medical Interventions: CPR (Attempt to Resuscitate); Full Support   Ordered at: 02/11/25 0743     Code Status (Patient has no pulse and is not breathing):    CPR (Attempt to Resuscitate)     Medical Interventions (Patient has pulse or is breathing):    Full Support       Family History   Problem Relation Age of Onset    Heart disease Father     Colon cancer Neg Hx     Colon polyps Neg Hx      Social History     Socioeconomic History    Marital status:     Tobacco Use    Smoking status: Every Day     Current packs/day: 0.50     Average packs/day: 0.7 packs/day for 60.0 years (40.0 ttl pk-yrs)     Types: Cigarettes    Smokeless tobacco: Never   Vaping Use    Vaping status: Never Used   Substance and Sexual Activity    Alcohol use: Yes     Comment: Maybe once a month    Drug use: No    Sexual activity: Yes     Partners: Female       Labs:    Lab Results (last 72 hours)       Procedure Component Value Units Date/Time    Comprehensive Metabolic Panel [[id number]]  (Abnormal) Collected: 02/11/25 0308    Specimen: Blood Updated: 02/11/25 0410     Glucose 141 mg/dL      BUN 59 mg/dL      Creatinine 2.10 mg/dL      Sodium 138 mmol/L      Potassium 4.3 mmol/L      Comment: Slight hemolysis detected by analyzer. Result may be falsely elevated.        Chloride 105 mmol/L      CO2 22.0 mmol/L      Calcium 8.2 mg/dL      Total Protein 6.4 g/dL      Albumin 2.9 g/dL      ALT (SGPT) 57 U/L      AST (SGOT) 63 U/L      Alkaline Phosphatase 86 U/L      Total Bilirubin 0.4 mg/dL      Globulin 3.5 gm/dL      A/G Ratio 0.8 g/dL      BUN/Creatinine Ratio 28.1     Anion Gap 11.0 mmol/L      eGFR 34.1 mL/min/1.73     Narrative:      GFR Categories in Chronic Kidney Disease (CKD)      GFR Category          GFR (mL/min/1.73)    Interpretation  G1                     90 or greater         Normal or high (1)  G2                      60-89                Mild decrease (1)  G3a                   45-59                Mild to moderate decrease  G3b                   30-44                Moderate to severe decrease  G4                    15-29                Severe decrease  G5                    14 or less           Kidney failure          (1)In the absence of evidence of kidney disease, neither GFR category G1 or G2 fulfill the criteria for CKD.    eGFR calculation 2021 CKD-EPI creatinine equation, which does not include race as a factor    CBC & Differential [[id number]]  (Abnormal) Collected:  02/11/25 0308    Specimen: Blood Updated: 02/11/25 0409    Narrative:      The following orders were created for panel order CBC & Differential.  Procedure                               Abnormality         Status                     ---------                               -----------         ------                     CBC Auto Differential[[id number]]        Abnormal            Final result                 Please view results for these tests on the individual orders.    CBC Auto Differential [[id number]]  (Abnormal) Collected: 02/11/25 0308    Specimen: Blood Updated: 02/11/25 0409     WBC 5.99 10*3/mm3      RBC 4.75 10*6/mm3      Hemoglobin 14.5 g/dL      Hematocrit 41.6 %      MCV 87.6 fL      MCH 30.5 pg      MCHC 34.9 g/dL      RDW 13.2 %      RDW-SD 42.3 fl      MPV 11.2 fL      Platelets 99 10*3/mm3      Neutrophil % 79.7 %      Lymphocyte % 12.4 %      Monocyte % 7.0 %      Eosinophil % 0.0 %      Basophil % 0.2 %      Immature Grans % 0.7 %      Neutrophils, Absolute 4.78 10*3/mm3      Lymphocytes, Absolute 0.74 10*3/mm3      Monocytes, Absolute 0.42 10*3/mm3      Eosinophils, Absolute 0.00 10*3/mm3      Basophils, Absolute 0.01 10*3/mm3      Immature Grans, Absolute 0.04 10*3/mm3     Blood Gas, Arterial - [[id number]]  (Abnormal) Collected: 02/10/25 2026    Specimen: Arterial Blood Updated: 02/10/25 2026     Site Right Radial     Desmond's Test Positive     pH, Arterial 7.457 pH units      Comment: 83 Value above reference range        pCO2, Arterial 33.6 mm Hg      Comment: 84 Value below reference range        pO2, Arterial 64.6 mm Hg      Comment: 84 Value below reference range        HCO3, Arterial 23.7 mmol/L      Base Excess, Arterial 0.5 mmol/L      O2 Saturation, Arterial 94.1 %      Temperature 37.0     Barometric Pressure for Blood Gas 760 mmHg      Modality HFNC     Flow Rate 15.0 lpm      Ventilator Mode NA     Collected by [id number]     Comment: Meter: [id number]     :  Radha Fuentes  "RRT        pCO2, Temperature Corrected 33.6 mm Hg      pH, Temp Corrected 7.457 pH Units      pO2, Temperature Corrected 64.6 mm Hg     T3, Free [[id number]]  (Normal) Collected: 02/10/25 1109    Specimen: Blood Updated: 02/10/25 1950     T3, Free 2.99 pg/mL     Procalcitonin [[id number]]  (Abnormal) Collected: 02/10/25 1109    Specimen: Blood Updated: 02/10/25 1653     Procalcitonin 0.47 ng/mL     Narrative:      As a Marker for Sepsis (Non-Neonates):    1. <0.5 ng/mL represents a low risk of severe sepsis and/or septic shock.  2. >2 ng/mL represents a high risk of severe sepsis and/or septic shock.    As a Marker for Lower Respiratory Tract Infections that require antibiotic therapy:    PCT on Admission    Antibiotic Therapy       6-12 Hrs later    >0.5                Strongly Recommended  >0.25 - <0.5        Recommended   0.1 - 0.25          Discouraged              Remeasure/reassess PCT  <0.1                Strongly Discouraged     Remeasure/reassess PCT    As 28 day mortality risk marker: \"Change in Procalcitonin Result\" (>80% or <=80%) if Day 0 (or Day 1) and Day 4 values are available. Refer to [email or web address]    Change in PCT <=80%  A decrease of PCT levels below or equal to 80% defines a positive change in PCT test result representing a higher risk for 28-day all-cause mortality of patients diagnosed with severe sepsis for septic shock.    Change in PCT >80%  A decrease of PCT levels of more than 80% defines a negative change in PCT result representing a lower risk for 28-day all-cause mortality of patients diagnosed with severe sepsis or septic shock.       High Sensitivity Troponin T 1Hr [[id number]]  (Abnormal) Collected: 02/10/25 1216    Specimen: Blood Updated: 02/10/25 1249     HS Troponin T 76 ng/L      Troponin T Numeric Delta -2 ng/L      Troponin T % Delta -3    Narrative:      High Sensitive Troponin T Reference Range:  <14.0 ng/L- Negative Female for AMI  <22.0 ng/L- Negative " Male for AMI  >=14 - Abnormal Female indicating possible myocardial injury.  >=22 - Abnormal Male indicating possible myocardial injury.   Clinicians would have to utilize clinical acumen, EKG, Troponin, and serial changes to determine if it is an Acute Myocardial Infarction or myocardial injury due to an underlying chronic condition.         BNP [[id number]]  (Normal) Collected: 02/10/25 1216    Specimen: Blood Updated: 02/10/25 1246     proBNP 147.5 pg/mL     Narrative:      This assay is used as an aid in the diagnosis of individuals suspected of having heart failure. It can be used as an aid in the diagnosis of acute decompensated heart failure (ADHF) in patients presenting with signs and symptoms of ADHF to the emergency department (ED). In addition, NT-proBNP of <300 pg/mL indicates ADHF is not likely.    Age Range Result Interpretation  NT-proBNP Concentration (pg/mL:      <50             Positive            >450                   Gray                 300-450                    Negative             <300    50-75           Positive            >900                  Gray                300-900                  Negative            <300      >75             Positive            >1800                  Gray                300-1800                  Negative            <300    Urinalysis With Microscopic If Indicated (No Culture) - Urine, Clean Catch [[id number]]  (Normal) Collected: 02/10/25 1231    Specimen: Urine, Clean Catch Updated: 02/10/25 1246     Color, UA Yellow     Appearance, UA Clear     pH, UA <=5.0     Specific Gravity, UA 1.016     Glucose, UA Negative     Ketones, UA Negative     Bilirubin, UA Negative     Blood, UA Negative     Protein, UA Negative     Leuk Esterase, UA Negative     Nitrite, UA Negative     Urobilinogen, UA 0.2 E.U./dL    Narrative:      Urine microscopic not indicated.    CBC & Differential [[id number]]  (Abnormal) Collected: 02/10/25 1109    Specimen: Blood Updated: 02/10/25 1159     Narrative:      The following orders were created for panel order CBC & Differential.  Procedure                               Abnormality         Status                     ---------                               -----------         ------                     CBC Auto Differential[[id number]]        Abnormal            Final result                 Please view results for these tests on the individual orders.    CBC Auto Differential [[id number]]  (Abnormal) Collected: 02/10/25 1109    Specimen: Blood Updated: 02/10/25 1159     WBC 6.31 10*3/mm3      RBC 4.54 10*6/mm3      Hemoglobin 14.1 g/dL      Hematocrit 40.3 %      MCV 88.8 fL      MCH 31.1 pg      MCHC 35.0 g/dL      RDW 13.2 %      RDW-SD 43.2 fl      MPV 11.3 fL      Platelets 95 10*3/mm3     Manual Differential [[id number]]  (Abnormal) Collected: 02/10/25 1109    Specimen: Blood Updated: 02/10/25 1159     Neutrophil % 79.8 %      Lymphocyte % 3.0 %      Monocyte % 7.1 %      Eosinophil % 0.0 %      Basophil % 0.0 %      Bands %  9.1 %      Atypical Lymphocyte % 1.0 %      Neutrophils Absolute 5.61 10*3/mm3      Lymphocytes Absolute 0.25 10*3/mm3      Monocytes Absolute 0.45 10*3/mm3      Eosinophils Absolute 0.00 10*3/mm3      Basophils Absolute 0.00 10*3/mm3      Anisocytosis Slight/1+     Poikilocytes Slight/1+     Spherocytes Slight/1+     WBC Morphology Normal     Platelet Estimate Decreased     Clumped Platelets Present     Giant Platelets Slight/1+    TSH [[id number]]  (Normal) Collected: 02/10/25 1109    Specimen: Blood Updated: 02/10/25 1151     TSH 2.450 uIU/mL     T4, Free [[id number]]  (Normal) Collected: 02/10/25 1109    Specimen: Blood Updated: 02/10/25 1151     Free T4 1.28 ng/dL     Comprehensive Metabolic Panel [[id number]]  (Abnormal) Collected: 02/10/25 1109    Specimen: Blood Updated: 02/10/25 1150     Glucose 106 mg/dL      BUN 60 mg/dL      Creatinine 2.34 mg/dL      Sodium 135 mmol/L      Potassium 3.8 mmol/L      Chloride 100 mmol/L       CO2 21.0 mmol/L      Calcium 7.9 mg/dL      Total Protein 6.2 g/dL      Albumin 3.2 g/dL      ALT (SGPT) 66 U/L      AST (SGOT) 66 U/L      Alkaline Phosphatase 80 U/L      Total Bilirubin 0.5 mg/dL      Globulin 3.0 gm/dL      A/G Ratio 1.1 g/dL      BUN/Creatinine Ratio 25.6     Anion Gap 14.0 mmol/L      eGFR 29.9 mL/min/1.73     Narrative:      GFR Categories in Chronic Kidney Disease (CKD)      GFR Category          GFR (mL/min/1.73)    Interpretation  G1                     90 or greater         Normal or high (1)  G2                      60-89                Mild decrease (1)  G3a                   45-59                Mild to moderate decrease  G3b                   30-44                Moderate to severe decrease  G4                    15-29                Severe decrease  G5                    14 or less           Kidney failure          (1)In the absence of evidence of kidney disease, neither GFR category G1 or G2 fulfill the criteria for CKD.    eGFR calculation 2021 CKD-EPI creatinine equation, which does not include race as a factor    Lactic Acid, Plasma [[id number]]  (Normal) Collected: 02/10/25 1109    Specimen: Blood Updated: 02/10/25 1149     Lactate 1.5 mmol/L     Magnesium [[id number]]  (Normal) Collected: 02/10/25 1109    Specimen: Blood Updated: 02/10/25 1147     Magnesium 1.9 mg/dL     High Sensitivity Troponin T [[id number]]  (Abnormal) Collected: 02/10/25 1109    Specimen: Blood Updated: 02/10/25 1147     HS Troponin T 78 ng/L     Narrative:      High Sensitive Troponin T Reference Range:  <14.0 ng/L- Negative Female for AMI  <22.0 ng/L- Negative Male for AMI  >=14 - Abnormal Female indicating possible myocardial injury.  >=22 - Abnormal Male indicating possible myocardial injury.   Clinicians would have to utilize clinical acumen, EKG, Troponin, and serial changes to determine if it is an Acute Myocardial Infarction or myocardial injury due to an underlying chronic condition.         Lipase  [[id number]]  (Abnormal) Collected: 02/10/25 1109    Specimen: Blood Updated: 02/10/25 1145     Lipase 74 U/L     Respiratory Panel PCR w/COVID-19(SARS-CoV-2) SIMIN/ALVERTO/MYRA/PAD/COR/MIK In-House, NP Swab in UTM/VTM, 2 HR TAT - Swab, Nasopharynx [[id number]]  (Abnormal) Collected: 02/10/25 1013    Specimen: Swab from Nasopharynx Updated: 02/10/25 1136     ADENOVIRUS, PCR Not Detected     Coronavirus 229E Not Detected     Coronavirus HKU1 Not Detected     Coronavirus NL63 Not Detected     Coronavirus OC43 Not Detected     COVID19 Not Detected     Human Metapneumovirus Not Detected     Human Rhinovirus/Enterovirus Not Detected     Influenza A H1 2009 PCR Detected     Influenza B PCR Not Detected     Parainfluenza Virus 1 Not Detected     Parainfluenza Virus 2 Not Detected     Parainfluenza Virus 3 Not Detected     Parainfluenza Virus 4 Not Detected     RSV, PCR Not Detected     Bordetella pertussis pcr Not Detected     Bordetella parapertussis PCR Not Detected     Chlamydophila pneumoniae PCR Not Detected     Mycoplasma pneumo by PCR Not Detected    Narrative:      In the setting of a positive respiratory panel with a viral infection PLUS a negative procalcitonin without other underlying concern for bacterial infection, consider observing off antibiotics or discontinuation of antibiotics and continue supportive care. If the respiratory panel is positive for atypical bacterial infection (Bordetella pertussis, Chlamydophila pneumoniae, or Mycoplasma pneumoniae), consider antibiotic de-escalation to target atypical bacterial infection.    Blood Culture - Blood, Arm, Right [[id number]] Collected: 02/10/25 1013    Specimen: Blood from Arm, Right Updated: 02/10/25 1027    Blood Culture - Blood, Arm, Left [[id number]] Collected: 02/10/25 1013    Specimen: Blood from Arm, Left Updated: 02/10/25 1027              Objective:     Vitals: /67 (BP Location: Left arm, Patient Position: Lying)   Pulse 79   Temp 98.3 °F (36.8 °C)  "(Axillary)   Resp 27   Ht 193 cm (76\")   Wt 116 kg (255 lb)   SpO2 95%   BMI 31.04 kg/m²    Intake/Output Summary (Last 24 hours) at 2025 0742  Last data filed at 2025 0345  Gross per 24 hour   Intake 120 ml   Output 875 ml   Net -755 ml    Temp (24hrs), Av.4 °F (36.9 °C), Min:97.9 °F (36.6 °C), Max:99.3 °F (37.4 °C)      Physical Exam  Constitutional:       Appearance: He is well-developed. He is ill-appearing.   HENT:      Head: Normocephalic and atraumatic.   Eyes:      Conjunctiva/sclera: Conjunctivae normal.      Pupils: Pupils are equal, round, and reactive to light.   Cardiovascular:      Rate and Rhythm: Normal rate and regular rhythm.      Heart sounds: Normal heart sounds. No murmur heard.     No friction rub.   Pulmonary:      Effort: No respiratory distress.      Breath sounds: Wheezing present. No rhonchi or rales.      Comments: Wheezing and aeration improved  Abdominal:      General: Bowel sounds are normal. There is no distension.      Palpations: Abdomen is soft.      Tenderness: There is no abdominal tenderness.   Musculoskeletal:         General: Normal range of motion.      Cervical back: Normal range of motion.   Skin:     Capillary Refill: Capillary refill takes less than 2 seconds.   Neurological:      Cranial Nerves: No cranial nerve deficit.      Comments: Confused, somnolent, nonsensical sounds   Psychiatric:         Behavior: Behavior normal.             Assessment and Plan:     Primary Problem:  Acute respiratory failure with hypoxia    Hospital Problem list:    Acute respiratory failure with hypoxia    Nicotine abuse    CAD (coronary artery disease)    Acute renal failure    Influenza A    Lobar pneumonia      PMH:  Past Medical History:   Diagnosis Date    Abnormal stress echo 3/25/19-[facility]    Noted For Myocardial Ischemia    Acute MI     Atelectasis 2019    Noted on Chest XR    CAD (coronary artery disease) Since     w/R Stent Placed    Chest " pain due to CAD     Nitro PRN    DM (diabetes mellitus)     false positive    Elevated cholesterol     History of chest x-ray 04/11/2019    Suggestive For Atelectasis w/Chronic Inflammatory Scarring Noted    History of EKG 3/25/19-[facility]    Sinus Rhythm w/Nonspecific ST-T Wave Abnormalities    History of nephrolithiasis     History of stress test 2015    WNL    HLD (hyperlipidemia)     Controlled w/Meds    Hypertension     Controlled w/Meds    Kidney stones     S/p Lithotripsy    LAD stenosis 04/11/2019    90% Mid LAD Stenosis; Diffuse Stenosis of LCX--Noted on Cardiac Cath    Myocardial ischemia 03/25/2019    Noted on Echo Stress Test    RCA occlusion 04/11/2019    Mild Diffuse Disease--Noted on Cardiac Cath     Tobacco use     1.5-1 PPD       Treatment Plan:  Acute respiratory failure with hypoxia  Secondary to influenza A and bilateral lobar pneumonia.  Continue to support respiration and oxygenation.  Last PFT in 2019 showed no COPD but he may have developed underlying COPD with continued smoking.  Pulmonology following appreciate assistance.  If he worsens any further may need transition to the ICU as he is on near maximal BiPAP settings but has stabilized somewhat.  Escalate antibiotics by changing Rocephin to Zosyn.  Continue steroids.     Influenza A  On Tamiflu, continue to support oxygenation    Metabolic encephalopathy  More altered but BUN significantly elevated in setting of acute renal failure.  Continue aggressive IV fluids.  Steroids possibly contributing to delirium as well as just overall acute illness.     Lobar pneumonia  Plan as above with IV antibiotics and steroids.  Unclear if this is bacterial pneumonia versus viral.  Procalcitonin is elevated so I think antibiotics are warranted to continue for full course     Acute renal failure  Baseline normal creatinine was severe creatinine elevation in the setting of dehydration likely prerenal in etiology.  Aggressive IV fluids.  Improving  with IV fluids     Troponin elevation  Likely related to acute renal failure, no delta    Disposition: Continue patient care, prognosis guarded.  Should oxygenation worsen he may warrant transfer to the ICU    Reviewed treatment plans with the patient and/or family.     Electronically signed by Mason Peguero MD on 2/12/2025 at 07:42 CST

## 2025-02-12 NOTE — PLAN OF CARE
Goal Outcome Evaluation:              Outcome Evaluation: pt AOx2, at times uncopperative, desats when O2 is removed into the mids 80s; medication given per order; family at bedside; safety maintained                              Detail Level: Detailed Depth Of Biopsy: dermis Was A Bandage Applied: Yes Size Of Lesion In Cm: 3 Biopsy Type: H and E Biopsy Method: Dermablade Anesthesia Type: 1% lidocaine with epinephrine Anesthesia Volume In Cc: 0.5 Additional Anesthesia Volume In Cc (Will Not Render If 0): 0 Hemostasis: Drysol Wound Care: Petrolatum Dressing: bandage Destruction After The Procedure: No Type Of Destruction Used: Curettage Curettage Text: The wound bed was treated with curettage after the biopsy was performed. Cryotherapy Text: The wound bed was treated with cryotherapy after the biopsy was performed. Electrodesiccation Text: The wound bed was treated with electrodesiccation after the biopsy was performed. Electrodesiccation And Curettage Text: The wound bed was treated with electrodesiccation and curettage after the biopsy was performed. Silver Nitrate Text: The wound bed was treated with silver nitrate after the biopsy was performed. Lab: [id number] Lab Facility: [id number] Consent: Written consent was obtained and risks were reviewed including but not limited to scarring, infection, bleeding, scabbing, incomplete removal, nerve damage and allergy to anesthesia. Post-Care Instructions: I reviewed with the patient in detail post-care instructions. Patient is to keep the biopsy site dry overnight, and then apply bacitracin twice daily until healed. Patient may apply hydrogen peroxide soaks to remove any crusting. Notification Instructions: Patient will be notified of biopsy results. However, patient instructed to call the office if not contacted within 2 weeks. Billing Type: Third-Party Bill Information: Selecting Yes will display possible errors in your note based on the variables you have selected. This validation is only offered as a suggestion for you. PLEASE NOTE THAT THE VALIDATION TEXT WILL BE REMOVED WHEN YOU FINALIZE YOUR NOTE. IF YOU WANT TO FAX A PRELIMINARY NOTE YOU WILL NEED TO TOGGLE THIS TO 'NO' IF YOU DO NOT WANT IT IN YOUR FAXED NOTE.

## 2025-02-12 NOTE — PROGRESS NOTES
"Pharmacy Dosing Service  Antimicrobial Dosing  Zosyn    Assessment/Action/Plan:  Based on indication and renal function, Zosyn 4.5 gm IV every 8 hours. Pharmacy will continue to monitor daily and make further adjustment(s) accordingly.     Subjective:  Chaitanya Hair is a 64 y.o. male with a  \"Pharmacy to Dose Zosyn\" consult for the treatment of Pneumonia , day 1 of 7 of treatment.    Objective:  Ht: 193 cm (76\"); Wt: 116 kg (255 lb)  Estimated Creatinine Clearance: 55.6 mL/min (A) (by C-G formula based on SCr of 1.82 mg/dL (H)).   Creatinine   Date Value Ref Range Status   02/12/2025 1.82 (H) 0.76 - 1.27 mg/dL Final   02/11/2025 2.10 (H) 0.76 - 1.27 mg/dL Final   02/10/2025 2.34 (H) 0.76 - 1.27 mg/dL Final   04/11/2019 1.1 0.5 - 1.2 mg/dL Final      Lab Results   Component Value Date    WBC 11.27 (H) 02/12/2025    WBC 5.99 02/11/2025    WBC 6.31 02/10/2025      Baseline culture results:  Microbiology Results (last 10 days)       Procedure Component Value - Date/Time    Respiratory Panel PCR w/COVID-19(SARS-CoV-2) SIMIN/ALVERTO/MYRA/PAD/COR/MIK In-House, NP Swab in UTM/VTM, 2 HR TAT - Swab, Nasopharynx [[id number]]  (Abnormal) Collected: 02/10/25 1013    Lab Status: Final result Specimen: Swab from Nasopharynx Updated: 02/10/25 1136     ADENOVIRUS, PCR Not Detected     Coronavirus 229E Not Detected     Coronavirus HKU1 Not Detected     Coronavirus NL63 Not Detected     Coronavirus OC43 Not Detected     COVID19 Not Detected     Human Metapneumovirus Not Detected     Human Rhinovirus/Enterovirus Not Detected     Influenza A H1 2009 PCR Detected     Influenza B PCR Not Detected     Parainfluenza Virus 1 Not Detected     Parainfluenza Virus 2 Not Detected     Parainfluenza Virus 3 Not Detected     Parainfluenza Virus 4 Not Detected     RSV, PCR Not Detected     Bordetella pertussis pcr Not Detected     Bordetella parapertussis PCR Not Detected     Chlamydophila pneumoniae PCR Not Detected     Mycoplasma pneumo by PCR " Not Detected    Narrative:      In the setting of a positive respiratory panel with a viral infection PLUS a negative procalcitonin without other underlying concern for bacterial infection, consider observing off antibiotics or discontinuation of antibiotics and continue supportive care. If the respiratory panel is positive for atypical bacterial infection (Bordetella pertussis, Chlamydophila pneumoniae, or Mycoplasma pneumoniae), consider antibiotic de-escalation to target atypical bacterial infection.    Blood Culture - Blood, Arm, Right [[id number]]  (Normal) Collected: 02/10/25 1013    Lab Status: Preliminary result Specimen: Blood from Arm, Right Updated: 02/11/25 1030     Blood Culture No growth at 24 hours    Blood Culture - Blood, Arm, Left [[id number]]  (Normal) Collected: 02/10/25 1013    Lab Status: Preliminary result Specimen: Blood from Arm, Left Updated: 02/11/25 1030     Blood Culture No growth at 24 hours            ORI Richardson RPH  02/12/25 07:50 CST

## 2025-02-12 NOTE — PLAN OF CARE
Problem: Noninvasive Ventilation Acute  Goal: Effective Unassisted Ventilation and Oxygenation  Outcome: Progressing   Goal Outcome Evaluation: To improve oxygenation

## 2025-02-12 NOTE — H&P
[facility] Intensivist Services  History and Physical Note    Patient Name: Chaitanya Hair  Date of Admission: 2/10/2025  Today's Date: 02/12/25  Length of Stay: 2  Primary Care Physician: Mason Peguero MD    Subjective     Chief Complaint: Acute on chronic hypoxic respiratory failure    HPI:  Mr. Hair is a 64-year-old male who presented to [facility] ED on 2/10/2025 for cough and shortness of breath.  Past medical history includes chronic hypoxic respiratory failure, restrictive lung disease, COPD, tobacco use.    History is provided by the records and staff as the patient is encephalopathic on exam.  The patient presented to the [facility] ED on 2/10/2025 for generalized weakness.  States he also had associated cough, congestion, fever, diarrhea for several days.  He was seen at the PCPs office earlier with similar complaints.  In the ED he was diagnosed with influenza A and admitted to the floor.  Initially he was managed with the Vapotherm which was quickly maxed out by the morning after admission.  He was evaluated by pulmonology yesterday.  Overnight the patient did require BiPAP due to increased hypoxia.  Additionally overnight he developed encephalopathy.  He was pulling at lines and his BiPAP mask.  He was evaluated by Dr. Peguero today who recommended transfer to the ICU given new BiPAP requirement and encephalopathy.  On exam the patient is encephalopathic.  He is oriented to name only.  He is on Vapotherm at 40 L 100% with a sat in the low 90s.  Overall he does appear to be comfortable.  His only complaint is face and eye itching.  Of note he does appear to have redness of his face and conjunctiva.    Review of Systems:  All pertinent negatives and positives are as above. All other systems have been reviewed and are negative unless otherwise stated.       Objective      Physical Exam:  General: Encephalopathic, no respiratory distress.  On Vapotherm 40 L and  "100%  Head: Normocephalic, atraumatic  Eyes: Bilateral conjunctival injection  Teeth/Gums: Normal inspection  Neck: Supple without stridor  Heart: Regular rate and rhythm, no murmur  Lungs: Diminished bilaterally, no wheezing  Abdomen: Soft, nontender  MSK/extremities: No cyanosis or edema  Neurologic: Oriented to name only, no focal deficits      Results Review:  Results from last 7 days   Lab Units 02/12/25  1306 02/12/25  0229 02/11/25  0308   SODIUM mmol/L 146* 142 138   POTASSIUM mmol/L 4.3 4.6 4.3   CHLORIDE mmol/L 111* 107 105   CO2 mmol/L 23.0 24.0 22.0   BUN mg/dL 69* 66* 59*   CALCIUM mg/dL 9.0 8.8 8.2*     Results from last 7 days   Lab Units 02/12/25  0229 02/11/25  0308 02/10/25  1109   WBC 10*3/mm3 11.27* 5.99 6.31   HEMOGLOBIN g/dL 14.9 14.5 14.1   HEMATOCRIT % 42.4 41.6 40.3   PLATELETS 10*3/mm3 128* 99* 95*     Visit Vitals  /74   Pulse 92   Temp 97.9 °F (36.6 °C)   Resp 25   Ht 193 cm (76\")   Wt 116 kg (255 lb)   SpO2 91%   BMI 31.04 kg/m²       Medications:  albuterol, 1.25 mg, Nebulization, 4x Daily - RT   And  ipratropium, 0.5 mg, Nebulization, 4x Daily - RT  amLODIPine, 10 mg, Oral, Daily  aspirin, 81 mg, Oral, Daily  budesonide, 0.5 mg, Nebulization, BID - RT  cefTRIAXone, 2,000 mg, Intravenous, Q24H  enoxaparin, 40 mg, Subcutaneous, Q24H  insulin regular, 2-7 Units, Subcutaneous, Q6H  metoprolol tartrate, 50 mg, Oral, BID  oseltamivir, 30 mg, Oral, Q12H  polyethylene glycol, 17 g, Oral, Daily  sodium chloride, 10 mL, Intravenous, Q12H      Pharmacy to Dose Oseltamivir (TAMIFLU),   Pharmacy to Dose Zosyn,   sodium chloride, 100 mL/hr          Assessment/Plan     Problems:  Acute on chronic hypoxic respiratory failure  Influenza A  COPD  Restrictive lung disease  Encephalopathy    Assessment:  Patient is a 64-year-old male admitted for acute on chronic hypoxic respiratory failure from influenza A.  Currently being managed with BiPAP and Vapotherm.  New development of encephalopathy which " is being worked up.     Plan:  Acute on chronic hypoxic respiratory failure, influenza A  -Continue supplemental oxygen as needed and wean as tolerated, goal O2 sat of 88-92%  -D-dimer mildly elevated, will rule out PE with CTA of the chest  -Will alternate between BiPAP and Vapotherm  -Nebulized triple therapy  -Will see how the patient does off of steroids as this may be contributing to his encephalopathy  -Complete Tamiflu course  -Complete empiric Rocephin and azithromycin  -Urinary antigens, MRSA swab, sputum culture  -Frequent incentive spirometer, PT/OT    Encephalopathy  -Unclear etiology, he is requiring supplemental oxygen though he has adequate PaO2 on his ABG.  Doubt hypoxia as a cause of his encephalopathy  -Will check head CT  -Repeat metabolic panel  -Blood culture and UA  -B12, folate  -UDS negative, patient and family deny any significant alcohol use  -TSH checked earlier was normal  -Consider LP if no improvement in new fevers  -Hold IV steroids  -Avoid antipsychotics and benzos    COPD, restrictive lung disease  -Triple therapy as above  -Holding steroids as this may be contributing to his encephalopathy      Critical Care Set:  Feeding: Nothing by mouth due to mental status  Analgesia: As needed ordered  Sedation: N/A  Thromboprophylaxis: DVT Lovenox  HOB: Elevated  Ulcer prophylaxis: N/A  Glycemic control: Nondiabetic  SBT: N/A  Bowel movement: As needed regimen ordered  Catheter removal: N/A  Medication de-escalation: N/A      Disposition  Continue critical care management.    Total critical care time: Approximately 35 minutes     Due to a high probability of clinically significant, life threatening deterioration, the patient required my highest level of preparedness to intervene emergently and I personally spent this critical care time directly and personally managing the patient.      This critical care time included obtaining a history; examining the patient; pulse oximetry; ordering and  review of studies; arranging urgent treatment with development of a management plan; evaluation of patient's response to treatment; frequent reassessment; and, discussions with other providers.     This critical care time was performed to assess and manage the high probability of imminent, life-threatening deterioration that could result in multi-organ failure. It was exclusive of separately billable procedures and treating other patients and teaching time.      Irina Elizabeth DO  Pulmonary Critical Care Medicine  2/12/2025  15:19 CST

## 2025-02-12 NOTE — PROGRESS NOTES
[facility] [place] - [facility]    Patient: Chaitanya Hair  1960   MR# [id number]   Acct# [id number]  02/12/25   13:27 CST  Referring Provider: Irina Elizabeth*    Chief Complaint: Shortness of breath, acute on chronic hypoxic respiratory failure, dependent on supplemental oxygen and high flow oxygen with possible requirement for BiPAP, obesity, possible obstructive sleep apnea, COPD, tobacco abuse influenza A positive.    Interval history: Patient was seen in the follow-up visit in pulmonary rounds this morning.  He did not do well overnight and was on Vapotherm 40 L flow and 100% FiO2.  He is tried on the BiPAP last night but was confused disoriented and more aggressive and anxious this morning with altered mental status and he was moved to the CCU to be followed by the intensivist team.  I talked to Dr. Dusty Elizabeth the intensivist he is following the patient.  The patient appears to be anxious and little disoriented at the time of my visit as well    Meds:  albuterol, 1.25 mg, Nebulization, 4x Daily - RT   And  ipratropium, 0.5 mg, Nebulization, 4x Daily - RT  amLODIPine, 10 mg, Oral, Daily  aspirin, 81 mg, Oral, Daily  budesonide, 0.5 mg, Nebulization, BID - RT  cefTRIAXone, 2,000 mg, Intravenous, Q24H  enoxaparin, 40 mg, Subcutaneous, Q24H  insulin regular, 2-7 Units, Subcutaneous, Q6H  metoprolol tartrate, 50 mg, Oral, BID  oseltamivir, 30 mg, Oral, Q12H  polyethylene glycol, 17 g, Oral, Daily  sodium chloride, 10 mL, Intravenous, Q12H      Pharmacy to Dose Oseltamivir (TAMIFLU),   Pharmacy to Dose Zosyn,       Physical Exam:  SpO2 Percentage    02/12/25 1110 02/12/25 1129 02/12/25 1200   SpO2: 95% 95% 93%     Body mass index is 31.04 kg/m².   Temp:  [97.9 °F (36.6 °C)-99.3 °F (37.4 °C)] 97.9 °F (36.6 °C)  Heart Rate:  [62-83] 74  Resp:  [16-27] 25  BP: (108-152)/(59-96) 123/74  Intake/Output Summary (Last 24 hours) at 2/12/2025 1327  Last  data filed at 2/12/2025 0345  Gross per 24 hour   Intake 120 ml   Output 425 ml   Net -305 ml     Physical Exam    Vitals and nursing note reviewed.   Constitutional:       General: He is not in acute distress.     Appearance: He is obese. He is ill-appearing.      Comments: Middle-aged  male morbidly obese on supplemental oxygen with Vapotherm 30 L 100% FiO2.  Appears confused and disoriented.   HENT:      Head: Normocephalic and atraumatic.      Right Ear: Tympanic membrane and external ear normal.      Left Ear: Tympanic membrane and external ear normal.      Nose: Nose normal. No congestion or rhinorrhea.      Mouth/Throat:      Mouth: Mucous membranes are moist.      Pharynx: Oropharynx is clear. No oropharyngeal exudate or posterior oropharyngeal erythema.   Eyes:      General: No scleral icterus.        Right eye: No discharge.         Left eye: No discharge.      Extraocular Movements: Extraocular movements intact.      Conjunctiva/sclera: Conjunctivae normal.      Pupils: Pupils are equal, round, and reactive to light.   Neck:      Comments: Broad neck circumference  Cardiovascular:      Rate and Rhythm: Normal rate and regular rhythm.      Pulses: Normal pulses.      Heart sounds: Normal heart sounds. No murmur heard.     No friction rub. No gallop.   Pulmonary:      Effort: Pulmonary effort is normal. No respiratory distress.      Breath sounds: No stridor. Rales present. No wheezing.      Comments: Decreased breath sound bilaterally  Chest:      Chest wall: No tenderness.   Abdominal:      General: Abdomen is flat. Bowel sounds are normal. There is no distension.      Palpations: There is no mass.      Tenderness: There is no abdominal tenderness. There is no guarding or rebound.      Hernia: No hernia is present.   Genitourinary:     Comments: Not examined  Musculoskeletal:         General: No swelling, tenderness, deformity or signs of injury. Normal range of motion.      Cervical back: Normal  range of motion and neck supple. No rigidity.      Right lower leg: Edema present.      Left lower leg: Edema present.      Comments: Bilateral trace ankle edema noted   Lymphadenopathy:      Cervical: No cervical adenopathy.   Skin:     General: Skin is warm.      Capillary Refill: Capillary refill takes less than 2 seconds.      Coloration: Skin is not jaundiced or pale.      Findings: No bruising, erythema, lesion or rash.   Neurological:      General: No focal deficit present.      Mental Status: He is alert. Mental status is at baseline. He is disoriented.      Cranial Nerves: No cranial nerve deficit.      Sensory: No sensory deficit.      Motor: Weakness present.      Deep Tendon Reflexes: Reflexes normal.   Psychiatric:         Mood and Affect: Mood normal.         Behavior: Behavior normal.      Laboratory Data:  Results from last 7 days   Lab Units 02/12/25  0229 02/11/25  0308 02/10/25  1109   WBC 10*3/mm3 11.27* 5.99 6.31   HEMOGLOBIN g/dL 14.9 14.5 14.1   PLATELETS 10*3/mm3 128* 99* 95*     Results from last 7 days   Lab Units 02/12/25  0229 02/11/25  0308 02/10/25  1109   SODIUM mmol/L 142 138 135*   POTASSIUM mmol/L 4.6 4.3 3.8   BUN mg/dL 66* 59* 60*   CREATININE mg/dL 1.82* 2.10* 2.34*     Results from last 7 days   Lab Units 02/12/25  1120 02/12/25  0353 02/11/25  1206   PH, ARTERIAL pH units 7.421 7.424 7.453*   PCO2, ARTERIAL mm Hg 38.1 38.9 33.9*   PO2 ART mm Hg 80.1* 96.3 59.1*   FIO2 % 75 75 100     Microbiology Results (last 10 days)       Procedure Component Value - Date/Time    S. Pneumo Ag Urine or CSF - Urine, Urine, Clean Catch [[id number]]  (Normal) Collected: 02/12/25 0341    Lab Status: Final result Specimen: Urine, Clean Catch Updated: 02/12/25 1235     Strep Pneumo Ag Negative    Legionella Antigen, Urine - Urine, Urine, Clean Catch [[id number]]  (Normal) Collected: 02/12/25 0341    Lab Status: Final result Specimen: Urine, Clean Catch Updated: 02/12/25 1235     LEGIONELLA ANTIGEN,  URINE Negative    Respiratory Panel PCR w/COVID-19(SARS-CoV-2) SIMIN/ALVERTO/MYRA/PAD/COR/MIK In-House, NP Swab in UTM/VTM, 2 HR TAT - Swab, Nasopharynx [[id number]]  (Abnormal) Collected: 02/10/25 1013    Lab Status: Final result Specimen: Swab from Nasopharynx Updated: 02/10/25 1136     ADENOVIRUS, PCR Not Detected     Coronavirus 229E Not Detected     Coronavirus HKU1 Not Detected     Coronavirus NL63 Not Detected     Coronavirus OC43 Not Detected     COVID19 Not Detected     Human Metapneumovirus Not Detected     Human Rhinovirus/Enterovirus Not Detected     Influenza A H1 2009 PCR Detected     Influenza B PCR Not Detected     Parainfluenza Virus 1 Not Detected     Parainfluenza Virus 2 Not Detected     Parainfluenza Virus 3 Not Detected     Parainfluenza Virus 4 Not Detected     RSV, PCR Not Detected     Bordetella pertussis pcr Not Detected     Bordetella parapertussis PCR Not Detected     Chlamydophila pneumoniae PCR Not Detected     Mycoplasma pneumo by PCR Not Detected    Narrative:      In the setting of a positive respiratory panel with a viral infection PLUS a negative procalcitonin without other underlying concern for bacterial infection, consider observing off antibiotics or discontinuation of antibiotics and continue supportive care. If the respiratory panel is positive for atypical bacterial infection (Bordetella pertussis, Chlamydophila pneumoniae, or Mycoplasma pneumoniae), consider antibiotic de-escalation to target atypical bacterial infection.    Blood Culture - Blood, Arm, Right [[id number]]  (Normal) Collected: 02/10/25 1013    Lab Status: Preliminary result Specimen: Blood from Arm, Right Updated: 02/12/25 1030     Blood Culture No growth at 2 days    Blood Culture - Blood, Arm, Left [[id number]]  (Normal) Collected: 02/10/25 1013    Lab Status: Preliminary result Specimen: Blood from Arm, Left Updated: 02/12/25 1030     Blood Culture No growth at 2 days          Recent films:  No radiology results for  the last day  Personal review of imaging : Reviewed last imaging study and agree with interpretation  Pulmonary Assessment:  Acute on chronic hypercapnic and hypoxic respiratory failure  Bilateral multilobar pneumonia  Influenza A positive  Acute exacerbation of COPD, ongoing tobacco abuse  Acute kidney injury  Morbid obesity, obstructive sleep apnea  Dependence on supplemental oxygen  History of hypertension  Coronary artery disease with stent placement and CABG in the past  Altered mental status   Anxiety  Delirium and agitation    Recommend:   Patient will need to continue supportive respiratory care and currently on Vapotherm.  He will benefit from BiPAP.  Last blood gas showed significant hypoxemia.  If he is unable to control the airway and remains confused and agitated he may benefit from brief intubation due to the airway  Continue Tamiflu for influenza.  Continue empiric antibiotic treatment with ceftriaxone azithromycin  He will be getting Pulmicort and DuoNeb.  Plan for outpatient PFT and sleep study when he is more stable  Continue respiratory solution for influenza A.  Patient may have some withdrawal symptoms but the family denied any history of alcohol use or any substance abuse  His altered mental status could be from the infection or toxic encephalopathy  CIWA protocol may be helpful.  I discussed care plan with Dr. Elizabeth.  I will no longer follow patient in the CCU and will be followed by intensivist team  I will see him back again when he is out in the medical floor.  Continue current care plan, DVT and stress ulcer prophylaxis pain and anxiety control  Bronchodilator treatment routine respiratory care.  PT/OT/DVT and stress ulcer prophylaxis  Pain and anxiety control.  Repeat labs and x-rays from time to time  CODE STATUS: Full.  Overall prognosis is guarded.  We will follow the patient when he is back in the floor.  Total time spent in seeing this patient in medical floor before transfer to  the CCU was 35 minutes    Electronically signed by     Dimple Dodd MD,   Pulmonologist/Intensivist  02/12/25, 13:27 CST

## 2025-02-12 NOTE — CONSULTS
[facility] [place]ULMONARY [place] - [facility]    25, 18:12 CST  Patient Care Team:  Mason Peguero MD as PCP - General (Family Medicine)  Name: Chaitanya Hair  : 1960  MRN: [id number]  Contact Serial Number [id number]    Chief complaint: Shortness of breath, acute on chronic hypoxic respiratory failure, dependent on supplemental oxygen and high flow oxygen with possible requirement for BiPAP, obesity, possible obstructive sleep apnea, COPD, tobacco abuse influenza A positive.    HPI:  We have been consulted by Mason Peguero MD to see this 64 y.o. male.  Patient is a middle-aged  gentleman was seen as a pulmonary inpatient consult today.    He is a retired  and is an active smoker and continues to smoke the patient presented to primary care providers office with fever and weakness with diarrhea.  He also had cough and chest congestion and shortness of breath in the last 24 hours.  He did have a influenza vaccine 2 weeks ago.  Patient has known history of coronary artery disease and hypertension.  He also has obesity with BMI of 31.04.  When he was admitted yesterday he was started on Tamiflu and also was given Rocephin and azithromycin.  This morning he appears to be little more confused and hypoxic at the time of my visit.  His wife was present at the time of my visit.  Reportedly patient had some problem with shortness of breath on exertion for some time.  Is a retired  and lives with his family.  He had never been worked up for COPD and does not use any inhalers.  He never had a sleep study done and does not wear any CPAP.  He was admitted yesterday and was requiring 2 to 4 L oxygen but this morning he was requiring Vapotherm 30 to 40 L flow with 100% FiO2.  Arterial blood gas showed worsening hypoxemia and he was started on BiPAP treatment overnight    Patient was started on Symbicort, DuoNeb nebulizer and Solu-Medrol and will also resume  respiratory solution for influenza A positive.  He had no history of coronary artery disease good follow-up with cardiology regular.  He had hypertension hyperlipidemia and also had history of coronary angioplasty with stent placement in the past.  He had chronic back pain and had back surgery.  The patient has cystoscopy and lithotripsy done in the past for bladder stone.  He had a coronary artery bypass surgery done in the past.    Patient did not have any COVID infection and he was vaccinated for COVID.  Patient chest CT angiogram done which showed he had bilateral patchy groundglass opacities bilateral pneumonia complicated with influenza A.  He recently had some cardiac workup and intervention done in [facility] but he followed by cardiology in [facility].    Past Medical History:   has a past medical history of Abnormal stress echo (3/25/19-[facility]), Acute MI, Atelectasis (04/11/2019), CAD (coronary artery disease) (Since 2009), Chest pain due to CAD, DM (diabetes mellitus), Elevated cholesterol, History of chest x-ray (04/11/2019), History of EKG (3/25/19-[facility]), History of nephrolithiasis, History of stress test (2015), HLD (hyperlipidemia), Hypertension, Kidney stones, LAD stenosis (04/11/2019), Myocardial ischemia (03/25/2019), RCA occlusion (04/11/2019), and Tobacco use.   has a past surgical history that includes Cystoscopy w/ laser lithotripsy; Cardiac catheterization (2009); Coronary angioplasty with stent (Right, 2009); Cardiac catheterization (04/11/2019-[facility]); Back surgery (2013); Coronary artery bypass graft (N/A, 04/16/2019); Abdominal surgery (2023); Appendectomy (2023); and Colonoscopy (N/A, 10/16/2024).  No Known Allergies  Medications:  albuterol, 1.25 mg, Nebulization, 4x Daily - RT   And  ipratropium, 0.5 mg, Nebulization, 4x Daily - RT  amLODIPine, 10 mg, Oral, Daily  aspirin, 81 mg, Oral, Daily  azithromycin, 500 mg, Oral,  Q24H  budesonide-formoterol, 2 puff, Inhalation, BID - RT  cefTRIAXone, 1,000 mg, Intravenous, Q24H  enoxaparin, 40 mg, Subcutaneous, Q24H  methylPREDNISolone sodium succinate, 60 mg, Intravenous, Q12H  metoprolol tartrate, 50 mg, Oral, BID  oseltamivir, 30 mg, Oral, Q12H  sodium chloride, 10 mL, Intravenous, Q12H      Pharmacy to Dose Oseltamivir (TAMIFLU),       Family History:  Family History   Problem Relation Age of Onset    Heart disease Father     Colon cancer Neg Hx     Colon polyps Neg Hx      Social History:   reports that he has been smoking cigarettes. He has a 40 pack-year smoking history. He has never used smokeless tobacco. He reports current alcohol use. He reports that he does not use drugs.  Review of Systems:  Review of Systems   Constitutional:  Positive for fatigue. Negative for fever.   HENT:  Positive for congestion, postnasal drip and sinus pressure.    Eyes: Negative.    Respiratory:  Positive for apnea, cough and shortness of breath.    Cardiovascular: Negative.    Gastrointestinal: Negative.    Endocrine: Negative.    Genitourinary: Negative.    Musculoskeletal:  Positive for arthralgias and back pain.   Skin: Negative.    Allergic/Immunologic: Positive for environmental allergies.   Neurological: Negative.    Hematological: Negative.    Psychiatric/Behavioral:  Positive for confusion, decreased concentration and sleep disturbance.       Physical Exam:  Temp:  [97.9 °F (36.6 °C)-98.6 °F (37 °C)] 97.9 °F (36.6 °C)  Heart Rate:  [70-93] 77  Resp:  [16-18] 16  BP: (105-132)/(55-96) 117/96  Intake/Output Summary (Last 24 hours) at 2/11/2025 1812  Last data filed at 2/11/2025 1424  Gross per 24 hour   Intake 120 ml   Output 1250 ml   Net -1130 ml         02/10/25  0832   Weight: 116 kg (255 lb)     SpO2 Percentage    02/11/25 1429 02/11/25 1434 02/11/25 1618   SpO2: 94% 98% 92%     Body mass index is 31.04 kg/m².   Physical Exam  Vitals and nursing note reviewed.   Constitutional:        General: He is not in acute distress.     Appearance: He is obese. He is ill-appearing.      Comments: Middle-aged  male morbidly obese on supplemental oxygen with Vapotherm 30 L 100% FiO2.  Appears confused and disoriented.   HENT:      Head: Normocephalic and atraumatic.      Right Ear: Tympanic membrane and external ear normal.      Left Ear: Tympanic membrane and external ear normal.      Nose: Nose normal. No congestion or rhinorrhea.      Mouth/Throat:      Mouth: Mucous membranes are moist.      Pharynx: Oropharynx is clear. No oropharyngeal exudate or posterior oropharyngeal erythema.   Eyes:      General: No scleral icterus.        Right eye: No discharge.         Left eye: No discharge.      Extraocular Movements: Extraocular movements intact.      Conjunctiva/sclera: Conjunctivae normal.      Pupils: Pupils are equal, round, and reactive to light.   Neck:      Comments: Broad neck circumference  Cardiovascular:      Rate and Rhythm: Normal rate and regular rhythm.      Pulses: Normal pulses.      Heart sounds: Normal heart sounds. No murmur heard.     No friction rub. No gallop.   Pulmonary:      Effort: Pulmonary effort is normal. No respiratory distress.      Breath sounds: No stridor. Rales present. No wheezing.      Comments: Decreased breath sound bilaterally  Chest:      Chest wall: No tenderness.   Abdominal:      General: Abdomen is flat. Bowel sounds are normal. There is no distension.      Palpations: There is no mass.      Tenderness: There is no abdominal tenderness. There is no guarding or rebound.      Hernia: No hernia is present.   Genitourinary:     Comments: Not examined  Musculoskeletal:         General: No swelling, tenderness, deformity or signs of injury. Normal range of motion.      Cervical back: Normal range of motion and neck supple. No rigidity.      Right lower leg: Edema present.      Left lower leg: Edema present.      Comments: Bilateral trace ankle edema noted    Lymphadenopathy:      Cervical: No cervical adenopathy.   Skin:     General: Skin is warm.      Capillary Refill: Capillary refill takes less than 2 seconds.      Coloration: Skin is not jaundiced or pale.      Findings: No bruising, erythema, lesion or rash.   Neurological:      General: No focal deficit present.      Mental Status: He is alert. Mental status is at baseline. He is disoriented.      Cranial Nerves: No cranial nerve deficit.      Sensory: No sensory deficit.      Motor: Weakness present.      Deep Tendon Reflexes: Reflexes normal.   Psychiatric:         Mood and Affect: Mood normal.         Behavior: Behavior normal.       Result Review  Results from last 7 days   Lab Units 02/11/25  0308 02/10/25  1109   WBC 10*3/mm3 5.99 6.31   HEMOGLOBIN g/dL 14.5 14.1   PLATELETS 10*3/mm3 99* 95*     Results from last 7 days   Lab Units 02/11/25  0308 02/10/25  1109   SODIUM mmol/L 138 135*   POTASSIUM mmol/L 4.3 3.8   CO2 mmol/L 22.0 21.0*   BUN mg/dL 59* 60*   CREATININE mg/dL 2.10* 2.34*   MAGNESIUM mg/dL  --  1.9   GLUCOSE mg/dL 141* 106*     Results from last 7 days   Lab Units 02/11/25  1206 02/10/25  2026   PH, ARTERIAL pH units 7.453* 7.457*   PCO2, ARTERIAL mm Hg 33.9* 33.6*   PO2 ART mm Hg 59.1* 64.6*   FIO2 % 100  --      Microbiology Results (last 10 days)       Procedure Component Value - Date/Time    Respiratory Panel PCR w/COVID-19(SARS-CoV-2) SIMIN/ALVERTO/MYRA/PAD/COR/MIK In-House, NP Swab in UTM/VTM, 2 HR TAT - Swab, Nasopharynx [[id number]]  (Abnormal) Collected: 02/10/25 1013    Lab Status: Final result Specimen: Swab from Nasopharynx Updated: 02/10/25 1136     ADENOVIRUS, PCR Not Detected     Coronavirus 229E Not Detected     Coronavirus HKU1 Not Detected     Coronavirus NL63 Not Detected     Coronavirus OC43 Not Detected     COVID19 Not Detected     Human Metapneumovirus Not Detected     Human Rhinovirus/Enterovirus Not Detected     Influenza A H1 2009 PCR Detected     Influenza B PCR Not Detected      Parainfluenza Virus 1 Not Detected     Parainfluenza Virus 2 Not Detected     Parainfluenza Virus 3 Not Detected     Parainfluenza Virus 4 Not Detected     RSV, PCR Not Detected     Bordetella pertussis pcr Not Detected     Bordetella parapertussis PCR Not Detected     Chlamydophila pneumoniae PCR Not Detected     Mycoplasma pneumo by PCR Not Detected    Narrative:      In the setting of a positive respiratory panel with a viral infection PLUS a negative procalcitonin without other underlying concern for bacterial infection, consider observing off antibiotics or discontinuation of antibiotics and continue supportive care. If the respiratory panel is positive for atypical bacterial infection (Bordetella pertussis, Chlamydophila pneumoniae, or Mycoplasma pneumoniae), consider antibiotic de-escalation to target atypical bacterial infection.    Blood Culture - Blood, Arm, Right [[id number]]  (Normal) Collected: 02/10/25 1013    Lab Status: Preliminary result Specimen: Blood from Arm, Right Updated: 02/11/25 1030     Blood Culture No growth at 24 hours    Blood Culture - Blood, Arm, Left [[id number]]  (Normal) Collected: 02/10/25 1013    Lab Status: Preliminary result Specimen: Blood from Arm, Left Updated: 02/11/25 1030     Blood Culture No growth at 24 hours          Recent radiology:   Imaging Results (Last 72 Hours)       Procedure Component Value Units Date/Time    CT Chest Without Contrast Diagnostic [[id number]] Collected: 02/10/25 1343     Updated: 02/10/25 1357    Narrative:      EXAMINATION: CT CHEST WO CONTRAST DIAGNOSTIC-      2/10/2025 12:00 PM     HISTORY: cough; shortness of breath     In order to have a CT radiation dose as low as reasonably achievable  Automated Exposure Control was utilized for adjustment of the mA and/or  KV according to patient size.     Total DLP = 1205.9 mGy.cm     The CT scan of the chest tube performed without intravenous contrast  enhancement.     The images are acquired in  axial plane and subsequent reconstruction in  coronal and sagittal planes.     There is no previous similar study for comparison.     The lungs are well-expanded.     There are multifocal, scattered, groundglass and nodular infiltrate in  the lungs bilaterally most severely in the lower lobes, right more than  the left.     No areas of significant consolidation or air bronchograms.     There are areas of interlobular septal thickening which may represent an  evolving pulmonary edema?.     Emphysematous changes are seen in the upper lungs bilaterally. Central  airway is patent and clear. No endobronchial abnormality.     Limited visualized soft tissue of the neck are unremarkable.     Limited evaluated thyroid gland is unremarkable.     No axillary lymphadenopathy.     Atheromatous changes of the thoracic aorta. No aneurysmal dilatation.     Central pulmonary arteries are of normal size and shape.     There are several nonspecific fatty infiltrated mediastinal lymph nodes.     Severe atheromatous changes of coronary arteries are noted.     The abdomen is separately reviewed and reported.       Impression:      1. Multifocal, predominantly peripherally located, patchy areas of  groundglass infiltrate and nodular infiltrate, with smooth interlobular  septal thickening, may represent acute eosinophilic pneumonia.  2. No definite lung nodules. However the nodules may be obscured by the  infiltrate.  3. The abdomen is separately reviewed and reported.                                               This report was signed and finalized on 2/10/2025 1:54 PM by Dr. John Smith MD.       CT Abdomen Pelvis Without Contrast [[id number]] Collected: 02/10/25 1314     Updated: 02/10/25 1325    Narrative:      EXAMINATION: CT ABDOMEN PELVIS WO CONTRAST-  2/10/2025 1:14 PM     HISTORY: abdominal pain; diarrhea, chills, weakness, shakes, possible  dehydration. History of kidney stones and diabetes.     COMPARISON: None      DLP:  1205.9 mGy.cm     In order to have a CT radiation dose as low as reasonably achievable,  Automated Exposure Control was utilized for adjustment of the mA and/or  KV according to patient size.     TECHNIQUE: Noncontrast images of the abdomen and pelvis obtained without  oral contrast. Sagittal and coronal reformatted images were obtained and  reviewed as well.     FINDINGS:  This examination is somewhat limited by the lack of intravenous  contrast.     Please see the dedicated CT chest for details regarding the lung bases.  Patchy opacities in both lower lobes. Coronary atherosclerosis also  noted.     LIVER: There are small indeterminate low-density lesions in the liver.  The largest in segment 4 measures 1.6 x 1.3 cm and the largest in the  RIGHT lobe measures 1.5 x 1.6 cm (series 2, image 19). These are  possibly cysts but are indeterminate. Other low-density nodules  scattered about the liver are smaller.     BILIARY SYSTEM: Some sludge or stones in a phrygian cap. There may be  some mild gallbladder wall thickening as well as some mild  pericholecystic edema.     PANCREAS: No focal pancreatic lesion.     SPLEEN: Normal size and contour.     ADRENALS: Normal.     KIDNEYS: No hydronephrosis. Tiny exophytic renal lesion anteriorly on  the LEFT is too small for characterization with any modality, measuring  approximately 5 mm. The ureters are decompressed and normal in  appearance.     RETROPERITONEUM: No mass, lymphadenopathy or hemorrhage.     GI TRACT: Colonic diverticulosis but no diverticulitis. Previous  appendectomy. No small bowel obstruction.     OTHER: There is no mesenteric mass, lymphadenopathy or fluid collection.  Moderate calcified atherosclerotic vascular disease.     SOFT TISSUES: Surrounding soft tissues are normal in appearance.     OSSEOUS: No acute osseous abnormality. Arthritis scattered throughout  the lumbar spine. This is most pronounced at L5-S1 where there is  moderate bilateral foraminal  narrowing and moderate disc space height  loss.     PELVIS: No mass lesion, fluid collection or significant lymphadenopathy  is seen in the pelvis. Decompressed urinary bladder.          Impression:         1.  Patchy airspace consolidations in the lung bases bilaterally,  nonspecific and only partially included. Please see the dedicated CT  chest report for details regarding these findings.     2.  Small indeterminate low-density lesions in the liver. While these  may represent cysts, they are not completely characterized and follow-up  with contrast-enhanced CT is recommended on a nonemergent basis for  further characterization.     3.  Some subtle edema surrounding the gallbladder with possible mild  gallbladder wall thickening. There is also some high density material  within a phrygian cap. Gallbladder ultrasound recommended for further  characterization.     4.  Atherosclerotic vascular disease in the coronary arteries and  throughout the abdominopelvic vasculature.     5.  Arthritis at the lumbosacral junction as discussed above.      This report was signed and finalized on 2/10/2025 1:21 PM by Dr. To Rashid MD.       XR Chest 1 View [[id number]] Collected: 02/10/25 1033     Updated: 02/10/25 1038    Narrative:      EXAMINATION: XR CHEST 1 VW- 2/10/2025 10:33 AM     HISTORY: cough with congestion.     REPORT: A frontal view of the chest was obtained.     COMPARISON: Chest x-ray 8/20/2023.     There are new interstitial infiltrates within both lungs without  consolidation. This could be related to mild interstitial edema or  interstitial pneumonitis. Heart size is normal. Previous median  sternotomy is noted. No pneumothorax or pleural effusion is identified.  The pulmonary vasculature appears within normal limits. No acute osseous  abnormality.       Impression:      New bilateral interstitial infiltrates without lung  consolidation, may represent mild interstitial edema versus interstitial  pneumonitis  and viral infection.     This report was signed and finalized on 2/10/2025 10:35 AM by Dr. Baltazar Ulloa MD.             Personal review of imaging : Imaging study shows he has bilateral interstitial infiltrate patchy groundglass opacities suggesting bilateral pneumonia.  The heart size was normal.  No pneumothorax or pleural effusion noted.    Other test results (not lab or imaging): Results for orders placed in visit on 04/16/19    Emergent/Open-Heart Anesthesia LAURI    Narrative  Intra-Operative LAURI was performed by anesthesia.  Please see Intra-Op and Anesthesia notes for documentation.  Reviewed.  Independent review of ekg: Done  Problem List as identified by Epic (may contain historical, inactive problems)    Acute respiratory failure with hypoxia    Nicotine abuse    CAD (coronary artery disease)    Acute renal failure    Influenza A    Lobar pneumonia    Pulmonary Assessment:    Acute on chronic hypercapnic and hypoxic respiratory failure  Bilateral multilobar pneumonia  Influenza A positive  Acute exacerbation of COPD, ongoing tobacco abuse  Acute kidney injury  Morbid obesity, obstructive sleep apnea  Dependence on supplemental oxygen  History of hypertension  Coronary artery disease with stent placement and CABG in the past  Current mental status    Recommend/plan:   Patient had significant hypoxemia noted in the blood gas which is worsening on Vapotherm  Advised the patient to be started on BiPAP and repeat blood gas in the morning.  BiPAP settings will be 16/8 with rate of 20 and FiO2 to titrate to keep saturation more than 92%  He started on Symbicort and DuoNeb also getting Solu-Medrol which is dual prednisone taper at the time of discharge  Altered mental status could be from hypoxia.  Bilateral pulmonary infiltrate appears to be from secondary pneumonia with influenza A.    He is already on Tamiflu and started empirically on ceftriaxone and azithromycin.    The pneumonia workup is mostly negative  so far   Discussed care plan with the wife.  We will plan for outpatient PFT and sleep study  He may qualify for home BiPAP or trilogy depending on his progress  Continue solution for influenza A.  PT OT.  DVT and stress ulcer phylaxis.  Pain and anxiety control.  Echocardiogram and cardiac evaluation.    Follow-up with cardiology for history of coronary disease and stent placement with CABG.  Smoking cessation addressed.  Patient will need nicotine patch.  Repeat labs and imaging studies from time to time.  Arterial blood gas will be done tomorrow morning.  CODE STATUS: Full code.  Overall prognosis: Guarded.  We appreciate the consult and we will follow.  Patient will need long-term follow-up in the pulmonary clinic after discharge.  Total time spent in seeing this patient as inpatient pulmonary consult was 45 minutes    Thank you for this consult.  We will follow along.    Electronically signed by     Dimple Dodd MD,  Pulmonologist/Intensivist   02/11/25, 6:12 PM CST.

## 2025-02-12 NOTE — NURSING NOTE
Pt being transferred to CCU bed 3.  Report called to RN.  Respiratory will assist in transfer,  due to Bipap.  VSS.

## 2025-02-12 NOTE — PROGRESS NOTES
RT EQUIPMENT DEVICE RELATED - SKIN ASSESSMENT    Abiodun Score:  Abiodun Score: 19     RT Medical Equipment/Device:     NIV Mask:  Full-face    size: L    Skin Assessment:      Cheek:  Intact  Neck:  Intact  Nose:  Intact    Device Skin Pressure Protection:  Pressure points protected    Nurse Notification:  No Elizabeth D Severs, RRT

## 2025-02-13 ENCOUNTER — APPOINTMENT (OUTPATIENT)
Dept: MRI IMAGING | Facility: HOSPITAL | Age: 65
End: 2025-02-13
Payer: MEDICARE

## 2025-02-13 LAB
ALBUMIN SERPL-MCNC: 2.8 G/DL (ref 3.5–5.2)
ALBUMIN/GLOB SERPL: 0.8 G/DL
ALP SERPL-CCNC: 85 U/L (ref 39–117)
ALT SERPL W P-5'-P-CCNC: 44 U/L (ref 1–41)
ANION GAP SERPL CALCULATED.3IONS-SCNC: 12 MMOL/L (ref 5–15)
ARTERIAL PATENCY WRIST A: NORMAL
ARTERIAL PATENCY WRIST A: POSITIVE
AST SERPL-CCNC: 66 U/L (ref 1–40)
ATMOSPHERIC PRESS: 753 MMHG
ATMOSPHERIC PRESS: 764 MMHG
BACTERIA UR QL AUTO: ABNORMAL /HPF
BASE EXCESS BLDA CALC-SCNC: 0.8 MMOL/L (ref 0–2)
BASE EXCESS BLDA CALC-SCNC: 1.2 MMOL/L (ref 0–2)
BASOPHILS # BLD MANUAL: 0 10*3/MM3 (ref 0–0.2)
BASOPHILS NFR BLD MANUAL: 0 % (ref 0–1.5)
BDY SITE: ABNORMAL
BDY SITE: NORMAL
BILIRUB SERPL-MCNC: 0.4 MG/DL (ref 0–1.2)
BILIRUB UR QL STRIP: NEGATIVE
BODY TEMPERATURE: 37
BODY TEMPERATURE: 37
BUN SERPL-MCNC: 70 MG/DL (ref 8–23)
BUN/CREAT SERPL: 38.3 (ref 7–25)
BURR CELLS BLD QL SMEAR: ABNORMAL
CALCIUM SPEC-SCNC: 8.6 MG/DL (ref 8.6–10.5)
CHLORIDE SERPL-SCNC: 112 MMOL/L (ref 98–107)
CLARITY UR: CLEAR
CLUMPED PLATELETS: PRESENT
CO2 SERPL-SCNC: 23 MMOL/L (ref 22–29)
COLOR UR: YELLOW
CREAT SERPL-MCNC: 1.83 MG/DL (ref 0.76–1.27)
DEPRECATED RDW RBC AUTO: 46.2 FL (ref 37–54)
EGFRCR SERPLBLD CKD-EPI 2021: 40.2 ML/MIN/1.73
EOSINOPHIL # BLD MANUAL: 0 10*3/MM3 (ref 0–0.4)
EOSINOPHIL NFR BLD MANUAL: 0 % (ref 0.3–6.2)
EPAP: 6
EPAP: 8
ERYTHROCYTE [DISTWIDTH] IN BLOOD BY AUTOMATED COUNT: 13.6 % (ref 12.3–15.4)
ETHANOL UR QL: <0.01 %
GIANT PLATELETS: ABNORMAL
GLOBULIN UR ELPH-MCNC: 3.6 GM/DL
GLUCOSE BLDC GLUCOMTR-MCNC: 105 MG/DL (ref 70–130)
GLUCOSE BLDC GLUCOMTR-MCNC: 111 MG/DL (ref 70–130)
GLUCOSE SERPL-MCNC: 128 MG/DL (ref 65–99)
GLUCOSE UR STRIP-MCNC: NEGATIVE MG/DL
HCO3 BLDA-SCNC: 26 MMOL/L (ref 20–26)
HCO3 BLDA-SCNC: 26.4 MMOL/L (ref 20–26)
HCT VFR BLD AUTO: 43 % (ref 37.5–51)
HGB BLD-MCNC: 14.4 G/DL (ref 13–17.7)
HGB UR QL STRIP.AUTO: ABNORMAL
HYALINE CASTS UR QL AUTO: ABNORMAL /LPF
INHALED O2 CONCENTRATION: 100 %
INHALED O2 CONCENTRATION: 70 %
IPAP: 16
IPAP: 16
KETONES UR QL STRIP: NEGATIVE
LEUKOCYTE ESTERASE UR QL STRIP.AUTO: NEGATIVE
LYMPHOCYTES # BLD MANUAL: 0.39 10*3/MM3 (ref 0.7–3.1)
LYMPHOCYTES NFR BLD MANUAL: 6.1 % (ref 5–12)
Lab: ABNORMAL
Lab: NORMAL
MAGNESIUM SERPL-MCNC: 2.7 MG/DL (ref 1.6–2.4)
MCH RBC QN AUTO: 30.6 PG (ref 26.6–33)
MCHC RBC AUTO-ENTMCNC: 33.5 G/DL (ref 31.5–35.7)
MCV RBC AUTO: 91.3 FL (ref 79–97)
MODALITY: ABNORMAL
MODALITY: NORMAL
MONOCYTES # BLD: 0.59 10*3/MM3 (ref 0.1–0.9)
NEUTROPHILS # BLD AUTO: 8.65 10*3/MM3 (ref 1.7–7)
NEUTROPHILS NFR BLD MANUAL: 88.9 % (ref 42.7–76)
NITRITE UR QL STRIP: NEGATIVE
NRBC SPEC MANUAL: 1 /100 WBC (ref 0–0.2)
PCO2 BLDA: 40.9 MM HG (ref 35–45)
PCO2 BLDA: 45.1 MM HG (ref 35–45)
PCO2 TEMP ADJ BLD: 40.9 MM HG (ref 35–45)
PCO2 TEMP ADJ BLD: 45.1 MM HG (ref 35–45)
PH BLDA: 7.38 PH UNITS (ref 7.35–7.45)
PH BLDA: 7.41 PH UNITS (ref 7.35–7.45)
PH UR STRIP.AUTO: <=5 [PH] (ref 5–8)
PH, TEMP CORRECTED: 7.38 PH UNITS (ref 7.35–7.45)
PH, TEMP CORRECTED: 7.41 PH UNITS (ref 7.35–7.45)
PHOSPHATE SERPL-MCNC: 4.4 MG/DL (ref 2.5–4.5)
PLASMA CELL PREC NFR BLD MANUAL: 1 % (ref 0–0)
PLATELET # BLD AUTO: 139 10*3/MM3 (ref 140–450)
PMV BLD AUTO: 10.8 FL (ref 6–12)
PO2 BLD: 137 MM[HG] (ref 0–500)
PO2 BLD: 89 MM[HG] (ref 0–500)
PO2 BLDA: 88.7 MM HG (ref 83–108)
PO2 BLDA: 95.8 MM HG (ref 83–108)
PO2 TEMP ADJ BLD: 88.7 MM HG (ref 83–108)
PO2 TEMP ADJ BLD: 95.8 MM HG (ref 83–108)
POIKILOCYTOSIS BLD QL SMEAR: ABNORMAL
POTASSIUM SERPL-SCNC: 5.1 MMOL/L (ref 3.5–5.2)
PROT SERPL-MCNC: 6.4 G/DL (ref 6–8.5)
PROT UR QL STRIP: ABNORMAL
PSV: 8 CMH2O
RBC # BLD AUTO: 4.71 10*6/MM3 (ref 4.14–5.8)
RBC # UR STRIP: ABNORMAL /HPF
REF LAB TEST METHOD: ABNORMAL
SAO2 % BLDCOA: 97.1 % (ref 94–99)
SAO2 % BLDCOA: 97.4 % (ref 94–99)
SET MECH RESP RATE: 12
SET MECH RESP RATE: 12
SMALL PLATELETS BLD QL SMEAR: ABNORMAL
SODIUM SERPL-SCNC: 147 MMOL/L (ref 136–145)
SP GR UR STRIP: 1.03 (ref 1–1.03)
SQUAMOUS #/AREA URNS HPF: ABNORMAL /HPF
UROBILINOGEN UR QL STRIP: ABNORMAL
VARIANT LYMPHS NFR BLD MANUAL: 1 % (ref 0–5)
VARIANT LYMPHS NFR BLD MANUAL: 3 % (ref 19.6–45.3)
VENTILATOR MODE: ABNORMAL
VENTILATOR MODE: NORMAL
WBC # UR STRIP: ABNORMAL /HPF
WBC NRBC COR # BLD AUTO: 9.73 10*3/MM3 (ref 3.4–10.8)

## 2025-02-13 PROCEDURE — 94799 UNLISTED PULMONARY SVC/PX: CPT

## 2025-02-13 PROCEDURE — 83735 ASSAY OF MAGNESIUM: CPT | Performed by: INTERNAL MEDICINE

## 2025-02-13 PROCEDURE — 25010000002 AZITHROMYCIN PER 500 MG: Performed by: PHYSICIAN ASSISTANT

## 2025-02-13 PROCEDURE — 81001 URINALYSIS AUTO W/SCOPE: CPT | Performed by: INTERNAL MEDICINE

## 2025-02-13 PROCEDURE — 94664 DEMO&/EVAL PT USE INHALER: CPT

## 2025-02-13 PROCEDURE — 85007 BL SMEAR W/DIFF WBC COUNT: CPT | Performed by: FAMILY MEDICINE

## 2025-02-13 PROCEDURE — 82948 REAGENT STRIP/BLOOD GLUCOSE: CPT

## 2025-02-13 PROCEDURE — 94660 CPAP INITIATION&MGMT: CPT

## 2025-02-13 PROCEDURE — 25010000002 LORAZEPAM PER 2 MG: Performed by: INTERNAL MEDICINE

## 2025-02-13 PROCEDURE — 25010000002 ENOXAPARIN PER 10 MG: Performed by: FAMILY MEDICINE

## 2025-02-13 PROCEDURE — 25010000002 CEFTRIAXONE PER 250 MG: Performed by: INTERNAL MEDICINE

## 2025-02-13 PROCEDURE — 80053 COMPREHEN METABOLIC PANEL: CPT | Performed by: FAMILY MEDICINE

## 2025-02-13 PROCEDURE — 25010000002 LORAZEPAM PER 2 MG

## 2025-02-13 PROCEDURE — 84100 ASSAY OF PHOSPHORUS: CPT | Performed by: INTERNAL MEDICINE

## 2025-02-13 PROCEDURE — 36600 WITHDRAWAL OF ARTERIAL BLOOD: CPT

## 2025-02-13 PROCEDURE — 25010000002 THIAMINE HCL 200 MG/2ML SOLUTION 2 ML VIAL: Performed by: INTERNAL MEDICINE

## 2025-02-13 PROCEDURE — 82803 BLOOD GASES ANY COMBINATION: CPT

## 2025-02-13 PROCEDURE — 85025 COMPLETE CBC W/AUTO DIFF WBC: CPT | Performed by: FAMILY MEDICINE

## 2025-02-13 PROCEDURE — 36415 COLL VENOUS BLD VENIPUNCTURE: CPT | Performed by: FAMILY MEDICINE

## 2025-02-13 PROCEDURE — 25810000003 SODIUM CHLORIDE 0.9 % SOLUTION 250 ML FLEX CONT: Performed by: PHYSICIAN ASSISTANT

## 2025-02-13 PROCEDURE — 94761 N-INVAS EAR/PLS OXIMETRY MLT: CPT

## 2025-02-13 RX ORDER — LORAZEPAM 2 MG/ML
1 INJECTION INTRAMUSCULAR ONCE
Status: COMPLETED | OUTPATIENT
Start: 2025-02-13 | End: 2025-02-13

## 2025-02-13 RX ORDER — LORAZEPAM 1 MG/1
1 TABLET ORAL
Status: DISCONTINUED | OUTPATIENT
Start: 2025-02-13 | End: 2025-02-13

## 2025-02-13 RX ORDER — LORAZEPAM 1 MG/1
2 TABLET ORAL
Status: DISCONTINUED | OUTPATIENT
Start: 2025-02-13 | End: 2025-02-13

## 2025-02-13 RX ORDER — SODIUM CHLORIDE 450 MG/100ML
125 INJECTION, SOLUTION INTRAVENOUS CONTINUOUS
Status: DISPENSED | OUTPATIENT
Start: 2025-02-13 | End: 2025-02-13

## 2025-02-13 RX ORDER — THIAMINE HYDROCHLORIDE 100 MG/ML
200 INJECTION, SOLUTION INTRAMUSCULAR; INTRAVENOUS EVERY 8 HOURS SCHEDULED
Status: DISCONTINUED | OUTPATIENT
Start: 2025-02-16 | End: 2025-02-13

## 2025-02-13 RX ORDER — LORAZEPAM 2 MG/ML
1 INJECTION INTRAMUSCULAR
Status: DISCONTINUED | OUTPATIENT
Start: 2025-02-13 | End: 2025-02-13

## 2025-02-13 RX ORDER — LORAZEPAM 2 MG/ML
2 INJECTION INTRAMUSCULAR
Status: DISCONTINUED | OUTPATIENT
Start: 2025-02-13 | End: 2025-02-13

## 2025-02-13 RX ORDER — DEXMEDETOMIDINE HYDROCHLORIDE 4 UG/ML
.2-1.5 INJECTION, SOLUTION INTRAVENOUS
Status: DISCONTINUED | OUTPATIENT
Start: 2025-02-13 | End: 2025-02-15

## 2025-02-13 RX ORDER — AZITHROMYCIN 250 MG/1
500 TABLET, FILM COATED ORAL ONCE
Status: DISCONTINUED | OUTPATIENT
Start: 2025-02-13 | End: 2025-02-13 | Stop reason: SDUPTHER

## 2025-02-13 RX ADMIN — IPRATROPIUM BROMIDE 0.5 MG: 0.5 SOLUTION RESPIRATORY (INHALATION) at 18:29

## 2025-02-13 RX ADMIN — ENOXAPARIN SODIUM 40 MG: 100 INJECTION SUBCUTANEOUS at 18:43

## 2025-02-13 RX ADMIN — Medication 10 ML: at 10:16

## 2025-02-13 RX ADMIN — BUDESONIDE 0.5 MG: 0.5 INHALANT RESPIRATORY (INHALATION) at 18:29

## 2025-02-13 RX ADMIN — IPRATROPIUM BROMIDE 0.5 MG: 0.5 SOLUTION RESPIRATORY (INHALATION) at 10:19

## 2025-02-13 RX ADMIN — Medication 10 ML: at 21:00

## 2025-02-13 RX ADMIN — BUDESONIDE 0.5 MG: 0.5 INHALANT RESPIRATORY (INHALATION) at 06:21

## 2025-02-13 RX ADMIN — IPRATROPIUM BROMIDE 0.5 MG: 0.5 SOLUTION RESPIRATORY (INHALATION) at 14:20

## 2025-02-13 RX ADMIN — FOLIC ACID 1 MG: 5 INJECTION, SOLUTION INTRAMUSCULAR; INTRAVENOUS; SUBCUTANEOUS at 11:23

## 2025-02-13 RX ADMIN — AZITHROMYCIN DIHYDRATE 500 MG: 500 INJECTION, POWDER, LYOPHILIZED, FOR SOLUTION INTRAVENOUS at 10:15

## 2025-02-13 RX ADMIN — CEFTRIAXONE SODIUM 2000 MG: 2 INJECTION, POWDER, FOR SOLUTION INTRAMUSCULAR; INTRAVENOUS at 14:04

## 2025-02-13 RX ADMIN — ALBUTEROL SULFATE 1.25 MG: 2.5 SOLUTION RESPIRATORY (INHALATION) at 06:21

## 2025-02-13 RX ADMIN — DEXMEDETOMIDINE HYDROCHLORIDE 0.2 MCG/KG/HR: 4 INJECTION, SOLUTION INTRAVENOUS at 16:30

## 2025-02-13 RX ADMIN — ALBUTEROL SULFATE 1.25 MG: 2.5 SOLUTION RESPIRATORY (INHALATION) at 10:19

## 2025-02-13 RX ADMIN — ALBUTEROL SULFATE 1.25 MG: 2.5 SOLUTION RESPIRATORY (INHALATION) at 14:20

## 2025-02-13 RX ADMIN — LORAZEPAM 2 MG: 2 INJECTION INTRAMUSCULAR; INTRAVENOUS at 12:05

## 2025-02-13 RX ADMIN — Medication 1 APPLICATION: at 10:15

## 2025-02-13 RX ADMIN — ALBUTEROL SULFATE 1.25 MG: 2.5 SOLUTION RESPIRATORY (INHALATION) at 18:29

## 2025-02-13 RX ADMIN — LORAZEPAM 2 MG: 2 INJECTION INTRAMUSCULAR; INTRAVENOUS at 10:15

## 2025-02-13 RX ADMIN — LORAZEPAM 1 MG: 2 INJECTION INTRAMUSCULAR; INTRAVENOUS at 02:46

## 2025-02-13 RX ADMIN — IPRATROPIUM BROMIDE 0.5 MG: 0.5 SOLUTION RESPIRATORY (INHALATION) at 06:22

## 2025-02-13 RX ADMIN — THIAMINE HYDROCHLORIDE 500 MG: 100 INJECTION, SOLUTION INTRAMUSCULAR; INTRAVENOUS at 14:41

## 2025-02-13 RX ADMIN — LORAZEPAM 2 MG: 2 INJECTION INTRAMUSCULAR; INTRAVENOUS at 14:26

## 2025-02-13 RX ADMIN — SODIUM CHLORIDE 125 ML/HR: 0.45 INJECTION, SOLUTION INTRAVENOUS at 07:48

## 2025-02-13 NOTE — PROGRESS NOTES
Daily Progress Note  Chaitanya Hair  MRN: [id number] LOS: 3    Admit Date: 2/10/2025   2/13/2025 07:50 CST    Subjective:          Chief Complaint:  Chief Complaint   Patient presents with    Weakness - Generalized    Diarrhea       Interval History:    Reviewed overnight events and nursing notes.   Patient continues to be on 70% FiO2 on BiPAP and remains severely altered.  Transferred to the ICU yesterday with no real change in condition this morning.  Quite tremulous    Review of Systems   Unable to perform ROS: Mental status change       DIET:  Diet: Regular/House; Fluid Consistency: Thin (IDDSI 0)    Medications:   Pharmacy to Dose Oseltamivir (TAMIFLU),   sodium chloride, 125 mL/hr, Last Rate: 125 mL/hr (02/13/25 0748)      albuterol, 1.25 mg, Nebulization, 4x Daily - RT   And  ipratropium, 0.5 mg, Nebulization, 4x Daily - RT  amLODIPine, 10 mg, Oral, Daily  aspirin, 81 mg, Oral, Daily  budesonide, 0.5 mg, Nebulization, BID - RT  cefTRIAXone, 2,000 mg, Intravenous, Q24H  Chlorhexidine Gluconate Cloth, 1 Application, Topical, Daily  enoxaparin, 40 mg, Subcutaneous, Q24H  insulin regular, 2-7 Units, Subcutaneous, Q6H  [Held by provider] metoprolol tartrate, 50 mg, Oral, BID  mupirocin, 1 Application, Each Nare, BID  oseltamivir, 30 mg, Oral, Q12H  polyethylene glycol, 17 g, Oral, Daily  sodium chloride, 10 mL, Intravenous, Q12H        Data:     Code Status:   Code Status and Medical Interventions: CPR (Attempt to Resuscitate); Full Support   Ordered at: 02/11/25 0743     Code Status (Patient has no pulse and is not breathing):    CPR (Attempt to Resuscitate)     Medical Interventions (Patient has pulse or is breathing):    Full Support       Family History   Problem Relation Age of Onset    Heart disease Father     Colon cancer Neg Hx     Colon polyps Neg Hx      Social History     Socioeconomic History    Marital status:    Tobacco Use    Smoking status: Every Day     Current packs/day: 0.50      Average packs/day: 0.7 packs/day for 60.0 years (40.0 ttl pk-yrs)     Types: Cigarettes    Smokeless tobacco: Never   Vaping Use    Vaping status: Never Used   Substance and Sexual Activity    Alcohol use: Yes     Comment: Maybe once a month    Drug use: No    Sexual activity: Yes     Partners: Female       Labs:    Lab Results (last 72 hours)       Procedure Component Value Units Date/Time    Comprehensive Metabolic Panel [[id number]]  (Abnormal) Collected: 02/11/25 0308    Specimen: Blood Updated: 02/11/25 0410     Glucose 141 mg/dL      BUN 59 mg/dL      Creatinine 2.10 mg/dL      Sodium 138 mmol/L      Potassium 4.3 mmol/L      Comment: Slight hemolysis detected by analyzer. Result may be falsely elevated.        Chloride 105 mmol/L      CO2 22.0 mmol/L      Calcium 8.2 mg/dL      Total Protein 6.4 g/dL      Albumin 2.9 g/dL      ALT (SGPT) 57 U/L      AST (SGOT) 63 U/L      Alkaline Phosphatase 86 U/L      Total Bilirubin 0.4 mg/dL      Globulin 3.5 gm/dL      A/G Ratio 0.8 g/dL      BUN/Creatinine Ratio 28.1     Anion Gap 11.0 mmol/L      eGFR 34.1 mL/min/1.73     Narrative:      GFR Categories in Chronic Kidney Disease (CKD)      GFR Category          GFR (mL/min/1.73)    Interpretation  G1                     90 or greater         Normal or high (1)  G2                      60-89                Mild decrease (1)  G3a                   45-59                Mild to moderate decrease  G3b                   30-44                Moderate to severe decrease  G4                    15-29                Severe decrease  G5                    14 or less           Kidney failure          (1)In the absence of evidence of kidney disease, neither GFR category G1 or G2 fulfill the criteria for CKD.    eGFR calculation 2021 CKD-EPI creatinine equation, which does not include race as a factor    CBC & Differential [[id number]]  (Abnormal) Collected: 02/11/25 0308    Specimen: Blood Updated: 02/11/25 0409    Narrative:      The  following orders were created for panel order CBC & Differential.  Procedure                               Abnormality         Status                     ---------                               -----------         ------                     CBC Auto Differential[[id number]]        Abnormal            Final result                 Please view results for these tests on the individual orders.    CBC Auto Differential [[id number]]  (Abnormal) Collected: 02/11/25 0308    Specimen: Blood Updated: 02/11/25 0409     WBC 5.99 10*3/mm3      RBC 4.75 10*6/mm3      Hemoglobin 14.5 g/dL      Hematocrit 41.6 %      MCV 87.6 fL      MCH 30.5 pg      MCHC 34.9 g/dL      RDW 13.2 %      RDW-SD 42.3 fl      MPV 11.2 fL      Platelets 99 10*3/mm3      Neutrophil % 79.7 %      Lymphocyte % 12.4 %      Monocyte % 7.0 %      Eosinophil % 0.0 %      Basophil % 0.2 %      Immature Grans % 0.7 %      Neutrophils, Absolute 4.78 10*3/mm3      Lymphocytes, Absolute 0.74 10*3/mm3      Monocytes, Absolute 0.42 10*3/mm3      Eosinophils, Absolute 0.00 10*3/mm3      Basophils, Absolute 0.01 10*3/mm3      Immature Grans, Absolute 0.04 10*3/mm3     Blood Gas, Arterial - [[id number]]  (Abnormal) Collected: 02/10/25 2026    Specimen: Arterial Blood Updated: 02/10/25 2026     Site Right Radial     Desmond's Test Positive     pH, Arterial 7.457 pH units      Comment: 83 Value above reference range        pCO2, Arterial 33.6 mm Hg      Comment: 84 Value below reference range        pO2, Arterial 64.6 mm Hg      Comment: 84 Value below reference range        HCO3, Arterial 23.7 mmol/L      Base Excess, Arterial 0.5 mmol/L      O2 Saturation, Arterial 94.1 %      Temperature 37.0     Barometric Pressure for Blood Gas 760 mmHg      Modality HFNC     Flow Rate 15.0 lpm      Ventilator Mode NA     Collected by [id number]     Comment: Meter: [id number]     :  Radha Fuentes, RHEA        pCO2, Temperature Corrected 33.6 mm Hg      pH, Temp Corrected 7.457  "pH Units      pO2, Temperature Corrected 64.6 mm Hg     T3, Free [[id number]]  (Normal) Collected: 02/10/25 1109    Specimen: Blood Updated: 02/10/25 1950     T3, Free 2.99 pg/mL     Procalcitonin [[id number]]  (Abnormal) Collected: 02/10/25 1109    Specimen: Blood Updated: 02/10/25 1653     Procalcitonin 0.47 ng/mL     Narrative:      As a Marker for Sepsis (Non-Neonates):    1. <0.5 ng/mL represents a low risk of severe sepsis and/or septic shock.  2. >2 ng/mL represents a high risk of severe sepsis and/or septic shock.    As a Marker for Lower Respiratory Tract Infections that require antibiotic therapy:    PCT on Admission    Antibiotic Therapy       6-12 Hrs later    >0.5                Strongly Recommended  >0.25 - <0.5        Recommended   0.1 - 0.25          Discouraged              Remeasure/reassess PCT  <0.1                Strongly Discouraged     Remeasure/reassess PCT    As 28 day mortality risk marker: \"Change in Procalcitonin Result\" (>80% or <=80%) if Day 0 (or Day 1) and Day 4 values are available. Refer to [email or web address]://[email or web address]-[email or web address]-[email or web address]    Change in PCT <=80%  A decrease of PCT levels below or equal to 80% defines a positive change in PCT test result representing a higher risk for 28-day all-cause mortality of patients diagnosed with severe sepsis for septic shock.    Change in PCT >80%  A decrease of PCT levels of more than 80% defines a negative change in PCT result representing a lower risk for 28-day all-cause mortality of patients diagnosed with severe sepsis or septic shock.       High Sensitivity Troponin T 1Hr [[id number]]  (Abnormal) Collected: 02/10/25 1216    Specimen: Blood Updated: 02/10/25 1249     HS Troponin T 76 ng/L      Troponin T Numeric Delta -2 ng/L      Troponin T % Delta -3    Narrative:      High Sensitive Troponin T Reference Range:  <14.0 ng/L- Negative Female for AMI  <22.0 ng/L- Negative Male for AMI  >=14 - Abnormal Female indicating possible myocardial " injury.  >=22 - Abnormal Male indicating possible myocardial injury.   Clinicians would have to utilize clinical acumen, EKG, Troponin, and serial changes to determine if it is an Acute Myocardial Infarction or myocardial injury due to an underlying chronic condition.         BNP [[id number]]  (Normal) Collected: 02/10/25 1216    Specimen: Blood Updated: 02/10/25 1246     proBNP 147.5 pg/mL     Narrative:      This assay is used as an aid in the diagnosis of individuals suspected of having heart failure. It can be used as an aid in the diagnosis of acute decompensated heart failure (ADHF) in patients presenting with signs and symptoms of ADHF to the emergency department (ED). In addition, NT-proBNP of <300 pg/mL indicates ADHF is not likely.    Age Range Result Interpretation  NT-proBNP Concentration (pg/mL:      <50             Positive            >450                   Gray                 300-450                    Negative             <300    50-75           Positive            >900                  Gray                300-900                  Negative            <300      >75             Positive            >1800                  Gray                300-1800                  Negative            <300    Urinalysis With Microscopic If Indicated (No Culture) - Urine, Clean Catch [[id number]]  (Normal) Collected: 02/10/25 1231    Specimen: Urine, Clean Catch Updated: 02/10/25 1246     Color, UA Yellow     Appearance, UA Clear     pH, UA <=5.0     Specific Gravity, UA 1.016     Glucose, UA Negative     Ketones, UA Negative     Bilirubin, UA Negative     Blood, UA Negative     Protein, UA Negative     Leuk Esterase, UA Negative     Nitrite, UA Negative     Urobilinogen, UA 0.2 E.U./dL    Narrative:      Urine microscopic not indicated.    CBC & Differential [[id number]]  (Abnormal) Collected: 02/10/25 1109    Specimen: Blood Updated: 02/10/25 1159    Narrative:      The following orders were created for panel order CBC  & Differential.  Procedure                               Abnormality         Status                     ---------                               -----------         ------                     CBC Auto Differential[[id number]]        Abnormal            Final result                 Please view results for these tests on the individual orders.    CBC Auto Differential [[id number]]  (Abnormal) Collected: 02/10/25 1109    Specimen: Blood Updated: 02/10/25 1159     WBC 6.31 10*3/mm3      RBC 4.54 10*6/mm3      Hemoglobin 14.1 g/dL      Hematocrit 40.3 %      MCV 88.8 fL      MCH 31.1 pg      MCHC 35.0 g/dL      RDW 13.2 %      RDW-SD 43.2 fl      MPV 11.3 fL      Platelets 95 10*3/mm3     Manual Differential [[id number]]  (Abnormal) Collected: 02/10/25 1109    Specimen: Blood Updated: 02/10/25 1159     Neutrophil % 79.8 %      Lymphocyte % 3.0 %      Monocyte % 7.1 %      Eosinophil % 0.0 %      Basophil % 0.0 %      Bands %  9.1 %      Atypical Lymphocyte % 1.0 %      Neutrophils Absolute 5.61 10*3/mm3      Lymphocytes Absolute 0.25 10*3/mm3      Monocytes Absolute 0.45 10*3/mm3      Eosinophils Absolute 0.00 10*3/mm3      Basophils Absolute 0.00 10*3/mm3      Anisocytosis Slight/1+     Poikilocytes Slight/1+     Spherocytes Slight/1+     WBC Morphology Normal     Platelet Estimate Decreased     Clumped Platelets Present     Giant Platelets Slight/1+    TSH [[id number]]  (Normal) Collected: 02/10/25 1109    Specimen: Blood Updated: 02/10/25 1151     TSH 2.450 uIU/mL     T4, Free [[id number]]  (Normal) Collected: 02/10/25 1109    Specimen: Blood Updated: 02/10/25 1151     Free T4 1.28 ng/dL     Comprehensive Metabolic Panel [[id number]]  (Abnormal) Collected: 02/10/25 1109    Specimen: Blood Updated: 02/10/25 1150     Glucose 106 mg/dL      BUN 60 mg/dL      Creatinine 2.34 mg/dL      Sodium 135 mmol/L      Potassium 3.8 mmol/L      Chloride 100 mmol/L      CO2 21.0 mmol/L      Calcium 7.9 mg/dL      Total Protein 6.2 g/dL       Albumin 3.2 g/dL      ALT (SGPT) 66 U/L      AST (SGOT) 66 U/L      Alkaline Phosphatase 80 U/L      Total Bilirubin 0.5 mg/dL      Globulin 3.0 gm/dL      A/G Ratio 1.1 g/dL      BUN/Creatinine Ratio 25.6     Anion Gap 14.0 mmol/L      eGFR 29.9 mL/min/1.73     Narrative:      GFR Categories in Chronic Kidney Disease (CKD)      GFR Category          GFR (mL/min/1.73)    Interpretation  G1                     90 or greater         Normal or high (1)  G2                      60-89                Mild decrease (1)  G3a                   45-59                Mild to moderate decrease  G3b                   30-44                Moderate to severe decrease  G4                    15-29                Severe decrease  G5                    14 or less           Kidney failure          (1)In the absence of evidence of kidney disease, neither GFR category G1 or G2 fulfill the criteria for CKD.    eGFR calculation 2021 CKD-EPI creatinine equation, which does not include race as a factor    Lactic Acid, Plasma [[id number]]  (Normal) Collected: 02/10/25 1109    Specimen: Blood Updated: 02/10/25 1149     Lactate 1.5 mmol/L     Magnesium [[id number]]  (Normal) Collected: 02/10/25 1109    Specimen: Blood Updated: 02/10/25 1147     Magnesium 1.9 mg/dL     High Sensitivity Troponin T [[id number]]  (Abnormal) Collected: 02/10/25 1109    Specimen: Blood Updated: 02/10/25 1147     HS Troponin T 78 ng/L     Narrative:      High Sensitive Troponin T Reference Range:  <14.0 ng/L- Negative Female for AMI  <22.0 ng/L- Negative Male for AMI  >=14 - Abnormal Female indicating possible myocardial injury.  >=22 - Abnormal Male indicating possible myocardial injury.   Clinicians would have to utilize clinical acumen, EKG, Troponin, and serial changes to determine if it is an Acute Myocardial Infarction or myocardial injury due to an underlying chronic condition.         Lipase [[id number]]  (Abnormal) Collected: 02/10/25 1109    Specimen: Blood  "Updated: 02/10/25 1145     Lipase 74 U/L     Respiratory Panel PCR w/COVID-19(SARS-CoV-2) SIMIN/ALVERTO/MYRA/PAD/COR/MIK In-House, NP Swab in UTM/VTM, 2 HR TAT - Swab, Nasopharynx [[id number]]  (Abnormal) Collected: 02/10/25 1013    Specimen: Swab from Nasopharynx Updated: 02/10/25 1136     ADENOVIRUS, PCR Not Detected     Coronavirus 229E Not Detected     Coronavirus HKU1 Not Detected     Coronavirus NL63 Not Detected     Coronavirus OC43 Not Detected     COVID19 Not Detected     Human Metapneumovirus Not Detected     Human Rhinovirus/Enterovirus Not Detected     Influenza A H1 2009 PCR Detected     Influenza B PCR Not Detected     Parainfluenza Virus 1 Not Detected     Parainfluenza Virus 2 Not Detected     Parainfluenza Virus 3 Not Detected     Parainfluenza Virus 4 Not Detected     RSV, PCR Not Detected     Bordetella pertussis pcr Not Detected     Bordetella parapertussis PCR Not Detected     Chlamydophila pneumoniae PCR Not Detected     Mycoplasma pneumo by PCR Not Detected    Narrative:      In the setting of a positive respiratory panel with a viral infection PLUS a negative procalcitonin without other underlying concern for bacterial infection, consider observing off antibiotics or discontinuation of antibiotics and continue supportive care. If the respiratory panel is positive for atypical bacterial infection (Bordetella pertussis, Chlamydophila pneumoniae, or Mycoplasma pneumoniae), consider antibiotic de-escalation to target atypical bacterial infection.    Blood Culture - Blood, Arm, Right [[id number]] Collected: 02/10/25 1013    Specimen: Blood from Arm, Right Updated: 02/10/25 1027    Blood Culture - Blood, Arm, Left [[id number]] Collected: 02/10/25 1013    Specimen: Blood from Arm, Left Updated: 02/10/25 1027              Objective:     Vitals: /76   Pulse 63   Temp 98.9 °F (37.2 °C) (Axillary)   Resp 22   Ht 193 cm (76\")   Wt 116 kg (255 lb)   SpO2 95%   BMI 31.04 kg/m²    Intake/Output Summary " (Last 24 hours) at 2025 0750  Last data filed at 2025 1600  Gross per 24 hour   Intake 250 ml   Output 300 ml   Net -50 ml    Temp (24hrs), Av.5 °F (36.9 °C), Min:97.9 °F (36.6 °C), Max:99.1 °F (37.3 °C)      Physical Exam  Constitutional:       Appearance: He is well-developed. He is ill-appearing.   HENT:      Head: Normocephalic and atraumatic.   Eyes:      Conjunctiva/sclera: Conjunctivae normal.      Pupils: Pupils are equal, round, and reactive to light.   Cardiovascular:      Rate and Rhythm: Normal rate and regular rhythm.      Heart sounds: Normal heart sounds. No murmur heard.     No friction rub.   Pulmonary:      Effort: No respiratory distress.      Breath sounds: Wheezing present. No rhonchi or rales.      Comments: Wheezing and aeration improved  Abdominal:      General: Bowel sounds are normal. There is no distension.      Palpations: Abdomen is soft.      Tenderness: There is no abdominal tenderness.   Musculoskeletal:         General: Normal range of motion.      Cervical back: Normal range of motion.   Skin:     Capillary Refill: Capillary refill takes less than 2 seconds.   Neurological:      Cranial Nerves: No cranial nerve deficit.      Comments: Delirious and confused,   Psychiatric:         Behavior: Behavior normal.             Assessment and Plan:     Primary Problem:  Acute respiratory failure with hypoxia    Hospital Problem list:    Acute respiratory failure with hypoxia    Nicotine abuse    CAD (coronary artery disease)    Acute renal failure    Influenza A    Lobar pneumonia      PMH:  Past Medical History:   Diagnosis Date    Abnormal stress echo 3/25/19-[facility]    Noted For Myocardial Ischemia    Acute MI     Atelectasis 2019    Noted on Chest XR    CAD (coronary artery disease) Since     w/R Stent Placed    Chest pain due to CAD     Nitro PRN    DM (diabetes mellitus)     false positive    Elevated cholesterol     History of chest x-ray 2019     Suggestive For Atelectasis w/Chronic Inflammatory Scarring Noted    History of EKG 3/25/19-[facility]    Sinus Rhythm w/Nonspecific ST-T Wave Abnormalities    History of nephrolithiasis     History of stress test 2015    WNL    HLD (hyperlipidemia)     Controlled w/Meds    Hypertension     Controlled w/Meds    Kidney stones     S/p Lithotripsy    LAD stenosis 04/11/2019    90% Mid LAD Stenosis; Diffuse Stenosis of LCX--Noted on Cardiac Cath    Myocardial ischemia 03/25/2019    Noted on Echo Stress Test    RCA occlusion 04/11/2019    Mild Diffuse Disease--Noted on Cardiac Cath     Tobacco use     1.5-1 PPD       Treatment Plan:  Acute respiratory failure with hypoxia  Secondary to influenza A and bilateral lobar pneumonia.  Continue to support respiration and oxygenation.  Last PFT in 2019 showed no COPD but he may have developed underlying COPD with continued smoking.  Transitioned to the ICU for worsening delirium which has not improved significantly this morning.  Intensivist team primary now.     Influenza A  On Tamiflu, continue to support oxygenation    Metabolic encephalopathy  More altered but BUN significantly elevated in setting of acute renal failure.  Continue aggressive IV fluids.  Steroids possibly contributing to delirium as well as just overall acute illness.  CT head negative.  No EtOH history.  Unclear why remains altered.     Lobar pneumonia  Plan as above with IV antibiotics and steroids.  Unclear if this is bacterial pneumonia versus viral.  Procalcitonin is elevated so I think antibiotics are warranted to continue for full course     Acute renal failure  Baseline normal creatinine was severe creatinine elevation in the setting of dehydration likely prerenal in etiology.  Aggressive IV fluids.  Improving with IV fluids     Troponin elevation  Likely related to acute renal failure, no delta    Disposition: Continue ICU level care, prognosis guarded    Reviewed treatment plans with the  patient and/or family.     Electronically signed by Mason Peguero MD on 2/13/2025 at 07:50 CST

## 2025-02-13 NOTE — PLAN OF CARE
Goal Outcome Evaluation:      Pt has been restless and disoriented throughout shift. Increased tremors noted. BiPAP placed. Incontinent of urine. Bed alarm on. Pt safety maintained.

## 2025-02-13 NOTE — PLAN OF CARE
Goal Outcome Evaluation:     Problem: Noninvasive Ventilation Acute  Goal: Effective Unassisted Ventilation and Oxygenation  Intervention: Monitor and Manage Noninvasive Ventilation  Recent Flowsheet Documentation  Taken 2/13/2025 0621 by Ha Mccullough CRT  Airway/Ventilation Management:   airway patency maintained   positive pressure ventilation provided  NPPV/CPAP Maintenance: proper fit/secure

## 2025-02-13 NOTE — CASE MANAGEMENT/SOCIAL WORK
Discharge Planning Assessment  [facility]     Patient Name: Chaitanya Hair  MRN: [id number]  Today's Date: 2/13/2025    Admit Date: 2/10/2025        Discharge Needs Assessment       Row Name 02/13/25 1042       Living Environment    People in Home spouse    Current Living Arrangements home    Potentially Unsafe Housing Conditions none    Primary Care Provided by self    Provides Primary Care For no one    Family Caregiver if Needed spouse    Family Caregiver Names Syl Hair - spouse    Quality of Family Relationships supportive;helpful;involved    Able to Return to Prior Arrangements yes       Resource/Environmental Concerns    Resource/Environmental Concerns none    Transportation Concerns none       Transportation Needs    In the past 12 months, has lack of transportation kept you from medical appointments or from getting medications? no    In the past 12 months, has lack of transportation kept you from meetings, work, or from getting things needed for daily living? No       Food Insecurity    Within the past 12 months, you worried that your food would run out before you got the money to buy more. Never true    Within the past 12 months, the food you bought just didn't last and you didn't have money to get more. Never true       Transition Planning    Patient/Family Anticipates Transition to home with family    Patient/Family Anticipated Services at Transition none    Transportation Anticipated family or friend will provide       Discharge Needs Assessment    Readmission Within the Last 30 Days no previous admission in last 30 days    Equipment Currently Used at Home none    Concerns to be Addressed discharge planning    Do you want help finding or keeping work or a job? I do not need or want help    Do you want help with school or training? For example, starting or completing job training or getting a high school diploma, GED or equivalent No    Current Discharge Risk chronically ill    Discharge  Coordination/Progress Patient resides at home with his spouse and has been independent.  Patient has a PCP and RX coverage.  Currently on 40L O2.  SW following.                   Discharge Plan    No documentation.                 Continued Care and Services - Admitted Since 2/10/2025    No active coordination exists for this encounter.          Demographic Summary    No documentation.                  Functional Status    No documentation.                  Psychosocial    No documentation.                  Abuse/Neglect    No documentation.                  Legal    No documentation.                  Substance Abuse    No documentation.                  Patient Forms    No documentation.                     ANTONIETTA Burris

## 2025-02-13 NOTE — PLAN OF CARE
Goal Outcome Evaluation:   Patient is stable from a medical standpoint with no major abnormalities in vital signs and tolerating a vapotherm well without difficulty. Vapotherm is on maximum amount of oxygen however, but this is a change from the bipap that the patient was on upon arrival to the CCU. Patient is confused still and is not the same person he is baseline according to his wife. No signs of fever, cognition was improving in the middle of the shift with the patient answering most LOC questions correctly, but later the patient was increasingly confused and unable to answer questions correctly regarding LOC. At the time of this note patient is alert to self and place. Patient voice is really faint and the patient appears to be very uncomfortable with noticeable facial grimaces and restlessness.                                          Problem: Adult Inpatient Plan of Care  Goal: Plan of Care Review  Outcome: Unable to Meet  Goal: Patient-Specific Goal (Individualized)  Outcome: Unable to Meet  Goal: Absence of Hospital-Acquired Illness or Injury  Outcome: Unable to Meet  Goal: Optimal Comfort and Wellbeing  Outcome: Unable to Meet  Goal: Readiness for Transition of Care  Outcome: Unable to Meet     Problem: Fall Injury Risk  Goal: Absence of Fall and Fall-Related Injury  Outcome: Unable to Meet     Problem: Infection  Goal: Absence of Infection Signs and Symptoms  Outcome: Unable to Meet     Problem: Acute Coronary Syndrome  Goal: Optimal Coping  Outcome: Unable to Meet  Goal: Stable Heart Rate and Rhythm  Outcome: Unable to Meet  Goal: Optimal Cardiac Function and Comfort  Outcome: Unable to Meet     Problem: Noninvasive Ventilation Acute  Goal: Effective Unassisted Ventilation and Oxygenation  Outcome: Unable to Meet     Problem: Skin Injury Risk Increased  Goal: Skin Health and Integrity  Outcome: Unable to Meet

## 2025-02-13 NOTE — PROGRESS NOTES
[facility] Intensivist Services  Progress Note    Patient Name: Chaitanya Hair  Date of Admission: 2/10/2025  Today's Date: 02/13/25  Length of Stay: 3  Primary Care Physician: Mason Peguero MD    Subjective     Chief Complaint: Acute on chronic hypoxic respiratory failure    HPI:  Remains altered overnight and this morning.  Not overtly agitated though due to this altered mental status he did require Ativan 1 mg x 2 in order to tolerate the BiPAP.  This morning the patient appears restless.  Seems to be tolerating his BiPAP better this morning.    Review of Systems:  All pertinent negatives and positives are as above. All other systems have been reviewed and are negative unless otherwise stated.     ICU Course     Summary:  Mr. Hair is a 64-year-old male admitted to [facility] with acute on chronic hypoxic respiratory failure secondary to influenza A.  He was initially managed on the floor with Vapotherm.  He developed an acute change in mental status on the evening of 2/11/2025.  He was placed on BiPAP at this time.  The following morning he continued to be significantly altered.  His BiPAP requirements did not increase.  He was then admitted to the ICU.  Encephalopathy workup thus far has been negative.  He does have any head MRI pending.  He has been started on as needed benzos with CIWA protocol due to possibility of unknown alcohol use.  Head MRI is pending, now that he is more calm with the Ativan we will proceed with an MRI.  Despite his Ativan doses he continues to protect his airway, and does respond to voice.    Objective      Physical Exam:  General: Encephalopathic, restless  Head: Normocephalic, atraumatic  Eyes: Conjunctiva clear, sclera non-icteric, improved  Teeth/Gums: Normal inspection  Neck: Supple without stridor  Heart: Regular rate and rhythm, no murmur  Lungs: Diminished bilaterally with no wheezing  Abdomen: Soft, nontender  MSK/extremities: No cyanosis or  "edema  Neurologic: He is alert but not oriented, GCS 12      Results Review:  Results from last 7 days   Lab Units 02/13/25  0345 02/12/25  1306 02/12/25  0229   SODIUM mmol/L 147* 146* 142   POTASSIUM mmol/L 5.1 4.3 4.6   CHLORIDE mmol/L 112* 111* 107   CO2 mmol/L 23.0 23.0 24.0   BUN mg/dL 70* 69* 66*   CALCIUM mg/dL 8.6 9.0 8.8     Results from last 7 days   Lab Units 02/13/25  0345 02/12/25  0229 02/11/25  0308   WBC 10*3/mm3 9.73 11.27* 5.99   HEMOGLOBIN g/dL 14.4 14.9 14.5   HEMATOCRIT % 43.0 42.4 41.6   PLATELETS 10*3/mm3 139* 128* 99*     Visit Vitals  /92 (BP Location: Left arm, Patient Position: Lying)   Pulse 81   Temp 98.4 °F (36.9 °C) (Axillary)   Resp 24   Ht 193 cm (76\")   Wt 116 kg (255 lb)   SpO2 97%   BMI 31.04 kg/m²       Medications:  albuterol, 1.25 mg, Nebulization, 4x Daily - RT   And  ipratropium, 0.5 mg, Nebulization, 4x Daily - RT  amLODIPine, 10 mg, Oral, Daily  aspirin, 81 mg, Oral, Daily  budesonide, 0.5 mg, Nebulization, BID - RT  cefTRIAXone, 2,000 mg, Intravenous, Q24H  Chlorhexidine Gluconate Cloth, 1 Application, Topical, Daily  enoxaparin, 40 mg, Subcutaneous, Q24H  folic acid 1 mg in sodium chloride 0.9 % 50 mL IVPB, 1 mg, Intravenous, Daily  insulin regular, 2-7 Units, Subcutaneous, Q6H  [Held by provider] metoprolol tartrate, 50 mg, Oral, BID  mupirocin, 1 Application, Each Nare, BID  oseltamivir, 30 mg, Oral, Q12H  polyethylene glycol, 17 g, Oral, Daily  sodium chloride, 10 mL, Intravenous, Q12H  thiamine (B-1) IV, 500 mg, Intravenous, Q8H   Followed by  [START ON 2/16/2025] thiamine (B-1) IV, 200 mg, Intravenous, Q8H   Followed by  [START ON 2/20/2025] thiamine, 100 mg, Oral, Daily      Pharmacy to Dose Oseltamivir (TAMIFLU),   sodium chloride, 125 mL/hr, Last Rate: 125 mL/hr (02/13/25 0748)          Assessment/Plan     Problems:  Acute on chronic hypoxic respiratory failure  Influenza A  COPD  Restrictive lung disease  Encephalopathy     Plan:  Acute on chronic hypoxic " respiratory failure, influenza A  -Continue supplemental oxygen as needed and wean as tolerated, goal O2 sat of 88-92%  -CT of the chest yesterday negative for PE, did show mild worsening of the bilateral lower lobe opacities  -Will alternate between BiPAP and Vapotherm as able.  The patient remains high risk for intubation due to respiratory failure as well as encephalopathy.  Currently his GCS is 12 and he has been protecting his airway  -Nebulized triple therapy  -Continue to monitor respiratory status off of steroids  -Complete Tamiflu course  -Complete empiric Rocephin and azithromycin  -Collection.  Urinary antigens and MRSA swab negative     Encephalopathy  -Unclear etiology, he is requiring supplemental oxygen though he has adequate PaO2 on his ABG.  Doubt hypoxia as a cause of his encephalopathy  -Head CT yesterday negative for acute findings  -Repeat metabolic panel unrevealing  -Blood culture pending, UA negative for infection  -B12, folate both normal  -UDS negative, patient and family deny any significant alcohol use  -TSH checked earlier was normal  -Consider LP if no improvement and if develops fever  -Hold IV steroids  -Since the patient's encephalopathy workup has been negative and he responded to the Ativan overnight we will provide additional Ativan as needed based on CIWA scoring.  The patient and family both deny significant alcohol use, though it is still possible he does have significant intake.  Additionally the Ativan will assist in obtaining MRI to rule out CVA.  -Assess response to Ativan, consider transitioning to Precedex     COPD, restrictive lung disease  -Triple therapy as above  -Holding steroids as this may be contributing to his encephalopathy      Critical Care Set:  Feeding: Continue nothing by mouth status  Analgesia: As needed ordered  Sedation: He is now on Ativan as needed based on CIWA protocol  Thromboprophylaxis: DVT Lovenox  HOB: Elevated  Ulcer prophylaxis: N/A  Glycemic  control: Nondiabetic  SBT: N/A  Bowel movement: As needed regimen ordered  Catheter removal: N/A  Medication de-escalation: N/A    Disposition  Continue critical care management.    Total critical care time: Approximately 40 minutes     Due to a high probability of clinically significant, life threatening deterioration, the patient required my highest level of preparedness to intervene emergently and I personally spent this critical care time directly and personally managing the patient.      This critical care time included obtaining a history; examining the patient; pulse oximetry; ordering and review of studies; arranging urgent treatment with development of a management plan; evaluation of patient's response to treatment; frequent reassessment; and, discussions with other providers.     This critical care time was performed to assess and manage the high probability of imminent, life-threatening deterioration that could result in multi-organ failure. It was exclusive of separately billable procedures and treating other patients and teaching time.      Irina Elizabeth DO  Pulmonary Critical Care Medicine  2/13/2025  13:03 CST

## 2025-02-13 NOTE — PROGRESS NOTES
RT EQUIPMENT DEVICE RELATED - SKIN ASSESSMENT    Abiodun Score:  Abiodun Score: 18     RT Medical Equipment/Device:     NIV Mask:  Full-face    size: ll    Skin Assessment:      Cheek:  Intact  Nose:  Intact    Device Skin Pressure Protection:  Skin-to-device areas padded:  None Required    Nurse Notification:  Jeanette Mccullough, CRT

## 2025-02-14 LAB
ALBUMIN SERPL-MCNC: 3 G/DL (ref 3.5–5.2)
ALBUMIN/GLOB SERPL: 0.9 G/DL
ALP SERPL-CCNC: 93 U/L (ref 39–117)
ALT SERPL W P-5'-P-CCNC: 58 U/L (ref 1–41)
ANION GAP SERPL CALCULATED.3IONS-SCNC: 10 MMOL/L (ref 5–15)
ANION GAP SERPL CALCULATED.3IONS-SCNC: 9 MMOL/L (ref 5–15)
ANISOCYTOSIS BLD QL: ABNORMAL
ARTERIAL PATENCY WRIST A: POSITIVE
AST SERPL-CCNC: 69 U/L (ref 1–40)
ATMOSPHERIC PRESS: 768 MMHG
BASE EXCESS BLDA CALC-SCNC: 1.9 MMOL/L (ref 0–2)
BASOPHILS # BLD MANUAL: 0 10*3/MM3 (ref 0–0.2)
BASOPHILS NFR BLD MANUAL: 0 % (ref 0–1.5)
BDY SITE: ABNORMAL
BILIRUB SERPL-MCNC: 0.4 MG/DL (ref 0–1.2)
BODY TEMPERATURE: 37
BUN SERPL-MCNC: 70 MG/DL (ref 8–23)
BUN SERPL-MCNC: 74 MG/DL (ref 8–23)
BUN/CREAT SERPL: 42.3 (ref 7–25)
BUN/CREAT SERPL: 47 (ref 7–25)
BURR CELLS BLD QL SMEAR: ABNORMAL
CALCIUM SPEC-SCNC: 8.7 MG/DL (ref 8.6–10.5)
CALCIUM SPEC-SCNC: 9.2 MG/DL (ref 8.6–10.5)
CHLORIDE SERPL-SCNC: 118 MMOL/L (ref 98–107)
CHLORIDE SERPL-SCNC: 120 MMOL/L (ref 98–107)
CO2 SERPL-SCNC: 24 MMOL/L (ref 22–29)
CO2 SERPL-SCNC: 26 MMOL/L (ref 22–29)
CREAT SERPL-MCNC: 1.49 MG/DL (ref 0.76–1.27)
CREAT SERPL-MCNC: 1.75 MG/DL (ref 0.76–1.27)
DEPRECATED RDW RBC AUTO: 49.1 FL (ref 37–54)
EGFRCR SERPLBLD CKD-EPI 2021: 42.4 ML/MIN/1.73
EGFRCR SERPLBLD CKD-EPI 2021: 51.4 ML/MIN/1.73
EOSINOPHIL # BLD MANUAL: 0 10*3/MM3 (ref 0–0.4)
EOSINOPHIL NFR BLD MANUAL: 0 % (ref 0.3–6.2)
EPAP: 6
ERYTHROCYTE [DISTWIDTH] IN BLOOD BY AUTOMATED COUNT: 14 % (ref 12.3–15.4)
GLOBULIN UR ELPH-MCNC: 3.5 GM/DL
GLUCOSE BLDC GLUCOMTR-MCNC: 105 MG/DL (ref 70–130)
GLUCOSE BLDC GLUCOMTR-MCNC: 109 MG/DL (ref 70–130)
GLUCOSE BLDC GLUCOMTR-MCNC: 127 MG/DL (ref 70–130)
GLUCOSE BLDC GLUCOMTR-MCNC: 135 MG/DL (ref 70–130)
GLUCOSE SERPL-MCNC: 101 MG/DL (ref 65–99)
GLUCOSE SERPL-MCNC: 119 MG/DL (ref 65–99)
HCO3 BLDA-SCNC: 27.6 MMOL/L (ref 20–26)
HCT VFR BLD AUTO: 45.5 % (ref 37.5–51)
HGB BLD-MCNC: 14.8 G/DL (ref 13–17.7)
INHALED O2 CONCENTRATION: 70 %
IPAP: 16
LYMPHOCYTES # BLD MANUAL: 1.98 10*3/MM3 (ref 0.7–3.1)
LYMPHOCYTES NFR BLD MANUAL: 7.9 % (ref 5–12)
Lab: ABNORMAL
MAGNESIUM SERPL-MCNC: 3.1 MG/DL (ref 1.6–2.4)
MCH RBC QN AUTO: 30.7 PG (ref 26.6–33)
MCHC RBC AUTO-ENTMCNC: 32.5 G/DL (ref 31.5–35.7)
MCV RBC AUTO: 94.4 FL (ref 79–97)
MICROCYTES BLD QL: ABNORMAL
MODALITY: ABNORMAL
MONOCYTES # BLD: 0.99 10*3/MM3 (ref 0.1–0.9)
MYELOCYTES NFR BLD MANUAL: 1 % (ref 0–0)
NEUTROPHILS # BLD AUTO: 9.07 10*3/MM3 (ref 1.7–7)
NEUTROPHILS NFR BLD MANUAL: 72.3 % (ref 42.7–76)
NEUTS VAC BLD QL SMEAR: ABNORMAL
PCO2 BLDA: 46.1 MM HG (ref 35–45)
PCO2 TEMP ADJ BLD: 46.1 MM HG (ref 35–45)
PH BLDA: 7.39 PH UNITS (ref 7.35–7.45)
PH, TEMP CORRECTED: 7.39 PH UNITS (ref 7.35–7.45)
PHOSPHATE SERPL-MCNC: 5.4 MG/DL (ref 2.5–4.5)
PLASMA CELL PREC NFR BLD MANUAL: 3 % (ref 0–0)
PLAT MORPH BLD: NORMAL
PLATELET # BLD AUTO: 147 10*3/MM3 (ref 140–450)
PMV BLD AUTO: 10.6 FL (ref 6–12)
PO2 BLD: 153 MM[HG] (ref 0–500)
PO2 BLDA: 107 MM HG (ref 83–108)
PO2 TEMP ADJ BLD: 107 MM HG (ref 83–108)
POIKILOCYTOSIS BLD QL SMEAR: ABNORMAL
POTASSIUM SERPL-SCNC: 4.5 MMOL/L (ref 3.5–5.2)
POTASSIUM SERPL-SCNC: 5.6 MMOL/L (ref 3.5–5.2)
PROT SERPL-MCNC: 6.5 G/DL (ref 6–8.5)
RBC # BLD AUTO: 4.82 10*6/MM3 (ref 4.14–5.8)
SAO2 % BLDCOA: 98.3 % (ref 94–99)
SET MECH RESP RATE: 12
SODIUM SERPL-SCNC: 153 MMOL/L (ref 136–145)
SODIUM SERPL-SCNC: 154 MMOL/L (ref 136–145)
VARIANT LYMPHS NFR BLD MANUAL: 6.9 % (ref 0–5)
VARIANT LYMPHS NFR BLD MANUAL: 8.9 % (ref 19.6–45.3)
VENTILATOR MODE: ABNORMAL
WBC NRBC COR # BLD AUTO: 12.55 10*3/MM3 (ref 3.4–10.8)

## 2025-02-14 PROCEDURE — 92610 EVALUATE SWALLOWING FUNCTION: CPT | Performed by: SPEECH-LANGUAGE PATHOLOGIST

## 2025-02-14 PROCEDURE — 94799 UNLISTED PULMONARY SVC/PX: CPT

## 2025-02-14 PROCEDURE — 25010000002 METHYLPREDNISOLONE PER 125 MG: Performed by: INTERNAL MEDICINE

## 2025-02-14 PROCEDURE — 36600 WITHDRAWAL OF ARTERIAL BLOOD: CPT

## 2025-02-14 PROCEDURE — 25010000002 HALOPERIDOL LACTATE PER 5 MG

## 2025-02-14 PROCEDURE — 25010000002 ENOXAPARIN PER 10 MG: Performed by: FAMILY MEDICINE

## 2025-02-14 PROCEDURE — 80053 COMPREHEN METABOLIC PANEL: CPT | Performed by: FAMILY MEDICINE

## 2025-02-14 PROCEDURE — 25010000002 DIPHENHYDRAMINE PER 50 MG: Performed by: INTERNAL MEDICINE

## 2025-02-14 PROCEDURE — 94761 N-INVAS EAR/PLS OXIMETRY MLT: CPT

## 2025-02-14 PROCEDURE — 83735 ASSAY OF MAGNESIUM: CPT | Performed by: INTERNAL MEDICINE

## 2025-02-14 PROCEDURE — 25810000003 LACTATED RINGERS PER 1000 ML: Performed by: INTERNAL MEDICINE

## 2025-02-14 PROCEDURE — 82803 BLOOD GASES ANY COMBINATION: CPT

## 2025-02-14 PROCEDURE — 25010000002 CEFTRIAXONE PER 250 MG: Performed by: INTERNAL MEDICINE

## 2025-02-14 PROCEDURE — 97110 THERAPEUTIC EXERCISES: CPT

## 2025-02-14 PROCEDURE — 82948 REAGENT STRIP/BLOOD GLUCOSE: CPT

## 2025-02-14 PROCEDURE — 84100 ASSAY OF PHOSPHORUS: CPT | Performed by: INTERNAL MEDICINE

## 2025-02-14 PROCEDURE — 85007 BL SMEAR W/DIFF WBC COUNT: CPT | Performed by: FAMILY MEDICINE

## 2025-02-14 PROCEDURE — 25010000002 MORPHINE PER 10 MG

## 2025-02-14 PROCEDURE — 94660 CPAP INITIATION&MGMT: CPT

## 2025-02-14 PROCEDURE — 97166 OT EVAL MOD COMPLEX 45 MIN: CPT | Performed by: OCCUPATIONAL THERAPIST

## 2025-02-14 PROCEDURE — 85025 COMPLETE CBC W/AUTO DIFF WBC: CPT | Performed by: FAMILY MEDICINE

## 2025-02-14 PROCEDURE — 94664 DEMO&/EVAL PT USE INHALER: CPT

## 2025-02-14 RX ORDER — SODIUM CHLORIDE, SODIUM LACTATE, POTASSIUM CHLORIDE, CALCIUM CHLORIDE 600; 310; 30; 20 MG/100ML; MG/100ML; MG/100ML; MG/100ML
999 INJECTION, SOLUTION INTRAVENOUS CONTINUOUS
Status: DISPENSED | OUTPATIENT
Start: 2025-02-14 | End: 2025-02-14

## 2025-02-14 RX ORDER — DIPHENHYDRAMINE HYDROCHLORIDE 50 MG/ML
50 INJECTION INTRAMUSCULAR; INTRAVENOUS EVERY 6 HOURS PRN
Status: DISCONTINUED | OUTPATIENT
Start: 2025-02-14 | End: 2025-02-20 | Stop reason: HOSPADM

## 2025-02-14 RX ORDER — TRAMADOL HYDROCHLORIDE 50 MG/1
50 TABLET ORAL ONCE
Status: DISCONTINUED | OUTPATIENT
Start: 2025-02-14 | End: 2025-02-14

## 2025-02-14 RX ORDER — DEXTROSE MONOHYDRATE AND SODIUM CHLORIDE 5; .45 G/100ML; G/100ML
200 INJECTION, SOLUTION INTRAVENOUS CONTINUOUS
Status: DISCONTINUED | OUTPATIENT
Start: 2025-02-14 | End: 2025-02-14

## 2025-02-14 RX ORDER — DIPHENHYDRAMINE HYDROCHLORIDE 50 MG/ML
50 INJECTION INTRAMUSCULAR; INTRAVENOUS ONCE
Status: COMPLETED | OUTPATIENT
Start: 2025-02-14 | End: 2025-02-14

## 2025-02-14 RX ORDER — METHYLPREDNISOLONE SODIUM SUCCINATE 125 MG/2ML
125 INJECTION, POWDER, LYOPHILIZED, FOR SOLUTION INTRAMUSCULAR; INTRAVENOUS ONCE
Status: COMPLETED | OUTPATIENT
Start: 2025-02-14 | End: 2025-02-14

## 2025-02-14 RX ORDER — HALOPERIDOL 5 MG/ML
5 INJECTION INTRAMUSCULAR ONCE
Status: COMPLETED | OUTPATIENT
Start: 2025-02-14 | End: 2025-02-14

## 2025-02-14 RX ORDER — SODIUM CHLORIDE, SODIUM LACTATE, POTASSIUM CHLORIDE, CALCIUM CHLORIDE 600; 310; 30; 20 MG/100ML; MG/100ML; MG/100ML; MG/100ML
125 INJECTION, SOLUTION INTRAVENOUS CONTINUOUS
Status: DISPENSED | OUTPATIENT
Start: 2025-02-14 | End: 2025-02-15

## 2025-02-14 RX ORDER — FAMOTIDINE 10 MG/ML
20 INJECTION, SOLUTION INTRAVENOUS ONCE
Status: COMPLETED | OUTPATIENT
Start: 2025-02-14 | End: 2025-02-14

## 2025-02-14 RX ORDER — MORPHINE SULFATE 2 MG/ML
1 INJECTION, SOLUTION INTRAMUSCULAR; INTRAVENOUS ONCE
Status: COMPLETED | OUTPATIENT
Start: 2025-02-14 | End: 2025-02-14

## 2025-02-14 RX ORDER — DEXTROSE MONOHYDRATE AND SODIUM CHLORIDE 5; .45 G/100ML; G/100ML
250 INJECTION, SOLUTION INTRAVENOUS CONTINUOUS
Status: CANCELLED | OUTPATIENT
Start: 2025-02-14 | End: 2025-02-14

## 2025-02-14 RX ADMIN — ALBUTEROL SULFATE 1.25 MG: 2.5 SOLUTION RESPIRATORY (INHALATION) at 09:34

## 2025-02-14 RX ADMIN — DEXMEDETOMIDINE HYDROCHLORIDE 0.2 MCG/KG/HR: 4 INJECTION, SOLUTION INTRAVENOUS at 18:33

## 2025-02-14 RX ADMIN — ALBUTEROL SULFATE 1.25 MG: 2.5 SOLUTION RESPIRATORY (INHALATION) at 05:53

## 2025-02-14 RX ADMIN — DIPHENHYDRAMINE HYDROCHLORIDE 50 MG: 50 INJECTION, SOLUTION INTRAMUSCULAR; INTRAVENOUS at 13:33

## 2025-02-14 RX ADMIN — Medication 10 ML: at 21:03

## 2025-02-14 RX ADMIN — SODIUM CHLORIDE, SODIUM LACTATE, POTASSIUM CHLORIDE, CALCIUM CHLORIDE 999 ML/HR: 20; 30; 600; 310 INJECTION, SOLUTION INTRAVENOUS at 16:57

## 2025-02-14 RX ADMIN — IPRATROPIUM BROMIDE 0.5 MG: 0.5 SOLUTION RESPIRATORY (INHALATION) at 09:34

## 2025-02-14 RX ADMIN — DEXTROSE AND SODIUM CHLORIDE 100 ML/HR: 5; .45 INJECTION, SOLUTION INTRAVENOUS at 09:55

## 2025-02-14 RX ADMIN — FAMOTIDINE 20 MG: 10 INJECTION INTRAVENOUS at 13:33

## 2025-02-14 RX ADMIN — DEXMEDETOMIDINE HYDROCHLORIDE 0.4 MCG/KG/HR: 4 INJECTION, SOLUTION INTRAVENOUS at 01:05

## 2025-02-14 RX ADMIN — OSELTAMIVIR PHOSPHATE 30 MG: 30 CAPSULE ORAL at 08:18

## 2025-02-14 RX ADMIN — Medication 1 APPLICATION: at 08:18

## 2025-02-14 RX ADMIN — AMLODIPINE BESYLATE 10 MG: 10 TABLET ORAL at 08:18

## 2025-02-14 RX ADMIN — HALOPERIDOL LACTATE 5 MG: 5 INJECTION, SOLUTION INTRAMUSCULAR at 21:54

## 2025-02-14 RX ADMIN — ASPIRIN 81 MG: 81 TABLET, COATED ORAL at 08:18

## 2025-02-14 RX ADMIN — SODIUM CHLORIDE, SODIUM LACTATE, POTASSIUM CHLORIDE, CALCIUM CHLORIDE 125 ML/HR: 20; 30; 600; 310 INJECTION, SOLUTION INTRAVENOUS at 17:57

## 2025-02-14 RX ADMIN — METHYLPREDNISOLONE SODIUM SUCCINATE 125 MG: 125 INJECTION, POWDER, FOR SOLUTION INTRAMUSCULAR; INTRAVENOUS at 13:33

## 2025-02-14 RX ADMIN — IPRATROPIUM BROMIDE 0.5 MG: 0.5 SOLUTION RESPIRATORY (INHALATION) at 18:44

## 2025-02-14 RX ADMIN — CEFTRIAXONE SODIUM 2000 MG: 2 INJECTION, POWDER, FOR SOLUTION INTRAMUSCULAR; INTRAVENOUS at 13:03

## 2025-02-14 RX ADMIN — IPRATROPIUM BROMIDE 0.5 MG: 0.5 SOLUTION RESPIRATORY (INHALATION) at 05:53

## 2025-02-14 RX ADMIN — CHLORHEXIDINE GLUCONATE 1 APPLICATION: 500 CLOTH TOPICAL at 08:18

## 2025-02-14 RX ADMIN — DEXMEDETOMIDINE HYDROCHLORIDE 1.5 MCG/KG/HR: 4 INJECTION, SOLUTION INTRAVENOUS at 21:49

## 2025-02-14 RX ADMIN — ENOXAPARIN SODIUM 40 MG: 100 INJECTION SUBCUTANEOUS at 17:56

## 2025-02-14 RX ADMIN — ALBUTEROL SULFATE 1.25 MG: 2.5 SOLUTION RESPIRATORY (INHALATION) at 13:40

## 2025-02-14 RX ADMIN — IPRATROPIUM BROMIDE 0.5 MG: 0.5 SOLUTION RESPIRATORY (INHALATION) at 13:40

## 2025-02-14 RX ADMIN — BUDESONIDE 0.5 MG: 0.5 INHALANT RESPIRATORY (INHALATION) at 18:44

## 2025-02-14 RX ADMIN — BUDESONIDE 0.5 MG: 0.5 INHALANT RESPIRATORY (INHALATION) at 05:53

## 2025-02-14 RX ADMIN — Medication 10 ML: at 08:18

## 2025-02-14 RX ADMIN — MORPHINE SULFATE 1 MG: 2 INJECTION, SOLUTION INTRAMUSCULAR; INTRAVENOUS at 21:02

## 2025-02-14 RX ADMIN — ALBUTEROL SULFATE 1.25 MG: 2.5 SOLUTION RESPIRATORY (INHALATION) at 18:44

## 2025-02-14 NOTE — PLAN OF CARE
Goal Outcome Evaluation:  Plan of Care Reviewed With: other (see comments)        Progress: no change  Outcome Evaluation: ST is changing pt's diet to a soft to chew, ground meat diet with thin liquids. He is confused. He is requesting a milkshake, will send a mighty shake with meals. Likely to have inadequate intake with current illness and confusion.

## 2025-02-14 NOTE — PROGRESS NOTES
Daily Progress Note  Chaitanya Hair  MRN: [id number] LOS: 4    Admit Date: 2/10/2025   2/14/2025 07:44 CST    Subjective:          Chief Complaint:  Chief Complaint   Patient presents with    Weakness - Generalized    Diarrhea       Interval History:    Reviewed overnight events and nursing notes.   Patient doing much better this morning after being on Precedex overnight.  Now conversational and requesting a milkshake    Review of Systems   Unable to perform ROS: Mental status change       DIET:  Diet: Regular/House; Fluid Consistency: Thin (IDDSI 0)    Medications:   dexmedetomidine, 0.2-1.5 mcg/kg/hr, Last Rate: Stopped (02/14/25 0600)  Pharmacy to Dose Oseltamivir (TAMIFLU),       albuterol, 1.25 mg, Nebulization, 4x Daily - RT   And  ipratropium, 0.5 mg, Nebulization, 4x Daily - RT  amLODIPine, 10 mg, Oral, Daily  aspirin, 81 mg, Oral, Daily  budesonide, 0.5 mg, Nebulization, BID - RT  cefTRIAXone, 2,000 mg, Intravenous, Q24H  Chlorhexidine Gluconate Cloth, 1 Application, Topical, Daily  enoxaparin, 40 mg, Subcutaneous, Q24H  insulin regular, 2-7 Units, Subcutaneous, Q6H  [Held by provider] metoprolol tartrate, 50 mg, Oral, BID  mupirocin, 1 Application, Each Nare, BID  oseltamivir, 30 mg, Oral, Q12H  polyethylene glycol, 17 g, Oral, Daily  sodium chloride, 10 mL, Intravenous, Q12H        Data:     Code Status:   Code Status and Medical Interventions: CPR (Attempt to Resuscitate); Full Support   Ordered at: 02/11/25 0743     Code Status (Patient has no pulse and is not breathing):    CPR (Attempt to Resuscitate)     Medical Interventions (Patient has pulse or is breathing):    Full Support       Family History   Problem Relation Age of Onset    Heart disease Father     Colon cancer Neg Hx     Colon polyps Neg Hx      Social History     Socioeconomic History    Marital status:    Tobacco Use    Smoking status: Every Day     Current packs/day: 0.50     Average packs/day: 0.7 packs/day for 60.0 years  (40.0 ttl pk-yrs)     Types: Cigarettes    Smokeless tobacco: Never   Vaping Use    Vaping status: Never Used   Substance and Sexual Activity    Alcohol use: Yes     Comment: Maybe once a month    Drug use: No    Sexual activity: Yes     Partners: Female       Labs:    Lab Results (last 72 hours)       Procedure Component Value Units Date/Time    Comprehensive Metabolic Panel [[id number]]  (Abnormal) Collected: 02/11/25 0308    Specimen: Blood Updated: 02/11/25 0410     Glucose 141 mg/dL      BUN 59 mg/dL      Creatinine 2.10 mg/dL      Sodium 138 mmol/L      Potassium 4.3 mmol/L      Comment: Slight hemolysis detected by analyzer. Result may be falsely elevated.        Chloride 105 mmol/L      CO2 22.0 mmol/L      Calcium 8.2 mg/dL      Total Protein 6.4 g/dL      Albumin 2.9 g/dL      ALT (SGPT) 57 U/L      AST (SGOT) 63 U/L      Alkaline Phosphatase 86 U/L      Total Bilirubin 0.4 mg/dL      Globulin 3.5 gm/dL      A/G Ratio 0.8 g/dL      BUN/Creatinine Ratio 28.1     Anion Gap 11.0 mmol/L      eGFR 34.1 mL/min/1.73     Narrative:      GFR Categories in Chronic Kidney Disease (CKD)      GFR Category          GFR (mL/min/1.73)    Interpretation  G1                     90 or greater         Normal or high (1)  G2                      60-89                Mild decrease (1)  G3a                   45-59                Mild to moderate decrease  G3b                   30-44                Moderate to severe decrease  G4                    15-29                Severe decrease  G5                    14 or less           Kidney failure          (1)In the absence of evidence of kidney disease, neither GFR category G1 or G2 fulfill the criteria for CKD.    eGFR calculation 2021 CKD-EPI creatinine equation, which does not include race as a factor    CBC & Differential [[id number]]  (Abnormal) Collected: 02/11/25 0308    Specimen: Blood Updated: 02/11/25 0409    Narrative:      The following orders were created for panel order  CBC & Differential.  Procedure                               Abnormality         Status                     ---------                               -----------         ------                     CBC Auto Differential[[id number]]        Abnormal            Final result                 Please view results for these tests on the individual orders.    CBC Auto Differential [[id number]]  (Abnormal) Collected: 02/11/25 0308    Specimen: Blood Updated: 02/11/25 0409     WBC 5.99 10*3/mm3      RBC 4.75 10*6/mm3      Hemoglobin 14.5 g/dL      Hematocrit 41.6 %      MCV 87.6 fL      MCH 30.5 pg      MCHC 34.9 g/dL      RDW 13.2 %      RDW-SD 42.3 fl      MPV 11.2 fL      Platelets 99 10*3/mm3      Neutrophil % 79.7 %      Lymphocyte % 12.4 %      Monocyte % 7.0 %      Eosinophil % 0.0 %      Basophil % 0.2 %      Immature Grans % 0.7 %      Neutrophils, Absolute 4.78 10*3/mm3      Lymphocytes, Absolute 0.74 10*3/mm3      Monocytes, Absolute 0.42 10*3/mm3      Eosinophils, Absolute 0.00 10*3/mm3      Basophils, Absolute 0.01 10*3/mm3      Immature Grans, Absolute 0.04 10*3/mm3     Blood Gas, Arterial - [[id number]]  (Abnormal) Collected: 02/10/25 2026    Specimen: Arterial Blood Updated: 02/10/25 2026     Site Right Radial     Desmond's Test Positive     pH, Arterial 7.457 pH units      Comment: 83 Value above reference range        pCO2, Arterial 33.6 mm Hg      Comment: 84 Value below reference range        pO2, Arterial 64.6 mm Hg      Comment: 84 Value below reference range        HCO3, Arterial 23.7 mmol/L      Base Excess, Arterial 0.5 mmol/L      O2 Saturation, Arterial 94.1 %      Temperature 37.0     Barometric Pressure for Blood Gas 760 mmHg      Modality HFNC     Flow Rate 15.0 lpm      Ventilator Mode NA     Collected by [id number]     Comment: Meter: [id number]     :  Radha Fuentes RRT        pCO2, Temperature Corrected 33.6 mm Hg      pH, Temp Corrected 7.457 pH Units      pO2, Temperature Corrected 64.6  "mm Hg     T3, Free [[id number]]  (Normal) Collected: 02/10/25 1109    Specimen: Blood Updated: 02/10/25 1950     T3, Free 2.99 pg/mL     Procalcitonin [[id number]]  (Abnormal) Collected: 02/10/25 1109    Specimen: Blood Updated: 02/10/25 1653     Procalcitonin 0.47 ng/mL     Narrative:      As a Marker for Sepsis (Non-Neonates):    1. <0.5 ng/mL represents a low risk of severe sepsis and/or septic shock.  2. >2 ng/mL represents a high risk of severe sepsis and/or septic shock.    As a Marker for Lower Respiratory Tract Infections that require antibiotic therapy:    PCT on Admission    Antibiotic Therapy       6-12 Hrs later    >0.5                Strongly Recommended  >0.25 - <0.5        Recommended   0.1 - 0.25          Discouraged              Remeasure/reassess PCT  <0.1                Strongly Discouraged     Remeasure/reassess PCT    As 28 day mortality risk marker: \"Change in Procalcitonin Result\" (>80% or <=80%) if Day 0 (or Day 1) and Day 4 values are available. Refer to http://[email or web address]    Change in PCT <=80%  A decrease of PCT levels below or equal to 80% defines a positive change in PCT test result representing a higher risk for 28-day all-cause mortality of patients diagnosed with severe sepsis for septic shock.    Change in PCT >80%  A decrease of PCT levels of more than 80% defines a negative change in PCT result representing a lower risk for 28-day all-cause mortality of patients diagnosed with severe sepsis or septic shock.       High Sensitivity Troponin T 1Hr [[id number]]  (Abnormal) Collected: 02/10/25 1216    Specimen: Blood Updated: 02/10/25 1249     HS Troponin T 76 ng/L      Troponin T Numeric Delta -2 ng/L      Troponin T % Delta -3    Narrative:      High Sensitive Troponin T Reference Range:  <14.0 ng/L- Negative Female for AMI  <22.0 ng/L- Negative Male for AMI  >=14 - Abnormal Female indicating possible myocardial injury.  >=22 - Abnormal Male indicating possible " myocardial injury.   Clinicians would have to utilize clinical acumen, EKG, Troponin, and serial changes to determine if it is an Acute Myocardial Infarction or myocardial injury due to an underlying chronic condition.         BNP [[id number]]  (Normal) Collected: 02/10/25 1216    Specimen: Blood Updated: 02/10/25 1246     proBNP 147.5 pg/mL     Narrative:      This assay is used as an aid in the diagnosis of individuals suspected of having heart failure. It can be used as an aid in the diagnosis of acute decompensated heart failure (ADHF) in patients presenting with signs and symptoms of ADHF to the emergency department (ED). In addition, NT-proBNP of <300 pg/mL indicates ADHF is not likely.    Age Range Result Interpretation  NT-proBNP Concentration (pg/mL:      <50             Positive            >450                   Gray                 300-450                    Negative             <300    50-75           Positive            >900                  Gray                300-900                  Negative            <300      >75             Positive            >1800                  Gray                300-1800                  Negative            <300    Urinalysis With Microscopic If Indicated (No Culture) - Urine, Clean Catch [[id number]]  (Normal) Collected: 02/10/25 1231    Specimen: Urine, Clean Catch Updated: 02/10/25 1246     Color, UA Yellow     Appearance, UA Clear     pH, UA <=5.0     Specific Gravity, UA 1.016     Glucose, UA Negative     Ketones, UA Negative     Bilirubin, UA Negative     Blood, UA Negative     Protein, UA Negative     Leuk Esterase, UA Negative     Nitrite, UA Negative     Urobilinogen, UA 0.2 E.U./dL    Narrative:      Urine microscopic not indicated.    CBC & Differential [[id number]]  (Abnormal) Collected: 02/10/25 1109    Specimen: Blood Updated: 02/10/25 1159    Narrative:      The following orders were created for panel order CBC & Differential.  Procedure                                Abnormality         Status                     ---------                               -----------         ------                     CBC Auto Differential[[id number]]        Abnormal            Final result                 Please view results for these tests on the individual orders.    CBC Auto Differential [[id number]]  (Abnormal) Collected: 02/10/25 1109    Specimen: Blood Updated: 02/10/25 1159     WBC 6.31 10*3/mm3      RBC 4.54 10*6/mm3      Hemoglobin 14.1 g/dL      Hematocrit 40.3 %      MCV 88.8 fL      MCH 31.1 pg      MCHC 35.0 g/dL      RDW 13.2 %      RDW-SD 43.2 fl      MPV 11.3 fL      Platelets 95 10*3/mm3     Manual Differential [[id number]]  (Abnormal) Collected: 02/10/25 1109    Specimen: Blood Updated: 02/10/25 1159     Neutrophil % 79.8 %      Lymphocyte % 3.0 %      Monocyte % 7.1 %      Eosinophil % 0.0 %      Basophil % 0.0 %      Bands %  9.1 %      Atypical Lymphocyte % 1.0 %      Neutrophils Absolute 5.61 10*3/mm3      Lymphocytes Absolute 0.25 10*3/mm3      Monocytes Absolute 0.45 10*3/mm3      Eosinophils Absolute 0.00 10*3/mm3      Basophils Absolute 0.00 10*3/mm3      Anisocytosis Slight/1+     Poikilocytes Slight/1+     Spherocytes Slight/1+     WBC Morphology Normal     Platelet Estimate Decreased     Clumped Platelets Present     Giant Platelets Slight/1+    TSH [[id number]]  (Normal) Collected: 02/10/25 1109    Specimen: Blood Updated: 02/10/25 1151     TSH 2.450 uIU/mL     T4, Free [[id number]]  (Normal) Collected: 02/10/25 1109    Specimen: Blood Updated: 02/10/25 1151     Free T4 1.28 ng/dL     Comprehensive Metabolic Panel [[id number]]  (Abnormal) Collected: 02/10/25 1109    Specimen: Blood Updated: 02/10/25 1150     Glucose 106 mg/dL      BUN 60 mg/dL      Creatinine 2.34 mg/dL      Sodium 135 mmol/L      Potassium 3.8 mmol/L      Chloride 100 mmol/L      CO2 21.0 mmol/L      Calcium 7.9 mg/dL      Total Protein 6.2 g/dL      Albumin 3.2 g/dL      ALT (SGPT) 66 U/L      AST  (SGOT) 66 U/L      Alkaline Phosphatase 80 U/L      Total Bilirubin 0.5 mg/dL      Globulin 3.0 gm/dL      A/G Ratio 1.1 g/dL      BUN/Creatinine Ratio 25.6     Anion Gap 14.0 mmol/L      eGFR 29.9 mL/min/1.73     Narrative:      GFR Categories in Chronic Kidney Disease (CKD)      GFR Category          GFR (mL/min/1.73)    Interpretation  G1                     90 or greater         Normal or high (1)  G2                      60-89                Mild decrease (1)  G3a                   45-59                Mild to moderate decrease  G3b                   30-44                Moderate to severe decrease  G4                    15-29                Severe decrease  G5                    14 or less           Kidney failure          (1)In the absence of evidence of kidney disease, neither GFR category G1 or G2 fulfill the criteria for CKD.    eGFR calculation 2021 CKD-EPI creatinine equation, which does not include race as a factor    Lactic Acid, Plasma [[id number]]  (Normal) Collected: 02/10/25 1109    Specimen: Blood Updated: 02/10/25 1149     Lactate 1.5 mmol/L     Magnesium [[id number]]  (Normal) Collected: 02/10/25 1109    Specimen: Blood Updated: 02/10/25 1147     Magnesium 1.9 mg/dL     High Sensitivity Troponin T [[id number]]  (Abnormal) Collected: 02/10/25 1109    Specimen: Blood Updated: 02/10/25 1147     HS Troponin T 78 ng/L     Narrative:      High Sensitive Troponin T Reference Range:  <14.0 ng/L- Negative Female for AMI  <22.0 ng/L- Negative Male for AMI  >=14 - Abnormal Female indicating possible myocardial injury.  >=22 - Abnormal Male indicating possible myocardial injury.   Clinicians would have to utilize clinical acumen, EKG, Troponin, and serial changes to determine if it is an Acute Myocardial Infarction or myocardial injury due to an underlying chronic condition.         Lipase [[id number]]  (Abnormal) Collected: 02/10/25 1109    Specimen: Blood Updated: 02/10/25 1145     Lipase 74 U/L      "Respiratory Panel PCR w/COVID-19(SARS-CoV-2) SIMIN/ALVERTO/MYRA/PAD/COR/MIK In-House, NP Swab in UTM/VTM, 2 HR TAT - Swab, Nasopharynx [[id number]]  (Abnormal) Collected: 02/10/25 1013    Specimen: Swab from Nasopharynx Updated: 02/10/25 1136     ADENOVIRUS, PCR Not Detected     Coronavirus 229E Not Detected     Coronavirus HKU1 Not Detected     Coronavirus NL63 Not Detected     Coronavirus OC43 Not Detected     COVID19 Not Detected     Human Metapneumovirus Not Detected     Human Rhinovirus/Enterovirus Not Detected     Influenza A H1 2009 PCR Detected     Influenza B PCR Not Detected     Parainfluenza Virus 1 Not Detected     Parainfluenza Virus 2 Not Detected     Parainfluenza Virus 3 Not Detected     Parainfluenza Virus 4 Not Detected     RSV, PCR Not Detected     Bordetella pertussis pcr Not Detected     Bordetella parapertussis PCR Not Detected     Chlamydophila pneumoniae PCR Not Detected     Mycoplasma pneumo by PCR Not Detected    Narrative:      In the setting of a positive respiratory panel with a viral infection PLUS a negative procalcitonin without other underlying concern for bacterial infection, consider observing off antibiotics or discontinuation of antibiotics and continue supportive care. If the respiratory panel is positive for atypical bacterial infection (Bordetella pertussis, Chlamydophila pneumoniae, or Mycoplasma pneumoniae), consider antibiotic de-escalation to target atypical bacterial infection.    Blood Culture - Blood, Arm, Right [[id number]] Collected: 02/10/25 1013    Specimen: Blood from Arm, Right Updated: 02/10/25 1027    Blood Culture - Blood, Arm, Left [[id number]] Collected: 02/10/25 1013    Specimen: Blood from Arm, Left Updated: 02/10/25 1027              Objective:     Vitals: /74   Pulse 74   Temp 96.8 °F (36 °C) (Axillary)   Resp 18   Ht 193 cm (76\")   Wt 116 kg (255 lb)   SpO2 92%   BMI 31.04 kg/m²    Intake/Output Summary (Last 24 hours) at 2/14/2025 0792  Last data " filed at 2025  Gross per 24 hour   Intake 2619.83 ml   Output --   Net 2619.83 ml    Temp (24hrs), Av.7 °F (36.5 °C), Min:96.8 °F (36 °C), Max:98.6 °F (37 °C)      Physical Exam  Constitutional:       Appearance: He is well-developed. He is ill-appearing.   HENT:      Head: Normocephalic and atraumatic.   Eyes:      Conjunctiva/sclera: Conjunctivae normal.      Pupils: Pupils are equal, round, and reactive to light.   Cardiovascular:      Rate and Rhythm: Normal rate and regular rhythm.      Heart sounds: Normal heart sounds. No murmur heard.     No friction rub.   Pulmonary:      Effort: No respiratory distress.      Breath sounds: Wheezing present. No rhonchi or rales.      Comments: Wheezing and aeration improved  Abdominal:      General: Bowel sounds are normal. There is no distension.      Palpations: Abdomen is soft.      Tenderness: There is no abdominal tenderness.   Musculoskeletal:         General: Normal range of motion.      Cervical back: Normal range of motion.   Skin:     Capillary Refill: Capillary refill takes less than 2 seconds.   Neurological:      Cranial Nerves: No cranial nerve deficit.      Comments: Delirious and confused,   Psychiatric:         Behavior: Behavior normal.             Assessment and Plan:     Primary Problem:  Acute respiratory failure with hypoxia    Hospital Problem list:    Acute respiratory failure with hypoxia    Nicotine abuse    CAD (coronary artery disease)    Acute renal failure    Influenza A    Lobar pneumonia      PMH:  Past Medical History:   Diagnosis Date    Abnormal stress echo 3/25/19-[facility]    Noted For Myocardial Ischemia    Acute MI     Atelectasis 2019    Noted on Chest XR    CAD (coronary artery disease) Since     w/R Stent Placed    Chest pain due to CAD     Nitro PRN    DM (diabetes mellitus)     false positive    Elevated cholesterol     History of chest x-ray 2019    Suggestive For Atelectasis w/Chronic  Inflammatory Scarring Noted    History of EKG 3/25/19-[facility]    Sinus Rhythm w/Nonspecific ST-T Wave Abnormalities    History of nephrolithiasis     History of stress test 2015    WNL    HLD (hyperlipidemia)     Controlled w/Meds    Hypertension     Controlled w/Meds    Kidney stones     S/p Lithotripsy    LAD stenosis 04/11/2019    90% Mid LAD Stenosis; Diffuse Stenosis of LCX--Noted on Cardiac Cath    Myocardial ischemia 03/25/2019    Noted on Echo Stress Test    RCA occlusion 04/11/2019    Mild Diffuse Disease--Noted on Cardiac Cath     Tobacco use     1.5-1 PPD       Treatment Plan:  Acute respiratory failure with hypoxia  Secondary to influenza A and bilateral lobar pneumonia.  Continue to support respiration and oxygenation.  Last PFT in 2019 showed no COPD but he may have developed underlying COPD with continued smoking.  Transitioned to the ICU for worsening delirium which has improved significantly this morning.  Intensivist team primary now.  Oxygenation improving and now on Vapotherm.     Influenza A  Complete Tamiflu, continue to support oxygenation    Metabolic encephalopathy  More altered but BUN significantly elevated in setting of acute renal failure.  Continue aggressive IV fluids.  Steroids possibly contributing to delirium as well as just overall acute illness.  CT head negative.  No EtOH history.  Unclear why remains altered but improving with Precedex     Lobar pneumonia  Plan as above with IV antibiotics and steroids.  Unclear if this is bacterial pneumonia versus viral.  Procalcitonin is elevated so I think antibiotics are warranted to continue for full course     Acute renal failure  Baseline normal creatinine was severe creatinine elevation in the setting of dehydration likely prerenal in etiology.  Aggressive IV fluids.  Improving with IV fluids     Troponin elevation  Likely related to acute renal failure, no delta    Disposition: Continue ICU level care, prognosis guarded.  When  stable, off Precedex he may be able to transition to the floor.    Reviewed treatment plans with the patient and/or family.     Electronically signed by Mason Peguero MD on 2/14/2025 at 07:44 CST

## 2025-02-14 NOTE — PLAN OF CARE
Goal Outcome Evaluation:              Outcome Evaluation: more alert today and able to cooperate with therapies, large bm and multiple incont urine episodes, multiple ivf given to help with hydration, no mri per md order, titrated O2 down to 6L currently.

## 2025-02-14 NOTE — PROGRESS NOTES
[facility] Intensivist Services  Progress Note    Patient Name: Chaitanya Hair  Date of Admission: 2/10/2025  Today's Date: 02/14/25  Length of Stay: 4  Primary Care Physician: Mason Peguero MD    Subjective     Chief Complaint: Acute on chronic hypoxic respiratory failure    HPI:  Was not on Precedex yesterday evening, rested well overnight.  This morning his mental status was much improved.  Initially his Tamiflu was held as he was nothing by mouth and the Tamiflu cannot be crushed.  The patient passed a swallow study this morning with his improved mental status.  His Tamiflu was restarted earlier this morning.  By about noon the patient became encephalopathic similar to the previous days.  Additionally he developed flushing and itchy eyes.    Review of Systems:  All pertinent negatives and positives are as above. All other systems have been reviewed and are negative unless otherwise stated.     ICU Course     Summary:  Mr. Hair is a 64-year-old male admitted to [facility] with acute on chronic hypoxic respiratory failure secondary to influenza A.  He was initially managed on the floor with Vapotherm.  He developed an acute change in mental status on the evening of 2/11/2025.  He was placed on BiPAP at this time.  The following morning he continued to be significantly altered.  His BiPAP requirements did increase.  He was then admitted to the ICU.  Encephalopathy workup thus far has been negative.  When his Tamiflu was held his mental status improved.  With the improvement of his mental status he was able to swallow.  His Tamiflu was resumed which resulted in reemergence of his encephalopathy.  Additionally he developed similar flushing and itchy eyes.  Therefore it appears the Tamiflu is the etiology of his encephalopathy.  While this is rare, especially in adults, Tamiflu is known to cause encephalopathy.    Objective      Physical Exam:  Exam after worsening mental  "status  General: Encephalopathic, restless  Head: Normocephalic, atraumatic  Eyes: Conjunctiva clear, sclera non-icteric, improved  Teeth/Gums: Normal inspection  Neck: Supple without stridor  Heart: Regular rate and rhythm, no murmur  Lungs: Diminished bilaterally with no wheezing  Abdomen: Soft, nontender  MSK/extremities: No cyanosis or edema  Neurologic: He is alert but not oriented, GCS 12      Results Review:  Results from last 7 days   Lab Units 02/14/25  1220 02/14/25  0257 02/13/25  0345   SODIUM mmol/L 153* 154* 147*   POTASSIUM mmol/L 4.5 5.6* 5.1   CHLORIDE mmol/L 120* 118* 112*   CO2 mmol/L 24.0 26.0 23.0   BUN mg/dL 70* 74* 70*   CALCIUM mg/dL 8.7 9.2 8.6     Results from last 7 days   Lab Units 02/14/25  0257 02/13/25 0345 02/12/25  0229   WBC 10*3/mm3 12.55* 9.73 11.27*   HEMOGLOBIN g/dL 14.8 14.4 14.9   HEMATOCRIT % 45.5 43.0 42.4   PLATELETS 10*3/mm3 147 139* 128*     Visit Vitals  /68   Pulse 77   Temp 98.1 °F (36.7 °C) (Axillary)   Resp 14   Ht 193 cm (76\")   Wt 110 kg (242 lb 8.1 oz)   SpO2 91%   BMI 29.52 kg/m²       Medications:  albuterol, 1.25 mg, Nebulization, 4x Daily - RT   And  ipratropium, 0.5 mg, Nebulization, 4x Daily - RT  amLODIPine, 10 mg, Oral, Daily  aspirin, 81 mg, Oral, Daily  budesonide, 0.5 mg, Nebulization, BID - RT  cefTRIAXone, 2,000 mg, Intravenous, Q24H  Chlorhexidine Gluconate Cloth, 1 Application, Topical, Daily  enoxaparin, 40 mg, Subcutaneous, Q24H  insulin regular, 2-7 Units, Subcutaneous, Q6H  [Held by provider] metoprolol tartrate, 50 mg, Oral, BID  mupirocin, 1 Application, Each Nare, BID  polyethylene glycol, 17 g, Oral, Daily  sodium chloride, 10 mL, Intravenous, Q12H      dexmedetomidine, 0.2-1.5 mcg/kg/hr, Last Rate: Stopped (02/14/25 0600)  lactated ringers, 999 mL/hr  Pharmacy to Dose Oseltamivir (TAMIFLU),           Assessment/Plan     Problems:  Acute on chronic hypoxic respiratory failure  Influenza A  COPD  Restrictive lung disease  Toxic " encephalopathy     Plan:  Acute on chronic hypoxic respiratory failure, influenza A  -Continue supplemental oxygen as needed and wean as tolerated, goal O2 sat of 88-92%  -Recent chest CTA negative for PE  -Will alternate between BiPAP and Vapotherm as able  -Nebulized triple therapy  -Continue to monitor respiratory status off of scheduled steroids  -Discontinue Tamiflu  -Complete empiric Rocephin and azithromycin  -Urinary antigens and MRSA swab negative     Encephalopathy  -Encephalopathy seems to be secondary to Tamiflu administration.  When this med was held in the ICU his mental status improved.  When it was resumed his mental status again worsened with flushing and itchy eyes.  Will discontinue his Tamiflu and added to his allergy list.  Continue to monitor in the ICU until toxic effects of Tamiflu resolve and his mental status improves  -Infectious workup negative other than influenza send  -Lab work unrevealing  -Will give one-time dose of Solu-Medrol, Benadryl, Pepcid as he appears to have allergic reaction to Tamiflu in addition to the encephalopathy     COPD, restrictive lung disease  -Triple therapy as above        Critical Care Set:  Feeding: Resume nothing by mouth  Analgesia: As needed ordered  Sedation: Precedex as needed for agitation from his toxic encephalopathy  Thromboprophylaxis: DVT Lovenox  HOB: Elevated  Ulcer prophylaxis: N/A  Glycemic control: Nondiabetic  SBT: N/A  Bowel movement: As needed regimen ordered  Catheter removal: N/A  Medication de-escalation: N/A    Disposition  Continue critical care management.    Total critical care time: Approximately 35 minutes     Due to a high probability of clinically significant, life threatening deterioration, the patient required my highest level of preparedness to intervene emergently and I personally spent this critical care time directly and personally managing the patient.      This critical care time included obtaining a history; examining  the patient; pulse oximetry; ordering and review of studies; arranging urgent treatment with development of a management plan; evaluation of patient's response to treatment; frequent reassessment; and, discussions with other providers.     This critical care time was performed to assess and manage the high probability of imminent, life-threatening deterioration that could result in multi-organ failure. It was exclusive of separately billable procedures and treating other patients and teaching time.      Irina Elizabeth DO  Pulmonary Critical Care Medicine  2/14/2025  13:39 CST

## 2025-02-14 NOTE — CASE MANAGEMENT/SOCIAL WORK
Continued Stay Note   [place]     Patient Name: Chaitanya Hair  MRN: [id number]  Today's Date: 2/14/2025    Admit Date: 2/10/2025    Plan: Pending   Discharge Plan       Row Name 02/14/25 1049       Plan    Plan Pending    Plan Comments SW following patient's progress to determine appropriate level of care upon discharge.  Following for patient to improve and be able to participate with therapy again.                   Discharge Codes    No documentation.                       ANTONIETTA Burris

## 2025-02-14 NOTE — PLAN OF CARE
Goal Outcome Evaluation:     Problem: Noninvasive Ventilation Acute  Goal: Effective Unassisted Ventilation and Oxygenation  Outcome: Progressing

## 2025-02-14 NOTE — THERAPY EVALUATION
Acute Care - Speech Language Pathology   Swallow Initial Evaluation [facility]     Patient Name: Chaitanya Hair  : 1960  MRN: [id number]  Today's Date: 2025               Admit Date: 2/10/2025    Visit Dx:      SPEECH-LANGUAGE PATHOLOGY EVALUATION - SWALLOW  Subjective: The patient was seen on this date for a Clinical Swallow evaluation.  Patient was alert and cooperative.  Significant history: Presented due to acute on chronic hypoxic respiratory failure secondary to flu A, altered mental status. History of acute care stay for COVID in  with need for LTACH. VFSS at that time with silent aspiration of thin liquids and use of chin tuck due to pharyngeal weaknss. Completed OP speech therapy and progressed back to normal diet. Medical history includes smoker, CAD, HTN.   Objective: Oral motor examination results: WFL. Textures given during assessment of swallow function included ice, thin liquid, nectar thick liquid, honey thick liquid, puree consistency, and regular consistency.      Assessment:   Observations: Swallow evaluation completed. Oral cavity was noted to present dry upon arrival due to open mouth posture. Patient was positioned upright and was easily awoken to present trials. All consistencies were given, due to reduced awareness meals need to remain supervised to reduce risk of aspiration. Multiple swallows were noted with each trial. Moderate cues were given to the pt for lip closure and releasing both the straw and spoon from his teeth. 1x instance of delayed strong coughing noted with thin liquids. OK to start soft to chew diet with thin liquids. He requires 1:1 supervision with intake given his altered mental status for safety precautions. SLP will continue to follow patient.       SLP Findings:  Patient presents with mild to moderate oropharyngeal dysphagia, with esophageal component.   Recommendations: Diet Textures: soft to chew chopped meats and thin liquids  Medications  should be taken whole with puree.   Recommended Strategies: upright for PO, small bites and sips, supervision with all PO, and 1:1 assist with all PO. Oral care after meals.      Dysphagia therapy is recommended.       Rocío Roberto, SLP   Daphney Thao, MS CCC-SLP 2/14/2025 14:20 CST        ICD-10-CM ICD-9-CM   1. Acute renal failure, unspecified acute renal failure type  N17.9 584.9   2. Influenza A  J10.1 487.1   3. Pneumonia due to infectious organism, unspecified laterality, unspecified part of lung  J18.9 486   4. Impaired mobility [Z74.09]  Z74.09 799.89   5. Dysphagia, unspecified type  R13.10 787.20     Patient Active Problem List   Diagnosis    Coronary artery disease involving native coronary artery of native heart without angina pectoris    Hypertension    Nicotine abuse    Old inferior wall myocardial infarction    Presence of stent in right coronary artery    Tobacco abuse    CAD (coronary artery disease)    Dysphagia, oropharyngeal    Dysphonia    Hoarseness    Vocal cord weakness    Dysarthria    Personal history of adenomatous and serrated colon polyps    HLD (hyperlipidemia)    Acute renal failure    Acute respiratory failure with hypoxia    Influenza A    Lobar pneumonia     Past Medical History:   Diagnosis Date    Abnormal stress echo 3/25/19-[facility]    Noted For Myocardial Ischemia    Acute MI     Atelectasis 04/11/2019    Noted on Chest XR    CAD (coronary artery disease) Since 2009    w/R Stent Placed    Chest pain due to CAD     Nitro PRN    DM (diabetes mellitus)     false positive    Elevated cholesterol     History of chest x-ray 04/11/2019    Suggestive For Atelectasis w/Chronic Inflammatory Scarring Noted    History of EKG 3/25/19-[facility]    Sinus Rhythm w/Nonspecific ST-T Wave Abnormalities    History of nephrolithiasis     History of stress test 2015    WNL    HLD (hyperlipidemia)     Controlled w/Meds    Hypertension     Controlled w/Meds     Kidney stones     S/p Lithotripsy    LAD stenosis 04/11/2019    90% Mid LAD Stenosis; Diffuse Stenosis of LCX--Noted on Cardiac Cath    Myocardial ischemia 03/25/2019    Noted on Echo Stress Test    RCA occlusion 04/11/2019    Mild Diffuse Disease--Noted on Cardiac Cath     Tobacco use     1.5-1 PPD     Past Surgical History:   Procedure Laterality Date    ABDOMINAL SURGERY  2023    APPENDECTOMY  2023    BACK SURGERY  2013    CARDIAC CATHETERIZATION  2009    RCA Stent    CARDIAC CATHETERIZATION  04/11/2019-[facility]    w/L Ventriculogram/Angiography--Dr. Garcia--CAD Involving LAD    COLONOSCOPY N/A 10/16/2024    Procedure: COLONOSCOPY WITH ANESTHESIA;  Surgeon: Nathaly Nino MD;  Location:  [facility];  Service: Gastroenterology;  Laterality: N/A;  pre screening  post diverticulosis; polyp  pcp  Mason Peguero MD    CORONARY ANGIOPLASTY WITH STENT PLACEMENT Right 2009    CORONARY ARTERY BYPASS GRAFT N/A 04/16/2019    Procedure: MEDIAN STERNOTOMY, CORONARY ARTERY BYPASS X2 USING LEFT INTERNAL MAMMARY ARTERY GRAFT, PRP, INTRAOP LAURI;  Surgeon: Jesse Perez MD;  Location: [facility] OR;  Service: Cardiothoracic    CYSTOSCOPY W/ LASER LITHOTRIPSY         SLP Recommendation and Plan  SLP Swallowing Diagnosis: (P) mild, oral dysphagia, suspected pharyngeal dysphagia (02/14/25 1051)  SLP Diet Recommendation: (P) soft to chew textures, thin liquids (02/14/25 1051)  Recommended Precautions and Strategies: (P) upright posture during/after eating, small bites of food and sips of liquid, fatigue precautions, 1:1 supervision (02/14/25 1051)  SLP Rec. for Method of Medication Administration: (P) meds whole, with puree (02/14/25 1051)     Monitor for Signs of Aspiration: (P) notify SLP if any concerns (02/14/25 1051)     Swallow Criteria for Skilled Therapeutic Interventions Met: (P) demonstrates skilled criteria (02/14/25 1051)  Anticipated Discharge Disposition (SLP): (P) home (02/14/25 1051)  Rehab  Potential/Prognosis, Swallowing: (P) adequate, monitor progress closely (02/14/25 1051)  Therapy Frequency (Swallow): (P) PRN (02/14/25 1051)  Predicted Duration Therapy Intervention (Days): (P) until discharge (02/14/25 1051)  Oral Care Recommendations: (P) Oral Care before breakfast, after meals and PRN (02/14/25 1051)                                        Progress: (P) no change      SWALLOW EVALUATION (Last 72 Hours)       SLP Adult Swallow Evaluation       Row Name 02/14/25 1051                   Rehab Evaluation    Document Type evaluation (P)   -CB        Patient Observations alert;cooperative;agree to therapy (P)   -CB        Patient/Family/Caregiver Comments/Observations -- (P)   Wife at bedside  -CB        Patient Effort good (P)   -CB        Symptoms Noted During/After Treatment none (P)   -CB           General Information    Patient Profile Reviewed yes (P)   -CB        Pertinent History Of Current Problem LAD, MI, diabetes, hyperlipidemia, CAD, and respiratory failure. (P)   -CB        Current Method of Nutrition NPO (P)   -CB        Precautions/Limitations, Vision WFL;for purposes of eval (P)   -CB        Precautions/Limitations, Hearing WFL;for purposes of eval (P)   -CB        Prior Level of Function-Communication WFL (P)   -CB        Prior Level of Function-Swallowing -- (P)   with silent aspiration following VFSS  -CB        Plans/Goals Discussed with patient;spouse/S.O. (P)   -CB        Barriers to Rehab cognitive status (P)   -CB        Patient's Goals for Discharge return home (P)   -CB        Family Goals for Discharge patient able to return to all previous activities/roles (P)   -CB           Pain    Pretreatment Pain Rating 0/10 - no pain (P)   -CB        Posttreatment Pain Rating 0/10 - no pain (P)   -CB           Oral Motor Structure and Function    Dentition Assessment natural, present and adequate (P)   -CB        Secretion Management dried secretions in oral cavity (P)   -CB         Mucosal Quality dry (P)   -CB        Gag Response WFL (P)   -CB        Volitional Swallow WFL (P)   -CB        Volitional Cough WFL (P)   -CB           Oral Musculature and Cranial Nerve Assessment    Oral Motor General Assessment WFL (P)   -CB           General Eating/Swallowing Observations    Respiratory Support Currently in Use nasal cannula (P)   -CB        O2 Liters -- (P)   25L with 60% oxygen saturation!  -CB        Eating/Swallowing Skills fed by SLP (P)   -CB        Positioning During Eating upright 90 degree;upright in bed (P)   -CB        Utensils Used spoon;straw (P)   -CB        Consistencies Trialed regular textures;honey-thick liquids;pudding thick;nectar/syrup-thick liquids;thin liquids (P)   -CB           Respiratory    Respiratory Status WFL (P)   -CB           Clinical Swallow Eval    Oral Prep Phase -- (P)   Prolonged chewing  -CB        Oral Transit -- (P)   Multiple swallows  -CB        Oral Residue -- (P)   N/a  -CB        Pharyngeal Phase -- (P)   Pharyngeal weakness  -CB        Esophageal Phase unremarkable (P)   -CB           SLP Evaluation Clinical Impression    SLP Swallowing Diagnosis mild;oral dysphagia;suspected pharyngeal dysphagia (P)   -CB        Functional Impact risk of aspiration/pneumonia;risk of malnutrition;risk of dehydration (P)   -CB        Rehab Potential/Prognosis, Swallowing adequate, monitor progress closely (P)   -CB        Swallow Criteria for Skilled Therapeutic Interventions Met demonstrates skilled criteria (P)   -CB           Recommendations    Therapy Frequency (Swallow) PRN (P)   -CB        Predicted Duration Therapy Intervention (Days) until discharge (P)   -CB        SLP Diet Recommendation soft to chew textures;thin liquids (P)   -CB        Recommended Precautions and Strategies upright posture during/after eating;small bites of food and sips of liquid;fatigue precautions;1:1 supervision (P)   -CB        Oral Care Recommendations Oral Care before breakfast,  after meals and PRN (P)   -CB        SLP Rec. for Method of Medication Administration meds whole;with puree (P)   -CB        Monitor for Signs of Aspiration notify SLP if any concerns (P)   -CB        Anticipated Discharge Disposition (SLP) home (P)   -CB           Swallow Goals (SLP)    Swallow LTGs Swallow Long Term Goal (free text) (P)   -CB        Swallow STGs diet tolerance goal selection (SLP) (P)   -CB        Diet Tolerance Goal Selection (SLP) Patient will tolerate trials of (P)   -CB           (LTG) Swallow    (LTG) Swallow Patient will tolerate least restrictive diet. (P)   -CB        Archer (Swallow Long Term Goal) independently (over 90% accuracy) (P)   -CB        Time Frame (Swallow Long Term Goal) by discharge (P)   -CB        Barriers (Swallow Long Term Goal) -- (P)   Cognitive status  -CB        Progress/Outcomes (Swallow Long Term Goal) new goal (P)   -CB           (STG) Patient will tolerate trials of    Consistencies Trialed (Tolerate trials) pureed textures;thin liquids (P)   -CB        Desired Outcome (Tolerate trials) without signs/symptoms of aspiration (P)   -CB        Archer (Tolerate trials) independently (over 90% accuracy) (P)   -CB        Time Frame (Tolerate trials) by discharge (P)   -CB        Progress/Outcomes (Tolerate trials) new goal (P)   -CB                  User Key  (r) = Recorded By, (t) = Taken By, (c) = Cosigned By      Initials Name Effective Dates    CB Rocío Wild, Speech Therapy Student 12/17/24 -                     EDUCATION  The patient has been educated in the following areas:   Dysphagia (Swallowing Impairment).        SLP GOALS       Row Name 02/14/25 1051             (LTG) Swallow    (LTG) Swallow Patient will tolerate least restrictive diet. (P)   -CB      Archer (Swallow Long Term Goal) independently (over 90% accuracy) (P)   -CB      Time Frame (Swallow Long Term Goal) by discharge (P)   -CB      Barriers (Swallow Long Term Goal) -- (P)    Cognitive status  -CB      Progress/Outcomes (Swallow Long Term Goal) new goal (P)   -CB         (STG) Patient will tolerate trials of    Consistencies Trialed (Tolerate trials) pureed textures;thin liquids (P)   -CB      Desired Outcome (Tolerate trials) without signs/symptoms of aspiration (P)   -CB      Benson (Tolerate trials) independently (over 90% accuracy) (P)   -CB      Time Frame (Tolerate trials) by discharge (P)   -CB      Progress/Outcomes (Tolerate trials) new goal (P)   -CB                User Key  (r) = Recorded By, (t) = Taken By, (c) = Cosigned By      Initials Name Provider Type    Rocío Braxton Speech Therapy Student SLP Student                         Time Calculation:    Time Calculation- SLP       Row Name 02/14/25 1251             Time Calculation- SLP    SLP Start Time 1118 (P)   -CB      SLP Stop Time 1252 (P)   -CB      SLP Time Calculation (min) 94 min (P)   -CB      SLP Received On 02/14/25 (P)   -CB                User Key  (r) = Recorded By, (t) = Taken By, (c) = Cosigned By      Initials Name Provider Type    Rocío Braxton, Speech Therapy Student SLP Student                             Rocío Wild Speech Therapy Student  2/14/2025

## 2025-02-14 NOTE — PROGRESS NOTES
RT EQUIPMENT DEVICE RELATED - SKIN ASSESSMENT    Abiodun Score:  Abiodun Score: 14     RT Medical Equipment/Device:     High Flow Nasal Cannula:   Vapotherm    Skin Assessment:      Cheek:  Intact  Nose:  Intact    Device Skin Pressure Protection:  Skin-to-device areas padded:  None Required    Nurse Notification:  Jeanette Mccullough, CRT

## 2025-02-14 NOTE — THERAPY TREATMENT NOTE
Acute Care - Physical Therapy Treatment Note  [facility]     Patient Name: Chaitanya Hair  : 1960  MRN: [id number]  Today's Date: 2025      Visit Dx:     ICD-10-CM ICD-9-CM   1. Acute renal failure, unspecified acute renal failure type  N17.9 584.9   2. Influenza A  J10.1 487.1   3. Pneumonia due to infectious organism, unspecified laterality, unspecified part of lung  J18.9 486   4. Impaired mobility [Z74.09]  Z74.09 799.89   5. Dysphagia, unspecified type  R13.10 787.20     Patient Active Problem List   Diagnosis    Coronary artery disease involving native coronary artery of native heart without angina pectoris    Hypertension    Nicotine abuse    Old inferior wall myocardial infarction    Presence of stent in right coronary artery    Tobacco abuse    CAD (coronary artery disease)    Dysphagia, oropharyngeal    Dysphonia    Hoarseness    Vocal cord weakness    Dysarthria    Personal history of adenomatous and serrated colon polyps    HLD (hyperlipidemia)    Acute renal failure    Acute respiratory failure with hypoxia    Influenza A    Lobar pneumonia     Past Medical History:   Diagnosis Date    Abnormal stress echo 3/25/19-[facility]    Noted For Myocardial Ischemia    Acute MI     Atelectasis 2019    Noted on Chest XR    CAD (coronary artery disease) Since     w/R Stent Placed    Chest pain due to CAD     Nitro PRN    DM (diabetes mellitus)     false positive    Elevated cholesterol     History of chest x-ray 2019    Suggestive For Atelectasis w/Chronic Inflammatory Scarring Noted    History of EKG 3/25/19-[facility]    Sinus Rhythm w/Nonspecific ST-T Wave Abnormalities    History of nephrolithiasis     History of stress test     WNL    HLD (hyperlipidemia)     Controlled w/Meds    Hypertension     Controlled w/Meds    Kidney stones     S/p Lithotripsy    LAD stenosis 2019    90% Mid LAD Stenosis; Diffuse Stenosis of LCX--Noted on Cardiac Cath     Myocardial ischemia 03/25/2019    Noted on Echo Stress Test    RCA occlusion 04/11/2019    Mild Diffuse Disease--Noted on Cardiac Cath     Tobacco use     1.5-1 PPD     Past Surgical History:   Procedure Laterality Date    ABDOMINAL SURGERY  2023    APPENDECTOMY  2023    BACK SURGERY  2013    CARDIAC CATHETERIZATION  2009    RCA Stent    CARDIAC CATHETERIZATION  04/11/2019-[facility]    w/L Ventriculogram/Angiography--Dr. Garcia--CAD Involving LAD    COLONOSCOPY N/A 10/16/2024    Procedure: COLONOSCOPY WITH ANESTHESIA;  Surgeon: Nathaly Nino MD;  Location:  [facility];  Service: Gastroenterology;  Laterality: N/A;  pre screening  post diverticulosis; polyp  pcp  Mason Peguero MD    CORONARY ANGIOPLASTY WITH STENT PLACEMENT Right 2009    CORONARY ARTERY BYPASS GRAFT N/A 04/16/2019    Procedure: MEDIAN STERNOTOMY, CORONARY ARTERY BYPASS X2 USING LEFT INTERNAL MAMMARY ARTERY GRAFT, PRP, INTRAOP LAURI;  Surgeon: Jesse Perez MD;  Location: [facility] OR;  Service: Cardiothoracic    CYSTOSCOPY W/ LASER LITHOTRIPSY       PT Assessment (Last 12 Hours)       PT Evaluation and Treatment       Row Name 02/14/25 1420 02/14/25 1136       Physical Therapy Time and Intention    Subjective Information no complaints  -LY --    Document Type therapy note (daily note)  -LY therapy note (daily note)  -LY    Mode of Treatment physical therapy  -LY physical therapy  -LY    Session Not Performed -- patient unavailable for treatment  -LY    Comment, Session Not Performed -- nsg setting him up for lunch  -LY      Row Name 02/14/25 1420          General Information    Existing Precautions/Restrictions fall;oxygen therapy device and L/min  9L HFNC  -LY       Row Name 02/14/25 1420          Pain    Pretreatment Pain Rating 0/10 - no pain  -LY     Posttreatment Pain Rating 0/10 - no pain  -LY       Row Name 02/14/25 1420          Cognition    Orientation Status (Cognition) oriented to;person;time;disoriented  to;place;situation  -LY       Row Name 02/14/25 1420          Bed Mobility    Rolling Left [place] (Bed Mobility) maximum assist (25% patient effort);dependent (less than 25% patient effort)  -LY     Rolling Right [place] (Bed Mobility) maximum assist (25% patient effort);dependent (less than 25% patient effort)  -LY     Assistive Device (Bed Mobility) head of bed elevated;repositioning sheet  -LY       Row Name 02/14/25 1420          Motor Skills    Comments, Therapeutic Exercise BLE AAROM 10 reps x2 sets  -LY     Additional Documentation Comments, Therapeutic Exercise (Row)  -LY       Row Name 02/14/25 1420          Plan of Care Review    Plan of Care Reviewed With patient  -LY     Progress declining  -LY     Outcome Evaluation PT tx completed. Pt alert to self and time only. He is able to follow all commands when given extensive time to respond. Eyes were kept closed for majority of tx despite encouragement to keep them open during therapy. He was able to complete BLE AAROMO 10 reps x2 sets. Requires increased time to complete activity. Cont with ataxia to BLEs. Left in R sidelying. Will follow.  -LY       Row Name 02/14/25 1420          Positioning and Restraints    Pre-Treatment Position in bed  -LY     Post Treatment Position bed  -LY     In Bed side lying right;call light within reach;encouraged to call for assist;exit alarm on  -LY               User Key  (r) = Recorded By, (t) = Taken By, (c) = Cosigned By      Initials Name Provider Type    LY Holly Patten, PTA Physical Therapist Assistant                    Physical Therapy Education       Title: PT OT SLP Therapies (In Progress)       Topic: Physical Therapy (In Progress)       Point: Mobility training (Done)       Learning Progress Summary            Patient Acceptance, E, VU by BL at 2/11/2025 1059    Comment: Bed mobility techinques, plan of care, home safety     by BL at 2/11/2025 1058                      Point: Home exercise program  (Not Started)       Learner Progress:  Not documented in this visit.              Point: Body mechanics (Done)       Learning Progress Summary            Patient Acceptance, E, VU by BL at 2/11/2025 1059    Comment: Bed mobility techinques, plan of care, home safety     by  at 2/11/2025 1058                      Point: Precautions (Done)       Learning Progress Summary            Patient Acceptance, E, VU by BL at 2/11/2025 1059    Comment: Bed mobility techinques, plan of care, home safety     by  at 2/11/2025 1058                                      User Key       Initials Effective Dates Name Provider Type Discipline     12/17/24 -  Hernandez Reyes PT Student PT Student PT                  PT Recommendation and Plan     Plan of Care Reviewed With: patient  Progress: declining  Outcome Evaluation: PT tx completed. Pt alert to self and time only. He is able to follow all commands when given extensive time to respond. Eyes were kept closed for majority of tx despite encouragement to keep them open during therapy. He was able to complete BLE AAROMO 10 reps x2 sets. Requires increased time to complete activity. Cont with ataxia to BLEs. Left in R sidelying. Will follow.       Time Calculation:    PT Charges       Row Name 02/14/25 1500             Time Calculation    Start Time 1420  -LY      Stop Time 1444  -LY      Time Calculation (min) 24 min  -LY      PT Received On 02/14/25  -LY         Time Calculation- PT    Total Timed Code Minutes- PT 24 minute(s)  -LY         Timed Charges    58545 - PT Therapeutic Exercise Minutes 24  -LY         Total Minutes    Timed Charges Total Minutes 24  -LY       Total Minutes 24  -LY                User Key  (r) = Recorded By, (t) = Taken By, (c) = Cosigned By      Initials Name Provider Type    LY Holly Patten, PTA Physical Therapist Assistant                      PT G-Codes  Outcome Measure Options: AM-PAC 6 Clicks Daily Activity (OT)  AM-PAC 6 Clicks Score (PT):  11  AM-PAC 6 Clicks Score (OT): 10    Holly Patten, PTA  2/14/2025

## 2025-02-14 NOTE — PLAN OF CARE
Goal Outcome Evaluation:  Plan of Care Reviewed With: patient        Progress: declining  Outcome Evaluation: PT tx completed. Pt alert to self and time only. He is able to follow all commands when given extensive time to respond. Eyes were kept closed for majority of tx despite encouragement to keep them open during therapy. He was able to complete BLE AAROMO 10 reps x2 sets. Requires increased time to complete activity. Cont with ataxia to BLEs. Left in R sidelying. Will follow.

## 2025-02-14 NOTE — PLAN OF CARE
Goal Outcome Evaluation:  Plan of Care Reviewed With: patient, spouse           Outcome Evaluation: OT eval completed. Pt presents lethargic and oriented to self only. Spouse present in room throughout session, remained on vapotherm and O2 sats maintained in 90s with activity. Spouse reports at baseline that pt is independent with all adls and mobility. Today he required Max A for rolling L/R in bed. Dep A for scooting up in bed. He was able to wash face with Mod A and [place] A. Pt remained lethargic throughout session. Not safe to attempt EOB activity at this time. He would benefit from skilled OT services to address decreased strength, balance, activity tolerance and cognition. Recommend d/c to SNF.    Anticipated Discharge Disposition (OT): skilled nursing facility

## 2025-02-14 NOTE — PLAN OF CARE
Goal Outcome Evaluation:           Progress: declining  Outcome Evaluation: altered mental status today, not verbalizing moans and gets restless/agitated/very tense with any pt care. Started CIWA with Ativan with no improvement. Protocol d/c'd & Precedex gtt started. Pt more calm but will still get very tense & moan when aroused. Alternating Bipap & Vapotherm. Afebrile. MRI ordered

## 2025-02-14 NOTE — THERAPY EVALUATION
Patient Name: Chaitanya Hair  : 1960    MRN: [id number]                              Today's Date: 2025       Admit Date: 2/10/2025    Visit Dx:     ICD-10-CM ICD-9-CM   1. Acute renal failure, unspecified acute renal failure type  N17.9 584.9   2. Influenza A  J10.1 487.1   3. Pneumonia due to infectious organism, unspecified laterality, unspecified part of lung  J18.9 486   4. Impaired mobility [Z74.09]  Z74.09 799.89   5. Dysphagia, unspecified type  R13.10 787.20     Patient Active Problem List   Diagnosis    Coronary artery disease involving native coronary artery of native heart without angina pectoris    Hypertension    Nicotine abuse    Old inferior wall myocardial infarction    Presence of stent in right coronary artery    Tobacco abuse    CAD (coronary artery disease)    Dysphagia, oropharyngeal    Dysphonia    Hoarseness    Vocal cord weakness    Dysarthria    Personal history of adenomatous and serrated colon polyps    HLD (hyperlipidemia)    Acute renal failure    Acute respiratory failure with hypoxia    Influenza A    Lobar pneumonia     Past Medical History:   Diagnosis Date    Abnormal stress echo 3/25/19-[facility]    Noted For Myocardial Ischemia    Acute MI     Atelectasis 2019    Noted on Chest XR    CAD (coronary artery disease) Since     w/R Stent Placed    Chest pain due to CAD     Nitro PRN    DM (diabetes mellitus)     false positive    Elevated cholesterol     History of chest x-ray 2019    Suggestive For Atelectasis w/Chronic Inflammatory Scarring Noted    History of EKG 3/25/19-[facility]    Sinus Rhythm w/Nonspecific ST-T Wave Abnormalities    History of nephrolithiasis     History of stress test     WNL    HLD (hyperlipidemia)     Controlled w/Meds    Hypertension     Controlled w/Meds    Kidney stones     S/p Lithotripsy    LAD stenosis 2019    90% Mid LAD Stenosis; Diffuse Stenosis of LCX--Noted on Cardiac Cath    Myocardial  ischemia 03/25/2019    Noted on Echo Stress Test    RCA occlusion 04/11/2019    Mild Diffuse Disease--Noted on Cardiac Cath     Tobacco use     1.5-1 PPD     Past Surgical History:   Procedure Laterality Date    ABDOMINAL SURGERY  2023    APPENDECTOMY  2023    BACK SURGERY  2013    CARDIAC CATHETERIZATION  2009    RCA Stent    CARDIAC CATHETERIZATION  04/11/2019-[facility]    w/L Ventriculogram/Angiography--Dr. Garcia--CAD Involving LAD    COLONOSCOPY N/A 10/16/2024    Procedure: COLONOSCOPY WITH ANESTHESIA;  Surgeon: Nathaly Nino MD;  Location:  [facility];  Service: Gastroenterology;  Laterality: N/A;  pre screening  post diverticulosis; polyp  pcp  Mason Peguero MD    CORONARY ANGIOPLASTY WITH STENT PLACEMENT Right 2009    CORONARY ARTERY BYPASS GRAFT N/A 04/16/2019    Procedure: MEDIAN STERNOTOMY, CORONARY ARTERY BYPASS X2 USING LEFT INTERNAL MAMMARY ARTERY GRAFT, PRP, INTRAOP LAURI;  Surgeon: Jesse Perez MD;  Location: [facility] OR;  Service: Cardiothoracic    CYSTOSCOPY W/ LASER LITHOTRIPSY        General Information       Row Name 02/14/25 0732          OT Time and Intention    Document Type evaluation  Dx: Influenza A, B lobe pneumonia. Hx: CAD, HTN  -JW     Mode of Treatment occupational therapy  -JW     Patient Effort good  -       Row Name 02/14/25 0732          General Information    Patient Profile Reviewed yes  -JW     Prior Level of Function independent:;all household mobility;transfer;ADL's;bed mobility  -     Existing Precautions/Restrictions fall;oxygen therapy device and L/min  -     Barriers to Rehab medically complex  -       Row Name 02/14/25 0732          Living Environment    People in Home spouse  -       Row Name 02/14/25 0732          Cognition    Orientation Status (Cognition) oriented to;person;place;disoriented to;situation;time;verbal cues/prompts needed for orientation  -       Row Name 02/14/25 0732          Safety Issues/Impairments Affecting  Functional Mobility    Impairments Affecting Function (Mobility) cognition;coordination;motor control;endurance/activity tolerance;strength  -               User Key  (r) = Recorded By, (t) = Taken By, (c) = Cosigned By      Initials Name Provider Type    Germania Arguelles, LULU/L, CSRS Occupational Therapist                     Mobility/ADL's       Row Name 02/14/25 0732          Bed Mobility    Bed Mobility rolling right;rolling left;scooting/bridging  -     Rolling Left [place] (Bed Mobility) maximum assist (25% patient effort);dependent (less than 25% patient effort)  -     Rolling Right [place] (Bed Mobility) maximum assist (25% patient effort);dependent (less than 25% patient effort)  -     Scooting/Bridging [place] (Bed Mobility) dependent (less than 25% patient effort);2 person assist  -     Assistive Device (Bed Mobility) head of bed elevated;repositioning sheet  -       Row Name 02/14/25 0732          Transfers    Transfers --  -     Comment, (Transfers) not appropriate to attempt at this time.  -       Row Name 02/14/25 0732          Stand-Sit Transfer    Assistive Device (Stand-Sit Transfers) --  -       Row Name 02/14/25 07          Functional Mobility    Functional Mobility- Ind. Level not appropriate to assess  -     Functional Mobility- Device --  -     Functional Mobility- Safety Issues --  -       Row Name 02/14/25 0732          Activities of Daily Living    BADL Assessment/Intervention grooming  -       Row Name 02/14/25 0732          Lower Body Dressing Assessment/Training    Position (Lower Body Dressing) --  -       Row Name 02/14/25 0732          Grooming Assessment/Training    [place] Level (Grooming) wash face, hands;moderate assist (50% patient effort)  -     Position (Grooming) edge of bed sitting  -     Comment, (Grooming) [place] A  -               User Key  (r) = Recorded By, (t) = Taken By, (c) = Cosigned By      Initials Name  Provider Type     Germania Su OTR/L, ESDRAS Occupational Therapist                   Obj/Interventions       [facility] Name 02/14/25 0732          Sensory Assessment (Somatosensory)    Sensory Assessment (Somatosensory) UE sensation intact  -JW       Row Name 02/14/25 0732          Vision Assessment/Intervention    Visual Impairment/Limitations WFL  -[facility] Name 02/14/25 0732          Range of Motion Comprehensive    General Range of Motion bilateral upper extremity ROM [facility]  -JW       Row Name 02/14/25 [facility]          Strength Comprehensive (MMT)    Comment, General Manual Muscle Testing (MMT) Assessment B UE strength observed 3/5  -               User Key  (r) = Recorded By, (t) = Taken By, (c) = Cosigned By      Initials Name Provider Type     Germania Su OTR/L, ESDRAS Occupational Therapist                   Goals/Plan       Row Name 02/14/25 0732          Bathing Goal 1 (OT)    Activity/Device (Bathing Goal 1, OT) bathing skills, all  -     [place] Level/Cues Needed (Bathing Goal 1, OT) minimum assist (75% or more patient effort)  -     Time Frame (Bathing Goal 1, OT) long term goal (LTG);by discharge  -     Progress/Outcomes (Bathing Goal 1, OT) new goal  -JW       Row Name 02/14/25 0732          Dressing Goal 1 (OT)    Activity/Device (Dressing Goal 1, OT) dressing skills, all  -     [place]/Cues Needed (Dressing Goal 1, OT) minimum assist (75% or more patient effort)  -     Time Frame (Dressing Goal 1, OT) long term goal (LTG);by discharge  -     Progress/Outcome (Dressing Goal 1, OT) new goal  -JW       Row Name 02/14/25 0732          Toileting Goal 1 (OT)    Activity/Device (Toileting Goal 1, OT) toileting skills, all  -     [place] Level/Cues Needed (Toileting Goal 1, OT) minimum assist (75% or more patient effort)  -     Time Frame (Toileting Goal 1, OT) long term goal (LTG);by discharge  -     Progress/Outcome (Toileting Goal 1, OT) new [facility]  -        [facility] Name 02/14/25 0732          Therapy Assessment/Plan (OT)    Planned Therapy Interventions (OT) activity tolerance training;adaptive equipment training;BADL retraining;functional balance retraining;transfer/mobility retraining;occupation/activity based interventions;strengthening exercise;patient/caregiver education/training  -               User Key  (r) = Recorded By, (t) = Taken By, (c) = Cosigned By      Initials Name Provider Type     Germania Su, OTR/L, CSRS Occupational Therapist                   Clinical Impression       Row Name 02/14/25 07          Pain Assessment    Additional Documentation Pain Scale: FACES Pre/Post-Treatment (Group)  -JW       Row Name 02/14/25 07          Pain Scale: FACES Pre/Post-Treatment    Pain: FACES Scale, Pretreatment 2-->hurts little bit  -     Posttreatment Pain Rating 2-->hurts little bit  -JW       Row Name 02/14/25 0732          Plan of Care Review    Plan of Care Reviewed With patient;spouse  -     Outcome Evaluation OT eval completed. Pt presents lethargic and oriented to self only. Spouse present in room throughout session, remained on vapotherm and O2 sats maintained in 90s with activity. Spouse reports at baseline that pt is independent with all adls and mobility. Today he required Max A for rolling L/R in bed. Dep A for scooting up in bed. He was able to wash face with Mod A and [place] A. Pt remained lethargic throughout session. Not safe to attempt EOB activity at this time. He would benefit from skilled OT services to address decreased strength, balance, activity tolerance and cognition. Recommend d/c to SNF.  -[facility] 02/14/25 07          Therapy Assessment/Plan (OT)    Criteria for Skilled Therapeutic Interventions Met (OT) yes;skilled treatment is necessary  -     Therapy Frequency (OT) 5 times/wk  -     Predicted Duration of Therapy Intervention (OT) 10 days  -JW       Row Name 02/14/25 0732          Therapy Plan  Review/Discharge Plan (OT)    Anticipated Discharge Disposition (OT) skilled nursing facility  -       Row Name 02/14/25 0732          Positioning and Restraints    Pre-Treatment Position in bed  -JW     Post Treatment Position bed  -JW     In Bed notified nsg;fowlers;call light within reach;encouraged to call for assist;side rails up x3;exit alarm on;patient within staff view;with family/caregiver  -               User Key  (r) = Recorded By, (t) = Taken By, (c) = Cosigned By      Initials Name Provider Type    Germania Arguelles, OTR/L, CSRS Occupational Therapist                   Outcome Measures       Row Name 02/14/25 0732          How much help from another is currently needed...    Putting on and taking off regular lower body clothing? 1  -JW     Bathing (including washing, rinsing, and drying) 2  -JW     Toileting (which includes using toilet bed pan or urinal) 1  -JW     Putting on and taking off regular upper body clothing 2  -JW     Taking care of personal grooming (such as brushing teeth) 2  -JW     Eating meals 2  -JW     AM-PAC 6 Clicks Score (OT) 10  -JW       Row Name 02/14/25 0800          How much help from another person do you currently need...    Turning from your back to your side while in flat bed without using bedrails? 3  -TH     Moving from lying on back to sitting on the side of a flat bed without bedrails? 2  -TH     Moving to and from a bed to a chair (including a wheelchair)? 2  -TH     Standing up from a chair using your arms (e.g., wheelchair, bedside chair)? 2  -TH     Climbing 3-5 steps with a railing? 1  -TH     To walk in hospital room? 1  -TH     AM-PAC 6 Clicks Score (PT) 11  -TH     Highest Level of Mobility Goal 4 --> Transfer to chair/commode  -TH       Row Name 02/14/25 0732          Functional Assessment    Outcome Measure Options AM-PAC 6 Clicks Daily Activity (OT)  -               User Key  (r) = Recorded By, (t) = Taken By, (c) = Cosigned By      Initials  Name Provider Type     Casi Taylor, RN Registered Nurse    Germania Arguelles OTR/L, CSRS Occupational Therapist                    Occupational Therapy Education       Title: PT OT SLP Therapies (In Progress)       Topic: Occupational Therapy (In Progress)       Point: ADL training (Done)       Description:   Instruct learner(s) on proper safety adaptation and remediation techniques during self care or transfers.   Instruct in proper use of assistive devices.                  Learning Progress Summary            Family Acceptance, E, VU by MANOJ at 2/14/2025 1334                      Point: Home exercise program (Not Started)       Description:   Instruct learner(s) on appropriate technique for monitoring, assisting and/or progressing therapeutic exercises/activities.                  Learner Progress:  Not documented in this visit.              Point: Precautions (Done)       Description:   Instruct learner(s) on prescribed precautions during self-care and functional transfers.                  Learning Progress Summary            Family Acceptance, E, VU by MANOJ at 2/14/2025 1334                      Point: Body mechanics (Done)       Description:   Instruct learner(s) on proper positioning and spine alignment during self-care, functional mobility activities and/or exercises.                  Learning Progress Summary            Family Acceptance, E, VU by MANOJ at 2/14/2025 1334                                      User Key       Initials Effective Dates Name Provider Type Discipline     11/15/24 -  Germania Su OTR/L, ESDRAS Occupational Therapist OT                  OT Recommendation and Plan  Planned Therapy Interventions (OT): activity tolerance training, adaptive equipment training, BADL retraining, functional balance retraining, transfer/mobility retraining, occupation/activity based interventions, strengthening exercise, patient/caregiver education/training  Therapy Frequency (OT): 5  times/wk  Plan of Care Review  Plan of Care Reviewed With: patient, spouse  Outcome Evaluation: OT eval completed. Pt presents lethargic and oriented to self only. Spouse present in room throughout session, remained on vapotherm and O2 sats maintained in 90s with activity. Spouse reports at baseline that pt is independent with all adls and mobility. Today he required Max A for rolling L/R in bed. Dep A for scooting up in bed. He was able to wash face with Mod A and [place] A. Pt remained lethargic throughout session. Not safe to attempt EOB activity at this time. He would benefit from skilled OT services to address decreased strength, balance, activity tolerance and cognition. Recommend d/c to SNF.     Time Calculation:         Time Calculation- OT       Row Name 02/14/25 0732             Time Calculation-     OT Start Time 0732  -      OT Stop Time 0810  -      OT Time Calculation (min) 38 min  -      OT Received On 02/14/25  -      OT Goal Re-Cert Due Date 02/24/25  -                User Key  (r) = Recorded By, (t) = Taken By, (c) = Cosigned By      Initials Name Provider Type     Germania Su OTR/L, CSRS Occupational Therapist                  Therapy Charges for Today       Code Description Service Date Service Provider Modifiers Qty    [id number]  OT EVAL MOD COMPLEXITY 3 2/14/2025 Germania Su OTR/L, CSRS GO 1                 CHACHO Dowling, CSRS  2/14/2025

## 2025-02-14 NOTE — PLAN OF CARE
Goal Outcome Evaluation:      Pt has been disoriented, not following commands, on Precedex gtt, incoherent/moaning only. Pt is very soft spoken, and recently pt started to speak, answered questions appropriately but would not open eyes. Pt is still very tense when touched, significant tremors noted. Wore BiPAP most of shift, currently on Vapotherm. Pt safety maintained.

## 2025-02-14 NOTE — PROGRESS NOTES
RT EQUIPMENT DEVICE RELATED - SKIN ASSESSMENT    Abiodun Score:  Abiodun Score: 12     RT Medical Equipment/Device:     NIV Mask:  Full-face    size:      Skin Assessment:      Cheek:  Intact  Nose:  Intact    Device Skin Pressure Protection:  Skin-to-device areas padded:  None Required    Nurse Notification:  Jeanette Laurent, CRT

## 2025-02-14 NOTE — PLAN OF CARE
Goal Outcome Evaluation:  Plan of Care Reviewed With: (P) patient, spouse, caregiver        Progress: (P) no change       Anticipated Discharge Disposition (SLP): (P) home          SLP Swallowing Diagnosis: (P) mild, oral dysphagia, suspected pharyngeal dysphagia (02/14/25 1051)           SPEECH-LANGUAGE PATHOLOGY EVALUATION - SWALLOW  Subjective: The patient was seen on this date for a Clinical Swallow evaluation.  Patient was alert and cooperative.  Significant history: Presented due to acute on chronic hypoxic respiratory failure secondary to flu A, altered mental status. History of acute care stay for COVID in 2023 with need for LTACH. VFSS at that time with silent aspiration of thin liquids and use of chin tuck due to pharyngeal weaknss. Completed OP speech therapy and progressed back to normal diet. Medical history includes smoker, CAD, HTN.   Objective: Oral motor examination results: WFL. Textures given during assessment of swallow function included ice, thin liquid, nectar thick liquid, honey thick liquid, puree consistency, and regular consistency.      Assessment:   Observations: Swallow evaluation completed. Oral cavity was noted to present dry upon arrival due to open mouth posture. Patient was positioned upright and was easily awoken to present trials. All consistencies were given, due to reduced awareness meals need to remain supervised to reduce risk of aspiration. Multiple swallows were noted with each trial. Moderate cues were given to the pt for lip closure and releasing both the straw and spoon from his teeth. 1x instance of delayed strong coughing noted with thin liquids. OK to start soft to chew diet with thin liquids. He requires 1:1 supervision with intake given his altered mental status for safety precautions. SLP will continue to follow patient.       SLP Findings:  Patient presents with mild to moderate oropharyngeal dysphagia, with esophageal component.   Recommendations: Diet Textures:  soft to chew chopped meats and thin liquids  Medications should be taken whole with puree.   Recommended Strategies: upright for PO, small bites and sips, supervision with all PO, and 1:1 assist with all PO. Oral care after meals.      Dysphagia therapy is recommended.       Rocío Roberto, SLP       Daphney Thao, MS CCC-SLP 2/14/2025 14:20 CST

## 2025-02-14 NOTE — PLAN OF CARE
Problem: Noninvasive Ventilation Acute  Goal: Effective Unassisted Ventilation and Oxygenation  Outcome: Progressing   Goal Outcome Evaluation:   To wean off of NIPPV use

## 2025-02-15 LAB
ALBUMIN SERPL-MCNC: 3.1 G/DL (ref 3.5–5.2)
ALBUMIN/GLOB SERPL: 0.9 G/DL
ALP SERPL-CCNC: 101 U/L (ref 39–117)
ALT SERPL W P-5'-P-CCNC: 66 U/L (ref 1–41)
ANION GAP SERPL CALCULATED.3IONS-SCNC: 11 MMOL/L (ref 5–15)
ARTERIAL PATENCY WRIST A: POSITIVE
AST SERPL-CCNC: 61 U/L (ref 1–40)
ATMOSPHERIC PRESS: 754 MMHG
BACTERIA SPEC AEROBE CULT: NORMAL
BACTERIA SPEC AEROBE CULT: NORMAL
BASE EXCESS BLDA CALC-SCNC: 1.6 MMOL/L (ref 0–2)
BASOPHILS # BLD MANUAL: 0 10*3/MM3 (ref 0–0.2)
BASOPHILS NFR BLD MANUAL: 0 % (ref 0–1.5)
BDY SITE: ABNORMAL
BILIRUB SERPL-MCNC: 0.6 MG/DL (ref 0–1.2)
BODY TEMPERATURE: 37
BUN SERPL-MCNC: 54 MG/DL (ref 8–23)
BUN/CREAT SERPL: 42.5 (ref 7–25)
BURR CELLS BLD QL SMEAR: ABNORMAL
CALCIUM SPEC-SCNC: 8.8 MG/DL (ref 8.6–10.5)
CHLORIDE SERPL-SCNC: 120 MMOL/L (ref 98–107)
CLUMPED PLATELETS: PRESENT
CO2 SERPL-SCNC: 23 MMOL/L (ref 22–29)
COHGB MFR BLD: 0.2 % (ref 0–5)
CREAT SERPL-MCNC: 1.27 MG/DL (ref 0.76–1.27)
DEPRECATED RDW RBC AUTO: 46.7 FL (ref 37–54)
EGFRCR SERPLBLD CKD-EPI 2021: 62.3 ML/MIN/1.73
EOSINOPHIL # BLD MANUAL: 0 10*3/MM3 (ref 0–0.4)
EOSINOPHIL NFR BLD MANUAL: 0 % (ref 0.3–6.2)
EPAP: 6
ERYTHROCYTE [DISTWIDTH] IN BLOOD BY AUTOMATED COUNT: 13.6 % (ref 12.3–15.4)
GIANT PLATELETS: ABNORMAL
GLOBULIN UR ELPH-MCNC: 3.3 GM/DL
GLUCOSE BLDC GLUCOMTR-MCNC: 86 MG/DL (ref 70–130)
GLUCOSE BLDC GLUCOMTR-MCNC: 94 MG/DL (ref 70–130)
GLUCOSE SERPL-MCNC: 122 MG/DL (ref 65–99)
HCO3 BLDA-SCNC: 26.7 MMOL/L (ref 20–26)
HCT VFR BLD AUTO: 45 % (ref 37.5–51)
HCT VFR BLD CALC: 46.5 % (ref 38–51)
HGB BLD-MCNC: 14.8 G/DL (ref 13–17.7)
HGB BLDA-MCNC: 15.2 G/DL (ref 14–18)
INHALED O2 CONCENTRATION: 70 %
IPAP: 16
LYMPHOCYTES # BLD MANUAL: 0.33 10*3/MM3 (ref 0.7–3.1)
LYMPHOCYTES NFR BLD MANUAL: 4 % (ref 5–12)
Lab: ABNORMAL
MAGNESIUM SERPL-MCNC: 2.6 MG/DL (ref 1.6–2.4)
MCH RBC QN AUTO: 30.5 PG (ref 26.6–33)
MCHC RBC AUTO-ENTMCNC: 32.9 G/DL (ref 31.5–35.7)
MCV RBC AUTO: 92.8 FL (ref 79–97)
METAMYELOCYTES NFR BLD MANUAL: 1 % (ref 0–0)
METHGB BLD QL: 0.6 % (ref 0–3)
MODALITY: ABNORMAL
MONOCYTES # BLD: 0.26 10*3/MM3 (ref 0.1–0.9)
NEUTROPHILS # BLD AUTO: 5.89 10*3/MM3 (ref 1.7–7)
NEUTROPHILS NFR BLD MANUAL: 89.9 % (ref 42.7–76)
OXYHGB MFR BLDV: 98.3 % (ref 94–99)
PCO2 BLDA: 42.7 MM HG (ref 35–45)
PCO2 TEMP ADJ BLD: 42.7 MM HG (ref 35–45)
PH BLDA: 7.41 PH UNITS (ref 7.35–7.45)
PH, TEMP CORRECTED: 7.41 PH UNITS (ref 7.35–7.45)
PHOSPHATE SERPL-MCNC: 3.7 MG/DL (ref 2.5–4.5)
PLATELET # BLD AUTO: 157 10*3/MM3 (ref 140–450)
PMV BLD AUTO: 11.2 FL (ref 6–12)
PO2 BLDA: 142 MM HG (ref 83–108)
PO2 TEMP ADJ BLD: 142 MM HG (ref 83–108)
POIKILOCYTOSIS BLD QL SMEAR: ABNORMAL
POTASSIUM BLDA-SCNC: 5 MMOL/L (ref 3.5–5.2)
POTASSIUM SERPL-SCNC: 4.9 MMOL/L (ref 3.5–5.2)
PROT SERPL-MCNC: 6.4 G/DL (ref 6–8.5)
RBC # BLD AUTO: 4.85 10*6/MM3 (ref 4.14–5.8)
SAO2 % BLDCOA: 99 % (ref 94–99)
SET MECH RESP RATE: 12
SODIUM BLDA-SCNC: 156 MMOL/L (ref 136–145)
SODIUM SERPL-SCNC: 154 MMOL/L (ref 136–145)
VARIANT LYMPHS NFR BLD MANUAL: 5.1 % (ref 19.6–45.3)
VENTILATOR MODE: ABNORMAL
WBC MORPH BLD: NORMAL
WBC NRBC COR # BLD AUTO: 6.55 10*3/MM3 (ref 3.4–10.8)

## 2025-02-15 PROCEDURE — 25010000002 ENOXAPARIN PER 10 MG: Performed by: FAMILY MEDICINE

## 2025-02-15 PROCEDURE — 25010000002 DIPHENHYDRAMINE PER 50 MG

## 2025-02-15 PROCEDURE — 94799 UNLISTED PULMONARY SVC/PX: CPT

## 2025-02-15 PROCEDURE — 85007 BL SMEAR W/DIFF WBC COUNT: CPT | Performed by: FAMILY MEDICINE

## 2025-02-15 PROCEDURE — 25810000003 LACTATED RINGERS PER 1000 ML: Performed by: INTERNAL MEDICINE

## 2025-02-15 PROCEDURE — 82375 ASSAY CARBOXYHB QUANT: CPT

## 2025-02-15 PROCEDURE — 83050 HGB METHEMOGLOBIN QUAN: CPT

## 2025-02-15 PROCEDURE — 94761 N-INVAS EAR/PLS OXIMETRY MLT: CPT

## 2025-02-15 PROCEDURE — 82948 REAGENT STRIP/BLOOD GLUCOSE: CPT

## 2025-02-15 PROCEDURE — 94664 DEMO&/EVAL PT USE INHALER: CPT

## 2025-02-15 PROCEDURE — 83735 ASSAY OF MAGNESIUM: CPT | Performed by: INTERNAL MEDICINE

## 2025-02-15 PROCEDURE — 36600 WITHDRAWAL OF ARTERIAL BLOOD: CPT

## 2025-02-15 PROCEDURE — 36415 COLL VENOUS BLD VENIPUNCTURE: CPT | Performed by: FAMILY MEDICINE

## 2025-02-15 PROCEDURE — 97530 THERAPEUTIC ACTIVITIES: CPT

## 2025-02-15 PROCEDURE — 25010000002 LORAZEPAM PER 2 MG: Performed by: INTERNAL MEDICINE

## 2025-02-15 PROCEDURE — 25010000002 CEFTRIAXONE PER 250 MG: Performed by: INTERNAL MEDICINE

## 2025-02-15 PROCEDURE — 94660 CPAP INITIATION&MGMT: CPT

## 2025-02-15 PROCEDURE — 85025 COMPLETE CBC W/AUTO DIFF WBC: CPT | Performed by: FAMILY MEDICINE

## 2025-02-15 PROCEDURE — 84100 ASSAY OF PHOSPHORUS: CPT | Performed by: INTERNAL MEDICINE

## 2025-02-15 PROCEDURE — 25010000002 OLANZAPINE 10 MG RECONSTITUTED SOLUTION: Performed by: INTERNAL MEDICINE

## 2025-02-15 PROCEDURE — 82805 BLOOD GASES W/O2 SATURATION: CPT

## 2025-02-15 PROCEDURE — 80053 COMPREHEN METABOLIC PANEL: CPT | Performed by: FAMILY MEDICINE

## 2025-02-15 RX ORDER — LORAZEPAM 2 MG/ML
1 INJECTION INTRAMUSCULAR ONCE
Status: COMPLETED | OUTPATIENT
Start: 2025-02-15 | End: 2025-02-15

## 2025-02-15 RX ORDER — OLANZAPINE 10 MG/2ML
5 INJECTION, POWDER, FOR SOLUTION INTRAMUSCULAR ONCE
Status: COMPLETED | OUTPATIENT
Start: 2025-02-15 | End: 2025-02-15

## 2025-02-15 RX ADMIN — BUDESONIDE 0.5 MG: 0.5 INHALANT RESPIRATORY (INHALATION) at 06:41

## 2025-02-15 RX ADMIN — ENOXAPARIN SODIUM 40 MG: 100 INJECTION SUBCUTANEOUS at 17:39

## 2025-02-15 RX ADMIN — LORAZEPAM 1 MG: 2 INJECTION INTRAMUSCULAR; INTRAVENOUS at 13:53

## 2025-02-15 RX ADMIN — CEFTRIAXONE SODIUM 2000 MG: 2 INJECTION, POWDER, FOR SOLUTION INTRAMUSCULAR; INTRAVENOUS at 13:53

## 2025-02-15 RX ADMIN — Medication 10 ML: at 09:19

## 2025-02-15 RX ADMIN — ALBUTEROL SULFATE 1.25 MG: 2.5 SOLUTION RESPIRATORY (INHALATION) at 18:21

## 2025-02-15 RX ADMIN — ALBUTEROL SULFATE 1.25 MG: 2.5 SOLUTION RESPIRATORY (INHALATION) at 10:39

## 2025-02-15 RX ADMIN — OLANZAPINE 5 MG: 10 INJECTION, POWDER, FOR SOLUTION INTRAMUSCULAR at 17:39

## 2025-02-15 RX ADMIN — BUDESONIDE 0.5 MG: 0.5 INHALANT RESPIRATORY (INHALATION) at 18:21

## 2025-02-15 RX ADMIN — DEXMEDETOMIDINE HYDROCHLORIDE 0.6 MCG/KG/HR: 4 INJECTION, SOLUTION INTRAVENOUS at 02:55

## 2025-02-15 RX ADMIN — AMLODIPINE BESYLATE 10 MG: 10 TABLET ORAL at 09:19

## 2025-02-15 RX ADMIN — IPRATROPIUM BROMIDE 0.5 MG: 0.5 SOLUTION RESPIRATORY (INHALATION) at 10:39

## 2025-02-15 RX ADMIN — IPRATROPIUM BROMIDE 0.5 MG: 0.5 SOLUTION RESPIRATORY (INHALATION) at 18:21

## 2025-02-15 RX ADMIN — ASPIRIN 81 MG: 81 TABLET, COATED ORAL at 09:19

## 2025-02-15 RX ADMIN — Medication 1 APPLICATION: at 09:19

## 2025-02-15 RX ADMIN — Medication 1 APPLICATION: at 20:12

## 2025-02-15 RX ADMIN — DIPHENHYDRAMINE HYDROCHLORIDE 50 MG: 50 INJECTION, SOLUTION INTRAMUSCULAR; INTRAVENOUS at 01:08

## 2025-02-15 RX ADMIN — IPRATROPIUM BROMIDE 0.5 MG: 0.5 SOLUTION RESPIRATORY (INHALATION) at 14:46

## 2025-02-15 RX ADMIN — Medication 10 ML: at 20:12

## 2025-02-15 RX ADMIN — ALBUTEROL SULFATE 1.25 MG: 2.5 SOLUTION RESPIRATORY (INHALATION) at 14:46

## 2025-02-15 RX ADMIN — ALBUTEROL SULFATE 1.25 MG: 2.5 SOLUTION RESPIRATORY (INHALATION) at 06:41

## 2025-02-15 RX ADMIN — Medication 1 APPLICATION: at 02:56

## 2025-02-15 RX ADMIN — IPRATROPIUM BROMIDE 0.5 MG: 0.5 SOLUTION RESPIRATORY (INHALATION) at 06:41

## 2025-02-15 RX ADMIN — SODIUM CHLORIDE, SODIUM LACTATE, POTASSIUM CHLORIDE, CALCIUM CHLORIDE 125 ML/HR: 20; 30; 600; 310 INJECTION, SOLUTION INTRAVENOUS at 02:55

## 2025-02-15 NOTE — PROGRESS NOTES
[facility] Intensivist Services  Progress Note    Patient Name: Chaitanya Hair  Date of Admission: 2/10/2025  Today's Date: 02/15/25  Length of Stay: 5  Primary Care Physician: Mason Peguero MD    Subjective     Chief Complaint: Acute on chronic hypoxic respiratory failure, encephalopathy    HPI:  Increased agitation and confusion overnight.  He was given Haldol x 1.  Rested following the Haldol.  Today he is somewhat more appropriate, though still has significant confusion.    Review of Systems:  All pertinent negatives and positives are as above. All other systems have been reviewed and are negative unless otherwise stated.     ICU Course     Summary:  Mr. Hair is a 64-year-old male admitted to [facility] with acute on chronic hypoxic respiratory failure secondary to influenza A.  He was initially managed on the floor with Vapotherm.  He developed an acute change in mental status on the evening of 2/11/2025.  He was placed on BiPAP at this time.  The following morning he continued to be significantly altered.  His BiPAP requirements did increase.  He was then admitted to the ICU.  Encephalopathy workup thus far has been negative.  When his Tamiflu was held his mental status improved.  With the improvement of his mental status he was able to swallow.  His Tamiflu was resumed which resulted in reemergence of his encephalopathy.  Additionally he developed similar flushing and itchy eyes.  Therefore it appears the Tamiflu is the etiology of his encephalopathy.  While this is rare, especially in adults, Tamiflu is known to cause encephalopathy.    Objective      Physical Exam:  General: Encephalopathic, minimally improved  Head: Normocephalic, atraumatic  Eyes: Conjunctiva clear, sclera non-icteric, improved  Teeth/Gums: Normal inspection  Neck: Supple without stridor  Heart: Regular rate and rhythm, no murmur  Lungs: Diminished bilaterally with no wheezing  Abdomen: Soft,  "nontender  MSK/extremities: No cyanosis or edema  Neurologic: He is alert but not oriented, GCS 12      Results Review:  Results from last 7 days   Lab Units 02/15/25  0331 02/15/25  0104 02/14/25  1220 02/14/25  0257   SODIUM mmol/L 154*  --  153* 154*   SODIUM, ARTERIAL mmol/L  --  156*  --   --    POTASSIUM mmol/L 4.9  --  4.5 5.6*   CHLORIDE mmol/L 120*  --  120* 118*   CO2 mmol/L 23.0  --  24.0 26.0   BUN mg/dL 54*  --  70* 74*   CALCIUM mg/dL 8.8  --  8.7 9.2     Results from last 7 days   Lab Units 02/15/25  0331 02/14/25  0257 02/13/25  0345   WBC 10*3/mm3 6.55 12.55* 9.73   HEMOGLOBIN g/dL 14.8 14.8 14.4   HEMATOCRIT % 45.0 45.5 43.0   PLATELETS 10*3/mm3 157 147 139*     Visit Vitals  /83   Pulse 92   Temp 97.5 °F (36.4 °C) (Axillary)   Resp 17   Ht 193 cm (76\")   Wt 110 kg (242 lb 8.1 oz)   SpO2 93%   BMI 29.52 kg/m²       Medications:  albuterol, 1.25 mg, Nebulization, 4x Daily - RT   And  ipratropium, 0.5 mg, Nebulization, 4x Daily - RT  amLODIPine, 10 mg, Oral, Daily  aspirin, 81 mg, Oral, Daily  budesonide, 0.5 mg, Nebulization, BID - RT  cefTRIAXone, 2,000 mg, Intravenous, Q24H  Chlorhexidine Gluconate Cloth, 1 Application, Topical, Daily  enoxaparin, 40 mg, Subcutaneous, Q24H  insulin regular, 2-7 Units, Subcutaneous, TID AC  [Held by provider] metoprolol tartrate, 50 mg, Oral, BID  mupirocin, 1 Application, Each Nare, BID  OLANZapine, 5 mg, Intramuscular, Once  polyethylene glycol, 17 g, Oral, Daily  sodium chloride, 10 mL, Intravenous, Q12H             Assessment/Plan     Problems:  Acute on chronic hypoxic respiratory failure  Influenza A  COPD  Restrictive lung disease  Toxic encephalopathy     Plan:  Acute on chronic hypoxic respiratory failure, influenza A  -Continue supplemental oxygen as needed and wean as tolerated, goal O2 sat of 88-92%  -Recent chest CTA negative for PE  -He has been weaned to nasal cannula, will discontinue the BiPAP  -Nebulized triple therapy  -Continue to monitor " respiratory status off of scheduled steroids  -Discontinue Tamiflu  -Complete empiric Rocephin and azithromycin  -Urinary antigens and MRSA swab negative     Encephalopathy  -Encephalopathy seems to be secondary to Tamiflu administration.  When this med was held in the ICU his mental status improved.  When it was resumed his mental status again worsened with flushing and itchy eyes.  Will discontinue his Tamiflu and added to his allergy list.  Continue to monitor in the ICU until toxic effects of Tamiflu resolve and his mental status improves  -Infectious workup negative other than influenza send  -Lab work unrevealing     COPD, restrictive lung disease  -Triple therapy as above        Critical Care Set:  Feeding: Resume nothing by mouth once mental status improved  Analgesia: As needed ordered  Sedation: N/A  Thromboprophylaxis: DVT Lovenox  HOB: Elevated  Ulcer prophylaxis: N/A  Glycemic control: Nondiabetic  SBT: N/A  Bowel movement: As needed regimen ordered  Catheter removal: N/A  Medication de-escalation: N/A    Disposition  Continue critical care management.    Total critical care time: Approximately 35 minutes     Due to a high probability of clinically significant, life threatening deterioration, the patient required my highest level of preparedness to intervene emergently and I personally spent this critical care time directly and personally managing the patient.      This critical care time included obtaining a history; examining the patient; pulse oximetry; ordering and review of studies; arranging urgent treatment with development of a management plan; evaluation of patient's response to treatment; frequent reassessment; and, discussions with other providers.     This critical care time was performed to assess and manage the high probability of imminent, life-threatening deterioration that could result in multi-organ failure. It was exclusive of separately billable procedures and treating other patients  and teaching time.      Irina Elizabeth DO  Pulmonary Critical Care Medicine  2/15/2025  16:46 CST

## 2025-02-15 NOTE — THERAPY TREATMENT NOTE
Acute Care - Physical Therapy Treatment Note  [facility]     Patient Name: Chaitanya Hair  : 1960  MRN: [id number]  Today's Date: 2/15/2025      Visit Dx:     ICD-10-CM ICD-9-CM   1. Acute renal failure, unspecified acute renal failure type  N17.9 584.9   2. Influenza A  J10.1 487.1   3. Pneumonia due to infectious organism, unspecified laterality, unspecified part of lung  J18.9 486   4. Impaired mobility [Z74.09]  Z74.09 799.89   5. Dysphagia, unspecified type  R13.10 787.20     Patient Active Problem List   Diagnosis    Coronary artery disease involving native coronary artery of native heart without angina pectoris    Hypertension    Nicotine abuse    Old inferior wall myocardial infarction    Presence of stent in right coronary artery    Tobacco abuse    CAD (coronary artery disease)    Dysphagia, oropharyngeal    Dysphonia    Hoarseness    Vocal cord weakness    Dysarthria    Personal history of adenomatous and serrated colon polyps    HLD (hyperlipidemia)    Acute renal failure    Acute respiratory failure with hypoxia    Influenza A    Lobar pneumonia     Past Medical History:   Diagnosis Date    Abnormal stress echo 3/25/19-[facility]    Noted For Myocardial Ischemia    Acute MI     Atelectasis 2019    Noted on Chest XR    CAD (coronary artery disease) Since     w/R Stent Placed    Chest pain due to CAD     Nitro PRN    DM (diabetes mellitus)     false positive    Elevated cholesterol     History of chest x-ray 2019    Suggestive For Atelectasis w/Chronic Inflammatory Scarring Noted    History of EKG 3/25/19-[facility]    Sinus Rhythm w/Nonspecific ST-T Wave Abnormalities    History of nephrolithiasis     History of stress test     WNL    HLD (hyperlipidemia)     Controlled w/Meds    Hypertension     Controlled w/Meds    Kidney stones     S/p Lithotripsy    LAD stenosis 2019    90% Mid LAD Stenosis; Diffuse Stenosis of LCX--Noted on Cardiac Cath     Myocardial ischemia 03/25/2019    Noted on Echo Stress Test    RCA occlusion 04/11/2019    Mild Diffuse Disease--Noted on Cardiac Cath     Tobacco use     1.5-1 PPD     Past Surgical History:   Procedure Laterality Date    ABDOMINAL SURGERY  2023    APPENDECTOMY  2023    BACK SURGERY  2013    CARDIAC CATHETERIZATION  2009    RCA Stent    CARDIAC CATHETERIZATION  04/11/2019-[facility]    w/L Ventriculogram/Angiography--Dr. Garcia--CAD Involving LAD    COLONOSCOPY N/A 10/16/2024    Procedure: COLONOSCOPY WITH ANESTHESIA;  Surgeon: Nathaly Nino MD;  Location:  [facility];  Service: Gastroenterology;  Laterality: N/A;  pre screening  post diverticulosis; polyp  pcp  Mason Peguero MD    CORONARY ANGIOPLASTY WITH STENT PLACEMENT Right 2009    CORONARY ARTERY BYPASS GRAFT N/A 04/16/2019    Procedure: MEDIAN STERNOTOMY, CORONARY ARTERY BYPASS X2 USING LEFT INTERNAL MAMMARY ARTERY GRAFT, PRP, INTRAOP LAURI;  Surgeon: Jesse Perez MD;  Location: [facility] OR;  Service: Cardiothoracic    CYSTOSCOPY W/ LASER LITHOTRIPSY       PT Assessment (Last 12 Hours)       PT Evaluation and Treatment       Row Name 02/15/25 0924          Physical Therapy Time and Intention    Subjective Information no complaints  -     Document Type therapy note (daily note)  -     Mode of Treatment physical therapy  -     Comment confused, but following directions and participating today.  -       Row Name 02/15/25 0924          General Information    Existing Precautions/Restrictions fall;oxygen therapy device and L/min  -       Row Name 02/15/25 0924          Pain    Pretreatment Pain Rating 0/10 - no pain  -     Posttreatment Pain Rating 0/10 - no pain  -       Row Name 02/15/25 0924          Bed Mobility    Rolling Left [place] (Bed Mobility) verbal cues;minimum assist (75% patient effort)  -     Rolling Right [place] (Bed Mobility) verbal cues;minimum assist (75% patient effort)  -      Scooting/Bridging [place] (Bed Mobility) dependent (less than 25% patient effort);2 person assist  -     Supine-Sit [place] (Bed Mobility) verbal cues;contact guard;minimum assist (75% patient effort)  -     Sit-Supine [place] (Bed Mobility) verbal cues;contact guard  -     Assistive Device (Bed Mobility) bed rails;repositioning sheet  -     Comment, (Bed Mobility) rolled each way after sitting up to change wet brief and linens.  -       Row Name 02/15/25 0924          Transfers    Comment, (Transfers) did not attempt to stand, but pt was able to scoot himself along the EOB using his arms.  -       Row Name 02/15/25 0924          Balance    Comment, Balance SBA for sitting balance-able to scoot himself along the EOB  -       Row Name 02/15/25 0924          Motor Skills    Comments, Therapeutic Exercise A-AAROM B UEs, AROM B LEs in sitting x 10-15  -       Row Name 02/15/25 0924          Plan of Care Review    Plan of Care Reviewed With patient  -     Progress improving  -     Outcome Evaluation PT tx completed. Pt. agreeable to therapy and following directions. Pt remains confused, but did participate. He was CGA-MIN assist for bed mobility. He was able to sit EOB with SBA. Participated with UE and LE exercises in sitting. Pt was able to use his arms and scoot himself along the edge of bed. Assisted pt with rolling in bed for cleanup to change wet brief and sheets. Pt's O2 sat was 90-95% during activity and HR ranged from 100-118bpm. Will continue to work on strengthening and mobility.  -       Row Name 02/15/25 0924          Vital Signs    Intratreatment Heart Rate (beats/min) 118  -MF     O2 Delivery Pre Treatment supplemental O2  -     Intra SpO2 (%) 90  -MF     O2 Delivery Intra Treatment supplemental O2  4.5l  -MF     Post SpO2 (%) 95  -MF     O2 Delivery Post Treatment supplemental O2  -       Row Name 02/15/25 0924          Positioning and Restraints    Pre-Treatment  Position in bed  -MF     Post Treatment Position bed  -MF     In Bed fowlers;call light within reach;encouraged to call for assist;side rails up x2;RUE elevated;LUE elevated;notified nsg;exit alarm on  -MF               User Key  (r) = Recorded By, (t) = Taken By, (c) = Cosigned By      Initials Name Provider Type    Natalie Bynum, PTA Physical Therapist Assistant                    Physical Therapy Education       Title: PT OT SLP Therapies (In Progress)       Topic: Physical Therapy (In Progress)       Point: Mobility training (Done)       Learning Progress Summary            Patient Acceptance, E, VU by BL at 2/11/2025 1059    Comment: Bed mobility techinques, plan of care, home safety     by BL at 2/11/2025 1058                      Point: Home exercise program (Not Started)       Learner Progress:  Not documented in this visit.              Point: Body mechanics (Done)       Learning Progress Summary            Patient Acceptance, E, VU by BL at 2/11/2025 1059    Comment: Bed mobility techinques, plan of care, home safety     by BL at 2/11/2025 1058                      Point: Precautions (Done)       Learning Progress Summary            Patient Acceptance, E, VU by BL at 2/11/2025 1059    Comment: Bed mobility techinques, plan of care, home safety     by BL at 2/11/2025 1058                                      User Key       Initials Effective Dates Name Provider Type Discipline     12/17/24 -  Hernandez Reyes PT Student PT Student PT                  PT Recommendation and Plan     Plan of Care Reviewed With: patient  Progress: improving  Outcome Evaluation: PT tx completed. Pt. agreeable to therapy and following directions. Pt remains confused, but did participate. He was CGA-MIN assist for bed mobility. He was able to sit EOB with SBA. Participated with UE and LE exercises in sitting. Pt was able to use his arms and scoot himself along the edge of bed. Assisted pt with rolling in bed for cleanup  to change wet brief and sheets. Pt's O2 sat was 90-95% during activity and HR ranged from 100-118bpm. Will continue to work on strengthening and mobility.   Outcome Measures       Row Name 02/15/25 0924             How much help from another person do you currently need...    Turning from your back to your side while in flat bed without using bedrails? 3  -MF      Moving from lying on back to sitting on the side of a flat bed without bedrails? 3  -MF      Moving to and from a bed to a chair (including a wheelchair)? 2  -MF      Standing up from a chair using your arms (e.g., wheelchair, bedside chair)? 2  -MF      Climbing 3-5 steps with a railing? 1  -MF      To walk in hospital room? 1  -MF      AM-PAC 6 Clicks Score (PT) 12  -MF         Functional Assessment    Outcome Measure Options AM-PAC 6 Clicks Basic Mobility (PT)  -MF                User Key  (r) = Recorded By, (t) = Taken By, (c) = Cosigned By      Initials Name Provider Type    Natalie Bynum PTA Physical Therapist Assistant                     Time Calculation:    PT Charges       Row Name 02/15/25 1005             Time Calculation    Start Time 0924  -MF      Stop Time 1005  -      Time Calculation (min) 41 min  -MF      PT Received On 02/15/25  -MF         Time Calculation- PT    Total Timed Code Minutes- PT 41 minute(s)  -MF         Timed Charges    25435 - PT Therapeutic Activity Minutes 41  -MF         Total Minutes    Timed Charges Total Minutes 41  -MF       Total Minutes 41  -MF                User Key  (r) = Recorded By, (t) = Taken By, (c) = Cosigned By      Initials Name Provider Type    Natalie Bynum PTA Physical Therapist Assistant                  Therapy Charges for Today       Code Description Service Date Service Provider Modifiers Qty    07788725020 HC PT THERAPEUTIC ACT EA 15 MIN 2/15/2025 Natalie Siu PTA GP 3            PT G-Codes  Outcome Measure Options: AM-PAC 6 Clicks Basic Mobility (PT)  AM-PAC 6  Clicks Score (PT): 12  AM-PAC 6 Clicks Score (OT): 10    Natalie Siu, PTA  2/15/2025

## 2025-02-15 NOTE — PLAN OF CARE
Goal Outcome Evaluation:  Plan of Care Reviewed With: patient        Progress: improving  Outcome Evaluation: PT tx completed. Pt. agreeable to therapy and following directions. Pt remains confused, but did participate. He was CGA-MIN assist for bed mobility. He was able to sit EOB with SBA. Participated with UE and LE exercises in sitting. Pt was able to use his arms and scoot himself along the edge of bed. Assisted pt with rolling in bed for cleanup to change wet brief and sheets. Pt's O2 sat was 90-95% during activity and HR ranged from 100-118bpm. Will continue to work on strengthening and mobility.

## 2025-02-15 NOTE — PLAN OF CARE
Problem: Noninvasive Ventilation Acute  Goal: Effective Unassisted Ventilation and Oxygenation  Outcome: Progressing   Goal Outcome Evaluation:      To continue to wear as needed.

## 2025-02-15 NOTE — PROGRESS NOTES
RT EQUIPMENT DEVICE RELATED - SKIN ASSESSMENT    Abiodun Score:  Abiodun Score: 13     RT Medical Equipment/Device:     NIV Mask:  Full-face    size: L    Skin Assessment:      Nose:  Intact    Device Skin Pressure Protection:  Skin-to-device areas padded:  None Required    Nurse Notification:  Jeanette Woods, RRT

## 2025-02-16 LAB
ALBUMIN SERPL-MCNC: 3.2 G/DL (ref 3.5–5.2)
ALBUMIN/GLOB SERPL: 1 G/DL
ALP SERPL-CCNC: 111 U/L (ref 39–117)
ALT SERPL W P-5'-P-CCNC: 73 U/L (ref 1–41)
ANION GAP SERPL CALCULATED.3IONS-SCNC: 12 MMOL/L (ref 5–15)
ANION GAP SERPL CALCULATED.3IONS-SCNC: 9 MMOL/L (ref 5–15)
AST SERPL-CCNC: 74 U/L (ref 1–40)
BASOPHILS # BLD MANUAL: 0 10*3/MM3 (ref 0–0.2)
BASOPHILS NFR BLD MANUAL: 0 % (ref 0–1.5)
BILIRUB SERPL-MCNC: 1 MG/DL (ref 0–1.2)
BUN SERPL-MCNC: 39 MG/DL (ref 8–23)
BUN SERPL-MCNC: 43 MG/DL (ref 8–23)
BUN/CREAT SERPL: 29.8 (ref 7–25)
BUN/CREAT SERPL: 31.4 (ref 7–25)
BURR CELLS BLD QL SMEAR: ABNORMAL
CALCIUM SPEC-SCNC: 8.7 MG/DL (ref 8.6–10.5)
CALCIUM SPEC-SCNC: 9 MG/DL (ref 8.6–10.5)
CHLORIDE SERPL-SCNC: 121 MMOL/L (ref 98–107)
CHLORIDE SERPL-SCNC: 122 MMOL/L (ref 98–107)
CO2 SERPL-SCNC: 23 MMOL/L (ref 22–29)
CO2 SERPL-SCNC: 24 MMOL/L (ref 22–29)
CREAT SERPL-MCNC: 1.31 MG/DL (ref 0.76–1.27)
CREAT SERPL-MCNC: 1.37 MG/DL (ref 0.76–1.27)
DEPRECATED RDW RBC AUTO: 46.8 FL (ref 37–54)
EGFRCR SERPLBLD CKD-EPI 2021: 56.9 ML/MIN/1.73
EGFRCR SERPLBLD CKD-EPI 2021: 60 ML/MIN/1.73
EOSINOPHIL # BLD MANUAL: 0 10*3/MM3 (ref 0–0.4)
EOSINOPHIL NFR BLD MANUAL: 0 % (ref 0.3–6.2)
ERYTHROCYTE [DISTWIDTH] IN BLOOD BY AUTOMATED COUNT: 13.7 % (ref 12.3–15.4)
GIANT PLATELETS: ABNORMAL
GLOBULIN UR ELPH-MCNC: 3.1 GM/DL
GLUCOSE SERPL-MCNC: 82 MG/DL (ref 65–99)
GLUCOSE SERPL-MCNC: 88 MG/DL (ref 65–99)
HCT VFR BLD AUTO: 46.2 % (ref 37.5–51)
HGB BLD-MCNC: 15 G/DL (ref 13–17.7)
LYMPHOCYTES # BLD MANUAL: 1.28 10*3/MM3 (ref 0.7–3.1)
LYMPHOCYTES NFR BLD MANUAL: 10.1 % (ref 5–12)
MAGNESIUM SERPL-MCNC: 2.3 MG/DL (ref 1.6–2.4)
MCH RBC QN AUTO: 30.5 PG (ref 26.6–33)
MCHC RBC AUTO-ENTMCNC: 32.5 G/DL (ref 31.5–35.7)
MCV RBC AUTO: 93.9 FL (ref 79–97)
MONOCYTES # BLD: 0.92 10*3/MM3 (ref 0.1–0.9)
NEUTROPHILS # BLD AUTO: 6.9 10*3/MM3 (ref 1.7–7)
NEUTROPHILS NFR BLD MANUAL: 75.8 % (ref 42.7–76)
NRBC BLD AUTO-RTO: 0 /100 WBC (ref 0–0.2)
OVALOCYTES BLD QL SMEAR: ABNORMAL
PHOSPHATE SERPL-MCNC: 3.1 MG/DL (ref 2.5–4.5)
PLATELET # BLD AUTO: 167 10*3/MM3 (ref 140–450)
PMV BLD AUTO: 11.3 FL (ref 6–12)
POIKILOCYTOSIS BLD QL SMEAR: ABNORMAL
POTASSIUM SERPL-SCNC: 4.2 MMOL/L (ref 3.5–5.2)
POTASSIUM SERPL-SCNC: 4.4 MMOL/L (ref 3.5–5.2)
PROT SERPL-MCNC: 6.3 G/DL (ref 6–8.5)
RBC # BLD AUTO: 4.92 10*6/MM3 (ref 4.14–5.8)
SODIUM SERPL-SCNC: 155 MMOL/L (ref 136–145)
SODIUM SERPL-SCNC: 156 MMOL/L (ref 136–145)
VARIANT LYMPHS NFR BLD MANUAL: 1 % (ref 0–5)
VARIANT LYMPHS NFR BLD MANUAL: 13.1 % (ref 19.6–45.3)
WBC MORPH BLD: NORMAL
WBC NRBC COR # BLD AUTO: 9.1 10*3/MM3 (ref 3.4–10.8)

## 2025-02-16 PROCEDURE — 94664 DEMO&/EVAL PT USE INHALER: CPT

## 2025-02-16 PROCEDURE — 84100 ASSAY OF PHOSPHORUS: CPT | Performed by: INTERNAL MEDICINE

## 2025-02-16 PROCEDURE — 97530 THERAPEUTIC ACTIVITIES: CPT

## 2025-02-16 PROCEDURE — 25010000002 ENOXAPARIN PER 10 MG: Performed by: FAMILY MEDICINE

## 2025-02-16 PROCEDURE — 94761 N-INVAS EAR/PLS OXIMETRY MLT: CPT

## 2025-02-16 PROCEDURE — 85025 COMPLETE CBC W/AUTO DIFF WBC: CPT | Performed by: INTERNAL MEDICINE

## 2025-02-16 PROCEDURE — 25010000002 HALOPERIDOL LACTATE PER 5 MG: Performed by: NURSE PRACTITIONER

## 2025-02-16 PROCEDURE — 94799 UNLISTED PULMONARY SVC/PX: CPT

## 2025-02-16 PROCEDURE — 25010000002 CEFTRIAXONE PER 250 MG: Performed by: INTERNAL MEDICINE

## 2025-02-16 PROCEDURE — 36415 COLL VENOUS BLD VENIPUNCTURE: CPT | Performed by: FAMILY MEDICINE

## 2025-02-16 PROCEDURE — 25010000002 DIPHENHYDRAMINE PER 50 MG

## 2025-02-16 PROCEDURE — 85007 BL SMEAR W/DIFF WBC COUNT: CPT | Performed by: INTERNAL MEDICINE

## 2025-02-16 PROCEDURE — 80053 COMPREHEN METABOLIC PANEL: CPT | Performed by: FAMILY MEDICINE

## 2025-02-16 PROCEDURE — 83735 ASSAY OF MAGNESIUM: CPT | Performed by: INTERNAL MEDICINE

## 2025-02-16 PROCEDURE — 25010000003 DEXTROSE 5 % SOLUTION: Performed by: INTERNAL MEDICINE

## 2025-02-16 RX ORDER — DEXTROSE MONOHYDRATE 50 MG/ML
75 INJECTION, SOLUTION INTRAVENOUS CONTINUOUS
Status: DISPENSED | OUTPATIENT
Start: 2025-02-16 | End: 2025-02-17

## 2025-02-16 RX ORDER — SODIUM CHLORIDE 450 MG/100ML
150 INJECTION, SOLUTION INTRAVENOUS CONTINUOUS
Status: DISCONTINUED | OUTPATIENT
Start: 2025-02-16 | End: 2025-02-16

## 2025-02-16 RX ORDER — DEXTROSE MONOHYDRATE AND SODIUM CHLORIDE 5; .45 G/100ML; G/100ML
150 INJECTION, SOLUTION INTRAVENOUS ONCE
Status: DISCONTINUED | OUTPATIENT
Start: 2025-02-16 | End: 2025-02-16

## 2025-02-16 RX ORDER — HALOPERIDOL 5 MG/ML
5 INJECTION INTRAMUSCULAR ONCE
Status: COMPLETED | OUTPATIENT
Start: 2025-02-16 | End: 2025-02-16

## 2025-02-16 RX ADMIN — ALBUTEROL SULFATE 1.25 MG: 2.5 SOLUTION RESPIRATORY (INHALATION) at 06:45

## 2025-02-16 RX ADMIN — DEXTROSE MONOHYDRATE 75 ML/HR: 50 INJECTION, SOLUTION INTRAVENOUS at 21:31

## 2025-02-16 RX ADMIN — BUDESONIDE 0.5 MG: 0.5 INHALANT RESPIRATORY (INHALATION) at 06:45

## 2025-02-16 RX ADMIN — ASPIRIN 81 MG: 81 TABLET, COATED ORAL at 08:00

## 2025-02-16 RX ADMIN — IPRATROPIUM BROMIDE 0.5 MG: 0.5 SOLUTION RESPIRATORY (INHALATION) at 10:45

## 2025-02-16 RX ADMIN — CEFTRIAXONE SODIUM 2000 MG: 2 INJECTION, POWDER, FOR SOLUTION INTRAMUSCULAR; INTRAVENOUS at 13:53

## 2025-02-16 RX ADMIN — Medication 1 APPLICATION: at 21:00

## 2025-02-16 RX ADMIN — AMLODIPINE BESYLATE 10 MG: 10 TABLET ORAL at 08:00

## 2025-02-16 RX ADMIN — ALBUTEROL SULFATE 1.25 MG: 2.5 SOLUTION RESPIRATORY (INHALATION) at 10:45

## 2025-02-16 RX ADMIN — Medication 1 APPLICATION: at 08:00

## 2025-02-16 RX ADMIN — ENOXAPARIN SODIUM 40 MG: 100 INJECTION SUBCUTANEOUS at 18:51

## 2025-02-16 RX ADMIN — HALOPERIDOL LACTATE 5 MG: 5 INJECTION, SOLUTION INTRAMUSCULAR at 21:01

## 2025-02-16 RX ADMIN — DIPHENHYDRAMINE HYDROCHLORIDE 50 MG: 50 INJECTION, SOLUTION INTRAMUSCULAR; INTRAVENOUS at 21:01

## 2025-02-16 RX ADMIN — Medication 10 ML: at 08:55

## 2025-02-16 RX ADMIN — IPRATROPIUM BROMIDE 0.5 MG: 0.5 SOLUTION RESPIRATORY (INHALATION) at 06:45

## 2025-02-16 RX ADMIN — SODIUM CHLORIDE 150 ML/HR: 4.5 INJECTION, SOLUTION INTRAVENOUS at 07:58

## 2025-02-16 RX ADMIN — Medication 10 ML: at 21:01

## 2025-02-16 NOTE — PROGRESS NOTES
[facility] Intensivist Services  Progress Note    Patient Name: Chaitanya Hair  Date of Admission: 2/10/2025  Today's Date: 02/16/25  Length of Stay: 6  Primary Care Physician: Mason Peguero MD    Subjective     Chief Complaint: Acute on chronic hypoxic respiratory failure, encephalopathy    HPI:  Did better overnight, did not require any as needed antipsychotic.  Continues to be encephalopathic but more calm today.  Mental status does appear to be improving.    Review of Systems:  All pertinent negatives and positives are as above. All other systems have been reviewed and are negative unless otherwise stated.     ICU Course     Summary:  Mr. Hair is a 64-year-old male admitted to [facility] with acute on chronic hypoxic respiratory failure secondary to influenza A.  He was initially managed on the floor with Vapotherm.  He developed an acute change in mental status on the evening of 2/11/2025.  He was placed on BiPAP at this time.  The following morning he continued to be significantly altered.  His BiPAP requirements did increase.  He was then admitted to the ICU.  Encephalopathy workup thus far has been negative.  When his Tamiflu was held, as he was unable to swallow, his mental status improved.  With the improvement of his mental status he was able to swallow.  His Tamiflu was resumed which resulted in reemergence of his encephalopathy.  Additionally he developed similar flushing and itchy eyes.  Therefore it appears the Tamiflu is the etiology of his encephalopathy.  While this is rare, especially in adults, Tamiflu is known to cause encephalopathy.    Objective      Physical Exam:  General: Encephalopathic, improving  Head: Normocephalic, atraumatic  Eyes: Conjunctiva clear, sclera non-icteric, improved  Teeth/Gums: Normal inspection  Neck: Supple without stridor  Heart: Regular rate and rhythm, no murmur  Lungs: Diminished bilaterally with no wheezing  Abdomen: Soft,  "nontender  MSK/extremities: No cyanosis or edema  Neurologic: He is alert but not oriented, GCS 12      Results Review:  Results from last 7 days   Lab Units 02/16/25  0406 02/15/25  0331 02/15/25  0104 02/14/25  1220   SODIUM mmol/L 155* 154*  --  153*   SODIUM, ARTERIAL mmol/L  --   --  156*  --    POTASSIUM mmol/L 4.4 4.9  --  4.5   CHLORIDE mmol/L 122* 120*  --  120*   CO2 mmol/L 24.0 23.0  --  24.0   BUN mg/dL 43* 54*  --  70*   CALCIUM mg/dL 9.0 8.8  --  8.7     Results from last 7 days   Lab Units 02/16/25  0406 02/15/25  0331 02/14/25  0257   WBC 10*3/mm3 9.10 6.55 12.55*   HEMOGLOBIN g/dL 15.0 14.8 14.8   HEMATOCRIT % 46.2 45.0 45.5   PLATELETS 10*3/mm3 167 157 147     Visit Vitals  /89   Pulse 101   Temp 98.7 °F (37.1 °C)   Resp 14   Ht 193 cm (76\")   Wt 107 kg (236 lb 1.8 oz)   SpO2 91%   BMI 28.74 kg/m²       Medications:  amLODIPine, 10 mg, Oral, Daily  aspirin, 81 mg, Oral, Daily  Chlorhexidine Gluconate Cloth, 1 Application, Topical, Daily  enoxaparin, 40 mg, Subcutaneous, Q24H  [Held by provider] metoprolol tartrate, 50 mg, Oral, BID  mupirocin, 1 Application, Each Nare, BID  polyethylene glycol, 17 g, Oral, Daily  sodium chloride, 10 mL, Intravenous, Q12H      sodium chloride, 150 mL/hr, Last Rate: 150 mL/hr (02/16/25 0758)          Assessment/Plan     Problems:  Acute on chronic hypoxic respiratory failure  Influenza A  COPD  Restrictive lung disease  Toxic encephalopathy     Plan:  Acute on chronic hypoxic respiratory failure, influenza A  -Continue supplemental oxygen as needed and wean as tolerated, goal O2 sat of 88-92%  -Recent chest CTA negative for PE  -He has been weaned to nasal cannula  -Continue to monitor respiratory status off of scheduled steroids  -Discontinue Tamiflu  -Complete empiric Rocephin and azithromycin     Encephalopathy  -Encephalopathy seems to be secondary to Tamiflu administration.  When this med was held in the ICU his mental status improved.  When it was resumed " his mental status again worsened with flushing and itchy eyes.  Will discontinue his Tamiflu and added to his allergy list.  Continue to monitor in the ICU until toxic effects of Tamiflu resolve and his mental status improves  -Infectious workup negative other than influenza send  -Lab work unrevealing     COPD, restrictive lung disease  -Restart nebulized triple therapy once mental status improves        Critical Care Set:  Feeding: Resume nothing by mouth once mental status improved  Analgesia: As needed ordered  Sedation: N/A  Thromboprophylaxis: DVT Lovenox  HOB: Elevated  Ulcer prophylaxis: N/A  Glycemic control: Nondiabetic  SBT: N/A  Bowel movement: As needed regimen ordered  Catheter removal: N/A  Medication de-escalation: N/A    Disposition  Possible transfer to the floor later this afternoon.    Irina Elizabeth DO  Pulmonary Critical Care Medicine  2/16/2025  14:40 CST

## 2025-02-16 NOTE — PLAN OF CARE
Goal Outcome Evaluation:      Alert and oriented x2-3. Pt is easily reoriented. Follows commands. Remains in two point restraints. Pt constantly tries to remove clothing, linen and equipment. Pt occasionally attempts to exit bed. Normal sinus to sinus tach. On 5L nasal cannula.

## 2025-02-16 NOTE — THERAPY TREATMENT NOTE
Acute Care - Physical Therapy Treatment Note  [facility]     Patient Name: Chaitanya Hair  : 1960  MRN: [id number]  Today's Date: 2025      Visit Dx:     ICD-10-CM ICD-9-CM   1. Acute renal failure, unspecified acute renal failure type  N17.9 584.9   2. Influenza A  J10.1 487.1   3. Pneumonia due to infectious organism, unspecified laterality, unspecified part of lung  J18.9 486   4. Impaired mobility [Z74.09]  Z74.09 799.89   5. Dysphagia, unspecified type  R13.10 787.20     Patient Active Problem List   Diagnosis    Coronary artery disease involving native coronary artery of native heart without angina pectoris    Hypertension    Nicotine abuse    Old inferior wall myocardial infarction    Presence of stent in right coronary artery    Tobacco abuse    CAD (coronary artery disease)    Dysphagia, oropharyngeal    Dysphonia    Hoarseness    Vocal cord weakness    Dysarthria    Personal history of adenomatous and serrated colon polyps    HLD (hyperlipidemia)    Acute renal failure    Acute respiratory failure with hypoxia    Influenza A    Lobar pneumonia     Past Medical History:   Diagnosis Date    Abnormal stress echo 3/25/19-[facility]    Noted For Myocardial Ischemia    Acute MI     Atelectasis 2019    Noted on Chest XR    CAD (coronary artery disease) Since     w/R Stent Placed    Chest pain due to CAD     Nitro PRN    DM (diabetes mellitus)     false positive    Elevated cholesterol     History of chest x-ray 2019    Suggestive For Atelectasis w/Chronic Inflammatory Scarring Noted    History of EKG 3/25/19-[facility]    Sinus Rhythm w/Nonspecific ST-T Wave Abnormalities    History of nephrolithiasis     History of stress test     WNL    HLD (hyperlipidemia)     Controlled w/Meds    Hypertension     Controlled w/Meds    Kidney stones     S/p Lithotripsy    LAD stenosis 2019    90% Mid LAD Stenosis; Diffuse Stenosis of LCX--Noted on Cardiac Cath     Myocardial ischemia 03/25/2019    Noted on Echo Stress Test    RCA occlusion 04/11/2019    Mild Diffuse Disease--Noted on Cardiac Cath     Tobacco use     1.5-1 PPD     Past Surgical History:   Procedure Laterality Date    ABDOMINAL SURGERY  2023    APPENDECTOMY  2023    BACK SURGERY  2013    CARDIAC CATHETERIZATION  2009    RCA Stent    CARDIAC CATHETERIZATION  04/11/2019-[facility]    w/L Ventriculogram/Angiography--Dr. Garcia--CAD Involving LAD    COLONOSCOPY N/A 10/16/2024    Procedure: COLONOSCOPY WITH ANESTHESIA;  Surgeon: Nathaly Nino MD;  Location:  [facility];  Service: Gastroenterology;  Laterality: N/A;  pre screening  post diverticulosis; polyp  pcp  Mason Peguero MD    CORONARY ANGIOPLASTY WITH STENT PLACEMENT Right 2009    CORONARY ARTERY BYPASS GRAFT N/A 04/16/2019    Procedure: MEDIAN STERNOTOMY, CORONARY ARTERY BYPASS X2 USING LEFT INTERNAL MAMMARY ARTERY GRAFT, PRP, INTRAOP LAURI;  Surgeon: Jesse Perez MD;  Location: [facility] OR;  Service: Cardiothoracic    CYSTOSCOPY W/ LASER LITHOTRIPSY       PT Assessment (Last 12 Hours)       PT Evaluation and Treatment       Row Name 02/16/25 0939          Physical Therapy Time and Intention    Subjective Information no complaints  -     Document Type therapy note (daily note)  -     Mode of Treatment physical therapy  -     Comment pleasantly confused, but followed directions.  -       Row Name 02/16/25 0939          General Information    Existing Precautions/Restrictions fall;oxygen therapy device and L/min  -     Limitations/Impairments safety/cognitive  -       Row Name 02/16/25 0939          Pain    Pretreatment Pain Rating 0/10 - no pain  -     Posttreatment Pain Rating 0/10 - no pain  -       Row Name 02/16/25 0939          Bed Mobility    Supine-Sit [place] (Bed Mobility) verbal cues;minimum assist (75% patient effort)  -     Sit-Supine [place] (Bed Mobility) verbal cues;contact guard  -      Bed Mobility, Safety Issues cognitive deficits limit understanding  -     Assistive Device (Bed Mobility) bed rails;head of bed elevated  -       Row Name 02/16/25 0939          Transfers    Comment, (Transfers) stood x 1, took side steps to HOB. HR elevated to the 150's. Deferred any more activity.  -       Row Name 02/16/25 0939          Sit-Stand Transfer    Sit-Stand [place] (Transfers) verbal cues;minimum assist (75% patient effort)  -     Comment, (Sit-Stand Transfer) very shaky  -[facility] Name 02/16/25 0939          Stand-Sit Transfer    Stand-Sit [place] (Transfers) verbal cues;minimum assist (75% patient effort)  -[facility] Name 02/16/25 0939          Balance    Comment, Balance supervision for sitting balance-shaky even in sitting.  -[facility] Name 02/16/25 0939          Motor Skills    Comments, Therapeutic Exercise AROM B LE x 15 reps. AROM B UE x 15 reps.  -[facility] Name 02/16/25 0939          Vital Signs    Intratreatment Heart Rate (beats/min) 150  -MF       Row Name 02/16/25 0939          Positioning and Restraints    Pre-Treatment Position in bed  -     Post Treatment Position bed  -     In Bed fowlers;call light within reach;encouraged to call for assist;exit alarm on;patient within staff view;side rails up x2;RUE elevated;LUE elevated;legs elevated  -               User Key  (r) = Recorded By, (t) = Taken By, (c) = Cosigned By      Initials Name Provider Type    Natalie Bynum PTA Physical Therapist Assistant                    Physical Therapy Education       Title: PT OT SLP Therapies (In Progress)       Topic: Physical Therapy (In Progress)       Point: Mobility training (Done)       Learning Progress Summary            Patient Acceptance, E, VU by BL at 2/11/2025 1059    Comment: Bed mobility techinques, plan of care, home safety     by BL at 2/11/2025 1058                      Point: Home exercise program (Not Started)       Learner Progress:   Not documented in this visit.              Point: Body mechanics (Done)       Learning Progress Summary            Patient Acceptance, E, VU by  at 2/11/2025 1059    Comment: Bed mobility techinques, plan of care, home safety     by  at 2/11/2025 1058                      Point: Precautions (Done)       Learning Progress Summary            Patient Acceptance, E, VU by  at 2/11/2025 1059    Comment: Bed mobility techinques, plan of care, home safety     by  at 2/11/2025 1058                                      User Key       Initials Effective Dates Name Provider Type Discipline     12/17/24 -  Hernandez Reyes, PT Student PT Student PT                  PT Recommendation and Plan     Plan of Care Reviewed With: patient  Progress: improving  Outcome Evaluation: PT tx completed. Pt. agreeable to therapy and following directions. Pt remains confused, but did participate. He was CGA-MIN assist for bed mobility. He was able to sit EOB with SBA. Participated with UE and LE exercises in sitting. Pt was able to use his arms and scoot himself along the edge of bed. Assisted pt with rolling in bed for cleanup to change wet brief and sheets. Pt's O2 sat was 90-95% during activity and HR ranged from 100-118bpm. Will continue to work on strengthening and mobility.   Outcome Measures       Row Name 02/16/25 0939 02/15/25 0924          How much help from another person do you currently need...    Turning from your back to your side while in flat bed without using bedrails? 3  -MF 3  -MF     Moving from lying on back to sitting on the side of a flat bed without bedrails? 3  -MF 3  -MF     Moving to and from a bed to a chair (including a wheelchair)? 2  -MF 2  -MF     Standing up from a chair using your arms (e.g., wheelchair, bedside chair)? 3  -MF 2  -MF     Climbing 3-5 steps with a railing? 1  -MF 1  -MF     To walk in hospital room? 1  -MF 1  -MF     AM-PAC 6 Clicks Score (PT) 13  -MF 12  -MF        Functional Assessment     Outcome Measure Options AM-PAC 6 Clicks Basic Mobility (PT)  -MF AM-PAC 6 Clicks Basic Mobility (PT)  -MF               User Key  (r) = Recorded By, (t) = Taken By, (c) = Cosigned By      Initials Name Provider Type    Natalie Bynum PTA Physical Therapist Assistant                     Time Calculation:    PT Charges       Row Name 02/16/25 1013             Time Calculation    Start Time 0939  -MF      Stop Time 1013  -MF      Time Calculation (min) 34 min  -MF      PT Received On 02/16/25  -MF         Time Calculation- PT    Total Timed Code Minutes- PT 34 minute(s)  -MF         Timed Charges    33957 - PT Therapeutic Activity Minutes 34  -MF         Total Minutes    Timed Charges Total Minutes 34  -MF       Total Minutes 34  -MF                User Key  (r) = Recorded By, (t) = Taken By, (c) = Cosigned By      Initials Name Provider Type     Natalie Siu, ANDRZEJ Physical Therapist Assistant                  Therapy Charges for Today       Code Description Service Date Service Provider Modifiers Qty    43307598325 HC PT THERAPEUTIC ACT EA 15 MIN 2/15/2025 Natalie Siu, ANDRZEJ GP 3    52371824607 HC PT THERAPEUTIC ACT EA 15 MIN 2/16/2025 Natalie Siu, ANDRZEJ GP 2            PT G-Codes  Outcome Measure Options: AM-PAC 6 Clicks Basic Mobility (PT)  AM-PAC 6 Clicks Score (PT): 13  AM-PAC 6 Clicks Score (OT): 10    Natalie Siu PTA  2/16/2025

## 2025-02-17 LAB
ALBUMIN SERPL-MCNC: 3.1 G/DL (ref 3.5–5.2)
ALBUMIN/GLOB SERPL: 1 G/DL
ALP SERPL-CCNC: 108 U/L (ref 39–117)
ALT SERPL W P-5'-P-CCNC: 70 U/L (ref 1–41)
ANION GAP SERPL CALCULATED.3IONS-SCNC: 10 MMOL/L (ref 5–15)
ANION GAP SERPL CALCULATED.3IONS-SCNC: 11 MMOL/L (ref 5–15)
AST SERPL-CCNC: 53 U/L (ref 1–40)
BASOPHILS # BLD AUTO: 0.03 10*3/MM3 (ref 0–0.2)
BASOPHILS NFR BLD AUTO: 0.4 % (ref 0–1.5)
BILIRUB SERPL-MCNC: 0.9 MG/DL (ref 0–1.2)
BUN SERPL-MCNC: 39 MG/DL (ref 8–23)
BUN SERPL-MCNC: 41 MG/DL (ref 8–23)
BUN/CREAT SERPL: 27.7 (ref 7–25)
BUN/CREAT SERPL: 28.7 (ref 7–25)
CALCIUM SPEC-SCNC: 8.4 MG/DL (ref 8.6–10.5)
CALCIUM SPEC-SCNC: 8.5 MG/DL (ref 8.6–10.5)
CHLORIDE SERPL-SCNC: 115 MMOL/L (ref 98–107)
CHLORIDE SERPL-SCNC: 121 MMOL/L (ref 98–107)
CO2 SERPL-SCNC: 23 MMOL/L (ref 22–29)
CO2 SERPL-SCNC: 23 MMOL/L (ref 22–29)
CREAT SERPL-MCNC: 1.36 MG/DL (ref 0.76–1.27)
CREAT SERPL-MCNC: 1.48 MG/DL (ref 0.76–1.27)
DEPRECATED RDW RBC AUTO: 47.2 FL (ref 37–54)
EGFRCR SERPLBLD CKD-EPI 2021: 51.9 ML/MIN/1.73
EGFRCR SERPLBLD CKD-EPI 2021: 57.4 ML/MIN/1.73
EOSINOPHIL # BLD AUTO: 0.06 10*3/MM3 (ref 0–0.4)
EOSINOPHIL NFR BLD AUTO: 0.8 % (ref 0.3–6.2)
ERYTHROCYTE [DISTWIDTH] IN BLOOD BY AUTOMATED COUNT: 13.8 % (ref 12.3–15.4)
GLOBULIN UR ELPH-MCNC: 3.1 GM/DL
GLUCOSE SERPL-MCNC: 115 MG/DL (ref 65–99)
GLUCOSE SERPL-MCNC: 99 MG/DL (ref 65–99)
HCT VFR BLD AUTO: 44.2 % (ref 37.5–51)
HGB BLD-MCNC: 14.4 G/DL (ref 13–17.7)
IMM GRANULOCYTES # BLD AUTO: 0.12 10*3/MM3 (ref 0–0.05)
IMM GRANULOCYTES NFR BLD AUTO: 1.6 % (ref 0–0.5)
LYMPHOCYTES # BLD AUTO: 1.58 10*3/MM3 (ref 0.7–3.1)
LYMPHOCYTES NFR BLD AUTO: 21.6 % (ref 19.6–45.3)
MAGNESIUM SERPL-MCNC: 2.3 MG/DL (ref 1.6–2.4)
MCH RBC QN AUTO: 30.6 PG (ref 26.6–33)
MCHC RBC AUTO-ENTMCNC: 32.6 G/DL (ref 31.5–35.7)
MCV RBC AUTO: 93.8 FL (ref 79–97)
MONOCYTES # BLD AUTO: 1.11 10*3/MM3 (ref 0.1–0.9)
MONOCYTES NFR BLD AUTO: 15.1 % (ref 5–12)
NEUTROPHILS NFR BLD AUTO: 4.43 10*3/MM3 (ref 1.7–7)
NEUTROPHILS NFR BLD AUTO: 60.5 % (ref 42.7–76)
NRBC BLD AUTO-RTO: 0 /100 WBC (ref 0–0.2)
PHOSPHATE SERPL-MCNC: 3.3 MG/DL (ref 2.5–4.5)
PLATELET # BLD AUTO: 150 10*3/MM3 (ref 140–450)
PMV BLD AUTO: 11.4 FL (ref 6–12)
POTASSIUM SERPL-SCNC: 4.2 MMOL/L (ref 3.5–5.2)
POTASSIUM SERPL-SCNC: 4.4 MMOL/L (ref 3.5–5.2)
PROT SERPL-MCNC: 6.2 G/DL (ref 6–8.5)
RBC # BLD AUTO: 4.71 10*6/MM3 (ref 4.14–5.8)
SODIUM SERPL-SCNC: 149 MMOL/L (ref 136–145)
SODIUM SERPL-SCNC: 154 MMOL/L (ref 136–145)
WBC NRBC COR # BLD AUTO: 7.33 10*3/MM3 (ref 3.4–10.8)

## 2025-02-17 PROCEDURE — 83735 ASSAY OF MAGNESIUM: CPT | Performed by: INTERNAL MEDICINE

## 2025-02-17 PROCEDURE — 85025 COMPLETE CBC W/AUTO DIFF WBC: CPT | Performed by: INTERNAL MEDICINE

## 2025-02-17 PROCEDURE — 97530 THERAPEUTIC ACTIVITIES: CPT

## 2025-02-17 PROCEDURE — 97535 SELF CARE MNGMENT TRAINING: CPT | Performed by: OCCUPATIONAL THERAPIST

## 2025-02-17 PROCEDURE — 36415 COLL VENOUS BLD VENIPUNCTURE: CPT | Performed by: FAMILY MEDICINE

## 2025-02-17 PROCEDURE — 80053 COMPREHEN METABOLIC PANEL: CPT | Performed by: FAMILY MEDICINE

## 2025-02-17 PROCEDURE — 84100 ASSAY OF PHOSPHORUS: CPT | Performed by: INTERNAL MEDICINE

## 2025-02-17 PROCEDURE — 92526 ORAL FUNCTION THERAPY: CPT | Performed by: SPEECH-LANGUAGE PATHOLOGIST

## 2025-02-17 RX ORDER — DEXTROSE MONOHYDRATE 50 MG/ML
75 INJECTION, SOLUTION INTRAVENOUS CONTINUOUS
Status: DISPENSED | OUTPATIENT
Start: 2025-02-17 | End: 2025-02-17

## 2025-02-17 RX ORDER — NICOTINE 21 MG/24HR
1 PATCH, TRANSDERMAL 24 HOURS TRANSDERMAL
Status: DISCONTINUED | OUTPATIENT
Start: 2025-02-17 | End: 2025-02-20 | Stop reason: HOSPADM

## 2025-02-17 RX ORDER — METOPROLOL TARTRATE 25 MG/1
25 TABLET, FILM COATED ORAL 2 TIMES DAILY
Status: DISCONTINUED | OUTPATIENT
Start: 2025-02-17 | End: 2025-02-20 | Stop reason: HOSPADM

## 2025-02-17 RX ADMIN — Medication 10 ML: at 20:09

## 2025-02-17 RX ADMIN — Medication 1 APPLICATION: at 08:31

## 2025-02-17 RX ADMIN — NICOTINE 1 PATCH: 21 PATCH, EXTENDED RELEASE TRANSDERMAL at 08:31

## 2025-02-17 RX ADMIN — Medication 10 ML: at 08:31

## 2025-02-17 RX ADMIN — METOPROLOL TARTRATE 25 MG: 25 TABLET, FILM COATED ORAL at 20:09

## 2025-02-17 RX ADMIN — AMLODIPINE BESYLATE 10 MG: 10 TABLET ORAL at 08:31

## 2025-02-17 RX ADMIN — ASPIRIN 81 MG: 81 TABLET, COATED ORAL at 08:31

## 2025-02-17 NOTE — PROGRESS NOTES
[facility] Intensivist Services  INPATIENT PROGRESS NOTE    Patient Name: Chaitanya Hair  Date of Admission: 2/10/2025  Today's Date: 02/17/25  Length of Stay: 7  Primary Care Physician: Mason Peguero MD    Subjective   ICU Summary:  64-year-old male admitted to [facility] with acute on chronic hypoxic respiratory failure secondary to influenza A.  He was initially managed on the floor with Vapotherm.  He developed an acute change in mental status on the evening of 2/11/2025.  He was placed on BiPAP at this time.  The following morning he continued to be significantly altered.  His BiPAP requirements did increase.  He was then admitted to the ICU.  Encephalopathy workup thus far has been negative.  When his Tamiflu was held, as he was unable to swallow, his mental status improved.  With the improvement of his mental status he was able to swallow.  His Tamiflu was resumed which resulted in reemergence of his encephalopathy.  Additionally he developed similar flushing and itchy eyes.  Therefore it appears the Tamiflu is the etiology of his encephalopathy.  While this is rare, especially in adults, Tamiflu is known to cause encephalopathy.    Interval Update  2/17  Alert, no distress.  Intermittent confusion to place.  Blood pressure heart rate stable.  Sats mid 90s on 5 L nasal cannula.      Review of Systems   All pertinent negatives and positives are as above. All other systems have been reviewed and are negative unless otherwise stated.     Objective    Temp:  [97.5 °F (36.4 °C)-98 °F (36.7 °C)] 97.8 °F (36.6 °C)  Heart Rate:  [] 101  Resp:  [14-16] 16  BP: (101-158)/() 152/91  Physical Exam  Vitals and nursing note reviewed.   Constitutional:       General: He is not in acute distress.  Eyes:      Pupils: Pupils are equal, round, and reactive to light.   Cardiovascular:      Rate and Rhythm: Normal rate and regular rhythm.      Pulses: Normal  pulses.      Heart sounds: Normal heart sounds.   Pulmonary:      Effort: Pulmonary effort is normal.      Breath sounds: Normal breath sounds.   Abdominal:      Palpations: Abdomen is soft.      Tenderness: There is no abdominal tenderness.   Musculoskeletal:         General: Normal range of motion.   Skin:     General: Skin is warm and dry.      Capillary Refill: Capillary refill takes less than 2 seconds.   Neurological:      General: No focal deficit present.      Mental Status: He is alert.      Comments: Disoriented to place intermittently         Results Review:  Lab Results (last 24 hours)       Procedure Component Value Units Date/Time    Comprehensive Metabolic Panel [[id number]]  (Abnormal) Collected: 02/17/25 0416    Specimen: Blood Updated: 02/17/25 0440     Glucose 115 mg/dL      BUN 39 mg/dL      Creatinine 1.36 mg/dL      Sodium 154 mmol/L      Potassium 4.4 mmol/L      Chloride 121 mmol/L      CO2 23.0 mmol/L      Calcium 8.4 mg/dL      Total Protein 6.2 g/dL      Albumin 3.1 g/dL      ALT (SGPT) 70 U/L      AST (SGOT) 53 U/L      Alkaline Phosphatase 108 U/L      Total Bilirubin 0.9 mg/dL      Globulin 3.1 gm/dL      A/G Ratio 1.0 g/dL      BUN/Creatinine Ratio 28.7     Anion Gap 10.0 mmol/L      eGFR 57.4 mL/min/1.73     Narrative:      GFR Categories in Chronic Kidney Disease (CKD)      GFR Category          GFR (mL/min/1.73)    Interpretation  G1                     90 or greater         Normal or high (1)  G2                      60-89                Mild decrease (1)  G3a                   45-59                Mild to moderate decrease  G3b                   30-44                Moderate to severe decrease  G4                    15-29                Severe decrease  G5                    14 or less           Kidney failure          (1)In the absence of evidence of kidney disease, neither GFR category G1 or G2 fulfill the criteria for CKD.    eGFR calculation 2021 CKD-EPI creatinine equation,  which does not include race as a factor    Magnesium [[id number]]  (Normal) Collected: 02/17/25 0416    Specimen: Blood Updated: 02/17/25 0440     Magnesium 2.3 mg/dL     Phosphorus [[id number]]  (Normal) Collected: 02/17/25 0416    Specimen: Blood Updated: 02/17/25 0440     Phosphorus 3.3 mg/dL     CBC & Differential [[id number]]  (Abnormal) Collected: 02/17/25 0416    Specimen: Blood Updated: 02/17/25 0424    Narrative:      The following orders were created for panel order CBC & Differential.  Procedure                               Abnormality         Status                     ---------                               -----------         ------                     CBC Auto Differential[[id number]]        Abnormal            Final result                 Please view results for these tests on the individual orders.    CBC Auto Differential [[id number]]  (Abnormal) Collected: 02/17/25 0416    Specimen: Blood Updated: 02/17/25 0424     WBC 7.33 10*3/mm3      RBC 4.71 10*6/mm3      Hemoglobin 14.4 g/dL      Hematocrit 44.2 %      MCV 93.8 fL      MCH 30.6 pg      MCHC 32.6 g/dL      RDW 13.8 %      RDW-SD 47.2 fl      MPV 11.4 fL      Platelets 150 10*3/mm3      Neutrophil % 60.5 %      Lymphocyte % 21.6 %      Monocyte % 15.1 %      Eosinophil % 0.8 %      Basophil % 0.4 %      Immature Grans % 1.6 %      Neutrophils, Absolute 4.43 10*3/mm3      Lymphocytes, Absolute 1.58 10*3/mm3      Monocytes, Absolute 1.11 10*3/mm3      Eosinophils, Absolute 0.06 10*3/mm3      Basophils, Absolute 0.03 10*3/mm3      Immature Grans, Absolute 0.12 10*3/mm3      nRBC 0.0 /100 WBC     Basic Metabolic Panel [[id number]]  (Abnormal) Collected: 02/16/25 1423    Specimen: Blood Updated: 02/16/25 1500     Glucose 88 mg/dL      BUN 39 mg/dL      Creatinine 1.31 mg/dL      Sodium 156 mmol/L      Potassium 4.2 mmol/L      Chloride 121 mmol/L      CO2 23.0 mmol/L      Calcium 8.7 mg/dL      BUN/Creatinine Ratio 29.8     Anion Gap 12.0 mmol/L       eGFR 60.0 mL/min/1.73     Narrative:      GFR Categories in Chronic Kidney Disease (CKD)      GFR Category          GFR (mL/min/1.73)    Interpretation  G1                     90 or greater         Normal or high (1)  G2                      60-89                Mild decrease (1)  G3a                   45-59                Mild to moderate decrease  G3b                   30-44                Moderate to severe decrease  G4                    15-29                Severe decrease  G5                    14 or less           Kidney failure          (1)In the absence of evidence of kidney disease, neither GFR category G1 or G2 fulfill the criteria for CKD.    eGFR calculation 2021 CKD-EPI creatinine equation, which does not include race as a factor             No radiology results for the last day    Result Review:  I have personally reviewed the results from the time of this admission to 2/17/2025 08:05 CST and agree with these findings:  [x]  Laboratory list / accordion  [x]  Microbiology  [x]  Radiology  [x]  EKG/Telemetry   []  Cardiology/Vascular   []  Pathology  []  Old records  []  Other:      Culture Data:   Blood Culture   Date Value Ref Range Status   02/10/2025 No growth at 5 days  Final   02/10/2025 No growth at 5 days  Final       I have reviewed the patient's current medications.     Assessment/Plan   Assessment  Active Hospital Problems    Diagnosis     **Acute respiratory failure with hypoxia     Acute renal failure     Influenza A     Lobar pneumonia     CAD (coronary artery disease)     Nicotine abuse    64-year-old male with acute hypoxic respiratory failure secondary to influenza A infection, in CCU for critical care management.    Acute hypoxic respiratory failure, influenza A  -Continue supplemental O2, titrate keeping O2 sat greater than 89%  -Has finished Rocephin azithromycin courses, monitor for any signs of worsening pneumonia or infectious process  -Marked Tamiflu as allergy given  encephalopathy and flushing    Encephalopathy  -Secondary to Tamiflu, improving, intermittent confusion to place today  -Continue to reorient as needed    GELACIO, hypernatremia  -Continue D5W at 75 mL/h  -Repeat metabolic panel daily    VTE: Lovenox  GI: N/A  NTN: Regular diet  Abx: Finished Rocephin azithromycin course  Lines/Tubes: Peripheral IV  CODE STATUS: Full resuscitation    Disposition: Stable for transfer to medical floor    50 minutes minimum spent on patient, MDM high     This time included obtaining a history; examining the patient; pulse oximetry; ordering and review of studies; arranging urgent treatment with development of a management plan; evaluation of patient's response to treatment; frequent reassessment; and, discussions with other providers.     Please see rest of the note for further information on patient assessment, MDM, and treatment.     Part of this note may be an electronic transcription/translation of spoken language to printed text using the Dragon dictation system    Electronically signed by ALY Rios on 2/17/2025 at 08:20 CST

## 2025-02-17 NOTE — PLAN OF CARE
Goal Outcome Evaluation:      Patient sitting in recliner when entered room. Patient actively worked thru multiple LE ex's. Patient also worked on reaching in all directions while sitting on edge of chair. Also performed trunk/ core strengthening and trunk mobility activities. Patient stood x 3 beginning with MIN assist but improved to CGA on 3rd stand. Patient marched in place 10 steps. HR was 112 and increased to 145 when standing. Nursing aware. Patient is very weak and will continue to benefit from strengthening activities.

## 2025-02-17 NOTE — PROGRESS NOTES
Daily Progress Note  Chaitanya Hair  MRN: [id number] LOS: 7    Admit Date: 2/10/2025   2/17/2025 07:40 CST    Subjective:          Chief Complaint:  Chief Complaint   Patient presents with    Weakness - Generalized    Diarrhea       Interval History:    Reviewed overnight events and nursing notes.   Tremendously improved today from encephalopathy standpoint.  Still confused about his location and situation however he is oriented to person and time.  Wife at bedside.  Remains on 5 L nasal cannula but this is improving as well.    Review of Systems   Unable to perform ROS: Mental status change       DIET:  Diet: Regular/House; Texture: Soft to Chew (NDD 3); Soft to Chew: Chopped Meat; Fluid Consistency: Thin (IDDSI 0)    Medications:        amLODIPine, 10 mg, Oral, Daily  aspirin, 81 mg, Oral, Daily  Chlorhexidine Gluconate Cloth, 1 Application, Topical, Daily  enoxaparin, 40 mg, Subcutaneous, Q24H  [Held by provider] metoprolol tartrate, 50 mg, Oral, BID  mupirocin, 1 Application, Each Nare, BID  polyethylene glycol, 17 g, Oral, Daily  sodium chloride, 10 mL, Intravenous, Q12H        Data:     Code Status:   Code Status and Medical Interventions: CPR (Attempt to Resuscitate); Full Support   Ordered at: 02/11/25 0743     Code Status (Patient has no pulse and is not breathing):    CPR (Attempt to Resuscitate)     Medical Interventions (Patient has pulse or is breathing):    Full Support       Family History   Problem Relation Age of Onset    Heart disease Father     Colon cancer Neg Hx     Colon polyps Neg Hx      Social History     Socioeconomic History    Marital status:    Tobacco Use    Smoking status: Every Day     Current packs/day: 0.50     Average packs/day: 0.7 packs/day for 60.0 years (40.0 ttl pk-yrs)     Types: Cigarettes    Smokeless tobacco: Never   Vaping Use    Vaping status: Never Used   Substance and Sexual Activity    Alcohol use: Yes     Comment: Maybe once a month    Drug use: No     Sexual activity: Yes     Partners: Female       Labs:    Lab Results (last 72 hours)       Procedure Component Value Units Date/Time    Comprehensive Metabolic Panel [[id number]]  (Abnormal) Collected: 02/11/25 0308    Specimen: Blood Updated: 02/11/25 0410     Glucose 141 mg/dL      BUN 59 mg/dL      Creatinine 2.10 mg/dL      Sodium 138 mmol/L      Potassium 4.3 mmol/L      Comment: Slight hemolysis detected by analyzer. Result may be falsely elevated.        Chloride 105 mmol/L      CO2 22.0 mmol/L      Calcium 8.2 mg/dL      Total Protein 6.4 g/dL      Albumin 2.9 g/dL      ALT (SGPT) 57 U/L      AST (SGOT) 63 U/L      Alkaline Phosphatase 86 U/L      Total Bilirubin 0.4 mg/dL      Globulin 3.5 gm/dL      A/G Ratio 0.8 g/dL      BUN/Creatinine Ratio 28.1     Anion Gap 11.0 mmol/L      eGFR 34.1 mL/min/1.73     Narrative:      GFR Categories in Chronic Kidney Disease (CKD)      GFR Category          GFR (mL/min/1.73)    Interpretation  G1                     90 or greater         Normal or high (1)  G2                      60-89                Mild decrease (1)  G3a                   45-59                Mild to moderate decrease  G3b                   30-44                Moderate to severe decrease  G4                    15-29                Severe decrease  G5                    14 or less           Kidney failure          (1)In the absence of evidence of kidney disease, neither GFR category G1 or G2 fulfill the criteria for CKD.    eGFR calculation 2021 CKD-EPI creatinine equation, which does not include race as a factor    CBC & Differential [[id number]]  (Abnormal) Collected: 02/11/25 0308    Specimen: Blood Updated: 02/11/25 0409    Narrative:      The following orders were created for panel order CBC & Differential.  Procedure                               Abnormality         Status                     ---------                               -----------         ------                     CBC Auto  Differential[[id number]]        Abnormal            Final result                 Please view results for these tests on the individual orders.    CBC Auto Differential [[id number]]  (Abnormal) Collected: 02/11/25 0308    Specimen: Blood Updated: 02/11/25 0409     WBC 5.99 10*3/mm3      RBC 4.75 10*6/mm3      Hemoglobin 14.5 g/dL      Hematocrit 41.6 %      MCV 87.6 fL      MCH 30.5 pg      MCHC 34.9 g/dL      RDW 13.2 %      RDW-SD 42.3 fl      MPV 11.2 fL      Platelets 99 10*3/mm3      Neutrophil % 79.7 %      Lymphocyte % 12.4 %      Monocyte % 7.0 %      Eosinophil % 0.0 %      Basophil % 0.2 %      Immature Grans % 0.7 %      Neutrophils, Absolute 4.78 10*3/mm3      Lymphocytes, Absolute 0.74 10*3/mm3      Monocytes, Absolute 0.42 10*3/mm3      Eosinophils, Absolute 0.00 10*3/mm3      Basophils, Absolute 0.01 10*3/mm3      Immature Grans, Absolute 0.04 10*3/mm3     Blood Gas, Arterial - [[id number]]  (Abnormal) Collected: 02/10/25 2026    Specimen: Arterial Blood Updated: 02/10/25 2026     Site Right Radial     Desmond's Test Positive     pH, Arterial 7.457 pH units      Comment: 83 Value above reference range        pCO2, Arterial 33.6 mm Hg      Comment: 84 Value below reference range        pO2, Arterial 64.6 mm Hg      Comment: 84 Value below reference range        HCO3, Arterial 23.7 mmol/L      Base Excess, Arterial 0.5 mmol/L      O2 Saturation, Arterial 94.1 %      Temperature 37.0     Barometric Pressure for Blood Gas 760 mmHg      Modality HFNC     Flow Rate 15.0 lpm      Ventilator Mode NA     Collected by [id number]     Comment: Meter: [id number]     :  Radha Fuentes RRT        pCO2, Temperature Corrected 33.6 mm Hg      pH, Temp Corrected 7.457 pH Units      pO2, Temperature Corrected 64.6 mm Hg     T3, Free [[id number]]  (Normal) Collected: 02/10/25 1109    Specimen: Blood Updated: 02/10/25 1950     T3, Free 2.99 pg/mL     Procalcitonin [[id number]]  (Abnormal) Collected: 02/10/25 1109     "Specimen: Blood Updated: 02/10/25 1653     Procalcitonin 0.47 ng/mL     Narrative:      As a Marker for Sepsis (Non-Neonates):    1. <0.5 ng/mL represents a low risk of severe sepsis and/or septic shock.  2. >2 ng/mL represents a high risk of severe sepsis and/or septic shock.    As a Marker for Lower Respiratory Tract Infections that require antibiotic therapy:    PCT on Admission    Antibiotic Therapy       6-12 Hrs later    >0.5                Strongly Recommended  >0.25 - <0.5        Recommended   0.1 - 0.25          Discouraged              Remeasure/reassess PCT  <0.1                Strongly Discouraged     Remeasure/reassess PCT    As 28 day mortality risk marker: \"Change in Procalcitonin Result\" (>80% or <=80%) if Day 0 (or Day 1) and Day 4 values are available. Refer to http://www.[employer] Financialpct-calculator.com    Change in PCT <=80%  A decrease of PCT levels below or equal to 80% defines a positive change in PCT test result representing a higher risk for 28-day all-cause mortality of patients diagnosed with severe sepsis for septic shock.    Change in PCT >80%  A decrease of PCT levels of more than 80% defines a negative change in PCT result representing a lower risk for 28-day all-cause mortality of patients diagnosed with severe sepsis or septic shock.       High Sensitivity Troponin T 1Hr [[id number]]  (Abnormal) Collected: 02/10/25 1216    Specimen: Blood Updated: 02/10/25 1249     HS Troponin T 76 ng/L      Troponin T Numeric Delta -2 ng/L      Troponin T % Delta -3    Narrative:      High Sensitive Troponin T Reference Range:  <14.0 ng/L- Negative Female for AMI  <22.0 ng/L- Negative Male for AMI  >=14 - Abnormal Female indicating possible myocardial injury.  >=22 - Abnormal Male indicating possible myocardial injury.   Clinicians would have to utilize clinical acumen, EKG, Troponin, and serial changes to determine if it is an Acute Myocardial Infarction or myocardial injury due to an underlying chronic " condition.         BNP [[id number]]  (Normal) Collected: 02/10/25 1216    Specimen: Blood Updated: 02/10/25 1246     proBNP 147.5 pg/mL     Narrative:      This assay is used as an aid in the diagnosis of individuals suspected of having heart failure. It can be used as an aid in the diagnosis of acute decompensated heart failure (ADHF) in patients presenting with signs and symptoms of ADHF to the emergency department (ED). In addition, NT-proBNP of <300 pg/mL indicates ADHF is not likely.    Age Range Result Interpretation  NT-proBNP Concentration (pg/mL:      <50             Positive            >450                   Gray                 300-450                    Negative             <300    50-75           Positive            >900                  Gray                300-900                  Negative            <300      >75             Positive            >1800                  Gray                300-1800                  Negative            <300    Urinalysis With Microscopic If Indicated (No Culture) - Urine, Clean Catch [[id number]]  (Normal) Collected: 02/10/25 1231    Specimen: Urine, Clean Catch Updated: 02/10/25 1246     Color, UA Yellow     Appearance, UA Clear     pH, UA <=5.0     Specific Gravity, UA 1.016     Glucose, UA Negative     Ketones, UA Negative     Bilirubin, UA Negative     Blood, UA Negative     Protein, UA Negative     Leuk Esterase, UA Negative     Nitrite, UA Negative     Urobilinogen, UA 0.2 E.U./dL    Narrative:      Urine microscopic not indicated.    CBC & Differential [[id number]]  (Abnormal) Collected: 02/10/25 1109    Specimen: Blood Updated: 02/10/25 1159    Narrative:      The following orders were created for panel order CBC & Differential.  Procedure                               Abnormality         Status                     ---------                               -----------         ------                     CBC Auto Differential[[id number]]        Abnormal            Final  result                 Please view results for these tests on the individual orders.    CBC Auto Differential [[id number]]  (Abnormal) Collected: 02/10/25 1109    Specimen: Blood Updated: 02/10/25 1159     WBC 6.31 10*3/mm3      RBC 4.54 10*6/mm3      Hemoglobin 14.1 g/dL      Hematocrit 40.3 %      MCV 88.8 fL      MCH 31.1 pg      MCHC 35.0 g/dL      RDW 13.2 %      RDW-SD 43.2 fl      MPV 11.3 fL      Platelets 95 10*3/mm3     Manual Differential [[id number]]  (Abnormal) Collected: 02/10/25 1109    Specimen: Blood Updated: 02/10/25 1159     Neutrophil % 79.8 %      Lymphocyte % 3.0 %      Monocyte % 7.1 %      Eosinophil % 0.0 %      Basophil % 0.0 %      Bands %  9.1 %      Atypical Lymphocyte % 1.0 %      Neutrophils Absolute 5.61 10*3/mm3      Lymphocytes Absolute 0.25 10*3/mm3      Monocytes Absolute 0.45 10*3/mm3      Eosinophils Absolute 0.00 10*3/mm3      Basophils Absolute 0.00 10*3/mm3      Anisocytosis Slight/1+     Poikilocytes Slight/1+     Spherocytes Slight/1+     WBC Morphology Normal     Platelet Estimate Decreased     Clumped Platelets Present     Giant Platelets Slight/1+    TSH [[id number]]  (Normal) Collected: 02/10/25 1109    Specimen: Blood Updated: 02/10/25 1151     TSH 2.450 uIU/mL     T4, Free [[id number]]  (Normal) Collected: 02/10/25 1109    Specimen: Blood Updated: 02/10/25 1151     Free T4 1.28 ng/dL     Comprehensive Metabolic Panel [[id number]]  (Abnormal) Collected: 02/10/25 1109    Specimen: Blood Updated: 02/10/25 1150     Glucose 106 mg/dL      BUN 60 mg/dL      Creatinine 2.34 mg/dL      Sodium 135 mmol/L      Potassium 3.8 mmol/L      Chloride 100 mmol/L      CO2 21.0 mmol/L      Calcium 7.9 mg/dL      Total Protein 6.2 g/dL      Albumin 3.2 g/dL      ALT (SGPT) 66 U/L      AST (SGOT) 66 U/L      Alkaline Phosphatase 80 U/L      Total Bilirubin 0.5 mg/dL      Globulin 3.0 gm/dL      A/G Ratio 1.1 g/dL      BUN/Creatinine Ratio 25.6     Anion Gap 14.0 mmol/L      eGFR 29.9  mL/min/1.73     Narrative:      GFR Categories in Chronic Kidney Disease (CKD)      GFR Category          GFR (mL/min/1.73)    Interpretation  G1                     90 or greater         Normal or high (1)  G2                      60-89                Mild decrease (1)  G3a                   45-59                Mild to moderate decrease  G3b                   30-44                Moderate to severe decrease  G4                    15-29                Severe decrease  G5                    14 or less           Kidney failure          (1)In the absence of evidence of kidney disease, neither GFR category G1 or G2 fulfill the criteria for CKD.    eGFR calculation 2021 CKD-EPI creatinine equation, which does not include race as a factor    Lactic Acid, Plasma [[id number]]  (Normal) Collected: 02/10/25 1109    Specimen: Blood Updated: 02/10/25 1149     Lactate 1.5 mmol/L     Magnesium [[id number]]  (Normal) Collected: 02/10/25 1109    Specimen: Blood Updated: 02/10/25 1147     Magnesium 1.9 mg/dL     High Sensitivity Troponin T [[id number]]  (Abnormal) Collected: 02/10/25 1109    Specimen: Blood Updated: 02/10/25 1147     HS Troponin T 78 ng/L     Narrative:      High Sensitive Troponin T Reference Range:  <14.0 ng/L- Negative Female for AMI  <22.0 ng/L- Negative Male for AMI  >=14 - Abnormal Female indicating possible myocardial injury.  >=22 - Abnormal Male indicating possible myocardial injury.   Clinicians would have to utilize clinical acumen, EKG, Troponin, and serial changes to determine if it is an Acute Myocardial Infarction or myocardial injury due to an underlying chronic condition.         Lipase [[id number]]  (Abnormal) Collected: 02/10/25 1109    Specimen: Blood Updated: 02/10/25 1145     Lipase 74 U/L     Respiratory Panel PCR w/COVID-19(SARS-CoV-2) SIMIN/ALVERTO/MYRA/PAD/[facility]/MI[facility] In-House, NP Swab in UTM/VTM, 2 HR TAT - Swab, Nasopharynx [[id number]]  (Abnormal) Collected: 02/10/25 1013    Specimen: Swab from  "Nasopharynx Updated: 02/10/25 1136     ADENOVIRUS, PCR Not Detected     Coronavirus 229E Not Detected     Coronavirus HKU1 Not Detected     Coronavirus NL63 Not Detected     Coronavirus OC43 Not Detected     COVID19 Not Detected     Human Metapneumovirus Not Detected     Human Rhinovirus/Enterovirus Not Detected     Influenza A H1 2009 PCR Detected     Influenza B PCR Not Detected     Parainfluenza Virus 1 Not Detected     Parainfluenza Virus 2 Not Detected     Parainfluenza Virus 3 Not Detected     Parainfluenza Virus 4 Not Detected     RSV, PCR Not Detected     Bordetella pertussis pcr Not Detected     Bordetella parapertussis PCR Not Detected     Chlamydophila pneumoniae PCR Not Detected     Mycoplasma pneumo by PCR Not Detected    Narrative:      In the setting of a positive respiratory panel with a viral infection PLUS a negative procalcitonin without other underlying concern for bacterial infection, consider observing off antibiotics or discontinuation of antibiotics and continue supportive care. If the respiratory panel is positive for atypical bacterial infection (Bordetella pertussis, Chlamydophila pneumoniae, or Mycoplasma pneumoniae), consider antibiotic de-escalation to target atypical bacterial infection.    Blood Culture - Blood, Arm, Right [[id number]] Collected: 02/10/25 1013    Specimen: Blood from Arm, Right Updated: 02/10/25 1027    Blood Culture - Blood, Arm, Left [[id number]] Collected: 02/10/25 1013    Specimen: Blood from Arm, Left Updated: 02/10/25 1027              Objective:     Vitals: /91   Pulse 101   Temp 97.8 °F (36.6 °C)   Resp 16   Ht 193 cm (76\")   Wt 107 kg (236 lb 1.8 oz)   SpO2 94%   BMI 28.74 kg/m²    Intake/Output Summary (Last 24 hours) at 2025 0740  Last data filed at 2025 0500  Gross per 24 hour   Intake 1041.25 ml   Output 1050 ml   Net -8.75 ml    Temp (24hrs), Av.9 °F (36.6 °C), Min:97.5 °F (36.4 °C), Max:98.7 °F (37.1 °C)      Physical " Exam  Constitutional:       Appearance: He is well-developed. He is ill-appearing.   HENT:      Head: Normocephalic and atraumatic.   Eyes:      Conjunctiva/sclera: Conjunctivae normal.      Pupils: Pupils are equal, round, and reactive to light.   Cardiovascular:      Rate and Rhythm: Normal rate and regular rhythm.      Heart sounds: Normal heart sounds. No murmur heard.     No friction rub.   Pulmonary:      Effort: No respiratory distress.      Breath sounds: Wheezing present. No rhonchi or rales.      Comments: Wheezing and aeration improved  Abdominal:      General: Bowel sounds are normal. There is no distension.      Palpations: Abdomen is soft.      Tenderness: There is no abdominal tenderness.   Musculoskeletal:         General: Normal range of motion.      Cervical back: Normal range of motion.   Skin:     Capillary Refill: Capillary refill takes less than 2 seconds.   Neurological:      Cranial Nerves: No cranial nerve deficit.      Comments: Delirious and confused,   Psychiatric:         Behavior: Behavior normal.             Assessment and Plan:     Primary Problem:  Acute respiratory failure with hypoxia    Hospital Problem list:    Acute respiratory failure with hypoxia    Nicotine abuse    CAD (coronary artery disease)    Acute renal failure    Influenza A    Lobar pneumonia      PMH:  Past Medical History:   Diagnosis Date    Abnormal stress echo 3/25/19-[facility]    Noted For Myocardial Ischemia    Acute MI     Atelectasis 04/11/2019    Noted on Chest XR    CAD (coronary artery disease) Since 2009    w/R Stent Placed    Chest pain due to CAD     Nitro PRN    DM (diabetes mellitus)     false positive    Elevated cholesterol     History of chest x-ray 04/11/2019    Suggestive For Atelectasis w/Chronic Inflammatory Scarring Noted    History of EKG 3/25/19-[facility]    Sinus Rhythm w/Nonspecific ST-T Wave Abnormalities    History of nephrolithiasis     History of stress test 2015    WNL     HLD (hyperlipidemia)     Controlled w/Meds    Hypertension     Controlled w/Meds    Kidney stones     S/p Lithotripsy    LAD stenosis 04/11/2019    90% Mid LAD Stenosis; Diffuse Stenosis of LCX--Noted on Cardiac Cath    Myocardial ischemia 03/25/2019    Noted on Echo Stress Test    RCA occlusion 04/11/2019    Mild Diffuse Disease--Noted on Cardiac Cath     Tobacco use     1.5-1 PPD       Treatment Plan:  Acute respiratory failure with hypoxia  Secondary to influenza A and bilateral lobar pneumonia.  Continue to support respiration and oxygenation.  Last PFT in 2019 showed no COPD but he may have developed underlying COPD with continued smoking.  Transitioned to the ICU for worsening delirium which has improved significantly this morning.  Intensivist team primary but anticipate that he will transition back up to the floor my service today.  Continue to wean oxygen as able.     Influenza A  Improving gradually.    Metabolic/drug-induced encephalopathy  Believe that his delirium was largely triggered by Tamiflu as when it was held for him being n.p.o. his mentation improved and then it was restarted and he acutely worsened again.  Now gradually improving.  Expect him to improve with transfer out of the ICU.  Continue to treat metabolic abnormalities.     Lobar pneumonia  Completed IV antibiotics and steroids     Acute renal failure  Baseline normal creatinine was severe creatinine elevation in the setting of dehydration likely prerenal in etiology.  Continue IV fluids.  Improving with IV fluids    Hypernatremia  Likely related to iatrogenic causes from IV fluids in combination with dehydration.  Continue D5W and monitor closely         Disposition: Significantly improved but remains in critical care, if okay with intensivist team I anticipate that he can transfer to the floor today.  Will need to work with therapy and may need rehab placement or possibly acute rehab given his prolonged period of immobility during  his ICU stay.    Reviewed treatment plans with the patient and/or family.     Electronically signed by Mason Peguero MD on 2/17/2025 at 07:40 CST

## 2025-02-17 NOTE — THERAPY TREATMENT NOTE
Patient Name: Chaitanya Hair  : 1960    MRN: [id number]                              Today's Date: 2025       Admit Date: 2/10/2025    Visit Dx:     ICD-10-CM ICD-9-CM   1. Acute renal failure, unspecified acute renal failure type  N17.9 584.9   2. Influenza A  J10.1 487.1   3. Pneumonia due to infectious organism, unspecified laterality, unspecified part of lung  J18.9 486   4. Impaired mobility [Z74.09]  Z74.09 799.89   5. Dysphagia, unspecified type  R13.10 787.20     Patient Active Problem List   Diagnosis    Coronary artery disease involving native coronary artery of native heart without angina pectoris    Hypertension    Nicotine abuse    Old inferior wall myocardial infarction    Presence of stent in right coronary artery    Tobacco abuse    CAD (coronary artery disease)    Dysphagia, oropharyngeal    Dysphonia    Hoarseness    Vocal cord weakness    Dysarthria    Personal history of adenomatous and serrated colon polyps    HLD (hyperlipidemia)    Acute renal failure    Acute respiratory failure with hypoxia    Influenza A    Lobar pneumonia     Past Medical History:   Diagnosis Date    Abnormal stress echo 3/25/19-[facility]    Noted For Myocardial Ischemia    Acute MI     Atelectasis 2019    Noted on Chest XR    CAD (coronary artery disease) Since     w/R Stent Placed    Chest pain due to CAD     Nitro PRN    DM (diabetes mellitus)     false positive    Elevated cholesterol     History of chest x-ray 2019    Suggestive For Atelectasis w/Chronic Inflammatory Scarring Noted    History of EKG 3/25/19-[facility]    Sinus Rhythm w/Nonspecific ST-T Wave Abnormalities    History of nephrolithiasis     History of stress test 2015    WNL    HLD (hyperlipidemia)     Controlled w/Meds    Hypertension     Controlled w/Meds    Kidney stones     S/p Lithotripsy    LAD stenosis 2019    90% Mid LAD Stenosis; Diffuse Stenosis of LCX--Noted on Cardiac Cath    Myocardial  ischemia 03/25/2019    Noted on Echo Stress Test    RCA occlusion 04/11/2019    Mild Diffuse Disease--Noted on Cardiac Cath     Tobacco use     1.5-1 PPD     Past Surgical History:   Procedure Laterality Date    ABDOMINAL SURGERY  2023    APPENDECTOMY  2023    BACK SURGERY  2013    CARDIAC CATHETERIZATION  2009    RCA Stent    CARDIAC CATHETERIZATION  04/11/2019-[facility]    w/L Ventriculogram/Angiography--Dr. Garcia--CAD Involving LAD    COLONOSCOPY N/A 10/16/2024    Procedure: COLONOSCOPY WITH ANESTHESIA;  Surgeon: Nathaly Nino MD;  Location:  [facility];  Service: Gastroenterology;  Laterality: N/A;  pre screening  post diverticulosis; polyp  pcp  Mason Peguero MD    CORONARY ANGIOPLASTY WITH STENT PLACEMENT Right 2009    CORONARY ARTERY BYPASS GRAFT N/A 04/16/2019    Procedure: MEDIAN STERNOTOMY, CORONARY ARTERY BYPASS X2 USING LEFT INTERNAL MAMMARY ARTERY GRAFT, PRP, INTRAOP LAURI;  Surgeon: Jesse Perez MD;  Location: [facility] OR;  Service: Cardiothoracic    CYSTOSCOPY W/ LASER LITHOTRIPSY        General Information       Row Name 02/17/25 1445          OT Time and Intention    Subjective Information complains of;weakness  -JW (r) CB (t) JW (c)     Document Type therapy note (daily note)  -JW (r) CB (t) JW (c)     Mode of Treatment occupational therapy  -JW (r) CB (t) JW (c)     Patient Effort excellent  -JW (r) CB (t) JW (c)       Row Name 02/17/25 1443          General Information    Existing Precautions/Restrictions fall;oxygen therapy device and L/min  -JW (r) CB (t) JW (c)     Barriers to Rehab medically complex  -JW (r) CB (t) JW (c)       Row Name 02/17/25 144          Cognition    Orientation Status (Cognition) oriented x 4  -JW (r) CB (t) JW (c)       Row Name 02/17/25 1447          Safety Issues/Impairments Affecting Functional Mobility    Impairments Affecting Function (Mobility) endurance/activity tolerance;strength  -JW (r) CB (t) JW (c)               User Key  (r) =  Recorded By, (t) = Taken By, (c) = Cosigned By      Initials Name Provider Type    Germania Arguelles OTR/L, CSRS Occupational Therapist    Michael Royal, OT Student OT Student                     Mobility/ADL's       Row Name 02/17/25 1445          Bed Mobility    Scooting/Bridging Lamb (Bed Mobility) modified independence  -JW (r) CB (t) JW (c)       Row Name 02/17/25 1445          Transfers    Comment, (Transfers) pt refused t/f, fatigued from earlier session  -JW (r) CB (t) JW (c)       Row Name 02/17/25 1445          Activities of Daily Living    BADL Assessment/Intervention grooming  -JW (r) CB (t) JW (c)       Row Name 02/17/25 [facility]5          Grooming Assessment/Training    Lamb Level (Grooming) wash face, hands;hair care, combing/brushing;set up  -JW (r) CB (t) JW (c)     Position (Grooming) sitting up in bed  -JW (r) CB (t) JW (c)               User Key  (r) = Recorded By, (t) = Taken By, (c) = Cosigned By      Initials Name Provider Type    Germania Arguelles OTR/L, CSRS Occupational Therapist    Michael Royal, OT Student OT Student                   Obj/Interventions       Row Name 02/17/25 1445          Strength Comprehensive (MMT)    Comment, General Manual Muscle Testing (MMT) Assessment performed 2 sets of 5 bed raises to increase BL triceps for t/f's  -JW (r) CB (t) JW (c)               User Key  (r) = Recorded By, (t) = Taken By, (c) = Cosigned By      Initials Name Provider Type    German[facility]guelles [facility]L, [facility]    Michael Royal, OT Student OT Student                   Goals/Plan    No documentation.                  Clinical Impression       Row Name 02/17/25 1445          Pain Assessment    Pretreatment Pain Rating 0/10 - no pain  -JW (r) CB (t) JW (c)     Posttreatment Pain Rating 0/10 - no pain  -JW (r) CB (t) JW (c)       Row Name 02/17/25 1445          Plan of Care Review    Plan of Care Reviewed With patient;spouse  -MANOJ (r) CAREN  (t) JW (c)     Outcome Evaluation OT treatment completed. A&Ox4. C/O fatigue and weakness. O2 sat remained in upper 90's throughout session. Pt in fowlers with wife in room upon arrival. Pt refused OOB activites due to fatigue from earlier t/f. 3 grooming tasks were completed with setup while sitting up in bed to improve activity tolerance. Pt performed 2 sets of 5 bed raises to improve BL triceps strength for t/f's. Pt educated on pursed lip breathing and role of OT. Pt left in fowlers with wife in room and call light in reach. Cont OT. Recommend SNF at d/c.  -JW (r) CB (t) JW (c)       Row Name 02/17/25 1445          Therapy Plan Review/Discharge Plan (OT)    Anticipated Discharge Disposition (OT) skilled nursing facility  -JW (r) CB (t) JW (c)       Row Name 02/17/25 1444          Vital Signs    Pre SpO2 (%) 97  -JW (r) CB (t) JW (c)     O2 Delivery Pre Treatment nasal cannula  -JW (r) CB (t) JW (c)     Intra SpO2 (%) 94  -JW (r) CB (t) JW (c)     O2 Delivery Intra Treatment nasal cannula  -JW (r) CB (t) JW (c)     Post SpO2 (%) 96  -JW (r) CB (t) JW (c)     O2 Delivery Post Treatment nasal cannula  -JW (r) CB (t) JW (c)     Pre Patient Position Supine  -JW (r) CB (t) JW (c)     Intra Patient Position Sitting  -JW (r) CB (t) JW (c)     Post Patient Position Supine  -JW (r) CB (t) JW (c)       Row Name 02/17/25 1445          Positioning and Restraints    Pre-Treatment Position in bed  -JW (r) CB (t) JW (c)     Post Treatment Position bed  -JW (r) CB (t) JW (c)     In Bed supine;call light within reach;encouraged to call for assist;side rails up x3;patient within staff view;with family/caregiver  -JW (r) CB (t) JW (c)               User Key  (r) = Recorded By, (t) = Taken By, (c) = Cosigned By      Initials Name Provider Type    Germania Arguleles, LULU/L, CSRS Occupational Therapist    Michael Royal, OT Student OT Student                   Outcome Measures       Row Name 02/17/25 2465          How much help  from another is currently needed...    Putting on and taking off regular lower body clothing? 2  -JW (r) CB (t) JW (c)     Bathing (including washing, rinsing, and drying) 2  -JW (r) CB (t) JW (c)     Toileting (which includes using toilet bed pan or urinal) 2  -JW (r) CB (t) JW (c)     Putting on and taking off regular upper body clothing 2  -JW (r) CB (t) JW (c)     Taking care of personal grooming (such as brushing teeth) 3  -JW (r) CB (t) JW (c)     Eating meals 3  -JW (r) CB (t) JW (c)     AM-PAC 6 Clicks Score (OT) 14  -JW (r) CB (t)       Row Name 02/17/25 1100 02/17/25 0845       How much help from another person do you currently need...    Turning from your back to your side while in flat bed without using bedrails? 3  -SAVANAH 3  -DS    Moving from lying on back to sitting on the side of a flat bed without bedrails? 3  -SAVANAH 3  -DS    Moving to and from a bed to a chair (including a wheelchair)? 3  -SAVANAH 3  -DS    Standing up from a chair using your arms (e.g., wheelchair, bedside chair)? 3  -SAVANAH 3  -DS    Climbing 3-5 steps with a railing? 2  -SAVANAH 2  -DS    To walk in hospital room? 2  -SAVANAH 2  -DS    AM-PAC 6 Clicks Score (PT) 16  -SAVANAH 16  -DS    Highest Level of Mobility Goal 5 --> Static standing  -SAVANAH 5 --> Static standing  -DS      Row Name 02/17/25 0500          How much help from another person do you currently need...    Turning from your back to your side while in flat bed without using bedrails? 3  -WR     Moving from lying on back to sitting on the side of a flat bed without bedrails? 3  -WR     Moving to and from a bed to a chair (including a wheelchair)? 2  -WR     Standing up from a chair using your arms (e.g., wheelchair, bedside chair)? 3  -WR     Climbing 3-5 steps with a railing? 1  -WR     To walk in hospital room? 2  -WR     AM-PAC 6 Clicks Score (PT) 14  -WR     Highest Level of Mobility Goal 4 --> Transfer to chair/commode  -WR       Row Name 02/17/25 3935          Functional Assessment    Outcome  Measure Options AM-PAC 6 Clicks Daily Activity (OT)  -JW (r) CB (t) JW (c)               User Key  (r) = Recorded By, (t) = Taken By, (c) = Cosigned By      Initials Name Provider Type    Brit Luna, PTA Physical Therapist Assistant    Germania Arguelles, OTR/L, CSRS Occupational Therapist    Elizabeth Watkins, RN Registered Nurse    Michael Royal, OT Student OT Student    Alexis Hernández, RN Registered Nurse                    Occupational Therapy Education       Title: PT OT SLP Therapies (In Progress)       Topic: Occupational Therapy (In Progress)       Point: ADL training (Done)       Description:   Instruct learner(s) on proper safety adaptation and remediation techniques during self care or transfers.   Instruct in proper use of assistive devices.                  Learning Progress Summary            Patient Acceptance, E, VU by CB at 2/17/2025 1517    Comment: Role of OT   Family Acceptance, E, VU by MANOJ at 2/14/2025 9979                      Point: Home exercise program (Not Started)       Description:   Instruct learner(s) on appropriate technique for monitoring, assisting and/or progressing therapeutic exercises/activities.                  Learner Progress:  Not documented in this visit.              Point: Precautions (Done)       Description:   Instruct learner(s) on prescribed precautions during self-care and functional transfers.                  Learning Progress Summary            Patient Acceptance, E, VU by CAREN at 2/17/2025 1517    Comment: Role of OT   Family Acceptance, E, VU by MANOJ at 2/14/2025 3092                      Point: Body mechanics (Done)       Description:   Instruct learner(s) on proper positioning and spine alignment during self-care, functional mobility activities and/or exercises.                  Learning Progress Summary            Patient Acceptance, E, VU by CAREN at 2/17/2025 1517    Comment: Role of OT   Family Acceptance, E, VU by MANOJ at 2/14/2025 0507                                       User Key       Initials Effective Dates Name Provider Type Discipline     11/15/24 -  Germania Su OTR/LESDRAS Occupational Therapist OT     12/17/24 -  Michael Heard OT Student OT Student OT                  OT Recommendation and Plan     Plan of Care Review  Plan of Care Reviewed With: patient, spouse  Outcome Evaluation: OT treatment completed. A&Ox4. C/O fatigue and weakness. O2 sat remained in upper 90's throughout session. Pt in fowlers with wife in room upon arrival. Pt refused OOB activites due to fatigue from earlier t/f. 3 grooming tasks were completed with setup while sitting up in bed to improve activity tolerance. Pt performed 2 sets of 5 bed raises to improve BL triceps strength for t/f's. Pt educated on pursed lip breathing and role of OT. Pt left in fowlers with wife in room and call light in reach. Cont OT. Recommend SNF at d/c.     Time Calculation:         Time Calculation- OT       Row Name 02/17/25 1502             Time Calculation- OT    OT Start Time 1445  -JW (r) CB (t) JW (c)      OT Stop Time 1500  -JW (r) CB (t) JW (c)      OT Time Calculation (min) 15 min  -JW (r) CB (t)      Total Timed Code Minutes- OT 15 minute(s)  -JW (r) CB (t) JW (c)      OT Received On 02/17/25  -JW (r) CB (t) JW (c)         Timed Charges    90674 - OT Self Care/Mgmt Minutes 15  -JW (r) CB (t) JW (c)         Total Minutes    Timed Charges Total Minutes 15  -JW (r) CB (t)       Total Minutes 15  -JW (r) CB (t)                User Key  (r) = Recorded By, (t) = Taken By, (c) = Cosigned By      Initials Name Provider Type     Germania Su OTR/L, ESDRAS Occupational Therapist    Michael Royal, OT Student OT Student                           Michael Heard OT Student  2/17/2025

## 2025-02-17 NOTE — PLAN OF CARE
Goal Outcome Evaluation:  Plan of Care Reviewed With: patient, spouse           Outcome Evaluation: OT treatment completed. A&Ox4. C/O fatigue and weakness. O2 sat remained in upper 90's throughout session. Pt in fowlers with wife in room upon arrival. Pt refused OOB activites due to fatigue from earlier t/f. 3 grooming tasks were completed with setup while sitting up in bed to improve activity tolerance. Pt performed 2 sets of 5 bed raises to improve BL triceps strength for t/f's. Pt educated on pursed lip breathing and role of OT. Pt left in fowlers with wife in room and call light in reach. Cont OT. Recommend SNF at d/c.    Anticipated Discharge Disposition (OT): skilled nursing facility

## 2025-02-17 NOTE — PLAN OF CARE
Goal Outcome Evaluation:  Plan of Care Reviewed With: (P) patient, spouse, caregiver        Progress: no change                     Treatment Assessment (SLP): (P) improved (02/17/25 1000)     Plan for Continued Treatment (SLP): (P) continue treatment per plan of care (02/17/25 1000)    Swallow follow up completed. Pt was alert and upright in his chair upon arrival.  Pt continued on nasal cannula with 5 liters.   Patient was self feeding but reported each item he lifts on his tray compares to 20lbs. Pt reports fatigue as he continues to self feed. Regular consistency trials in addition to thin liquids were given with no overt s/s of aspiration. Patient ok to upgrade to regular consistencies at this time. SLP will continue to follow up 1x.     Rocío Wild, SLP

## 2025-02-17 NOTE — CASE MANAGEMENT/SOCIAL WORK
Continued Stay Note   Malcolm     Patient Name: Chaitanya Hair  MRN: [id number]  Today's Date: 2/17/2025    Admit Date: 2/10/2025    Plan: Undetermined   Discharge Plan       Row Name 02/17/25 1539       Plan    Plan Undetermined    Plan Comments Continuing to follow. Noted that pt may needs snf at d/c. Will discuss with pt when is closer to moving to a regular room.                   Discharge Codes    No documentation.                       JONES Webster

## 2025-02-17 NOTE — PLAN OF CARE
Problem: Adult Inpatient Plan of Care  Goal: Absence of Hospital-Acquired Illness or Injury  Intervention: Identify and Manage Fall Risk  Description: Perform standard risk assessment on admission using a validated tool or comprehensive approach appropriate to the patient; reassess fall risk frequently, with change in status or transfer to another level of care.  Communicate risk to interprofessional healthcare team; ensure fall risk visible cue.  Determine need for increased observation, equipment and environmental modification, as well as use of supportive, nonskid footwear.  Adjust safety measures to individual needs and identified risk factors.  Reinforce the importance of active participation with fall risk prevention, safety, and physical activity with the patient and family.  Perform regular intentional rounding to assess need for position change, pain assessment and personal needs, including assistance with toileting.  Recent Flowsheet Documentation  Taken 2/17/2025 0600 by Elizabeth Priest RN  Safety Promotion/Fall Prevention: safety round/check completed  Taken 2/17/2025 0500 by Elizabeth Priest RN  Safety Promotion/Fall Prevention: safety round/check completed  Taken 2/17/2025 0400 by Elizabeth Priest RN  Safety Promotion/Fall Prevention:   safety round/check completed   fall prevention program maintained  Taken 2/17/2025 0300 by Elizabeth Priest RN  Safety Promotion/Fall Prevention: safety round/check completed  Taken 2/17/2025 0200 by Elizabeth Priest RN  Safety Promotion/Fall Prevention:   safety round/check completed   fall prevention program maintained  Taken 2/17/2025 0100 by Elizabeth Priest RN  Safety Promotion/Fall Prevention: safety round/check completed  Taken 2/17/2025 0000 by Elizabeth Priest RN  Safety Promotion/Fall Prevention: safety round/check completed  Taken 2/16/2025 2200 by Elizabeth Priest RN  Safety Promotion/Fall Prevention:   safety round/check completed   fall prevention  program maintained  Taken 2/16/2025 2100 by Elizabeth Priest RN  Safety Promotion/Fall Prevention:   safety round/check completed   fall prevention program maintained  Taken 2/16/2025 2000 by Elizabeth Priest RN  Safety Promotion/Fall Prevention:   safety round/check completed   fall prevention program maintained  Taken 2/16/2025 1900 by Elizabeth Priest RN  Safety Promotion/Fall Prevention:   safety round/check completed   fall prevention program maintained  Intervention: Prevent Skin Injury  Description: Perform a screening for skin injury risk, such as pressure or moisture-associated skin damage on admission and at regular intervals throughout hospital stay.  Keep all areas of skin (especially folds) clean and dry.  Maintain adequate skin hydration.  Relieve and redistribute pressure and protect bony prominences and skin at risk for injury; implement measures based on patient-specific risk factors.  Match turning and repositioning schedule to clinical condition.  Encourage weight shift frequently; assist with reposition if unable to complete independently.  Float heels off bed; avoid pressure on the Achilles tendon.  Keep skin free from extended contact with medical devices.  Optimize nutrition and hydration.  Encourage functional activity and mobility, as early as tolerated.  Use aids (e.g., slide boards, mechanical lift) during transfer.  Recent Flowsheet Documentation  Taken 2/17/2025 0500 by Elizabeth Priest RN  Body Position:   position changed independently   right  Taken 2/17/2025 0300 by Elizabeth Priest RN  Body Position: position changed independently  Taken 2/17/2025 0100 by Elizabeth Priest RN  Body Position: position changed independently  Taken 2/16/2025 2300 by Elizabeth Priest RN  Body Position: position changed independently  Taken 2/16/2025 2100 by Elizabeth Priest RN  Body Position: position changed independently  Taken 2/16/2025 1900 by Elizabeth Priest RN  Body Position: position changed  independently     Problem: Fall Injury Risk  Goal: Absence of Fall and Fall-Related Injury  Intervention: Identify and Manage Contributors  Description: Develop a fall prevention plan, considering patient-centered interventions and family/caregiver involvement; identify and address patient's facilitators and barriers.  Provide reorientation, appropriate sensory stimulation and routines with changes in mental status to decrease risk of fall.  Promote use of personal vision and auditory aids.  Assess assistance level required for safe and effective self-care; provide support as needed, such as toileting and mobilization. For age 65 and older, implement timed toileting with assistance.  Encourage physical activity, such as performance of mobility and self-care at highest level of patient ability, multicomponent exercise program and provision of appropriate assistive devices.  If fall occurs, assess the severity of injury; implement fall injury protocol. Determine the cause and revise fall injury prevention plan.  Regularly review and advocate for medication adjustment to decrease fall risk; consider administration times, polypharmacy and age.  Balance adequate pain management with potential for oversedation.  Recent Flowsheet Documentation  Taken 2/17/2025 0600 by Elizabeth Priest RN  Medication Review/Management: medications reviewed  Taken 2/17/2025 0500 by Elizabeth Priest RN  Medication Review/Management: medications reviewed  Taken 2/17/2025 0400 by Elizabeth Priest RN  Medication Review/Management: medications reviewed  Taken 2/17/2025 0300 by Elizabeth Priest RN  Medication Review/Management: medications reviewed  Taken 2/17/2025 0200 by Elizabeth Priest RN  Medication Review/Management: medications reviewed  Taken 2/17/2025 0100 by Elizabeth Priest RN  Medication Review/Management: medications reviewed  Taken 2/17/2025 0000 by Elizabeth Priest RN  Medication Review/Management: medications reviewed  Taken  2/16/2025 2200 by Elizabeth Priest RN  Medication Review/Management: medications reviewed  Taken 2/16/2025 2100 by Elizabeth Priest RN  Medication Review/Management: medications reviewed  Taken 2/16/2025 2000 by Elizabeth Priest RN  Medication Review/Management: medications reviewed  Taken 2/16/2025 1900 by Elizabeth Priest RN  Medication Review/Management: medications reviewed  Intervention: Promote Injury-Free Environment  Description: Provide a safe, barrier-free environment that encourages independent activity.  Keep care area uncluttered and well-lighted.  Determine need for increased observation or monitoring.  Avoid use of devices that minimize mobility, such as restraints or indwelling urinary catheter.  Recent Flowsheet Documentation  Taken 2/17/2025 0600 by Elizabeth Priest RN  Safety Promotion/Fall Prevention: safety round/check completed  Taken 2/17/2025 0500 by Elizabeth Priest RN  Safety Promotion/Fall Prevention: safety round/check completed  Taken 2/17/2025 0400 by Elizabeth Priest RN  Safety Promotion/Fall Prevention:   safety round/check completed   fall prevention program maintained  Taken 2/17/2025 0300 by Elizabeth Priest RN  Safety Promotion/Fall Prevention: safety round/check completed  Taken 2/17/2025 0200 by Elizabeth Priest RN  Safety Promotion/Fall Prevention:   safety round/check completed   fall prevention program maintained  Taken 2/17/2025 0100 by Elizabeth Priest RN  Safety Promotion/Fall Prevention: safety round/check completed  Taken 2/17/2025 0000 by Elizabeth Priest RN  Safety Promotion/Fall Prevention: safety round/check completed  Taken 2/16/2025 2200 by Elizabeth Priest RN  Safety Promotion/Fall Prevention:   safety round/check completed   fall prevention program maintained  Taken 2/16/2025 2100 by Elizabeth Priest RN  Safety Promotion/Fall Prevention:   safety round/check completed   fall prevention program maintained  Taken 2/16/2025 2000 by Elizabeth Priest RN  Safety  Promotion/Fall Prevention:   safety round/check completed   fall prevention program maintained  Taken 2/16/2025 1900 by Elizabeth Priest, RN  Safety Promotion/Fall Prevention:   safety round/check completed   fall prevention program maintained     Problem: Skin Injury Risk Increased  Goal: Skin Health and Integrity  Intervention: Optimize Skin Protection  Description: Perform a full pressure injury risk assessment, as indicated by screening, upon admission to care unit.  Reassess skin (full inspection and injury risk, including skin temperature, consistency and color) frequently (e.g., scheduled interval, with change in condition) to provide optimal early detection and prevention.  Maintain adequate tissue perfusion (e.g., encourage fluid balance; avoid crossing legs, constrictive clothing or devices) to promote tissue oxygenation.  Maintain head of bed at lowest degree of elevation tolerated, considering medical condition and other restrictions. Use positioning supports to prevent sliding and friction. Consider low friction textiles.  Avoid positioning onto an area that remains reddened or on bony prominences.  Minimize incontinence and moisture (e.g., toileting schedule; moisture-wicking pad, diaper or incontinence collection device; skin moisture barrier).  Cleanse skin promptly and gently, when soiled, utilizing a pH-balanced cleanser.  Relieve and redistribute pressure (e.g., scheduled position changes, weight shifts, use of support surface, medical device repositioning, protective dressing application, use of positioning device, microclimate control, use of pressure-injury-monitor  Encourage increased activity, such as sitting in a chair at the bedside or early mobilization, when able to tolerate. Avoid prolonged sitting.  Recent Flowsheet Documentation  Taken 2/17/2025 0500 by Elizabeth Priest, RN  Head of Bed (HOB) Positioning: HOB elevated  Taken 2/17/2025 0300 by Elizabeth Priest, RN  Head of Bed (HOB)  Positioning: HOB elevated  Taken 2/17/2025 0100 by Elizabeth Priest RN  Head of Bed (HOB) Positioning: HOB elevated  Taken 2/16/2025 2300 by Elizabeth Priest RN  Head of Bed (HOB) Positioning: HOB elevated  Taken 2/16/2025 2100 by Elizabeth Priest RN  Activity Management:   up to bedside commode   back to bed  Head of Bed (HOB) Positioning: HOB elevated  Taken 2/16/2025 2000 by Elizabeth Priest RN  Activity Management: up in chair  Taken 2/16/2025 1900 by Elizabeth Priest RN  Activity Management: up in chair  Head of Bed (HOB) Positioning: HOB elevated     Problem: Skin Injury Risk Increased  Goal: Skin Health and Integrity  Intervention: Optimize Skin Protection  Description: Perform a full pressure injury risk assessment, as indicated by screening, upon admission to care unit.  Reassess skin (full inspection and injury risk, including skin temperature, consistency and color) frequently (e.g., scheduled interval, with change in condition) to provide optimal early detection and prevention.  Maintain adequate tissue perfusion (e.g., encourage fluid balance; avoid crossing legs, constrictive clothing or devices) to promote tissue oxygenation.  Maintain head of bed at lowest degree of elevation tolerated, considering medical condition and other restrictions. Use positioning supports to prevent sliding and friction. Consider low friction textiles.  Avoid positioning onto an area that remains reddened or on bony prominences.  Minimize incontinence and moisture (e.g., toileting schedule; moisture-wicking pad, diaper or incontinence collection device; skin moisture barrier).  Cleanse skin promptly and gently, when soiled, utilizing a pH-balanced cleanser.  Relieve and redistribute pressure (e.g., scheduled position changes, weight shifts, use of support surface, medical device repositioning, protective dressing application, use of positioning device, microclimate control, use of pressure-injury-monitor  Encourage increased  activity, such as sitting in a chair at the bedside or early mobilization, when able to tolerate. Avoid prolonged sitting.  Recent Flowsheet Documentation  Taken 2/17/2025 0500 by Elizabeth Priest RN  Head of Bed (HOB) Positioning: HOB elevated  Taken 2/17/2025 0300 by Elizabeth Priest RN  Head of Bed (HOB) Positioning: HOB elevated  Taken 2/17/2025 0100 by Elizabeth Priest RN  Head of Bed (HOB) Positioning: HOB elevated  Taken 2/16/2025 2300 by Elizabeth Priest RN  Head of Bed (HOB) Positioning: HOB elevated  Taken 2/16/2025 2100 by Elizabeth Priest RN  Activity Management:   up to bedside commode   back to bed  Head of Bed (HOB) Positioning: HOB elevated  Taken 2/16/2025 2000 by Elizabeth Priest RN  Activity Management: up in chair  Taken 2/16/2025 1900 by Elizabeth Priest RN  Activity Management: up in chair  Head of Bed (HOB) Positioning: HOB elevated     Problem: Fall Injury Risk  Goal: Absence of Fall and Fall-Related Injury  Intervention: Identify and Manage Contributors  Description: Develop a fall prevention plan, considering patient-centered interventions and family/caregiver involvement; identify and address patient's facilitators and barriers.  Provide reorientation, appropriate sensory stimulation and routines with changes in mental status to decrease risk of fall.  Promote use of personal vision and auditory aids.  Assess assistance level required for safe and effective self-care; provide support as needed, such as toileting and mobilization. For age 65 and older, implement timed toileting with assistance.  Encourage physical activity, such as performance of mobility and self-care at highest level of patient ability, multicomponent exercise program and provision of appropriate assistive devices.  If fall occurs, assess the severity of injury; implement fall injury protocol. Determine the cause and revise fall injury prevention plan.  Regularly review and advocate for medication adjustment to decrease  fall risk; consider administration times, polypharmacy and age.  Balance adequate pain management with potential for oversedation.  Recent Flowsheet Documentation  Taken 2/17/2025 0600 by Elizabeth Priest RN  Medication Review/Management: medications reviewed  Taken 2/17/2025 0500 by Elizabeth Priest RN  Medication Review/Management: medications reviewed  Taken 2/17/2025 0400 by Elizabeth Priest RN  Medication Review/Management: medications reviewed  Taken 2/17/2025 0300 by Elizabeth Priest RN  Medication Review/Management: medications reviewed  Taken 2/17/2025 0200 by Elizabeth Priest RN  Medication Review/Management: medications reviewed  Taken 2/17/2025 0100 by Elizabeth Priest RN  Medication Review/Management: medications reviewed  Taken 2/17/2025 0000 by Elizabeth Priest RN  Medication Review/Management: medications reviewed  Taken 2/16/2025 2200 by Elizabeth Priest RN  Medication Review/Management: medications reviewed  Taken 2/16/2025 2100 by Elizabeth Priest RN  Medication Review/Management: medications reviewed  Taken 2/16/2025 2000 by Elizabeth Priest RN  Medication Review/Management: medications reviewed  Taken 2/16/2025 1900 by Elizabeth Priest RN  Medication Review/Management: medications reviewed  Intervention: Promote Injury-Free Environment  Description: Provide a safe, barrier-free environment that encourages independent activity.  Keep care area uncluttered and well-lighted.  Determine need for increased observation or monitoring.  Avoid use of devices that minimize mobility, such as restraints or indwelling urinary catheter.  Recent Flowsheet Documentation  Taken 2/17/2025 0600 by Elizabeth Pirest RN  Safety Promotion/Fall Prevention: safety round/check completed  Taken 2/17/2025 0500 by Elizabeth Priest RN  Safety Promotion/Fall Prevention: safety round/check completed  Taken 2/17/2025 0400 by Elizabeth Priest RN  Safety Promotion/Fall Prevention:   safety round/check completed   fall prevention  program maintained  Taken 2/17/2025 0300 by Elizabeth Priest RN  Safety Promotion/Fall Prevention: safety round/check completed  Taken 2/17/2025 0200 by Elizabeth Priest RN  Safety Promotion/Fall Prevention:   safety round/check completed   fall prevention program maintained  Taken 2/17/2025 0100 by Elizabeth Priest RN  Safety Promotion/Fall Prevention: safety round/check completed  Taken 2/17/2025 0000 by Elizabeth Priest RN  Safety Promotion/Fall Prevention: safety round/check completed  Taken 2/16/2025 2200 by Elizabeth Priest RN  Safety Promotion/Fall Prevention:   safety round/check completed   fall prevention program maintained  Taken 2/16/2025 2100 by Elizabeth Priest RN  Safety Promotion/Fall Prevention:   safety round/check completed   fall prevention program maintained  Taken 2/16/2025 2000 by Elizabeth Priest RN  Safety Promotion/Fall Prevention:   safety round/check completed   fall prevention program maintained  Taken 2/16/2025 1900 by Elizabeth Priest RN  Safety Promotion/Fall Prevention:   safety round/check completed   fall prevention program maintained     Problem: Adult Inpatient Plan of Care  Goal: Absence of Hospital-Acquired Illness or Injury  Intervention: Identify and Manage Fall Risk  Description: Perform standard risk assessment on admission using a validated tool or comprehensive approach appropriate to the patient; reassess fall risk frequently, with change in status or transfer to another level of care.  Communicate risk to interprofessional healthcare team; ensure fall risk visible cue.  Determine need for increased observation, equipment and environmental modification, as well as use of supportive, nonskid footwear.  Adjust safety measures to individual needs and identified risk factors.  Reinforce the importance of active participation with fall risk prevention, safety, and physical activity with the patient and family.  Perform regular intentional rounding to assess need for position  change, pain assessment and personal needs, including assistance with toileting.  Recent Flowsheet Documentation  Taken 2/17/2025 0600 by Elizabeth Priest RN  Safety Promotion/Fall Prevention: safety round/check completed  Taken 2/17/2025 0500 by Elizabeth Priest RN  Safety Promotion/Fall Prevention: safety round/check completed  Taken 2/17/2025 0400 by Elizabeth Priest RN  Safety Promotion/Fall Prevention:   safety round/check completed   fall prevention program maintained  Taken 2/17/2025 0300 by Elizabeth Priest RN  Safety Promotion/Fall Prevention: safety round/check completed  Taken 2/17/2025 0200 by Elizabeth Priest RN  Safety Promotion/Fall Prevention:   safety round/check completed   fall prevention program maintained  Taken 2/17/2025 0100 by Elizabeth Priest RN  Safety Promotion/Fall Prevention: safety round/check completed  Taken 2/17/2025 0000 by Elizabeth Priest RN  Safety Promotion/Fall Prevention: safety round/check completed  Taken 2/16/2025 2200 by Elizabeth Priest RN  Safety Promotion/Fall Prevention:   safety round/check completed   fall prevention program maintained  Taken 2/16/2025 2100 by Elizabeth Priest RN  Safety Promotion/Fall Prevention:   safety round/check completed   fall prevention program maintained  Taken 2/16/2025 2000 by Elizabeth Priest RN  Safety Promotion/Fall Prevention:   safety round/check completed   fall prevention program maintained  Taken 2/16/2025 1900 by Elizabeth Priest RN  Safety Promotion/Fall Prevention:   safety round/check completed   fall prevention program maintained  Intervention: Prevent Skin Injury  Description: Perform a screening for skin injury risk, such as pressure or moisture-associated skin damage on admission and at regular intervals throughout hospital stay.  Keep all areas of skin (especially folds) clean and dry.  Maintain adequate skin hydration.  Relieve and redistribute pressure and protect bony prominences and skin at risk for injury; implement  measures based on patient-specific risk factors.  Match turning and repositioning schedule to clinical condition.  Encourage weight shift frequently; assist with reposition if unable to complete independently.  Float heels off bed; avoid pressure on the Achilles tendon.  Keep skin free from extended contact with medical devices.  Optimize nutrition and hydration.  Encourage functional activity and mobility, as early as tolerated.  Use aids (e.g., slide boards, mechanical lift) during transfer.  Recent Flowsheet Documentation  Taken 2/17/2025 0500 by Elizabeth Priest RN  Body Position:   position changed independently   right  Taken 2/17/2025 0300 by Elizabeth Priest RN  Body Position: position changed independently  Taken 2/17/2025 0100 by Elizabeth Priest RN  Body Position: position changed independently  Taken 2/16/2025 2300 by Elizabeth Priest RN  Body Position: position changed independently  Taken 2/16/2025 2100 by Elizabeth Priest RN  Body Position: position changed independently  Taken 2/16/2025 1900 by Elizabeth Priest RN  Body Position: position changed independently     Problem: Restraint, Nonviolent  Goal: Absence of Harm or Injury  Intervention: Protect Skin and Joint Integrity  Description: Frequently assess restraint application site for skin integrity, edema and perfusion distal to the site; document findings.  Consider protective skin barrier with risk of skin injury.  Release and replace restraint at regular intervals.  Assist with frequent joint range of motion activity.  Recent Flowsheet Documentation  Taken 2/17/2025 0500 by Elizabeth Priest RN  Body Position:   position changed independently   right  Taken 2/17/2025 0300 by Elizabeth Priest RN  Body Position: position changed independently  Taken 2/17/2025 0100 by Elizabeth Priest RN  Body Position: position changed independently  Taken 2/16/2025 2300 by Elizabeth Priest RN  Body Position: position changed independently  Taken 2/16/2025 2100 by  Elizabeth Priest, RN  Body Position: position changed independently  Taken 2/16/2025 1900 by Elizabeth Priest RN  Body Position: position changed independently   Goal Outcome Evaluation:

## 2025-02-17 NOTE — PLAN OF CARE
Goal Outcome Evaluation:  Plan of Care Reviewed With: patient        Progress: improving  Outcome Evaluation: LOS nutrition assessment. Pt is admitted with acute respiratory failure secondary to Influenza with PNA and GELACIO. He is being treated by ST for dysphagia. His diet has been upgraded from soft to chew, chopped meat to regular consistency today. He is receiving OT and PT as well for strengthening. He becomes very fatigued with self feeding and appetite has been poor, but is starting to show some improvement. He did consume 25% of breakfast this morning. He is receiving Might[employer]akes TID with meals to supplement intake and stimulate appetite. Cont to follow and encourage food and fluid intake as tolerated.

## 2025-02-17 NOTE — THERAPY TREATMENT NOTE
Acute Care - Physical Therapy Treatment Note  [facility]     Patient Name: Chaitanya Hari  : 1960  MRN: [id number]  Today's Date: 2025      Visit Dx:     ICD-10-CM ICD-9-CM   1. Acute renal failure, unspecified acute renal failure type  N17.9 584.9   2. Influenza A  J10.1 487.1   3. Pneumonia due to infectious organism, unspecified laterality, unspecified part of lung  J18.9 486   4. Impaired mobility [Z74.09]  Z74.09 799.89   5. Dysphagia, unspecified type  R13.10 787.20     Patient Active Problem List   Diagnosis    Coronary artery disease involving native coronary artery of native heart without angina pectoris    Hypertension    Nicotine abuse    Old inferior wall myocardial infarction    Presence of stent in right coronary artery    Tobacco abuse    CAD (coronary artery disease)    Dysphagia, oropharyngeal    Dysphonia    Hoarseness    Vocal cord weakness    Dysarthria    Personal history of adenomatous and serrated colon polyps    HLD (hyperlipidemia)    Acute renal failure    Acute respiratory failure with hypoxia    Influenza A    Lobar pneumonia     Past Medical History:   Diagnosis Date    Abnormal stress echo 3/25/19-[facility]    Noted For Myocardial Ischemia    Acute MI     Atelectasis 2019    Noted on Chest XR    CAD (coronary artery disease) Since     w/R Stent Placed    Chest pain due to CAD     Nitro PRN    DM (diabetes mellitus)     false positive    Elevated cholesterol     History of chest x-ray 2019    Suggestive For Atelectasis w/Chronic Inflammatory Scarring Noted    History of EKG 3/25/19-[facility]    Sinus Rhythm w/Nonspecific ST-T Wave Abnormalities    History of nephrolithiasis     History of stress test     WNL    HLD (hyperlipidemia)     Controlled w/Meds    Hypertension     Controlled w/Meds    Kidney stones     S/p Lithotripsy    LAD stenosis 2019    90% Mid LAD Stenosis; Diffuse Stenosis of LCX--Noted on Cardiac Cath     Myocardial ischemia 03/25/2019    Noted on Echo Stress Test    RCA occlusion 04/11/2019    Mild Diffuse Disease--Noted on Cardiac Cath     Tobacco use     1.5-1 PPD     Past Surgical History:   Procedure Laterality Date    ABDOMINAL SURGERY  2023    APPENDECTOMY  2023    BACK SURGERY  2013    CARDIAC CATHETERIZATION  2009    RCA Stent    CARDIAC CATHETERIZATION  04/11/2019-[facility]    w/L Ventriculogram/Angiography--Dr. Garcia--CAD Involving LAD    COLONOSCOPY N/A 10/16/2024    Procedure: COLONOSCOPY WITH ANESTHESIA;  Surgeon: Nathaly Nino MD;  Location: [facility] ENDOSCOPY;  Service: Gastroenterology;  Laterality: N/A;  pre screening  post diverticulosis; polyp  pcp  Mason Peguero MD    CORONARY ANGIOPLASTY WITH STENT PLACEMENT Right 2009    CORONARY ARTERY BYPASS GRAFT N/A 04/16/2019    Procedure: MEDIAN STERNOTOMY, CORONARY ARTERY BYPASS X2 USING LEFT INTERNAL MAMMARY ARTERY GRAFT, PRP, INTRAOP LAURI;  Surgeon: Jesse Perez MD;  Location: [facility] OR;  Service: Cardiothoracic    CYSTOSCOPY W/ LASER LITHOTRIPSY       PT Assessment (Last 12 Hours)       PT Evaluation and Treatment       Row Name 02/17/25 0850          Physical Therapy Time and Intention    Subjective Information complains of;weakness;dizziness  -SAVANAH     Document Type therapy note (daily note)  -     Mode of Treatment physical therapy  -       Row Name 02/17/25 0850          General Information    Patient/Family/Caregiver Comments/Observations spouse  -     Existing Precautions/Restrictions fall;oxygen therapy device and L/min  -       Row Name 02/17/25 0850          Pain    Pretreatment Pain Rating 0/10 - no pain  -SAVANAH     Posttreatment Pain Rating 0/10 - no pain  -       Row Name 02/17/25 0850          Transfers    Comment, (Transfers) stood x3, improved with each stand  HR to 145 when in standing.  -       Row Name 02/17/25 0850          Sit-Stand Transfer    Sit-Stand [place] (Transfers) verbal cues;minimum  assist (75% patient effort);contact guard  -SAVANAH       Row Name 02/17/25 0850          Stand-Sit Transfer    Stand-Sit [place] (Transfers) verbal cues;contact guard  -SAVANAH       Row Name 02/17/25 0850          Gait/Stairs (Locomotion)    [place] Level (Gait) minimum assist (75% patient effort)  -SAVANAH     Distance in Feet (Gait) --  march in place 10 steps  -SAVANAH       Row Name 02/17/25 0850          Balance    Static Sitting Balance standby assist  edge of chair  -SAVANAH     Dynamic Sitting Balance contact guard  -SAVANAH     Position, Sitting Balance unsupported;sitting in chair  edge of chair  -SAVANAH     Sit to Stand Dynamic Balance minimal assist  -SAVANAH     Dynamic Standing Balance minimal assist  -SAVANAH     Balance Interventions sitting;standing;core stability exercise;dynamic reaching;narrowed base of support  -SAVANAH       Row Name 02/17/25 0850          Motor Skills    Comments, Therapeutic Exercise B LE AROM X 20 in sitting  lateral trunk leans, trunk rotation, lateral flexion  -SAVANAH       Row Name 02/17/25 0850          Vital Signs    Pretreatment Heart Rate (beats/min) 112  -SAVANAH     Intratreatment Heart Rate (beats/min) 145  -SAVANAH     Posttreatment Heart Rate (beats/min) 121  -SAVANAH     Pre Patient Position Sitting  -SAVANAH     Intra Patient Position Standing  -SAVANAH     Post Patient Position Sitting  -SAVANAH       Row Name 02/17/25 0850          Positioning and Restraints    Pre-Treatment Position sitting in chair/recliner  -SAVANAH     Post Treatment Position chair  -SAVANAH     In Chair reclined;call light within reach;encouraged to call for assist;notified nsg;with family/caregiver  -SAVANAH               User Key  (r) = Recorded By, (t) = Taken By, (c) = Cosigned By      Initials Name Provider Type    Brit Luna PTA Physical Therapist Assistant                    Physical Therapy Education       Title: PT OT SLP Therapies (In Progress)       Topic: Physical Therapy (In Progress)       Point: Mobility training (Done)       Learning Progress  Summary            Patient Acceptance, E, DU by SAVANAH at 2/17/2025 1115    Comment: benefits of activity    Acceptance, E, VU by  at 2/11/2025 1059    Comment: Bed mobility techinques, plan of care, home safety     by BL at 2/11/2025 1058                      Point: Home exercise program (Not Started)       Learner Progress:  Not documented in this visit.              Point: Body mechanics (Done)       Learning Progress Summary            Patient Acceptance, E, VU by BL at 2/11/2025 1059    Comment: Bed mobility techinques, plan of care, home safety     by BL at 2/11/2025 1058                      Point: Precautions (Done)       Learning Progress Summary            Patient Acceptance, E, VU by BL at 2/11/2025 1059    Comment: Bed mobility techinques, plan of care, home safety     by BL at 2/11/2025 1058                                      User Key       Initials Effective Dates Name Provider Type Discipline     02/03/23 -  Brit Clancy, PTA Physical Therapist Assistant PT     12/17/24 -  Hernandez Reyes PT Student PT Student PT                  PT Recommendation and Plan         Outcome Measures       Row Name 02/17/25 1100 02/16/25 0939 02/15/25 0924       How much help from another person do you currently need...    Turning from your back to your side while in flat bed without using bedrails? 3  - 3  -MF 3  -MF    Moving from lying on back to sitting on the side of a flat bed without bedrails? 3  - 3  -MF 3  -MF    Moving to and from a bed to a chair (including a wheelchair)? 3  - 2  - 2  -MF    Standing up from a chair using your arms (e.g., wheelchair, bedside chair)? 3  - 3  -MF 2  -MF    Climbing 3-5 steps with a railing? 2  -SAVANAH 1  -MF 1  -MF    To walk in hospital room? 2  -SAVANAH 1  -MF 1  -MF    AM-PAC 6 Clicks Score (PT) 16  - 13  -MF 12  -MF       Functional Assessment    Outcome Measure Options -- AM-PAC 6 Clicks Basic Mobility (PT)  -MF AM-PAC 6 Clicks Basic Mobility (PT)  -MF               User Key  (r) = Recorded By, (t) = Taken By, (c) = Cosigned By      Initials Name Provider Type    Brit Luna PTA Physical Therapist Assistant    Natalie Bynum PTA Physical Therapist Assistant                     Time Calculation:    PT Charges       Row Name 02/17/25 1119             Time Calculation    Start Time 0850  -SAVANAH      Stop Time 0930  -SAVANAH      Time Calculation (min) 40 min  -SAVANAH         Timed Charges    86000 - PT Therapeutic Activity Minutes 40  -SAVANAH         Total Minutes    Timed Charges Total Minutes 40  -SAVANAH       Total Minutes 40  -SAVANAH                User Key  (r) = Recorded By, (t) = Taken By, (c) = Cosigned By      Initials Name Provider Type    Brit Luna PTA Physical Therapist Assistant                  Therapy Charges for Today       Code Description Service Date Service Provider Modifiers Qty    58898849920 HC PT THERAPEUTIC ACT EA 15 MIN 2/17/2025 Brit Clancy PTA GP 3            PT G-Codes  Outcome Measure Options: AM-PAC 6 Clicks Basic Mobility (PT)  AM-PAC 6 Clicks Score (PT): 16  AM-PAC 6 Clicks Score (OT): 10    Brit Clancy PTA  2/17/2025

## 2025-02-17 NOTE — THERAPY TREATMENT NOTE
Acute Care - Speech Language Pathology   Swallow Treatment Note [facility]     Patient Name: Chaitanya Hair  : 1960  MRN: [id number]  Today's Date: 2025               Admit Date: 2/10/2025    Visit Dx:   Swallow follow up completed. Pt was alert and upright in his chair upon arrival.  Pt continued on nasal cannula with 5 liters.   Patient was self feeding but reported each item he lifts on his tray compares to 20lbs. Pt reports fatigue as he continues to self feed. Regular consistency trials in addition to thin liquids were given with no overt s/s of aspiration. Patient ok to upgrade to regular consistencies at this time. SLP will continue to follow up 1x.     Rocío Wild, SLP     Daphney Thao, MS CCC-SLP 2025 10:55 CST        ICD-10-CM ICD-9-CM   1. Acute renal failure, unspecified acute renal failure type  N17.9 584.9   2. Influenza A  J10.1 487.1   3. Pneumonia due to infectious organism, unspecified laterality, unspecified part of lung  J18.9 486   4. Impaired mobility [Z74.09]  Z74.09 799.89   5. Dysphagia, unspecified type  R13.10 787.20     Patient Active Problem List   Diagnosis    Coronary artery disease involving native coronary artery of native heart without angina pectoris    Hypertension    Nicotine abuse    Old inferior wall myocardial infarction    Presence of stent in right coronary artery    Tobacco abuse    CAD (coronary artery disease)    Dysphagia, oropharyngeal    Dysphonia    Hoarseness    Vocal cord weakness    Dysarthria    Personal history of adenomatous and serrated colon polyps    HLD (hyperlipidemia)    Acute renal failure    Acute respiratory failure with hypoxia    Influenza A    Lobar pneumonia     Past Medical History:   Diagnosis Date    Abnormal stress echo 3/25/19-[facility]    Noted For Myocardial Ischemia    Acute MI     Atelectasis 2019    Noted on Chest XR    CAD (coronary artery disease) Since     w/R Stent  Placed    Chest pain due to CAD     Nitro PRN    DM (diabetes mellitus)     false positive    Elevated cholesterol     History of chest x-ray 04/11/2019    Suggestive For Atelectasis w/Chronic Inflammatory Scarring Noted    History of EKG 3/25/19-[facility]    Sinus Rhythm w/Nonspecific ST-T Wave Abnormalities    History of nephrolithiasis     History of stress test 2015    WNL    HLD (hyperlipidemia)     Controlled w/Meds    Hypertension     Controlled w/Meds    Kidney stones     S/p Lithotripsy    LAD stenosis 04/11/2019    90% Mid LAD Stenosis; Diffuse Stenosis of LCX--Noted on Cardiac Cath    Myocardial ischemia 03/25/2019    Noted on Echo Stress Test    RCA occlusion 04/11/2019    Mild Diffuse Disease--Noted on Cardiac Cath     Tobacco use     1.5-1 PPD     Past Surgical History:   Procedure Laterality Date    ABDOMINAL SURGERY  2023    APPENDECTOMY  2023    BACK SURGERY  2013    CARDIAC CATHETERIZATION  2009    RCA Stent    CARDIAC CATHETERIZATION  04/11/2019-[facility]    w/L Ventriculogram/Angiography--Dr. Garcia--CAD Involving LAD    COLONOSCOPY N/A 10/16/2024    Procedure: COLONOSCOPY WITH ANESTHESIA;  Surgeon: Nathaly Nino MD;  Location:  [facility]COPY;  Service: Gastroenterology;  Laterality: N/A;  pre screening  post diverticulosis; polyp  pcp  Mason Peguero MD    CORONARY ANGIOPLASTY WITH STENT PLACEMENT Right 2009    CORONARY ARTERY BYPASS GRAFT N/A 04/16/2019    Procedure: MEDIAN STERNOTOMY, CORONARY ARTERY BYPASS X2 USING LEFT INTERNAL MAMMARY ARTERY GRAFT, PRP, INTRAOP LAURI;  Surgeon: Jesse Perez MD;  Location: [facility] OR;  Service: Cardiothoracic    CYSTOSCOPY W/ LASER LITHOTRIPSY         SLP Recommendation and Plan                                                                                      SWALLOW EVALUATION (Last 72 Hours)       SLP Adult Swallow Evaluation       Row Name 02/17/25 1000                   Rehab Evaluation    Document Type therapy note  (daily note)  -MG (r) CB (t) MG (c)        Subjective Information no complaints  -MG (r) CB (t) MG (c)        Patient Observations alert;cooperative;agree to therapy  -MG (r) CB (t) MG (c)        Patient Effort excellent  -MG (r) CB (t) MG (c)        Comment --  Wife stated that he has increased in alertness and awareness. Continues with generalized weakness. Could not independently feed without fatigue at this time. e  -MG (r) CB (t) MG (c)        Symptoms Noted During/After Treatment none  -MG (r) CB (t) MG (c)           General Information    Patient Profile Reviewed yes  -MG (r) CB (t) MG (c)           Pain    Additional Documentation Pain Scale: FACES Pre/Post-Treatment (Group)  -MG (r) CB (t) MG (c)           Pain Scale: FACES Pre/Post-Treatment    Pain: FACES Scale, Pretreatment 0-->no hurt  -MG (r) CB (t) MG (c)        Posttreatment Pain Rating 0-->no hurt  -MG (r) CB (t) MG (c)           SLP Treatment Clinical Impressions    Treatment Assessment (SLP) improved  -MG (r) CB (t) MG (c)        Daily Summary of Progress (SLP) progress toward functional goals as expected  -MG (r) CB (t) MG (c)        Barriers to Overall Progress (SLP) --  None  -MG (r) CB (t) MG (c)        Plan for Continued Treatment (SLP) continue treatment per plan of care  -MG (r) CB (t) MG (c)        Care Plan Review evaluation/treatment results reviewed;care plan/treatment goals reviewed;risks/benefits reviewed;patient/other agree to care plan;current/potential barriers reviewed  -MG (r) CB (t) MG (c)        Care Plan Review, Other Participant(s) caregiver;family;spouse  -MG (r) CB (t) MG (c)           Swallow Goals (SLP)    Swallow LTGs Swallow Long Term Goal (free text)  -MG (r) CB (t) MG (c)        Swallow STGs diet tolerance goal selection (SLP)  -MG (r) CB (t) MG (c)        Diet Tolerance Goal Selection (SLP) Patient will tolerate trials of  -MG (r) CB (t) MG (c)           (LTG) Swallow    (LTG) Swallow Patient will tolerate least  restrictive diet.  -MG (r) CB (t) MG (c)        [place] (Swallow Long Term Goal) independently (over 90% accuracy)  -MG (r) CB (t) MG (c)        Time Frame (Swallow Long Term Goal) by discharge  -MG (r) CB (t) MG (c)        Progress/Outcomes (Swallow Long Term Goal) good progress toward goal  -MG (r) CB (t) MG (c)           (STG) Patient will tolerate trials of    Consistencies Trialed (Tolerate trials) regular textures;thin liquids  -MG (r) CB (t) MG (c)        Desired Outcome (Tolerate trials) without signs/symptoms of aspiration  -MG (r) CB (t) MG (c)        [place] (Tolerate trials) independently (over 90% accuracy)  -MG (r) CB (t) MG (c)        Time Frame (Tolerate trials) by discharge  -MG (r) CB (t) MG (c)        Progress/Outcomes (Tolerate trials) goal revised this date;continuing progress toward goal  -MG                  User Key  (r) = Recorded By, (t) = Taken By, (c) = Cosigned By      Initials Name Effective Dates    MG Daphney Thao, MS [facility]-SLP 07/11/23 -     Rocío Braxton, Speech Therapy Student 12/17/24 -                     EDUCATION  The patient has been educated in the following areas:   Dysphagia (Swallowing Impairment).        SLP GOALS       Row Name 02/17/25 1000             (LTG) Swallow    (LTG) Swallow Patient will tolerate least restrictive diet.  -MG (r) CB (t) MG (c)      [place] (Swallow Long Term Goal) independently (over 90% accuracy)  -MG (r) CB (t) MG (c)      Time Frame (Swallow Long Term Goal) by discharge  -MG (r) CB (t) MG (c)      Progress/Outcomes (Swallow Long Term Goal) good progress toward goal  -MG (r) CB (t) MG (c)         (STG) Patient will tolerate trials of    Consistencies Trialed (Tolerate trials) regular textures;thin liquids  -MG (r) CB (t) MG (c)      Desired Outcome (Tolerate trials) without signs/symptoms of aspiration  -MG (r) CB (t) MG (c)      [place] (Tolerate trials) independently (over 90% accuracy)  -MG (r) CB (t) MG (c)       Time Frame (Tolerate trials) by discharge  -MG (r) CB (t) MG (c)      Progress/Outcomes (Tolerate trials) goal revised this date;continuing progress toward goal  -MG                User Key  (r) = Recorded By, (t) = Taken By, (c) = Cosigned By      Initials Name Provider Type    Daphney Hernandez MS CCC-SLP Speech and Language Pathologist    Rocío Braxton, Speech Therapy Student SLP Student                         Time Calculation:    Time Calculation- SLP       Row Name 02/17/25 1049             Time Calculation- SLP    SLP Start Time 1000  -MG (r) CB (t) MG (c)      SLP Stop Time 1050  -MG (r) CB (t) MG (c)      SLP Time Calculation (min) 50 min  -MG (r) CB (t)      SLP Received On 02/17/25  -MG (r) CB (t) MG (c)         Untimed Charges    91137-VK Treatment Swallow Minutes 50  -MG         Total Minutes    Untimed Charges Total Minutes 50  -MG       Total Minutes 50  -MG                User Key  (r) = Recorded By, (t) = Taken By, (c) = Cosigned By      Initials Name Provider Type    Daphney Hernandez MS CCC-SLP Speech and Language Pathologist    Rocío Braxton, Speech Therapy Student SLP Student                    Therapy Charges for Today       Code Description Service Date Service Provider Modifiers Qty    84129289291  ST TREATMENT SWALLOW 3 2/17/2025 Daphney Thao MS CCC-SLP GN 1                 Daphney Thao MS CCC-SLP  2/17/2025

## 2025-02-18 LAB
ALBUMIN SERPL-MCNC: 2.9 G/DL (ref 3.5–5.2)
ALBUMIN/GLOB SERPL: 0.9 G/DL
ALP SERPL-CCNC: 117 U/L (ref 39–117)
ALT SERPL W P-5'-P-CCNC: 69 U/L (ref 1–41)
ANION GAP SERPL CALCULATED.3IONS-SCNC: 9 MMOL/L (ref 5–15)
AST SERPL-CCNC: 46 U/L (ref 1–40)
BILIRUB SERPL-MCNC: 1 MG/DL (ref 0–1.2)
BUN SERPL-MCNC: 38 MG/DL (ref 8–23)
BUN/CREAT SERPL: 28.4 (ref 7–25)
CALCIUM SPEC-SCNC: 8.3 MG/DL (ref 8.6–10.5)
CHLORIDE SERPL-SCNC: 116 MMOL/L (ref 98–107)
CO2 SERPL-SCNC: 23 MMOL/L (ref 22–29)
CREAT SERPL-MCNC: 1.34 MG/DL (ref 0.76–1.27)
EGFRCR SERPLBLD CKD-EPI 2021: 58.4 ML/MIN/1.73
GLOBULIN UR ELPH-MCNC: 3.1 GM/DL
GLUCOSE SERPL-MCNC: 94 MG/DL (ref 65–99)
POTASSIUM SERPL-SCNC: 4.5 MMOL/L (ref 3.5–5.2)
PROT SERPL-MCNC: 6 G/DL (ref 6–8.5)
SODIUM SERPL-SCNC: 148 MMOL/L (ref 136–145)

## 2025-02-18 PROCEDURE — 92526 ORAL FUNCTION THERAPY: CPT | Performed by: SPEECH-LANGUAGE PATHOLOGIST

## 2025-02-18 PROCEDURE — 97530 THERAPEUTIC ACTIVITIES: CPT

## 2025-02-18 PROCEDURE — 80053 COMPREHEN METABOLIC PANEL: CPT | Performed by: FAMILY MEDICINE

## 2025-02-18 PROCEDURE — 25010000002 ENOXAPARIN PER 10 MG: Performed by: FAMILY MEDICINE

## 2025-02-18 RX ORDER — DEXTROSE MONOHYDRATE AND SODIUM CHLORIDE 5; .45 G/100ML; G/100ML
100 INJECTION, SOLUTION INTRAVENOUS CONTINUOUS
Status: DISPENSED | OUTPATIENT
Start: 2025-02-18 | End: 2025-02-18

## 2025-02-18 RX ADMIN — Medication 10 ML: at 08:31

## 2025-02-18 RX ADMIN — Medication 10 ML: at 20:00

## 2025-02-18 RX ADMIN — METOPROLOL TARTRATE 25 MG: 25 TABLET, FILM COATED ORAL at 20:00

## 2025-02-18 RX ADMIN — DEXTROSE AND SODIUM CHLORIDE 100 ML/HR: 5; .45 INJECTION, SOLUTION INTRAVENOUS at 10:14

## 2025-02-18 RX ADMIN — ASPIRIN 81 MG: 81 TABLET, COATED ORAL at 08:27

## 2025-02-18 RX ADMIN — AMLODIPINE BESYLATE 10 MG: 10 TABLET ORAL at 08:27

## 2025-02-18 RX ADMIN — METOPROLOL TARTRATE 25 MG: 25 TABLET, FILM COATED ORAL at 08:27

## 2025-02-18 RX ADMIN — CHLORHEXIDINE GLUCONATE 1 APPLICATION: 500 CLOTH TOPICAL at 08:31

## 2025-02-18 RX ADMIN — ENOXAPARIN SODIUM 40 MG: 100 INJECTION SUBCUTANEOUS at 18:02

## 2025-02-18 NOTE — THERAPY TREATMENT NOTE
Acute Care - Physical Therapy Treatment Note  Psychiatric     Patient Name: Chaitanya Hair  : 1960  MRN: [id number]  Today's Date: 2025      Visit Dx:     ICD-10-CM ICD-9-CM   1. Acute renal failure, unspecified acute renal failure type  N17.9 584.9   2. Influenza A  J10.1 487.1   3. Pneumonia due to infectious organism, unspecified laterality, unspecified part of lung  J18.9 486   4. Impaired mobility [Z74.09]  Z74.09 799.89   5. Dysphagia, unspecified type  R13.10 787.20     Patient Active Problem List   Diagnosis    Coronary artery disease involving native coronary artery of native heart without angina pectoris    Hypertension    Nicotine abuse    Old inferior wall myocardial infarction    Presence of stent in right coronary artery    Tobacco abuse    CAD (coronary artery disease)    Dysphagia, oropharyngeal    Dysphonia    Hoarseness    Vocal cord weakness    Dysarthria    Personal history of adenomatous and serrated colon polyps    HLD (hyperlipidemia)    Acute renal failure    Acute respiratory failure with hypoxia    Influenza A    Lobar pneumonia     Past Medical History:   Diagnosis Date    Abnormal stress echo 3/25/19-[facility]    Noted For Myocardial Ischemia    Acute MI     Atelectasis 2019    Noted on Chest XR    CAD (coronary artery disease) Since     w/R Stent Placed    Chest pain due to CAD     Nitro PRN    DM (diabetes mellitus)     false positive    Elevated cholesterol     History of chest x-ray 2019    Suggestive For Atelectasis w/Chronic Inflammatory Scarring Noted    History of EKG 3/25/19-[facility]    Sinus Rhythm w/Nonspecific ST-T Wave Abnormalities    History of nephrolithiasis     History of stress test     WNL    HLD (hyperlipidemia)     Controlled w/Meds    Hypertension     Controlled w/Meds    Kidney stones     S/p Lithotripsy    LAD stenosis 2019    90% Mid LAD Stenosis; Diffuse Stenosis of LCX--Noted on Cardiac Cath     Myocardial ischemia 03/25/2019    Noted on Echo Stress Test    RCA occlusion 04/11/2019    Mild Diffuse Disease--Noted on Cardiac Cath     Tobacco use     1.5-1 PPD     Past Surgical History:   Procedure Laterality Date    ABDOMINAL SURGERY  2023    APPENDECTOMY  2023    BACK SURGERY  2013    CARDIAC CATHETERIZATION  2009    RCA Stent    CARDIAC CATHETERIZATION  04/11/2019-[facility]    w/L Ventriculogram/Angiography--Dr. Garcia--CAD Involving LAD    COLONOSCOPY N/A 10/16/2024    Procedure: COLONOSCOPY WITH ANESTHESIA;  Surgeon: Nathaly Nino MD;  Location: [facility] END[facility]COPY;  Service: Gastroenterology;  Laterality: N/A;  pre screening  post diverticulosis; polyp  pcp  Mason Peguero MD    CORONARY ANGIOPLASTY WITH STENT PLACEMENT Right 2009    CORONARY ARTERY BYPASS GRAFT N/A 04/16/2019    Procedure: MEDIAN STERNOTOMY, CORONARY ARTERY BYPASS X2 USING LEFT INTERNAL MAMMARY ARTERY GRAFT, PRP, INTRAOP LAURI;  Surgeon: Jesse Perez MD;  Location: [facility] OR;  Service: Cardiothoracic    CYSTOSCOPY W/ LASER LITHOTRIPSY       PT Assessment (Last 12 Hours)       PT Evaluation and Treatment       Row Name 02/18/25 [facility]          Physical Therapy Time and Intention    Subjective Information complains of;weakness;fatigue  -SAVANAH     Document Type therapy note (daily note)  -SAVANAH     Mode of Treatment physical therapy  -       Row Name 02/18/25 08          General Information    Patient/Family/Caregiver Comments/Observations spouse  -SAVANAH     Existing Precautions/Restrictions fall;oxygen therapy device and L/min  -       Row Name 02/18/25 08          Pain    Pretreatment Pain Rating 0/10 - no pain  -SAVANAH     Posttreatment Pain Rating 0/10 - no pain  -SAVANAH       Row Name 02/18/25 [facility]          Bed Mobility    Rolling Left Stephenson (Bed Mobility) verbal cues;contact guard  -SAVANAH     Rolling Right [place] (Bed Mobility) verbal cues;contact guard  -SAVANAH     Sidelying-Sit Stephenson (Bed Mobility) verbal  cues;minimum assist (75% patient effort)  -     Assistive Device (Bed Mobility) bed rails  -       Row Name 02/18/25 0805          Transfers    Comment, (Transfers) stood x 4  -       Row Name 02/18/25 0805          Sit-Stand Transfer    Sit-Stand Johnston (Transfers) verbal cues;minimum assist (75% patient effort)  -       Row Name 02/18/25 0805          Stand-Sit Transfer    Stand-Sit Johnston (Transfers) verbal cues;contact guard  -       Row Name 02/18/25 0805          Gait/Stairs (Locomotion)    [place]ston Level (Gait) minimum assist (75% patient effort)  -     Assistive Device (Gait) --    -     Distance in Feet (Gait) --  8 steps forward and back x2.  -     Pattern (Gait) step-to  -SAVANAH     Deviations/Abnormal Patterns (Gait) stride length decreased;weight shifting decreased  -     Bilateral Gait Deviations heel strike decreased  decreased floor clearance  -       Row Name 02/18/25 0805          Balance    Static Sitting Balance supervision  -     Dynamic Sitting Balance standby assist  -     Position, Sitting Balance supported;unsupported;sitting edge of bed  -     Sit to Stand Dynamic Balance minimal assist  -     Static Standing Balance contact guard  -     Dynamic Standing Balance minimal assist  -SAVANAH     Balance Interventions sitting;core stability exercise;standing;tandem standing;trunk training exercise  trunk stabs, lateral bends  -       Row Name 02/18/25 0805          Motor Skills    Comments, Therapeutic Exercise B LE AROM x15  -[facility] Name 02/18/25 0805          Positioning and Restraints    Pre-Treatment Position in bed  -     Post Treatment Position chair  -     In Bed sitting;call light within reach;encouraged to call for assist;with family/caregiver  -               User Key  (r) = Recorded By, (t) = Taken By, (c) = Cosigned By      Initials Name Provider Type    SAVANAH Brit Clancy, PTA Physical Therapist Assistant                     Physical Therapy Education       Title: PT OT SLP Therapies (In Progress)       Topic: Physical Therapy (In Progress)       Point: Mobility training (Done)       Learning Progress Summary            Patient Acceptance, E, DU by  at 2/18/2025 1101    Comment: bed mobility    Acceptance, E, DU by  at 2/17/2025 1115    Comment: benefits of activity    Acceptance, E, VU by BL at 2/11/2025 1059    Comment: Bed mobility techinques, plan of care, home safety     by BL at 2/11/2025 1058                      Point: Home exercise program (Not Started)       Learner Progress:  Not documented in this visit.              Point: Body mechanics (Done)       Learning Progress Summary            Patient Acceptance, E, VU by BL at 2/11/2025 1059    Comment: Bed mobility techinques, plan of care, home safety     by BL at 2/11/2025 1058                      Point: Precautions (Done)       Learning Progress Summary            Patient Acceptance, E, VU by BL at 2/11/2025 1059    Comment: Bed mobility techinques, plan of care, home safety     by BL at 2/11/2025 1058                                      User Key       Initials Effective Dates Name Provider Type Discipline     02/03/23 -  Brit Clancy, PTA Physical Therapist Assistant PT     12/17/24 -  Hernandez Reyes PT Student PT Student PT                  PT Recommendation and Plan         Outcome Measures       Row Name 02/18/25 1100 02/17/25 1100 02/16/25 0939       How much help from another person do you currently need...    Turning from your back to your side while in flat bed without using bedrails? 3  -SAVANAH 3  -SAVANAH 3  -MF    Moving from lying on back to sitting on the side of a flat bed without bedrails? 3  -SAVANAH 3  -SAVANAH 3  -MF    Moving to and from a bed to a chair (including a wheelchair)? 3  -SAVANAH 3  -SAVANAH 2  -MF    Standing up from a chair using your arms (e.g., wheelchair, bedside chair)? 3  -SAVANAH 3  -SAVANAH 3  -MF    Climbing 3-5 steps with a railing? 2  -SAVANAH 2  -SAVANAH 1   -MF    To walk in hospital room? 3  -SAVANAH 2  -SAVANAH 1  -MF    AM-PAC 6 Clicks Score (PT) 17  -SAVANAH 16  -SAVANAH 13  -MF       Functional Assessment    Outcome Measure Options -- -- AM-PAC 6 Clicks Basic Mobility (PT)  -MF              User Key  (r) = Recorded By, (t) = Taken By, (c) = Cosigned By      Initials Name Provider Type    Brit Luna, ANDRZEJ Physical Therapist Assistant    Natalie Bynum PTA Physical Therapist Assistant                     Time Calculation:    PT Charges       Row Name 02/18/25 1106             Time Calculation    Start Time 0805  -SAVANAH      Stop Time 0848  -SAVANAH      Time Calculation (min) 43 min  -SAVANAH         Timed Charges    99138 - PT Therapeutic Activity Minutes 43  -SAVANAH         Total Minutes    Timed Charges Total Minutes 43  -SAVANAH       Total Minutes 43  -SAVANAH                User Key  (r) = Recorded By, (t) = Taken By, (c) = Cosigned By      Initials Name Provider Type    Brit Luna PTA Physical Therapist Assistant                  Therapy Charges for Today       Code Description Service Date Service Provider Modifiers Qty    77316722884 HC PT THERAPEUTIC ACT EA 15 MIN 2/17/2025 Brit Clancy PTA GP 3    00266976964 HC PT THERAPEUTIC ACT EA 15 MIN 2/18/2025 Brit Clancy, ANDRZEJ GP 3            PT G-Codes  Outcome Measure Options: AM-PAC 6 Clicks Daily Activity (OT)  AM-PAC 6 Clicks Score (PT): 17  AM-PAC 6 Clicks Score (OT): 14    Brit Clancy PTA  2/18/2025

## 2025-02-18 NOTE — PROGRESS NOTES
Daily Progress Note  Chaitanya Hair  MRN: [id number] LOS: 8    Admit Date: 2/10/2025   2/18/2025 07:33 CST    Subjective:          Chief Complaint:  Chief Complaint   Patient presents with    Weakness - Generalized    Diarrhea       Interval History:    Reviewed overnight events and nursing notes.   Stable from mental status standpoint.  Still confused about his location and situation however he is oriented to person and time.  Remains on 4.5L NC    Review of Systems   Constitutional:  Negative for chills and fever.   Respiratory:  Positive for cough. Negative for shortness of breath.    Psychiatric/Behavioral:  Positive for confusion.        DIET:  Diet: Regular/House; Texture: Regular (IDDSI 7); Fluid Consistency: Thin (IDDSI 0)    Medications:        amLODIPine, 10 mg, Oral, Daily  aspirin, 81 mg, Oral, Daily  Chlorhexidine Gluconate Cloth, 1 Application, Topical, Daily  enoxaparin, 40 mg, Subcutaneous, Q24H  metoprolol tartrate, 25 mg, Oral, BID  nicotine, 1 patch, Transdermal, Q24H  polyethylene glycol, 17 g, Oral, Daily  sodium chloride, 10 mL, Intravenous, Q12H        Data:     Code Status:   Code Status and Medical Interventions: CPR (Attempt to Resuscitate); Full Support   Ordered at: 02/11/25 0743     Code Status (Patient has no pulse and is not breathing):    CPR (Attempt to Resuscitate)     Medical Interventions (Patient has pulse or is breathing):    Full Support       Family History   Problem Relation Age of Onset    Heart disease Father     Colon cancer Neg Hx     Colon polyps Neg Hx      Social History     Socioeconomic History    Marital status:    Tobacco Use    Smoking status: Every Day     Current packs/day: 0.50     Average packs/day: 0.7 packs/day for 60.0 years (40.0 ttl pk-yrs)     Types: Cigarettes    Smokeless tobacco: Never   Vaping Use    Vaping status: Never Used   Substance and Sexual Activity    Alcohol use: Yes     Comment: Maybe once a month    Drug use: No    Sexual  activity: Yes     Partners: Female       Labs:    Lab Results (last 72 hours)       Procedure Component Value Units Date/Time    Comprehensive Metabolic Panel [[id number]]  (Abnormal) Collected: 02/11/25 0308    Specimen: Blood Updated: 02/11/25 0410     Glucose 141 mg/dL      BUN 59 mg/dL      Creatinine 2.10 mg/dL      Sodium 138 mmol/L      Potassium 4.3 mmol/L      Comment: Slight hemolysis detected by analyzer. Result may be falsely elevated.        Chloride 105 mmol/L      CO2 22.0 mmol/L      Calcium 8.2 mg/dL      Total Protein 6.4 g/dL      Albumin 2.9 g/dL      ALT (SGPT) 57 U/L      AST (SGOT) 63 U/L      Alkaline Phosphatase 86 U/L      Total Bilirubin 0.4 mg/dL      Globulin 3.5 gm/dL      A/G Ratio 0.8 g/dL      BUN/Creatinine Ratio 28.1     Anion Gap 11.0 mmol/L      eGFR 34.1 mL/min/1.73     Narrative:      GFR Categories in Chronic Kidney Disease (CKD)      GFR Category          GFR (mL/min/1.73)    Interpretation  G1                     90 or greater         Normal or high (1)  G2                      60-89                Mild decrease (1)  G3a                   45-59                Mild to moderate decrease  G3b                   30-44                Moderate to severe decrease  G4                    15-29                Severe decrease  G5                    14 or less           Kidney failure          (1)In the absence of evidence of kidney disease, neither GFR category G1 or G2 fulfill the criteria for CKD.    eGFR calculation 2021 CKD-EPI creatinine equation, which does not include race as a factor    CBC & Differential [[id number]]  (Abnormal) Collected: 02/11/25 0308    Specimen: Blood Updated: 02/11/25 0409    Narrative:      The following orders were created for panel order CBC & Differential.  Procedure                               Abnormality         Status                     ---------                               -----------         ------                     CBC Auto  Differential[[id number]]        Abnormal            Final result                 Please view results for these tests on the individual orders.    CBC Auto Differential [[id number]]  (Abnormal) Collected: 02/11/25 0308    Specimen: Blood Updated: 02/11/25 0409     WBC 5.99 10*3/mm3      RBC 4.75 10*6/mm3      Hemoglobin 14.5 g/dL      Hematocrit 41.6 %      MCV 87.6 fL      MCH 30.5 pg      MCHC 34.9 g/dL      RDW 13.2 %      RDW-SD 42.3 fl      MPV 11.2 fL      Platelets 99 10*3/mm3      Neutrophil % 79.7 %      Lymphocyte % 12.4 %      Monocyte % 7.0 %      Eosinophil % 0.0 %      Basophil % 0.2 %      Immature Grans % 0.7 %      Neutrophils, Absolute 4.78 10*3/mm3      Lymphocytes, Absolute 0.74 10*3/mm3      Monocytes, Absolute 0.42 10*3/mm3      Eosinophils, Absolute 0.00 10*3/mm3      Basophils, Absolute 0.01 10*3/mm3      Immature Grans, Absolute 0.04 10*3/mm3     Blood Gas, Arterial - [[id number]]  (Abnormal) Collected: 02/10/25 2026    Specimen: Arterial Blood Updated: 02/10/25 2026     Site Right Radial     Desmond's Test Positive     pH, Arterial 7.457 pH units      Comment: 83 Value above reference range        pCO2, Arterial 33.6 mm Hg      Comment: 84 Value below reference range        pO2, Arterial 64.6 mm Hg      Comment: 84 Value below reference range        HCO3, Arterial 23.7 mmol/L      Base Excess, Arterial 0.5 mmol/L      O2 Saturation, Arterial 94.1 %      Temperature 37.0     Barometric Pressure for Blood Gas 760 mmHg      Modality HFNC     Flow Rate 15.0 lpm      Ventilator Mode NA     Collected by [id number]     Comment: Meter: [id number]     :  Radha Fuentes RRT        pCO2, Temperature Corrected 33.6 mm Hg      pH, Temp Corrected 7.457 pH Units      pO2, Temperature Corrected 64.6 mm Hg     T3, Free [[id number]]  (Normal) Collected: 02/10/25 1109    Specimen: Blood Updated: 02/10/25 1950     T3, Free 2.99 pg/mL     Procalcitonin [[id number]]  (Abnormal) Collected: 02/10/25 1109     "Specimen: Blood Updated: 02/10/25 1653     Procalcitonin 0.47 ng/mL     Narrative:      As a Marker for Sepsis (Non-Neonates):    1. <0.5 ng/mL represents a low risk of severe sepsis and/or septic shock.  2. >2 ng/mL represents a high risk of severe sepsis and/or septic shock.    As a Marker for Lower Respiratory Tract Infections that require antibiotic therapy:    PCT on Admission    Antibiotic Therapy       6-12 Hrs later    >0.5                Strongly Recommended  >0.25 - <0.5        Recommended   0.1 - 0.25          Discouraged              Remeasure/reassess PCT  <0.1                Strongly Discouraged     Remeasure/reassess PCT    As 28 day mortality risk marker: \"Change in Procalcitonin Result\" (>80% or <=80%) if Day 0 (or Day 1) and Day 4 values are available. Refer to http://[email or web address]-calculator.com    Change in PCT <=80%  A decrease of PCT levels below or equal to 80% defines a positive change in PCT test result representing a higher risk for 28-day all-cause mortality of patients diagnosed with severe sepsis for septic shock.    Change in PCT >80%  A decrease of PCT levels of more than 80% defines a negative change in PCT result representing a lower risk for 28-day all-cause mortality of patients diagnosed with severe sepsis or septic shock.       High Sensitivity Troponin T 1Hr [[id number]]  (Abnormal) Collected: 02/10/25 1216    Specimen: Blood Updated: 02/10/25 1249     HS Troponin T 76 ng/L      Troponin T Numeric Delta -2 ng/L      Troponin T % Delta -3    Narrative:      High Sensitive Troponin T Reference Range:  <14.0 ng/L- Negative Female for AMI  <22.0 ng/L- Negative Male for AMI  >=14 - Abnormal Female indicating possible myocardial injury.  >=22 - Abnormal Male indicating possible myocardial injury.   Clinicians would have to utilize clinical acumen, EKG, Troponin, and serial changes to determine if it is an Acute Myocardial Infarction or myocardial injury due to an underlying chronic " condition.         BNP [[id number]]  (Normal) Collected: 02/10/25 1216    Specimen: Blood Updated: 02/10/25 1246     proBNP 147.5 pg/mL     Narrative:      This assay is used as an aid in the diagnosis of individuals suspected of having heart failure. It can be used as an aid in the diagnosis of acute decompensated heart failure (ADHF) in patients presenting with signs and symptoms of ADHF to the emergency department (ED). In addition, NT-proBNP of <300 pg/mL indicates ADHF is not likely.    Age Range Result Interpretation  NT-proBNP Concentration (pg/mL:      <50             Positive            >450                   Gray                 300-450                    Negative             <300    50-75           Positive            >900                  Gray                300-900                  Negative            <300      >75             Positive            >1800                  Gray                300-1800                  Negative            <300    Urinalysis With Microscopic If Indicated (No Culture) - Urine, Clean Catch [[id number]]  (Normal) Collected: 02/10/25 1231    Specimen: Urine, Clean Catch Updated: 02/10/25 1246     Color, UA Yellow     Appearance, UA Clear     pH, UA <=5.0     Specific Gravity, UA 1.016     Glucose, UA Negative     Ketones, UA Negative     Bilirubin, UA Negative     Blood, UA Negative     Protein, UA Negative     Leuk Esterase, UA Negative     Nitrite, UA Negative     Urobilinogen, UA 0.2 E.U./dL    Narrative:      Urine microscopic not indicated.    CBC & Differential [[id number]]  (Abnormal) Collected: 02/10/25 1109    Specimen: Blood Updated: 02/10/25 1159    Narrative:      The following orders were created for panel order CBC & Differential.  Procedure                               Abnormality         Status                     ---------                               -----------         ------                     CBC Auto Differential[[id number]]        Abnormal            Final  result                 Please view results for these tests on the individual orders.    CBC Auto Differential [[id number]]  (Abnormal) Collected: 02/10/25 1109    Specimen: Blood Updated: 02/10/25 1159     WBC 6.31 10*3/mm3      RBC 4.54 10*6/mm3      Hemoglobin 14.1 g/dL      Hematocrit 40.3 %      MCV 88.8 fL      MCH 31.1 pg      MCHC 35.0 g/dL      RDW 13.2 %      RDW-SD 43.2 fl      MPV 11.3 fL      Platelets 95 10*3/mm3     Manual Differential [[id number]]  (Abnormal) Collected: 02/10/25 1109    Specimen: Blood Updated: 02/10/25 1159     Neutrophil % 79.8 %      Lymphocyte % 3.0 %      Monocyte % 7.1 %      Eosinophil % 0.0 %      Basophil % 0.0 %      Bands %  9.1 %      Atypical Lymphocyte % 1.0 %      Neutrophils Absolute 5.61 10*3/mm3      Lymphocytes Absolute 0.25 10*3/mm3      Monocytes Absolute 0.45 10*3/mm3      Eosinophils Absolute 0.00 10*3/mm3      Basophils Absolute 0.00 10*3/mm3      Anisocytosis Slight/1+     Poikilocytes Slight/1+     Spherocytes Slight/1+     WBC Morphology Normal     Platelet Estimate Decreased     Clumped Platelets Present     Giant Platelets Slight/1+    TSH [[id number]]  (Normal) Collected: 02/10/25 1109    Specimen: Blood Updated: 02/10/25 1151     TSH 2.450 uIU/mL     T4, Free [[id number]]  (Normal) Collected: 02/10/25 1109    Specimen: Blood Updated: 02/10/25 1151     Free T4 1.28 ng/dL     Comprehensive Metabolic Panel [[id number]]  (Abnormal) Collected: 02/10/25 1109    Specimen: Blood Updated: 02/10/25 1150     Glucose 106 mg/dL      BUN 60 mg/dL      Creatinine 2.34 mg/dL      Sodium 135 mmol/L      Potassium 3.8 mmol/L      Chloride 100 mmol/L      CO2 21.0 mmol/L      Calcium 7.9 mg/dL      Total Protein 6.2 g/dL      Albumin 3.2 g/dL      ALT (SGPT) 66 U/L      AST (SGOT) 66 U/L      Alkaline Phosphatase 80 U/L      Total Bilirubin 0.5 mg/dL      Globulin 3.0 gm/dL      A/G Ratio 1.1 g/dL      BUN/Creatinine Ratio 25.6     Anion Gap 14.0 mmol/L      eGFR 29.9  mL/min/1.73     Narrative:      GFR Categories in Chronic Kidney Disease (CKD)      GFR Category          GFR (mL/min/1.73)    Interpretation  G1                     90 or greater         Normal or high (1)  G2                      60-89                Mild decrease (1)  G3a                   45-59                Mild to moderate decrease  G3b                   30-44                Moderate to severe decrease  G4                    15-29                Severe decrease  G5                    14 or less           Kidney failure          (1)In the absence of evidence of kidney disease, neither GFR category G1 or G2 fulfill the criteria for CKD.    eGFR calculation 2021 CKD-EPI creatinine equation, which does not include race as a factor    Lactic Acid, Plasma [[id number]]  (Normal) Collected: 02/10/25 1109    Specimen: Blood Updated: 02/10/25 1149     Lactate 1.5 mmol/L     Magnesium [[id number]]  (Normal) Collected: 02/10/25 1109    Specimen: Blood Updated: 02/10/25 1147     Magnesium 1.9 mg/dL     High Sensitivity Troponin T [[id number]]  (Abnormal) Collected: 02/10/25 1109    Specimen: Blood Updated: 02/10/25 1147     HS Troponin T 78 ng/L     Narrative:      High Sensitive Troponin T Reference Range:  <14.0 ng/L- Negative Female for AMI  <22.0 ng/L- Negative Male for AMI  >=14 - Abnormal Female indicating possible myocardial injury.  >=22 - Abnormal Male indicating possible myocardial injury.   Clinicians would have to utilize clinical acumen, EKG, Troponin, and serial changes to determine if it is an Acute Myocardial Infarction or myocardial injury due to an underlying chronic condition.         Lipase [[id number]]  (Abnormal) Collected: 02/10/25 1109    Specimen: Blood Updated: 02/10/25 1145     Lipase 74 U/L     Respiratory Panel PCR w/COVID-19(SARS-CoV-2) SIMIN/ALVERTO/MYRA/PAD/COR/MIK In-House, NP Swab in UTM/VTM, 2 HR TAT - Swab, Nasopharynx [[id number]]  (Abnormal) Collected: 02/10/25 1013    Specimen: Swab from  "Nasopharynx Updated: 02/10/25 1136     ADENOVIRUS, PCR Not Detected     Coronavirus 229E Not Detected     Coronavirus HKU1 Not Detected     Coronavirus NL63 Not Detected     Coronavirus OC43 Not Detected     COVID19 Not Detected     Human Metapneumovirus Not Detected     Human Rhinovirus/Enterovirus Not Detected     Influenza A H1 2009 PCR Detected     Influenza B PCR Not Detected     Parainfluenza Virus 1 Not Detected     Parainfluenza Virus 2 Not Detected     Parainfluenza Virus 3 Not Detected     Parainfluenza Virus 4 Not Detected     RSV, PCR Not Detected     Bordetella pertussis pcr Not Detected     Bordetella parapertussis PCR Not Detected     Chlamydophila pneumoniae PCR Not Detected     Mycoplasma pneumo by PCR Not Detected    Narrative:      In the setting of a positive respiratory panel with a viral infection PLUS a negative procalcitonin without other underlying concern for bacterial infection, consider observing off antibiotics or discontinuation of antibiotics and continue supportive care. If the respiratory panel is positive for atypical bacterial infection (Bordetella pertussis, Chlamydophila pneumoniae, or Mycoplasma pneumoniae), consider antibiotic de-escalation to target atypical bacterial infection.    Blood Culture - Blood, Arm, Right [[id number]] Collected: 02/10/25 1013    Specimen: Blood from Arm, Right Updated: 02/10/25 1027    Blood Culture - Blood, Arm, Left [[id number]] Collected: 02/10/25 1013    Specimen: Blood from Arm, Left Updated: 02/10/25 1027              Objective:     Vitals: /71   Pulse 62   Temp 98 °F (36.7 °C) (Axillary)   Resp 16   Ht 193 cm (76\")   Wt 107 kg (236 lb 1.8 oz)   SpO2 94%   BMI 28.74 kg/m²    Intake/Output Summary (Last 24 hours) at 2025 0733  Last data filed at 2025 1800  Gross per 24 hour   Intake 1020 ml   Output --   Net 1020 ml    Temp (24hrs), Av.3 °F (36.8 °C), Min:97.9 °F (36.6 °C), Max:98.7 °F (37.1 °C)      Physical " Exam  Constitutional:       Appearance: He is well-developed. He is ill-appearing.   HENT:      Head: Normocephalic and atraumatic.   Eyes:      Conjunctiva/sclera: Conjunctivae normal.      Pupils: Pupils are equal, round, and reactive to light.   Cardiovascular:      Rate and Rhythm: Normal rate and regular rhythm.      Heart sounds: Normal heart sounds. No murmur heard.     No friction rub.   Pulmonary:      Effort: No respiratory distress.      Breath sounds: No wheezing, rhonchi or rales.      Comments: Wheezing and aeration improved  Abdominal:      General: Bowel sounds are normal. There is no distension.      Palpations: Abdomen is soft.      Tenderness: There is no abdominal tenderness.   Musculoskeletal:         General: Normal range of motion.      Cervical back: Normal range of motion.   Skin:     Capillary Refill: Capillary refill takes less than 2 seconds.   Neurological:      Cranial Nerves: No cranial nerve deficit.      Comments: AAO x 2, not oriented to place or situation   Psychiatric:         Behavior: Behavior normal.             Assessment and Plan:     Primary Problem:  Acute respiratory failure with hypoxia    Hospital Problem list:    Acute respiratory failure with hypoxia    Nicotine abuse    CAD (coronary artery disease)    Acute renal failure    Influenza A    Lobar pneumonia      PMH:  Past Medical History:   Diagnosis Date    Abnormal stress echo 3/25/19-[facility]    Noted For Myocardial Ischemia    Acute MI     Atelectasis 04/11/2019    Noted on Chest XR    CAD (coronary artery disease) Since 2009    w/R Stent Placed    Chest pain due to CAD     Nitro PRN    DM (diabetes mellitus)     false positive    Elevated cholesterol     History of chest x-ray 04/11/2019    Suggestive For Atelectasis w/Chronic Inflammatory Scarring Noted    History of EKG 3/25/19-[facility]    Sinus Rhythm w/Nonspecific ST-T Wave Abnormalities    History of nephrolithiasis     History of stress test  2015    WNL    HLD (hyperlipidemia)     Controlled w/Meds    Hypertension     Controlled w/Meds    Kidney stones     S/p Lithotripsy    LAD stenosis 04/11/2019    90% Mid LAD Stenosis; Diffuse Stenosis of LCX--Noted on Cardiac Cath    Myocardial ischemia 03/25/2019    Noted on Echo Stress Test    RCA occlusion 04/11/2019    Mild Diffuse Disease--Noted on Cardiac Cath     Tobacco use     1.5-1 PPD       Treatment Plan:  Acute respiratory failure with hypoxia  Secondary to influenza A and bilateral lobar pneumonia.  Continue to support respiration and oxygenation.  Last PFT in 2019 showed no COPD but he may have developed underlying COPD with continued smoking.  Transitioned to the ICU for worsening delirium which continues to improve. Ok to floor when bed available     Influenza A  Improving gradually.    Metabolic/drug-induced encephalopathy  Believe that his delirium was largely triggered by Tamiflu as when it was held for him being n.p.o. his mentation improved and then it was restarted and he acutely worsened again.  Now gradually improving.  Expect him to improve with transfer out of the ICU.  Continue to treat metabolic abnormalities.     Lobar pneumonia  Completed IV antibiotics and steroids     Acute renal failure  Baseline normal creatinine was severe creatinine elevation in the setting of dehydration likely prerenal in etiology.  Continue IV fluids.  Improving with IV fluids    Hypernatremia  Likely related to iatrogenic causes from IV fluids in combination with dehydration.  Will give another liter of hypotonic fluids over next 10 hours as po intake has been poor         Disposition: Stable for floor transfer. Will likely need acute rehab or SNF at d/c. SW following.    Reviewed treatment plans with the patient and/or family.     Electronically signed by Mason Peguero MD on 2/18/2025 at 07:33 CST

## 2025-02-18 NOTE — PLAN OF CARE
Goal Outcome Evaluation:  Patient remained in NSR, HR in the 70s throughout the shift; BP WDL. Alert and oriented to person, place, and time. Patient had 2 loose bowel movements. Regular diet; poor appetite. Wife remained at the bedside throughout the shift. Up to chair and bedside commode assist times 2.Patient has floor orders; waiting on bed availability.

## 2025-02-18 NOTE — PLAN OF CARE
Goal Outcome Evaluation:         Mentation has improved,  A/O throughout shift. Loose stools x3. Able to use call light to make needs known. VSS. 3LNC. Pt safety maintained.

## 2025-02-18 NOTE — THERAPY DISCHARGE NOTE
Acute Care - Speech Language Pathology   Swallow Treatment Note/Discharge [facility]     Patient Name: Chaitanya Hair  : 1960  MRN: [id number]  Today's Date: 2025               Admit Date: 2/10/2025    Visit Dx:  Swallow follow up completed. Pt was alert and upright in his chair upon arrival.   Patient was self feeding cherry pie sipping water through a straw. Patient reported he had more strength today to lift items off his tray. Pt reports improved endurance as he continues to self feed. While pt self fed thin liquids and regular consistencies there were no overt s/s of aspiration. SLP is signing off. Please consult if any acute changes occur.     Rocío Wild, SLP     Daphney Thao, MS CCC-SLP 2025 14:12 CST        ICD-10-CM ICD-9-CM   1. Acute renal failure, unspecified acute renal failure type  N17.9 584.9   2. Influenza A  J10.1 487.1   3. Pneumonia due to infectious organism, unspecified laterality, unspecified part of lung  J18.9 486   4. Impaired mobility [Z74.09]  Z74.09 799.89   5. Dysphagia, unspecified type  R13.10 787.20     Patient Active Problem List   Diagnosis    Coronary artery disease involving native coronary artery of native heart without angina pectoris    Hypertension    Nicotine abuse    Old inferior wall myocardial infarction    Presence of stent in right coronary artery    Tobacco abuse    CAD (coronary artery disease)    Dysphagia, oropharyngeal    Dysphonia    Hoarseness    Vocal cord weakness    Dysarthria    Personal history of adenomatous and serrated colon polyps    HLD (hyperlipidemia)    Acute renal failure    Acute respiratory failure with hypoxia    Influenza A    Lobar pneumonia     Past Medical History:   Diagnosis Date    Abnormal stress echo 3/25/19-[facility]    Noted For Myocardial Ischemia    Acute MI     Atelectasis 2019    Noted on Chest XR    CAD (coronary artery disease) Since     w/R Stent Placed     Chest pain due to CAD     Nitro PRN    DM (diabetes mellitus)     false positive    Elevated cholesterol     History of chest x-ray 04/11/2019    Suggestive For Atelectasis w/Chronic Inflammatory Scarring Noted    History of EKG 3/25/19-[facility]    Sinus Rhythm w/Nonspecific ST-T Wave Abnormalities    History of nephrolithiasis     History of stress test 2015    WNL    HLD (hyperlipidemia)     Controlled w/Meds    Hypertension     Controlled w/Meds    Kidney stones     S/p Lithotripsy    LAD stenosis 04/11/2019    90% Mid LAD Stenosis; Diffuse Stenosis of LCX--Noted on Cardiac Cath    Myocardial ischemia 03/25/2019    Noted on Echo Stress Test    RCA occlusion 04/11/2019    Mild Diffuse Disease--Noted on Cardiac Cath     Tobacco use     1.5-1 PPD     Past Surgical History:   Procedure Laterality Date    ABDOMINAL SURGERY  2023    APPENDECTOMY  2023    BACK SURGERY  2013    CARDIAC CATHETERIZATION  2009    RCA Stent    CARDIAC CATHETERIZATION  04/11/2019-[facility]    w/L Ventriculogram/Angiography--Dr. Garcia--CAD Involving LAD    COLONOSCOPY N/A 10/16/2024    Procedure: COLONOSCOPY WITH ANESTHESIA;  Surgeon: Nathaly Nino MD;  Location:  [facility];  Service: Gastroenterology;  Laterality: N/A;  pre screening  post diverticulosis; polyp  pcp  Mason Peguero MD    CORONARY ANGIOPLASTY WITH STENT PLACEMENT Right 2009    CORONARY ARTERY BYPASS GRAFT N/A 04/16/2019    Procedure: MEDIAN STERNOTOMY, CORONARY ARTERY BYPASS X2 USING LEFT INTERNAL MAMMARY ARTERY GRAFT, PRP, INTRAOP LAURI;  Surgeon: Jesse Perez MD;  Location: [facility] OR;  Service: Cardiothoracic    CYSTOSCOPY W/ LASER LITHOTRIPSY         SLP Recommendation and Plan                    Anticipated Discharge Disposition (SLP): unknown (02/18/25 1411)                    Anticipated Discharge Disposition (SLP): unknown (02/18/25 1411)           Reason for Discharge: all goals and outcomes met, no further needs identified  (02/18/25 1411)                     SWALLOW EVALUATION (Last 72 Hours)       SLP Adult Swallow Evaluation       Row Name 02/18/25 1053 02/17/25 1000                Rehab Evaluation    Document Type -- therapy note (daily note)  -MG (r) CB (t) MG (c)       Subjective Information -- no complaints  -MG (r) CB (t) MG (c)       Patient Observations -- alert;cooperative;agree to therapy  -MG (r) CB (t) MG (c)       Patient Effort -- excellent  -MG (r) CB (t) MG (c)       Comment -- --  Wife stated that he has increased in alertness and awareness. Continues with generalized weakness. Could not independently feed without fatigue at this time. e  -MG (r) CB (t) MG (c)       Symptoms Noted During/After Treatment -- none  -MG (r) CB (t) MG (c)          General Information    Patient Profile Reviewed -- yes  -MG (r) CB (t) MG (c)          Pain    Additional Documentation -- Pain Scale: FACES Pre/Post-Treatment (Group)  -MG (r) CB (t) MG (c)          Pain Scale: FACES Pre/Post-Treatment    Pain: FACES Scale, Pretreatment -- 0-->no hurt  -MG (r) CB (t) MG (c)       Posttreatment Pain Rating -- 0-->no hurt  -MG (r) CB (t) MG (c)          SLP Treatment Clinical Impressions    Treatment Assessment (SLP) -- improved  -MG (r) CB (t) MG (c)       Daily Summary of Progress (SLP) -- progress toward functional goals as expected  -MG (r) CB (t) MG (c)       Barriers to Overall Progress (SLP) -- --  None  -MG (r) CB (t) MG (c)       Plan for Continued Treatment (SLP) -- continue treatment per plan of care  -MG (r) CB (t) MG (c)       Care Plan Review -- evaluation/treatment results reviewed;care plan/treatment goals reviewed;risks/benefits reviewed;patient/other agree to care plan;current/potential barriers reviewed  -MG (r) CB (t) MG (c)       Care Plan Review, Other Participant(s) -- caregiver;family;spouse  -MG (r) CB (t) MG (c)          Recommendations    Therapy Frequency (Swallow) PRN  -MG (r) CB (t) MG (c) --       SLP Diet  Recommendation regular textures;thin liquids  -MG (r) CB (t) MG (c) --       Recommended Precautions and Strategies upright posture during/after eating;small bites of food and sips of liquid;fatigue precautions;1:1 supervision  Chin tuck compensatory stratgeies as needed if pt feels like food is getting stuck.  -MG (r) CB (t) MG (c) --       Oral Care Recommendations Oral Care before breakfast, after meals and PRN  -MG (r) CB (t) MG (c) --       SLP Rec. for Method of Medication Administration meds whole;as tolerated  -MG (r) CB (t) MG (c) --       Monitor for Signs of Aspiration notify SLP if any concerns  -MG (r) CB (t) MG (c) --          Swallow Goals (SLP)    Swallow LTGs Swallow Long Term Goal (free text)  -MG (r) CB (t) MG (c) Swallow Long Term Goal (free text)  -MG (r) CB (t) MG (c)       Swallow STGs diet tolerance goal selection (SLP)  -MG (r) CB (t) MG (c) diet tolerance goal selection (SLP)  -MG (r) CB (t) MG (c)       Diet Tolerance Goal Selection (SLP) Patient will tolerate trials of  -MG (r) CB (t) MG (c) Patient will tolerate trials of  -MG (r) CB (t) MG (c)          (LTG) Swallow    (LTG) Swallow Patient will tolerate least restrictive diet.  -MG (r) CB (t) MG (c) Patient will tolerate least restrictive diet.  -MG (r) CB (t) MG (c)       [place] (Swallow Long Term Goal) independently (over 90% accuracy)  -MG (r) CB (t) MG (c) independently (over 90% accuracy)  -MG (r) CB (t) MG (c)       Time Frame (Swallow Long Term Goal) by discharge  -MG (r) CB (t) MG (c) by discharge  -MG (r) CB (t) MG (c)       Progress/Outcomes (Swallow Long Term Goal) goal met  -MG (r) CB (t) MG (c) good progress toward goal  -MG (r) CB (t) MG (c)          (STG) Patient will tolerate trials of    Consistencies Trialed (Tolerate trials) regular textures;thin liquids  -MG (r) CB (t) MG (c) regular textures;thin liquids  -MG (r) CB (t) MG (c)       Desired Outcome (Tolerate trials) without signs/symptoms of aspiration  -MG  (r) CB (t) MG (c) without signs/symptoms of aspiration  -MG (r) CB (t) MG (c)       [place] (Tolerate trials) independently (over 90% accuracy)  -MG (r) CB (t) MG (c) independently (over 90% accuracy)  -MG (r) CB (t) MG (c)       Time Frame (Tolerate trials) by discharge  -MG (r) CB (t) MG (c) by discharge  -MG (r) CB (t) MG (c)       Progress/Outcomes (Tolerate trials) goal met  -MG (r) CB (t) MG (c) goal revised this date;continuing progress toward goal  -MG                 User Key  (r) = Recorded By, (t) = Taken By, (c) = Cosigned By      Initials Name Effective Dates    MG Daphney Thao, MS [facility]-SLP 07/11/23 -     Rocío Braxton, Speech Therapy Student 12/17/24 -                     EDUCATION  The patient has been educated in the following areas:   Dysphagia (Swallowing Impairment).         SLP GOALS       Row Name 02/18/25 1053 02/17/25 1000          (LTG) Swallow    (LTG) Swallow Patient will tolerate least restrictive diet.  -MG (r) CB (t) MG (c) Patient will tolerate least restrictive diet.  -MG (r) CB (t) MG (c)     [place] (Swallow Long Term Goal) independently (over 90% accuracy)  -MG (r) CB (t) MG (c) independently (over 90% accuracy)  -MG (r) CB (t) MG (c)     Time Frame (Swallow Long Term Goal) by discharge  -MG (r) CB (t) MG (c) by discharge  -MG (r) CB (t) MG (c)     Progress/Outcomes (Swallow Long Term Goal) goal met  -MG (r) CB (t) MG (c) good progress toward goal  -MG (r) CB (t) MG (c)        (STG) Patient will tolerate trials of    Consistencies Trialed (Tolerate trials) regular textures;thin liquids  -MG (r) CB (t) MG (c) regular textures;thin liquids  -MG (r) CB (t) MG (c)     Desired Outcome (Tolerate trials) without signs/symptoms of aspiration  -MG (r) CB (t) MG (c) without signs/symptoms of aspiration  -MG (r) CB (t) MG (c)     [place] (Tolerate trials) independently (over 90% accuracy)  -MG (r) CB (t) MG (c) independently (over 90% accuracy)  -MG (r) CB (t) MG (c)      Time Frame (Tolerate trials) by discharge  -MG (r) CB (t) MG (c) by discharge  -MG (r) CB (t) MG (c)     Progress/Outcomes (Tolerate trials) goal met  -MG (r) CB (t) MG (c) goal revised this date;continuing progress toward goal  -MG               User Key  (r) = Recorded By, (t) = Taken By, (c) = Cosigned By      Initials Name Provider Type    Daphney Hernandez MS CCC-SLP Speech and Language Pathologist    Rocío Braxton, Speech Therapy Student SLP Student                           Time Calculation:    Time Calculation- SLP       Row Name 02/18/25 1201             Time Calculation- SLP    SLP Start Time 1120  -MG (r) CB (t) MG (c)      SLP Stop Time 1204  -MG (r) CB (t) MG (c)      SLP Time Calculation (min) 44 min  -MG (r) CB (t)      SLP Received On 02/18/25  -MG (r) CB (t) MG (c)         Untimed Charges    20770-[facility] Treatment Swallow Minutes 44  -MG         Total Minutes    Untimed Charges Total Minutes 44  -MG       Total Minutes 44  -MG                User Key  (r) = Recorded By, (t) = Taken By, (c) = Cosigned By      Initials Name Provider Type    Daphney Hernandez MS CCC-SLP Speech and Language Pathologist    Rocío Braxton, Speech Therapy Student SLP Student                    Therapy Charges for Today       Code Description Service Date Service Provider Modifiers Qty    [id number]  ST TREATMENT SWALLOW 3 2/17/2025 Daphney Thao MS CCC-SLP GN 1                 SLP Discharge Summary  Anticipated Discharge Disposition (SLP): unknown  Reason for Discharge: all goals and outcomes met, no further needs identified  Progress Toward Achieving Short/long Term Goals: all goals met within established timelines  Discharge Destination: other (see comments) (Remains in acute care)    Daphney Thao MS CCC-SLP  2/18/2025

## 2025-02-18 NOTE — PLAN OF CARE
Goal Outcome Evaluation:  Plan of Care Reviewed With: (P) patient, caregiver        Progress: no change                     Treatment Assessment (SLP): (P) continued (02/18/25 1053)     Plan for Continued Treatment (SLP): (P) continue treatment per plan of care (02/18/25 1053)      Swallow follow up completed. Pt was alert and upright in his chair upon arrival.   Patient was self feeding cherry pie sipping water through a straw. Patient reported he had more strength today to lift items off his tray. Pt reports improved endurance as he continues to self feed. While pt self fed thin liquids and regular consistencies there were no overt s/s of aspiration. SLP is signing off. Please consult if any acute changes occur.     Rocío Wild, SLP

## 2025-02-18 NOTE — PLAN OF CARE
"Goal Outcome Evaluation:      Patient eager to get out of bed and move!. Patient rolls right and left with CGA using rails. Patient required min assist for side lying to sit. Patient has good sitting balance. Patient actively worked thru B LE exercises. Also participated in trunk/core strengthening activities. Patient stood x 4 with Min assist. Able to take 8 steps forward and back x 2 with Min assist. Patient states he is feeling \"stronger today\". Patient tolerated activity well and will continue to benefit from strengthening activities.,                                      "

## 2025-02-19 LAB
ALBUMIN SERPL-MCNC: 2.6 G/DL (ref 3.5–5.2)
ALBUMIN/GLOB SERPL: 0.9 G/DL
ALP SERPL-CCNC: 113 U/L (ref 39–117)
ALT SERPL W P-5'-P-CCNC: 64 U/L (ref 1–41)
ANION GAP SERPL CALCULATED.3IONS-SCNC: 6 MMOL/L (ref 5–15)
AST SERPL-CCNC: 45 U/L (ref 1–40)
BILIRUB SERPL-MCNC: 0.8 MG/DL (ref 0–1.2)
BUN SERPL-MCNC: 34 MG/DL (ref 8–23)
BUN/CREAT SERPL: 30.4 (ref 7–25)
CALCIUM SPEC-SCNC: 7.9 MG/DL (ref 8.6–10.5)
CHLORIDE SERPL-SCNC: 112 MMOL/L (ref 98–107)
CO2 SERPL-SCNC: 23 MMOL/L (ref 22–29)
CREAT SERPL-MCNC: 1.12 MG/DL (ref 0.76–1.27)
EGFRCR SERPLBLD CKD-EPI 2021: 72.5 ML/MIN/1.73
GLOBULIN UR ELPH-MCNC: 3 GM/DL
GLUCOSE SERPL-MCNC: 80 MG/DL (ref 65–99)
POTASSIUM SERPL-SCNC: 4 MMOL/L (ref 3.5–5.2)
PROT SERPL-MCNC: 5.6 G/DL (ref 6–8.5)
SODIUM SERPL-SCNC: 141 MMOL/L (ref 136–145)

## 2025-02-19 PROCEDURE — 97110 THERAPEUTIC EXERCISES: CPT | Performed by: OCCUPATIONAL THERAPIST

## 2025-02-19 PROCEDURE — 36415 COLL VENOUS BLD VENIPUNCTURE: CPT | Performed by: FAMILY MEDICINE

## 2025-02-19 PROCEDURE — 97535 SELF CARE MNGMENT TRAINING: CPT | Performed by: OCCUPATIONAL THERAPIST

## 2025-02-19 PROCEDURE — 97116 GAIT TRAINING THERAPY: CPT

## 2025-02-19 PROCEDURE — 80053 COMPREHEN METABOLIC PANEL: CPT | Performed by: FAMILY MEDICINE

## 2025-02-19 PROCEDURE — 25010000002 ENOXAPARIN PER 10 MG: Performed by: FAMILY MEDICINE

## 2025-02-19 RX ADMIN — METOPROLOL TARTRATE 25 MG: 25 TABLET, FILM COATED ORAL at 08:24

## 2025-02-19 RX ADMIN — ENOXAPARIN SODIUM 40 MG: 100 INJECTION SUBCUTANEOUS at 17:47

## 2025-02-19 RX ADMIN — Medication 10 ML: at 08:24

## 2025-02-19 RX ADMIN — METOPROLOL TARTRATE 25 MG: 25 TABLET, FILM COATED ORAL at 20:30

## 2025-02-19 RX ADMIN — AMLODIPINE BESYLATE 10 MG: 10 TABLET ORAL at 08:24

## 2025-02-19 RX ADMIN — CHLORHEXIDINE GLUCONATE 1 APPLICATION: 500 CLOTH TOPICAL at 08:24

## 2025-02-19 RX ADMIN — Medication 10 ML: at 20:32

## 2025-02-19 RX ADMIN — ASPIRIN 81 MG: 81 TABLET, COATED ORAL at 08:24

## 2025-02-19 NOTE — PLAN OF CARE
Goal Outcome Evaluation:  Plan of Care Reviewed With: patient           Outcome Evaluation: OT treatment completed. A&Ox4. No C/O. Pt sitting in chair upon arrival. Pt now on RA with O2 sat remaining in mid 90's throughout session. Sit>stand was min A with verbal cues, stand>sit was CGA. Pt performed 3 grooming tasks, with set up, consecutively while standing to improve activity tolerance. Pt ambulated ~10 ft total to use BSC, was CGA. Pt was CGA-min A to perform toiletry, verbal cues to improve balance by correcting forward leaning posture and excessive BL hip flex while standing when performing perineal hygiene. Completed 2 sets of 10 BL shoulder aBd and elbow flex with medium resis theraband; 2 sets of 5 chair raises to improve activity tolerance and strength for t/f's. Cont OT. Recommend IRF vs SNF at d/c.    Anticipated Discharge Disposition (OT): skilled nursing facility, inpatient rehabilitation facility

## 2025-02-19 NOTE — PLAN OF CARE
Goal Outcome Evaluation:  Patient remained in NSR, HR in the 70s throughout the shift; BP WDL. A&O X4. Patient had 2 loose bowel movements on bedside commode with unmeasured urine. Regular diet, poor appetite; adequate fluid intake. Continuous 5% dextrose 0.45% sodium chloride infusion running at 100mL/hr. Up in chair throughout shift. Patient has floor orders; waiting on bed availability.

## 2025-02-19 NOTE — PROGRESS NOTES
Daily Progress Note  Chaitanya Hair  MRN: [id number] LOS: 9    Admit Date: 2/10/2025   2/19/2025 10:51 CST    Subjective:          Chief Complaint:  Chief Complaint   Patient presents with    Weakness - Generalized    Diarrhea       Interval History:    Reviewed overnight events and nursing notes.   Patient doing tremendously better this morning.  Sitting up in the chair and off oxygen.  He is more oriented    Review of Systems   Constitutional:  Negative for chills and fever.   Respiratory:  Positive for cough. Negative for shortness of breath.    Psychiatric/Behavioral:  Negative for confusion.        DIET:  Diet: Regular/House; Texture: Regular (IDDSI 7); Fluid Consistency: Thin (IDDSI 0)    Medications:        amLODIPine, 10 mg, Oral, Daily  aspirin, 81 mg, Oral, Daily  Chlorhexidine Gluconate Cloth, 1 Application, Topical, Daily  enoxaparin, 40 mg, Subcutaneous, Q24H  metoprolol tartrate, 25 mg, Oral, BID  nicotine, 1 patch, Transdermal, Q24H  polyethylene glycol, 17 g, Oral, Daily  sodium chloride, 10 mL, Intravenous, Q12H        Data:     Code Status:   Code Status and Medical Interventions: CPR (Attempt to Resuscitate); Full Support   Ordered at: 02/11/25 0743     Code Status (Patient has no pulse and is not breathing):    CPR (Attempt to Resuscitate)     Medical Interventions (Patient has pulse or is breathing):    Full Support       Family History   Problem Relation Age of Onset    Heart disease Father     Colon cancer Neg Hx     Colon polyps Neg Hx      Social History     Socioeconomic History    Marital status:    Tobacco Use    Smoking status: Every Day     Current packs/day: 0.50     Average packs/day: 0.7 packs/day for 60.0 years (40.0 ttl pk-yrs)     Types: Cigarettes    Smokeless tobacco: Never   Vaping Use    Vaping status: Never Used   Substance and Sexual Activity    Alcohol use: Yes     Comment: Maybe once a month    Drug use: No    Sexual activity: Yes     Partners: Female        Labs:    Lab Results (last 72 hours)       Procedure Component Value Units Date/Time    Comprehensive Metabolic Panel [[id number]]  (Abnormal) Collected: 02/11/25 0308    Specimen: Blood Updated: 02/11/25 0410     Glucose 141 mg/dL      BUN 59 mg/dL      Creatinine 2.10 mg/dL      Sodium 138 mmol/L      Potassium 4.3 mmol/L      Comment: Slight hemolysis detected by analyzer. Result may be falsely elevated.        Chloride 105 mmol/L      CO2 22.0 mmol/L      Calcium 8.2 mg/dL      Total Protein 6.4 g/dL      Albumin 2.9 g/dL      ALT (SGPT) 57 U/L      AST (SGOT) 63 U/L      Alkaline Phosphatase 86 U/L      Total Bilirubin 0.4 mg/dL      Globulin 3.5 gm/dL      A/G Ratio 0.8 g/dL      BUN/Creatinine Ratio 28.1     Anion Gap 11.0 mmol/L      eGFR 34.1 mL/min/1.73     Narrative:      GFR Categories in Chronic Kidney Disease (CKD)      GFR Category          GFR (mL/min/1.73)    Interpretation  G1                     90 or greater         Normal or high (1)  G2                      60-89                Mild decrease (1)  G3a                   45-59                Mild to moderate decrease  G3b                   30-44                Moderate to severe decrease  G4                    15-29                Severe decrease  G5                    14 or less           Kidney failure          (1)In the absence of evidence of kidney disease, neither GFR category G1 or G2 fulfill the criteria for CKD.    eGFR calculation 2021 CKD-EPI creatinine equation, which does not include race as a factor    CBC & Differential [[id number]]  (Abnormal) Collected: 02/11/25 0308    Specimen: Blood Updated: 02/11/25 0409    Narrative:      The following orders were created for panel order CBC & Differential.  Procedure                               Abnormality         Status                     ---------                               -----------         ------                     CBC Auto Differential[[id number]]        Abnormal             Final result                 Please view results for these tests on the individual orders.    CBC Auto Differential [[id number]]  (Abnormal) Collected: 02/11/25 0308    Specimen: Blood Updated: 02/11/25 0409     WBC 5.99 10*3/mm3      RBC 4.75 10*6/mm3      Hemoglobin 14.5 g/dL      Hematocrit 41.6 %      MCV 87.6 fL      MCH 30.5 pg      MCHC 34.9 g/dL      RDW 13.2 %      RDW-SD 42.3 fl      MPV 11.2 fL      Platelets 99 10*3/mm3      Neutrophil % 79.7 %      Lymphocyte % 12.4 %      Monocyte % 7.0 %      Eosinophil % 0.0 %      Basophil % 0.2 %      Immature Grans % 0.7 %      Neutrophils, Absolute 4.78 10*3/mm3      Lymphocytes, Absolute 0.74 10*3/mm3      Monocytes, Absolute 0.42 10*3/mm3      Eosinophils, Absolute 0.00 10*3/mm3      Basophils, Absolute 0.01 10*3/mm3      Immature Grans, Absolute 0.04 10*3/mm3     Blood Gas, Arterial - [[id number]]  (Abnormal) Collected: 02/10/25 2026    Specimen: Arterial Blood Updated: 02/10/25 2026     Site Right Radial     Desmond's Test Positive     pH, Arterial 7.457 pH units      Comment: 83 Value above reference range        pCO2, Arterial 33.6 mm Hg      Comment: 84 Value below reference range        pO2, Arterial 64.6 mm Hg      Comment: 84 Value below reference range        HCO3, Arterial 23.7 mmol/L      Base Excess, Arterial 0.5 mmol/L      O2 Saturation, Arterial 94.1 %      Temperature 37.0     Barometric Pressure for Blood Gas 760 mmHg      Modality HFNC     Flow Rate 15.0 lpm      Ventilator Mode NA     Collected by [id number]     Comment: Meter: [id number]     :  Radha Fuentes, RHEA        pCO2, Temperature Corrected 33.6 mm Hg      pH, Temp Corrected 7.457 pH Units      pO2, Temperature Corrected 64.6 mm Hg     T3, Free [[id number]]  (Normal) Collected: 02/10/25 1109    Specimen: Blood Updated: 02/10/25 1950     T3, Free 2.99 pg/mL     Procalcitonin [[id number]]  (Abnormal) Collected: 02/10/25 1109    Specimen: Blood Updated: 02/10/25 1658      "Procalcitonin 0.47 ng/mL     Narrative:      As a Marker for Sepsis (Non-Neonates):    1. <0.5 ng/mL represents a low risk of severe sepsis and/or septic shock.  2. >2 ng/mL represents a high risk of severe sepsis and/or septic shock.    As a Marker for Lower Respiratory Tract Infections that require antibiotic therapy:    PCT on Admission    Antibiotic Therapy       6-12 Hrs later    >0.5                Strongly Recommended  >0.25 - <0.5        Recommended   0.1 - 0.25          Discouraged              Remeasure/reassess PCT  <0.1                Strongly Discouraged     Remeasure/reassess PCT    As 28 day mortality risk marker: \"Change in Procalcitonin Result\" (>80% or <=80%) if Day 0 (or Day 1) and Day 4 values are available. Refer to http://[email or web address]pct-calculator[facility][email or web address]    Change in PCT <=80%  A decrease of PCT levels below or equal to 80% defines a positive change in PCT test result representing a higher risk for 28-day all-cause mortality of patients diagnosed with severe sepsis for septic shock.    Change in PCT >80%  A decrease of PCT levels of more than 80% defines a negative change in PCT result representing a lower risk for 28-day all-cause mortality of patients diagnosed with severe sepsis or septic shock.       High Sensitivity Troponin T 1Hr [[id number]]  (Abnormal) Collected: 02/10/25 1216    Specimen: Blood Updated: 02/10/25 1249     HS Troponin T 76 ng/L      Troponin T Numeric Delta -2 ng/L      Troponin T % Delta -3    Narrative:      High Sensitive Troponin T Reference Range:  <14.0 ng/L- Negative Female for AMI  <22.0 ng/L- Negative Male for AMI  >=14 - Abnormal Female indicating possible myocardial injury.  >=22 - Abnormal Male indicating possible myocardial injury.   Clinicians would have to utilize clinical acumen, EKG, Troponin, and serial changes to determine if it is an Acute Myocardial Infarction or myocardial injury due to an underlying chronic condition.         BNP [[id number]]  (Normal) " Collected: 02/10/25 1216    Specimen: Blood Updated: 02/10/25 1246     proBNP 147.5 pg/mL     Narrative:      This assay is used as an aid in the diagnosis of individuals suspected of having heart failure. It can be used as an aid in the diagnosis of acute decompensated heart failure (ADHF) in patients presenting with signs and symptoms of ADHF to the emergency department (ED). In addition, NT-proBNP of <300 pg/mL indicates ADHF is not likely.    Age Range Result Interpretation  NT-proBNP Concentration (pg/mL:      <50             Positive            >450                   Gray                 300-450                    Negative             <300    50-75           Positive            >900                  Gray                300-900                  Negative            <300      >75             Positive            >1800                  Gray                300-1800                  Negative            <300    Urinalysis With Microscopic If Indicated (No Culture) - Urine, Clean Catch [[id number]]  (Normal) Collected: 02/10/25 1231    Specimen: Urine, Clean Catch Updated: 02/10/25 1246     Color, UA Yellow     Appearance, UA Clear     pH, UA <=5.0     Specific Gravity, UA 1.016     Glucose, UA Negative     Ketones, UA Negative     Bilirubin, UA Negative     Blood, UA Negative     Protein, UA Negative     Leuk Esterase, UA Negative     Nitrite, UA Negative     Urobilinogen, UA 0.2 E.U./dL    Narrative:      Urine microscopic not indicated.    CBC & Differential [[id number]]  (Abnormal) Collected: 02/10/25 1109    Specimen: Blood Updated: 02/10/25 1159    Narrative:      The following orders were created for panel order CBC & Differential.  Procedure                               Abnormality         Status                     ---------                               -----------         ------                     CBC Auto Differential[[id number]]        Abnormal            Final result                 Please view results for  these tests on the individual orders.    CBC Auto Differential [[id number]]  (Abnormal) Collected: 02/10/25 1109    Specimen: Blood Updated: 02/10/25 1159     WBC 6.31 10*3/mm3      RBC 4.54 10*6/mm3      Hemoglobin 14.1 g/dL      Hematocrit 40.3 %      MCV 88.8 fL      MCH 31.1 pg      MCHC 35.0 g/dL      RDW 13.2 %      RDW-SD 43.2 fl      MPV 11.3 fL      Platelets 95 10*3/mm3     Manual Differential [[id number]]  (Abnormal) Collected: 02/10/25 1109    Specimen: Blood Updated: 02/10/25 1159     Neutrophil % 79.8 %      Lymphocyte % 3.0 %      Monocyte % 7.1 %      Eosinophil % 0.0 %      Basophil % 0.0 %      Bands %  9.1 %      Atypical Lymphocyte % 1.0 %      Neutrophils Absolute 5.61 10*3/mm3      Lymphocytes Absolute 0.25 10*3/mm3      Monocytes Absolute 0.45 10*3/mm3      Eosinophils Absolute 0.00 10*3/mm3      Basophils Absolute 0.00 10*3/mm3      Anisocytosis Slight/1+     Poikilocytes Slight/1+     Spherocytes Slight/1+     WBC Morphology Normal     Platelet Estimate Decreased     Clumped Platelets Present     Giant Platelets Slight/1+    TSH [[id number]]  (Normal) Collected: 02/10/25 1109    Specimen: Blood Updated: 02/10/25 1151     TSH 2.450 uIU/mL     T4, Free [[id number]]  (Normal) Collected: 02/10/25 1109    Specimen: Blood Updated: 02/10/25 1151     Free T4 1.28 ng/dL     Comprehensive Metabolic Panel [[id number]]  (Abnormal) Collected: 02/10/25 1109    Specimen: Blood Updated: 02/10/25 1150     Glucose 106 mg/dL      BUN 60 mg/dL      Creatinine 2.34 mg/dL      Sodium 135 mmol/L      Potassium 3.8 mmol/L      Chloride 100 mmol/L      CO2 21.0 mmol/L      Calcium 7.9 mg/dL      Total Protein 6.2 g/dL      Albumin 3.2 g/dL      ALT (SGPT) 66 U/L      AST (SGOT) 66 U/L      Alkaline Phosphatase 80 U/L      Total Bilirubin 0.5 mg/dL      Globulin 3.0 gm/dL      A/G Ratio 1.1 g/dL      BUN/Creatinine Ratio 25.6     Anion Gap 14.0 mmol/L      eGFR 29.9 mL/min/1.73     Narrative:      GFR Categories in  Chronic Kidney Disease (CKD)      GFR Category          GFR (mL/min/1.73)    Interpretation  G1                     90 or greater         Normal or high (1)  G2                      60-89                Mild decrease (1)  G3a                   45-59                Mild to moderate decrease  G3b                   30-44                Moderate to severe decrease  G4                    15-29                Severe decrease  G5                    14 or less           Kidney failure          (1)In the absence of evidence of kidney disease, neither GFR category G1 or G2 fulfill the criteria for CKD.    eGFR calculation 2021 CKD-EPI creatinine equation, which does not include race as a factor    Lactic Acid, Plasma [[id number]]  (Normal) Collected: 02/10/25 1109    Specimen: Blood Updated: 02/10/25 1149     Lactate 1.5 mmol/L     Magnesium [[id number]]  (Normal) Collected: 02/10/25 1109    Specimen: Blood Updated: 02/10/25 1147     Magnesium 1.9 mg/dL     High Sensitivity Troponin T [[id number]]  (Abnormal) Collected: 02/10/25 1109    Specimen: Blood Updated: 02/10/25 1147     HS Troponin T 78 ng/L     Narrative:      High Sensitive Troponin T Reference Range:  <14.0 ng/L- Negative Female for AMI  <22.0 ng/L- Negative Male for AMI  >=14 - Abnormal Female indicating possible myocardial injury.  >=22 - Abnormal Male indicating possible myocardial injury.   Clinicians would have to utilize clinical acumen, EKG, Troponin, and serial changes to determine if it is an Acute Myocardial Infarction or myocardial injury due to an underlying chronic condition.         Lipase [[id number]]  (Abnormal) Collected: 02/10/25 1109    Specimen: Blood Updated: 02/10/25 1145     Lipase 74 U/L     Respiratory Panel PCR w/COVID-19(SARS-CoV-2) SIMIN/ALVERTO/MYRA/PAD/COR/MIK In-House, NP Swab in [place], 2 HR TAT - Swab, Nasopharynx [[id number]]  (Abnormal) Collected: 02/10/25 1013    Specimen: Swab from Nasopharynx Updated: 02/10/25 1136     ADENOVIRUS, PCR  "Not Detected     Coronavirus 229E Not Detected     Coronavirus HKU1 Not Detected     Coronavirus NL63 Not Detected     Coronavirus OC43 Not Detected     COVID19 Not Detected     Human Metapneumovirus Not Detected     Human Rhinovirus/Enterovirus Not Detected     Influenza A H1 2009 PCR Detected     Influenza B PCR Not Detected     Parainfluenza Virus 1 Not Detected     Parainfluenza Virus 2 Not Detected     Parainfluenza Virus 3 Not Detected     Parainfluenza Virus 4 Not Detected     RSV, PCR Not Detected     Bordetella pertussis pcr Not Detected     Bordetella parapertussis PCR Not Detected     Chlamydophila pneumoniae PCR Not Detected     Mycoplasma pneumo by PCR Not Detected    Narrative:      In the setting of a positive respiratory panel with a viral infection PLUS a negative procalcitonin without other underlying concern for bacterial infection, consider observing off antibiotics or discontinuation of antibiotics and continue supportive care. If the respiratory panel is positive for atypical bacterial infection (Bordetella pertussis, Chlamydophila pneumoniae, or Mycoplasma pneumoniae), consider antibiotic de-escalation to target atypical bacterial infection.    Blood Culture - Blood, Arm, Right [[id number]] Collected: 02/10/25 1013    Specimen: Blood from Arm, Right Updated: 02/10/25 1027    Blood Culture - Blood, Arm, Left [[id number]] Collected: 02/10/25 1013    Specimen: Blood from Arm, Left Updated: 02/10/25 1027              Objective:     Vitals: /80   Pulse 64   Temp 98.6 °F (37 °C) (Oral)   Resp 18   Ht 193 cm (76\")   Wt 107 kg (236 lb 1.8 oz)   SpO2 90%   BMI 28.74 kg/m²    Intake/Output Summary (Last 24 hours) at 2025 1051  Last data filed at 2025 0700  Gross per 24 hour   Intake 1346.4 ml   Output 950 ml   Net 396.4 ml    Temp (24hrs), Av.1 °F (36.7 °C), Min:97.5 °F (36.4 °C), Max:98.7 °F (37.1 °C)      Physical Exam  Constitutional:       Appearance: He is well-developed. " He is not ill-appearing.   HENT:      Head: Normocephalic and atraumatic.   Eyes:      Conjunctiva/sclera: Conjunctivae normal.      Pupils: Pupils are equal, round, and reactive to light.   Cardiovascular:      Rate and Rhythm: Normal rate and regular rhythm.      Heart sounds: Normal heart sounds. No murmur heard.     No friction rub.   Pulmonary:      Effort: No respiratory distress.      Breath sounds: No wheezing, rhonchi or rales.      Comments: Wheezing and aeration improved  Abdominal:      General: Bowel sounds are normal. There is no distension.      Palpations: Abdomen is soft.      Tenderness: There is no abdominal tenderness.   Musculoskeletal:         General: Normal range of motion.      Cervical back: Normal range of motion.   Skin:     Capillary Refill: Capillary refill takes less than 2 seconds.   Neurological:      Mental Status: He is alert and oriented to person, place, and time.      Cranial Nerves: No cranial nerve deficit.   Psychiatric:         Behavior: Behavior normal.             Assessment and Plan:     Primary Problem:  Acute respiratory failure with hypoxia    Hospital Problem list:    Acute respiratory failure with hypoxia    Nicotine abuse    CAD (coronary artery disease)    Acute renal failure    Influenza A    Lobar pneumonia      PMH:  Past Medical History:   Diagnosis Date    Abnormal stress echo 3/25/19-[facility]    Noted For Myocardial Ischemia    Acute MI     Atelectasis 04/11/2019    Noted on Chest XR    CAD (coronary artery disease) Since 2009    w/R Stent Placed    Chest pain due to CAD     Nitro PRN    DM (diabetes mellitus)     false positive    Elevated cholesterol     History of chest x-ray 04/11/2019    Suggestive For Atelectasis w/Chronic Inflammatory Scarring Noted    History of EKG 3/25/19-[facility]    Sinus Rhythm w/Nonspecific ST-T Wave Abnormalities    History of nephrolithiasis     History of stress test 2015    WNL    HLD (hyperlipidemia)      Controlled w/Meds    Hypertension     Controlled w/Meds    Kidney stones     S/p Lithotripsy    LAD stenosis 04/11/2019    90% Mid LAD Stenosis; Diffuse Stenosis of LCX--Noted on Cardiac Cath    Myocardial ischemia 03/25/2019    Noted on Echo Stress Test    RCA occlusion 04/11/2019    Mild Diffuse Disease--Noted on Cardiac Cath     Tobacco use     1.5-1 PPD       Treatment Plan:  Acute respiratory failure with hypoxia, resolved  Secondary to influenza A and bilateral lobar pneumonia.  Continue to support respiration and oxygenation.  Last PFT in 2019 showed no COPD but he may have developed underlying COPD with continued smoking.  Transitioned to the ICU for worsening delirium which continues to improve. Ok to floor when bed available.  Now off oxygen     Influenza A  Resolving    Metabolic/drug-induced encephalopathy  Believe that his delirium was largely triggered by Tamiflu as when it was held for him being n.p.o. his mentation improved and then it was restarted and he acutely worsened again.  Now gradually improving.  Nearing baseline.       Lobar pneumonia, resolved  Completed IV antibiotics and steroids     Acute renal failure, resolved  Baseline normal creatinine was severe creatinine elevation in the setting of dehydration likely prerenal in etiology.     Hypernatremia, resolved  Likely related to iatrogenic causes from IV fluids in combination with dehydration.       ICU deconditioning  Prolonged ICU stay may need placement    Disposition: Stable for floor transfer.  With his improvement I think he would benefit from acute rehab unless he significantly better with physical therapy today and could go home with home health.  Appreciate case management assistance.  DT at bedside and they agree with acute rehab.  Reviewed treatment plans with the patient and/or family.     Electronically signed by Mason Peguero MD on 2/19/2025 at 10:51 CST

## 2025-02-19 NOTE — THERAPY TREATMENT NOTE
Acute Care - Physical Therapy Treatment Note  [facility]     Patient Name: Chaitanya Hair  : 1960  MRN: [id number]  Today's Date: 2025      Visit Dx:     ICD-10-CM ICD-9-CM   1. Acute renal failure, unspecified acute renal failure type  N17.9 584.9   2. Influenza A  J10.1 487.1   3. Pneumonia due to infectious organism, unspecified laterality, unspecified part of lung  J18.9 486   4. Impaired mobility [Z74.09]  Z74.09 799.89   5. Dysphagia, unspecified type  R13.10 787.20     Patient Active Problem List   Diagnosis    Coronary artery disease involving native coronary artery of native heart without angina pectoris    Hypertension    Nicotine abuse    Old inferior wall myocardial infarction    Presence of stent in right coronary artery    Tobacco abuse    CAD (coronary artery disease)    Dysphagia, oropharyngeal    Dysphonia    Hoarseness    Vocal cord weakness    Dysarthria    Personal history of adenomatous and serrated colon polyps    HLD (hyperlipidemia)    Acute renal failure    Acute respiratory failure with hypoxia    Influenza A    Lobar pneumonia     Past Medical History:   Diagnosis Date    Abnormal stress echo 3/25/19-[facility]    Noted For Myocardial Ischemia    Acute MI     Atelectasis 2019    Noted on Chest XR    CAD (coronary artery disease) Since     w/R Stent Placed    Chest pain due to CAD     Nitro PRN    DM (diabetes mellitus)     false positive    Elevated cholesterol     History of chest x-ray 2019    Suggestive For Atelectasis w/Chronic Inflammatory Scarring Noted    History of EKG 3/25/19-[facility]    Sinus Rhythm w/Nonspecific ST-T Wave Abnormalities    History of nephrolithiasis     History of stress test     WNL    HLD (hyperlipidemia)     Controlled w/Meds    Hypertension     Controlled w/Meds    Kidney stones     S/p Lithotripsy    LAD stenosis 2019    90% Mid LAD Stenosis; Diffuse Stenosis of LCX--Noted on Cardiac Cath     Myocardial ischemia 03/25/2019    Noted on Echo Stress Test    RCA occlusion 04/11/2019    Mild Diffuse Disease--Noted on Cardiac Cath     Tobacco use     1.5-1 PPD     Past Surgical History:   Procedure Laterality Date    ABDOMINAL SURGERY  2023    APPENDECTOMY  2023    BACK SURGERY  2013    CARDIAC CATHETERIZATION  2009    RCA Stent    CARDIAC CATHETERIZATION  04/11/2019-[facility]    w/L Ventriculogram/Angiography--Dr. Garcia--CAD Involving LAD    COLONOSCOPY N/A 10/16/2024    Procedure: COLONOSCOPY WITH ANESTHESIA;  Surgeon: Nathaly Nino MD;  Location:  [facility];  Service: Gastroenterology;  Laterality: N/A;  pre screening  post diverticulosis; polyp  pcp  Mason Peguero MD    CORONARY ANGIOPLASTY WITH STENT PLACEMENT Right 2009    CORONARY ARTERY BYPASS GRAFT N/A 04/16/2019    Procedure: MEDIAN STERNOTOMY, CORONARY ARTERY BYPASS X2 USING LEFT INTERNAL MAMMARY ARTERY GRAFT, PRP, INTRAOP LAURI;  Surgeon: Jesse Perez MD;  Location: [facility] OR;  Service: Cardiothoracic    CYSTOSCOPY W/ LASER LITHOTRIPSY       PT Assessment (Last 12 Hours)       PT Evaluation and Treatment       Row Name 02/19/25 1503 02/19/25 1040       Physical Therapy Time and Intention    Subjective Information no complaints  -WK --    Document Type therapy note (daily note)  -WK --    Mode of Treatment physical therapy  -WK --    Session Not Performed -- other (see comments)  -WK    Comment -- with OT  -WK      Row Name 02/19/25 1503          General Information    Existing Precautions/Restrictions fall  -WK       Row Name 02/19/25 1503          Pain    Pretreatment Pain Rating 0/10 - no pain  -WK     Posttreatment Pain Rating 0/10 - no pain  -WK       Row Name 02/19/25 1503          Bed Mobility    Comment, (Bed Mobility) sitting in chair  -WK       Row Name 02/19/25 1503          Transfers    Transfers stand pivot/stand step transfer  -WK       Row Name 02/19/25 1503          Sit-Stand Transfer    Sit-Stand  [place] (Transfers) verbal cues;minimum assist (75% patient effort)  mild LOB require sitting and standing 2nd  -WK       Row Name 02/19/25 1503          Stand-Sit Transfer    Stand-Sit [place] (Transfers) verbal cues;contact guard  -WK       Row Name 02/19/25 1503          Stand Pivot/Stand Step Transfer    Stand Pivot/Stand Step [place] (Transfers) verbal cues;minimum assist (75% patient effort);contact guard  -WK       Row Name 02/19/25 1503          Gait/Stairs (Locomotion)    [place] Level (Gait) verbal cues;moderate assist (50% patient effort)  -WK     Assistive Device (Gait) --  HHA  -WK     Distance in Feet (Gait) 20  sitting rest 25  -WK     Deviations/Abnormal Patterns (Gait) gait speed decreased  -WK     Bilateral Gait Deviations forward flexed posture  -WK     Comment, (Gait/Stairs) mild LOB  -WK       Row Name 02/19/25 1503          Balance    Sit to Stand Dynamic Balance contact guard  -WK     Dynamic Standing Balance contact guard  -WK       Row Name 02/19/25 1503          Motor Skills    Comments, Therapeutic Exercise --  -WK       Row Name 02/19/25 1503          Plan of Care Review    Plan of Care Reviewed With patient  -WK     Progress improving  -WK     Outcome Evaluation Pt performed sit to stand min A. Amb 25' at a time min A with HHA. Pt transferred to [facility] with 1 LOB. Decrease safety awareness.  -WK       Row Name 02/19/25 1503          Vital Signs    Pre Systolic BP Rehab 128  -WK     Pre Treatment Diastolic BP 73  -WK     Intratreatment Heart Rate (beats/min) 65  -WK     Intra SpO2 (%) 94  -WK     O2 Delivery Intra Treatment room air  -WK       Row Name 02/19/25 1503          Positioning and Restraints    Pre-Treatment Position sitting in chair/recliner  -WK     Post Treatment Position chair  -WK     In Chair sitting;call light within reach;encouraged to call for assist  -WK               User Key  (r) = Recorded By, (t) = Taken By, (c) = Cosigned By      Initials Name  Provider Type    WK Elizabeth Galindo PTA Physical Therapist Assistant                    Physical Therapy Education       Title: PT OT SLP Therapies (In Progress)       Topic: Physical Therapy (In Progress)       Point: Mobility training (Done)       Learning Progress Summary            Patient Acceptance, E, DU by  at 2/18/2025 1101    Comment: bed mobility    Acceptance, E, DU by  at 2/17/2025 1115    Comment: benefits of activity    Acceptance, E, VU by BL at 2/11/2025 1059    Comment: Bed mobility techinques, plan of care, home safety     by BL at 2/11/2025 1058                      Point: Home exercise program (Not Started)       Learner Progress:  Not documented in this visit.              Point: Body mechanics (Done)       Learning Progress Summary            Patient Acceptance, E, VU by BL at 2/11/2025 1059    Comment: Bed mobility techinques, plan of care, home safety     by  at 2/11/2025 1058                      Point: Precautions (Done)       Learning Progress Summary            Patient Acceptance, E, VU by BL at 2/11/2025 1059    Comment: Bed mobility techinques, plan of care, home safety     by BL at 2/11/2025 1058                                      User Key       Initials Effective Dates Name Provider Type Discipline     02/03/23 -  Brit Clancy PTA Physical Therapist Assistant PT     12/17/24 -  Hernandez Reyes PT Student PT Student PT                  PT Recommendation and Plan     Plan of Care Reviewed With: patient  Progress: improving  Outcome Evaluation: Pt performed sit to stand min A. Amb 25' at a time min A with HHA. Pt transferred to [facility] with 1 LOB. Decrease safety awareness.   Outcome Measures       Row Name 02/18/25 1100 02/17/25 1100          How much help from another person do you currently need...    Turning from your back to your side while in flat bed without using bedrails? 3  -SAVANAH 3  -SAVANAH     Moving from lying on back to sitting on the side of a flat bed without  bedrails? 3  -SAVANAH 3  -SAVANAH     Moving to and from a bed to a chair (including a wheelchair)? 3  -SAVANAH 3  -SAVANAH     Standing up from a chair using your arms (e.g., wheelchair, bedside chair)? 3  -SAVANAH 3  -SAVANAH     Climbing 3-5 steps with a railing? 2  -SAVANAH 2  -SAVANAH     To walk in hospital room? 3  -SAVANAH 2  -SAVANAH     AM-PAC 6 Clicks Score (PT) 17  -SAVANAH 16  -SAVANAH               User Key  (r) = Recorded By, (t) = Taken By, (c) = Cosigned By      Initials Name Provider Type    SAVANAH Brit Clancy PTA Physical Therapist Assistant                     Time Calculation:    PT Charges       Row Name 02/19/25 1527             Time Calculation    Start Time 1503  -WK      Stop Time 1527  -WK      Time Calculation (min) 24 min  -WK      PT Received On 02/19/25  -WK         Time Calculation- PT    Total Timed Code Minutes- PT 24 minute(s)  -WK         Timed Charges    70225 - Gait Training Minutes  24  -WK         Total Minutes    Timed Charges Total Minutes 24  -WK       Total Minutes 24  -WK                User Key  (r) = Recorded By, (t) = Taken By, (c) = Cosigned By      Initials Name Provider Type    WK Elizabeth Galindo PTA Physical Therapist Assistant                  Therapy Charges for Today       Code Description Service Date Service Provider Modifiers Qty    09488786475 HC GAIT TRAINING EA 15 MIN 2/19/2025 Elizabeth Galindo PTA GP 2            PT G-Codes  Outcome Measure Options: AM-PAC 6 Clicks Daily Activity (OT)  AM-PAC 6 Clicks Score (PT): 17  AM-PAC 6 Clicks Score (OT): 16    Elizabeth Galindo PTA  2/19/2025

## 2025-02-19 NOTE — PLAN OF CARE
Goal Outcome Evaluation:  Plan of Care Reviewed With: patient        Progress: improving  Outcome Evaluation: Pt performed sit to stand min A. Amb 25' at a time min A with HHA. Pt transferred to [facility] with 1 LOB min A to correct. Decrease safety awareness.

## 2025-02-19 NOTE — THERAPY TREATMENT NOTE
Patient Name: Chaitanya Hair  : 1960    MRN: [id number]                              Today's Date: 2025       Admit Date: 2/10/2025    Visit Dx:     ICD-10-CM ICD-9-CM   1. Acute renal failure, unspecified acute renal failure type  N17.9 584.9   2. Influenza A  J10.1 487.1   3. Pneumonia due to infectious organism, unspecified laterality, unspecified part of lung  J18.9 486   4. Impaired mobility [Z74.09]  Z74.09 799.89   5. Dysphagia, unspecified type  R13.10 787.20     Patient Active Problem List   Diagnosis    Coronary artery disease involving native coronary artery of native heart without angina pectoris    Hypertension    Nicotine abuse    Old inferior wall myocardial infarction    Presence of stent in right coronary artery    Tobacco abuse    CAD (coronary artery disease)    Dysphagia, oropharyngeal    Dysphonia    Hoarseness    Vocal cord weakness    Dysarthria    Personal history of adenomatous and serrated colon polyps    HLD (hyperlipidemia)    Acute renal failure    Acute respiratory failure with hypoxia    Influenza A    Lobar pneumonia     Past Medical History:   Diagnosis Date    Abnormal stress echo 3/25/19-[facility]    Noted For Myocardial Ischemia    Acute MI     Atelectasis 2019    Noted on Chest XR    CAD (coronary artery disease) Since     w/R Stent Placed    Chest pain due to CAD     Nitro PRN    DM (diabetes mellitus)     false positive    Elevated cholesterol     History of chest x-ray 2019    Suggestive For Atelectasis w/Chronic Inflammatory Scarring Noted    History of EKG 3/25/19-[facility]    Sinus Rhythm w/Nonspecific ST-T Wave Abnormalities    History of nephrolithiasis     History of stress test     WNL    HLD (hyperlipidemia)     Controlled w/Meds    Hypertension     Controlled w/Meds    Kidney stones     S/p Lithotripsy    LAD stenosis 2019    90% Mid LAD Stenosis; Diffuse Stenosis of LCX--Noted on Cardiac Cath    Myocardial  ischemia 03/25/2019    Noted on Echo Stress Test    RCA occlusion 04/11/2019    Mild Diffuse Disease--Noted on Cardiac Cath     Tobacco use     1.5-1 PPD     Past Surgical History:   Procedure Laterality Date    ABDOMINAL SURGERY  2023    APPENDECTOMY  2023    BACK SURGERY  2013    CARDIAC CATHETERIZATION  2009    RCA Stent    CARDIAC CATHETERIZATION  04/11/2019-[facility]    w/L Ventriculogram/Angiography--Dr. Garcia--CAD Involving LAD    COLONOSCOPY N/A 10/16/2024    Procedure: COLONOSCOPY WITH ANESTHESIA;  Surgeon: Nathaly Nino MD;  Location:  [facility];  Service: Gastroenterology;  Laterality: N/A;  pre screening  post diverticulosis; polyp  pcp  Mason Peguero MD    CORONARY ANGIOPLASTY WITH STENT PLACEMENT Right 2009    CORONARY ARTERY BYPASS GRAFT N/A 04/16/2019    Procedure: MEDIAN STERNOTOMY, CORONARY ARTERY BYPASS X2 USING LEFT INTERNAL MAMMARY ARTERY GRAFT, PRP, INTRAOP LAURI;  Surgeon: Jesse Perez MD;  Location: [facility] OR;  Service: Cardiothoracic    CYSTOSCOPY W/ LASER LITHOTRIPSY        General Information       Row Name 02/19/25 1030          OT Time and Intention    Subjective Information no complaints  -JW (r) CB (t) JW (c)     Document Type therapy note (daily note)  -JW (r) CB (t) JW (c)     Mode of Treatment occupational therapy  -JW (r) CB (t) JW (c)     Patient Effort excellent  -JW (r) CB (t) JW (c)     Symptoms Noted During/After Treatment none  -JW (r) CB (t) JW (c)       Row Name 02/19/25 1030          General Information    Existing Precautions/Restrictions fall  -JW (r) CB (t) JW (c)     Barriers to Rehab medically complex  -JW (r) CB (t) JW (c)       Row Name 02/19/25 1030          Occupational Profile    Reason for Services/Referral (Occupational Profile) RA upper 90's  -JW (r) CB (t) JW (c)       Row Name 02/19/25 1030          Cognition    Orientation Status (Cognition) oriented x 4  -JW (r) CB (t) JW (c)       Row Name 02/19/25 1030          Safety  Issues/Impairments Affecting Functional Mobility    Safety Issues Affecting Function (Mobility) safety precaution awareness;safety precautions follow-through/compliance  -JW (r) CB (t) MANOJ (c)     Impairments Affecting Function (Mobility) endurance/activity tolerance;strength  -JW (r) CB (t) JW (c)     Cognitive Impairments, Mobility Safety/Performance safety precaution awareness;safety precaution follow-through  -JW (r) CB (t) JW (c)               User Key  (r) = Recorded By, (t) = Taken By, (c) = Cosigned By      Initials Name Provider Type    Germania Arguelles, OTR/L, CSRS Occupational Therapist    Michael Royal, OT Student OT Student                     Mobility/ADL's       Row Name 02/19/25 1030          Bed Mobility    Comment, (Bed Mobility) in chair upon arrival.  -JW (r) CB (t) JW (c)       Row Name 02/19/25 1030          Transfers    Transfers sit-stand transfer;stand-sit transfer;toilet transfer  -JW (r) CB (t) JW (c)       Row Name 02/19/25 1030          Sit-Stand Transfer    Sit-Stand [place] (Transfers) verbal cues;minimum assist (75% patient effort)  -JW (r) CB (t) JW (c)       Row Name 02/19/25 1030          Stand-Sit Transfer    Stand-Sit [place] (Transfers) verbal cues;contact guard  -JW (r) CB (t) JW (c)       Row Name 02/19/25 1030          Toilet Transfer    Type (Toilet Transfer) sit-stand;stand-sit  -JW (r) CB (t) JW (c)     [place] Level (Toilet Transfer) contact guard;minimum assist (75% patient effort);verbal cues  -JW (r) CB (t) JW (c)     Assistive Device (Toilet Transfer) commode chair  -JW (r) CB (t) JW (c)       Row Name 02/19/25 1030          Functional Mobility    Functional Mobility- Ind. Level contact guard assist  -JW (r) CB (t) JW (c)     Functional Mobility- Safety Issues balance decreased during turns;weight-shifting ability decreased  -MANOJ (r) CAREN (t) JW (c)     Functional Mobility- Comment ~10 ft total from chair to BSC.  -MANOJ (r) CAREN (t) MANOJ (c)       Row  Name 02/19/25 1030          Activities of Daily Living    BADL Assessment/Intervention grooming;toileting  -JW (r) CB (t) JW (c)       Row Name 02/19/25 1030          Grooming Assessment/Training    [place] Level (Grooming) hair care, combing/brushing;wash face, hands;oral care regimen;set up  -JW (r) CB (t) JW (c)     Position (Grooming) supported standing  -JW (r) CB (t) JW (c)     Comment, (Grooming) performed all 3 tasks consecutively while standing to improve activity tolerance  -JW (r) CB (t) JW (c)       Row Name 02/19/25 1030          Toileting Assessment/Training    [place] Level (Toileting) adjust/manage clothing;perform perineal hygiene;contact guard assist;minimum assist (75% patient effort)  -JW (r) CB (t) JW (c)     Position (Toileting) supported sitting;supported standing;other (see comments)  verbal cues to improve balance by correcting forward leaning posture and excessive BL hip flex while standing when performing perineal hygience  -JW (r) CB (t) JW (c)               User Key  (r) = Recorded By, (t) = Taken By, (c) = Cosigned By      Initials Name Provider Type    Germania Arguelles, OTR/L, CSRS Occupational Therapist    Michael Royal, DANIEL Student OT Student                   Obj/Interventions       Row Name 02/19/25 1030          Strength Comprehensive (MMT)    Comment, General Manual Muscle Testing (MMT) Assessment completed 2 sets of 10 BL shoulder aBd and elbow flex with medium resis theraband; 2 sets of 5 chair raises to improve activity tolerance and strength for t/f's.  -JW (r) CB (t) JW (c)       Row Name 02/19/25 1030          Balance    Balance Assessment sitting static balance;sitting dynamic balance;sit to stand dynamic balance;standing static balance;standing dynamic balance  -JW (r) CB (t) JW (c)     Static Sitting Balance modified independence  -JW (r) CB (t) JW (c)     Dynamic Sitting Balance modified independence  -JW (r) CB (t) JW (c)     Position, Sitting  Balance supported;sitting in chair  -MANOJ (r) CAREN (t) MANOJ (c)     Sit to Stand Dynamic Balance minimal assist;verbal cues  -MANOJ (r) CAREN (t) MANOJ (c)     Static Standing Balance contact guard  -MANOJ (r) CAREN (t) MANOJ (c)     Dynamic Standing Balance contact guard  -MANOJ (r) CAREN (t) MANOJ (c)     Position/Device Used, Standing Balance unsupported  -JW (r) CAREN (t) MANOJ (c)     Balance Interventions sitting;standing;sit to stand;supported;static;dynamic;dynamic reaching;occupation based/functional task  -JW (r) CAREN (t) MANOJ (c)               User Key  (r) = Recorded By, (t) = Taken By, (c) = Cosigned By      Initials Name Provider Type    Germania Arguelles, OTR/L, CSRS Occupational Therapist    Michael Royal, OT Student OT Student                   Goals/Plan    No documentation.                  Clinical Impression       Row Name 02/19/25 1030          Pain Assessment    Pretreatment Pain Rating 0/10 - no pain  -MANOJ (r) CAREN (t) MANOJ (c)     Posttreatment Pain Rating 0/10 - no pain  -MANOJ (r) CAREN (t) MANOJ (c)       Row Name 02/19/25 1030          Plan of Care Review    Plan of Care Reviewed With patient  -MANOJ (r) CAREN (t) MANOJ (c)     Outcome Evaluation OT treatment completed. A&Ox4. No C/O. Pt sitting in chair upon arrival. Pt now on RA with O2 sat remaining in mid 90's throughout session. Sit>stand was min A with verbal cues, stand>sit was CGA. Pt performed 3 grooming tasks, with set up, consecutively while standing to improve activity tolerance. Pt ambulated ~10 ft total to use BSC, was CGA. Pt was CGA-min A to perform toiletry, verbal cues to improve balance by correcting forward leaning posture and excessive BL hip flex while standing when performing perineal hygiene. Completed 2 sets of 10 BL shoulder aBd and elbow flex with medium resis theraband; 2 sets of 5 chair raises to improve activity tolerance and strength for t/f's. Cont OT. Recommend IRF vs SNF at d/c.  -MANOJ (r) CAREN (t) MANOJ (c)       Row Name 02/19/25 1030          Therapy Plan  Review/Discharge Plan (OT)    Anticipated Discharge Disposition (OT) skilled nursing facility;inpatient rehabilitation facility  -JW (r) CB (t) JW (c)       Row Name 02/19/25 1030          Vital Signs    Pre SpO2 (%) 96  -JW (r) CB (t) JW (c)     O2 Delivery Pre Treatment room air  -JW (r) CB (t) JW (c)     Intra SpO2 (%) 94  -JW (r) CB (t) JW (c)     O2 Delivery Intra Treatment room air  -JW (r) CB (t) JW (c)     Post SpO2 (%) 96  -JW (r) CB (t) JW (c)     O2 Delivery Post Treatment room air  -JW (r) CB (t) JW (c)     Pre Patient Position Sitting  -JW (r) CB (t) JW (c)     Intra Patient Position Standing  -JW (r) CB (t) JW (c)     Post Patient Position Sitting  -JW (r) CB (t) JW (c)       Row Name 02/19/25 1030          Positioning and Restraints    Pre-Treatment Position sitting in chair/recliner  -JW (r) CB (t) JW (c)     Post Treatment Position chair  -JW (r) CB (t) JW (c)     In Chair sitting;call light within reach;encouraged to call for assist;patient within staff view  -JW (r) CB (t) JW (c)               User Key  (r) = Recorded By, (t) = Taken By, (c) = Cosigned By      Initials Name Provider Type    Germania Arguelles, OTR/L, CSRS Occupational Therapist    Michael Royal, OT Student OT Student                   Outcome Measures       Row Name 02/19/25 1030          How much help from another is currently needed...    Putting on and taking off regular lower body clothing? 2  -JW (r) CB (t) JW (c)     Bathing (including washing, rinsing, and drying) 2  -JW (r) CB (t) JW (c)     Toileting (which includes using toilet bed pan or urinal) 3  -JW (r) CB (t) JW (c)     Putting on and taking off regular upper body clothing 3  -JW (r) CB (t) JW (c)     Taking care of personal grooming (such as brushing teeth) 3  -JW (r) CB (t) JW (c)     Eating meals 3  -JW (r) CAREN (t) MANOJ (c)     AM-PAC 6 Clicks Score (OT) 16  -MANOJ (r) CAREN (t)       Row Name 02/19/25 1030          Functional Assessment    Outcome Measure Options  AM-PAC 6 Clicks Daily Activity (OT)  -MANOJ (r) CB (t) MANOJ (c)               User Key  (r) = Recorded By, (t) = Taken By, (c) = Cosigned By      Initials Name Provider Type    Germania Arguelles, OTR/L, CSRS Occupational Therapist    Michael Royal, OT Student OT Student                    Occupational Therapy Education       Title: PT OT SLP Therapies (In Progress)       Topic: Occupational Therapy (In Progress)       Point: ADL training (Done)       Description:   Instruct learner(s) on proper safety adaptation and remediation techniques during self care or transfers.   Instruct in proper use of assistive devices.                  Learning Progress Summary            Patient Acceptance, E, VU by CAREN at 2/19/2025 1156    Comment: Role of OT    Acceptance, E, VU by CAREN at 2/17/2025 1517    Comment: Role of OT   Family Acceptance, E, VU by MANOJ at 2/14/2025 1334                      Point: Home exercise program (Not Started)       Description:   Instruct learner(s) on appropriate technique for monitoring, assisting and/or progressing therapeutic exercises/activities.                  Learner Progress:  Not documented in this visit.              Point: Precautions (Done)       Description:   Instruct learner(s) on prescribed precautions during self-care and functional transfers.                  Learning Progress Summary            Patient Acceptance, E, VU by CAREN at 2/19/2025 1156    Comment: Role of OT    Acceptance, E, VU by CAREN at 2/17/2025 1517    Comment: Role of OT   Family Acceptance, E, VU by MANOJ at 2/14/2025 1334                      Point: Body mechanics (Done)       Description:   Instruct learner(s) on proper positioning and spine alignment during self-care, functional mobility activities and/or exercises.                  Learning Progress Summary            Patient Acceptance, E, VU by CAREN at 2/19/2025 1156    Comment: Role of OT    Acceptance, E, VU by CAREN at 2/17/2025 1517    Comment: Role of OT   Family  Acceptance, E, VU by MANOJ at 2/14/2025 1334                                      User Key       Initials Effective Dates Name Provider Type Discipline     11/15/24 -  Germania Su OTR/L, CSRS Occupational Therapist OT    CAREN 12/17/24 -  Michael Heard OT Student OT Student OT                  OT Recommendation and Plan     Plan of Care Review  Plan of Care Reviewed With: patient  Outcome Evaluation: OT treatment completed. A&Ox4. No C/O. Pt sitting in chair upon arrival. Pt now on RA with O2 sat remaining in mid 90's throughout session. Sit>stand was min A with verbal cues, stand>sit was CGA. Pt performed 3 grooming tasks, with set up, consecutively while standing to improve activity tolerance. Pt ambulated ~10 ft total to use BSC, was CGA. Pt was CGA-min A to perform toiletry, verbal cues to improve balance by correcting forward leaning posture and excessive BL hip flex while standing when performing perineal hygiene. Completed 2 sets of 10 BL shoulder aBd and elbow flex with medium resis theraband; 2 sets of 5 chair raises to improve activity tolerance and strength for t/f's. Cont OT. Recommend IRF vs SNF at d/c.     Time Calculation:         Time Calculation- OT       Row Name 02/19/25 1118             Time Calculation- OT    OT Start Time 1030  -JW (r) CB (t) JW (c)      OT Stop Time 1125  -JW (r) CB (t) JW (c)      OT Time Calculation (min) 55 min  -JW (r) CB (t)      Total Timed Code Minutes- OT 55 minute(s)  -JW (r) CAREN (t) JW (c)      OT Received On 02/19/25  -JW         Timed Charges    89933 - OT Therapeutic Exercise Minutes 17  -JW (r) CB (t) JW (c)      80479 - OT Self Care/Mgmt Minutes 38  -JW (r) CB (t) JW (c)         Total Minutes    Timed Charges Total Minutes 55  -JW (r) CB (t)       Total Minutes 55  -JW (r) CB (t)                User Key  (r) = Recorded By, (t) = Taken By, (c) = Cosigned By      Initials Name Provider Type     Germania Su OTR/L, ESDRAS Occupational Therapist    CAREN  Michael Heard, OT Student OT Student                           Michael Heard, OT Student  2/19/2025

## 2025-02-20 ENCOUNTER — READMISSION MANAGEMENT (OUTPATIENT)
Dept: CALL CENTER | Facility: HOSPITAL | Age: 65
End: 2025-02-20
Payer: MEDICARE

## 2025-02-20 VITALS
WEIGHT: 236.11 LBS | DIASTOLIC BLOOD PRESSURE: 59 MMHG | OXYGEN SATURATION: 93 % | SYSTOLIC BLOOD PRESSURE: 110 MMHG | HEART RATE: 62 BPM | TEMPERATURE: 98 F | HEIGHT: 76 IN | RESPIRATION RATE: 18 BRPM | BODY MASS INDEX: 28.75 KG/M2

## 2025-02-20 LAB
ALBUMIN SERPL-MCNC: 2.9 G/DL (ref 3.5–5.2)
ALBUMIN/GLOB SERPL: 0.9 G/DL
ALP SERPL-CCNC: 130 U/L (ref 39–117)
ALT SERPL W P-5'-P-CCNC: 74 U/L (ref 1–41)
ANION GAP SERPL CALCULATED.3IONS-SCNC: 7 MMOL/L (ref 5–15)
AST SERPL-CCNC: 54 U/L (ref 1–40)
BILIRUB SERPL-MCNC: 0.8 MG/DL (ref 0–1.2)
BUN SERPL-MCNC: 31 MG/DL (ref 8–23)
BUN/CREAT SERPL: 24.2 (ref 7–25)
CALCIUM SPEC-SCNC: 8.4 MG/DL (ref 8.6–10.5)
CHLORIDE SERPL-SCNC: 105 MMOL/L (ref 98–107)
CO2 SERPL-SCNC: 21 MMOL/L (ref 22–29)
CREAT SERPL-MCNC: 1.28 MG/DL (ref 0.76–1.27)
EGFRCR SERPLBLD CKD-EPI 2021: 61.7 ML/MIN/1.73
GLOBULIN UR ELPH-MCNC: 3.1 GM/DL
GLUCOSE SERPL-MCNC: 81 MG/DL (ref 65–99)
POTASSIUM SERPL-SCNC: 4.1 MMOL/L (ref 3.5–5.2)
PROT SERPL-MCNC: 6 G/DL (ref 6–8.5)
SODIUM SERPL-SCNC: 133 MMOL/L (ref 136–145)

## 2025-02-20 PROCEDURE — 80053 COMPREHEN METABOLIC PANEL: CPT | Performed by: FAMILY MEDICINE

## 2025-02-20 PROCEDURE — 99231 SBSQ HOSP IP/OBS SF/LOW 25: CPT | Performed by: INTERNAL MEDICINE

## 2025-02-20 PROCEDURE — 97116 GAIT TRAINING THERAPY: CPT

## 2025-02-20 RX ADMIN — AMLODIPINE BESYLATE 10 MG: 10 TABLET ORAL at 08:29

## 2025-02-20 RX ADMIN — METOPROLOL TARTRATE 25 MG: 25 TABLET, FILM COATED ORAL at 08:29

## 2025-02-20 RX ADMIN — CHLORHEXIDINE GLUCONATE 1 APPLICATION: 500 CLOTH TOPICAL at 08:29

## 2025-02-20 RX ADMIN — ASPIRIN 81 MG: 81 TABLET, COATED ORAL at 08:29

## 2025-02-20 RX ADMIN — Medication 10 ML: at 08:30

## 2025-02-20 NOTE — THERAPY TREATMENT NOTE
Acute Care - Physical Therapy Treatment Note  [facility]     Patient Name: Chaitanya Hair  : 1960  MRN: [id number]  Today's Date: 2025      Visit Dx:     ICD-10-CM ICD-9-CM   1. Acute renal failure, unspecified acute renal failure type  N17.9 584.9   2. Influenza A  J10.1 487.1   3. Pneumonia due to infectious organism, unspecified laterality, unspecified part of lung  J18.9 486   4. Impaired mobility [Z74.09]  Z74.09 799.89   5. Dysphagia, unspecified type  R13.10 787.20   6. Lobar pneumonia  J18.1 481   7. Acute respiratory failure with hypoxia  J96.01 518.81   8. Intensive care (ICU) myopathy  G72.81 359.81     Patient Active Problem List   Diagnosis    Coronary artery disease involving native coronary artery of native heart without angina pectoris    Hypertension    Nicotine abuse    Old inferior wall myocardial infarction    Presence of stent in right coronary artery    Tobacco abuse    CAD (coronary artery disease)    Dysphagia, oropharyngeal    Dysphonia    Hoarseness    Vocal cord weakness    Dysarthria    Personal history of adenomatous and serrated colon polyps    HLD (hyperlipidemia)    Acute renal failure    Acute respiratory failure with hypoxia    Influenza A    Lobar pneumonia     Past Medical History:   Diagnosis Date    Abnormal stress echo 3/25/19-[facility]    Noted For Myocardial Ischemia    Acute MI     Atelectasis 2019    Noted on Chest XR    CAD (coronary artery disease) Since     w/R Stent Placed    Chest pain due to CAD     Nitro PRN    DM (diabetes mellitus)     false positive    Elevated cholesterol     History of chest x-ray 2019    Suggestive For Atelectasis w/Chronic Inflammatory Scarring Noted    History of EKG 3/25/19-[facility]    Sinus Rhythm w/Nonspecific ST-T Wave Abnormalities    History of nephrolithiasis     History of stress test 2015    WNL    HLD (hyperlipidemia)     Controlled w/Meds    Hypertension     Controlled w/Meds     Kidney stones     S/p Lithotripsy    LAD stenosis 04/11/2019    90% Mid LAD Stenosis; Diffuse Stenosis of LCX--Noted on Cardiac Cath    Myocardial ischemia 03/25/2019    Noted on Echo Stress Test    RCA occlusion 04/11/2019    Mild Diffuse Disease--Noted on Cardiac Cath     Tobacco use     1.5-1 PPD     Past Surgical History:   Procedure Laterality Date    ABDOMINAL SURGERY  2023    APPENDECTOMY  2023    BACK SURGERY  2013    CARDIAC CATHETERIZATION  2009    RCA Stent    CARDIAC CATHETERIZATION  04/11/2019-[facility]    w/L Ventriculogram/Angiography--Dr. Garcia--CAD Involving LAD    COLONOSCOPY N/A 10/16/2024    Procedure: COLONOSCOPY WITH ANESTHESIA;  Surgeon: Nathaly Nino MD;  Location:  [facility];  Service: Gastroenterology;  Laterality: N/A;  pre screening  post diverticulosis; polyp  pcp  Mason Peguero MD    CORONARY ANGIOPLASTY WITH STENT PLACEMENT Right 2009    CORONARY ARTERY BYPASS GRAFT N/A 04/16/2019    Procedure: MEDIAN STERNOTOMY, CORONARY ARTERY BYPASS X2 USING LEFT INTERNAL MAMMARY ARTERY GRAFT, PRP, INTRAOP LAURI;  Surgeon: Jesse Perez MD;  Location: [facility] OR;  Service: Cardiothoracic    CYSTOSCOPY W/ LASER LITHOTRIPSY       PT Assessment (Last 12 Hours)       PT Evaluation and Treatment       Row Name 02/20/25 0837          Physical Therapy Time and Intention    Subjective Information no complaints  -WK     Document Type therapy note (daily note)  -WK     Mode of Treatment physical therapy  -WK       Row Name 02/20/25 0837          General Information    Existing Precautions/Restrictions fall  -WK       Row Name 02/20/25 0837          Pain    Pretreatment Pain Rating 0/10 - no pain  -WK     Posttreatment Pain Rating 0/10 - no pain  -WK       Row Name 02/20/25 0837          Bed Mobility    Comment, (Bed Mobility) in chair  -WK       Row Name 02/20/25 0837          Sit-Stand Transfer    Sit-Stand [place] (Transfers) contact guard  -WK       Row Name 02/20/25  0837          Stand-Sit Transfer    Stand-Sit [employer] (Transfers) contact guard  -WK       Row Name 02/20/25 0837          Gait/Stairs (Locomotion)    [employer] Level (Gait) verbal cues;minimum assist (75% patient effort)  -WK     Assistive Device (Gait) --  unsteadiness with multiple LOB  -WK     Distance in Feet (Gait) 150  -WK     Comment, (Gait/Stairs) amb 100' with RW SBA with no LOB. Educated on safety and using rollator at home. Pt stated he felt more stable with the RW and will use rollator at home.  -WK       Row Name 02/20/25 0837          Balance    Static Standing Balance contact guard  -WK     Dynamic Standing Balance verbal cues;contact guard  -WK     Position/Device Used, Standing Balance walker, front-wheeled  -WK     Comment, Balance turns R and L  -WK       Row Name 02/20/25 0837          Plan of Care Review    Plan of Care Reviewed With patient  -WK     Progress improving  -WK     Outcome Evaluation Pt performed sit to stand CGA and amb 150' min A with no RW. Pt had some LOB requiring min A and reaching for objects in ayala. Pt was given a RW for stability and amb 100' SBA. Pt stated he has rollator at home and would use to improve his stability. If d/c home, would recommond use of rollator for safety. Pt performed balance exercises CGA-min with Rw and SBA with RW.  -WK       Row Name 02/20/25 0837          Positioning and Restraints    Pre-Treatment Position sitting in chair/recliner  -WK     Post Treatment Position chair  -WK     In Chair sitting;call light within reach;encouraged to call for assist;notified nsg  -WK               User Key  (r) = Recorded By, (t) = Taken By, (c) = Cosigned By      Initials Name Provider Type    WK Elizabeth Galindo PTA Physical Therapist Assistant                    Physical Therapy Education       Title: PT OT SLP Therapies (Done)       Topic: Physical Therapy (Done)       Point: Mobility training (Done)       Learning Progress Summary            Patient  Acceptance, E, VU by SC at 2/20/2025 0938    Comment: pt states he has rollator walker at home to see dr morfin in one week to discuss if will need outpt therapy    Acceptance, E, DU by  at 2/18/2025 1101    Comment: bed mobility    Acceptance, E, DU by  at 2/17/2025 1115    Comment: benefits of activity    Acceptance, E, VU by  at 2/11/2025 1059    Comment: Bed mobility techinques, plan of care, home safety     by  at 2/11/2025 1058                      Point: Home exercise program (Done)       Learning Progress Summary            Patient Acceptance, E, VU by SC at 2/20/2025 0938    Comment: pt states he has rollator walker at home to see dr morfin in one week to discuss if will need outpt therapy                      Point: Body mechanics (Done)       Learning Progress Summary            Patient Acceptance, E, VU by SC at 2/20/2025 0938    Comment: pt states he has rollator walker at home to see dr morfin in one week to discuss if will need outpt therapy    Acceptance, E, VU by  at 2/11/2025 1059    Comment: Bed mobility techinques, plan of care, home safety     by  at 2/11/2025 1058                      Point: Precautions (Done)       Learning Progress Summary            Patient Acceptance, E, VU by SC at 2/20/2025 0938    Comment: pt states he has rollator walker at home to see dr morfin in one week to discuss if will need outpt therapy    Acceptance, E, VU by  at 2/11/2025 1059    Comment: Bed mobility techinques, plan of care, home safety     by  at 2/11/2025 1058                                      User Key       Initials Effective Dates Name Provider Type Discipline     02/03/23 -  Brit Clancy PTA Physical Therapist Assistant PT    SC 06/16/21 -  Adrianne Paul RN Registered Nurse Nurse     12/17/24 -  Hernandez Reyes PT Student PT Student PT                  PT Recommendation and Plan     Plan of Care Reviewed With: patient  Progress: improving  Outcome  Evaluation: Pt performed sit to stand CGA and amb 150' min A with no RW. Pt had some LOB requiring min A and reaching for objects in ayala. Pt was given a RW for stability and amb 100' SBA. Pt stated he has rollator at home and would use to improve his stability. If d/c home, would recommond use of rollator for safety. Pt performed balance exercises CGA-min with Rw and SBA with RW.   Outcome Measures       Row Name 02/18/25 1100 02/17/25 1100          How much help from another person do you currently need...    Turning from your back to your side while in flat bed without using bedrails? 3  -SAVANAH 3  -SAVANAH     Moving from lying on back to sitting on the side of a flat bed without bedrails? 3  -SAVANAH 3  -SAVANAH     Moving to and from a bed to a chair (including a wheelchair)? 3  -SAVANAH 3  -SAVANAH     Standing up from a chair using your arms (e.g., wheelchair, bedside chair)? 3  -SAVANAH 3  -SAVANAH     Climbing 3-5 steps with a railing? 2  -SAVANAH 2  -SAVANAH     To walk in hospital room? 3  -SAVANAH 2  -SAVANAH     AM-PAC 6 Clicks Score (PT) 17  -SAVANAH 16  -SAVANAH               User Key  (r) = Recorded By, (t) = Taken By, (c) = Cosigned By      Initials Name Provider Type    Brit Luna PTA Physical Therapist Assistant                     Time Calculation:    PT Charges       Row Name 02/20/25 0951             Time Calculation    Start Time 0837  -WK      Stop Time 0915  -WK      Time Calculation (min) 38 min  -WK      PT Received On 02/20/25  -WK         Time Calculation- PT    Total Timed Code Minutes- PT 38 minute(s)  -WK         Timed Charges    42115 - Gait Training Minutes  38  -WK         Total Minutes    Timed Charges Total Minutes 38  -WK       Total Minutes 38  -WK                User Key  (r) = Recorded By, (t) = Taken By, (c) = Cosigned By      Initials Name Provider Type    WK Elizabeth Galindo PTA Physical Therapist Assistant                  Therapy Charges for Today       Code Description Service Date Service Provider Modifiers Qty     65279191172 HC GAIT TRAINING EA 15 MIN 2/19/2025 Elizabeth Galindo, ANDRZEJ GP 2    96251044730 HC GAIT TRAINING EA 15 MIN 2/20/2025 Elizabeth Galindo, ANDRZEJ GP 3            PT G-Codes  Outcome Measure Options: AM-PAC 6 Clicks Daily Activity (OT)  AM-PAC 6 Clicks Score (PT): 17  AM-PAC 6 Clicks Score (OT): 16    Elizabeth Galindo PTA  2/20/2025

## 2025-02-20 NOTE — DISCHARGE SUMMARY
Hospital Discharge Summary    Chaitanya Hair  :  1960  MRN:  [id number]    Admit date:  2/10/2025  Discharge date: 2025    Admitting Physician:    No admitting provider for patient encounter.    Discharge Diagnoses:      Acute respiratory failure with hypoxia    Nicotine abuse    CAD (coronary artery disease)    Acute renal failure    Influenza A    Lobar pneumonia      Hospital Course:   64-year-old gentleman with long smoking history presented with influenza A and worsening respiratory status.  Initially was only mildly hypoxic but developed acute respiratory failure with severe hypoxia requiring near maximal BiPAP and transferred to the ICU.  He also developed encephalopathy that was initially thought to be metabolic encephalopathy but then determined to be partially drug-induced by Tamiflu.  He improved with discontinuation of Tamiflu.  He was also treated for secondary bacterial pneumonia with elevated procalcitonin and completed a full course of Rocephin and azithromycin.  He had a prolonged hospitalization and developed significant weakness for prolonged time in the ICU on BiPAP.  He remarkably improved and felt comfortable to go home with outpatient rehab and he will be referred to outpatient physical therapy for strengthening and rehabilitation.    The patient was admitted for the above noted medical/surgical issues. Please see daily progress note for further details concerning their stay. The patient improved throughout their stay and reached maximum medical improvement on the day of discharge. The patient/family agree with the treatment plan as outlined above. All questions concerning their stay were answered prior to discharge. They understand the importance of follow up concerning any abnormal test results.     Physical Exam  Constitutional:       Appearance: He is well-developed. He is not ill-appearing.   HENT:      Head: Normocephalic and atraumatic.   Eyes:       Conjunctiva/sclera: Conjunctivae normal.      Pupils: Pupils are equal, round, and reactive to light.   Cardiovascular:      Rate and Rhythm: Normal rate and regular rhythm.      Heart sounds: Normal heart sounds. No murmur heard.     No friction rub.   Pulmonary:      Effort: No respiratory distress.      Breath sounds: No wheezing, rhonchi or rales.      Comments: Wheezing and aeration improved  Abdominal:      General: Bowel sounds are normal. There is no distension.      Palpations: Abdomen is soft.      Tenderness: There is no abdominal tenderness.   Musculoskeletal:         General: Normal range of motion.      Cervical back: Normal range of motion.   Skin:     Capillary Refill: Capillary refill takes less than 2 seconds.   Neurological:      Mental Status: He is alert and oriented to person, place, and time.      Cranial Nerves: No cranial nerve deficit.   Psychiatric:         Behavior: Behavior normal.           Discharge Medications:         Discharge Medications        Continue These Medications        Instructions Start Date   amLODIPine 10 MG tablet  Commonly known as: NORVASC   1 tablet, Oral, Daily      aspirin 81 MG EC tablet   81 mg, Oral, Daily      ezetimibe 10 MG tablet  Commonly known as: ZETIA   10 mg, Daily      lisinopril-hydrochlorothiazide 20-12.5 MG per tablet  Commonly known as: PRINZIDE,ZESTORETIC   2 tablets, Oral, Daily      metoprolol tartrate 50 MG tablet  Commonly known as: LOPRESSOR   50 mg, Oral, 2 Times Daily      rosuvastatin 40 MG tablet  Commonly known as: CRESTOR   40 mg, Oral, Daily               Activity: Up with assist    Diet: Ad juancarlos.    Consults: Pulmonology, intensivist    Significant Diagnostic Studies:    CT Angiogram Chest    Result Date: 2/12/2025  1. Interval progression of diffuse groundglass and nodular infiltrates with increasing consolidation of the lower lobes greater on the right compatible with acute pneumonia, atypical etiologies should be considered. No  evidence of PE is identified.    This report was signed and finalized on 2/12/2025 3:59 PM by Dr. Baltazar Ulloa MD.      CT Head Without Contrast    Result Date: 2/12/2025  1. Chronic changes and no acute intracranial findings.  This report was signed and finalized on 2/12/2025 3:49 PM by Bernard Stubbs.            Treatments:   IV fluids, IV antibiotics, BiPAP, oxygen support    Disposition:   Home in stable condition with outpatient physical therapy    36 minutes time spent on discharge including discussion with patient/family, SW, and coordination of care.     Follow up with Mason Peguero MD in 1 weeks.    Signed:  Mason Peguero MD   2/20/2025, 08:10 CST

## 2025-02-20 NOTE — OUTREACH NOTE
Prep Survey      Flowsheet Row Responses   [facility] facility patient discharged from? [facility]   Is LACE score < 7 ? No   Eligibility Readm Mgmt   Discharge diagnosis Influenza A  Pneumonia due to infectious organism,Acute renal failure   Does the patient have one of the following disease processes/diagnoses(primary or secondary)? Pneumonia   Does the patient have Home health ordered? No   Is there a DME ordered? No   Prep survey completed? Yes            JENNY VALVERDE - Registered Nurse

## 2025-02-20 NOTE — PLAN OF CARE
Goal Outcome Evaluation:  Plan of Care Reviewed With: patient        Progress: improving  Outcome Evaluation: Pt performed sit to stand CGA and amb 150' min A with no RW. Pt had some LOB requiring min A and reaching for objects in ayala. Pt was given a RW for stability and amb 100' SBA. Pt stated he has rollator at home and would use to improve his stability. If d/c home, recommend use of rollator for safety. Pt performed balance exercises CGA-min with Rw and SBA with RW.

## 2025-02-20 NOTE — THERAPY DISCHARGE NOTE
Acute Care - Physical Therapy Discharge Summary  [facility]       Patient Name: Chaitanya Hair  : 1960  MRN: [id number]    Today's Date: 2025                 Admit Date: 2/10/2025      PT Recommendation and Plan    Visit Dx:    ICD-10-CM ICD-9-CM   1. Acute renal failure, unspecified acute renal failure type  N17.9 584.9   2. Influenza A  J10.1 487.1   3. Pneumonia due to infectious organism, unspecified laterality, unspecified part of lung  J18.9 486   4. Impaired mobility [Z74.09]  Z74.09 799.89   5. Dysphagia, unspecified type  R13.10 787.20   6. Lobar pneumonia  J18.1 481   7. Acute respiratory failure with hypoxia  J96.01 518.81   8. Intensive care (ICU) myopathy  G72.81 359.81        Outcome Measures       Row Name 25 1100             How much help from another person do you currently need...    Turning from your back to your side while in flat bed without using bedrails? 3  -SAVANAH      Moving from lying on back to sitting on the side of a flat bed without bedrails? 3  -SAVANAH      Moving to and from a bed to a chair (including a wheelchair)? 3  -SAVANAH      Standing up from a chair using your arms (e.g., wheelchair, bedside chair)? 3  -SAVANAH      Climbing 3-5 steps with a railing? 2  -SAVANAH      To walk in hospital room? 3  -SAVANAH      AM-PAC 6 Clicks Score (PT) 17  -SAVANAH                User Key  (r) = Recorded By, (t) = Taken By, (c) = Cosigned By      Initials Name Provider Type    Brit Luna PTA Physical Therapist Assistant                     PT Charges       Row Name 25 0951             Time Calculation    Start Time 0837  -WK      Stop Time 0915  -WK      Time Calculation (min) 38 min  -WK      PT Received On 25  -WK         Time Calculation- PT    Total Timed Code Minutes- PT 38 minute(s)  -WK         Timed Charges    81768 - Gait Training Minutes  38  -WK         Total Minutes    Timed Charges Total Minutes 38  -WK       Total Minutes 38  -WK                User Key  (r) =  Recorded By, (t) = Taken By, (c) = Cosigned By      Initials Name Provider Type    Elizabeth Vasquez PTA Physical Therapist Assistant                     PT Rehab Goals       Row Name 02/20/25 1549             Bed Mobility Goal 1 (PT)    Activity/Assistive Device (Bed Mobility Goal 1, PT) rolling to left;rolling to right;sidelying to sit;sit to sidelying;sit to supine;supine to sit  -LY      [place] Level/Cues Needed (Bed Mobility Goal 1, PT) supervision required;verbal cues required;nonverbal cues (demo/gesture) required  -LY      Time Frame (Bed Mobility Goal 1, PT) long term goal (LTG);by discharge  -LY      Progress/Outcomes (Bed Mobility Goal 1, PT) goal not met  -LY         Transfer Goal 1 (PT)    Activity/Assistive Device (Transfer Goal 1, PT) sit-to-stand/stand-to-sit;bed-to-chair/chair-to-bed  -LY      [place] Level/Cues Needed (Transfer Goal 1, PT) standby assist  -LY      Time Frame (Transfer Goal 1, PT) long term goal (LTG);by discharge  -LY      Progress/Outcome (Transfer Goal 1, PT) goal not met  -LY         Gait Training Goal 1 (PT)    Activity/Assistive Device (Gait Training Goal 1, PT) gait (walking locomotion);decrease fall risk;improve balance and speed;increase endurance/gait distance  -LY      [place] Level (Gait Training Goal 1, PT) independent  -LY      Distance (Gait Training Goal 1, PT) 50  -LY      Time Frame (Gait Training Goal 1, PT) long term goal (LTG);by discharge  -LY      Progress/Outcome (Gait Training Goal 1, PT) goal not met  -LY         Balance Goal 1 (PT)    Activity/Assistive Device (Balance Goal) sitting static balance;sitting dynamic balance;unsupported;with functional reaching activities;with functional mobility activities  -LY      [place] Level/Cues Needed (Balance Goal 1, PT) supervision required  -LY      Time Frame (Balance Goal 1, PT) long-term goal (LTG);by discharge  -LY      Progress/Outcomes (Balance Goal 1, PT) goal not met  -LY                 User Key  (r) = Recorded By, (t) = Taken By, (c) = Cosigned By      Initials Name Provider Type Discipline    LY Holly Patten, PTA Physical Therapist Assistant PT                        PT Discharge Summary  Anticipated Discharge Disposition (PT): home with assist, skilled nursing facility  Reason for Discharge: Discharge from facility  Outcomes Achieved: Refer to plan of care for updates on goals achieved  Discharge Destination: Home with assist      Holly Patten PTA   2/20/2025

## 2025-02-20 NOTE — PROGRESS NOTES
[facility] [place] PULMONARY AND CRITICAL CARE PROGRESS NOTE - [facility]    Patient: Chaitanya Hair  1960   MR# [id number]   Acct# [id number]  02/20/25   08:41 CST  Referring Provider: Mason Peguero MD    Chief Complaint: He feels much better, with no new complaints   Interval history: he feels much better, feels like he is thinking clearly.  No fever, on room air  Meds:  amLODIPine, 10 mg, Oral, Daily  aspirin, 81 mg, Oral, Daily  Chlorhexidine Gluconate Cloth, 1 Application, Topical, Daily  enoxaparin, 40 mg, Subcutaneous, Q24H  metoprolol tartrate, 25 mg, Oral, BID  nicotine, 1 patch, Transdermal, Q24H  polyethylene glycol, 17 g, Oral, Daily  sodium chloride, 10 mL, Intravenous, Q12H         Physical Exam:  SpO2 Percentage    02/19/25 2000 02/19/25 2030 02/20/25 0400   SpO2: 94% 94% 93%     Body mass index is 28.74 kg/m².   Temp:  [97.6 °F (36.4 °C)-97.8 °F (36.6 °C)] 97.8 °F (36.6 °C)  Heart Rate:  [51-72] 62  BP: (106-132)/(60-73) 132/62  Intake/Output Summary (Last 24 hours) at 2/20/2025 0841  Last data filed at 2/20/2025 0400  Gross per 24 hour   Intake --   Output 500 ml   Net -500 ml     Physical Exam  Pulmonary:      Effort: Pulmonary effort is normal. No respiratory distress.      Breath sounds: No wheezing or rhonchi.       Laboratory Data:  Results from last 7 days   Lab Units 02/17/25  0416 02/16/25  0406 02/15/25  0331   WBC 10*3/mm3 7.33 9.10 6.55   HEMOGLOBIN g/dL 14.4 15.0 14.8   PLATELETS 10*3/mm3 150 167 157     Results from last 7 days   Lab Units 02/20/25  0236 02/19/25  0351 02/18/25  0128   SODIUM mmol/L 133* 141 148*   POTASSIUM mmol/L 4.1 4.0 4.5   BUN mg/dL 31* 34* 38*   CREATININE mg/dL 1.28* 1.12 1.34*     Results from last 7 days   Lab Units 02/15/25  0104 02/14/25  0042 02/13/25  1312   PH, ARTERIAL pH units 7.405 7.386 7.377   PCO2, ARTERIAL mm Hg 42.7 46.1* 45.1*   PO2 ART mm Hg 142.0* 107.0 95.8   FIO2 % 70 70 70     Microbiology Results (last 10 days)        Procedure Component Value - Date/Time    MRSA Screen, PCR (Inpatient) - Swab, Nares [[id number]]  (Normal) Collected: 02/12/25 1235    Lab Status: Final result Specimen: Swab from Nares Updated: 02/12/25 1450     MRSA PCR No MRSA Detected    Narrative:      The negative predictive value of this diagnostic test is high and should only be used to consider de-escalating anti-MRSA therapy. A positive result may indicate colonization with MRSA and must be correlated clinically.    S. Pneumo Ag Urine or CSF - Urine, Urine, Clean Catch [[id number]]  (Normal) Collected: 02/12/25 0341    Lab Status: Final result Specimen: Urine, Clean Catch Updated: 02/12/25 1235     Strep Pneumo Ag Negative    Legionella Antigen, Urine - Urine, Urine, Clean Catch [[id number]]  (Normal) Collected: 02/12/25 0341    Lab Status: Final result Specimen: Urine, Clean Catch Updated: 02/12/25 1235     LEGIONELLA ANTIGEN, URINE Negative    Respiratory Panel PCR w/COVID-19(SARS-CoV-2) SIMIN/ALVERTO/MYRA/PAD/COR/MIK In-House, NP Swab in UTM/VTM, 2 HR TAT - Swab, Nasopharynx [[id number]]  (Abnormal) Collected: 02/10/25 1013    Lab Status: Final result Specimen: Swab from Nasopharynx Updated: 02/10/25 1136     ADENOVIRUS, PCR Not Detected     Coronavirus 229E Not Detected     Coronavirus HKU1 Not Detected     Coronavirus NL63 Not Detected     Coronavirus OC43 Not Detected     COVID19 Not Detected     Human Metapneumovirus Not Detected     Human Rhinovirus/Enterovirus Not Detected     Influenza A H1 2009 PCR Detected     Influenza B PCR Not Detected     Parainfluenza Virus 1 Not Detected     Parainfluenza Virus 2 Not Detected     Parainfluenza Virus 3 Not Detected     Parainfluenza Virus 4 Not Detected     RSV, PCR Not Detected     Bordetella pertussis pcr Not Detected     Bordetella parapertussis PCR Not Detected     Chlamydophila pneumoniae PCR Not Detected     Mycoplasma pneumo by PCR Not Detected    Narrative:      In the setting of a positive respiratory panel  with a viral infection PLUS a negative procalcitonin without other underlying concern for bacterial infection, consider observing off antibiotics or discontinuation of antibiotics and continue supportive care. If the respiratory panel is positive for atypical bacterial infection (Bordetella pertussis, Chlamydophila pneumoniae, or Mycoplasma pneumoniae), consider antibiotic de-escalation to target atypical bacterial infection.    Blood Culture - Blood, Arm, Right [[id number]]  (Normal) Collected: 02/10/25 1013    Lab Status: Final result Specimen: Blood from Arm, Right Updated: 02/15/25 1030     Blood Culture No growth at 5 days    Blood Culture - Blood, Arm, Left [[id number]]  (Normal) Collected: 02/10/25 1013    Lab Status: Final result Specimen: Blood from Arm, Left Updated: 02/15/25 1030     Blood Culture No growth at 5 days          Recent films:  No radiology results for the last day    Pulmonary Assessment:  Influenza better  Encephalopathy better  Mild hyponatremia of doubtful significance    Recommend:   Continue supportive care  Mobilize  Anticipate home.  Pt still in ICU because no beds on floor. Could end up being discharged from ICU.    Electronically signed by Jm Galindo MD, 02/20/25, 08:41 CST

## 2025-02-20 NOTE — THERAPY DISCHARGE NOTE
Acute Care - Occupational Therapy Discharge Summary  [facility]     Patient Name: Chaitanya Hair  : 1960  MRN: [id number]    Today's Date: 2025                 Admit Date: 2/10/2025        OT Recommendation and Plan    Visit Dx:    ICD-10-CM ICD-9-CM   1. Acute renal failure, unspecified acute renal failure type  N17.9 584.9   2. Influenza A  J10.1 487.1   3. Pneumonia due to infectious organism, unspecified laterality, unspecified part of lung  J18.9 486   4. Impaired mobility [Z74.09]  Z74.09 799.89   5. Dysphagia, unspecified type  R13.10 787.20   6. Lobar pneumonia  J18.1 481   7. Acute respiratory failure with hypoxia  J96.01 518.81   8. Intensive care (ICU) myopathy  G72.81 359.81                OT Rehab Goals       Row Name 25 0800             Bathing Goal 1 (OT)    Activity/Device (Bathing Goal 1, OT) bathing skills, all  -      [place] Level/Cues Needed (Bathing Goal 1, OT) minimum assist (75% or more patient effort)  -      Time Frame (Bathing Goal 1, OT) long term goal (LTG);by discharge  -      Progress/Outcomes (Bathing Goal 1, OT) goal not met;discharged from facility  -         Dressing Goal 1 (OT)    Activity/Device (Dressing Goal 1, OT) dressing skills, all  -      [place]/Cues Needed (Dressing Goal 1, OT) minimum assist (75% or more patient effort)  -      Time Frame (Dressing Goal 1, OT) long term goal (LTG);by discharge  -      Progress/Outcome (Dressing Goal 1, OT) goal not met;discharged from facility  -         Toileting Goal 1 (OT)    Activity/Device (Toileting Goal 1, OT) toileting skills, all  -      [place] Level/Cues Needed (Toileting Goal 1, OT) minimum assist (75% or more patient effort)  -      Time Frame (Toileting Goal 1, OT) long term goal (LTG);by discharge  -      Progress/Outcome (Toileting Goal 1, OT) goal met;discharged from facility  -                User Key  (r) = Recorded By, (t) = Taken By, (c) = Cosigned By       Initials Name Provider Type Discipline     Germania Su OTERIN/L, CSRS Occupational Therapist OT                     Outcome Measures       Row Name 02/18/25 1100 02/17/25 1100          How much help from another person do you currently need...    Turning from your back to your side while in flat bed without using bedrails? 3  -SAVANAH 3  -SAVANAH     Moving from lying on back to sitting on the side of a flat bed without bedrails? 3  -SAVANAH 3  -SAVANAH     Moving to and from a bed to a chair (including a wheelchair)? 3  -SAVANAH 3  -SAVANAH     Standing up from a chair using your arms (e.g., wheelchair, bedside chair)? 3  -SAVANAH 3  -SAVANAH     Climbing 3-5 steps with a railing? 2  -SAVANAH 2  -SAVANAH     To walk in hospital room? 3  -SAVANAH 2  -SAVANAH     AM-PAC 6 Clicks Score (PT) 17  -SAVANAH 16  -SAVANAH               User Key  (r) = Recorded By, (t) = Taken By, (c) = Cosigned By      Initials Name Provider Type    Brit Luna, PTA Physical Therapist Assistant                    Timed Therapy Charges  Total Units: 4      Suggested Charges  Total Units: 4      Procedure Name Documented Minutes Units Code    HC OT SELF CARE/MGMT/TRAIN EA 15 MIN 38 3   84717 (CPT®)      HC OT THER PROC EA 15 MIN 17 1   39247 (CPT®)                 Documented Minutes  Total Minutes: 55      Therapy Provided Minutes    98305 - OT Self Care/Mgmt Minutes 38    39783 - OT Therapeutic Exercise Minutes 17                    Therapy Charges for Today       Code Description Service Date Service Provider Modifiers Qty    15042389019 HC OT THER PROC EA 15 MIN 2/19/2025 Germania Su OTR/L, CSRS GO 1    24114637453 HC OT SELF CARE/MGMT/TRAIN EA 15 MIN 2/19/2025 Germania Su OTR/L, CSRS GO 3            OT Discharge Summary  Anticipated Discharge Disposition (OT): skilled nursing facility, inpatient rehabilitation facility  Reason for Discharge: Discharge from facility  Outcomes Achieved: Refer to plan of care for updates on goals achieved  Discharge Destination: Home  with assist, Home with outpatient services      Germania Su, OTR/L, CSRS  2/20/2025

## 2025-02-27 ENCOUNTER — READMISSION MANAGEMENT (OUTPATIENT)
Dept: CALL CENTER | Facility: HOSPITAL | Age: 65
End: 2025-02-27
Payer: MEDICARE

## 2025-02-27 ENCOUNTER — TRANSCRIBE ORDERS (OUTPATIENT)
Dept: ADMINISTRATIVE | Facility: HOSPITAL | Age: 65
End: 2025-02-27
Payer: MEDICARE

## 2025-02-27 DIAGNOSIS — J44.9 CHRONIC OBSTRUCTIVE PULMONARY DISEASE, UNSPECIFIED COPD TYPE: Primary | ICD-10-CM

## 2025-02-27 NOTE — OUTREACH NOTE
COPD/PN Week 1 Survey      Flowsheet Row Responses   [facility]on facility patient discharged from? [facility]   Does the patient have one of the following disease processes/diagnoses(primary or secondary)? Pneumonia   Week 1 attempt successful? No   Unsuccessful attempts Attempt 1            Melanie Gomez Registered Nurse

## 2025-03-04 ENCOUNTER — READMISSION MANAGEMENT (OUTPATIENT)
Dept: CALL CENTER | Facility: HOSPITAL | Age: 65
End: 2025-03-04
Payer: MEDICARE

## 2025-03-04 NOTE — OUTREACH NOTE
COPD/PN Week 1 Survey      Flowsheet Row Responses   Protestant facility patient discharged from? [facility]   Does the patient have one of the following disease processes/diagnoses(primary or secondary)? Pneumonia   Week 1 attempt successful? No   Unsuccessful attempts Attempt 2            WILLIAM ORTIZ - Registered Nurse

## 2025-03-13 ENCOUNTER — READMISSION MANAGEMENT (OUTPATIENT)
Dept: CALL CENTER | Facility: HOSPITAL | Age: 65
End: 2025-03-13
Payer: MEDICARE

## 2025-03-13 NOTE — OUTREACH NOTE
COPD/PN Week 3 Survey      Flowsheet Row Responses   Latter day facility patient discharged from? [place]   Does the patient have one of the following disease processes/diagnoses(primary or secondary)? Pneumonia   Week 3 attempt successful? No   Unsuccessful attempts Attempt 2  [attempted pt and spouse]   Revoke Other  [attempted x 3]            MARCELLO OLVERA - Registered Nurse

## 2025-03-19 NOTE — PROGRESS NOTES
Chief Complaint  Acute Respiratory Failure    Subjective    History of Present Illness {  Problem List  Visit Diagnosis   Encounters  Notes  Medications  Labs  Result Review Imaging  Media     Chaitanya Hair presents to [facility] PULMONARY & CRITICAL CARE MEDICINE for:    History of Present Illness  Mr. Hair is here for hospital follow up. He was admitted to [facility] in February 2025 with influenza A. He was also treated for a secondary bacterial pneumonia and required high flow oxygen/BiPAP therapy due to severe respiratory failure. He required icu care.  PFT completed recently was normal with mildly reduced DLCO.  Unfortunately he continues to smoke. FVL in 2019 normal.  He tells me he has done well since discharge from the hospital.  He has been his normal self per his report.  He feels like his breathing has been okay.  He has had a lot of asbestos exposure in the past.  He worked as a .       Prior to Admission medications    Medication Sig Start Date End Date Taking? Authorizing Provider   amLODIPine (NORVASC) 10 MG tablet Take 1 tablet by mouth Daily. 8/26/24   Stephany Durham MD   aspirin 81 MG EC tablet Take 1 tablet by mouth Daily. 4/21/19   Briana Gibson APRN   ezetimibe (ZETIA) 10 MG tablet Take 1 tablet by mouth Daily.    Stephany Durham MD   lisinopril-hydrochlorothiazide (PRINZIDE,ZESTORETIC) 20-12.5 MG per tablet Take 2 tablets by mouth Daily. 6/25/24   Stephany Durham MD   metoprolol tartrate (LOPRESSOR) 50 MG tablet Take 1 tablet by mouth 2 (Two) Times a Day. 4/20/19   Briana Gibson APRN   rosuvastatin (CRESTOR) 40 MG tablet Take 1 tablet by mouth Daily.    Stephany Durham MD       Social History     Socioeconomic History    Marital status:    Tobacco Use    Smoking status: Every Day     Current packs/day: 0.50     Average packs/day: 0.7 packs/day for 60.0 years (40.0 ttl pk-yrs)     Types: Cigarettes     Passive  "exposure: Current    Smokeless tobacco: Never    Tobacco comments:     Smokes about 0.5 pack a day 3/25/25   Vaping Use    Vaping status: Never Used   Substance and Sexual Activity    Alcohol use: Yes     Comment: Maybe once a month    Drug use: No    Sexual activity: Yes     Partners: Female       Objective   Vital Signs:   /78   Pulse 65   Ht 193 cm (76\")   Wt 111 kg (244 lb 6.4 oz)   SpO2 98% Comment: RA  BMI 29.75 kg/m²     Physical Exam  Constitutional:       General: He is not in acute distress.  HENT:      Head: Normocephalic.      Nose: Nose normal.      Mouth/Throat:      Mouth: Mucous membranes are moist.   Eyes:      General: No scleral icterus.  Cardiovascular:      Rate and Rhythm: Normal rate.   Pulmonary:      Effort: No respiratory distress.   Abdominal:      General: There is no distension.   Neurological:      Mental Status: He is alert and oriented to person, place, and time.   Psychiatric:         Mood and Affect: Mood normal.         Behavior: Behavior is cooperative.        Result Review :{ Labs  Result Review  Imaging  Med Tab  Media :      My interpretation of the PFT : Normal, mildly reduced DLCO    Results for orders placed during the hospital encounter of 03/24/25    Complete PFT - Pre & Post Bronchodilator    Narrative  [facility] - Pulmonary Function Test    [address] [place] [address]  [address]  [phone]    Patient : Chaitanya Hair  MRN : [id number]  CSN : [id number]  Pulmonologist : Ambrose Palma MD  Date : 3/24/2025    ______________________________________________________________________    Interpretation :  1.  Spirometry is within normal limits.  2.  There is improvement in midflows postbronchodilator which were normal even prebronchodilator.  There is a slight decline in the patient's peak expiratory flow postbronchodilator which is now mildly decreased.  Otherwise there is no significant change in spirometry postbronchodilator.  3.  Lung " volumes reveal an elevated expiratory reserve volume and otherwise are within normal limits.  4.  There is a moderate diffusion impairment particularly when corrected for alveolar volume.  5.  When current studies are compared to studies performed on April 15, 2019 at [facility], the patient's current prebronchodilator spirometry reveals improvement in the FVC and no significant change in the FEV1 compared to previous baseline values.  The patient's current postbronchodilator spirometry reveals improvement in the FVC and slight improvement in the FEV1 compared to previous baseline values.      Ambrose Palma MD      Results for orders placed during the hospital encounter of 04/15/19    Pulmonary Function Test Spirometry Pre & Post Bronchodilator    Narrative  Review of flow volume loops demonstrate good patient effort and the loops are normal in appearance.  Spirometry is normal    Rest/Exercise Pulse Ox Values          3/25/2025    11:00   Rest/Exercise Pulse Ox Results   Rest room air SAT % 100   Exercise room air SAT % 97         Chaitanya Hair  reports that he has been smoking cigarettes. He has a 40 pack-year smoking history. He has been exposed to tobacco smoke. He has never used smokeless tobacco.             CT Angiogram Chest (02/12/2025 15:45)   My interpretation of imaging:  diffuse ground glass and nodular infiltrates bilaterally         Assessment and Plan {CC Problem List  Visit Diagnosis  ROS  Review (Popup)  Health Maintenance  Quality  BestPractice  Medications  SmartSets  SnapShot Encounters  Media      Diagnoses and all orders for this visit:    1. History of recent pneumonia (Primary)  Comments:  Follow-up CT chest in a few weeks to ensure resolution of bilateral pneumonia  Orders:  -     Walking Oximetry  -     CT Chest Without Contrast; Future    2. Tobacco abuse  Comments:  Cessation recommended.  Yearly LDCT.  Orders:  -     Walking Oximetry    3. Lung  infiltrate on CT  Comments:  Normal PFT.  Walking oximetry was favorable.  Follow-up CT in a few weeks to ensure resolution  Orders:  -     CT Chest Without Contrast; Future               Plan as above.  Office follow-up in 1 year or sooner if needed.  Will follow-up CT chest by phone.    Mehnaz Sanders, APRN  3/25/2025  11:40 CDT    Follow Up {Instructions Charge Capture  Follow-up Communications   Return in about 1 year (around 3/25/2026).    Patient was given instructions and counseling regarding his condition or for health maintenance advice. Please see specific information pulled into the AVS if appropriate.

## 2025-03-24 ENCOUNTER — HOSPITAL ENCOUNTER (OUTPATIENT)
Dept: PULMONOLOGY | Facility: HOSPITAL | Age: 65
Discharge: HOME | End: 2025-03-24
Admitting: FAMILY MEDICINE
Payer: MEDICARE

## 2025-03-24 DIAGNOSIS — J44.9 CHRONIC OBSTRUCTIVE PULMONARY DISEASE, UNSPECIFIED COPD TYPE: ICD-10-CM

## 2025-03-24 PROCEDURE — 94060 EVALUATION OF WHEEZING: CPT | Performed by: INTERNAL MEDICINE

## 2025-03-24 PROCEDURE — 94726 PLETHYSMOGRAPHY LUNG VOLUMES: CPT

## 2025-03-24 PROCEDURE — 94060 EVALUATION OF WHEEZING: CPT

## 2025-03-24 PROCEDURE — 94729 DIFFUSING CAPACITY: CPT | Performed by: INTERNAL MEDICINE

## 2025-03-24 PROCEDURE — 94729 DIFFUSING CAPACITY: CPT

## 2025-03-24 PROCEDURE — 94726 PLETHYSMOGRAPHY LUNG VOLUMES: CPT | Performed by: INTERNAL MEDICINE

## 2025-03-24 RX ORDER — ALBUTEROL SULFATE 0.83 MG/ML
2.5 SOLUTION RESPIRATORY (INHALATION) ONCE
Status: COMPLETED | OUTPATIENT
Start: 2025-03-24 | End: 2025-03-24

## 2025-03-24 RX ADMIN — ALBUTEROL SULFATE 2.5 MG: 2.5 SOLUTION RESPIRATORY (INHALATION) at 10:17

## 2025-03-25 ENCOUNTER — OFFICE VISIT (OUTPATIENT)
Dept: PULMONOLOGY | Facility: CLINIC | Age: 65
End: 2025-03-25
Payer: MEDICARE

## 2025-03-25 VITALS
HEIGHT: 76 IN | BODY MASS INDEX: 29.76 KG/M2 | SYSTOLIC BLOOD PRESSURE: 150 MMHG | HEART RATE: 65 BPM | OXYGEN SATURATION: 98 % | WEIGHT: 244.4 LBS | DIASTOLIC BLOOD PRESSURE: 78 MMHG

## 2025-03-25 DIAGNOSIS — R91.8 LUNG INFILTRATE ON CT: ICD-10-CM

## 2025-03-25 DIAGNOSIS — Z87.01 HISTORY OF RECENT PNEUMONIA: Primary | ICD-10-CM

## 2025-03-25 DIAGNOSIS — Z72.0 TOBACCO ABUSE: Chronic | ICD-10-CM

## 2025-03-25 PROCEDURE — 3077F SYST BP >= 140 MM HG: CPT | Performed by: NURSE PRACTITIONER

## 2025-03-25 PROCEDURE — 3078F DIAST BP <80 MM HG: CPT | Performed by: NURSE PRACTITIONER

## 2025-03-25 PROCEDURE — 99213 OFFICE O/P EST LOW 20 MIN: CPT | Performed by: NURSE PRACTITIONER

## 2025-03-25 RX ORDER — NITROGLYCERIN 0.4 MG/1
0.4 TABLET SUBLINGUAL
COMMUNITY
Start: 2025-02-26

## 2025-03-25 NOTE — PROCEDURES
Walking Oximetry    Performed by: Elizabeth Latif MA  Authorized by: Mehnaz Sanders APRN    Supplemental Oxygen: None  Rest room air SAT %:  100  Exercise room air SAT %:  97

## 2025-05-06 ENCOUNTER — RESULTS FOLLOW-UP (OUTPATIENT)
Dept: PULMONOLOGY | Facility: CLINIC | Age: 65
End: 2025-05-06
Payer: MEDICARE

## 2025-05-06 ENCOUNTER — HOSPITAL ENCOUNTER (OUTPATIENT)
Dept: CT IMAGING | Facility: HOSPITAL | Age: 65
Discharge: HOME | End: 2025-05-06
Admitting: NURSE PRACTITIONER
Payer: MEDICARE

## 2025-05-06 DIAGNOSIS — Z87.01 HISTORY OF RECENT PNEUMONIA: ICD-10-CM

## 2025-05-06 DIAGNOSIS — R91.8 LUNG INFILTRATE ON CT: ICD-10-CM

## 2025-05-06 PROCEDURE — 71250 CT THORAX DX C-: CPT

## (undated) DEVICE — ST. SORBAVIEW ULTIMATE IJ SYSTEM A,C: Brand: CENTURION

## (undated) DEVICE — Device: Brand: SINGLE USE ELECTROSURGICAL SNARE SD-400

## (undated) DEVICE — ADHS SKIN DERMABOND TOP ADVANCED

## (undated) DEVICE — THE CHANNEL CLEANING BRUSH IS A NYLON FLEXI BRUSH ATTACHED TO A FLEXIBLE PLASTIC SHEATH DESIGNED TO SAFELY REMOVE DEBRIS FROM FLEXIBLE ENDOSCOPES.

## (undated) DEVICE — CORONARY ARTERY BYPASS GRAFT MARKERS, STAINLESS STEEL, DISTAL, WITHOUT HOLDER: Brand: ANASTOMARK CORONARY ARTERY BYPASS GRAFT MARKERS, STAINLESS STEEL, DISTAL

## (undated) DEVICE — DRAIN SURG 15FR 100% SIL RND END PERF

## (undated) DEVICE — TROCAR: Brand: KII FIOS FIRST ENTRY

## (undated) DEVICE — OASIS DRAIN, DUAL, IN-LINE, ATS COMPATIBLE: Brand: OASIS

## (undated) DEVICE — VASOVIEW HEMOPRO: Brand: VASOVIEW HEMOPRO

## (undated) DEVICE — SENSR CERBRL O2 PK/2

## (undated) DEVICE — INSUFFLATION TUBING,LAPAROSCOPIC: Brand: DEROYAL

## (undated) DEVICE — GENERAL LAP CDS

## (undated) DEVICE — AGENT HEMSTAT 3GM PURIFIED PLNT STARCH PWD ABSRB ARISTA AH

## (undated) DEVICE — PK ATS CUST W CARDIOTOMY RESEVOIR

## (undated) DEVICE — DRSNG WND GEL FIBR OPTICELL AG PLS W/SLV LF 4X5IN  STRL

## (undated) DEVICE — DRSNG SURESITE WNDW 2.38X2.75

## (undated) DEVICE — CUTTER ENDOSCP L340MM LIN ARTC SGL STROKE FIRING ENDOPATH

## (undated) DEVICE — GLOVE SURG SZ 7 CRM LTX FREE POLYISOPRENE POLYMER BEAD ANTI

## (undated) DEVICE — BIOPATCH™ ANTIMICROBIAL DRESSING WITH CHLORHEXIDINE GLUCONATE IS A HYDROPHILLIC POLYURETHANE ABSORPTIVE FOAM WITH CHLORHEXIDINE GLUCONATE (CHG) WHICH INHIBITS BACTERIAL GROWTH UNDER THE DRESSING. THE DRESSING IS INTENDED TO BE USED TO ABSORB EXUDATE, COVER A WOUND CAUSED BY VASCULAR AND NONVASCULAR PERCUTANEOUS MEDICAL DEVICES DURING SURGERY, AS WELL AS REDUCE LOCAL INFECTION AND COLONIZATION OF MICROORGANISMS.: Brand: BIOPATCH

## (undated) DEVICE — SUTURE VCRL SZ 0 L27IN ABSRB VLT L36MM UR-5 5/8 CIR J376H

## (undated) DEVICE — RESERVOIR,SUCTION,100CC,SILICONE: Brand: MEDLINE

## (undated) DEVICE — PUMP SUC IRR TBNG L10FT W/ HNDPC ASSEMB STRYKEFLOW 2

## (undated) DEVICE — BAG SPEC REM 224ML W4XL6IN DIA10MM 1 HND GYN DISP ENDOPCH

## (undated) DEVICE — SOL IRR NACL 0.9PCT BT 1000ML

## (undated) DEVICE — SPNG GZ STRL 2S 4X4 12PLY

## (undated) DEVICE — SOLUTION ANTIFOG VIS SYS CLEARIFY LAPSCP

## (undated) DEVICE — CURAVIEW LED LARYNSCP BLDE

## (undated) DEVICE — Device

## (undated) DEVICE — 12 FOOT DISPOSABLE EXTENSION CABLE WITH SAFE CONNECT / SCREW-DOWN

## (undated) DEVICE — YANKAUER,BULB TIP WITH VENT: Brand: ARGYLE

## (undated) DEVICE — SUT PROLN MO.5 7/0 DBLARM BV175 6 2X30 BX/12

## (undated) DEVICE — SUTURE MCRYL SZ 4-0 L18IN ABSRB UD L19MM PS-2 3/8 CIR PRIM Y496G

## (undated) DEVICE — HARMONIC SYNERGY DISSECTING HOOK WITH TORQUE WRENCH. FOR USE WITH BLUE HAND PIECE ONLY: Brand: HARMONIC SYNERGY

## (undated) DEVICE — THE SINGLE USE ETRAP – POLYP TRAP IS USED FOR SUCTION RETRIEVAL OF ENDOSCOPICALLY REMOVED POLYPS.: Brand: ETRAP

## (undated) DEVICE — SUT VIC 0 CT1 CR8 27IN JJ41G

## (undated) DEVICE — PK PERFUS CUST W/CARDIOPLEGIA

## (undated) DEVICE — GLV SURG BIOGEL M LTX PF 7 1/2

## (undated) DEVICE — BLOWER/MISTER AXIOUS OPCAB W/TBG

## (undated) DEVICE — MASK,OXYGEN,MED CONC,ADLT,7' TUB, UC: Brand: PENDING

## (undated) DEVICE — SOL ISO/ALC RUB 70PCT 4OZ

## (undated) DEVICE — SEALER LAP L37CM MARYLAND JAW OPN NANO COAT MULTIFUNCTIONAL

## (undated) DEVICE — SUT SILK 0 CT1 CR8 18IN C021D

## (undated) DEVICE — TUBE ET 7.5MM NSL ORAL BASIC CUF INTMED MURPHY EYE RADPQ

## (undated) DEVICE — SOLUTION IV 500ML LAC RINGERS PH 6.5 INJ USP VIAFLX PLAS

## (undated) DEVICE — HEMOCONCENTRATOR PERFUS LPS06

## (undated) DEVICE — LOU OPEN HEART DR POLLOCK: Brand: MEDLINE INDUSTRIES, INC.

## (undated) DEVICE — TROCAR: Brand: KII® SLEEVE

## (undated) DEVICE — SOL IRR H2O BTL 1000ML STRL

## (undated) DEVICE — Device: Brand: DEFENDO AIR/WATER/SUCTION AND BIOPSY VALVE

## (undated) DEVICE — RELOAD STPL H1-2.5X45MM VASC THN TISS WHT 6 ROW B FRM SGL

## (undated) DEVICE — DECANTER VI VENT W/ VLV FOR ASEP TRNSF OF FLD

## (undated) DEVICE — CUFF,BP,DISP,1 TUBE,ADULT,HP: Brand: MEDLINE

## (undated) DEVICE — OPTIFOAM GENTLE SA, POSTOP, 4X12: Brand: MEDLINE

## (undated) DEVICE — ADHESIVE SKIN CLSR 0.7ML TOP DERMBND ADV

## (undated) DEVICE — BLAKE SILICONE DRAINS CARDIO CONNECTOR 2:1: Brand: BLAKE

## (undated) DEVICE — SYS PERFUS SEP PLATLT W TIPS CUST

## (undated) DEVICE — GOWN,PREVENTION PLUS,XL,ST,24/CS: Brand: MEDLINE

## (undated) DEVICE — Device: Brand: LEVEL 1

## (undated) DEVICE — SUTURE ETHLN SZ 2-0 L30IN NONABSORBABLE BLK L36MM FSLX 3/8 1674H

## (undated) DEVICE — SOLUTION IV 1000ML 0.9% SOD CHL

## (undated) DEVICE — SENSR O2 OXIMAX FNGR A/ 18IN NONSTR

## (undated) DEVICE — PK HEART OPN 40